# Patient Record
Sex: FEMALE | Race: WHITE | NOT HISPANIC OR LATINO | URBAN - METROPOLITAN AREA
[De-identification: names, ages, dates, MRNs, and addresses within clinical notes are randomized per-mention and may not be internally consistent; named-entity substitution may affect disease eponyms.]

---

## 2020-01-06 ENCOUNTER — HISTORICAL (OUTPATIENT)
Dept: INTENSIVE CARE | Facility: HOSPITAL | Age: 50
End: 2020-01-06

## 2020-07-25 ENCOUNTER — HOSPITAL ENCOUNTER (INPATIENT)
Facility: HOSPITAL | Age: 50
LOS: 11 days | Discharge: NURSING, REHAB OR LONG-TERM CARE | DRG: 025 | End: 2020-08-06
Attending: EMERGENCY MEDICINE | Admitting: INTERNAL MEDICINE
Payer: COMMERCIAL

## 2020-07-25 DIAGNOSIS — E83.52 HYPERCALCEMIA: ICD-10-CM

## 2020-07-25 DIAGNOSIS — G89.29 CHRONIC NONINTRACTABLE HEADACHE, UNSPECIFIED HEADACHE TYPE: ICD-10-CM

## 2020-07-25 DIAGNOSIS — Z01.818 PREOP TESTING: ICD-10-CM

## 2020-07-25 DIAGNOSIS — D32.0 CALCIFIED CEREBRAL MENINGIOMA: Primary | Chronic | ICD-10-CM

## 2020-07-25 DIAGNOSIS — G93.89 BRAIN MASS: ICD-10-CM

## 2020-07-25 DIAGNOSIS — R51.9 CHRONIC NONINTRACTABLE HEADACHE, UNSPECIFIED HEADACHE TYPE: ICD-10-CM

## 2020-07-25 DIAGNOSIS — E87.6 HYPOKALEMIA: ICD-10-CM

## 2020-07-25 DIAGNOSIS — N17.9 AKI (ACUTE KIDNEY INJURY): ICD-10-CM

## 2020-07-25 DIAGNOSIS — D50.9 IRON DEFICIENCY ANEMIA, UNSPECIFIED IRON DEFICIENCY ANEMIA TYPE: ICD-10-CM

## 2020-07-25 DIAGNOSIS — N18.30 ANEMIA DUE TO STAGE 3 CHRONIC KIDNEY DISEASE: ICD-10-CM

## 2020-07-25 DIAGNOSIS — D49.7 NEOPLASM OF CENTRAL NERVOUS SYSTEM: ICD-10-CM

## 2020-07-25 DIAGNOSIS — D49.6 NEOPLASM OF BRAIN CAUSING MASS EFFECT ON ADJACENT STRUCTURES: ICD-10-CM

## 2020-07-25 DIAGNOSIS — D63.1 ANEMIA DUE TO STAGE 3 CHRONIC KIDNEY DISEASE: ICD-10-CM

## 2020-07-25 LAB
HIV 1+2 AB+HIV1 P24 AG SERPL QL IA: NEGATIVE
SARS-COV-2 RDRP RESP QL NAA+PROBE: NEGATIVE

## 2020-07-25 PROCEDURE — 96361 HYDRATE IV INFUSION ADD-ON: CPT

## 2020-07-25 PROCEDURE — 96375 TX/PRO/DX INJ NEW DRUG ADDON: CPT

## 2020-07-25 PROCEDURE — 86703 HIV-1/HIV-2 1 RESULT ANTBDY: CPT

## 2020-07-25 PROCEDURE — 25000003 PHARM REV CODE 250: Performed by: EMERGENCY MEDICINE

## 2020-07-25 PROCEDURE — 99291 CRITICAL CARE FIRST HOUR: CPT | Mod: 25

## 2020-07-25 PROCEDURE — 96374 THER/PROPH/DIAG INJ IV PUSH: CPT

## 2020-07-25 PROCEDURE — U0002 COVID-19 LAB TEST NON-CDC: HCPCS

## 2020-07-25 PROCEDURE — 63600175 PHARM REV CODE 636 W HCPCS: Performed by: EMERGENCY MEDICINE

## 2020-07-25 RX ORDER — ATORVASTATIN CALCIUM 10 MG/1
10 TABLET, FILM COATED ORAL DAILY
COMMUNITY
Start: 2020-06-02

## 2020-07-25 RX ORDER — MORPHINE SULFATE 4 MG/ML
4 INJECTION, SOLUTION INTRAMUSCULAR; INTRAVENOUS
Status: COMPLETED | OUTPATIENT
Start: 2020-07-25 | End: 2020-07-25

## 2020-07-25 RX ORDER — LOSARTAN POTASSIUM AND HYDROCHLOROTHIAZIDE 12.5; 5 MG/1; MG/1
1 TABLET ORAL DAILY
COMMUNITY
End: 2020-07-26 | Stop reason: CLARIF

## 2020-07-25 RX ORDER — LEVETIRACETAM 500 MG/1
1500 TABLET ORAL 2 TIMES DAILY
COMMUNITY

## 2020-07-25 RX ORDER — ASPIRIN 325 MG
325 TABLET ORAL DAILY
Status: ON HOLD | COMMUNITY
End: 2020-08-06 | Stop reason: HOSPADM

## 2020-07-25 RX ORDER — FUROSEMIDE 10 MG/ML
20 INJECTION INTRAMUSCULAR; INTRAVENOUS
Status: COMPLETED | OUTPATIENT
Start: 2020-07-25 | End: 2020-07-25

## 2020-07-25 RX ORDER — LEVOTHYROXINE SODIUM 125 UG/1
125 TABLET ORAL
COMMUNITY
Start: 2019-08-09 | End: 2020-07-26 | Stop reason: CLARIF

## 2020-07-25 RX ORDER — LOSARTAN POTASSIUM 100 MG/1
100 TABLET ORAL DAILY
COMMUNITY
End: 2020-07-25 | Stop reason: CLARIF

## 2020-07-25 RX ORDER — ONDANSETRON 2 MG/ML
4 INJECTION INTRAMUSCULAR; INTRAVENOUS
Status: COMPLETED | OUTPATIENT
Start: 2020-07-25 | End: 2020-07-25

## 2020-07-25 RX ORDER — METFORMIN HYDROCHLORIDE 500 MG/1
500 TABLET ORAL 2 TIMES DAILY WITH MEALS
COMMUNITY

## 2020-07-25 RX ORDER — CALCITRIOL 0.25 UG/1
0.25 CAPSULE ORAL DAILY
Status: ON HOLD | COMMUNITY
Start: 2020-07-14 | End: 2021-01-27

## 2020-07-25 RX ORDER — GABAPENTIN 600 MG/1
600 TABLET ORAL DAILY
Status: ON HOLD | COMMUNITY
Start: 2020-06-25 | End: 2021-01-27

## 2020-07-25 RX ORDER — ZOLPIDEM TARTRATE 10 MG/1
10 TABLET ORAL NIGHTLY PRN
Status: ON HOLD | COMMUNITY
Start: 2020-06-25 | End: 2020-08-06 | Stop reason: SDUPTHER

## 2020-07-25 RX ORDER — CLOPIDOGREL BISULFATE 75 MG/1
75 TABLET ORAL DAILY
Status: ON HOLD | COMMUNITY
Start: 2020-06-25 | End: 2020-08-06 | Stop reason: HOSPADM

## 2020-07-25 RX ADMIN — SODIUM CHLORIDE 1000 ML: 0.9 INJECTION, SOLUTION INTRAVENOUS at 10:07

## 2020-07-25 RX ADMIN — FUROSEMIDE 20 MG: 10 INJECTION, SOLUTION INTRAMUSCULAR; INTRAVENOUS at 10:07

## 2020-07-25 RX ADMIN — ONDANSETRON 4 MG: 2 INJECTION INTRAMUSCULAR; INTRAVENOUS at 08:07

## 2020-07-25 RX ADMIN — MORPHINE SULFATE 4 MG: 4 INJECTION, SOLUTION INTRAMUSCULAR; INTRAVENOUS at 08:07

## 2020-07-26 PROBLEM — N17.9 AKI (ACUTE KIDNEY INJURY): Status: ACTIVE | Noted: 2020-07-26

## 2020-07-26 PROBLEM — I10 ESSENTIAL HYPERTENSION: Chronic | Status: ACTIVE | Noted: 2020-07-26

## 2020-07-26 PROBLEM — E83.52 HYPERCALCEMIA: Status: ACTIVE | Noted: 2020-07-26

## 2020-07-26 PROBLEM — G93.89 BRAIN MASS: Status: ACTIVE | Noted: 2020-07-26

## 2020-07-26 LAB
25(OH)D3+25(OH)D2 SERPL-MCNC: 18 NG/ML (ref 30–96)
ALBUMIN SERPL BCP-MCNC: 3.1 G/DL (ref 3.5–5.2)
ALBUMIN SERPL BCP-MCNC: 3.4 G/DL (ref 3.5–5.2)
ALBUMIN SERPL BCP-MCNC: 3.4 G/DL (ref 3.5–5.2)
ALP SERPL-CCNC: 158 U/L (ref 55–135)
ALP SERPL-CCNC: 158 U/L (ref 55–135)
ALT SERPL W/O P-5'-P-CCNC: 22 U/L (ref 10–44)
ALT SERPL W/O P-5'-P-CCNC: 23 U/L (ref 10–44)
ANION GAP SERPL CALC-SCNC: 12 MMOL/L (ref 8–16)
ANION GAP SERPL CALC-SCNC: 12 MMOL/L (ref 8–16)
ANION GAP SERPL CALC-SCNC: 13 MMOL/L (ref 8–16)
AST SERPL-CCNC: 26 U/L (ref 10–40)
AST SERPL-CCNC: 29 U/L (ref 10–40)
BACTERIA #/AREA URNS HPF: ABNORMAL /HPF
BASOPHILS # BLD AUTO: 0.04 K/UL (ref 0–0.2)
BASOPHILS NFR BLD: 0.4 % (ref 0–1.9)
BILIRUB SERPL-MCNC: 0.3 MG/DL (ref 0.1–1)
BILIRUB SERPL-MCNC: 0.3 MG/DL (ref 0.1–1)
BILIRUB UR QL STRIP: NEGATIVE
BUN SERPL-MCNC: 19 MG/DL (ref 6–20)
BUN SERPL-MCNC: 20 MG/DL (ref 6–20)
BUN SERPL-MCNC: 21 MG/DL (ref 6–20)
CA-I BLDV-SCNC: 1.76 MMOL/L (ref 1.06–1.42)
CALCIUM SERPL-MCNC: 13.3 MG/DL (ref 8.7–10.5)
CALCIUM SERPL-MCNC: 14 MG/DL (ref 8.7–10.5)
CALCIUM SERPL-MCNC: 14.2 MG/DL (ref 8.7–10.5)
CHLORIDE SERPL-SCNC: 97 MMOL/L (ref 95–110)
CHLORIDE SERPL-SCNC: 98 MMOL/L (ref 95–110)
CHLORIDE SERPL-SCNC: 98 MMOL/L (ref 95–110)
CHOLEST SERPL-MCNC: 136 MG/DL (ref 120–199)
CHOLEST/HDLC SERPL: 3.9 {RATIO} (ref 2–5)
CLARITY UR: ABNORMAL
CO2 SERPL-SCNC: 25 MMOL/L (ref 23–29)
CO2 SERPL-SCNC: 25 MMOL/L (ref 23–29)
CO2 SERPL-SCNC: 26 MMOL/L (ref 23–29)
COLOR UR: YELLOW
CREAT SERPL-MCNC: 1.9 MG/DL (ref 0.5–1.4)
CREAT SERPL-MCNC: 2.4 MG/DL (ref 0.5–1.4)
CREAT SERPL-MCNC: 2.5 MG/DL (ref 0.5–1.4)
CREAT UR-MCNC: 66.1 MG/DL (ref 15–325)
CREAT UR-MCNC: 66.1 MG/DL (ref 15–325)
DIFFERENTIAL METHOD: ABNORMAL
EOSINOPHIL # BLD AUTO: 0.1 K/UL (ref 0–0.5)
EOSINOPHIL NFR BLD: 0.9 % (ref 0–8)
ERYTHROCYTE [DISTWIDTH] IN BLOOD BY AUTOMATED COUNT: 15.2 % (ref 11.5–14.5)
EST. GFR  (AFRICAN AMERICAN): 25 ML/MIN/1.73 M^2
EST. GFR  (AFRICAN AMERICAN): 26 ML/MIN/1.73 M^2
EST. GFR  (AFRICAN AMERICAN): 35 ML/MIN/1.73 M^2
EST. GFR  (NON AFRICAN AMERICAN): 22 ML/MIN/1.73 M^2
EST. GFR  (NON AFRICAN AMERICAN): 23 ML/MIN/1.73 M^2
EST. GFR  (NON AFRICAN AMERICAN): 30 ML/MIN/1.73 M^2
GLUCOSE SERPL-MCNC: 103 MG/DL (ref 70–110)
GLUCOSE SERPL-MCNC: 110 MG/DL (ref 70–110)
GLUCOSE SERPL-MCNC: 124 MG/DL (ref 70–110)
GLUCOSE UR QL STRIP: NEGATIVE
HCT VFR BLD AUTO: 32.6 % (ref 37–48.5)
HDLC SERPL-MCNC: 35 MG/DL (ref 40–75)
HDLC SERPL: 25.7 % (ref 20–50)
HGB BLD-MCNC: 9.8 G/DL (ref 12–16)
HGB UR QL STRIP: ABNORMAL
IMM GRANULOCYTES # BLD AUTO: 0.09 K/UL (ref 0–0.04)
IMM GRANULOCYTES NFR BLD AUTO: 0.8 % (ref 0–0.5)
KETONES UR QL STRIP: NEGATIVE
LDLC SERPL CALC-MCNC: 67.6 MG/DL (ref 63–159)
LEUKOCYTE ESTERASE UR QL STRIP: ABNORMAL
LYMPHOCYTES # BLD AUTO: 2.9 K/UL (ref 1–4.8)
LYMPHOCYTES NFR BLD: 27.4 % (ref 18–48)
MAGNESIUM SERPL-MCNC: 1.9 MG/DL (ref 1.6–2.6)
MCH RBC QN AUTO: 24.6 PG (ref 27–31)
MCHC RBC AUTO-ENTMCNC: 30.1 G/DL (ref 32–36)
MCV RBC AUTO: 82 FL (ref 82–98)
MICROSCOPIC COMMENT: ABNORMAL
MONOCYTES # BLD AUTO: 0.7 K/UL (ref 0.3–1)
MONOCYTES NFR BLD: 6.4 % (ref 4–15)
NEUTROPHILS # BLD AUTO: 6.9 K/UL (ref 1.8–7.7)
NEUTROPHILS NFR BLD: 64.1 % (ref 38–73)
NITRITE UR QL STRIP: POSITIVE
NONHDLC SERPL-MCNC: 101 MG/DL
NRBC BLD-RTO: 0 /100 WBC
PH UR STRIP: 6 [PH] (ref 5–8)
PHOSPHATE SERPL-MCNC: 4.5 MG/DL (ref 2.7–4.5)
PHOSPHATE SERPL-MCNC: 5.3 MG/DL (ref 2.7–4.5)
PLATELET # BLD AUTO: 370 K/UL (ref 150–350)
PMV BLD AUTO: 9.9 FL (ref 9.2–12.9)
POCT GLUCOSE: 101 MG/DL (ref 70–110)
POCT GLUCOSE: 112 MG/DL (ref 70–110)
POCT GLUCOSE: 114 MG/DL (ref 70–110)
POCT GLUCOSE: 123 MG/DL (ref 70–110)
POTASSIUM SERPL-SCNC: 4.6 MMOL/L (ref 3.5–5.1)
POTASSIUM SERPL-SCNC: 4.6 MMOL/L (ref 3.5–5.1)
POTASSIUM SERPL-SCNC: 4.8 MMOL/L (ref 3.5–5.1)
PROT SERPL-MCNC: 6.9 G/DL (ref 6–8.4)
PROT SERPL-MCNC: 6.9 G/DL (ref 6–8.4)
PROT UR QL STRIP: NEGATIVE
PROT UR-MCNC: 12 MG/DL (ref 0–15)
PROT UR-MCNC: 12 MG/DL (ref 0–15)
PROT/CREAT UR: 0.18 MG/G{CREAT} (ref 0–0.2)
PTH-INTACT SERPL-MCNC: <5 PG/ML (ref 9–77)
RBC # BLD AUTO: 3.98 M/UL (ref 4–5.4)
RBC #/AREA URNS HPF: 5 /HPF (ref 0–4)
SODIUM SERPL-SCNC: 135 MMOL/L (ref 136–145)
SODIUM SERPL-SCNC: 135 MMOL/L (ref 136–145)
SODIUM SERPL-SCNC: 136 MMOL/L (ref 136–145)
SODIUM UR-SCNC: 82 MMOL/L (ref 20–250)
SP GR UR STRIP: 1.02 (ref 1–1.03)
TRIGL SERPL-MCNC: 167 MG/DL (ref 30–150)
TSH SERPL DL<=0.005 MIU/L-ACNC: 1.54 UIU/ML (ref 0.4–4)
URN SPEC COLLECT METH UR: ABNORMAL
UROBILINOGEN UR STRIP-ACNC: NEGATIVE EU/DL
UUN UR-MCNC: 208 MG/DL (ref 140–1050)
WBC # BLD AUTO: 10.72 K/UL (ref 3.9–12.7)
WBC #/AREA URNS HPF: >100 /HPF (ref 0–5)

## 2020-07-26 PROCEDURE — 84540 ASSAY OF URINE/UREA-N: CPT

## 2020-07-26 PROCEDURE — 83735 ASSAY OF MAGNESIUM: CPT

## 2020-07-26 PROCEDURE — 82306 VITAMIN D 25 HYDROXY: CPT

## 2020-07-26 PROCEDURE — 87086 URINE CULTURE/COLONY COUNT: CPT

## 2020-07-26 PROCEDURE — 80061 LIPID PANEL: CPT

## 2020-07-26 PROCEDURE — 25000003 PHARM REV CODE 250: Performed by: NURSE PRACTITIONER

## 2020-07-26 PROCEDURE — 99223 PR INITIAL HOSPITAL CARE,LEVL III: ICD-10-PCS | Mod: ,,, | Performed by: INTERNAL MEDICINE

## 2020-07-26 PROCEDURE — 80069 RENAL FUNCTION PANEL: CPT

## 2020-07-26 PROCEDURE — 25000003 PHARM REV CODE 250: Performed by: INTERNAL MEDICINE

## 2020-07-26 PROCEDURE — 63600175 PHARM REV CODE 636 W HCPCS: Performed by: NURSE PRACTITIONER

## 2020-07-26 PROCEDURE — 81000 URINALYSIS NONAUTO W/SCOPE: CPT

## 2020-07-26 PROCEDURE — 84156 ASSAY OF PROTEIN URINE: CPT

## 2020-07-26 PROCEDURE — 84100 ASSAY OF PHOSPHORUS: CPT

## 2020-07-26 PROCEDURE — 63600175 PHARM REV CODE 636 W HCPCS: Performed by: INTERNAL MEDICINE

## 2020-07-26 PROCEDURE — 83970 ASSAY OF PARATHORMONE: CPT

## 2020-07-26 PROCEDURE — 84300 ASSAY OF URINE SODIUM: CPT

## 2020-07-26 PROCEDURE — 80053 COMPREHEN METABOLIC PANEL: CPT

## 2020-07-26 PROCEDURE — 96372 THER/PROPH/DIAG INJ SC/IM: CPT

## 2020-07-26 PROCEDURE — 99223 1ST HOSP IP/OBS HIGH 75: CPT | Mod: ,,, | Performed by: NEUROLOGICAL SURGERY

## 2020-07-26 PROCEDURE — 84443 ASSAY THYROID STIM HORMONE: CPT

## 2020-07-26 PROCEDURE — 82330 ASSAY OF CALCIUM: CPT

## 2020-07-26 PROCEDURE — 99223 1ST HOSP IP/OBS HIGH 75: CPT | Mod: ,,, | Performed by: INTERNAL MEDICINE

## 2020-07-26 PROCEDURE — 36415 COLL VENOUS BLD VENIPUNCTURE: CPT

## 2020-07-26 PROCEDURE — 85025 COMPLETE CBC W/AUTO DIFF WBC: CPT

## 2020-07-26 PROCEDURE — 99223 PR INITIAL HOSPITAL CARE,LEVL III: ICD-10-PCS | Mod: ,,, | Performed by: NEUROLOGICAL SURGERY

## 2020-07-26 PROCEDURE — 11000001 HC ACUTE MED/SURG PRIVATE ROOM

## 2020-07-26 PROCEDURE — 80053 COMPREHEN METABOLIC PANEL: CPT | Mod: 91

## 2020-07-26 PROCEDURE — 82652 VIT D 1 25-DIHYDROXY: CPT

## 2020-07-26 RX ORDER — HYDRALAZINE HYDROCHLORIDE 25 MG/1
25 TABLET, FILM COATED ORAL EVERY 8 HOURS
Status: DISCONTINUED | OUTPATIENT
Start: 2020-07-26 | End: 2020-08-06 | Stop reason: HOSPADM

## 2020-07-26 RX ORDER — GABAPENTIN 300 MG/1
600 CAPSULE ORAL DAILY
Status: DISCONTINUED | OUTPATIENT
Start: 2020-07-26 | End: 2020-08-06 | Stop reason: HOSPADM

## 2020-07-26 RX ORDER — CLOPIDOGREL BISULFATE 75 MG/1
75 TABLET ORAL DAILY
Status: DISCONTINUED | OUTPATIENT
Start: 2020-07-26 | End: 2020-07-27

## 2020-07-26 RX ORDER — ONDANSETRON 2 MG/ML
4 INJECTION INTRAMUSCULAR; INTRAVENOUS EVERY 6 HOURS PRN
Status: DISCONTINUED | OUTPATIENT
Start: 2020-07-26 | End: 2020-07-31

## 2020-07-26 RX ORDER — INSULIN ASPART 100 [IU]/ML
0-5 INJECTION, SOLUTION INTRAVENOUS; SUBCUTANEOUS
Status: DISCONTINUED | OUTPATIENT
Start: 2020-07-26 | End: 2020-07-27

## 2020-07-26 RX ORDER — LEVOTHYROXINE SODIUM 137 UG/1
TABLET ORAL
Status: ON HOLD | COMMUNITY
Start: 2020-07-14 | End: 2020-08-06 | Stop reason: SDUPTHER

## 2020-07-26 RX ORDER — LEVETIRACETAM 500 MG/1
1500 TABLET ORAL 2 TIMES DAILY
Status: DISCONTINUED | OUTPATIENT
Start: 2020-07-26 | End: 2020-07-30

## 2020-07-26 RX ORDER — IBUPROFEN 200 MG
16 TABLET ORAL
Status: DISCONTINUED | OUTPATIENT
Start: 2020-07-26 | End: 2020-07-27

## 2020-07-26 RX ORDER — SODIUM CHLORIDE 9 MG/ML
INJECTION, SOLUTION INTRAVENOUS CONTINUOUS
Status: DISCONTINUED | OUTPATIENT
Start: 2020-07-26 | End: 2020-07-27

## 2020-07-26 RX ORDER — HYDRALAZINE HYDROCHLORIDE 20 MG/ML
10 INJECTION INTRAMUSCULAR; INTRAVENOUS EVERY 8 HOURS PRN
Status: DISCONTINUED | OUTPATIENT
Start: 2020-07-26 | End: 2020-08-06 | Stop reason: HOSPADM

## 2020-07-26 RX ORDER — HEPARIN SODIUM 5000 [USP'U]/ML
5000 INJECTION, SOLUTION INTRAVENOUS; SUBCUTANEOUS EVERY 8 HOURS
Status: DISCONTINUED | OUTPATIENT
Start: 2020-07-26 | End: 2020-07-30

## 2020-07-26 RX ORDER — ACETAMINOPHEN 325 MG/1
650 TABLET ORAL EVERY 6 HOURS PRN
Status: DISCONTINUED | OUTPATIENT
Start: 2020-07-26 | End: 2020-07-31

## 2020-07-26 RX ORDER — IBUPROFEN 200 MG
24 TABLET ORAL
Status: DISCONTINUED | OUTPATIENT
Start: 2020-07-26 | End: 2020-07-27

## 2020-07-26 RX ORDER — GLUCAGON 1 MG
1 KIT INJECTION
Status: DISCONTINUED | OUTPATIENT
Start: 2020-07-26 | End: 2020-07-27

## 2020-07-26 RX ORDER — ATORVASTATIN CALCIUM 10 MG/1
10 TABLET, FILM COATED ORAL DAILY
Status: DISCONTINUED | OUTPATIENT
Start: 2020-07-26 | End: 2020-08-06 | Stop reason: HOSPADM

## 2020-07-26 RX ORDER — LOSARTAN POTASSIUM AND HYDROCHLOROTHIAZIDE 12.5; 1 MG/1; MG/1
TABLET ORAL
Status: ON HOLD | COMMUNITY
Start: 2020-05-16 | End: 2020-08-06 | Stop reason: HOSPADM

## 2020-07-26 RX ORDER — ASPIRIN 325 MG
325 TABLET ORAL DAILY
Status: DISCONTINUED | OUTPATIENT
Start: 2020-07-26 | End: 2020-07-27

## 2020-07-26 RX ORDER — ENOXAPARIN SODIUM 100 MG/ML
40 INJECTION SUBCUTANEOUS EVERY 24 HOURS
Status: DISCONTINUED | OUTPATIENT
Start: 2020-07-26 | End: 2020-07-26

## 2020-07-26 RX ADMIN — ATORVASTATIN CALCIUM 10 MG: 10 TABLET, FILM COATED ORAL at 09:07

## 2020-07-26 RX ADMIN — CEFTRIAXONE 1 G: 1 INJECTION, SOLUTION INTRAVENOUS at 06:07

## 2020-07-26 RX ADMIN — LEVOTHYROXINE SODIUM 137 MCG: 25 TABLET ORAL at 06:07

## 2020-07-26 RX ADMIN — SODIUM CHLORIDE: 0.9 INJECTION, SOLUTION INTRAVENOUS at 04:07

## 2020-07-26 RX ADMIN — CLOPIDOGREL 75 MG: 75 TABLET, FILM COATED ORAL at 09:07

## 2020-07-26 RX ADMIN — HEPARIN SODIUM 5000 UNITS: 5000 INJECTION, SOLUTION INTRAVENOUS; SUBCUTANEOUS at 09:07

## 2020-07-26 RX ADMIN — HYDRALAZINE HYDROCHLORIDE 25 MG: 25 TABLET, FILM COATED ORAL at 09:07

## 2020-07-26 RX ADMIN — GABAPENTIN 600 MG: 300 CAPSULE ORAL at 09:07

## 2020-07-26 RX ADMIN — HEPARIN SODIUM 5000 UNITS: 5000 INJECTION, SOLUTION INTRAVENOUS; SUBCUTANEOUS at 04:07

## 2020-07-26 RX ADMIN — SODIUM CHLORIDE: 0.9 INJECTION, SOLUTION INTRAVENOUS at 09:07

## 2020-07-26 RX ADMIN — HEPARIN SODIUM 5000 UNITS: 5000 INJECTION, SOLUTION INTRAVENOUS; SUBCUTANEOUS at 06:07

## 2020-07-26 RX ADMIN — LEVETIRACETAM 1500 MG: 500 TABLET ORAL at 09:07

## 2020-07-26 RX ADMIN — ACETAMINOPHEN 650 MG: 325 TABLET ORAL at 09:07

## 2020-07-26 RX ADMIN — ASPIRIN 325 MG: 325 TABLET, FILM COATED ORAL at 09:07

## 2020-07-26 RX ADMIN — HYDRALAZINE HYDROCHLORIDE 10 MG: 20 INJECTION INTRAMUSCULAR; INTRAVENOUS at 04:07

## 2020-07-26 RX ADMIN — LEVETIRACETAM 1500 MG: 500 TABLET ORAL at 08:07

## 2020-07-26 NOTE — ASSESSMENT & PLAN NOTE
1. SHARLENE on CKD stage 3 , baseline creatinine about 1.3 mg/dL, GFR 50 mL/minute, acute kidney injury likely dehydration from hypercalcemia, continue IV fluids, also can be a urinary tract infection, urine culture pending, will start her on IV Rocephin, renal ultrasound ordered and pending at this time,  Avoid Ace inhibitors/ARB/NSAIDs due to acute kidney injury,    2.  Hypercalcemia, patient was taking Tums, calcitriol, hydrochlorothiazide, serum vitamin-D level slightly low at 18, PTH appropriately suppressed at less than 5, PTH RP pending, improvement in serum calcium noted with hydration,     3.  Hypertension, blood pressure is slightly elevated, can be due to her nausea, will add low-dose hydralazine,    4.  Intracranial mass, neurosurgery notes reviewed, MRI pending,    5.  Anemia, multifactorial, monitor hemoglobin,    6.  Possible urinary tract infection, urine cultures pending, will start on IV Rocephin,

## 2020-07-26 NOTE — ASSESSMENT & PLAN NOTE
- Initial creatinine of 1.9 (baseline 1.3).  - IV hydration.  - Avoid nephrotoxic agents. Hold home losartan-HCTZ for now.  - Follow labs.

## 2020-07-26 NOTE — PLAN OF CARE
SW met with patient and her  at bedside to complete discharge planning assessment. Patient lives with her  and he helps manages her care at home. Patient uses a power chair at home and states she is independent with her needs. She denies any current home health or outpatient treatment at this time. She denies any current lw/poa. Patient d/c contact will be her . No other needs identified at this time. Patient denies any post hospital needs or services at this time. Transitional Care Folder, Discharge Planning Begins on Admission pamphlet, [facility], Advance Directive information given to patient. Sw placed contact information on white board. Sw instructed patient or family to call with any questions or concerns.    D/C plan: Home with family  Sabra Fregoso MD  Pharm: [facility] in [place]   Bedside: Yes  Transportation:       07/26/20 1155   Discharge Assessment   Assessment Type Discharge Planning Assessment   Confirmed/corrected address and phone number on facesheet? Yes   Assessment information obtained from? Patient;Caregiver   Communicated expected length of stay with patient/caregiver no   Prior to hospitilization cognitive status: Alert/Oriented   Prior to hospitalization functional status: Assistive Equipment   Current cognitive status: Alert/Oriented   Current Functional Status: Assistive Equipment   Lives With spouse   Able to Return to Prior Arrangements yes   Is patient able to care for self after discharge? Unable to determine at this time (comments)   Who are your caregiver(s) and their phone number(s)? COLLIN DE LA ROSA [phone]   Readmission Within the Last 30 Days no previous admission in last 30 days   Patient currently being followed by outpatient case management? No   Patient currently receives any other outside agency services? No   Equipment Currently Used at Home power chair   Do you have any problems affording any of your  prescribed medications? No   Is the patient taking medications as prescribed? yes   Does the patient have transportation home? Yes   Transportation Anticipated family or friend will provide   Does the patient receive services at the [facility]? No   Discharge Plan A Home with family   Discharge Plan B Home   DME Needed Upon Discharge  none   Patient/Family in Agreement with Plan yes   Does the patient have transportation to healthcare appointments? Yes

## 2020-07-26 NOTE — ASSESSMENT & PLAN NOTE
- Initial calcium of 14.2.  Patient is on calitriol at home, will discontinue.  - Continue aggressive IV hydration.  Will give additional IV Lasix if needed.  - Follow labs.  - Nephrology consult.

## 2020-07-26 NOTE — PLAN OF CARE
Chart reviewed, pt resting in bed with call light and personal belongings within reach  Vitals stable.  Iv fluids infusing as ordered.  Bed alarm set.  Neuro checks every 4 hours.  Pt absent of injury throughout shift, will continue to monitor.

## 2020-07-26 NOTE — SUBJECTIVE & OBJECTIVE
Past Medical History:   Diagnosis Date    Diabetes mellitus     Hypertension     Seizures     Stroke     Thyroid disease        Past Surgical History:   Procedure Laterality Date    THYROIDECTOMY      TONSILLECTOMY         Review of patient's allergies indicates:  No Known Allergies    No current facility-administered medications on file prior to encounter.      Current Outpatient Medications on File Prior to Encounter   Medication Sig    aspirin 325 MG tablet Take 325 mg by mouth once daily.    atorvastatin (LIPITOR) 10 MG tablet Take 10 mg by mouth once daily.     calcitRIOL (ROCALTROL) 0.25 MCG Cap 0.25 mcg once daily.     clopidogreL (PLAVIX) 75 mg tablet Take 75 mg by mouth once daily.     gabapentin (NEURONTIN) 600 MG tablet Take 600 mg by mouth once daily.     levETIRAcetam (KEPPRA) 500 MG Tab Take 1,500 mg by mouth 2 (two) times daily.    levothyroxine (SYNTHROID) 137 MCG Tab tablet     metFORMIN (GLUCOPHAGE) 500 MG tablet Take 500 mg by mouth 2 (two) times daily with meals.    zolpidem (AMBIEN) 10 mg Tab 10 mg nightly as needed.     [DISCONTINUED] levothyroxine (SYNTHROID) 125 MCG tablet Take 125 mcg by mouth before breakfast.     [DISCONTINUED] losartan-hydrochlorothiazide 50-12.5 mg (HYZAAR) 50-12.5 mg per tablet Take 1 tablet by mouth once daily.    losartan-hydrochlorothiazide 100-12.5 mg (HYZAAR) 100-12.5 mg Tab      Family History     Reviewed and not pertinent.         Tobacco Use    Smoking status: Current Every Day Smoker     Packs/day: 1.00     Types: Cigarettes   Substance and Sexual Activity    Alcohol use: Yes    Drug use: Not Currently    Sexual activity: Not on file     Review of Systems   Constitutional: Positive for fatigue. Negative for activity change, chills, diaphoresis and fever.   HENT: Negative for congestion, drooling, postnasal drip, rhinorrhea, sinus pressure and sore throat.    Eyes: Negative for photophobia and visual disturbance.   Respiratory: Negative  for cough, shortness of breath and wheezing.    Cardiovascular: Negative for chest pain, palpitations and leg swelling.   Gastrointestinal: Negative for abdominal pain, constipation, diarrhea, nausea and vomiting.   Genitourinary: Negative for dysuria, hematuria and urgency.   Musculoskeletal: Positive for gait problem. Negative for arthralgias, back pain and myalgias.   Skin: Negative for pallor and rash.   Neurological: Positive for dizziness and weakness (generalized). Negative for seizures, syncope, facial asymmetry, speech difficulty, light-headedness, numbness and headaches.   Psychiatric/Behavioral: The patient is not nervous/anxious.    All other systems reviewed and are negative.    Objective:     Vital Signs (Most Recent):  Temp: 97.8 °F (36.6 °C) (07/26/20 0435)  Pulse: 76 (07/26/20 0435)  Resp: 15 (07/26/20 0231)  BP: (!) 141/79 (07/26/20 0442)  SpO2: (!) 92 % (07/26/20 0435) Vital Signs (24h Range):  Temp:  [97.8 °F (36.6 °C)-98.4 °F (36.9 °C)] 97.8 °F (36.6 °C)  Pulse:  [67-85] 76  Resp:  [13-18] 15  SpO2:  [88 %-97 %] 92 %  BP: (141-192)/(65-90) 141/79     Weight: 129.8 kg (286 lb 2.5 oz)  Body mass index is 41.06 kg/m².    Physical Exam  Vitals signs and nursing note reviewed.   Constitutional:       General: She is sleeping. She is not in acute distress.     Appearance: Normal appearance. She is well-developed. She is not diaphoretic.   HENT:      Head: Normocephalic and atraumatic.   Eyes:      Extraocular Movements: Extraocular movements intact.      Conjunctiva/sclera: Conjunctivae normal.      Pupils: Pupils are equal, round, and reactive to light.   Neck:      Musculoskeletal: Normal range of motion and neck supple.      Vascular: Normal carotid pulses. No carotid bruit.   Cardiovascular:      Rate and Rhythm: Normal rate and regular rhythm.      Heart sounds: S1 normal and S2 normal. No murmur.   Pulmonary:      Effort: Pulmonary effort is normal.      Breath sounds: Normal breath sounds and  air entry. No wheezing, rhonchi or rales.   Abdominal:      General: Bowel sounds are normal. There is no distension.      Palpations: Abdomen is soft.      Tenderness: There is no abdominal tenderness.   Musculoskeletal: Normal range of motion.         General: No tenderness.   Skin:     General: Skin is warm and dry.      Capillary Refill: Capillary refill takes less than 2 seconds.      Findings: No erythema or rash.   Neurological:      General: No focal deficit present.      Mental Status: She is oriented to person, place, and time and easily aroused. She is lethargic.      GCS: GCS eye subscore is 4. GCS verbal subscore is 5. GCS motor subscore is 6.      Cranial Nerves: Cranial nerves are intact.      Sensory: Sensation is intact.      Motor: Motor function is intact.      Coordination: Coordination is intact.      Comments: Muscle strength 4/5 to all extremities.  Speech clear and appropriate.   Psychiatric:         Mood and Affect: Mood and affect normal.         Speech: Speech normal.         Behavior: Behavior normal.         Cognition and Memory: Cognition and memory normal.           CRANIAL NERVES     CN III, IV, VI   Pupils are equal, round, and reactive to light.       Significant Labs:  Results for orders placed or performed during the hospital encounter of 07/25/20   HIV 1/2 Ag/Ab (4th Gen)   Result Value Ref Range    HIV 1/2 Ag/Ab Negative Negative   COVID-19 Rapid Screening   Result Value Ref Range    SARS-CoV-2 RNA, Amplification, Qual Negative Negative   Comprehensive metabolic panel   Result Value Ref Range    Sodium 136 136 - 145 mmol/L    Potassium 4.8 3.5 - 5.1 mmol/L    Chloride 98 95 - 110 mmol/L    CO2 25 23 - 29 mmol/L    Glucose 110 70 - 110 mg/dL    BUN, Bld 19 6 - 20 mg/dL    Creatinine 1.9 (H) 0.5 - 1.4 mg/dL    Calcium 14.2 (HH) 8.7 - 10.5 mg/dL    Total Protein 6.9 6.0 - 8.4 g/dL    Albumin 3.4 (L) 3.5 - 5.2 g/dL    Total Bilirubin 0.3 0.1 - 1.0 mg/dL    Alkaline Phosphatase 158  (H) 55 - 135 U/L    AST 26 10 - 40 U/L    ALT 23 10 - 44 U/L    Anion Gap 13 8 - 16 mmol/L    eGFR if African American 35 (A) >60 mL/min/1.73 m^2    eGFR if non African American 30 (A) >60 mL/min/1.73 m^2   CBC auto differential   Result Value Ref Range    WBC 10.72 3.90 - 12.70 K/uL    RBC 3.98 (L) 4.00 - 5.40 M/uL    Hemoglobin 9.8 (L) 12.0 - 16.0 g/dL    Hematocrit 32.6 (L) 37.0 - 48.5 %    Mean Corpuscular Volume 82 82 - 98 fL    Mean Corpuscular Hemoglobin 24.6 (L) 27.0 - 31.0 pg    Mean Corpuscular Hemoglobin Conc 30.1 (L) 32.0 - 36.0 g/dL    RDW 15.2 (H) 11.5 - 14.5 %    Platelets 370 (H) 150 - 350 K/uL    MPV 9.9 9.2 - 12.9 fL    Immature Granulocytes 0.8 (H) 0.0 - 0.5 %    Gran # (ANC) 6.9 1.8 - 7.7 K/uL    Immature Grans (Abs) 0.09 (H) 0.00 - 0.04 K/uL    Lymph # 2.9 1.0 - 4.8 K/uL    Mono # 0.7 0.3 - 1.0 K/uL    Eos # 0.1 0.0 - 0.5 K/uL    Baso # 0.04 0.00 - 0.20 K/uL    nRBC 0 0 /100 WBC    Gran% 64.1 38.0 - 73.0 %    Lymph% 27.4 18.0 - 48.0 %    Mono% 6.4 4.0 - 15.0 %    Eosinophil% 0.9 0.0 - 8.0 %    Basophil% 0.4 0.0 - 1.9 %    Differential Method Automated       All pertinent labs within the past 24 hours have been reviewed.    Significant Imaging:  Imaging Results    None        I have reviewed all pertinent imaging results/findings within the past 24 hours.

## 2020-07-26 NOTE — HPI
Cata Lang is a 49 y.o. female patient with a PMHx of brain mass, HTN, DM II, hypothyroidism, seizure disorder, and chronic HA who presents to the Emergency Department for evaluation of neurological problem which onset gradually today. Pt notes she has been walking worse since December and her HA is worse on the L side and rates it a moderate to severe HA. Symptoms are worsening and moderate to severe in severity. No mitigating or exacerbating factors reported. Associated sxs include dizziness, malaise, and difficulty walking. Patient denies any fever, SOB, CP, abd pain, N/V, back pain, weakness, and all other sxs at this time. Pt notes she takes several tums daily which was recommended by her ENT. Pt transferred from [place] and accepted by Dr. Gandara (Neurosurgery). Case discussed with Dr. Gandara in ED who reviewed CT of the head from transferring facility and feels there is no need for neurosurgical intervention at present.  In ED, patient's symptoms completely resolved with NIHSS of 0.  Labs showed: creatinine of 1.9, BUN 19, calcium 14.2. Patient is on calcitriol at home, started last year with no follow-up labs done. IV fluids x 1 liter and 20 mg IV Lasix given. Hospital Medicine was contacted to admit for hypercalcemia and SHARLENE.

## 2020-07-26 NOTE — SUBJECTIVE & OBJECTIVE
Past Medical History:   Diagnosis Date    Diabetes mellitus     Hypertension     Seizures     Stroke     Thyroid disease        Past Surgical History:   Procedure Laterality Date    THYROIDECTOMY      TONSILLECTOMY         Review of patient's allergies indicates:   Allergen Reactions    Hydrochlorothiazide      hypercalcemia     Current Facility-Administered Medications   Medication Frequency    0.9%  NaCl infusion Continuous    acetaminophen tablet 650 mg Q6H PRN    aspirin tablet 325 mg Daily    atorvastatin tablet 10 mg Daily    cefTRIAXone (ROCEPHIN) 1 g/50 mL D5W IVPB Q24H    clopidogreL tablet 75 mg Daily    dextrose 50% injection 12.5 g PRN    dextrose 50% injection 25 g PRN    gabapentin capsule 600 mg Daily    glucagon (human recombinant) injection 1 mg PRN    glucose chewable tablet 16 g PRN    glucose chewable tablet 24 g PRN    heparin (porcine) injection 5,000 Units Q8H    hydrALAZINE injection 10 mg Q8H PRN    insulin aspart U-100 pen 0-5 Units QID (AC + HS) PRN    levETIRAcetam tablet 1,500 mg BID    levothyroxine tablet 137 mcg Before breakfast    ondansetron injection 4 mg Q6H PRN     Family History     None        Tobacco Use    Smoking status: Current Every Day Smoker     Packs/day: 1.00     Types: Cigarettes   Substance and Sexual Activity    Alcohol use: Yes    Drug use: Not Currently    Sexual activity: Not on file     Review of Systems   Constitutional: Positive for activity change and fatigue. Negative for appetite change and fever.   HENT: Negative for congestion, facial swelling, sore throat, trouble swallowing and voice change.    Eyes: Negative for redness and visual disturbance.   Respiratory: Negative for apnea, cough, chest tightness, shortness of breath and wheezing.    Cardiovascular: Negative for chest pain, palpitations and leg swelling.   Gastrointestinal: Positive for nausea. Negative for abdominal distention, abdominal pain, blood in stool,  constipation, diarrhea and vomiting.   Genitourinary: Negative for decreased urine volume, difficulty urinating, dysuria, flank pain, frequency, hematuria, pelvic pain and urgency.   Musculoskeletal: Negative for back pain, gait problem and joint swelling.   Skin: Negative for color change and rash.   Neurological: Positive for weakness and headaches. Negative for dizziness and syncope.   Hematological: Does not bruise/bleed easily.   Psychiatric/Behavioral: Negative for agitation, behavioral problems and confusion. The patient is not nervous/anxious.      Objective:     Vital Signs (Most Recent):  Temp: 96.5 °F (35.8 °C) (07/26/20 1201)  Pulse: 68 (07/26/20 1201)  Resp: 18 (07/26/20 1201)  BP: (!) 152/72 (07/26/20 1201)  SpO2: (!) 94 % (07/26/20 1201)  O2 Device (Oxygen Therapy): room air (07/26/20 0435) Vital Signs (24h Range):  Temp:  [96.5 °F (35.8 °C)-98.4 °F (36.9 °C)] 96.5 °F (35.8 °C)  Pulse:  [67-85] 68  Resp:  [13-20] 18  SpO2:  [88 %-97 %] 94 %  BP: (141-192)/(65-90) 152/72     Weight: 129.8 kg (286 lb 2.5 oz) (07/26/20 0435)  Body mass index is 41.06 kg/m².  Body surface area is 2.53 meters squared.    I/O last 3 completed shifts:  In: 1000 [IV Piggyback:1000]  Out: 1000 [Urine:1000]    Physical Exam  Constitutional:       General: She is in acute distress.      Appearance: She is well-developed.   HENT:      Head: Normocephalic and atraumatic.      Mouth/Throat:      Pharynx: No oropharyngeal exudate.   Eyes:      Conjunctiva/sclera: Conjunctivae normal.      Pupils: Pupils are equal, round, and reactive to light.   Neck:      Musculoskeletal: Normal range of motion and neck supple.      Thyroid: No thyroid mass or thyromegaly.      Vascular: No carotid bruit or JVD.      Trachea: No tracheal deviation.   Cardiovascular:      Rate and Rhythm: Normal rate and regular rhythm.      Heart sounds: Normal heart sounds. No murmur. No friction rub. No gallop.    Pulmonary:      Effort: Pulmonary effort is  normal. No respiratory distress.      Breath sounds: Normal breath sounds. No wheezing or rales.   Chest:      Chest wall: No tenderness.   Abdominal:      General: Bowel sounds are normal. There is no distension or abdominal bruit.      Palpations: Abdomen is soft. There is no mass.      Tenderness: There is no abdominal tenderness. There is no guarding or rebound.      Comments: Obese    Musculoskeletal:         General: No tenderness.   Lymphadenopathy:      Cervical: No cervical adenopathy.   Skin:     General: Skin is warm.      Coloration: Skin is not pale.      Findings: No erythema or rash.      Comments: Healing wound on the left leg   Neurological:      Mental Status: She is oriented to person, place, and time.      Cranial Nerves: No cranial nerve deficit.      Motor: No abnormal muscle tone.      Coordination: Coordination normal.   Psychiatric:         Behavior: Behavior normal.         Judgment: Judgment normal.         Significant Labs:  CBC:   Recent Labs   Lab 07/26/20  0045   WBC 10.72   RBC 3.98*   HGB 9.8*   HCT 32.6*   *   MCV 82   MCH 24.6*   MCHC 30.1*     CMP:   Recent Labs   Lab 07/26/20  0452 07/26/20  1141    124*   CALCIUM 14.0* 13.3*   ALBUMIN 3.4* 3.1*   PROT 6.9  --    * 135*   K 4.6 4.6   CO2 26 25   CL 97 98   BUN 20 21*   CREATININE 2.4* 2.5*   ALKPHOS 158*  --    ALT 22  --    AST 29  --    BILITOT 0.3  --      Coagulation: No results for input(s): PT, INR, APTT in the last 168 hours.  LFTs:   Recent Labs   Lab 07/26/20  0452 07/26/20  1141   ALT 22  --    AST 29  --    ALKPHOS 158*  --    BILITOT 0.3  --    PROT 6.9  --    ALBUMIN 3.4* 3.1*     All labs within the past 24 hours have been reviewed.    Significant Imaging:  Reviewed    Lab Results   Component Value Date    PTH <5.0 (L) 07/26/2020    CALCIUM 13.3 (HH) 07/26/2020    CAION 1.76 (H) 07/26/2020    PHOS 4.5 07/26/2020       Lab Results   Component Value Date    ALBUMIN 3.1 (L) 07/26/2020

## 2020-07-26 NOTE — HPI
Cata Lang is a pleasant 49-year-old  woman history of hypertension, type 2 diabetes, morbid obesity, seizure disorder, CKD stage 3, baseline creatinine about 1.3 mg/dL, GFR 50 mL/minute, intracranial mass / lesion, she presents to the emergency room yesterday with progressive complains of weakness, increasing headache, nausea, a repeat MRI of the brain is pending at this time, she was evaluated by Neurosurgery, also significant hypercalcemia with a serum calcium of 14 noted on presentation, patient is currently taking Tums, calcitriol, hydrochlorothiazide, also she possibly has a urinary tract infection, acute on chronic renal failure, serum creatinine was 1.9 on presentation, 2.5 this evening, she is currently receiving IV fluids, we were consulted for acute kidney injury, hypercalcemia

## 2020-07-26 NOTE — H&P
[facility]  History & Physical    Patient Name: Cata Lang  MRN: [id number]  Admission Date: 7/25/2020  Attending Physician: Elina Macedo MD   Primary Care Provider: Sabra Fregoso MD         Patient information was obtained from patient, relative(s), past medical records and ER records.     Subjective:     Principal Problem:Hypercalcemia    Chief Complaint:   Chief Complaint   Patient presents with    Neurologic Problem     Hx of brain mass. Patient sent from [place] for neuro.        HPI:  Cata Lang is a 49 y.o. female patient with a PMHx of brain mass, HTN, DM II, hypothyroidism, seizure disorder, and chronic HA who presents to the Emergency Department for evaluation of neurological problem which onset gradually today. Pt notes she has been walking worse since December and her HA is worse on the L side and rates it a moderate to severe HA. Symptoms are worsening and moderate to severe in severity. No mitigating or exacerbating factors reported. Associated sxs include dizziness, malaise, and difficulty walking. Patient denies any fever, SOB, CP, abd pain, N/V, back pain, weakness, and all other sxs at this time. Pt notes she takes several tums daily which was recommended by her ENT. Pt transferred from [place] and accepted by Dr. Gandara (Neurosurgery). Case discussed with Dr. Gandara in ED who reviewed CT of the head from transferring facility and feels there is no need for neurosurgical intervention at present.  In ED, patient's symptoms completely resolved with NIHSS of 0.  Labs showed: creatinine of 1.9, BUN 19, calcium 14.2. Patient is on calcitriol at home, started last year with no follow-up labs done. IV fluids x 1 liter and 20 mg IV Lasix given. Hospital Medicine was contacted to admit for hypercalcemia and SHARLENE.      Past Medical History:   Diagnosis Date    Diabetes mellitus     Hypertension     Seizures     Stroke     Thyroid disease         Past Surgical History:   Procedure Laterality Date    THYROIDECTOMY      TONSILLECTOMY         Review of patient's allergies indicates:  No Known Allergies    No current facility-administered medications on file prior to encounter.      Current Outpatient Medications on File Prior to Encounter   Medication Sig    aspirin 325 MG tablet Take 325 mg by mouth once daily.    atorvastatin (LIPITOR) 10 MG tablet Take 10 mg by mouth once daily.     calcitRIOL (ROCALTROL) 0.25 MCG Cap 0.25 mcg once daily.     clopidogreL (PLAVIX) 75 mg tablet Take 75 mg by mouth once daily.     gabapentin (NEURONTIN) 600 MG tablet Take 600 mg by mouth once daily.     levETIRAcetam (KEPPRA) 500 MG Tab Take 1,500 mg by mouth 2 (two) times daily.    levothyroxine (SYNTHROID) 137 MCG Tab tablet     metFORMIN (GLUCOPHAGE) 500 MG tablet Take 500 mg by mouth 2 (two) times daily with meals.    zolpidem (AMBIEN) 10 mg Tab 10 mg nightly as needed.     [DISCONTINUED] levothyroxine (SYNTHROID) 125 MCG tablet Take 125 mcg by mouth before breakfast.     [DISCONTINUED] losartan-hydrochlorothiazide 50-12.5 mg (HYZAAR) 50-12.5 mg per tablet Take 1 tablet by mouth once daily.    losartan-hydrochlorothiazide 100-12.5 mg (HYZAAR) 100-12.5 mg Tab      Family History     Reviewed and not pertinent.         Tobacco Use    Smoking status: Current Every Day Smoker     Packs/day: 1.00     Types: Cigarettes   Substance and Sexual Activity    Alcohol use: Yes    Drug use: Not Currently    Sexual activity: Not on file     Review of Systems   Constitutional: Positive for fatigue. Negative for activity change, chills, diaphoresis and fever.   HENT: Negative for congestion, drooling, postnasal drip, rhinorrhea, sinus pressure and sore throat.    Eyes: Negative for photophobia and visual disturbance.   Respiratory: Negative for cough, shortness of breath and wheezing.    Cardiovascular: Negative for chest pain, palpitations and leg swelling.    Gastrointestinal: Negative for abdominal pain, constipation, diarrhea, nausea and vomiting.   Genitourinary: Negative for dysuria, hematuria and urgency.   Musculoskeletal: Positive for gait problem. Negative for arthralgias, back pain and myalgias.   Skin: Negative for pallor and rash.   Neurological: Positive for dizziness and weakness (generalized). Negative for seizures, syncope, facial asymmetry, speech difficulty, light-headedness, numbness and headaches.   Psychiatric/Behavioral: The patient is not nervous/anxious.    All other systems reviewed and are negative.    Objective:     Vital Signs (Most Recent):  Temp: 97.8 °F (36.6 °C) (07/26/20 0435)  Pulse: 76 (07/26/20 0435)  Resp: 15 (07/26/20 0231)  BP: (!) 141/79 (07/26/20 0442)  SpO2: (!) 92 % (07/26/20 0435) Vital Signs (24h Range):  Temp:  [97.8 °F (36.6 °C)-98.4 °F (36.9 °C)] 97.8 °F (36.6 °C)  Pulse:  [67-85] 76  Resp:  [13-18] 15  SpO2:  [88 %-97 %] 92 %  BP: (141-192)/(65-90) 141/79     Weight: 129.8 kg (286 lb 2.5 oz)  Body mass index is 41.06 kg/m².    Physical Exam  Vitals signs and nursing note reviewed.   Constitutional:       General: She is sleeping. She is not in acute distress.     Appearance: Normal appearance. She is well-developed. She is not diaphoretic.   HENT:      Head: Normocephalic and atraumatic.   Eyes:      Extraocular Movements: Extraocular movements intact.      Conjunctiva/sclera: Conjunctivae normal.      Pupils: Pupils are equal, round, and reactive to light.   Neck:      Musculoskeletal: Normal range of motion and neck supple.      Vascular: Normal carotid pulses. No carotid bruit.   Cardiovascular:      Rate and Rhythm: Normal rate and regular rhythm.      Heart sounds: S1 normal and S2 normal. No murmur.   Pulmonary:      Effort: Pulmonary effort is normal.      Breath sounds: Normal breath sounds and air entry. No wheezing, rhonchi or rales.   Abdominal:      General: Bowel sounds are normal. There is no distension.       Palpations: Abdomen is soft.      Tenderness: There is no abdominal tenderness.   Musculoskeletal: Normal range of motion.         General: No tenderness.   Skin:     General: Skin is warm and dry.      Capillary Refill: Capillary refill takes less than 2 seconds.      Findings: No erythema or rash.   Neurological:      General: No focal deficit present.      Mental Status: She is oriented to person, place, and time and easily aroused. She is lethargic.      GCS: GCS eye subscore is 4. GCS verbal subscore is 5. GCS motor subscore is 6.      Cranial Nerves: Cranial nerves are intact.      Sensory: Sensation is intact.      Motor: Motor function is intact.      Coordination: Coordination is intact.      Comments: Muscle strength 4/5 to all extremities.  Speech clear and appropriate.   Psychiatric:         Mood and Affect: Mood and affect normal.         Speech: Speech normal.         Behavior: Behavior normal.         Cognition and Memory: Cognition and memory normal.           CRANIAL NERVES     CN III, IV, VI   Pupils are equal, round, and reactive to light.       Significant Labs:  Results for orders placed or performed during the hospital encounter of 07/25/20   HIV 1/2 Ag/Ab (4th Gen)   Result Value Ref Range    HIV 1/2 Ag/Ab Negative Negative   COVID-19 Rapid Screening   Result Value Ref Range    SARS-CoV-2 RNA, Amplification, Qual Negative Negative   Comprehensive metabolic panel   Result Value Ref Range    Sodium 136 136 - 145 mmol/L    Potassium 4.8 3.5 - 5.1 mmol/L    Chloride 98 95 - 110 mmol/L    CO2 25 23 - 29 mmol/L    Glucose 110 70 - 110 mg/dL    BUN, Bld 19 6 - 20 mg/dL    Creatinine 1.9 (H) 0.5 - 1.4 mg/dL    Calcium 14.2 (HH) 8.7 - 10.5 mg/dL    Total Protein 6.9 6.0 - 8.4 g/dL    Albumin 3.4 (L) 3.5 - 5.2 g/dL    Total Bilirubin 0.3 0.1 - 1.0 mg/dL    Alkaline Phosphatase 158 (H) 55 - 135 U/L    AST 26 10 - 40 U/L    ALT 23 10 - 44 U/L    Anion Gap 13 8 - 16 mmol/L    eGFR if African American 35  (A) >60 mL/min/1.73 m^2    eGFR if non African American 30 (A) >60 mL/min/1.73 m^2   CBC auto differential   Result Value Ref Range    WBC 10.72 3.90 - 12.70 K/uL    RBC 3.98 (L) 4.00 - 5.40 M/uL    Hemoglobin 9.8 (L) 12.0 - 16.0 g/dL    Hematocrit 32.6 (L) 37.0 - 48.5 %    Mean Corpuscular Volume 82 82 - 98 fL    Mean Corpuscular Hemoglobin 24.6 (L) 27.0 - 31.0 pg    Mean Corpuscular Hemoglobin Conc 30.1 (L) 32.0 - 36.0 g/dL    RDW 15.2 (H) 11.5 - 14.5 %    Platelets 370 (H) 150 - 350 K/uL    MPV 9.9 9.2 - 12.9 fL    Immature Granulocytes 0.8 (H) 0.0 - 0.5 %    Gran # (ANC) 6.9 1.8 - 7.7 K/uL    Immature Grans (Abs) 0.09 (H) 0.00 - 0.04 K/uL    Lymph # 2.9 1.0 - 4.8 K/uL    Mono # 0.7 0.3 - 1.0 K/uL    Eos # 0.1 0.0 - 0.5 K/uL    Baso # 0.04 0.00 - 0.20 K/uL    nRBC 0 0 /100 WBC    Gran% 64.1 38.0 - 73.0 %    Lymph% 27.4 18.0 - 48.0 %    Mono% 6.4 4.0 - 15.0 %    Eosinophil% 0.9 0.0 - 8.0 %    Basophil% 0.4 0.0 - 1.9 %    Differential Method Automated       All pertinent labs within the past 24 hours have been reviewed.    Significant Imaging:  Imaging Results    None        I have reviewed all pertinent imaging results/findings within the past 24 hours.           Assessment/Plan:     * Hypercalcemia  - Initial calcium of 14.2.  Patient is on calitriol at home, will discontinue.  - Continue aggressive IV hydration.  Will give additional IV Lasix if needed.  - Follow labs.  - Nephrology consult.    SHARLENE on CKD, stage III  - Initial creatinine of 1.9 (baseline 1.3).  - IV hydration.  - Avoid nephrotoxic agents. Hold home losartan-HCTZ for now.  - Follow labs.    Brain mass  - No acute change in mass seen on CT of brain per Neurosurgery.    Essential hypertension  - Losartan-HCTZ on hold as above.    - IV hydralazine PRN.      VTE Risk Mitigation (From admission, onward)         Ordered     heparin (porcine) injection 5,000 Units  Every 8 hours      07/26/20 0551     IP VTE HIGH RISK PATIENT  Once      07/26/20 0569      Place sequential compression device  Until discontinued      07/26/20 0552                   Riya Franks NP  Department of Hospital Medicine   [facility]

## 2020-07-26 NOTE — ED PROVIDER NOTES
SCRIBE #1 NOTE: I, Gui Aldridge, am scribing for, and in the presence of, Arjun Ely MD. I have scribed the entire note.       History     Chief Complaint   Patient presents with    Neurologic Problem     Hx of brain mass. Patient sent from [facility] for neuro.     Review of patient's allergies indicates:   Allergen Reactions    Hydrochlorothiazide      hypercalcemia         History of Present Illness     HPI    7/25/2020, 9:21 PM   History obtained from the patient      History of Present Illness: Cata Lang is a 49 y.o. female patient with a PMHx of brain mass and chronic HA who presents to the Emergency Department for evaluation of neurological problem which onset gradually today. Pt notes she has been walking worse since December and her HA is worse on the L side and rates it a moderate to severe HA. Symptoms are worsening and moderate to severe in severity. No mitigating or exacerbating factors reported. Associated sxs include dizziness, malaise, and difficulty walking. Patient denies any fever, SOB, CP, abd pain, N/V, back pain, weakness, and all other sxs at this time. Pt notes she takes several tums daily which was recommended by her ENT. Pt transferred from [facility] and accepted by Dr. Gandara (Neurosurgery). No further complaints or concerns at this time.        Arrival mode: AASI    PCP: Sabra Fregoso MD        Past Medical History:  History reviewed. No pertinent medical history.      Past Surgical History:  History reviewed. No pertinent surgical history.        Family History:  History reviewed. No pertinent family history.      Social History:  Social History     Tobacco Use    Smoking status: Unknown    Substance and Sexual Activity    Alcohol use: Unknown     Drug use: Unknown     Sexual activity: Unknown         Review of Systems     Review of Systems   Constitutional: Negative for fever.        (+) malaise   HENT: Negative for sore throat.    Respiratory: Negative  for shortness of breath.    Cardiovascular: Negative for chest pain.   Gastrointestinal: Negative for abdominal pain, nausea and vomiting.   Genitourinary: Negative for dysuria.   Musculoskeletal: Negative for back pain.   Skin: Negative for rash.   Neurological: Positive for dizziness. Negative for weakness and headaches.        (+) difficulty walking   Hematological: Does not bruise/bleed easily.   All other systems reviewed and are negative.       Physical Exam     Initial Vitals [07/25/20 1929]   BP Pulse Resp Temp SpO2   (!) 171/90 85 18 97.9 °F (36.6 °C) 97 %      MAP       --          Physical Exam  Nursing Notes and Vital Signs Reviewed.  Constitutional: Patient is in no acute distress. Well-developed and well-nourished.  Head: Atraumatic. Normocephalic.  Eyes: PERRL. EOM intact. Conjunctivae are not pale. No scleral icterus.  ENT: Mucous membranes are moist. Oropharynx is clear and symmetric.    Neck: Supple. Full ROM. No lymphadenopathy.  Cardiovascular: Regular rate. Regular rhythm. No murmurs, rubs, or gallops. Distal pulses are 2+ and symmetric.  Pulmonary/Chest: No respiratory distress. Clear to auscultation bilaterally. No wheezing or rales.  Abdominal: Soft and non-distended.  There is no tenderness.  No rebound, guarding, or rigidity. Good bowel sounds.  Genitourinary: No CVA tenderness  Musculoskeletal: Moves all extremities. No obvious deformities. No edema. No calf tenderness.  Skin: Warm and dry.  Neurological:  Alert and appropriate.  Normal speech.  No acute focal neurological deficits are appreciated. Somnolent. Asthenia noted. 4/5 strength to all extremities. Pt requires frequent awakening but awakens easily with voice. GCS 14. Oriented x4.   Psychiatric: Normal affect. Good eye contact. Appropriate in content.     ED Course   Critical Care    Date/Time: 7/25/2020 9:39 PM  Performed by: Arjun Ely MD  Authorized by: Arjun Ely MD   Direct patient critical care time: 10  "minutes  Additional history critical care time: 10 minutes  Ordering / reviewing critical care time: 10 minutes  Documentation critical care time: 5 minutes  Consulting other physicians critical care time: 5 minutes  Total critical care time (exclusive of procedural time) : 40 minutes  Critical care time was exclusive of teaching time and separately billable procedures and treating other patients.  Critical care was necessary to treat or prevent imminent or life-threatening deterioration of the following conditions: hypercalcemia, acute kidney injury.  Critical care was time spent personally by me on the following activities: blood draw for specimens, development of treatment plan with patient or surrogate, discussions with consultants, discussions with primary provider, interpretation of cardiac output measurements, evaluation of patient's response to treatment, examination of patient, obtaining history from patient or surrogate, ordering and performing treatments and interventions, ordering and review of laboratory studies, ordering and review of radiographic studies, pulse oximetry, re-evaluation of patient's condition and review of old charts.        ED Vital Signs:  Vitals:    07/26/20 0201 07/26/20 0231 07/26/20 0435 07/26/20 0442   BP: (!) 155/68 (!) 175/72 (!) 177/83 (!) 141/79   Pulse: 74 72 76    Resp: 16 15     Temp:   97.8 °F (36.6 °C)    TempSrc:   Oral    SpO2: (!) 88% (!) 91% (!) 92%    Weight:   129.8 kg (286 lb 2.5 oz)    Height:   5' 10" (1.778 m)     07/26/20 0719 07/26/20 1201 07/26/20 1625 07/26/20 1930   BP: (!) 159/70 (!) 152/72 (!) 181/81 (!) 167/74   Pulse: 69 68 65 70   Resp: 20 18 18 18   Temp: 97.6 °F (36.4 °C) 96.5 °F (35.8 °C) 96.5 °F (35.8 °C) 97.6 °F (36.4 °C)   TempSrc: Axillary Oral Oral Oral   SpO2: 96% (!) 94% 96% 97%   Weight:       Height:        07/26/20 2319 07/27/20 0322 07/27/20 0402 07/27/20 0437   BP: (!) 152/81 (!) 177/83 (!) 173/85 (!) 160/73   Pulse: 70 68  69   Resp: " 18 18     Temp: 97.9 °F (36.6 °C) 97.7 °F (36.5 °C)     TempSrc: Oral Oral     SpO2: 96% (!) 94%     Weight:       Height:        07/27/20 0758 07/27/20 1138 07/27/20 1141   BP: (!) 162/77  (!) 166/74   Pulse: 65 85 72   Resp: 18 18 18   Temp: 97.5 °F (36.4 °C) 97.5 °F (36.4 °C) 97.5 °F (36.4 °C)   TempSrc: Oral Oral Oral   SpO2: 95% (!) 94% 96%   Weight:      Height:          Abnormal Lab Results:  Labs Reviewed   COMPREHENSIVE METABOLIC PANEL - Abnormal; Notable for the following components:       Result Value    Creatinine 1.9 (*)     Calcium 14.2 (*)     Albumin 3.4 (*)     Alkaline Phosphatase 158 (*)     eGFR if  35 (*)     eGFR if non  30 (*)     All other components within normal limits    Narrative:     CA critical result(s) called and verbal readback obtained from JONAS TELLES RN by KIN 07/26/2020 01:31   CBC W/ AUTO DIFFERENTIAL - Abnormal; Notable for the following components:    RBC 3.98 (*)     Hemoglobin 9.8 (*)     Hematocrit 32.6 (*)     Mean Corpuscular Hemoglobin 24.6 (*)     Mean Corpuscular Hemoglobin Conc 30.1 (*)     RDW 15.2 (*)     Platelets 370 (*)     Immature Granulocytes 0.8 (*)     Immature Grans (Abs) 0.09 (*)     All other components within normal limits   HIV 1 / 2 ANTIBODY   SARS-COV-2 RNA AMPLIFICATION, QUAL        All Lab Results:  CBC: unremarkable  UA: positive nitriles, 5-10 WBCS, 3+ bacteria, Moderate epithelial cells    CMP  BUN: 19  Creatinine: 2.23  Calcium: 16.2  Troponin: normal    Results for orders placed or performed during the hospital encounter of 07/25/20   HIV 1/2 Ag/Ab (4th Gen)   Result Value Ref Range    HIV 1/2 Ag/Ab Negative Negative   COVID-19 Rapid Screening   Result Value Ref Range    SARS-CoV-2 RNA, Amplification, Qual Negative Negative   Comprehensive metabolic panel   Result Value Ref Range    Sodium 136 136 - 145 mmol/L    Potassium 4.8 3.5 - 5.1 mmol/L    Chloride 98 95 - 110 mmol/L    CO2 25 23 - 29 mmol/L     Glucose 110 70 - 110 mg/dL    BUN, Bld 19 6 - 20 mg/dL    Creatinine 1.9 (H) 0.5 - 1.4 mg/dL    Calcium 14.2 (HH) 8.7 - 10.5 mg/dL    Total Protein 6.9 6.0 - 8.4 g/dL    Albumin 3.4 (L) 3.5 - 5.2 g/dL    Total Bilirubin 0.3 0.1 - 1.0 mg/dL    Alkaline Phosphatase 158 (H) 55 - 135 U/L    AST 26 10 - 40 U/L    ALT 23 10 - 44 U/L    Anion Gap 13 8 - 16 mmol/L    eGFR if African American 35 (A) >60 mL/min/1.73 m^2    eGFR if non African American 30 (A) >60 mL/min/1.73 m^2   CBC auto differential   Result Value Ref Range    WBC 10.72 3.90 - 12.70 K/uL    RBC 3.98 (L) 4.00 - 5.40 M/uL    Hemoglobin 9.8 (L) 12.0 - 16.0 g/dL    Hematocrit 32.6 (L) 37.0 - 48.5 %    Mean Corpuscular Volume 82 82 - 98 fL    Mean Corpuscular Hemoglobin 24.6 (L) 27.0 - 31.0 pg    Mean Corpuscular Hemoglobin Conc 30.1 (L) 32.0 - 36.0 g/dL    RDW 15.2 (H) 11.5 - 14.5 %    Platelets 370 (H) 150 - 350 K/uL    MPV 9.9 9.2 - 12.9 fL    Immature Granulocytes 0.8 (H) 0.0 - 0.5 %    Gran # (ANC) 6.9 1.8 - 7.7 K/uL    Immature Grans (Abs) 0.09 (H) 0.00 - 0.04 K/uL    Lymph # 2.9 1.0 - 4.8 K/uL    Mono # 0.7 0.3 - 1.0 K/uL    Eos # 0.1 0.0 - 0.5 K/uL    Baso # 0.04 0.00 - 0.20 K/uL    nRBC 0 0 /100 WBC    Gran% 64.1 38.0 - 73.0 %    Lymph% 27.4 18.0 - 48.0 %    Mono% 6.4 4.0 - 15.0 %    Eosinophil% 0.9 0.0 - 8.0 %    Basophil% 0.4 0.0 - 1.9 %    Differential Method Automated    Comprehensive metabolic panel   Result Value Ref Range    Sodium 135 (L) 136 - 145 mmol/L    Potassium 4.6 3.5 - 5.1 mmol/L    Chloride 97 95 - 110 mmol/L    CO2 26 23 - 29 mmol/L    Glucose 103 70 - 110 mg/dL    BUN, Bld 20 6 - 20 mg/dL    Creatinine 2.4 (H) 0.5 - 1.4 mg/dL    Calcium 14.0 (HH) 8.7 - 10.5 mg/dL    Total Protein 6.9 6.0 - 8.4 g/dL    Albumin 3.4 (L) 3.5 - 5.2 g/dL    Total Bilirubin 0.3 0.1 - 1.0 mg/dL    Alkaline Phosphatase 158 (H) 55 - 135 U/L    AST 29 10 - 40 U/L    ALT 22 10 - 44 U/L    Anion Gap 12 8 - 16 mmol/L    eGFR if African American 26 (A) >60  mL/min/1.73 m^2    eGFR if non African American 23 (A) >60 mL/min/1.73 m^2   Magnesium   Result Value Ref Range    Magnesium 1.9 1.6 - 2.6 mg/dL   Phosphorus   Result Value Ref Range    Phosphorus 5.3 (H) 2.7 - 4.5 mg/dL   Lipid panel   Result Value Ref Range    Cholesterol 136 120 - 199 mg/dL    Triglycerides 167 (H) 30 - 150 mg/dL    HDL 35 (L) 40 - 75 mg/dL    LDL Cholesterol 67.6 63.0 - 159.0 mg/dL    Hdl/Cholesterol Ratio 25.7 20.0 - 50.0 %    Total Cholesterol/HDL Ratio 3.9 2.0 - 5.0    Non-HDL Cholesterol 101 mg/dL   Calcium, ionized   Result Value Ref Range    Calcium, Ion 1.76 (H) 1.06 - 1.42 mmol/L   PTH, intact   Result Value Ref Range    PTH, Intact <5.0 (L) 9.0 - 77.0 pg/mL   Vitamin D   Result Value Ref Range    Vit D, 25-Hydroxy 18 (L) 30 - 96 ng/mL   Sodium, urine, random   Result Value Ref Range    Sodium Urine Random 82 20 - 250 mmol/L   Creatinine, urine, random   Result Value Ref Range    Creatinine, Random Ur 66.1 15.0 - 325.0 mg/dL   TSH   Result Value Ref Range    TSH 1.544 0.400 - 4.000 uIU/mL   Urea nitrogen, urine   Result Value Ref Range    Urine Urea Nitrogen, Random 208 140 - 1050 mg/dL   Urinalysis, Reflex to Urine Culture Urine, Clean Catch    Specimen: Urine   Result Value Ref Range    Specimen UA Urine, Catheterized     Color, UA Yellow Yellow, Straw, Pao    Appearance, UA Cloudy (A) Clear    pH, UA 6.0 5.0 - 8.0    Specific Gravity, UA 1.020 1.005 - 1.030    Protein, UA Negative Negative    Glucose, UA Negative Negative    Ketones, UA Negative Negative    Bilirubin (UA) Negative Negative    Occult Blood UA 1+ (A) Negative    Nitrite, UA Positive (A) Negative    Urobilinogen, UA Negative <2.0 EU/dL    Leukocytes, UA 2+ (A) Negative   Protein / creatinine ratio, urine   Result Value Ref Range    Protein, Urine Random 12 0 - 15 mg/dL    Creatinine, Random Ur 66.1 15.0 - 325.0 mg/dL    Prot/Creat Ratio, Ur 0.18 0.00 - 0.20   Protein, Quantitative, Urine Random   Result Value Ref Range     Protein, Urine Random 12 0 - 15 mg/dL   Renal function panel   Result Value Ref Range    Glucose 124 (H) 70 - 110 mg/dL    Sodium 135 (L) 136 - 145 mmol/L    Potassium 4.6 3.5 - 5.1 mmol/L    Chloride 98 95 - 110 mmol/L    CO2 25 23 - 29 mmol/L    BUN, Bld 21 (H) 6 - 20 mg/dL    Calcium 13.3 (HH) 8.7 - 10.5 mg/dL    Creatinine 2.5 (H) 0.5 - 1.4 mg/dL    Albumin 3.1 (L) 3.5 - 5.2 g/dL    Phosphorus 4.5 2.7 - 4.5 mg/dL    eGFR if African American 25 (A) >60 mL/min/1.73 m^2    eGFR if non African American 22 (A) >60 mL/min/1.73 m^2    Anion Gap 12 8 - 16 mmol/L   Urinalysis Microscopic   Result Value Ref Range    RBC, UA 5 (H) 0 - 4 /hpf    WBC, UA >100 (H) 0 - 5 /hpf    Bacteria Moderate (A) None-Occ /hpf    Microscopic Comment SEE COMMENT    Urinalysis, Reflex to Urine Culture Urine, Clean Catch    Specimen: Urine   Result Value Ref Range    Specimen UA Urine, Clean Catch     Color, UA Yellow Yellow, Straw, Pao    Appearance, UA Clear Clear    pH, UA 8.0 5.0 - 8.0    Specific Gravity, UA 1.015 1.005 - 1.030    Protein, UA Negative Negative    Glucose, UA Negative Negative    Ketones, UA Negative Negative    Bilirubin (UA) Negative Negative    Occult Blood UA Trace (A) Negative    Nitrite, UA Positive (A) Negative    Urobilinogen, UA Negative <2.0 EU/dL    Leukocytes, UA 2+ (A) Negative   Urinalysis Microscopic   Result Value Ref Range    RBC, UA 2 0 - 4 /hpf    WBC, UA >100 (H) 0 - 5 /hpf    Bacteria Occasional None-Occ /hpf    Squam Epithel, UA 0 /hpf    Microscopic Comment SEE COMMENT    Comprehensive metabolic panel   Result Value Ref Range    Sodium 136 136 - 145 mmol/L    Potassium 4.0 3.5 - 5.1 mmol/L    Chloride 100 95 - 110 mmol/L    CO2 24 23 - 29 mmol/L    Glucose 111 (H) 70 - 110 mg/dL    BUN, Bld 24 (H) 6 - 20 mg/dL    Creatinine 2.6 (H) 0.5 - 1.4 mg/dL    Calcium 12.0 (H) 8.7 - 10.5 mg/dL    Total Protein 6.9 6.0 - 8.4 g/dL    Albumin 3.4 (L) 3.5 - 5.2 g/dL    Total Bilirubin 0.2 0.1 - 1.0 mg/dL     Alkaline Phosphatase 145 (H) 55 - 135 U/L    AST 35 10 - 40 U/L    ALT 27 10 - 44 U/L    Anion Gap 12 8 - 16 mmol/L    eGFR if African American 23.7 (A) >60 mL/min/1.73 m^2    eGFR if non African American 20.6 (A) >60 mL/min/1.73 m^2   CBC auto differential   Result Value Ref Range    WBC 10.73 3.90 - 12.70 K/uL    RBC 3.89 (L) 4.00 - 5.40 M/uL    Hemoglobin 9.5 (L) 12.0 - 16.0 g/dL    Hematocrit 31.8 (L) 37.0 - 48.5 %    Mean Corpuscular Volume 82 82 - 98 fL    Mean Corpuscular Hemoglobin 24.4 (L) 27.0 - 31.0 pg    Mean Corpuscular Hemoglobin Conc 29.9 (L) 32.0 - 36.0 g/dL    RDW 15.5 (H) 11.5 - 14.5 %    Platelets 343 150 - 350 K/uL    MPV 10.7 9.2 - 12.9 fL    Immature Granulocytes 0.7 (H) 0.0 - 0.5 %    Gran # (ANC) 7.8 (H) 1.8 - 7.7 K/uL    Immature Grans (Abs) 0.08 (H) 0.00 - 0.04 K/uL    Lymph # 2.2 1.0 - 4.8 K/uL    Mono # 0.6 0.3 - 1.0 K/uL    Eos # 0.0 0.0 - 0.5 K/uL    Baso # 0.03 0.00 - 0.20 K/uL    nRBC 0 0 /100 WBC    Gran% 72.5 38.0 - 73.0 %    Lymph% 20.5 18.0 - 48.0 %    Mono% 5.6 4.0 - 15.0 %    Eosinophil% 0.4 0.0 - 8.0 %    Basophil% 0.3 0.0 - 1.9 %    Differential Method Automated    APTT   Result Value Ref Range    aPTT <21.0 21.0 - 32.0 sec   POCT glucose   Result Value Ref Range    POCT Glucose 112 (H) 70 - 110 mg/dL   POCT glucose   Result Value Ref Range    POCT Glucose 123 (H) 70 - 110 mg/dL   POCT glucose   Result Value Ref Range    POCT Glucose 101 70 - 110 mg/dL   POCT glucose   Result Value Ref Range    POCT Glucose 114 (H) 70 - 110 mg/dL   POCT glucose   Result Value Ref Range    POCT Glucose 104 70 - 110 mg/dL   POCT glucose   Result Value Ref Range    POCT Glucose 122 (H) 70 - 110 mg/dL         Imaging Results:  CT Head: Redemonstrated extra-axial mass overlying the  right frontal lobe with accompanying mass effect, midline shift, and underlying vasogenic edema. Potentially more apparent calcifications versus differences in modality.     XR tibia + fibula: No evidence of acute  osseous pathology.     XR Knee 1 or 2 views Left: No evidence of acute osseous pathology.     XR Foot 3 views Min Lt: Recent time frame fracture involving the proximal phalanx of the left great toe. Additional healing fracture deformities involving the fourth and fifth metatarsals and proximal phalanx of the left little toe.               The Emergency Provider reviewed the vital signs and test results, which are outlined above.     ED Discussion     9:34 PM: Discussed pt's case with Dr. Gandara (Neurosurgery) who agrees with treating metabolic causes of altered mental status and recommends no indication for acute intervention due to chronicity of tumor and that there are no lateralizing neuro changes.     11:38 PM: D/w Riya Franks NP with [facility] with Dr. Macedo; they are requesting post-treatment repeat electrolytes and renal function and request a stat nephrology consult.      1:50 AM: Re-evaluated pt. I have discussed test results, shared treatment plan, and the need for admission with patient and family at bedside. Pt and family express understanding at this time and agree with all information. All questions answered. Pt and family have no further questions or concerns at this time. Pt is ready for admit.     2:18 PM: Dr. Blackwell, nephrologist, agrees w/ care.      2:20 AM: Discussed case with Riya Franks NP (Hospital Medicine). Dr. Macedo agrees with current care and management of pt and accepts admission.   Admitting Service: Hospital Medicine  Admitting Physician: Dr. Macedo  [facility]      ED Medication(s):  Medications   atorvastatin tablet 10 mg (10 mg Oral Given 7/27/20 0942)   gabapentin capsule 600 mg (600 mg Oral Given 7/27/20 0942)   levETIRAcetam tablet 1,500 mg (1,500 mg Oral Given 7/27/20 0942)   levothyroxine tablet 137 mcg (137 mcg Oral Given 7/27/20 0521)   hydrALAZINE injection 10 mg (10 mg Intravenous Given 7/27/20 0400)   acetaminophen tablet 650 mg (650 mg Oral Given 7/27/20  1158)   ondansetron injection 4 mg (4 mg Intravenous Given 7/27/20 0526)   heparin (porcine) injection 5,000 Units (5,000 Units Subcutaneous Given 7/27/20 0521)   cefTRIAXone (ROCEPHIN) 1 g/50 mL D5W IVPB (1 g Intravenous New Bag 7/26/20 1802)   hydrALAZINE tablet 25 mg (25 mg Oral Given 7/27/20 0521)   dexamethasone injection 2 mg (2 mg Intravenous Given 7/27/20 1153)   glucose chewable tablet 16 g (has no administration in time range)   glucose chewable tablet 24 g (has no administration in time range)   dextrose 50% injection 12.5 g (has no administration in time range)   dextrose 50% injection 25 g (has no administration in time range)   glucagon (human recombinant) injection 1 mg (has no administration in time range)   insulin aspart U-100 pen 1-10 Units (has no administration in time range)   morphine injection 4 mg (4 mg Intravenous Given 7/25/20 2052)   ondansetron injection 4 mg (4 mg Intravenous Given 7/25/20 2052)   sodium chloride 0.9% bolus 1,000 mL (0 mLs Intravenous Stopped 7/26/20 0053)   furosemide injection 20 mg (20 mg Intravenous Given 7/25/20 2218)       Current Discharge Medication List                    Medical Decision Making:   Clinical Tests:   Lab Tests: Ordered and Reviewed  Radiological Study: Ordered and Reviewed             Scribe Attestation:   Scribe #1: I performed the above scribed service and the documentation accurately describes the services I performed. I attest to the accuracy of the note.     Attending:   Physician Attestation Statement for Scribe #1: I, Arjun Ely MD, personally performed the services described in this documentation, as scribed by Gui Aldridge, in my presence, and it is both accurate and complete.           Clinical Impression       ICD-10-CM ICD-9-CM   1. Hypercalcemia  E83.52 275.42   2. SHARLENE (acute kidney injury)  N17.9 584.9   3. Chronic nonintractable headache, unspecified headache type  R51 784.0   4. 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 Neoplasm of central nervous system  D49.7 239.7       Disposition:   Disposition: Admitted  Condition: Omid Ely MD  07/27/20 1433

## 2020-07-26 NOTE — CONSULTS
[facility] -   Nephrology  Consult Note      Patient Name: Cata Lang  MRN: [id number]  Admission Date: 7/25/2020  Hospital Length of Stay: 0 days  Attending Provider: Kingsley Heart MD   Primary Care Physician: Sabra Fregoso MD  Principal Problem:Hypercalcemia    Consults  Subjective:     HPI: Cata Lang is a pleasant 49-year-old  woman history of hypertension, type 2 diabetes, morbid obesity, seizure disorder, CKD stage 3, baseline creatinine about 1.3 mg/dL, GFR 50 mL/minute, intracranial mass / lesion, she presents to the emergency room yesterday with progressive complains of weakness, increasing headache, nausea, a repeat MRI of the brain is pending at this time, she was evaluated by Neurosurgery, also significant hypercalcemia with a serum calcium of 14 noted on presentation, patient is currently taking Tums, calcitriol, hydrochlorothiazide, also she possibly has a urinary tract infection, acute on chronic renal failure, serum creatinine was 1.9 on presentation, 2.5 this evening, she is currently receiving IV fluids, we were consulted for acute kidney injury, hypercalcemia    Past Medical History:   Diagnosis Date    Diabetes mellitus     Hypertension     Seizures     Stroke     Thyroid disease        Past Surgical History:   Procedure Laterality Date    THYROIDECTOMY      TONSILLECTOMY         Review of patient's allergies indicates:   Allergen Reactions    Hydrochlorothiazide      hypercalcemia     Current Facility-Administered Medications   Medication Frequency    0.9%  NaCl infusion Continuous    acetaminophen tablet 650 mg Q6H PRN    aspirin tablet 325 mg Daily    atorvastatin tablet 10 mg Daily    cefTRIAXone (ROCEPHIN) 1 g/50 mL D5W IVPB Q24H    clopidogreL tablet 75 mg Daily    dextrose 50% injection 12.5 g PRN    dextrose 50% injection 25 g PRN    gabapentin capsule 600 mg Daily    glucagon (human recombinant) injection 1 mg PRN     glucose chewable tablet 16 g PRN    glucose chewable tablet 24 g PRN    heparin (porcine) injection 5,000 Units Q8H    hydrALAZINE injection 10 mg Q8H PRN    insulin aspart U-100 pen 0-5 Units QID (AC + HS) PRN    levETIRAcetam tablet 1,500 mg BID    levothyroxine tablet 137 mcg Before breakfast    ondansetron injection 4 mg Q6H PRN     Family History     None        Tobacco Use    Smoking status: Current Every Day Smoker     Packs/day: 1.00     Types: Cigarettes   Substance and Sexual Activity    Alcohol use: Yes    Drug use: Not Currently    Sexual activity: Not on file     Review of Systems   Constitutional: Positive for activity change and fatigue. Negative for appetite change and fever.   HENT: Negative for congestion, facial swelling, sore throat, trouble swallowing and voice change.    Eyes: Negative for redness and visual disturbance.   Respiratory: Negative for apnea, cough, chest tightness, shortness of breath and wheezing.    Cardiovascular: Negative for chest pain, palpitations and leg swelling.   Gastrointestinal: Positive for nausea. Negative for abdominal distention, abdominal pain, blood in stool, constipation, diarrhea and vomiting.   Genitourinary: Negative for decreased urine volume, difficulty urinating, dysuria, flank pain, frequency, hematuria, pelvic pain and urgency.   Musculoskeletal: Negative for back pain, gait problem and joint swelling.   Skin: Negative for color change and rash.   Neurological: Positive for weakness and headaches. Negative for dizziness and syncope.   Hematological: Does not bruise/bleed easily.   Psychiatric/Behavioral: Negative for agitation, behavioral problems and confusion. The patient is not nervous/anxious.      Objective:     Vital Signs (Most Recent):  Temp: 96.5 °F (35.8 °C) (07/26/20 1201)  Pulse: 68 (07/26/20 1201)  Resp: 18 (07/26/20 1201)  BP: (!) 152/72 (07/26/20 1201)  SpO2: (!) 94 % (07/26/20 1201)  O2 Device (Oxygen Therapy): room air  (07/26/20 0435) Vital Signs (24h Range):  Temp:  [96.5 °F (35.8 °C)-98.4 °F (36.9 °C)] 96.5 °F (35.8 °C)  Pulse:  [67-85] 68  Resp:  [13-20] 18  SpO2:  [88 %-97 %] 94 %  BP: (141-192)/(65-90) 152/72     Weight: 129.8 kg (286 lb 2.5 oz) (07/26/20 0435)  Body mass index is 41.06 kg/m².  Body surface area is 2.53 meters squared.    I/O last 3 completed shifts:  In: 1000 [IV Piggyback:1000]  Out: 1000 [Urine:1000]    Physical Exam  Constitutional:       General: She is in acute distress.      Appearance: She is well-developed.   HENT:      Head: Normocephalic and atraumatic.      Mouth/Throat:      Pharynx: No oropharyngeal exudate.   Eyes:      Conjunctiva/sclera: Conjunctivae normal.      Pupils: Pupils are equal, round, and reactive to light.   Neck:      Musculoskeletal: Normal range of motion and neck supple.      Thyroid: No thyroid mass or thyromegaly.      Vascular: No carotid bruit or JVD.      Trachea: No tracheal deviation.   Cardiovascular:      Rate and Rhythm: Normal rate and regular rhythm.      Heart sounds: Normal heart sounds. No murmur. No friction rub. No gallop.    Pulmonary:      Effort: Pulmonary effort is normal. No respiratory distress.      Breath sounds: Normal breath sounds. No wheezing or rales.   Chest:      Chest wall: No tenderness.   Abdominal:      General: Bowel sounds are normal. There is no distension or abdominal bruit.      Palpations: Abdomen is soft. There is no mass.      Tenderness: There is no abdominal tenderness. There is no guarding or rebound.      Comments: Obese    Musculoskeletal:         General: No tenderness.   Lymphadenopathy:      Cervical: No cervical adenopathy.   Skin:     General: Skin is warm.      Coloration: Skin is not pale.      Findings: No erythema or rash.      Comments: Healing wound on the left leg   Neurological:      Mental Status: She is oriented to person, place, and time.      Cranial Nerves: No cranial nerve deficit.      Motor: No abnormal  muscle tone.      Coordination: Coordination normal.   Psychiatric:         Behavior: Behavior normal.         Judgment: Judgment normal.         Significant Labs:  CBC:   Recent Labs   Lab 07/26/20  0045   WBC 10.72   RBC 3.98*   HGB 9.8*   HCT 32.6*   *   MCV 82   MCH 24.6*   MCHC 30.1*     CMP:   Recent Labs   Lab 07/26/20  0452 07/26/20  1141    124*   CALCIUM 14.0* 13.3*   ALBUMIN 3.4* 3.1*   PROT 6.9  --    * 135*   K 4.6 4.6   CO2 26 25   CL 97 98   BUN 20 21*   CREATININE 2.4* 2.5*   ALKPHOS 158*  --    ALT 22  --    AST 29  --    BILITOT 0.3  --      Coagulation: No results for input(s): PT, INR, APTT in the last 168 hours.  LFTs:   Recent Labs   Lab 07/26/20  0452 07/26/20  1141   ALT 22  --    AST 29  --    ALKPHOS 158*  --    BILITOT 0.3  --    PROT 6.9  --    ALBUMIN 3.4* 3.1*     All labs within the past 24 hours have been reviewed.    Significant Imaging:  Reviewed    Lab Results   Component Value Date    PTH <5.0 (L) 07/26/2020    CALCIUM 13.3 (HH) 07/26/2020    CAION 1.76 (H) 07/26/2020    PHOS 4.5 07/26/2020       Lab Results   Component Value Date    ALBUMIN 3.1 (L) 07/26/2020         Assessment/Plan:     SHARLENE on CKD, stage III  1. SHARLENE on CKD stage 3 , baseline creatinine about 1.3 mg/dL, GFR 50 mL/minute, acute kidney injury likely dehydration from hypercalcemia, continue IV fluids, also can be a urinary tract infection, urine culture pending, will start her on IV Rocephin, renal ultrasound ordered and pending at this time,  Avoid Ace inhibitors/ARB/NSAIDs due to acute kidney injury,    2.  Hypercalcemia, patient was taking Tums, calcitriol, hydrochlorothiazide, serum vitamin-D level slightly low at 18, PTH appropriately suppressed at less than 5, PTH RP pending, improvement in serum calcium noted with hydration,     3.  Hypertension, blood pressure is slightly elevated, can be due to her nausea, will add low-dose hydralazine,    4.  Intracranial mass, neurosurgery notes  reviewed, MRI pending,    5.  Anemia, multifactorial, monitor hemoglobin,    6.  Possible urinary tract infection, urine cultures pending, will start on IV Rocephin,        Thank you for your consult. I will follow-up with patient. Please contact us if you have any additional questions.     Total time spent 70 minutes including time needed to review the records,  patient  evaluation, documentation, face-to-face discussion with the patient,  spouse, primary team,   more than 50% of the time was spent on coordination of care and counseling.       Tr Blackwell MD   Nephrology  [facility] [place]

## 2020-07-26 NOTE — CONSULTS
Plan  Patient remains neurologically stable  MRI of the brain is ordered with without contrast  Will make recommendations following the imaging being completed      History   Patient is a 49-year-old female with a known history of a right-sided frontal lesion since December of last year.  She was having headaches as well as questionable generalized seizure-like activity.  At the initial event she was placed on Keppra for seizure prophylaxis which she has been on since then.   She was initially worked up by a neurosurgeon in [place] and was scheduled for a biopsy of the right frontal lesion however she tells me this was postponed secondary to COVID .    Patient presented to outside hospital with worsening dizziness, difficulty with walking as well as headaches.  On admit to the outside hospital she was found to be hypercalcemic and found to have an elevated creatinine consistent with an SHARLENE she was subsequently admitted to the medicine service  This morning she is not complaining of any new symptoms  She reports she continues to have headaches  No focal weakness, no recent seizure-like activity, denies nausea vomiting, and reports no other associated neurologic symptoms    Medications:  Continuous Infusions:   sodium chloride 0.9% 150 mL/hr at 07/26/20 0439     Scheduled Meds:   aspirin  325 mg Oral Daily    atorvastatin  10 mg Oral Daily    clopidogreL  75 mg Oral Daily    gabapentin  600 mg Oral Daily    heparin (porcine)  5,000 Units Subcutaneous Q8H    levETIRAcetam  1,500 mg Oral BID    levothyroxine  137 mcg Oral Before breakfast     PRN Meds:acetaminophen, dextrose 50%, dextrose 50%, glucagon (human recombinant), glucose, glucose, hydrALAZINE, insulin aspart U-100, ondansetron     Review of Systems   Constitutional: Negative for fatigue and fever.   HENT: Negative for congestion and hearing loss.    Eyes: Negative for pain.   Respiratory: Negative for shortness of breath.    Cardiovascular: Negative  for chest pain.   Gastrointestinal: Negative for abdominal pain.   Genitourinary: Negative for difficulty urinating and urgency.   Skin: Negative for color change and pallor.   Neurological: Positive for dizziness, syncope, weakness and headaches.   Psychiatric/Behavioral: Negative for confusion.     Objective:     Weight: 129.8 kg (286 lb 2.5 oz)  Body mass index is 41.06 kg/m².  Vital Signs (Most Recent):  Temp: 97.6 °F (36.4 °C) (07/26/20 0719)  Pulse: 69 (07/26/20 0719)  Resp: 20 (07/26/20 0719)  BP: (!) 159/70 (07/26/20 0719)  SpO2: 96 % (07/26/20 0719) Vital Signs (24h Range):  Temp:  [97.6 °F (36.4 °C)-98.4 °F (36.9 °C)] 97.6 °F (36.4 °C)  Pulse:  [67-85] 69  Resp:  [13-20] 20  SpO2:  [88 %-97 %] 96 %  BP: (141-192)/(65-90) 159/70                     Female External Urinary Catheter 07/26/20 0600 (Active)   Skin reddened;no breakdown 07/26/20 0435   Tolerance no signs/symptoms of discomfort;assisted with appliance change 07/26/20 0435   Suction Continuous suction at 70 mmHg 07/26/20 0435   Date of last wick change 07/26/20 07/26/20 0435   Time of last wick change 0600 07/26/20 0435       Physical Exam:    Constitutional: She appears distressed.     Eyes: Pupils are equal, round, and reactive to light. EOM are normal.     Cardiovascular: Normal rate.     Abdominal: Soft.     Skin: Skin displays no rash on trunk.     Psych/Behavior: She is alert. She is oriented to person, place, and time. She has a normal mood and affect.     Musculoskeletal:        Neck: Range of motion is full. Muscle strength is 5/5. Tone is normal.        Back: Range of motion is full. There is tenderness. Muscle strength is 5/5. Tone is normal.        Right Upper Extremities: There is no tenderness. Tone is normal.        Left Upper Extremities: There is no tenderness. Tone is normal.       Right Lower Extremities: There is no tenderness.        Left Lower Extremities: There is no tenderness.     Neurological:        Sensory: There is no  sensory deficit in the trunk. There is no sensory deficit in the extremities.        DTRs: DTRs are normal.        Cranial nerves: Cranial nerve(s) II, III, IV, V, VI, VII, VIII, IX, X, XI and XII are intact.       Significant Labs:  Recent Labs   Lab 07/26/20 0045 07/26/20  0452    103     --    K 4.8  --    CL 98  --    CO2 25 26   BUN 19 20   CREATININE 1.9* 2.4*   CALCIUM 14.2* 14.0*   MG  --  1.9     Recent Labs   Lab 07/26/20 0045   WBC 10.72   HGB 9.8*   HCT 32.6*   *     No results for input(s): LABPT, INR, APTT in the last 48 hours.  Microbiology Results (last 7 days)     ** No results found for the last 168 hours. **        All pertinent labs from the last 24 hours have been reviewed.    Significant Diagnostics:  CT scan shows a calcified right frontal lesion with associated edema and compression of the right lateral ventricle.  Third ventricle and 4th ventricle remained patent and no signs of hydrocephalus

## 2020-07-27 PROBLEM — G93.41 METABOLIC ENCEPHALOPATHY: Status: ACTIVE | Noted: 2020-07-27

## 2020-07-27 LAB
ALBUMIN SERPL BCP-MCNC: 3.4 G/DL (ref 3.5–5.2)
ALP SERPL-CCNC: 145 U/L (ref 55–135)
ALT SERPL W/O P-5'-P-CCNC: 27 U/L (ref 10–44)
ANION GAP SERPL CALC-SCNC: 12 MMOL/L (ref 8–16)
APTT BLDCRRT: <21 SEC (ref 21–32)
AST SERPL-CCNC: 35 U/L (ref 10–40)
BACTERIA #/AREA URNS HPF: ABNORMAL /HPF
BASOPHILS # BLD AUTO: 0.03 K/UL (ref 0–0.2)
BASOPHILS NFR BLD: 0.3 % (ref 0–1.9)
BILIRUB SERPL-MCNC: 0.2 MG/DL (ref 0.1–1)
BILIRUB UR QL STRIP: NEGATIVE
BUN SERPL-MCNC: 24 MG/DL (ref 6–20)
CALCIUM SERPL-MCNC: 12 MG/DL (ref 8.7–10.5)
CHLORIDE SERPL-SCNC: 100 MMOL/L (ref 95–110)
CLARITY UR: CLEAR
CO2 SERPL-SCNC: 24 MMOL/L (ref 23–29)
COLOR UR: YELLOW
CREAT SERPL-MCNC: 2.6 MG/DL (ref 0.5–1.4)
DIFFERENTIAL METHOD: ABNORMAL
EOSINOPHIL # BLD AUTO: 0 K/UL (ref 0–0.5)
EOSINOPHIL NFR BLD: 0.4 % (ref 0–8)
ERYTHROCYTE [DISTWIDTH] IN BLOOD BY AUTOMATED COUNT: 15.5 % (ref 11.5–14.5)
EST. GFR  (AFRICAN AMERICAN): 23.7 ML/MIN/1.73 M^2
EST. GFR  (NON AFRICAN AMERICAN): 20.6 ML/MIN/1.73 M^2
GLUCOSE SERPL-MCNC: 111 MG/DL (ref 70–110)
GLUCOSE UR QL STRIP: NEGATIVE
HCT VFR BLD AUTO: 31.8 % (ref 37–48.5)
HGB BLD-MCNC: 9.5 G/DL (ref 12–16)
HGB UR QL STRIP: ABNORMAL
IMM GRANULOCYTES # BLD AUTO: 0.08 K/UL (ref 0–0.04)
IMM GRANULOCYTES NFR BLD AUTO: 0.7 % (ref 0–0.5)
KETONES UR QL STRIP: NEGATIVE
LEUKOCYTE ESTERASE UR QL STRIP: ABNORMAL
LYMPHOCYTES # BLD AUTO: 2.2 K/UL (ref 1–4.8)
LYMPHOCYTES NFR BLD: 20.5 % (ref 18–48)
MCH RBC QN AUTO: 24.4 PG (ref 27–31)
MCHC RBC AUTO-ENTMCNC: 29.9 G/DL (ref 32–36)
MCV RBC AUTO: 82 FL (ref 82–98)
MICROSCOPIC COMMENT: ABNORMAL
MONOCYTES # BLD AUTO: 0.6 K/UL (ref 0.3–1)
MONOCYTES NFR BLD: 5.6 % (ref 4–15)
NEUTROPHILS # BLD AUTO: 7.8 K/UL (ref 1.8–7.7)
NEUTROPHILS NFR BLD: 72.5 % (ref 38–73)
NITRITE UR QL STRIP: POSITIVE
NRBC BLD-RTO: 0 /100 WBC
PH UR STRIP: 8 [PH] (ref 5–8)
PLATELET # BLD AUTO: 343 K/UL (ref 150–350)
PMV BLD AUTO: 10.7 FL (ref 9.2–12.9)
POCT GLUCOSE: 104 MG/DL (ref 70–110)
POCT GLUCOSE: 117 MG/DL (ref 70–110)
POCT GLUCOSE: 122 MG/DL (ref 70–110)
POCT GLUCOSE: 133 MG/DL (ref 70–110)
POTASSIUM SERPL-SCNC: 4 MMOL/L (ref 3.5–5.1)
PROT SERPL-MCNC: 6.9 G/DL (ref 6–8.4)
PROT UR QL STRIP: NEGATIVE
RBC # BLD AUTO: 3.89 M/UL (ref 4–5.4)
RBC #/AREA URNS HPF: 2 /HPF (ref 0–4)
SODIUM SERPL-SCNC: 136 MMOL/L (ref 136–145)
SP GR UR STRIP: 1.01 (ref 1–1.03)
SQUAMOUS #/AREA URNS HPF: 0 /HPF
URN SPEC COLLECT METH UR: ABNORMAL
UROBILINOGEN UR STRIP-ACNC: NEGATIVE EU/DL
WBC # BLD AUTO: 10.73 K/UL (ref 3.9–12.7)
WBC #/AREA URNS HPF: >100 /HPF (ref 0–5)

## 2020-07-27 PROCEDURE — 81000 URINALYSIS NONAUTO W/SCOPE: CPT

## 2020-07-27 PROCEDURE — 85730 THROMBOPLASTIN TIME PARTIAL: CPT

## 2020-07-27 PROCEDURE — 36415 COLL VENOUS BLD VENIPUNCTURE: CPT

## 2020-07-27 PROCEDURE — 87086 URINE CULTURE/COLONY COUNT: CPT

## 2020-07-27 PROCEDURE — 99222 1ST HOSP IP/OBS MODERATE 55: CPT | Mod: ,,, | Performed by: NEUROLOGICAL SURGERY

## 2020-07-27 PROCEDURE — 25000003 PHARM REV CODE 250: Performed by: INTERNAL MEDICINE

## 2020-07-27 PROCEDURE — 80053 COMPREHEN METABOLIC PANEL: CPT | Mod: ER

## 2020-07-27 PROCEDURE — 99233 PR SUBSEQUENT HOSPITAL CARE,LEVL III: ICD-10-PCS | Mod: ,,, | Performed by: INTERNAL MEDICINE

## 2020-07-27 PROCEDURE — 63600175 PHARM REV CODE 636 W HCPCS: Performed by: NURSE PRACTITIONER

## 2020-07-27 PROCEDURE — 85025 COMPLETE CBC W/AUTO DIFF WBC: CPT

## 2020-07-27 PROCEDURE — 63600175 PHARM REV CODE 636 W HCPCS: Performed by: INTERNAL MEDICINE

## 2020-07-27 PROCEDURE — 82397 CHEMILUMINESCENT ASSAY: CPT

## 2020-07-27 PROCEDURE — 83520 IMMUNOASSAY QUANT NOS NONAB: CPT | Mod: 59

## 2020-07-27 PROCEDURE — 99222 PR INITIAL HOSPITAL CARE,LEVL II: ICD-10-PCS | Mod: ,,, | Performed by: NEUROLOGICAL SURGERY

## 2020-07-27 PROCEDURE — 99233 SBSQ HOSP IP/OBS HIGH 50: CPT | Mod: ,,, | Performed by: INTERNAL MEDICINE

## 2020-07-27 PROCEDURE — 11000001 HC ACUTE MED/SURG PRIVATE ROOM

## 2020-07-27 PROCEDURE — 25000003 PHARM REV CODE 250: Performed by: NURSE PRACTITIONER

## 2020-07-27 RX ORDER — INSULIN ASPART 100 [IU]/ML
1-10 INJECTION, SOLUTION INTRAVENOUS; SUBCUTANEOUS
Status: DISCONTINUED | OUTPATIENT
Start: 2020-07-27 | End: 2020-08-06 | Stop reason: HOSPADM

## 2020-07-27 RX ORDER — GLUCAGON 1 MG
1 KIT INJECTION
Status: DISCONTINUED | OUTPATIENT
Start: 2020-07-27 | End: 2020-08-06 | Stop reason: HOSPADM

## 2020-07-27 RX ORDER — IBUPROFEN 200 MG
24 TABLET ORAL
Status: DISCONTINUED | OUTPATIENT
Start: 2020-07-27 | End: 2020-08-06 | Stop reason: HOSPADM

## 2020-07-27 RX ORDER — IBUPROFEN 200 MG
16 TABLET ORAL
Status: DISCONTINUED | OUTPATIENT
Start: 2020-07-27 | End: 2020-08-06 | Stop reason: HOSPADM

## 2020-07-27 RX ORDER — DEXAMETHASONE SODIUM PHOSPHATE 4 MG/ML
2 INJECTION, SOLUTION INTRA-ARTICULAR; INTRALESIONAL; INTRAMUSCULAR; INTRAVENOUS; SOFT TISSUE EVERY 12 HOURS
Status: DISCONTINUED | OUTPATIENT
Start: 2020-07-27 | End: 2020-07-30

## 2020-07-27 RX ORDER — SODIUM CHLORIDE 9 MG/ML
INJECTION, SOLUTION INTRAVENOUS CONTINUOUS
Status: DISCONTINUED | OUTPATIENT
Start: 2020-07-27 | End: 2020-07-31

## 2020-07-27 RX ADMIN — CEFTRIAXONE 1 G: 1 INJECTION, SOLUTION INTRAVENOUS at 04:07

## 2020-07-27 RX ADMIN — LEVOTHYROXINE SODIUM 137 MCG: 25 TABLET ORAL at 05:07

## 2020-07-27 RX ADMIN — ACETAMINOPHEN 650 MG: 325 TABLET ORAL at 09:07

## 2020-07-27 RX ADMIN — ACETAMINOPHEN 650 MG: 325 TABLET ORAL at 11:07

## 2020-07-27 RX ADMIN — HEPARIN SODIUM 5000 UNITS: 5000 INJECTION, SOLUTION INTRAVENOUS; SUBCUTANEOUS at 09:07

## 2020-07-27 RX ADMIN — SODIUM CHLORIDE: 0.9 INJECTION, SOLUTION INTRAVENOUS at 04:07

## 2020-07-27 RX ADMIN — GABAPENTIN 600 MG: 300 CAPSULE ORAL at 09:07

## 2020-07-27 RX ADMIN — HYDRALAZINE HYDROCHLORIDE 10 MG: 20 INJECTION INTRAMUSCULAR; INTRAVENOUS at 04:07

## 2020-07-27 RX ADMIN — ATORVASTATIN CALCIUM 10 MG: 10 TABLET, FILM COATED ORAL at 09:07

## 2020-07-27 RX ADMIN — HYDRALAZINE HYDROCHLORIDE 25 MG: 25 TABLET, FILM COATED ORAL at 09:07

## 2020-07-27 RX ADMIN — HEPARIN SODIUM 5000 UNITS: 5000 INJECTION, SOLUTION INTRAVENOUS; SUBCUTANEOUS at 05:07

## 2020-07-27 RX ADMIN — HYDRALAZINE HYDROCHLORIDE 25 MG: 25 TABLET, FILM COATED ORAL at 04:07

## 2020-07-27 RX ADMIN — ONDANSETRON 4 MG: 2 INJECTION INTRAMUSCULAR; INTRAVENOUS at 05:07

## 2020-07-27 RX ADMIN — LEVETIRACETAM 1500 MG: 500 TABLET ORAL at 09:07

## 2020-07-27 RX ADMIN — DEXAMETHASONE SODIUM PHOSPHATE 2 MG: 4 INJECTION, SOLUTION INTRAMUSCULAR; INTRAVENOUS at 08:07

## 2020-07-27 RX ADMIN — LEVETIRACETAM 1500 MG: 500 TABLET ORAL at 08:07

## 2020-07-27 RX ADMIN — DEXAMETHASONE SODIUM PHOSPHATE 2 MG: 4 INJECTION, SOLUTION INTRAMUSCULAR; INTRAVENOUS at 11:07

## 2020-07-27 RX ADMIN — HYDRALAZINE HYDROCHLORIDE 25 MG: 25 TABLET, FILM COATED ORAL at 05:07

## 2020-07-27 NOTE — PLAN OF CARE
Chart reviewed pt resting in bed with call light and personal belongings within reach. Vitals stable.  Turn every 2 hours to prevent skin breakdown, bed alarm active, periwick in place when pt does not remove herself. Neuro checks every 4 hours. Pt still very drowsy but still arouses for conversation. Pt denies pain, no complaints, free from injury will continue to monitor.

## 2020-07-27 NOTE — PROGRESS NOTES
[facility] -   Nephrology  Progress Note    Patient Name: Cata Lang  MRN: [id number]  Admission Date: 7/25/2020  Hospital Length of Stay: 1 days  Attending Provider: Kingsley Heart MD   Primary Care Physician: Sabra Fregoso MD  Principal Problem:Hypercalcemia    Subjective:     HPI: Cata Lang is a pleasant 49-year-old  woman history of hypertension, type 2 diabetes, morbid obesity, seizure disorder, CKD stage 3, baseline creatinine about 1.3 mg/dL, GFR 50 mL/minute, intracranial mass / lesion, she presents to the emergency room yesterday with progressive complains of weakness, increasing headache, nausea, a repeat MRI of the brain is pending at this time, she was evaluated by Neurosurgery, also significant hypercalcemia with a serum calcium of 14 noted on presentation, patient is currently taking Tums, calcitriol, hydrochlorothiazide, also she possibly has a urinary tract infection, acute on chronic renal failure, serum creatinine was 1.9 on presentation, 2.5 this evening, she is currently receiving IV fluids, we were consulted for acute kidney injury, hypercalcemia    Interval History:  Creatinine is still 2.6 with acute kidney injury.  Ultrasound negative.  Calcium down to 12.  Workup for hypercalcemia pending.  Started on IV fluids for acute kidney injury and dehydration    Review of patient's allergies indicates:   Allergen Reactions    Hydrochlorothiazide      hypercalcemia     Current Facility-Administered Medications   Medication Frequency    acetaminophen tablet 650 mg Q6H PRN    atorvastatin tablet 10 mg Daily    cefTRIAXone (ROCEPHIN) 1 g/50 mL D5W IVPB Q24H    dexamethasone injection 2 mg Q12H    dextrose 50% injection 12.5 g PRN    dextrose 50% injection 25 g PRN    gabapentin capsule 600 mg Daily    glucagon (human recombinant) injection 1 mg PRN    glucose chewable tablet 16 g PRN    glucose chewable tablet 24 g PRN    heparin (porcine)  injection 5,000 Units Q8H    hydrALAZINE injection 10 mg Q8H PRN    hydrALAZINE tablet 25 mg Q8H    insulin aspart U-100 pen 1-10 Units QID (AC + HS) PRN    levETIRAcetam tablet 1,500 mg BID    levothyroxine tablet 137 mcg Before breakfast    ondansetron injection 4 mg Q6H PRN       Objective:     Vital Signs (Most Recent):  Temp: 97.5 °F (36.4 °C) (07/27/20 1141)  Pulse: 72 (07/27/20 1141)  Resp: 18 (07/27/20 1141)  BP: (!) 166/74 (07/27/20 1141)  SpO2: 96 % (07/27/20 1141)  O2 Device (Oxygen Therapy): room air (07/27/20 0322) Vital Signs (24h Range):  Temp:  [96.5 °F (35.8 °C)-97.9 °F (36.6 °C)] 97.5 °F (36.4 °C)  Pulse:  [65-85] 72  Resp:  [18] 18  SpO2:  [94 %-97 %] 96 %  BP: (152-181)/(73-85) 166/74     Weight: 129.8 kg (286 lb 2.5 oz) (07/26/20 0435)  Body mass index is 41.06 kg/m².  Body surface area is 2.53 meters squared.    I/O last 3 completed shifts:  In: 5032.5 [P.O.:240; I.V.:3742.5; IV Piggyback:1050]  Out: 1700 [Urine:1700]    Physical Exam  Vitals signs and nursing note reviewed.   Constitutional:       General: She is not in acute distress.     Appearance: She is well-developed.   HENT:      Head: Normocephalic and atraumatic.   Eyes:      Conjunctiva/sclera: Conjunctivae normal.      Pupils: Pupils are equal, round, and reactive to light.   Neck:      Musculoskeletal: Neck supple.      Thyroid: No thyromegaly.      Vascular: No JVD.   Cardiovascular:      Rate and Rhythm: Normal rate and regular rhythm.      Heart sounds: No murmur. No friction rub. No gallop.    Pulmonary:      Effort: Pulmonary effort is normal.      Breath sounds: Normal breath sounds. No wheezing or rales.   Abdominal:      General: Bowel sounds are normal. There is no distension.      Palpations: Abdomen is soft.      Tenderness: There is no abdominal tenderness. There is no guarding or rebound.   Musculoskeletal: Normal range of motion.         General: No deformity.   Lymphadenopathy:      Cervical: No cervical  adenopathy.   Skin:     General: Skin is warm and dry.      Findings: No rash.   Neurological:      Mental Status: She is alert and oriented to person, place, and time.      Deep Tendon Reflexes: Reflexes are normal and symmetric.   Psychiatric:         Behavior: Behavior normal.         Thought Content: Thought content normal.         Judgment: Judgment normal.         Significant Labs:  CMP:   Recent Labs   Lab 07/27/20  0545   *   CALCIUM 12.0*   ALBUMIN 3.4*   PROT 6.9      K 4.0   CO2 24      BUN 24*   CREATININE 2.6*   ALKPHOS 145*   ALT 27   AST 35   BILITOT 0.2     All labs within the past 24 hours have been reviewed.     Significant Imaging:  Labs: Reviewed  X-Ray: Reviewed  US: Reviewed    Assessment/Plan:     * Hypercalcemia  Patient has severe hypercalcemia.  Vitamin-D and calcium supplementations have been stopped.  Patient still has vitamin-D level which is not very high.  Parathyroid hormone level is low.  PTH related peptide is pending.  Will check for free light chains and screen for multiple myeloma at this time.  Patient has acute kidney injury due to hypercalcemia dehydration.  Will start patient on normal saline for the treatment of hypercalcemia and acute kidney injury.    SHARLENE on CKD, stage III  1. SHARLENE on CKD stage 3 , baseline creatinine about 1.3 mg/dL, GFR 50 mL/minute, acute kidney injury likely dehydration from hypercalcemia,  also can be a urinary tract infection, urine culture pending, will start her on IV Rocephin, renal ultrasound reviewed  Avoid Ace inhibitors/ARB/NSAIDs due to acute kidney injury, start normal saline at 125 cc an hour            Thank you for your consult.     Veronika Eller MD  Nephrology  [facility]

## 2020-07-27 NOTE — SUBJECTIVE & OBJECTIVE
Interval History:  Somnolent but easily awakens.  Answers questions and follows questions appropriately. Gait instability and sluggishness.    Review of Systems   Constitutional: Negative for chills and fever.   HENT: Negative for congestion and sore throat.    Eyes: Negative for visual disturbance.   Respiratory: Negative for cough, shortness of breath and wheezing.    Cardiovascular: Negative for chest pain, palpitations and leg swelling.   Gastrointestinal: Negative for abdominal pain, blood in stool, constipation, diarrhea, nausea and vomiting.   Genitourinary: Negative for dysuria and hematuria.   Musculoskeletal: Positive for gait problem. Negative for arthralgias and back pain.   Skin: Negative for rash and wound.   Neurological: Positive for weakness. Negative for dizziness, light-headedness and numbness.   Hematological: Negative for adenopathy.   Psychiatric/Behavioral: Positive for confusion.     Objective:     Vital Signs (Most Recent):  Temp: 97.9 °F (36.6 °C) (07/26/20 2319)  Pulse: 70 (07/26/20 2319)  Resp: 18 (07/26/20 2319)  BP: (!) 152/81 (07/26/20 2319)  SpO2: 96 % (07/26/20 2319) Vital Signs (24h Range):  Temp:  [96.5 °F (35.8 °C)-97.9 °F (36.6 °C)] 97.9 °F (36.6 °C)  Pulse:  [65-84] 70  Resp:  [13-20] 18  SpO2:  [88 %-97 %] 96 %  BP: (141-181)/(65-83) 152/81     Weight: 129.8 kg (286 lb 2.5 oz)  Body mass index is 41.06 kg/m².    Intake/Output Summary (Last 24 hours) at 7/26/2020 6696  Last data filed at 7/26/2020 1802  Gross per 24 hour   Intake 1240 ml   Output 1700 ml   Net -460 ml      Physical Exam  Vitals signs and nursing note reviewed.   Constitutional:       General: She is not in acute distress.     Appearance: She is well-developed.   HENT:      Head: Normocephalic and atraumatic.   Eyes:      Conjunctiva/sclera: Conjunctivae normal.      Pupils: Pupils are equal, round, and reactive to light.   Neck:      Musculoskeletal: Neck supple.      Thyroid: No thyromegaly.      Vascular: No  JVD.   Cardiovascular:      Rate and Rhythm: Normal rate and regular rhythm.      Heart sounds: No murmur. No friction rub. No gallop.    Pulmonary:      Effort: Pulmonary effort is normal.      Breath sounds: Normal breath sounds. No wheezing or rales.   Abdominal:      General: Bowel sounds are normal. There is no distension.      Palpations: Abdomen is soft.      Tenderness: There is no abdominal tenderness. There is no guarding or rebound.   Musculoskeletal: Normal range of motion.         General: No deformity.   Lymphadenopathy:      Cervical: No cervical adenopathy.   Skin:     General: Skin is warm and dry.      Findings: No rash.   Neurological:      Mental Status: She is alert and oriented to person, place, and time.      Deep Tendon Reflexes: Reflexes are normal and symmetric.   Psychiatric:         Behavior: Behavior normal.         Thought Content: Thought content normal.         Judgment: Judgment normal.         Significant Labs: All pertinent labs within the past 24 hours have been reviewed.    Significant Imaging: I have reviewed all pertinent imaging results/findings within the past 24 hours.

## 2020-07-27 NOTE — NURSING
Pt keeps removing Periwick. Education was given to pt regarding the importance of keeping it in place due to suction and not wanting her to be wet. Pt stated she wants a regular catheter. Education was given to pt regarding us not just putting in catheters if someone doesn't have a medical reason for one due to the risk of infection. Reinforced periwick in place.

## 2020-07-27 NOTE — CONSULTS
[facility] -   Neurosurgery  Consult Note    Consults  Subjective:     Chief Complaint/Reason for Admission:  Right frontal mass    History of Present Illness:  This pleasant 49-year-old woman had developed progressive difficulties with falling in ambulation over the past couple of weeks.  She had been seen in [place] where CT scan demonstrated a right frontal mass.  According to her , who gives most of the history, the patient has been having what he believes her leg seizures where she will have episodic alterations in mental status and weakness.  The patient, who is a poor historian, thinks these may have occurred as far back as 2015, however this is not clear at all.  Most of the symptoms have occurred within the last 6 months, in particular over the last few weeks.  These are clearly associated with headache in the patient has been increasingly sleepy.    PTA Medications   Medication Sig    aspirin 325 MG tablet Take 325 mg by mouth once daily.    atorvastatin (LIPITOR) 10 MG tablet Take 10 mg by mouth once daily.     calcitRIOL (ROCALTROL) 0.25 MCG Cap 0.25 mcg once daily.     clopidogreL (PLAVIX) 75 mg tablet Take 75 mg by mouth once daily.     gabapentin (NEURONTIN) 600 MG tablet Take 600 mg by mouth once daily.     levETIRAcetam (KEPPRA) 500 MG Tab Take 1,500 mg by mouth 2 (two) times daily.    levothyroxine (SYNTHROID) 137 MCG Tab tablet     metFORMIN (GLUCOPHAGE) 500 MG tablet Take 500 mg by mouth 2 (two) times daily with meals.    zolpidem (AMBIEN) 10 mg Tab 10 mg nightly as needed.     losartan-hydrochlorothiazide 100-12.5 mg (HYZAAR) 100-12.5 mg Tab        Review of patient's allergies indicates:   Allergen Reactions    Hydrochlorothiazide      hypercalcemia       Past Medical History:   Diagnosis Date    Diabetes mellitus     Hypertension     Seizures     Stroke     Thyroid disease      Past Surgical History:   Procedure Laterality Date    THYROIDECTOMY       TONSILLECTOMY       Family History     None        Tobacco Use    Smoking status: Current Every Day Smoker     Packs/day: 1.00     Types: Cigarettes   Substance and Sexual Activity    Alcohol use: Yes    Drug use: Not Currently    Sexual activity: Not on file     Review of Systems   Constitutional: Positive for activity change and fatigue. Negative for diaphoresis and unexpected weight change.   HENT: Negative for hearing loss, rhinorrhea, trouble swallowing and voice change.    Eyes: Negative for photophobia and visual disturbance.   Respiratory: Negative for chest tightness and shortness of breath.    Cardiovascular: Negative for chest pain.   Gastrointestinal: Negative for constipation, nausea and vomiting.   Endocrine: Negative for cold intolerance, heat intolerance, polydipsia, polyphagia and polyuria.   Genitourinary: Negative for difficulty urinating.   Musculoskeletal: Negative for back pain, neck pain and neck stiffness.   Skin: Negative.    Neurological: Positive for seizures, weakness and light-headedness. Negative for dizziness, tremors, syncope, facial asymmetry, speech difficulty, numbness and headaches.   Hematological: Negative for adenopathy. Does not bruise/bleed easily.   Psychiatric/Behavioral: Positive for confusion. Negative for behavioral problems and decreased concentration.     Objective:     Weight: 129.8 kg (286 lb 2.5 oz)  Body mass index is 41.06 kg/m².  Vital Signs (Most Recent):  Temp: 97.5 °F (36.4 °C) (07/27/20 0758)  Pulse: 65 (07/27/20 0758)  Resp: 18 (07/27/20 0758)  BP: (!) 162/77 (07/27/20 0758)  SpO2: 95 % (07/27/20 0758) Vital Signs (24h Range):  Temp:  [96.5 °F (35.8 °C)-97.9 °F (36.6 °C)] 97.5 °F (36.4 °C)  Pulse:  [65-70] 65  Resp:  [18] 18  SpO2:  [94 %-97 %] 95 %  BP: (152-181)/(72-85) 162/77                          Physical Exam:  Nursing note and vitals reviewed.    Constitutional: She appears well-developed and well-nourished.     Eyes: Pupils are equal, round,  and reactive to light. Conjunctivae and EOM are normal.     Cardiovascular: Normal rate, regular rhythm and normal pulses.     Abdominal: Soft. Bowel sounds are normal.     Skin: Skin displays no rash on trunk.     Psych/Behavior: She is alert. She is oriented to person, place, and time. She has a normal mood and affect.     Musculoskeletal:        Neck: Range of motion is full. Muscle strength is 5/5. Tone is normal.        Back: Range of motion is full. Muscle strength is 5/5. Tone is normal.        Right Upper Extremities: Range of motion is full. Muscle strength is 5/5. Tone is normal.        Left Upper Extremities: Range of motion is full. Muscle strength is 5/5. Tone is normal.       Right Lower Extremities: Range of motion is full. Muscle strength is 5/5.        Left Lower Extremities: Range of motion is full. Muscle strength is 5/5.     Neurological:        Coordination: She has a normal Romberg Test, normal finger to nose coordination and normal tandem walking coordination.        Sensory: There is no sensory deficit in the trunk. There is no sensory deficit in the extremities.        DTRs: DTRs are DTRS NORMAL AND SYMMETRICnormal and symmetric. Tricep reflexes are 2+ on the right side and 2+ on the left side. Bicep reflexes are 2+ on the right side and 2+ on the left side. Brachioradialis reflexes are 2+ on the right side and 2+ on the left side. Patellar reflexes are 2+ on the right side and 2+ on the left side. Achilles reflexes are 2+ on the right side and 2+ on the left side. She displays no Babinski's sign on the right side. She displays no Babinski's sign on the left side.        Cranial nerves: Cranial nerve(s) II, III, IV, V, VI, VII, VIII, IX, X, XI and XII are intact.    The patient is sleepy but easily aroused.  Her speech is normal.  There are no paraphasic errors.  There is no evidence of focal deficit, however the patient seems to have some subtle memory issues and confusion.    Significant  Labs:  Recent Labs   Lab 07/26/20  0045 07/26/20  0452 07/26/20  1141    103 124*    135* 135*   K 4.8 4.6 4.6   CL 98 97 98   CO2 25 26 25   BUN 19 20 21*   CREATININE 1.9* 2.4* 2.5*   CALCIUM 14.2* 14.0* 13.3*   MG  --  1.9  --      Recent Labs   Lab 07/26/20  0045 07/27/20  0545   WBC 10.72 10.73   HGB 9.8* 9.5*   HCT 32.6* 31.8*   * 343     No results for input(s): LABPT, INR, APTT in the last 48 hours.  Microbiology Results (last 7 days)     Procedure Component Value Units Date/Time    Urine culture [[id number]] Collected: 07/27/20 0033    Order Status: No result Specimen: Urine Updated: 07/27/20 0105    Urine culture [[id number]] Collected: 07/26/20 1434    Order Status: Canceled Specimen: Urine         All pertinent labs from the last 24 hours have been reviewed.  Significant Diagnostics:  I have reviewed and interpreted all pertinent imaging results/findings within the past 24 hours.  A CT scan of the head without contrast is available for review from her local hospital demonstrating a mass in the right frontal lobe with significant calcification.  This is in appearance quite typical of oligodendroglioma.  There is significant mass effect with vasogenic edema associated with the tumor.  The lesion measures about 35 x 40 mm in dimension.  There is effacement of frontal midline structures.  A new MRI confirms these findings.    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  I suspect her previous stroke may have been epilepsy.  We are going to stop the Plavix and aspirin and assess the anti-platelet drug effect.  The patient will require craniotomy and evacuation of the tumor once her coagulation situation has recovered.  She is being treated with anticonvulsants in the form of Keppra and gabapentin.  She has a non compliant diabetic, and will need to be placed on dexamethasone today, so may have some  increased need for hypoglycemics or insulin.  A long discussion with the patient and her  (by telephone) regarding her diagnosis and the indications for, alternatives to all the pertinent risks of the treatment plan.  Understanding the treatment plan fully, and having had all their questions carefully answered, they would like to proceed as outlined above.  Active Diagnoses:    Diagnosis Date Noted POA    PRINCIPAL PROBLEM:  Hypercalcemia [E83.52] 07/26/2020 Yes    SHARLENE on CKD, stage III [N17.9] 07/26/2020 Yes    Essential hypertension [I10] 07/26/2020 Yes     Chronic    Brain mass [G93.89] 07/26/2020 Yes      Problems Resolved During this Admission:       Thank you for your consult. I will follow-up with patient. Please contact us if you have any additional questions.    Jose Hagen MD  Neurosurgery  [facility] -

## 2020-07-27 NOTE — NURSING
Micro called stating the grey top tube for the urine culture did not have any urine in it, the order needed to be put back in and urine recollected for the culture.  Order put in and urine recollected and sent to lab.

## 2020-07-27 NOTE — ASSESSMENT & PLAN NOTE
Patient has severe hypercalcemia.  Vitamin-D and calcium supplementations have been stopped.  Patient still has vitamin-D level which is not very high.  Parathyroid hormone level is low.  PTH related peptide is pending.  Will check for free light chains and screen for multiple myeloma at this time.  Patient has acute kidney injury due to hypercalcemia dehydration.  Will start patient on normal saline for the treatment of hypercalcemia and acute kidney injury.

## 2020-07-27 NOTE — CONSULTS
Consulted to this 49 year old female patient for skin breakdown. PMHx of brain mass, HTN, DM II, hypothyroidism, seizure disorder. She is sleeping, family member present at the bedside. Assessment performed. Left shin with dry flaky skin, healing abrasion from fall, no nursing intervention required. Bilateral heels intact with no redness noted. Patient turned to left side. Cluster of full thickness lesions noted to left buttock, hip area. Wound beds are fibrinous, red and white, periwound red. Unknown etiology. Cleansed with saline and covered with foam dressing. Gluteal cleft with MASD noted, partial thickness tissue loss with moist red wound bed. Barrier cream in use. Recommend continued pressure injury prevention measures.    Lesions to left buttock/hip:  1. Cleanse with saline   2. Pat dry   3. Paint periwound with cavilon  3. Apply foam dressing  5. Change every 3 days or prn excess drainage    MASD gluteal cleft:  1. Cleanse with saline or bath wipes  2. Pat dry  3. Apply thin layer of critic aide paste  4. Perform twice daily and with hygiene care

## 2020-07-27 NOTE — PROGRESS NOTES
[facility] Hospital Medicine  Progress Note    Patient Name: Cata Lang  MRN: [id number]  Patient Class: IP- Inpatient   Admission Date: 7/25/2020  Length of Stay: 0 days  Attending Physician: Kingsley Heart MD  Primary Care Provider: Sabra Fregoso MD        Subjective:     Principal Problem:Hypercalcemia        HPI:   Cata Lang is a 49 y.o. female patient with a PMHx of brain mass, HTN, DM II, hypothyroidism, seizure disorder, and chronic HA who presents to the Emergency Department for evaluation of neurological problem which onset gradually today. Pt notes she has been walking worse since December and her HA is worse on the L side and rates it a moderate to severe HA. Symptoms are worsening and moderate to severe in severity. No mitigating or exacerbating factors reported. Associated sxs include dizziness, malaise, and difficulty walking. Patient denies any fever, SOB, CP, abd pain, N/V, back pain, weakness, and all other sxs at this time. Pt notes she takes several tums daily which was recommended by her ENT. Pt transferred from [place] and accepted by Dr. Gandara (Neurosurgery). Case discussed with Dr. Gandara in ED who reviewed CT of the head from transferring facility and feels there is no need for neurosurgical intervention at present.  In ED, patient's symptoms completely resolved with NIHSS of 0.  Labs showed: creatinine of 1.9, BUN 19, calcium 14.2. Patient is on calcitriol at home, started last year with no follow-up labs done. IV fluids x 1 liter and 20 mg IV Lasix given. Hospital Medicine was contacted to admit for hypercalcemia and SHARLENE.      Overview/Hospital Course:  No notes on file    Interval History:  Somnolent but easily awakens.  Answers questions and follows questions appropriately. Gait instability and sluggishness.    Review of Systems   Constitutional: Negative for chills and fever.   HENT: Negative for congestion and sore throat.    Eyes: Negative  for visual disturbance.   Respiratory: Negative for cough, shortness of breath and wheezing.    Cardiovascular: Negative for chest pain, palpitations and leg swelling.   Gastrointestinal: Negative for abdominal pain, blood in stool, constipation, diarrhea, nausea and vomiting.   Genitourinary: Negative for dysuria and hematuria.   Musculoskeletal: Positive for gait problem. Negative for arthralgias and back pain.   Skin: Negative for rash and wound.   Neurological: Positive for weakness. Negative for dizziness, light-headedness and numbness.   Hematological: Negative for adenopathy.   Psychiatric/Behavioral: Positive for confusion.     Objective:     Vital Signs (Most Recent):  Temp: 97.9 °F (36.6 °C) (07/26/20 2319)  Pulse: 70 (07/26/20 2319)  Resp: 18 (07/26/20 2319)  BP: (!) 152/81 (07/26/20 2319)  SpO2: 96 % (07/26/20 2319) Vital Signs (24h Range):  Temp:  [96.5 °F (35.8 °C)-97.9 °F (36.6 °C)] 97.9 °F (36.6 °C)  Pulse:  [65-84] 70  Resp:  [13-20] 18  SpO2:  [88 %-97 %] 96 %  BP: (141-181)/(65-83) 152/81     Weight: 129.8 kg (286 lb 2.5 oz)  Body mass index is 41.06 kg/m².    Intake/Output Summary (Last 24 hours) at 7/26/2020 2325  Last data filed at 7/26/2020 1802  Gross per 24 hour   Intake 1240 ml   Output 1700 ml   Net -460 ml      Physical Exam  Vitals signs and nursing note reviewed.   Constitutional:       General: She is not in acute distress.     Appearance: She is well-developed.   HENT:      Head: Normocephalic and atraumatic.   Eyes:      Conjunctiva/sclera: Conjunctivae normal.      Pupils: Pupils are equal, round, and reactive to light.   Neck:      Musculoskeletal: Neck supple.      Thyroid: No thyromegaly.      Vascular: No JVD.   Cardiovascular:      Rate and Rhythm: Normal rate and regular rhythm.      Heart sounds: No murmur. No friction rub. No gallop.    Pulmonary:      Effort: Pulmonary effort is normal.      Breath sounds: Normal breath sounds. No wheezing or rales.   Abdominal:       General: Bowel sounds are normal. There is no distension.      Palpations: Abdomen is soft.      Tenderness: There is no abdominal tenderness. There is no guarding or rebound.   Musculoskeletal: Normal range of motion.         General: No deformity.   Lymphadenopathy:      Cervical: No cervical adenopathy.   Skin:     General: Skin is warm and dry.      Findings: No rash.   Neurological:      Mental Status: She is alert and oriented to person, place, and time.      Deep Tendon Reflexes: Reflexes are normal and symmetric.   Psychiatric:         Behavior: Behavior normal.         Thought Content: Thought content normal.         Judgment: Judgment normal.         Significant Labs: All pertinent labs within the past 24 hours have been reviewed.    Significant Imaging: I have reviewed all pertinent imaging results/findings within the past 24 hours.      Assessment/Plan:      * Hypercalcemia  - Initial calcium of 14.2.  Patient is on calitriol at home, will discontinue.  - Continue aggressive IV hydration.  Will give additional IV Lasix if needed.  - Follow labs.  - Nephrology consult.    Brain mass  - No acute change in mass seen on CT of brain per Neurosurgery.    Essential hypertension  - Losartan-HCTZ on hold as above.    - IV hydralazine PRN.    SHARLENE on CKD, stage III  - Initial creatinine of 1.9 (baseline 1.3).  - IV hydration.  - Avoid nephrotoxic agents. Hold home losartan-HCTZ for now.  - Follow labs.      VTE Risk Mitigation (From admission, onward)         Ordered     heparin (porcine) injection 5,000 Units  Every 8 hours      07/26/20 0553     IP VTE HIGH RISK PATIENT  Once      07/26/20 0552     Place sequential compression device  Until discontinued      07/26/20 0552                      Kingsley Heart MD  Department of Hospital Medicine   [facility] -

## 2020-07-27 NOTE — ASSESSMENT & PLAN NOTE
1. SHARLENE on CKD stage 3 , baseline creatinine about 1.3 mg/dL, GFR 50 mL/minute, acute kidney injury likely dehydration from hypercalcemia,  also can be a urinary tract infection, urine culture pending, will start her on IV Rocephin, renal ultrasound reviewed  Avoid Ace inhibitors/ARB/NSAIDs due to acute kidney injury, start normal saline at 125 cc an hour

## 2020-07-27 NOTE — SUBJECTIVE & OBJECTIVE
Interval History:  Creatinine is still 2.6 with acute kidney injury.  Ultrasound negative.  Calcium down to 12.  Workup for hypercalcemia pending.  Started on IV fluids for acute kidney injury and dehydration    Review of patient's allergies indicates:   Allergen Reactions    Hydrochlorothiazide      hypercalcemia     Current Facility-Administered Medications   Medication Frequency    acetaminophen tablet 650 mg Q6H PRN    atorvastatin tablet 10 mg Daily    cefTRIAXone (ROCEPHIN) 1 g/50 mL D5W IVPB Q24H    dexamethasone injection 2 mg Q12H    dextrose 50% injection 12.5 g PRN    dextrose 50% injection 25 g PRN    gabapentin capsule 600 mg Daily    glucagon (human recombinant) injection 1 mg PRN    glucose chewable tablet 16 g PRN    glucose chewable tablet 24 g PRN    heparin (porcine) injection 5,000 Units Q8H    hydrALAZINE injection 10 mg Q8H PRN    hydrALAZINE tablet 25 mg Q8H    insulin aspart U-100 pen 1-10 Units QID (AC + HS) PRN    levETIRAcetam tablet 1,500 mg BID    levothyroxine tablet 137 mcg Before breakfast    ondansetron injection 4 mg Q6H PRN       Objective:     Vital Signs (Most Recent):  Temp: 97.5 °F (36.4 °C) (07/27/20 1141)  Pulse: 72 (07/27/20 1141)  Resp: 18 (07/27/20 1141)  BP: (!) 166/74 (07/27/20 1141)  SpO2: 96 % (07/27/20 1141)  O2 Device (Oxygen Therapy): room air (07/27/20 0322) Vital Signs (24h Range):  Temp:  [96.5 °F (35.8 °C)-97.9 °F (36.6 °C)] 97.5 °F (36.4 °C)  Pulse:  [65-85] 72  Resp:  [18] 18  SpO2:  [94 %-97 %] 96 %  BP: (152-181)/(73-85) 166/74     Weight: 129.8 kg (286 lb 2.5 oz) (07/26/20 0435)  Body mass index is 41.06 kg/m².  Body surface area is 2.53 meters squared.    I/O last 3 completed shifts:  In: 5032.5 [P.O.:240; I.V.:3742.5; IV Piggyback:1050]  Out: 1700 [Urine:1700]    Physical Exam  Vitals signs and nursing note reviewed.   Constitutional:       General: She is not in acute distress.     Appearance: She is well-developed.   HENT:      Head:  Normocephalic and atraumatic.   Eyes:      Conjunctiva/sclera: Conjunctivae normal.      Pupils: Pupils are equal, round, and reactive to light.   Neck:      Musculoskeletal: Neck supple.      Thyroid: No thyromegaly.      Vascular: No JVD.   Cardiovascular:      Rate and Rhythm: Normal rate and regular rhythm.      Heart sounds: No murmur. No friction rub. No gallop.    Pulmonary:      Effort: Pulmonary effort is normal.      Breath sounds: Normal breath sounds. No wheezing or rales.   Abdominal:      General: Bowel sounds are normal. There is no distension.      Palpations: Abdomen is soft.      Tenderness: There is no abdominal tenderness. There is no guarding or rebound.   Musculoskeletal: Normal range of motion.         General: No deformity.   Lymphadenopathy:      Cervical: No cervical adenopathy.   Skin:     General: Skin is warm and dry.      Findings: No rash.   Neurological:      Mental Status: She is alert and oriented to person, place, and time.      Deep Tendon Reflexes: Reflexes are normal and symmetric.   Psychiatric:         Behavior: Behavior normal.         Thought Content: Thought content normal.         Judgment: Judgment normal.         Significant Labs:  CMP:   Recent Labs   Lab 07/27/20  0545   *   CALCIUM 12.0*   ALBUMIN 3.4*   PROT 6.9      K 4.0   CO2 24      BUN 24*   CREATININE 2.6*   ALKPHOS 145*   ALT 27   AST 35   BILITOT 0.2     All labs within the past 24 hours have been reviewed.     Significant Imaging:  Labs: Reviewed  X-Ray: Reviewed  US: Reviewed

## 2020-07-28 LAB
ALBUMIN SERPL BCP-MCNC: 3.3 G/DL (ref 3.5–5.2)
ANION GAP SERPL CALC-SCNC: 13 MMOL/L (ref 8–16)
BACTERIA UR CULT: NO GROWTH
BASOPHILS # BLD AUTO: 0.02 K/UL (ref 0–0.2)
BASOPHILS NFR BLD: 0.2 % (ref 0–1.9)
BILIRUB UR QL STRIP: NEGATIVE
BUN SERPL-MCNC: 27 MG/DL (ref 6–20)
CALCIUM SERPL-MCNC: 10.8 MG/DL (ref 8.7–10.5)
CHLORIDE SERPL-SCNC: 102 MMOL/L (ref 95–110)
CLARITY UR: CLEAR
CO2 SERPL-SCNC: 22 MMOL/L (ref 23–29)
COLOR UR: YELLOW
CREAT SERPL-MCNC: 2.6 MG/DL (ref 0.5–1.4)
CREAT UR-MCNC: 61.9 MG/DL (ref 15–325)
CREAT UR-MCNC: 61.9 MG/DL (ref 15–325)
DIFFERENTIAL METHOD: ABNORMAL
EOSINOPHIL # BLD AUTO: 0 K/UL (ref 0–0.5)
EOSINOPHIL NFR BLD: 0.1 % (ref 0–8)
ERYTHROCYTE [DISTWIDTH] IN BLOOD BY AUTOMATED COUNT: 15.8 % (ref 11.5–14.5)
EST. GFR  (AFRICAN AMERICAN): 24 ML/MIN/1.73 M^2
EST. GFR  (NON AFRICAN AMERICAN): 21 ML/MIN/1.73 M^2
GLUCOSE SERPL-MCNC: 115 MG/DL (ref 70–110)
GLUCOSE UR QL STRIP: NEGATIVE
HCT VFR BLD AUTO: 29.9 % (ref 37–48.5)
HGB BLD-MCNC: 9 G/DL (ref 12–16)
HGB UR QL STRIP: NEGATIVE
IMM GRANULOCYTES # BLD AUTO: 0.12 K/UL (ref 0–0.04)
IMM GRANULOCYTES NFR BLD AUTO: 1.1 % (ref 0–0.5)
INR PPP: 1 (ref 0.8–1.2)
KAPPA LC SER QL IA: 9.27 MG/DL (ref 0.33–1.94)
KAPPA LC/LAMBDA SER IA: 2.05 (ref 0.26–1.65)
KETONES UR QL STRIP: NEGATIVE
LAMBDA LC SER QL IA: 4.53 MG/DL (ref 0.57–2.63)
LEUKOCYTE ESTERASE UR QL STRIP: NEGATIVE
LYMPHOCYTES # BLD AUTO: 2.2 K/UL (ref 1–4.8)
LYMPHOCYTES NFR BLD: 19.4 % (ref 18–48)
MCH RBC QN AUTO: 24.9 PG (ref 27–31)
MCHC RBC AUTO-ENTMCNC: 30.1 G/DL (ref 32–36)
MCV RBC AUTO: 83 FL (ref 82–98)
MONOCYTES # BLD AUTO: 0.6 K/UL (ref 0.3–1)
MONOCYTES NFR BLD: 5.3 % (ref 4–15)
NEUTROPHILS # BLD AUTO: 8.3 K/UL (ref 1.8–7.7)
NEUTROPHILS NFR BLD: 73.9 % (ref 38–73)
NITRITE UR QL STRIP: NEGATIVE
NRBC BLD-RTO: 0 /100 WBC
PH UR STRIP: 6 [PH] (ref 5–8)
PHOSPHATE SERPL-MCNC: 3.5 MG/DL (ref 2.7–4.5)
PLATELET # BLD AUTO: 386 K/UL (ref 150–350)
PLATELET FUNCTION ASSAY - EPINEPHRINE: 90 SECS (ref 76–199)
PMV BLD AUTO: 10.2 FL (ref 9.2–12.9)
POCT GLUCOSE: 107 MG/DL (ref 70–110)
POCT GLUCOSE: 107 MG/DL (ref 70–110)
POCT GLUCOSE: 108 MG/DL (ref 70–110)
POCT GLUCOSE: 131 MG/DL (ref 70–110)
POTASSIUM SERPL-SCNC: 4.2 MMOL/L (ref 3.5–5.1)
PROT UR QL STRIP: NEGATIVE
PROT UR-MCNC: 9 MG/DL (ref 0–15)
PROT/CREAT UR: 0.15 MG/G{CREAT} (ref 0–0.2)
PROTHROMBIN TIME: 10.8 SEC (ref 9–12.5)
RBC # BLD AUTO: 3.62 M/UL (ref 4–5.4)
SODIUM SERPL-SCNC: 137 MMOL/L (ref 136–145)
SODIUM UR-SCNC: 88 MMOL/L (ref 20–250)
SP GR UR STRIP: 1.01 (ref 1–1.03)
URN SPEC COLLECT METH UR: NORMAL
UROBILINOGEN UR STRIP-ACNC: NEGATIVE EU/DL
WBC # BLD AUTO: 11.2 K/UL (ref 3.9–12.7)

## 2020-07-28 PROCEDURE — 85610 PROTHROMBIN TIME: CPT

## 2020-07-28 PROCEDURE — 80069 RENAL FUNCTION PANEL: CPT

## 2020-07-28 PROCEDURE — 99233 PR SUBSEQUENT HOSPITAL CARE,LEVL III: ICD-10-PCS | Mod: ,,, | Performed by: INTERNAL MEDICINE

## 2020-07-28 PROCEDURE — 11000001 HC ACUTE MED/SURG PRIVATE ROOM

## 2020-07-28 PROCEDURE — 99900037 HC PT THERAPY SCREENING (STAT)

## 2020-07-28 PROCEDURE — 63600175 PHARM REV CODE 636 W HCPCS: Performed by: INTERNAL MEDICINE

## 2020-07-28 PROCEDURE — 96372 THER/PROPH/DIAG INJ SC/IM: CPT

## 2020-07-28 PROCEDURE — 99900038 HC OT GENERIC THERAPY SCREENING (STAT): Performed by: PHYSICAL THERAPIST

## 2020-07-28 PROCEDURE — 63600175 PHARM REV CODE 636 W HCPCS: Performed by: NURSE PRACTITIONER

## 2020-07-28 PROCEDURE — 36415 COLL VENOUS BLD VENIPUNCTURE: CPT

## 2020-07-28 PROCEDURE — 25000003 PHARM REV CODE 250: Performed by: NURSE PRACTITIONER

## 2020-07-28 PROCEDURE — 81003 URINALYSIS AUTO W/O SCOPE: CPT

## 2020-07-28 PROCEDURE — 25000003 PHARM REV CODE 250: Performed by: INTERNAL MEDICINE

## 2020-07-28 PROCEDURE — 99231 SBSQ HOSP IP/OBS SF/LOW 25: CPT | Mod: ,,, | Performed by: NEUROLOGICAL SURGERY

## 2020-07-28 PROCEDURE — 84156 ASSAY OF PROTEIN URINE: CPT

## 2020-07-28 PROCEDURE — 84300 ASSAY OF URINE SODIUM: CPT

## 2020-07-28 PROCEDURE — 85576 BLOOD PLATELET AGGREGATION: CPT

## 2020-07-28 PROCEDURE — 85025 COMPLETE CBC W/AUTO DIFF WBC: CPT

## 2020-07-28 PROCEDURE — 99233 SBSQ HOSP IP/OBS HIGH 50: CPT | Mod: ,,, | Performed by: INTERNAL MEDICINE

## 2020-07-28 PROCEDURE — 99231 PR SUBSEQUENT HOSPITAL CARE,LEVL I: ICD-10-PCS | Mod: ,,, | Performed by: NEUROLOGICAL SURGERY

## 2020-07-28 RX ADMIN — HYDRALAZINE HYDROCHLORIDE 10 MG: 20 INJECTION INTRAMUSCULAR; INTRAVENOUS at 11:07

## 2020-07-28 RX ADMIN — HYDRALAZINE HYDROCHLORIDE 25 MG: 25 TABLET, FILM COATED ORAL at 09:07

## 2020-07-28 RX ADMIN — ACETAMINOPHEN 650 MG: 325 TABLET ORAL at 07:07

## 2020-07-28 RX ADMIN — GABAPENTIN 600 MG: 300 CAPSULE ORAL at 09:07

## 2020-07-28 RX ADMIN — DEXAMETHASONE SODIUM PHOSPHATE 2 MG: 4 INJECTION, SOLUTION INTRAMUSCULAR; INTRAVENOUS at 09:07

## 2020-07-28 RX ADMIN — HEPARIN SODIUM 5000 UNITS: 5000 INJECTION, SOLUTION INTRAVENOUS; SUBCUTANEOUS at 09:07

## 2020-07-28 RX ADMIN — LEVETIRACETAM 1500 MG: 500 TABLET ORAL at 09:07

## 2020-07-28 RX ADMIN — HYDRALAZINE HYDROCHLORIDE 10 MG: 20 INJECTION INTRAMUSCULAR; INTRAVENOUS at 12:07

## 2020-07-28 RX ADMIN — HYDRALAZINE HYDROCHLORIDE 25 MG: 25 TABLET, FILM COATED ORAL at 05:07

## 2020-07-28 RX ADMIN — LEVOTHYROXINE SODIUM 137 MCG: 25 TABLET ORAL at 05:07

## 2020-07-28 RX ADMIN — CEFTRIAXONE 1 G: 1 INJECTION, SOLUTION INTRAVENOUS at 05:07

## 2020-07-28 RX ADMIN — ATORVASTATIN CALCIUM 10 MG: 10 TABLET, FILM COATED ORAL at 09:07

## 2020-07-28 RX ADMIN — HEPARIN SODIUM 5000 UNITS: 5000 INJECTION, SOLUTION INTRAVENOUS; SUBCUTANEOUS at 02:07

## 2020-07-28 RX ADMIN — ACETAMINOPHEN 650 MG: 325 TABLET ORAL at 09:07

## 2020-07-28 RX ADMIN — SODIUM CHLORIDE: 0.9 INJECTION, SOLUTION INTRAVENOUS at 05:07

## 2020-07-28 RX ADMIN — HEPARIN SODIUM 5000 UNITS: 5000 INJECTION, SOLUTION INTRAVENOUS; SUBCUTANEOUS at 05:07

## 2020-07-28 RX ADMIN — HYDRALAZINE HYDROCHLORIDE 25 MG: 25 TABLET, FILM COATED ORAL at 02:07

## 2020-07-28 NOTE — SUBJECTIVE & OBJECTIVE
Interval History:  Remains somnolent but marginally more alert.  Answers questions and follows questions appropriately. Gait instability and sluggishness.  Mild hyperglycemia not requiring insulin.    Review of Systems   Constitutional: Negative for chills and fever.   HENT: Negative for congestion and sore throat.    Eyes: Negative for visual disturbance.   Respiratory: Negative for cough, shortness of breath and wheezing.    Cardiovascular: Negative for chest pain, palpitations and leg swelling.   Gastrointestinal: Negative for abdominal pain, blood in stool, constipation, diarrhea, nausea and vomiting.   Genitourinary: Negative for dysuria and hematuria.   Musculoskeletal: Positive for gait problem. Negative for arthralgias and back pain.   Skin: Negative for rash and wound.   Neurological: Positive for weakness. Negative for dizziness, light-headedness and numbness.   Hematological: Negative for adenopathy.   Psychiatric/Behavioral: Positive for confusion.     Objective:     Vital Signs (Most Recent):  Temp: 97.4 °F (36.3 °C) (07/28/20 1137)  Pulse: 70 (07/28/20 1137)  Resp: 18 (07/28/20 1137)  BP: (!) 144/67 (07/28/20 1137)  SpO2: (!) 94 % (07/28/20 1137) Vital Signs (24h Range):  Temp:  [96.9 °F (36.1 °C)-98.5 °F (36.9 °C)] 97.4 °F (36.3 °C)  Pulse:  [65-82] 70  Resp:  [16-20] 18  SpO2:  [94 %-98 %] 94 %  BP: (144-214)/(67-96) 144/67     Weight: 129.8 kg (286 lb 2.5 oz)  Body mass index is 41.06 kg/m².    Intake/Output Summary (Last 24 hours) at 7/28/2020 1411  Last data filed at 7/28/2020 0800  Gross per 24 hour   Intake 2044.17 ml   Output 2 ml   Net 2042.17 ml      Physical Exam  Vitals signs and nursing note reviewed.   Constitutional:       General: She is not in acute distress.     Appearance: She is well-developed.   HENT:      Head: Normocephalic and atraumatic.   Eyes:      Conjunctiva/sclera: Conjunctivae normal.      Pupils: Pupils are equal, round, and reactive to light.   Neck:       Musculoskeletal: Neck supple.      Thyroid: No thyromegaly.      Vascular: No JVD.   Cardiovascular:      Rate and Rhythm: Normal rate and regular rhythm.      Heart sounds: No murmur. No friction rub. No gallop.    Pulmonary:      Effort: Pulmonary effort is normal.      Breath sounds: Normal breath sounds. No wheezing or rales.   Abdominal:      General: Bowel sounds are normal. There is no distension.      Palpations: Abdomen is soft.      Tenderness: There is no abdominal tenderness. There is no guarding or rebound.   Musculoskeletal: Normal range of motion.         General: No deformity.   Lymphadenopathy:      Cervical: No cervical adenopathy.   Skin:     General: Skin is warm and dry.      Findings: No rash.   Neurological:      Mental Status: She is alert and oriented to person, place, and time.      Deep Tendon Reflexes: Reflexes are normal and symmetric.   Psychiatric:         Behavior: Behavior normal.         Thought Content: Thought content normal.         Judgment: Judgment normal.         Significant Labs: All pertinent labs within the past 24 hours have been reviewed.    Significant Imaging: I have reviewed all pertinent imaging results/findings within the past 24 hours.

## 2020-07-28 NOTE — PROGRESS NOTES
Contacted Dr Heart per therapy request. Per Dr Heart okay to hold therapy until after surgical intervention d/t pt drowsiness.

## 2020-07-28 NOTE — PT/OT/SLP PROGRESS
Physical Therapy      Patient Name:  Cata Lang   MRN:  [id number]    1337 P.T.COMPLETED CHART REVIEW AND SPOKE WITH CHARGE NURSE RONY. PT NOT APPROPRIATE FOR P.T. ASSESSMENT AT THIS TIME AND DR. BOLTON CONTACTED APPROVED TO COMPLETE INITIAL EVAL AFTER SX. THANK YOU.    Harriett Bernard, PT,7/28/2020

## 2020-07-28 NOTE — NURSING
Received phone call from micro lab stating PFA was unable to be used due to incorrect timing. Order was put back in for a recollect for 0600 to arrive in lab by 1700 Tuesday 7/28/20. Will continue to monitor.

## 2020-07-28 NOTE — PROGRESS NOTES
[facility] -   Neurosurgery  Progress Note    Subjective:     Interval History:  49-year-old lady in poor general health with known right frontal mass suspicious for oligodendroglioma.  Acute kidney injury, diabetes and hypercalcemia also identified.Known seizure disorder, on high-dose Keppra.    History of Present Illness:  See above.  Patient is somewhat will more awake now after having a dose or to dexamethasone, however still is quite sleepy.  She easily arouses and follows commands.  We are able to get a better neurologic assessment on her today and she appears to have significant left hemiparesis.  Post-Op Info:  * No surgery found *          Medications:  Continuous Infusions:   sodium chloride 0.9% 125 mL/hr at 07/27/20 1658     Scheduled Meds:   atorvastatin  10 mg Oral Daily    cefTRIAXone (ROCEPHIN) IVPB  1 g Intravenous Q24H    dexamethasone  2 mg Intravenous Q12H    gabapentin  600 mg Oral Daily    heparin (porcine)  5,000 Units Subcutaneous Q8H    hydrALAZINE  25 mg Oral Q8H    levETIRAcetam  1,500 mg Oral BID    levothyroxine  137 mcg Oral Before breakfast     PRN Meds:acetaminophen, dextrose 50%, dextrose 50%, glucagon (human recombinant), glucose, glucose, hydrALAZINE, insulin aspart U-100, ondansetron     Review of Systems  Objective:     Weight: 129.8 kg (286 lb 2.5 oz)  Body mass index is 41.06 kg/m².  Vital Signs (Most Recent):  Temp: 98.3 °F (36.8 °C) (07/28/20 0733)  Pulse: 68 (07/28/20 0733)  Resp: 17 (07/28/20 0733)  BP: (!) 145/67 (07/28/20 0733)  SpO2: 96 % (07/28/20 0733) Vital Signs (24h Range):  Temp:  [96.9 °F (36.1 °C)-98.5 °F (36.9 °C)] 98.3 °F (36.8 °C)  Pulse:  [65-85] 68  Resp:  [16-20] 17  SpO2:  [94 %-98 %] 96 %  BP: (145-214)/(67-96) 145/67     Date 07/28/20 0700 - 07/29/20 0659   Shift 5543-5201 1007-2669 6893-1139 24 Hour Total   INTAKE   P.O. 240   240   Shift Total(mL/kg) 240(1.8)   240(1.8)   OUTPUT   Urine(mL/kg/hr) 2   2   Shift Total(mL/kg) 2(0)    2(0)   Weight (kg) 129.8 129.8 129.8 129.8                        Neurosurgery Physical Exam    Significant Labs:  Recent Labs   Lab 07/26/20  1141 07/27/20  0545 07/28/20  0433   * 111* 115*   * 136 137   K 4.6 4.0 4.2   CL 98 100 102   CO2 25 24 22*   BUN 21* 24* 27*   CREATININE 2.5* 2.6* 2.6*   CALCIUM 13.3* 12.0* 10.8*     Recent Labs   Lab 07/27/20  0545 07/28/20  0433   WBC 10.73 11.20   HGB 9.5* 9.0*   HCT 31.8* 29.9*    386*     Recent Labs   Lab 07/27/20  1131 07/28/20  0837   INR  --  1.0   APTT <21.0  --      Microbiology Results (last 7 days)     Procedure Component Value Units Date/Time    Urine culture [[id number]] Collected: 07/27/20 0033    Order Status: No result Specimen: Urine Updated: 07/27/20 0105    Urine culture [[id number]] Collected: 07/26/20 1434    Order Status: Canceled Specimen: Urine         CMP:   Recent Labs   Lab 07/26/20  1141 07/27/20  0545 07/28/20  0433   * 111* 115*   CALCIUM 13.3* 12.0* 10.8*   ALBUMIN 3.1* 3.4* 3.3*   PROT  --  6.9  --    * 136 137   K 4.6 4.0 4.2   CO2 25 24 22*   CL 98 100 102   BUN 21* 24* 27*   CREATININE 2.5* 2.6* 2.6*   ALKPHOS  --  145*  --    ALT  --  27  --    AST  --  35  --    BILITOT  --  0.2  --      Significant Diagnostics:  I have reviewed and interpreted all pertinent imaging results/findings within the past 24 hours.  We have obtained a navigational CT scan for use with operative tumor resection hopefully in the near future.    Assessment/Plan:   1.  Right frontal oligodendroglioma 2.  Acute kidney injury on chronic kidney disease stage III 3.  Hypertension 4.  Diabetes    Plan:  We still await platelet function analysis to see to what degree her platelets are inhibited by her dual anti-platelet therapy that she had been on up to this point.  I suspect her so-called stroke in the past was more likely partial complex seizures in cited by the frontal tumor.  Once her acute kidney injury has recovered to a  degree, and her platelet inhibition has been corrected, she would be a reasonable candidate for stereotactically cystic craniotomy and resection of the tumor followed by adjuvant therapy.  I had a long discussion with the patient's  who is with her this morning, regarding the indications for, alternatives to and all the pertinent risks of the above-outlined treatment plan.  Understanding all of the pertinent risks, and having all of his questions carefully answered, he would like to proceed as outlined above.  Active Diagnoses:    Diagnosis Date Noted POA    PRINCIPAL PROBLEM:  Brain mass [G93.89] 07/26/2020 Yes    Metabolic encephalopathy [G93.41] 07/27/2020 Yes    Hypercalcemia [E83.52] 07/26/2020 Yes    SHARLENE on CKD, stage III [N17.9] 07/26/2020 Yes    Essential hypertension [I10] 07/26/2020 Yes     Chronic      Problems Resolved During this Admission:       Jose Hagen MD  Neurosurgery  [facility] -

## 2020-07-28 NOTE — ASSESSMENT & PLAN NOTE
Initial calcium of 14.2.  Patient is on calitriol at home, will discontinue.  Hold Calcium supplementation.  Continue aggressive IV hydration.  Will give additional IV Lasix if needed.  Nephrology evaluated and assisting with management.  PTH is appropriately suppressed and Vitamin D25 is low.  Check PTHrP and Calcitriol level.

## 2020-07-28 NOTE — PLAN OF CARE
Problem: Adult Inpatient Plan of Care  Goal: Plan of Care Review  7/28/2020 0307 by Riddhi Green RN  Outcome: Ongoing, Progressing  Flowsheets (Taken 7/28/2020 0307)  Plan of Care Reviewed With: patient  Pt had no adverse events during shift. Pt free from falls. Call light in reach. SR's x2. Headache pain well controlled w/ PRN meds. Pt repositions via wedge q2 hours. Wound care initiated, frequent turning in use. IVF's administered as ordered. VTE prophylaxis - SCD's in place. Fluids promoted, ambulation encouraged. Increased BP during shift, prn medication administered per order. Other VS stable. Pt very drowsy but easily awoken with voice and touch. Chart reviewed. Will continue to monitor.

## 2020-07-28 NOTE — ASSESSMENT & PLAN NOTE
- Initial calcium of 14.2.  Patient is on calitriol at home, will discontinue.  - Continue aggressive IV hydration.  Will give additional IV Lasix if needed.  - Follow labs.  - Nephrology consult.  PTH is appropriately suppressed and Vitamin D25 is low.  Check PTHrP and Calcitriol level.

## 2020-07-28 NOTE — ASSESSMENT & PLAN NOTE
No acute change in mass seen on CT of brain per Neurosurgery.  Holding Aspirin and Clopidogrel for possible surgery later this week.  Platelet function assay in progress.

## 2020-07-28 NOTE — ASSESSMENT & PLAN NOTE
Patient had severe hypercalcemia.  Vitamin-D and calcium supplementations have been stopped.  Patient still has vitamin-D level which is not very high.  Parathyroid hormone level is low.  PTH related peptide is pending.        Patient has acute kidney injury due to hypercalcemia dehydration.      Free light chains reviewed.  Probably will need input from Hematology at some point

## 2020-07-28 NOTE — PROGRESS NOTES
[facility] -   Nephrology  Progress Note    Patient Name: Cata Lang  MRN: [id number]  Admission Date: 7/25/2020  Hospital Length of Stay: 2 days  Attending Provider: Kingsley Heart MD   Primary Care Physician: Sabra Fregoso MD  Principal Problem:Brain mass    Subjective:     HPI: Cata Lang is a pleasant 49-year-old  woman history of hypertension, type 2 diabetes, morbid obesity, seizure disorder, CKD stage 3, baseline creatinine about 1.3 mg/dL, GFR 50 mL/minute, intracranial mass / lesion, she presents to the emergency room yesterday with progressive complains of weakness, increasing headache, nausea, a repeat MRI of the brain is pending at this time, she was evaluated by Neurosurgery, also significant hypercalcemia with a serum calcium of 14 noted on presentation, patient is currently taking Tums, calcitriol, hydrochlorothiazide, also she possibly has a urinary tract infection, acute on chronic renal failure, serum creatinine was 1.9 on presentation, 2.5 this evening, she is currently receiving IV fluids, we were consulted for acute kidney injury, hypercalcemia    Interval History:  Calcium is improved.  Acute kidney injury stable.  Ultrasound negative.  Free light chains are high but normal ratio.    Review of patient's allergies indicates:   Allergen Reactions    Hydrochlorothiazide      hypercalcemia     Current Facility-Administered Medications   Medication Frequency    0.9%  NaCl infusion Continuous    acetaminophen tablet 650 mg Q6H PRN    atorvastatin tablet 10 mg Daily    cefTRIAXone (ROCEPHIN) 1 g/50 mL D5W IVPB Q24H    dexamethasone injection 2 mg Q12H    dextrose 50% injection 12.5 g PRN    dextrose 50% injection 25 g PRN    gabapentin capsule 600 mg Daily    glucagon (human recombinant) injection 1 mg PRN    glucose chewable tablet 16 g PRN    glucose chewable tablet 24 g PRN    heparin (porcine) injection 5,000 Units Q8H    hydrALAZINE  injection 10 mg Q8H PRN    hydrALAZINE tablet 25 mg Q8H    insulin aspart U-100 pen 1-10 Units QID (AC + HS) PRN    levETIRAcetam tablet 1,500 mg BID    levothyroxine tablet 137 mcg Before breakfast    ondansetron injection 4 mg Q6H PRN       Objective:     Vital Signs (Most Recent):  Temp: 97.4 °F (36.3 °C) (07/28/20 1533)  Pulse: 69 (07/28/20 1533)  Resp: 18 (07/28/20 1533)  BP: (!) 153/70 (07/28/20 1533)  SpO2: (!) 93 % (07/28/20 1533)  O2 Device (Oxygen Therapy): room air (07/28/20 0733) Vital Signs (24h Range):  Temp:  [96.9 °F (36.1 °C)-98.5 °F (36.9 °C)] 97.4 °F (36.3 °C)  Pulse:  [65-82] 69  Resp:  [17-20] 18  SpO2:  [93 %-98 %] 93 %  BP: (144-214)/(67-96) 153/70     Weight: 129.8 kg (286 lb 2.5 oz) (07/26/20 0435)  Body mass index is 41.06 kg/m².  Body surface area is 2.53 meters squared.    I/O last 3 completed shifts:  In: 5836.7 [P.O.:340; I.V.:5396.7; IV Piggyback:100]  Out: -     Physical Exam  Vitals signs and nursing note reviewed. Exam conducted with a chaperone present.   Constitutional:       General: She is not in acute distress.     Appearance: She is well-developed.   HENT:      Head: Normocephalic and atraumatic.   Eyes:      Conjunctiva/sclera: Conjunctivae normal.      Pupils: Pupils are equal, round, and reactive to light.   Neck:      Musculoskeletal: Neck supple.      Thyroid: No thyromegaly.      Vascular: No JVD.   Cardiovascular:      Rate and Rhythm: Normal rate and regular rhythm.      Heart sounds: No murmur. No friction rub. No gallop.    Pulmonary:      Effort: Pulmonary effort is normal.      Breath sounds: Normal breath sounds. No wheezing or rales.   Abdominal:      General: Bowel sounds are normal. There is no distension.      Palpations: Abdomen is soft.      Tenderness: There is no abdominal tenderness. There is no guarding or rebound.   Musculoskeletal: Normal range of motion.         General: No deformity.   Lymphadenopathy:      Cervical: No cervical adenopathy.    Skin:     General: Skin is warm and dry.      Findings: No rash.   Neurological:      Mental Status: She is alert and oriented to person, place, and time.      Deep Tendon Reflexes: Reflexes are normal and symmetric.   Psychiatric:         Behavior: Behavior normal.         Thought Content: Thought content normal.         Judgment: Judgment normal.         Significant Labs:  CBC:   Recent Labs   Lab 07/28/20  0433   WBC 11.20   RBC 3.62*   HGB 9.0*   HCT 29.9*   *   MCV 83   MCH 24.9*   MCHC 30.1*     CMP:   Recent Labs   Lab 07/27/20  0545 07/28/20  0433   * 115*   CALCIUM 12.0* 10.8*   ALBUMIN 3.4* 3.3*   PROT 6.9  --     137   K 4.0 4.2   CO2 24 22*    102   BUN 24* 27*   CREATININE 2.6* 2.6*   ALKPHOS 145*  --    ALT 27  --    AST 35  --    BILITOT 0.2  --      All labs within the past 24 hours have been reviewed.     Significant Imaging:      Assessment/Plan:     SHARLENE on CKD, stage III   SHARLENE on CKD stage 3 , baseline creatinine about 1.3 mg/dL,      acute kidney injury likely dehydration from hypercalcemia,  also can be a urinary tract infection, urine culture pending, will start her on IV Rocephin, renal ultrasound reviewed  Avoid Ace inhibitors/ARB/NSAIDs due to acute kidney injury,  normal saline at 125 cc an hour    Recheck urine sodium and electrolytes today      Hypercalcemia  Patient had severe hypercalcemia.  Vitamin-D and calcium supplementations have been stopped.  Patient still has vitamin-D level which is not very high.  Parathyroid hormone level is low.  PTH related peptide is pending.        Patient has acute kidney injury due to hypercalcemia dehydration.      Free light chains reviewed.  Probably will need input from Hematology at some point        Thank you for your consult.     Veronika Eller MD  Nephrology  [facility] -

## 2020-07-28 NOTE — ASSESSMENT & PLAN NOTE
SHARLENE on CKD stage 3 , baseline creatinine about 1.3 mg/dL,      acute kidney injury likely dehydration from hypercalcemia,  also can be a urinary tract infection, urine culture pending, will start her on IV Rocephin, renal ultrasound reviewed  Avoid Ace inhibitors/ARB/NSAIDs due to acute kidney injury,  normal saline at 125 cc an hour    Recheck urine sodium and electrolytes today

## 2020-07-28 NOTE — PROGRESS NOTES
[facility] [place] Hospital Medicine  Progress Note    Patient Name: Ctaa Lang  MRN: [id number]  Patient Class: IP- Inpatient   Admission Date: 7/25/2020  Length of Stay: 1 days  Attending Physician: Kingsley Heart MD  Primary Care Provider: Sabra Fregoso MD        Subjective:     Principal Problem:Brain mass        HPI:   Cata Lang is a 49 y.o. female patient with a PMHx of brain mass, HTN, DM II, hypothyroidism, seizure disorder, and chronic HA who presents to the Emergency Department for evaluation of neurological problem which onset gradually today. Pt notes she has been walking worse since December and her HA is worse on the L side and rates it a moderate to severe HA. Symptoms are worsening and moderate to severe in severity. No mitigating or exacerbating factors reported. Associated sxs include dizziness, malaise, and difficulty walking. Patient denies any fever, SOB, CP, abd pain, N/V, back pain, weakness, and all other sxs at this time. Pt notes she takes several tums daily which was recommended by her ENT. Pt transferred from [place] and accepted by Dr. Gandara (Neurosurgery). Case discussed with Dr. Gandara in ED who reviewed CT of the head from transferring facility and feels there is no need for neurosurgical intervention at present.  In ED, patient's symptoms completely resolved with NIHSS of 0.  Labs showed: creatinine of 1.9, BUN 19, calcium 14.2. Patient is on calcitriol at home, started last year with no follow-up labs done. IV fluids x 1 liter and 20 mg IV Lasix given. Hospital Medicine was contacted to admit for hypercalcemia and SHARLENE.      Overview/Hospital Course:  No notes on file    Interval History:  Somnolent but easily awakens.  Spending most of the day and night sleeping.  Answers questions and follows questions appropriately. Gait instability and sluggishness.  No significant change.    Review of Systems   Constitutional: Negative for chills and fever.    HENT: Negative for congestion and sore throat.    Eyes: Negative for visual disturbance.   Respiratory: Negative for cough, shortness of breath and wheezing.    Cardiovascular: Negative for chest pain, palpitations and leg swelling.   Gastrointestinal: Negative for abdominal pain, blood in stool, constipation, diarrhea, nausea and vomiting.   Genitourinary: Negative for dysuria and hematuria.   Musculoskeletal: Positive for gait problem. Negative for arthralgias and back pain.   Skin: Negative for rash and wound.   Neurological: Positive for weakness. Negative for dizziness, light-headedness and numbness.   Hematological: Negative for adenopathy.   Psychiatric/Behavioral: Positive for confusion.     Objective:     Vital Signs (Most Recent):  Temp: 98.1 °F (36.7 °C) (07/27/20 1956)  Pulse: 65 (07/27/20 1956)  Resp: 18 (07/27/20 1956)  BP: (!) 172/81 (07/27/20 1956)  SpO2: 98 % (07/27/20 1956) Vital Signs (24h Range):  Temp:  [97.5 °F (36.4 °C)-98.1 °F (36.7 °C)] 98.1 °F (36.7 °C)  Pulse:  [65-85] 65  Resp:  [16-18] 18  SpO2:  [94 %-98 %] 98 %  BP: (152-177)/(73-85) 172/81     Weight: 129.8 kg (286 lb 2.5 oz)  Body mass index is 41.06 kg/m².    Intake/Output Summary (Last 24 hours) at 7/27/2020 7217  Last data filed at 7/27/2020 0758  Gross per 24 hour   Intake 4032.5 ml   Output --   Net 4032.5 ml      Physical Exam  Vitals signs and nursing note reviewed.   Constitutional:       General: She is not in acute distress.     Appearance: She is well-developed.   HENT:      Head: Normocephalic and atraumatic.   Eyes:      Conjunctiva/sclera: Conjunctivae normal.      Pupils: Pupils are equal, round, and reactive to light.   Neck:      Musculoskeletal: Neck supple.      Thyroid: No thyromegaly.      Vascular: No JVD.   Cardiovascular:      Rate and Rhythm: Normal rate and regular rhythm.      Heart sounds: No murmur. No friction rub. No gallop.    Pulmonary:      Effort: Pulmonary effort is normal.      Breath sounds:  Normal breath sounds. No wheezing or rales.   Abdominal:      General: Bowel sounds are normal. There is no distension.      Palpations: Abdomen is soft.      Tenderness: There is no abdominal tenderness. There is no guarding or rebound.   Musculoskeletal: Normal range of motion.         General: No deformity.   Lymphadenopathy:      Cervical: No cervical adenopathy.   Skin:     General: Skin is warm and dry.      Findings: No rash.   Neurological:      Mental Status: She is alert and oriented to person, place, and time.      Deep Tendon Reflexes: Reflexes are normal and symmetric.   Psychiatric:         Behavior: Behavior normal.         Thought Content: Thought content normal.         Judgment: Judgment normal.         Significant Labs: All pertinent labs within the past 24 hours have been reviewed.    Significant Imaging: I have reviewed all pertinent imaging results/findings within the past 24 hours.      Assessment/Plan:      * Brain mass  No acute change in mass seen on CT of brain per Neurosurgery.  Holding Aspirin and Clopidogrel for possible surgery later this week.    Hypercalcemia  - Initial calcium of 14.2.  Patient is on calitriol at home, will discontinue.  - Continue aggressive IV hydration.  Will give additional IV Lasix if needed.  - Follow labs.  - Nephrology consult.  PTH is appropriately suppressed and Vitamin D25 is low.  Check PTHrP and Calcitriol level.    Metabolic encephalopathy  Multifactorial due to hypercalcemia and brain mass with vasogenic edema.    Essential hypertension  - Losartan-HCTZ on hold as above.    - IV hydralazine PRN.    SHARLENE on CKD, stage III  - Initial creatinine of 1.9 (baseline 1.3).  - IV hydration.  - Avoid nephrotoxic agents. Hold home losartan-HCTZ for now.  - Follow labs.      VTE Risk Mitigation (From admission, onward)         Ordered     heparin (porcine) injection 5,000 Units  Every 8 hours      07/26/20 0530     IP VTE HIGH RISK PATIENT  Once      07/26/20 0522      Place sequential compression device  Until discontinued      07/26/20 0552                      Kingsley Heart MD  Department of Hospital Medicine   [facility] -

## 2020-07-28 NOTE — ASSESSMENT & PLAN NOTE
No acute change in mass seen on CT of brain per Neurosurgery.  Holding Aspirin and Clopidogrel for possible surgery later this week.

## 2020-07-28 NOTE — PLAN OF CARE
NS @ 125mL/hr. IV ABT therapy remains in progress. No adverse reactions noted, remains afebrile. Pt alert, awakens on arousal but quickly falls back asleep. Accu chekcs AC&HS. Dressing to right hip.  @ bedside. Remains free from injury/incident. Call light in reach.

## 2020-07-28 NOTE — SUBJECTIVE & OBJECTIVE
Interval History:  Somnolent but easily awakens.  Spending most of the day and night sleeping.  Answers questions and follows questions appropriately. Gait instability and sluggishness.  No significant change.    Review of Systems   Constitutional: Negative for chills and fever.   HENT: Negative for congestion and sore throat.    Eyes: Negative for visual disturbance.   Respiratory: Negative for cough, shortness of breath and wheezing.    Cardiovascular: Negative for chest pain, palpitations and leg swelling.   Gastrointestinal: Negative for abdominal pain, blood in stool, constipation, diarrhea, nausea and vomiting.   Genitourinary: Negative for dysuria and hematuria.   Musculoskeletal: Positive for gait problem. Negative for arthralgias and back pain.   Skin: Negative for rash and wound.   Neurological: Positive for weakness. Negative for dizziness, light-headedness and numbness.   Hematological: Negative for adenopathy.   Psychiatric/Behavioral: Positive for confusion.     Objective:     Vital Signs (Most Recent):  Temp: 98.1 °F (36.7 °C) (07/27/20 1956)  Pulse: 65 (07/27/20 1956)  Resp: 18 (07/27/20 1956)  BP: (!) 172/81 (07/27/20 1956)  SpO2: 98 % (07/27/20 1956) Vital Signs (24h Range):  Temp:  [97.5 °F (36.4 °C)-98.1 °F (36.7 °C)] 98.1 °F (36.7 °C)  Pulse:  [65-85] 65  Resp:  [16-18] 18  SpO2:  [94 %-98 %] 98 %  BP: (152-177)/(73-85) 172/81     Weight: 129.8 kg (286 lb 2.5 oz)  Body mass index is 41.06 kg/m².    Intake/Output Summary (Last 24 hours) at 7/27/2020 2217  Last data filed at 7/27/2020 0758  Gross per 24 hour   Intake 4032.5 ml   Output --   Net 4032.5 ml      Physical Exam  Vitals signs and nursing note reviewed.   Constitutional:       General: She is not in acute distress.     Appearance: She is well-developed.   HENT:      Head: Normocephalic and atraumatic.   Eyes:      Conjunctiva/sclera: Conjunctivae normal.      Pupils: Pupils are equal, round, and reactive to light.   Neck:       Musculoskeletal: Neck supple.      Thyroid: No thyromegaly.      Vascular: No JVD.   Cardiovascular:      Rate and Rhythm: Normal rate and regular rhythm.      Heart sounds: No murmur. No friction rub. No gallop.    Pulmonary:      Effort: Pulmonary effort is normal.      Breath sounds: Normal breath sounds. No wheezing or rales.   Abdominal:      General: Bowel sounds are normal. There is no distension.      Palpations: Abdomen is soft.      Tenderness: There is no abdominal tenderness. There is no guarding or rebound.   Musculoskeletal: Normal range of motion.         General: No deformity.   Lymphadenopathy:      Cervical: No cervical adenopathy.   Skin:     General: Skin is warm and dry.      Findings: No rash.   Neurological:      Mental Status: She is alert and oriented to person, place, and time.      Deep Tendon Reflexes: Reflexes are normal and symmetric.   Psychiatric:         Behavior: Behavior normal.         Thought Content: Thought content normal.         Judgment: Judgment normal.         Significant Labs: All pertinent labs within the past 24 hours have been reviewed.    Significant Imaging: I have reviewed all pertinent imaging results/findings within the past 24 hours.

## 2020-07-28 NOTE — PT/OT/SLP PROGRESS
Occupational Therapy      Patient Name:  Cata Lang   MRN:  [id number]    OT completed chart review and spoke to Charge Nurse Brook and pt's Nurse Diana. Pt is not appropriate for OT Eval at this time, and Dr. Heart contacted and approved for EVAL to be completed after pt has SX later this week. OT will follow up at that time.     Alejandra Saenz, PT/OT  7/28/2020   13:35

## 2020-07-28 NOTE — PROGRESS NOTES
[facility] Hospital Medicine  Progress Note    Patient Name: Cata Lang  MRN: [id number]  Patient Class: IP- Inpatient   Admission Date: 7/25/2020  Length of Stay: 2 days  Attending Physician: Kingsley Heart MD  Primary Care Provider: Sabra Fregoso MD        Subjective:     Principal Problem:Brain mass        HPI:   Cata Lang is a 49 y.o. female patient with a PMHx of brain mass, HTN, DM II, hypothyroidism, seizure disorder, and chronic HA who presents to the Emergency Department for evaluation of neurological problem which onset gradually today. Pt notes she has been walking worse since December and her HA is worse on the L side and rates it a moderate to severe HA. Symptoms are worsening and moderate to severe in severity. No mitigating or exacerbating factors reported. Associated sxs include dizziness, malaise, and difficulty walking. Patient denies any fever, SOB, CP, abd pain, N/V, back pain, weakness, and all other sxs at this time. Pt notes she takes several tums daily which was recommended by her ENT. Pt transferred from [facility] and accepted by Dr. Gandara (Neurosurgery). Case discussed with Dr. Gandara in ED who reviewed CT of the head from transferring facility and feels there is no need for neurosurgical intervention at present.  In ED, patient's symptoms completely resolved with NIHSS of 0.  Labs showed: creatinine of 1.9, BUN 19, calcium 14.2. Patient is on calcitriol at home, started last year with no follow-up labs done. IV fluids x 1 liter and 20 mg IV Lasix given. Hospital Medicine was contacted to admit for hypercalcemia and SHARLENE.      Overview/Hospital Course:  No notes on file    Interval History:  Remains somnolent but marginally more alert.  Answers questions and follows questions appropriately. Gait instability and sluggishness.  Mild hyperglycemia not requiring insulin.    Review of Systems   Constitutional: Negative for chills and fever.   HENT:  Negative for congestion and sore throat.    Eyes: Negative for visual disturbance.   Respiratory: Negative for cough, shortness of breath and wheezing.    Cardiovascular: Negative for chest pain, palpitations and leg swelling.   Gastrointestinal: Negative for abdominal pain, blood in stool, constipation, diarrhea, nausea and vomiting.   Genitourinary: Negative for dysuria and hematuria.   Musculoskeletal: Positive for gait problem. Negative for arthralgias and back pain.   Skin: Negative for rash and wound.   Neurological: Positive for weakness. Negative for dizziness, light-headedness and numbness.   Hematological: Negative for adenopathy.   Psychiatric/Behavioral: Positive for confusion.     Objective:     Vital Signs (Most Recent):  Temp: 97.4 °F (36.3 °C) (07/28/20 1137)  Pulse: 70 (07/28/20 1137)  Resp: 18 (07/28/20 1137)  BP: (!) 144/67 (07/28/20 1137)  SpO2: (!) 94 % (07/28/20 1137) Vital Signs (24h Range):  Temp:  [96.9 °F (36.1 °C)-98.5 °F (36.9 °C)] 97.4 °F (36.3 °C)  Pulse:  [65-82] 70  Resp:  [16-20] 18  SpO2:  [94 %-98 %] 94 %  BP: (144-214)/(67-96) 144/67     Weight: 129.8 kg (286 lb 2.5 oz)  Body mass index is 41.06 kg/m².    Intake/Output Summary (Last 24 hours) at 7/28/2020 1411  Last data filed at 7/28/2020 0800  Gross per 24 hour   Intake 2044.17 ml   Output 2 ml   Net 2042.17 ml      Physical Exam  Vitals signs and nursing note reviewed.   Constitutional:       General: She is not in acute distress.     Appearance: She is well-developed.   HENT:      Head: Normocephalic and atraumatic.   Eyes:      Conjunctiva/sclera: Conjunctivae normal.      Pupils: Pupils are equal, round, and reactive to light.   Neck:      Musculoskeletal: Neck supple.      Thyroid: No thyromegaly.      Vascular: No JVD.   Cardiovascular:      Rate and Rhythm: Normal rate and regular rhythm.      Heart sounds: No murmur. No friction rub. No gallop.    Pulmonary:      Effort: Pulmonary effort is normal.      Breath sounds:  Normal breath sounds. No wheezing or rales.   Abdominal:      General: Bowel sounds are normal. There is no distension.      Palpations: Abdomen is soft.      Tenderness: There is no abdominal tenderness. There is no guarding or rebound.   Musculoskeletal: Normal range of motion.         General: No deformity.   Lymphadenopathy:      Cervical: No cervical adenopathy.   Skin:     General: Skin is warm and dry.      Findings: No rash.   Neurological:      Mental Status: She is alert and oriented to person, place, and time.      Deep Tendon Reflexes: Reflexes are normal and symmetric.   Psychiatric:         Behavior: Behavior normal.         Thought Content: Thought content normal.         Judgment: Judgment normal.         Significant Labs: All pertinent labs within the past 24 hours have been reviewed.    Significant Imaging: I have reviewed all pertinent imaging results/findings within the past 24 hours.      Assessment/Plan:      * Brain mass  No acute change in mass seen on CT of brain per Neurosurgery.  Holding Aspirin and Clopidogrel for possible surgery later this week.  Platelet function assay in progress.    Hypercalcemia  Initial calcium of 14.2.  Patient is on calitriol at home, will discontinue.  Hold Calcium supplementation.  Continue aggressive IV hydration.  Will give additional IV Lasix if needed.  Nephrology evaluated and assisting with management.  PTH is appropriately suppressed and Vitamin D25 is low.  Check PTHrP and Calcitriol level.    Metabolic encephalopathy  Multifactorial due to hypercalcemia and brain mass with vasogenic edema.    Essential hypertension  - Losartan-HCTZ on hold as above.    - IV hydralazine PRN.    SHARLENE on CKD, stage III  - Initial creatinine of 1.9 (baseline 1.3).  - IV hydration.  - Avoid nephrotoxic agents. Hold home losartan-HCTZ for now.  - Follow labs.      VTE Risk Mitigation (From admission, onward)         Ordered     heparin (porcine) injection 5,000 Units  Every 8  hours      07/26/20 0553     IP VTE HIGH RISK PATIENT  Once      07/26/20 0552     Place sequential compression device  Until discontinued      07/26/20 0552                      Kingsley Heart MD  Department of Hospital Medicine   [facility]

## 2020-07-28 NOTE — NURSING
Received phone call from micro lab stating PFA was unable to be used due to incorrect timing. Order was put back in for a recollect for 0600 to arrive in lab by 1700 Tuesday 7/28/2020. Will continue to monitor.

## 2020-07-28 NOTE — SUBJECTIVE & OBJECTIVE
Interval History:  Calcium is improved.  Acute kidney injury stable.  Ultrasound negative.  Free light chains are high but normal ratio.    Review of patient's allergies indicates:   Allergen Reactions    Hydrochlorothiazide      hypercalcemia     Current Facility-Administered Medications   Medication Frequency    0.9%  NaCl infusion Continuous    acetaminophen tablet 650 mg Q6H PRN    atorvastatin tablet 10 mg Daily    cefTRIAXone (ROCEPHIN) 1 g/50 mL D5W IVPB Q24H    dexamethasone injection 2 mg Q12H    dextrose 50% injection 12.5 g PRN    dextrose 50% injection 25 g PRN    gabapentin capsule 600 mg Daily    glucagon (human recombinant) injection 1 mg PRN    glucose chewable tablet 16 g PRN    glucose chewable tablet 24 g PRN    heparin (porcine) injection 5,000 Units Q8H    hydrALAZINE injection 10 mg Q8H PRN    hydrALAZINE tablet 25 mg Q8H    insulin aspart U-100 pen 1-10 Units QID (AC + HS) PRN    levETIRAcetam tablet 1,500 mg BID    levothyroxine tablet 137 mcg Before breakfast    ondansetron injection 4 mg Q6H PRN       Objective:     Vital Signs (Most Recent):  Temp: 97.4 °F (36.3 °C) (07/28/20 1533)  Pulse: 69 (07/28/20 1533)  Resp: 18 (07/28/20 1533)  BP: (!) 153/70 (07/28/20 1533)  SpO2: (!) 93 % (07/28/20 1533)  O2 Device (Oxygen Therapy): room air (07/28/20 0733) Vital Signs (24h Range):  Temp:  [96.9 °F (36.1 °C)-98.5 °F (36.9 °C)] 97.4 °F (36.3 °C)  Pulse:  [65-82] 69  Resp:  [17-20] 18  SpO2:  [93 %-98 %] 93 %  BP: (144-214)/(67-96) 153/70     Weight: 129.8 kg (286 lb 2.5 oz) (07/26/20 0435)  Body mass index is 41.06 kg/m².  Body surface area is 2.53 meters squared.    I/O last 3 completed shifts:  In: 5836.7 [P.O.:340; I.V.:5396.7; IV Piggyback:100]  Out: -     Physical Exam  Vitals signs and nursing note reviewed. Exam conducted with a chaperone present.   Constitutional:       General: She is not in acute distress.     Appearance: She is well-developed.   HENT:      Head:  Normocephalic and atraumatic.   Eyes:      Conjunctiva/sclera: Conjunctivae normal.      Pupils: Pupils are equal, round, and reactive to light.   Neck:      Musculoskeletal: Neck supple.      Thyroid: No thyromegaly.      Vascular: No JVD.   Cardiovascular:      Rate and Rhythm: Normal rate and regular rhythm.      Heart sounds: No murmur. No friction rub. No gallop.    Pulmonary:      Effort: Pulmonary effort is normal.      Breath sounds: Normal breath sounds. No wheezing or rales.   Abdominal:      General: Bowel sounds are normal. There is no distension.      Palpations: Abdomen is soft.      Tenderness: There is no abdominal tenderness. There is no guarding or rebound.   Musculoskeletal: Normal range of motion.         General: No deformity.   Lymphadenopathy:      Cervical: No cervical adenopathy.   Skin:     General: Skin is warm and dry.      Findings: No rash.   Neurological:      Mental Status: She is alert and oriented to person, place, and time.      Deep Tendon Reflexes: Reflexes are normal and symmetric.   Psychiatric:         Behavior: Behavior normal.         Thought Content: Thought content normal.         Judgment: Judgment normal.         Significant Labs:  CBC:   Recent Labs   Lab 07/28/20  0433   WBC 11.20   RBC 3.62*   HGB 9.0*   HCT 29.9*   *   MCV 83   MCH 24.9*   MCHC 30.1*     CMP:   Recent Labs   Lab 07/27/20  0545 07/28/20  0433   * 115*   CALCIUM 12.0* 10.8*   ALBUMIN 3.4* 3.3*   PROT 6.9  --     137   K 4.0 4.2   CO2 24 22*    102   BUN 24* 27*   CREATININE 2.6* 2.6*   ALKPHOS 145*  --    ALT 27  --    AST 35  --    BILITOT 0.2  --      All labs within the past 24 hours have been reviewed.     Significant Imaging:

## 2020-07-29 LAB
1,25(OH)2D3 SERPL-MCNC: 8 PG/ML (ref 20–79)
ABO + RH BLD: NORMAL
ALBUMIN SERPL BCP-MCNC: 3.3 G/DL (ref 3.5–5.2)
ANION GAP SERPL CALC-SCNC: 12 MMOL/L (ref 8–16)
BASOPHILS # BLD AUTO: 0.02 K/UL (ref 0–0.2)
BASOPHILS NFR BLD: 0.2 % (ref 0–1.9)
BLD GP AB SCN CELLS X3 SERPL QL: NORMAL
BUN SERPL-MCNC: 31 MG/DL (ref 6–20)
CALCIUM SERPL-MCNC: 9.6 MG/DL (ref 8.7–10.5)
CHLORIDE SERPL-SCNC: 101 MMOL/L (ref 95–110)
CO2 SERPL-SCNC: 21 MMOL/L (ref 23–29)
CREAT SERPL-MCNC: 2.3 MG/DL (ref 0.5–1.4)
DIFFERENTIAL METHOD: ABNORMAL
EOSINOPHIL # BLD AUTO: 0 K/UL (ref 0–0.5)
EOSINOPHIL NFR BLD: 0.1 % (ref 0–8)
ERYTHROCYTE [DISTWIDTH] IN BLOOD BY AUTOMATED COUNT: 15.7 % (ref 11.5–14.5)
EST. GFR  (AFRICAN AMERICAN): 28 ML/MIN/1.73 M^2
EST. GFR  (NON AFRICAN AMERICAN): 24 ML/MIN/1.73 M^2
GLUCOSE SERPL-MCNC: 119 MG/DL (ref 70–110)
HCT VFR BLD AUTO: 27.8 % (ref 37–48.5)
HGB BLD-MCNC: 8.6 G/DL (ref 12–16)
IMM GRANULOCYTES # BLD AUTO: 0.12 K/UL (ref 0–0.04)
IMM GRANULOCYTES NFR BLD AUTO: 1.1 % (ref 0–0.5)
LYMPHOCYTES # BLD AUTO: 2.5 K/UL (ref 1–4.8)
LYMPHOCYTES NFR BLD: 22.4 % (ref 18–48)
MCH RBC QN AUTO: 24.7 PG (ref 27–31)
MCHC RBC AUTO-ENTMCNC: 30.9 G/DL (ref 32–36)
MCV RBC AUTO: 80 FL (ref 82–98)
MONOCYTES # BLD AUTO: 0.7 K/UL (ref 0.3–1)
MONOCYTES NFR BLD: 5.9 % (ref 4–15)
NEUTROPHILS # BLD AUTO: 7.8 K/UL (ref 1.8–7.7)
NEUTROPHILS NFR BLD: 70.3 % (ref 38–73)
NRBC BLD-RTO: 0 /100 WBC
PHOSPHATE SERPL-MCNC: 3.4 MG/DL (ref 2.7–4.5)
PLATELET # BLD AUTO: 204 K/UL (ref 150–350)
PMV BLD AUTO: 12.3 FL (ref 9.2–12.9)
POCT GLUCOSE: 111 MG/DL (ref 70–110)
POCT GLUCOSE: 129 MG/DL (ref 70–110)
POCT GLUCOSE: 137 MG/DL (ref 70–110)
POCT GLUCOSE: 139 MG/DL (ref 70–110)
POTASSIUM SERPL-SCNC: 3.9 MMOL/L (ref 3.5–5.1)
RBC # BLD AUTO: 3.48 M/UL (ref 4–5.4)
SODIUM SERPL-SCNC: 134 MMOL/L (ref 136–145)
WBC # BLD AUTO: 11.08 K/UL (ref 3.9–12.7)

## 2020-07-29 PROCEDURE — 97167 OT EVAL HIGH COMPLEX 60 MIN: CPT

## 2020-07-29 PROCEDURE — 63600175 PHARM REV CODE 636 W HCPCS: Performed by: NURSE PRACTITIONER

## 2020-07-29 PROCEDURE — 63600175 PHARM REV CODE 636 W HCPCS: Performed by: INTERNAL MEDICINE

## 2020-07-29 PROCEDURE — 80069 RENAL FUNCTION PANEL: CPT

## 2020-07-29 PROCEDURE — 11000001 HC ACUTE MED/SURG PRIVATE ROOM

## 2020-07-29 PROCEDURE — 96372 THER/PROPH/DIAG INJ SC/IM: CPT

## 2020-07-29 PROCEDURE — 85025 COMPLETE CBC W/AUTO DIFF WBC: CPT

## 2020-07-29 PROCEDURE — 99233 PR SUBSEQUENT HOSPITAL CARE,LEVL III: ICD-10-PCS | Mod: ,,, | Performed by: INTERNAL MEDICINE

## 2020-07-29 PROCEDURE — 99232 SBSQ HOSP IP/OBS MODERATE 35: CPT | Mod: ,,, | Performed by: NEUROLOGICAL SURGERY

## 2020-07-29 PROCEDURE — 97535 SELF CARE MNGMENT TRAINING: CPT

## 2020-07-29 PROCEDURE — 86901 BLOOD TYPING SEROLOGIC RH(D): CPT

## 2020-07-29 PROCEDURE — 36415 COLL VENOUS BLD VENIPUNCTURE: CPT

## 2020-07-29 PROCEDURE — 63600175 PHARM REV CODE 636 W HCPCS: Performed by: PHYSICIAN ASSISTANT

## 2020-07-29 PROCEDURE — 99233 SBSQ HOSP IP/OBS HIGH 50: CPT | Mod: ,,, | Performed by: INTERNAL MEDICINE

## 2020-07-29 PROCEDURE — 97163 PT EVAL HIGH COMPLEX 45 MIN: CPT

## 2020-07-29 PROCEDURE — 97116 GAIT TRAINING THERAPY: CPT

## 2020-07-29 PROCEDURE — 25000003 PHARM REV CODE 250: Performed by: INTERNAL MEDICINE

## 2020-07-29 PROCEDURE — 25000003 PHARM REV CODE 250: Performed by: NURSE PRACTITIONER

## 2020-07-29 PROCEDURE — 99232 PR SUBSEQUENT HOSPITAL CARE,LEVL II: ICD-10-PCS | Mod: ,,, | Performed by: NEUROLOGICAL SURGERY

## 2020-07-29 PROCEDURE — 97530 THERAPEUTIC ACTIVITIES: CPT

## 2020-07-29 RX ADMIN — HYDRALAZINE HYDROCHLORIDE 25 MG: 25 TABLET, FILM COATED ORAL at 05:07

## 2020-07-29 RX ADMIN — GABAPENTIN 600 MG: 300 CAPSULE ORAL at 09:07

## 2020-07-29 RX ADMIN — DEXAMETHASONE SODIUM PHOSPHATE 2 MG: 4 INJECTION, SOLUTION INTRAMUSCULAR; INTRAVENOUS at 08:07

## 2020-07-29 RX ADMIN — LEVOTHYROXINE SODIUM 137 MCG: 25 TABLET ORAL at 05:07

## 2020-07-29 RX ADMIN — HEPARIN SODIUM 5000 UNITS: 5000 INJECTION, SOLUTION INTRAVENOUS; SUBCUTANEOUS at 05:07

## 2020-07-29 RX ADMIN — CEFTRIAXONE 1 G: 1 INJECTION, SOLUTION INTRAVENOUS at 04:07

## 2020-07-29 RX ADMIN — HEPARIN SODIUM 5000 UNITS: 5000 INJECTION, SOLUTION INTRAVENOUS; SUBCUTANEOUS at 03:07

## 2020-07-29 RX ADMIN — LEVETIRACETAM 1500 MG: 500 TABLET ORAL at 09:07

## 2020-07-29 RX ADMIN — ATORVASTATIN CALCIUM 10 MG: 10 TABLET, FILM COATED ORAL at 09:07

## 2020-07-29 RX ADMIN — DEXAMETHASONE SODIUM PHOSPHATE 2 MG: 4 INJECTION, SOLUTION INTRAMUSCULAR; INTRAVENOUS at 09:07

## 2020-07-29 RX ADMIN — ACETAMINOPHEN 650 MG: 325 TABLET ORAL at 09:07

## 2020-07-29 RX ADMIN — HYDRALAZINE HYDROCHLORIDE 25 MG: 25 TABLET, FILM COATED ORAL at 01:07

## 2020-07-29 RX ADMIN — ACETAMINOPHEN 650 MG: 325 TABLET ORAL at 03:07

## 2020-07-29 RX ADMIN — HYDRALAZINE HYDROCHLORIDE 25 MG: 25 TABLET, FILM COATED ORAL at 09:07

## 2020-07-29 RX ADMIN — HEPARIN SODIUM 5000 UNITS: 5000 INJECTION, SOLUTION INTRAVENOUS; SUBCUTANEOUS at 09:07

## 2020-07-29 NOTE — PROGRESS NOTES
[facility]   Nephrology  Progress Note    Patient Name: Cata Lang  MRN: [id number]  Admission Date: 7/25/2020  Hospital Length of Stay: 3 days  Attending Provider: Kingsley Heart MD   Primary Care Physician: Sabra Fregoso MD  Principal Problem:Brain mass    Subjective:     HPI: Cata Lang is a pleasant 49-year-old  woman history of hypertension, type 2 diabetes, morbid obesity, seizure disorder, CKD stage 3, baseline creatinine about 1.3 mg/dL, GFR 50 mL/minute, intracranial mass / lesion, she presents to the emergency room yesterday with progressive complains of weakness, increasing headache, nausea, a repeat MRI of the brain is pending at this time, she was evaluated by Neurosurgery, also significant hypercalcemia with a serum calcium of 14 noted on presentation, patient is currently taking Tums, calcitriol, hydrochlorothiazide, also she possibly has a urinary tract infection, acute on chronic renal failure, serum creatinine was 1.9 on presentation, 2.5 this evening, she is currently receiving IV fluids, we were consulted for acute kidney injury, hypercalcemia    Interval History:  Creatinine down to 2.3.  Urine output has improved.  IV fluids continued.  Calcium is stable.    Review of patient's allergies indicates:   Allergen Reactions    Hydrochlorothiazide      hypercalcemia     Current Facility-Administered Medications   Medication Frequency    0.9%  NaCl infusion Continuous    acetaminophen tablet 650 mg Q6H PRN    atorvastatin tablet 10 mg Daily    cefTRIAXone (ROCEPHIN) 1 g/50 mL D5W IVPB Q24H    dexamethasone injection 2 mg Q12H    dextrose 50% injection 12.5 g PRN    dextrose 50% injection 25 g PRN    gabapentin capsule 600 mg Daily    glucagon (human recombinant) injection 1 mg PRN    glucose chewable tablet 16 g PRN    glucose chewable tablet 24 g PRN    heparin (porcine) injection 5,000 Units Q8H    hydrALAZINE injection 10 mg Q8H PRN     hydrALAZINE tablet 25 mg Q8H    insulin aspart U-100 pen 1-10 Units QID (AC + HS) PRN    levETIRAcetam tablet 1,500 mg BID    levothyroxine tablet 137 mcg Before breakfast    ondansetron injection 4 mg Q6H PRN       Objective:     Vital Signs (Most Recent):  Temp: 98.1 °F (36.7 °C) (07/29/20 1315)  Pulse: 66 (07/29/20 1315)  Resp: 18 (07/29/20 1315)  BP: (!) 145/69 (07/29/20 1315)  SpO2: 96 % (07/29/20 1315)  O2 Device (Oxygen Therapy): room air (07/29/20 0731) Vital Signs (24h Range):  Temp:  [97.4 °F (36.3 °C)-98.6 °F (37 °C)] 98.1 °F (36.7 °C)  Pulse:  [64-71] 66  Resp:  [16-18] 18  SpO2:  [93 %-98 %] 96 %  BP: (145-174)/(69-89) 145/69     Weight: 129.8 kg (286 lb 2.5 oz) (07/26/20 0435)  Body mass index is 41.06 kg/m².  Body surface area is 2.53 meters squared.    I/O last 3 completed shifts:  In: 5611 [P.O.:920; I.V.:4591; IV Piggyback:100]  Out: 4 [Urine:4]    Physical Exam  Vitals signs and nursing note reviewed. Exam conducted with a chaperone present.   Constitutional:       General: She is not in acute distress.     Appearance: She is well-developed.   HENT:      Head: Normocephalic and atraumatic.   Eyes:      Conjunctiva/sclera: Conjunctivae normal.      Pupils: Pupils are equal, round, and reactive to light.   Neck:      Musculoskeletal: Neck supple.      Thyroid: No thyromegaly.      Vascular: No JVD.   Cardiovascular:      Rate and Rhythm: Normal rate and regular rhythm.      Heart sounds: No murmur. No friction rub. No gallop.    Pulmonary:      Effort: Pulmonary effort is normal.      Breath sounds: Normal breath sounds. No wheezing or rales.   Abdominal:      General: Bowel sounds are normal. There is no distension.      Palpations: Abdomen is soft.      Tenderness: There is no abdominal tenderness. There is no guarding or rebound.   Musculoskeletal: Normal range of motion.         General: No deformity.   Lymphadenopathy:      Cervical: No cervical adenopathy.   Skin:     General: Skin is warm  and dry.      Findings: No rash.   Neurological:      Mental Status: She is alert and oriented to person, place, and time.      Deep Tendon Reflexes: Reflexes are normal and symmetric.   Psychiatric:         Behavior: Behavior normal.         Thought Content: Thought content normal.         Judgment: Judgment normal.         Significant Labs:  CMP:   Recent Labs   Lab 07/27/20  0545  07/29/20  0423   *   < > 119*   CALCIUM 12.0*   < > 9.6   ALBUMIN 3.4*   < > 3.3*   PROT 6.9  --   --       < > 134*   K 4.0   < > 3.9   CO2 24   < > 21*      < > 101   BUN 24*   < > 31*   CREATININE 2.6*   < > 2.3*   ALKPHOS 145*  --   --    ALT 27  --   --    AST 35  --   --    BILITOT 0.2  --   --     < > = values in this interval not displayed.     All labs within the past 24 hours have been reviewed.     Significant Imaging:      Assessment/Plan:     SHARLENE on CKD, stage III   SHARLENE on CKD stage 3 , baseline creatinine about 1.3 mg/dL,      acute kidney injury likely dehydration from hypercalcemia,  also can be a urinary tract infection    Continued IV fluids for now.  Discussed with neurosurgery-Dr. Hagen about clearance for surgery on Friday.  Will monitor for the next 24-48 hours.    Ultrasound negative for any hydronephrosis.  Fractional excretion of sodium is greater than 1% now    Hypercalcemia  Patient had severe hypercalcemia.  Vitamin-D and calcium supplementations have been stopped.  Patient still has vitamin-D level which is not very high.  Parathyroid hormone level is low.  PTH related peptide is pending.        Patient has acute kidney injury due to hypercalcemia dehydration.      Free light chains reviewed.  Probably will need input from Hematology at some point        Thank you for your consult.     Veronika Eller MD  Nephrology  [facility]

## 2020-07-29 NOTE — PLAN OF CARE
Problem: Adult Inpatient Plan of Care  Goal: Plan of Care Review  Outcome: Ongoing, Progressing  Flowsheets (Taken 7/29/2020 0401)  Plan of Care Reviewed With:   patient   spouse  Pt had no adverse events during shift. Pt free from falls. Call light in reach. SR's x2. Headache pain well controlled w/ PRN meds. Pt repositions via wedge q2 hours. Wound care initiated, frequent turning in use. IVF's administered as ordered. VTE prophylaxis - SCD's in place. Fluids promoted, ambulation encouraged. Increased BP during shift, prn medication administered per order. Other VS stable. Skin dressing CDI. Pt very drowsy but easily awoken with voice. Chart reviewed. Will continue to monitor.

## 2020-07-29 NOTE — SUBJECTIVE & OBJECTIVE
Interval History:  Creatinine down to 2.3.  Urine output has improved.  IV fluids continued.  Calcium is stable.    Review of patient's allergies indicates:   Allergen Reactions    Hydrochlorothiazide      hypercalcemia     Current Facility-Administered Medications   Medication Frequency    0.9%  NaCl infusion Continuous    acetaminophen tablet 650 mg Q6H PRN    atorvastatin tablet 10 mg Daily    cefTRIAXone (ROCEPHIN) 1 g/50 mL D5W IVPB Q24H    dexamethasone injection 2 mg Q12H    dextrose 50% injection 12.5 g PRN    dextrose 50% injection 25 g PRN    gabapentin capsule 600 mg Daily    glucagon (human recombinant) injection 1 mg PRN    glucose chewable tablet 16 g PRN    glucose chewable tablet 24 g PRN    heparin (porcine) injection 5,000 Units Q8H    hydrALAZINE injection 10 mg Q8H PRN    hydrALAZINE tablet 25 mg Q8H    insulin aspart U-100 pen 1-10 Units QID (AC + HS) PRN    levETIRAcetam tablet 1,500 mg BID    levothyroxine tablet 137 mcg Before breakfast    ondansetron injection 4 mg Q6H PRN       Objective:     Vital Signs (Most Recent):  Temp: 98.1 °F (36.7 °C) (07/29/20 1315)  Pulse: 66 (07/29/20 1315)  Resp: 18 (07/29/20 1315)  BP: (!) 145/69 (07/29/20 1315)  SpO2: 96 % (07/29/20 1315)  O2 Device (Oxygen Therapy): room air (07/29/20 0731) Vital Signs (24h Range):  Temp:  [97.4 °F (36.3 °C)-98.6 °F (37 °C)] 98.1 °F (36.7 °C)  Pulse:  [64-71] 66  Resp:  [16-18] 18  SpO2:  [93 %-98 %] 96 %  BP: (145-174)/(69-89) 145/69     Weight: 129.8 kg (286 lb 2.5 oz) (07/26/20 0435)  Body mass index is 41.06 kg/m².  Body surface area is 2.53 meters squared.    I/O last 3 completed shifts:  In: 5611 [P.O.:920; I.V.:4591; IV Piggyback:100]  Out: 4 [Urine:4]    Physical Exam  Vitals signs and nursing note reviewed. Exam conducted with a chaperone present.   Constitutional:       General: She is not in acute distress.     Appearance: She is well-developed.   HENT:      Head: Normocephalic and atraumatic.    Eyes:      Conjunctiva/sclera: Conjunctivae normal.      Pupils: Pupils are equal, round, and reactive to light.   Neck:      Musculoskeletal: Neck supple.      Thyroid: No thyromegaly.      Vascular: No JVD.   Cardiovascular:      Rate and Rhythm: Normal rate and regular rhythm.      Heart sounds: No murmur. No friction rub. No gallop.    Pulmonary:      Effort: Pulmonary effort is normal.      Breath sounds: Normal breath sounds. No wheezing or rales.   Abdominal:      General: Bowel sounds are normal. There is no distension.      Palpations: Abdomen is soft.      Tenderness: There is no abdominal tenderness. There is no guarding or rebound.   Musculoskeletal: Normal range of motion.         General: No deformity.   Lymphadenopathy:      Cervical: No cervical adenopathy.   Skin:     General: Skin is warm and dry.      Findings: No rash.   Neurological:      Mental Status: She is alert and oriented to person, place, and time.      Deep Tendon Reflexes: Reflexes are normal and symmetric.   Psychiatric:         Behavior: Behavior normal.         Thought Content: Thought content normal.         Judgment: Judgment normal.         Significant Labs:  CMP:   Recent Labs   Lab 07/27/20  0545  07/29/20  0423   *   < > 119*   CALCIUM 12.0*   < > 9.6   ALBUMIN 3.4*   < > 3.3*   PROT 6.9  --   --       < > 134*   K 4.0   < > 3.9   CO2 24   < > 21*      < > 101   BUN 24*   < > 31*   CREATININE 2.6*   < > 2.3*   ALKPHOS 145*  --   --    ALT 27  --   --    AST 35  --   --    BILITOT 0.2  --   --     < > = values in this interval not displayed.     All labs within the past 24 hours have been reviewed.     Significant Imaging:

## 2020-07-29 NOTE — ASSESSMENT & PLAN NOTE
SHARLENE on CKD stage 3 , baseline creatinine about 1.3 mg/dL,      acute kidney injury likely dehydration from hypercalcemia,  also can be a urinary tract infection    Continued IV fluids for now.  Discussed with neurosurgery-Dr. Hagen about clearance for surgery on Friday.  Will monitor for the next 24-48 hours.    Ultrasound negative for any hydronephrosis.  Fractional excretion of sodium is greater than 1% now

## 2020-07-29 NOTE — PT/OT/SLP EVAL
Physical Therapy Evaluation    Patient Name:  Cata Lang   MRN:  [id number]    Recommendations:     Discharge Recommendations:  rehabilitation facility   Discharge Equipment Recommendations: (TBD)   Barriers to discharge: Decreased caregiver support    Assessment:     Cata Lang is a 49 y.o. female admitted with a medical diagnosis of Brain mass.  She presents with the following impairments/functional limitations:  weakness, impaired endurance, impaired self care skills, impaired sensation, impaired functional mobilty, gait instability, impaired balance, pain, decreased safety awareness, decreased lower extremity function, decreased upper extremity function, decreased coordination, decreased ROM .    Rehab Prognosis: Good; patient would benefit from acute skilled PT services to address these deficits and reach maximum level of function.    Recent Surgery: Procedure(s) (LRB):  CRANIOTOMY, WITH 3D STEREOTACTIC IMAGING GUIDANCE, REAL-TIME (Right)      Plan:     During this hospitalization, patient to be seen   to address the identified rehab impairments via gait training, therapeutic activities, therapeutic exercises and progress toward the following goals:    · Plan of Care Expires:  08/05/20    Subjective     Chief Complaint: BACK PAIN AND L SIDED WEAKNESS   Patient/Family Comments/goals: INC MOBILITY   Pain/Comfort:  · Pain Rating 1: 7/10  · Location 1: back  · Pain Rating Post-Intervention 1: 7/10    Patients cultural, spiritual, Restorationism conflicts given the current situation:      Living Environment:  PT LIVES AT HOME WITH  AND HAS A RAMP TO ENTER HOME   Prior to admission, patients level of function was IND AND DRIVES HOWEVER HAS HAD MULT FALLS RECENTLY.  Equipment used at home: none.  DME owned (not currently used): [employer] AND [employer].  Upon discharge, patient will have assistance from  .    Objective:     Communicated with NURSE ELENA AND Epic CHART REVIEW  prior to session.  Patient  found supine with telemetry, peripheral IV  upon PT entry to room.    General Precautions: Standard, fall   Orthopedic Precautions:N/A   Braces: N/A     Exams:  · RLE ROM: WNL  · RLE Strength: WNL  · LLE ROM: LIMITED  · LLE Strength: LIMITED    Functional Mobility:  PT MET IN RM LOG ROLLED TO LEFT AND SEATED EOB WITH MAX A AND LEFT LATERAL LEAN. PT SCOOTED TO EOB WITH MOD A. PT REPORTED INC DIZZINESS AND CONT CLOSING EYES DURING ASSESSMENT. PT STOOD WITH RW AND MAX A X 2 X 3 TRIALS AND ATTEMPTED SIDE STEP TO LEFT WITH INC CUES FOR WT SHIFT TO RIGHT. PT TOOK ON STEP AND THEN SEATED EOB WITH DEC ECCENTRIC CONTROL. PT UNABLE TO T/F TO CHAIR AT THIS TIME AND IS A HIGH FALL RISK D/T DEC TRUNK CONTROL AND FALLING ASLEEP FREQUENTLY. PT RETURNED SUP IN BED WITH MOD A. PT LEFT WITH ALL NEEDS MET AND BED ALARM ON    AM-PAC 6 CLICK MOBILITY  Total Score:10     Patient left HOB elevated with call button in reach and bed alarm on.    GOALS:   Multidisciplinary Problems     Physical Therapy Goals        Problem: Physical Therapy Goal    Goal Priority Disciplines Outcome Goal Variances Interventions   Physical Therapy Goal     PT, PT/OT      Description: PT WILL BE SEEN FOR P.T. FOR A MIN OF 5 OUT OF 7 DAYS A WEEK  LT20  1. PT WILL COMPLETE BED MOBILITY WITH MIN A   2. PT WILL T/F TO CHAIR WITH RW AND MAX A  3. PT WILL SIT>STAND WITH RW AND MAX A  4. PT WILL COMPLETE B LE TE X 20 REPS  5. PT WILL GT TRAIN X 10' WITH RW AND MAX A                   History:     Past Medical History:   Diagnosis Date    Diabetes mellitus     Hypertension     Seizures     Stroke     Thyroid disease        Past Surgical History:   Procedure Laterality Date    THYROIDECTOMY      TONSILLECTOMY         Time Tracking:     PT Received On: 20  PT Start Time: 930     PT Stop Time: 1000  PT Total Time (min): 30 min     Billable Minutes: Evaluation 15 and Therapeutic Activity 15      Harriett Bernard, PT  2020

## 2020-07-29 NOTE — PROGRESS NOTES
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  In the interval the patient has become increasingly alert, however continues to have a mild left hemiparesis in headache.  Her platelet function analysis has come back in the normal range.  She still has some evidence of acute kidney injury, however this appears to be stabilizing according to Dr. West.  Post-Op Info:  Procedure(s) (LRB):  CRANIOTOMY, WITH 3D STEREOTACTIC IMAGING GUIDANCE, REAL-TIME (Right)          Medications:  Continuous Infusions:   sodium chloride 0.9% 125 mL/hr at 07/28/20 1703     Scheduled Meds:   atorvastatin  10 mg Oral Daily    cefTRIAXone (ROCEPHIN) IVPB  1 g Intravenous Q24H    dexamethasone  2 mg Intravenous Q12H    gabapentin  600 mg Oral Daily    heparin (porcine)  5,000 Units Subcutaneous Q8H    hydrALAZINE  25 mg Oral Q8H    levETIRAcetam  1,500 mg Oral BID    levothyroxine  137 mcg Oral Before breakfast     PRN Meds:acetaminophen, dextrose 50%, dextrose 50%, glucagon (human recombinant), glucose, glucose, hydrALAZINE, insulin aspart U-100, ondansetron     Review of Systems   Constitutional: Positive for fatigue. Negative for diaphoresis and unexpected weight change.   HENT: Negative for hearing loss, rhinorrhea, trouble swallowing and voice change.    Eyes: Negative for photophobia and visual disturbance.   Respiratory: Negative for chest tightness and shortness of breath.    Cardiovascular: Negative for chest pain.   Gastrointestinal: Negative for constipation, nausea and vomiting.   Endocrine: Negative for cold intolerance, heat intolerance, polydipsia, polyphagia and polyuria.   Genitourinary: Negative for difficulty  urinating.   Musculoskeletal: Negative for back pain, neck pain and neck stiffness.   Skin: Negative.    Allergic/Immunologic: Negative.    Neurological: Positive for weakness. Negative for dizziness, tremors, seizures, syncope, facial asymmetry, speech difficulty, numbness and headaches.   Hematological: Negative for adenopathy. Does not bruise/bleed easily.   Psychiatric/Behavioral: Positive for decreased concentration. Negative for behavioral problems and confusion.     Objective:     Weight: 129.8 kg (286 lb 2.5 oz)  Body mass index is 41.06 kg/m².  Vital Signs (Most Recent):  Temp: 97.8 °F (36.6 °C) (07/29/20 0731)  Pulse: 64 (07/29/20 0731)  Resp: 16 (07/29/20 0731)  BP: (!) 160/71 (07/29/20 0731)  SpO2: 97 % (07/29/20 0731) Vital Signs (24h Range):  Temp:  [97.4 °F (36.3 °C)-98.6 °F (37 °C)] 97.8 °F (36.6 °C)  Pulse:  [64-71] 64  Resp:  [16-18] 16  SpO2:  [93 %-98 %] 97 %  BP: (144-174)/(67-89) 160/71     Date 07/29/20 0700 - 07/30/20 0659   Shift 6952-8362 8799-3218 6985-2254 24 Hour Total   INTAKE   P.O. 240   240   Shift Total(mL/kg) 240(1.8)   240(1.8)   OUTPUT   Shift Total(mL/kg)       Weight (kg) 129.8 129.8 129.8 129.8                        Physical Exam:  Nursing note and vitals reviewed.    Constitutional: She appears well-developed and well-nourished.     Eyes: Pupils are equal, round, and reactive to light. Conjunctivae and EOM are normal.     Cardiovascular: Normal rate, regular rhythm and normal pulses.     Abdominal: Soft. Bowel sounds are normal.     Skin: Skin displays no rash on trunk and no rash on extremities.     Psych/Behavior: She is alert. She is oriented to person, place, and time. She has a normal mood and affect.     Musculoskeletal: Gait is normal and abnormal.        Neck: Range of motion is full. Muscle strength is 5/5. Tone is normal.        Back: Range of motion is full. Muscle strength is 5/5. Tone is normal.        Right Upper Extremities: Range of motion is full. Muscle  strength is 5/5. Tone is normal.        Left Upper Extremities: Range of motion is full. Muscle strength is 4/5. Tone is normal.       Right Lower Extremities: Range of motion is full. Muscle strength is 5/5.        Left Lower Extremities: Range of motion is full. Muscle strength is 4/5.     Neurological:        Coordination: She has a normal Romberg Test, normal finger to nose coordination and normal tandem walking coordination.        Sensory: There is no sensory deficit in the trunk. There is no sensory deficit in the extremities.        DTRs: DTRs are DTRS NORMAL AND SYMMETRICnormal and symmetric. Tricep reflexes are 1+ on the right side and 2+ on the left side. Bicep reflexes are 1+ on the right side and 2+ on the left side. Brachioradialis reflexes are 1+ on the right side and 2+ on the left side. Patellar reflexes are 1+ on the right side and 2+ on the left side. Achilles reflexes are 1+ on the right side and 2+ on the left side. She displays no Babinski's sign on the right side. She displays Babinski's sign on the left side.        Cranial nerves: Cranial nerve(s) II, III, IV, V, VI, VII, VIII, IX, X, XI and XII are intact.       Significant Labs:  Recent Labs   Lab 07/28/20  0433 07/29/20  0423   * 119*    134*   K 4.2 3.9    101   CO2 22* 21*   BUN 27* 31*   CREATININE 2.6* 2.3*   CALCIUM 10.8* 9.6     Recent Labs   Lab 07/28/20  0433 07/29/20  0423   WBC 11.20 11.08   HGB 9.0* 8.6*   HCT 29.9* 27.8*   * 204     Recent Labs   Lab 07/27/20  1131 07/28/20  0837   INR  --  1.0   APTT <21.0  --      Microbiology Results (last 7 days)     Procedure Component Value Units Date/Time    Urine culture [[id number]] Collected: 07/27/20 0033    Order Status: Completed Specimen: Urine Updated: 07/28/20 1935     Urine Culture, Routine No growth    Narrative:      Urine was sent to lab but culture was not able to be  completed, recollect just for urine culture  Specimen Source->Urine    Urine  culture [[id number]] Collected: 07/26/20 1434    Order Status: Canceled Specimen: Urine         CMP:   Recent Labs   Lab 07/28/20  0433 07/29/20  0423   * 119*   CALCIUM 10.8* 9.6   ALBUMIN 3.3* 3.3*    134*   K 4.2 3.9   CO2 22* 21*    101   BUN 27* 31*   CREATININE 2.6* 2.3*     Significant Diagnostics:  A navigational CT was obtained in anticipation of surgical resection.  The study appears adequate for intended purposes.      Assessment/Plan:   We tentatively plan for stereotactic assisted craniotomy and resection of the right frontal mass on Friday July 31, 2020.  I took the opportunity to discuss the situation at length with the patient and her  at the bedside.  We discussed the indications for, alternatives to, and all the pertinent risks of the procedure at length.  The risks discussed included those of bleeding, infection, stroke, seizures, residual or recurrent tumor development, speech, language cognitive or special sense deficits, medical or anesthetic complications among many others.  Understanding these risks and others, and having had an opportunity to have all of their questions carefully answered, they like to proceed as outlined above.  Active Diagnoses:    Diagnosis Date Noted POA    PRINCIPAL PROBLEM:  Brain mass [G93.89] 07/26/2020 Yes    Metabolic encephalopathy [G93.41] 07/27/2020 Yes    Hypercalcemia [E83.52] 07/26/2020 Yes    SHARLENE on CKD, stage III [N17.9] 07/26/2020 Yes    Essential hypertension [I10] 07/26/2020 Yes     Chronic      Problems Resolved During this Admission:       Jose Hagen MD  Neurosurgery  [facility] -

## 2020-07-29 NOTE — PT/OT/SLP EVAL
Occupational Therapy   Evaluation    Name: Cata Lang  MRN: [id number]  Admitting Diagnosis:  Brain mass      Recommendations:     Discharge Recommendations: rehabilitation facility, nursing facility, skilled(snf vs rehab)  depending on progress  Discharge Equipment Recommendations:  none  Barriers to discharge:  None    Assessment:     Cata Lang is a 49 y.o. female with a medical diagnosis of Brain mass.  She presents with debility. Performance deficits affecting function: weakness, gait instability, impaired endurance, impaired balance, impaired self care skills, impaired functional mobilty, decreased coordination.      Rehab Prognosis: Good; patient would benefit from acute skilled OT services to address these deficits and reach maximum level of function.       Plan:     Patient to be seen 3 x/week to address the above listed problems via    · Plan of Care Expires: 07/29/20  · Plan of Care Reviewed with:      Subjective     Chief Complaint: debility and generalized weakness  Patient/Family Comments/goals:    Occupational Profile:  Living Environment: lives with spouse in 1 story house with ramp to enter  Previous level of function: (I)  With adl's and functional mobility  Roles and Routines: occupational therapy  Equipment Used at Home:  power chair  Assistance upon Discharge:     Pain/Comfort:  Pain Rating 1: 0/10    Patients cultural, spiritual, Judaism conflicts given the current situation:      Objective:     Communicated with: nurse and epic chart review prior to session.  Patient found HOB elevated with   upon OT entry to room.    General Precautions: Standard, fall   Orthopedic Precautions:N/A   Braces: N/A     Occupational Performance:    Bed Mobility:    · Patient completed Rolling/Turning to Right with maximal assistance and 2 persons  · Patient completed Scooting/Bridging with maximal assistance and 2 persons  · Patient completed Supine to Sit with maximal assistance and 2  persons  · Patient completed Sit to Supine with maximal assistance2 persons    Functional Mobility/Transfers:  · Patient completed Sit <> Stand Transfer with maximal assistance and of 2 persons  with  rolling walker x 3 attempts   · Functional Mobility: pt unable weight tshift to to perform activity    Activities of Daily Living:  · Upper Body Dressing: maximal assistance [facility] robe  · Lower Body Dressing: maximal assistance . rose sock sitting eob    Cognitive/Visual Perceptual:  Cognitive/Psychosocial Skills:     -       Oriented to: Person, Place, Time and Situation   -       Follows Commands/attention:Follows one-step commands  -       Communication: clear/ delayed  -       Memory: No Deficits noted  -       Safety awareness/insight to disability: impaired     Physical Exam:  Upper Extremity Range of Motion:     -       Right Upper Extremity: WFL  -       Left Upper Extremity: approx 70 degrees  Upper Extremity Strength:    -       Right Upper Extremity: mmt: 3/ 5 grossly  -       Left Upper Extremity: mmt: -2/5 grossly   Strength:    -       Right Upper Extremity: mmt: 3/5 grossly  -       Left Upper Extremity: mmt: -2/5 grossly    AMPAC 6 Click ADL:  AMPAC Total Score: 12    Treatment & Education:  Pt seen in room with no c/o pain. Pt req mod a with bed mobility and forward scooting. Pt req max a with le/ue dressing. Pt req max a x 3 attempts with max a x2. functional t/f's and un able to weight shift for functional mobility. See above for details.  Patient left HOB elevated with all lines intact, call button in reach, bed alarm on, nurse notified and spouse present    GOALS:   Multidisciplinary Problems     Occupational Therapy Goals        Problem: Occupational Therapy Goal    Goal Priority Disciplines Outcome Interventions   Occupational Therapy Goal     OT, PT/OT     Description: OT goals to be met by 8-5-20  Min a with ue dressing  Min a with le dressing  Pt will tolerate 1 set x 10 reps b  ue rom exercise   Mod a with bsc t/f's with ae.                     History:     Past Medical History:   Diagnosis Date    Diabetes mellitus     Hypertension     Seizures     Stroke     Thyroid disease          Past Surgical History:   Procedure Laterality Date    THYROIDECTOMY      TONSILLECTOMY         Time Tracking:     OT Date of Treatment: 07/29/20  OT Start Time: 0900  OT Stop Time: 0925  OT Total Time (min): 25 min    Billable Minutes:Evaluation 10 minutes  Therapeutic Activity  15 minutes    Inna Ornelas, OT  7/29/2020

## 2020-07-29 NOTE — PLAN OF CARE
NS @ 125mL/hr. IV ABT therapy remains in progress. No adverse reactions noted, remains afebrile. Pt alert, awakens on arousal but quickly falls back asleep. Accu chekcs AC&HS. Dressing to right hip.  @ bedside. Remains free from injury/incident. Call light in reach

## 2020-07-30 ENCOUNTER — ANESTHESIA EVENT (OUTPATIENT)
Dept: SURGERY | Facility: HOSPITAL | Age: 50
DRG: 025 | End: 2020-07-30
Payer: COMMERCIAL

## 2020-07-30 LAB
ALBUMIN SERPL BCP-MCNC: 3 G/DL (ref 3.5–5.2)
ANION GAP SERPL CALC-SCNC: 14 MMOL/L (ref 8–16)
BASOPHILS # BLD AUTO: 0.02 K/UL (ref 0–0.2)
BASOPHILS NFR BLD: 0.1 % (ref 0–1.9)
BUN SERPL-MCNC: 33 MG/DL (ref 6–20)
CALCIUM SERPL-MCNC: 8.5 MG/DL (ref 8.7–10.5)
CHLORIDE SERPL-SCNC: 103 MMOL/L (ref 95–110)
CO2 SERPL-SCNC: 20 MMOL/L (ref 23–29)
CREAT SERPL-MCNC: 1.9 MG/DL (ref 0.5–1.4)
DIFFERENTIAL METHOD: ABNORMAL
EOSINOPHIL # BLD AUTO: 0 K/UL (ref 0–0.5)
EOSINOPHIL NFR BLD: 0.1 % (ref 0–8)
ERYTHROCYTE [DISTWIDTH] IN BLOOD BY AUTOMATED COUNT: 15.8 % (ref 11.5–14.5)
EST. GFR  (AFRICAN AMERICAN): 35 ML/MIN/1.73 M^2
EST. GFR  (NON AFRICAN AMERICAN): 30 ML/MIN/1.73 M^2
GLUCOSE SERPL-MCNC: 116 MG/DL (ref 70–110)
HCT VFR BLD AUTO: 26.9 % (ref 37–48.5)
HGB BLD-MCNC: 8.1 G/DL (ref 12–16)
IMM GRANULOCYTES # BLD AUTO: 0.12 K/UL (ref 0–0.04)
IMM GRANULOCYTES NFR BLD AUTO: 0.9 % (ref 0–0.5)
LYMPHOCYTES # BLD AUTO: 2.7 K/UL (ref 1–4.8)
LYMPHOCYTES NFR BLD: 19.7 % (ref 18–48)
MCH RBC QN AUTO: 24 PG (ref 27–31)
MCHC RBC AUTO-ENTMCNC: 30.1 G/DL (ref 32–36)
MCV RBC AUTO: 80 FL (ref 82–98)
MONOCYTES # BLD AUTO: 0.9 K/UL (ref 0.3–1)
MONOCYTES NFR BLD: 6.4 % (ref 4–15)
NEUTROPHILS # BLD AUTO: 10.1 K/UL (ref 1.8–7.7)
NEUTROPHILS NFR BLD: 72.8 % (ref 38–73)
NRBC BLD-RTO: 0 /100 WBC
PHOSPHATE SERPL-MCNC: 3.4 MG/DL (ref 2.7–4.5)
PLATELET # BLD AUTO: 248 K/UL (ref 150–350)
PMV BLD AUTO: 11.8 FL (ref 9.2–12.9)
POCT GLUCOSE: 106 MG/DL (ref 70–110)
POCT GLUCOSE: 123 MG/DL (ref 70–110)
POCT GLUCOSE: 133 MG/DL (ref 70–110)
POTASSIUM SERPL-SCNC: 3.5 MMOL/L (ref 3.5–5.1)
PTH RELATED PROT SERPL-SCNC: <0.4 PMOL/L
RBC # BLD AUTO: 3.37 M/UL (ref 4–5.4)
SARS-COV-2 RDRP RESP QL NAA+PROBE: NEGATIVE
SODIUM SERPL-SCNC: 137 MMOL/L (ref 136–145)
WBC # BLD AUTO: 13.9 K/UL (ref 3.9–12.7)

## 2020-07-30 PROCEDURE — 93005 ELECTROCARDIOGRAM TRACING: CPT

## 2020-07-30 PROCEDURE — 93010 EKG 12-LEAD: ICD-10-PCS | Mod: ,,, | Performed by: INTERNAL MEDICINE

## 2020-07-30 PROCEDURE — 25000003 PHARM REV CODE 250: Performed by: PHYSICIAN ASSISTANT

## 2020-07-30 PROCEDURE — 93010 ELECTROCARDIOGRAM REPORT: CPT | Mod: ,,, | Performed by: INTERNAL MEDICINE

## 2020-07-30 PROCEDURE — U0002 COVID-19 LAB TEST NON-CDC: HCPCS

## 2020-07-30 PROCEDURE — 25000003 PHARM REV CODE 250: Performed by: NURSE PRACTITIONER

## 2020-07-30 PROCEDURE — 85025 COMPLETE CBC W/AUTO DIFF WBC: CPT

## 2020-07-30 PROCEDURE — 11000001 HC ACUTE MED/SURG PRIVATE ROOM

## 2020-07-30 PROCEDURE — 63600175 PHARM REV CODE 636 W HCPCS: Performed by: INTERNAL MEDICINE

## 2020-07-30 PROCEDURE — 63600175 PHARM REV CODE 636 W HCPCS: Performed by: PHYSICIAN ASSISTANT

## 2020-07-30 PROCEDURE — 63600175 PHARM REV CODE 636 W HCPCS: Performed by: NURSE PRACTITIONER

## 2020-07-30 PROCEDURE — 25000003 PHARM REV CODE 250: Performed by: INTERNAL MEDICINE

## 2020-07-30 PROCEDURE — 99232 PR SUBSEQUENT HOSPITAL CARE,LEVL II: ICD-10-PCS | Mod: ,,, | Performed by: NEUROLOGICAL SURGERY

## 2020-07-30 PROCEDURE — 97530 THERAPEUTIC ACTIVITIES: CPT

## 2020-07-30 PROCEDURE — 99232 SBSQ HOSP IP/OBS MODERATE 35: CPT | Mod: ,,, | Performed by: NEUROLOGICAL SURGERY

## 2020-07-30 PROCEDURE — 80069 RENAL FUNCTION PANEL: CPT

## 2020-07-30 PROCEDURE — 99233 SBSQ HOSP IP/OBS HIGH 50: CPT | Mod: ,,, | Performed by: INTERNAL MEDICINE

## 2020-07-30 PROCEDURE — 36415 COLL VENOUS BLD VENIPUNCTURE: CPT

## 2020-07-30 PROCEDURE — 99233 PR SUBSEQUENT HOSPITAL CARE,LEVL III: ICD-10-PCS | Mod: ,,, | Performed by: INTERNAL MEDICINE

## 2020-07-30 RX ORDER — PANTOPRAZOLE SODIUM 40 MG/1
40 TABLET, DELAYED RELEASE ORAL DAILY
Status: DISCONTINUED | OUTPATIENT
Start: 2020-07-30 | End: 2020-07-31

## 2020-07-30 RX ORDER — CEFAZOLIN SODIUM 2 G/50ML
2 SOLUTION INTRAVENOUS ONCE
Status: DISCONTINUED | OUTPATIENT
Start: 2020-07-31 | End: 2020-07-31

## 2020-07-30 RX ORDER — LEVETIRACETAM 15 MG/ML
1500 INJECTION INTRAVASCULAR EVERY 12 HOURS
Status: DISCONTINUED | OUTPATIENT
Start: 2020-07-30 | End: 2020-08-01

## 2020-07-30 RX ORDER — DEXAMETHASONE SODIUM PHOSPHATE 4 MG/ML
4 INJECTION, SOLUTION INTRA-ARTICULAR; INTRALESIONAL; INTRAMUSCULAR; INTRAVENOUS; SOFT TISSUE EVERY 12 HOURS
Status: DISCONTINUED | OUTPATIENT
Start: 2020-07-30 | End: 2020-07-31

## 2020-07-30 RX ADMIN — HYDRALAZINE HYDROCHLORIDE 25 MG: 25 TABLET, FILM COATED ORAL at 05:07

## 2020-07-30 RX ADMIN — LEVOTHYROXINE SODIUM 137 MCG: 25 TABLET ORAL at 05:07

## 2020-07-30 RX ADMIN — ACETAMINOPHEN 650 MG: 325 TABLET ORAL at 12:07

## 2020-07-30 RX ADMIN — DEXAMETHASONE SODIUM PHOSPHATE 4 MG: 4 INJECTION, SOLUTION INTRAMUSCULAR; INTRAVENOUS at 10:07

## 2020-07-30 RX ADMIN — HYDRALAZINE HYDROCHLORIDE 25 MG: 25 TABLET, FILM COATED ORAL at 04:07

## 2020-07-30 RX ADMIN — CEFTRIAXONE 1 G: 1 INJECTION, SOLUTION INTRAVENOUS at 04:07

## 2020-07-30 RX ADMIN — DEXAMETHASONE SODIUM PHOSPHATE 4 MG: 4 INJECTION, SOLUTION INTRAMUSCULAR; INTRAVENOUS at 08:07

## 2020-07-30 RX ADMIN — GABAPENTIN 600 MG: 300 CAPSULE ORAL at 10:07

## 2020-07-30 RX ADMIN — ACETAMINOPHEN 650 MG: 325 TABLET ORAL at 08:07

## 2020-07-30 RX ADMIN — ATORVASTATIN CALCIUM 10 MG: 10 TABLET, FILM COATED ORAL at 10:07

## 2020-07-30 RX ADMIN — ACETAMINOPHEN 650 MG: 325 TABLET ORAL at 04:07

## 2020-07-30 RX ADMIN — HYDRALAZINE HYDROCHLORIDE 25 MG: 25 TABLET, FILM COATED ORAL at 10:07

## 2020-07-30 RX ADMIN — PANTOPRAZOLE SODIUM 40 MG: 40 TABLET, DELAYED RELEASE ORAL at 10:07

## 2020-07-30 RX ADMIN — LEVETIRACETAM INJECTION 1500 MG: 15 INJECTION INTRAVENOUS at 08:07

## 2020-07-30 RX ADMIN — HYDRALAZINE HYDROCHLORIDE 10 MG: 20 INJECTION INTRAMUSCULAR; INTRAVENOUS at 10:07

## 2020-07-30 RX ADMIN — LEVETIRACETAM INJECTION 1500 MG: 15 INJECTION INTRAVENOUS at 10:07

## 2020-07-30 NOTE — PLAN OF CARE
List provided from [employer] for inpatient rehab hospitals within 50 miles of [place] [employer].         07/30/20 1200   Post-Acute Status   Post-Acute Authorization Placement   Post-Acute Placement Status Patient List Provided   Discharge Delays (!) Other

## 2020-07-30 NOTE — ASSESSMENT & PLAN NOTE
SHARLENE on CKD stage 3 , baseline creatinine about 1.3 mg/dL,Ultrasound negative for any hydronephrosis.    7/30 : Calcium is normal creatinine down to 1.9 okay to proceed with neurosurgery tomorrow discussed with patient, , Dr. Hagen

## 2020-07-30 NOTE — SUBJECTIVE & OBJECTIVE
Interval History:  More alert and interactive.  Answers questions and follows questions appropriately. Gait instability and left sided weakness.  Frontal headache.    Review of Systems   Constitutional: Negative for chills and fever.   HENT: Negative for congestion and sore throat.    Eyes: Negative for visual disturbance.   Respiratory: Negative for cough, shortness of breath and wheezing.    Cardiovascular: Negative for chest pain, palpitations and leg swelling.   Gastrointestinal: Negative for abdominal pain, blood in stool, constipation, diarrhea, nausea and vomiting.   Genitourinary: Negative for dysuria and hematuria.   Musculoskeletal: Positive for gait problem. Negative for arthralgias and back pain.   Skin: Negative for rash and wound.   Neurological: Positive for weakness and headaches. Negative for dizziness, light-headedness and numbness.   Hematological: Negative for adenopathy.   Psychiatric/Behavioral: Positive for confusion.     Objective:     Vital Signs (Most Recent):  Temp: 98.6 °F (37 °C) (07/29/20 1926)  Pulse: 76 (07/29/20 1926)  Resp: 14 (07/29/20 1926)  BP: (!) 141/68 (07/29/20 1926)  SpO2: 96 % (07/29/20 1926) Vital Signs (24h Range):  Temp:  [97.7 °F (36.5 °C)-98.6 °F (37 °C)] 98.6 °F (37 °C)  Pulse:  [64-76] 76  Resp:  [14-18] 14  SpO2:  [93 %-97 %] 96 %  BP: (141-174)/(68-89) 141/68     Weight: 129.8 kg (286 lb 2.5 oz)  Body mass index is 41.06 kg/m².    Intake/Output Summary (Last 24 hours) at 7/29/2020 2047  Last data filed at 7/29/2020 1800  Gross per 24 hour   Intake 3979.76 ml   Output --   Net 3979.76 ml      Physical Exam  Vitals signs and nursing note reviewed.   Constitutional:       General: She is not in acute distress.     Appearance: She is well-developed.   HENT:      Head: Normocephalic and atraumatic.   Eyes:      Conjunctiva/sclera: Conjunctivae normal.      Pupils: Pupils are equal, round, and reactive to light.   Neck:      Musculoskeletal: Neck supple.      Thyroid: No  thyromegaly.      Vascular: No JVD.   Cardiovascular:      Rate and Rhythm: Normal rate and regular rhythm.      Heart sounds: No murmur. No friction rub. No gallop.    Pulmonary:      Effort: Pulmonary effort is normal.      Breath sounds: Normal breath sounds. No wheezing or rales.   Abdominal:      General: Bowel sounds are normal. There is no distension.      Palpations: Abdomen is soft.      Tenderness: There is no abdominal tenderness. There is no guarding or rebound.   Musculoskeletal: Normal range of motion.         General: No deformity.   Lymphadenopathy:      Cervical: No cervical adenopathy.   Skin:     General: Skin is warm and dry.      Findings: No rash.   Neurological:      Mental Status: She is alert and oriented to person, place, and time.      Deep Tendon Reflexes: Reflexes are normal and symmetric.      Comments: Left upper extremity 4/5 strength and pronator drift.   Psychiatric:         Behavior: Behavior normal.         Thought Content: Thought content normal.         Judgment: Judgment normal.         Significant Labs: All pertinent labs within the past 24 hours have been reviewed.    Significant Imaging: I have reviewed all pertinent imaging results/findings within the past 24 hours.

## 2020-07-30 NOTE — PT/OT/SLP PROGRESS
Physical Therapy  Treatment    Cata Lang   MRN: [id number]   Admitting Diagnosis: Brain mass    PT Received On: 07/30/20  PT Start Time: 0745     PT Stop Time: 0810    PT Total Time (min): 25 min       Billable Minutes:  Therapeutic Activity 25    Treatment Type: Treatment  PT/PTA: PT     PTA Visit Number: 0       General Precautions: Standard, fall  Orthopedic Precautions: N/A   Braces: N/A         Subjective:  Communicated with NURSE MALINI AND Epic CHART REVIEW  prior to session.   PT AGREED TO TX     Pain/Comfort  Pain Rating 1: 7/10  Location 1: head  Pain Addressed 1: Nurse notified  Pain Rating Post-Intervention 1: 7/10    Objective:   Patient found with: peripheral IV, telemetry    Functional Mobility:  PT MET IN RM SUP.SIT EOB WITH MAX A. PT SCOOTED TO EOB AND STOOD WITH RW AND MAX A X 2 X 3 TRIALS PT WITH LE UE SUPPORT AND LE QUAD TACTILE CUES. PT WITH SIDE STEPPING TO LEFT SIDE WITH MAX A X 2 TO T/F TO CHAIR WITH MAX A X 2. PT WITH INC FATIGUE AND REPORTED DIZZINESS. PT AGREED TO SIT UP IN CHAIR AND NURSE AWARE TO ASSIST BACK TO BED. PT LEFT SEATED WITH ALL NEEDS MET AND CALL BELL IN REACH.     AM-PAC 6 CLICK MOBILITY  How much help from another person does this patient currently need?   1 = Unable, Total/Dependent Assistance  2 = A lot, Maximum/Moderate Assistance  3 = A little, Minimum/Contact Guard/Supervision  4 = None, Modified [place]/Independent    Turning over in bed (including adjusting bedclothes, sheets and blankets)?: 2  Sitting down on and standing up from a chair with arms (e.g., wheelchair, bedside commode, etc.): 2  Moving from lying on back to sitting on the side of the bed?: 2  Moving to and from a bed to a chair (including a wheelchair)?: 2  Need to walk in hospital room?: 1  Climbing 3-5 steps with a railing?: 1  Basic Mobility Total Score: 10    AM-PAC Raw Score CMS G-Code Modifier Level of Impairment Assistance   6 % Total / Unable   7 - 9 CM 80 - 100% Maximal  Assist   10 - 14 CL 60 - 80% Moderate Assist   15 - 19 CK 40 - 60% Moderate Assist   20 - 22 CJ 20 - 40% Minimal Assist   23 CI 1-20% SBA / CGA   24 CH 0% Independent/ Mod I     Patient left up in chair with call button in reach, chair alarm on and  present.    Assessment:  PT PROGRESSING WITH T/F TRAINING.     Rehab identified problem list/impairments: Rehab identified problem list/impairments: weakness, impaired endurance, impaired self care skills, impaired functional mobilty, gait instability, decreased ROM, decreased lower extremity function, impaired balance, decreased safety awareness, pain, impaired skin, decreased upper extremity function, decreased coordination, abnormal tone, impaired sensation    Rehab potential is good.    Activity tolerance: Poor    Discharge recommendations: Discharge Facility/Level of Care Needs: rehabilitation facility     Barriers to discharge:      Equipment recommendations: Equipment Needed After Discharge: (TBD)     GOALS:   Multidisciplinary Problems     Physical Therapy Goals        Problem: Physical Therapy Goal    Goal Priority Disciplines Outcome Goal Variances Interventions   Physical Therapy Goal     PT, PT/OT Ongoing, Progressing     Description: PT WILL BE SEEN FOR P.T. FOR A MIN OF 5 OUT OF 7 DAYS A WEEK  LT20  1. PT WILL COMPLETE BED MOBILITY WITH MIN A   2. PT WILL T/F TO CHAIR WITH RW AND MAX A  3. PT WILL SIT>STAND WITH RW AND MAX A  4. PT WILL COMPLETE B LE TE X 20 REPS  5. PT WILL GT TRAIN X 10' WITH RW AND MAX A                   PLAN:    Patient to be seen    to address the above listed problems via gait training, therapeutic activities, therapeutic exercises  Plan of Care expires: 20  Plan of Care reviewed with: patient         Harriett Bernard, PT  2020

## 2020-07-30 NOTE — PROGRESS NOTES
[facility] -   Neurosurgery  Progress Note    Subjective:     Interval History:  The patient looks far more awake and alert after a couple of days of dexamethasone.  Metabolic issues seem to be improving.  Less left hemiparesis.    History of Present Illness:  See admission consult from Neurosurgery.  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    Post-Op Info:  Procedure(s) (LRB):  CRANIOTOMY, WITH 3D STEREOTACTIC IMAGING GUIDANCE, REAL-TIME (Right)          Medications:  Continuous Infusions:   sodium chloride 0.9% 125 mL/hr at 07/28/20 1703     Scheduled Meds:   atorvastatin  10 mg Oral Daily    [START ON 7/31/2020] ceFAZolin (ANCEF) IVPB  2 g Intravenous Once    cefTRIAXone (ROCEPHIN) IVPB  1 g Intravenous Q24H    dexamethasone  4 mg Intravenous Q12H    gabapentin  600 mg Oral Daily    hydrALAZINE  25 mg Oral Q8H    levetiracetam IVPB  1,500 mg Intravenous Q12H    levothyroxine  137 mcg Oral Before breakfast    pantoprazole  40 mg Oral Daily     PRN Meds:acetaminophen, dextrose 50%, dextrose 50%, glucagon (human recombinant), glucose, glucose, hydrALAZINE, insulin aspart U-100, ondansetron     Review of Systems  Objective:     Weight: 129.8 kg (286 lb 2.5 oz)  Body mass index is 41.06 kg/m².  Vital Signs (Most Recent):  Temp: 97.7 °F (36.5 °C) (07/30/20 0744)  Pulse: 66 (07/30/20 0744)  Resp: 18 (07/30/20 0744)  BP: (!) 184/83 (07/30/20 0744)  SpO2: 97 % (07/30/20 0744) Vital Signs (24h Range):  Temp:  [97.5 °F (36.4 °C)-98.6 °F (37 °C)] 97.7 °F (36.5 °C)  Pulse:  [66-76] 66  Resp:  [14-18] 18  SpO2:  [95 %-97 %] 97 %  BP: (141-184)/(67-83) 184/83     Date 07/30/20 0700 - 07/31/20 0659   Shift 9609-5750 9199-4988 5975-7819 24 Hour Total   INTAKE   P.O. 240   240   Shift Total(mL/kg) 240(1.8)   240(1.8)   OUTPUT   Shift Total(mL/kg)       Weight (kg)  129.8 129.8 129.8 129.8                        Neurosurgery Physical Exam    Significant Labs:  Recent Labs   Lab 07/29/20  0423 07/30/20  0433   * 116*   * 137   K 3.9 3.5    103   CO2 21* 20*   BUN 31* 33*   CREATININE 2.3* 1.9*   CALCIUM 9.6 8.5*     Recent Labs   Lab 07/29/20  0423 07/30/20  0433   WBC 11.08 13.90*   HGB 8.6* 8.1*   HCT 27.8* 26.9*    248     No results for input(s): LABPT, INR, APTT in the last 48 hours.  Microbiology Results (last 7 days)     Procedure Component Value Units Date/Time    Urine culture [[id number]] Collected: 07/27/20 0033    Order Status: Completed Specimen: Urine Updated: 07/28/20 1935     Urine Culture, Routine No growth    Narrative:      Urine was sent to lab but culture was not able to be  completed, recollect just for urine culture  Specimen Source->Urine    Urine culture [[id number]] Collected: 07/26/20 1434    Order Status: Canceled Specimen: Urine         All pertinent labs from the last 24 hours have been reviewed.  Significant Diagnostics:  I have reviewed and interpreted all pertinent imaging results/findings within the past 24 hours.    Assessment/Plan:   I have discussed her case with Dr. West, who feels she is okay to proceed with surgery tomorrow morning from a metabolic standpoint.  I discussed with the patient and her  today once again the indications for, alternatives to an all the pertinent risks of a stereotactically assisted craniotomy and resection of the right frontal tumor.  The risks of bleeding, infection, stroke, seizures, residual or recurrent tumor development, cognitive or special sense deficits, medical or anesthetic complications were among the many carefully explained.  They both understand that in order for her to reach maximum medical benefit, she may have to undergo adjuvant therapy in the future in the form of chemotherapy and possibly radiotherapy.  Understanding all these issues in risks, and having had an  opportunity to have all of the questions carefully answered, they would like to proceed as outlined above.  Active Diagnoses:    Diagnosis Date Noted POA    PRINCIPAL PROBLEM:  Brain mass [G93.89] 07/26/2020 Yes    Metabolic encephalopathy [G93.41] 07/27/2020 Yes    Hypercalcemia [E83.52] 07/26/2020 Yes    SHARLENE on CKD, stage III [N17.9] 07/26/2020 Yes    Essential hypertension [I10] 07/26/2020 Yes     Chronic      Problems Resolved During this Admission:       Jose Hagen MD  Neurosurgery  [facility] -

## 2020-07-30 NOTE — PLAN OF CARE
Fall precautions implemented. Pt remained free from falls/injuries.  IVF infusing per orders. Frequent weight shifting encouraged. LUE and LLE weakness noted. Purewick in place. Blood glucose monitored using SSI.  Headaches controled with PO tylenol. Safety precautions implemented. Chart reviewed. Will continue to monitor.

## 2020-07-30 NOTE — ASSESSMENT & PLAN NOTE
Initial calcium of 14.2.  Patient is on calitriol at home, will discontinue.  Hold Calcium supplementation.  Continue aggressive IV hydration.  Will give additional IV Lasix if needed.  Nephrology evaluated and assisting with management.  PTH is appropriately suppressed and Vitamin D25 and Calcitriol are low.  PTHrP pending.

## 2020-07-30 NOTE — PT/OT/SLP PROGRESS
Occupational Therapy      Patient Name:  Cata Lang   MRN:  [id number]    Patient not seen today secondary to pt eating dinner Will follow-up on later date. Spouse in agreement    Inna Ornelas, OT  7/30/2020   [id number]

## 2020-07-30 NOTE — PROGRESS NOTES
[facility] -   Nephrology  Progress Note    Patient Name: Cata Lang  MRN: [id number]  Admission Date: 7/25/2020  Hospital Length of Stay: 4 days  Attending Provider: Kingsley Heart MD   Primary Care Physician: Sabra Fregoso MD  Principal Problem:Brain mass    Subjective:     HPI: Cata Lang is a pleasant 49-year-old  woman history of hypertension, type 2 diabetes, morbid obesity, seizure disorder, CKD stage 3, baseline creatinine about 1.3 mg/dL, GFR 50 mL/minute, intracranial mass / lesion, she presents to the emergency room yesterday with progressive complains of weakness, increasing headache, nausea, a repeat MRI of the brain is pending at this time, she was evaluated by Neurosurgery, also significant hypercalcemia with a serum calcium of 14 noted on presentation, patient is currently taking Tums, calcitriol, hydrochlorothiazide, also she possibly has a urinary tract infection, acute on chronic renal failure, serum creatinine was 1.9 on presentation, 2.5 this evening, she is currently receiving IV fluids, we were consulted for acute kidney injury, hypercalcemia    Interval History:  Calcium is normal creatinine down to 1.9 okay to proceed with neurosurgery tomorrow discussed with patient, , Dr. Hagen     Review of patient's allergies indicates:   Allergen Reactions    Hydrochlorothiazide      hypercalcemia     Current Facility-Administered Medications   Medication Frequency    0.9%  NaCl infusion Continuous    acetaminophen tablet 650 mg Q6H PRN    atorvastatin tablet 10 mg Daily    [START ON 7/31/2020] cefazolin (ANCEF) 2 gram in dextrose 5% 50 mL IVPB (premix) Once    cefTRIAXone (ROCEPHIN) 1 g/50 mL D5W IVPB Q24H    dexamethasone injection 4 mg Q12H    dextrose 50% injection 12.5 g PRN    dextrose 50% injection 25 g PRN    gabapentin capsule 600 mg Daily    glucagon (human recombinant) injection 1 mg PRN    glucose chewable tablet 16 g PRN     glucose chewable tablet 24 g PRN    hydrALAZINE injection 10 mg Q8H PRN    hydrALAZINE tablet 25 mg Q8H    insulin aspart U-100 pen 1-10 Units QID (AC + HS) PRN    levETIRAcetam in NaCl (iso-os) IVPB 1,500 mg Q12H    levothyroxine tablet 137 mcg Before breakfast    ondansetron injection 4 mg Q6H PRN    pantoprazole EC tablet 40 mg Daily       Objective:     Vital Signs (Most Recent):  Temp: 97.7 °F (36.5 °C) (07/30/20 0744)  Pulse: 66 (07/30/20 0744)  Resp: 18 (07/30/20 0744)  BP: (!) 184/83 (07/30/20 0744)  SpO2: 97 % (07/30/20 0744)  O2 Device (Oxygen Therapy): room air (07/30/20 0345) Vital Signs (24h Range):  Temp:  [97.5 °F (36.4 °C)-98.6 °F (37 °C)] 97.7 °F (36.5 °C)  Pulse:  [66-76] 66  Resp:  [14-18] 18  SpO2:  [95 %-97 %] 97 %  BP: (141-184)/(67-83) 184/83     Weight: 129.8 kg (286 lb 2.5 oz) (07/26/20 0435)  Body mass index is 41.06 kg/m².  Body surface area is 2.53 meters squared.    I/O last 3 completed shifts:  In: 4459.8 [P.O.:1300; I.V.:3109.8; IV Piggyback:50]  Out: -     Physical Exam  Vitals signs and nursing note reviewed.   Constitutional:       General: She is not in acute distress.     Appearance: She is well-developed.   HENT:      Head: Normocephalic and atraumatic.   Eyes:      Conjunctiva/sclera: Conjunctivae normal.      Pupils: Pupils are equal, round, and reactive to light.   Neck:      Musculoskeletal: Neck supple.      Thyroid: No thyromegaly.      Vascular: No JVD.   Cardiovascular:      Rate and Rhythm: Normal rate and regular rhythm.      Heart sounds: No murmur. No friction rub. No gallop.    Pulmonary:      Effort: Pulmonary effort is normal.      Breath sounds: Normal breath sounds. No wheezing or rales.   Abdominal:      General: Bowel sounds are normal. There is no distension.      Palpations: Abdomen is soft.      Tenderness: There is no abdominal tenderness. There is no guarding or rebound.   Musculoskeletal: Normal range of motion.         General: No deformity.    Lymphadenopathy:      Cervical: No cervical adenopathy.   Skin:     General: Skin is warm and dry.      Findings: No rash.   Neurological:      Mental Status: She is alert and oriented to person, place, and time.      Deep Tendon Reflexes: Reflexes are normal and symmetric.      Comments: Left upper extremity 4/5 strength and pronator drift.   Psychiatric:         Behavior: Behavior normal.         Thought Content: Thought content normal.         Judgment: Judgment normal.         Significant Labs:  All labs within the past 24 hours have been reviewed.     Significant Imaging:      Assessment/Plan:     SHARLENE on CKD, stage III   SHARLENE on CKD stage 3 , baseline creatinine about 1.3 mg/dL,Ultrasound negative for any hydronephrosis.    7/30 : Calcium is normal creatinine down to 1.9 okay to proceed with neurosurgery tomorrow discussed with patient, , Dr. Hagen         Thank you for your consult.     Veronika Eller MD  Nephrology  [facility] -

## 2020-07-30 NOTE — PROGRESS NOTES
[facility] Hospital Medicine  Progress Note    Patient Name: Cata Lang  MRN: [id number]  Patient Class: IP- Inpatient   Admission Date: 7/25/2020  Length of Stay: 3 days  Attending Physician: Kingsley Heart MD  Primary Care Provider: Sabra Fregoso MD        Subjective:     Principal Problem:Brain mass        HPI:   Cata Lang is a 49 y.o. female patient with a PMHx of brain mass, HTN, DM II, hypothyroidism, seizure disorder, and chronic HA who presents to the Emergency Department for evaluation of neurological problem which onset gradually today. Pt notes she has been walking worse since December and her HA is worse on the L side and rates it a moderate to severe HA. Symptoms are worsening and moderate to severe in severity. No mitigating or exacerbating factors reported. Associated sxs include dizziness, malaise, and difficulty walking. Patient denies any fever, SOB, CP, abd pain, N/V, back pain, weakness, and all other sxs at this time. Pt notes she takes several tums daily which was recommended by her ENT. Pt transferred from [place] and accepted by Dr. Gandara (Neurosurgery). Case discussed with Dr. Gandara in ED who reviewed CT of the head from transferring facility and feels there is no need for neurosurgical intervention at present.  In ED, patient's symptoms completely resolved with NIHSS of 0.  Labs showed: creatinine of 1.9, BUN 19, calcium 14.2. Patient is on calcitriol at home, started last year with no follow-up labs done. IV fluids x 1 liter and 20 mg IV Lasix given. Hospital Medicine was contacted to admit for hypercalcemia and SHARLENE.      Overview/Hospital Course:  No notes on file    Interval History:  More alert and interactive.  Answers questions and follows questions appropriately. Gait instability and left sided weakness.  Frontal headache.    Review of Systems   Constitutional: Negative for chills and fever.   HENT: Negative for congestion and sore  throat.    Eyes: Negative for visual disturbance.   Respiratory: Negative for cough, shortness of breath and wheezing.    Cardiovascular: Negative for chest pain, palpitations and leg swelling.   Gastrointestinal: Negative for abdominal pain, blood in stool, constipation, diarrhea, nausea and vomiting.   Genitourinary: Negative for dysuria and hematuria.   Musculoskeletal: Positive for gait problem. Negative for arthralgias and back pain.   Skin: Negative for rash and wound.   Neurological: Positive for weakness and headaches. Negative for dizziness, light-headedness and numbness.   Hematological: Negative for adenopathy.   Psychiatric/Behavioral: Positive for confusion.     Objective:     Vital Signs (Most Recent):  Temp: 98.6 °F (37 °C) (07/29/20 1926)  Pulse: 76 (07/29/20 1926)  Resp: 14 (07/29/20 1926)  BP: (!) 141/68 (07/29/20 1926)  SpO2: 96 % (07/29/20 1926) Vital Signs (24h Range):  Temp:  [97.7 °F (36.5 °C)-98.6 °F (37 °C)] 98.6 °F (37 °C)  Pulse:  [64-76] 76  Resp:  [14-18] 14  SpO2:  [93 %-97 %] 96 %  BP: (141-174)/(68-89) 141/68     Weight: 129.8 kg (286 lb 2.5 oz)  Body mass index is 41.06 kg/m².    Intake/Output Summary (Last 24 hours) at 7/29/2020 2047  Last data filed at 7/29/2020 1800  Gross per 24 hour   Intake 3979.76 ml   Output --   Net 3979.76 ml      Physical Exam  Vitals signs and nursing note reviewed.   Constitutional:       General: She is not in acute distress.     Appearance: She is well-developed.   HENT:      Head: Normocephalic and atraumatic.   Eyes:      Conjunctiva/sclera: Conjunctivae normal.      Pupils: Pupils are equal, round, and reactive to light.   Neck:      Musculoskeletal: Neck supple.      Thyroid: No thyromegaly.      Vascular: No JVD.   Cardiovascular:      Rate and Rhythm: Normal rate and regular rhythm.      Heart sounds: No murmur. No friction rub. No gallop.    Pulmonary:      Effort: Pulmonary effort is normal.      Breath sounds: Normal breath sounds. No wheezing  or rales.   Abdominal:      General: Bowel sounds are normal. There is no distension.      Palpations: Abdomen is soft.      Tenderness: There is no abdominal tenderness. There is no guarding or rebound.   Musculoskeletal: Normal range of motion.         General: No deformity.   Lymphadenopathy:      Cervical: No cervical adenopathy.   Skin:     General: Skin is warm and dry.      Findings: No rash.   Neurological:      Mental Status: She is alert and oriented to person, place, and time.      Deep Tendon Reflexes: Reflexes are normal and symmetric.      Comments: Left upper extremity 4/5 strength and pronator drift.   Psychiatric:         Behavior: Behavior normal.         Thought Content: Thought content normal.         Judgment: Judgment normal.         Significant Labs: All pertinent labs within the past 24 hours have been reviewed.    Significant Imaging: I have reviewed all pertinent imaging results/findings within the past 24 hours.      Assessment/Plan:      * Brain mass  No acute change in mass seen on CT of brain per Neurosurgery.  Holding Aspirin and Clopidogrel for possible surgery later this week.  Platelet function assay normal.    Hypercalcemia  Initial calcium of 14.2.  Patient is on calitriol at home, will discontinue.  Hold Calcium supplementation.  Continue aggressive IV hydration.  Will give additional IV Lasix if needed.  Nephrology evaluated and assisting with management.  PTH is appropriately suppressed and Vitamin D25 and Calcitriol are low.  PTHrP pending.    Metabolic encephalopathy  Multifactorial due to hypercalcemia and brain mass with vasogenic edema.    Essential hypertension  - Losartan-HCTZ on hold as above.    - IV hydralazine PRN.    SHARLENE on CKD, stage III  - Initial creatinine of 1.9 (baseline 1.3).  - IV hydration.  - Avoid nephrotoxic agents. Hold home losartan-HCTZ for now.  - Follow labs.      VTE Risk Mitigation (From admission, onward)         Ordered     heparin (porcine)  injection 5,000 Units  Every 8 hours      07/26/20 0553     IP VTE HIGH RISK PATIENT  Once      07/26/20 0552     Place sequential compression device  Until discontinued      07/26/20 0552                      Kingsley Heart MD  Department of Hospital Medicine   [facility] -

## 2020-07-30 NOTE — ASSESSMENT & PLAN NOTE
No acute change in mass seen on CT of brain per Neurosurgery.  Holding Aspirin and Clopidogrel for possible surgery later this week.  Platelet function assay normal.

## 2020-07-30 NOTE — SUBJECTIVE & OBJECTIVE
Interval History:  Calcium is normal creatinine down to 1.9 okay to proceed with neurosurgery tomorrow discussed with patient, , Dr. Hagen     Review of patient's allergies indicates:   Allergen Reactions    Hydrochlorothiazide      hypercalcemia     Current Facility-Administered Medications   Medication Frequency    0.9%  NaCl infusion Continuous    acetaminophen tablet 650 mg Q6H PRN    atorvastatin tablet 10 mg Daily    [START ON 7/31/2020] cefazolin (ANCEF) 2 gram in dextrose 5% 50 mL IVPB (premix) Once    cefTRIAXone (ROCEPHIN) 1 g/50 mL D5W IVPB Q24H    dexamethasone injection 4 mg Q12H    dextrose 50% injection 12.5 g PRN    dextrose 50% injection 25 g PRN    gabapentin capsule 600 mg Daily    glucagon (human recombinant) injection 1 mg PRN    glucose chewable tablet 16 g PRN    glucose chewable tablet 24 g PRN    hydrALAZINE injection 10 mg Q8H PRN    hydrALAZINE tablet 25 mg Q8H    insulin aspart U-100 pen 1-10 Units QID (AC + HS) PRN    levETIRAcetam in NaCl (iso-os) IVPB 1,500 mg Q12H    levothyroxine tablet 137 mcg Before breakfast    ondansetron injection 4 mg Q6H PRN    pantoprazole EC tablet 40 mg Daily       Objective:     Vital Signs (Most Recent):  Temp: 97.7 °F (36.5 °C) (07/30/20 0744)  Pulse: 66 (07/30/20 0744)  Resp: 18 (07/30/20 0744)  BP: (!) 184/83 (07/30/20 0744)  SpO2: 97 % (07/30/20 0744)  O2 Device (Oxygen Therapy): room air (07/30/20 0345) Vital Signs (24h Range):  Temp:  [97.5 °F (36.4 °C)-98.6 °F (37 °C)] 97.7 °F (36.5 °C)  Pulse:  [66-76] 66  Resp:  [14-18] 18  SpO2:  [95 %-97 %] 97 %  BP: (141-184)/(67-83) 184/83     Weight: 129.8 kg (286 lb 2.5 oz) (07/26/20 0435)  Body mass index is 41.06 kg/m².  Body surface area is 2.53 meters squared.    I/O last 3 completed shifts:  In: 4459.8 [P.O.:1300; I.V.:3109.8; IV Piggyback:50]  Out: -     Physical Exam  Vitals signs and nursing note reviewed.   Constitutional:       General: She is not in acute distress.      Appearance: She is well-developed.   HENT:      Head: Normocephalic and atraumatic.   Eyes:      Conjunctiva/sclera: Conjunctivae normal.      Pupils: Pupils are equal, round, and reactive to light.   Neck:      Musculoskeletal: Neck supple.      Thyroid: No thyromegaly.      Vascular: No JVD.   Cardiovascular:      Rate and Rhythm: Normal rate and regular rhythm.      Heart sounds: No murmur. No friction rub. No gallop.    Pulmonary:      Effort: Pulmonary effort is normal.      Breath sounds: Normal breath sounds. No wheezing or rales.   Abdominal:      General: Bowel sounds are normal. There is no distension.      Palpations: Abdomen is soft.      Tenderness: There is no abdominal tenderness. There is no guarding or rebound.   Musculoskeletal: Normal range of motion.         General: No deformity.   Lymphadenopathy:      Cervical: No cervical adenopathy.   Skin:     General: Skin is warm and dry.      Findings: No rash.   Neurological:      Mental Status: She is alert and oriented to person, place, and time.      Deep Tendon Reflexes: Reflexes are normal and symmetric.      Comments: Left upper extremity 4/5 strength and pronator drift.   Psychiatric:         Behavior: Behavior normal.         Thought Content: Thought content normal.         Judgment: Judgment normal.         Significant Labs:  All labs within the past 24 hours have been reviewed.     Significant Imaging:     appears to be embolic  transthoracic Echo normal  rec: cardiology evaluation to rule out central source  neuro FU  may use antiplatelet agents / AC if indicated by neuro provided platelet count is >50k

## 2020-07-31 ENCOUNTER — ANESTHESIA (OUTPATIENT)
Dept: SURGERY | Facility: HOSPITAL | Age: 50
DRG: 025 | End: 2020-07-31
Payer: COMMERCIAL

## 2020-07-31 PROBLEM — D32.0 CALCIFIED CEREBRAL MENINGIOMA: Chronic | Status: ACTIVE | Noted: 2020-07-31

## 2020-07-31 PROBLEM — E08.65 DIABETES MELLITUS DUE TO UNDERLYING CONDITION WITH HYPERGLYCEMIA, WITHOUT LONG-TERM CURRENT USE OF INSULIN: Status: ACTIVE | Noted: 2020-07-31

## 2020-07-31 PROBLEM — E87.6 HYPOKALEMIA: Status: ACTIVE | Noted: 2020-07-31

## 2020-07-31 LAB
ALBUMIN SERPL BCP-MCNC: 3.1 G/DL (ref 3.5–5.2)
ANION GAP SERPL CALC-SCNC: 13 MMOL/L (ref 8–16)
BASOPHILS # BLD AUTO: 0.01 K/UL (ref 0–0.2)
BASOPHILS NFR BLD: 0.1 % (ref 0–1.9)
BUN SERPL-MCNC: 31 MG/DL (ref 6–20)
CALCIUM SERPL-MCNC: 7.9 MG/DL (ref 8.7–10.5)
CHLORIDE SERPL-SCNC: 105 MMOL/L (ref 95–110)
CO2 SERPL-SCNC: 19 MMOL/L (ref 23–29)
CREAT SERPL-MCNC: 1.7 MG/DL (ref 0.5–1.4)
DIFFERENTIAL METHOD: ABNORMAL
EOSINOPHIL # BLD AUTO: 0 K/UL (ref 0–0.5)
EOSINOPHIL NFR BLD: 0 % (ref 0–8)
ERYTHROCYTE [DISTWIDTH] IN BLOOD BY AUTOMATED COUNT: 15.9 % (ref 11.5–14.5)
EST. GFR  (AFRICAN AMERICAN): 40 ML/MIN/1.73 M^2
EST. GFR  (NON AFRICAN AMERICAN): 34 ML/MIN/1.73 M^2
GLUCOSE SERPL-MCNC: 128 MG/DL (ref 70–110)
HCT VFR BLD AUTO: 28.2 % (ref 37–48.5)
HGB BLD-MCNC: 8.5 G/DL (ref 12–16)
IMM GRANULOCYTES # BLD AUTO: 0.13 K/UL (ref 0–0.04)
IMM GRANULOCYTES NFR BLD AUTO: 1 % (ref 0–0.5)
LYMPHOCYTES # BLD AUTO: 1.9 K/UL (ref 1–4.8)
LYMPHOCYTES NFR BLD: 14.5 % (ref 18–48)
MCH RBC QN AUTO: 24.8 PG (ref 27–31)
MCHC RBC AUTO-ENTMCNC: 30.1 G/DL (ref 32–36)
MCV RBC AUTO: 82 FL (ref 82–98)
MONOCYTES # BLD AUTO: 0.7 K/UL (ref 0.3–1)
MONOCYTES NFR BLD: 5.5 % (ref 4–15)
NEUTROPHILS # BLD AUTO: 10.5 K/UL (ref 1.8–7.7)
NEUTROPHILS NFR BLD: 78.9 % (ref 38–73)
NRBC BLD-RTO: 0 /100 WBC
PHOSPHATE SERPL-MCNC: 3.9 MG/DL (ref 2.7–4.5)
PLATELET # BLD AUTO: 275 K/UL (ref 150–350)
PMV BLD AUTO: 11.3 FL (ref 9.2–12.9)
POCT GLUCOSE: 144 MG/DL (ref 70–110)
POCT GLUCOSE: 157 MG/DL (ref 70–110)
POCT GLUCOSE: 176 MG/DL (ref 70–110)
POTASSIUM SERPL-SCNC: 3.4 MMOL/L (ref 3.5–5.1)
RBC # BLD AUTO: 3.43 M/UL (ref 4–5.4)
SODIUM SERPL-SCNC: 137 MMOL/L (ref 136–145)
WBC # BLD AUTO: 13.34 K/UL (ref 3.9–12.7)

## 2020-07-31 PROCEDURE — 63600175 PHARM REV CODE 636 W HCPCS: Performed by: INTERNAL MEDICINE

## 2020-07-31 PROCEDURE — 88307 TISSUE EXAM BY PATHOLOGIST: CPT | Mod: 26,,, | Performed by: PATHOLOGY

## 2020-07-31 PROCEDURE — 25000003 PHARM REV CODE 250: Performed by: NEUROLOGICAL SURGERY

## 2020-07-31 PROCEDURE — 37000009 HC ANESTHESIA EA ADD 15 MINS: Performed by: NEUROLOGICAL SURGERY

## 2020-07-31 PROCEDURE — 63600175 PHARM REV CODE 636 W HCPCS: Performed by: NURSE ANESTHETIST, CERTIFIED REGISTERED

## 2020-07-31 PROCEDURE — 63600175 PHARM REV CODE 636 W HCPCS: Performed by: NEUROLOGICAL SURGERY

## 2020-07-31 PROCEDURE — 88342 IMHCHEM/IMCYTCHM 1ST ANTB: CPT | Performed by: PATHOLOGY

## 2020-07-31 PROCEDURE — 88307 TISSUE EXAM BY PATHOLOGIST: CPT | Performed by: PATHOLOGY

## 2020-07-31 PROCEDURE — 88331 PR  PATH CONSULT IN SURG,W FRZ SEC: ICD-10-PCS | Mod: 26,,, | Performed by: PATHOLOGY

## 2020-07-31 PROCEDURE — 25000003 PHARM REV CODE 250: Performed by: NURSE PRACTITIONER

## 2020-07-31 PROCEDURE — 61781 PR STEREOTACTIC COMP ASSIST PROC,CRANIAL,INTRADURAL: ICD-10-PCS | Mod: ,,, | Performed by: NEUROLOGICAL SURGERY

## 2020-07-31 PROCEDURE — 99024 POSTOP FOLLOW-UP VISIT: CPT | Mod: ,,, | Performed by: NEUROLOGICAL SURGERY

## 2020-07-31 PROCEDURE — 27201423 OPTIME MED/SURG SUP & DEVICES STERILE SUPPLY: Performed by: NEUROLOGICAL SURGERY

## 2020-07-31 PROCEDURE — 99024 PR POST-OP FOLLOW-UP VISIT: ICD-10-PCS | Mod: ,,, | Performed by: NEUROLOGICAL SURGERY

## 2020-07-31 PROCEDURE — 36000712 HC OR TIME LEV V 1ST 15 MIN: Performed by: NEUROLOGICAL SURGERY

## 2020-07-31 PROCEDURE — 88331 PATH CONSLTJ SURG 1 BLK 1SPC: CPT | Performed by: PATHOLOGY

## 2020-07-31 PROCEDURE — 88331 PATH CONSLTJ SURG 1 BLK 1SPC: CPT | Mod: 26,,, | Performed by: PATHOLOGY

## 2020-07-31 PROCEDURE — 61781 SCAN PROC CRANIAL INTRA: CPT | Mod: ,,, | Performed by: NEUROLOGICAL SURGERY

## 2020-07-31 PROCEDURE — C9113 INJ PANTOPRAZOLE SODIUM, VIA: HCPCS | Performed by: NEUROLOGICAL SURGERY

## 2020-07-31 PROCEDURE — C1713 ANCHOR/SCREW BN/BN,TIS/BN: HCPCS | Performed by: NEUROLOGICAL SURGERY

## 2020-07-31 PROCEDURE — 36000713 HC OR TIME LEV V EA ADD 15 MIN: Performed by: NEUROLOGICAL SURGERY

## 2020-07-31 PROCEDURE — 61512 PR EXCIS SUPRATENT MENINGIOMA: ICD-10-PCS | Mod: ,,, | Performed by: NEUROLOGICAL SURGERY

## 2020-07-31 PROCEDURE — 37000008 HC ANESTHESIA 1ST 15 MINUTES: Performed by: NEUROLOGICAL SURGERY

## 2020-07-31 PROCEDURE — 69990 MICROSURGERY ADD-ON: CPT | Mod: 59,,, | Performed by: NEUROLOGICAL SURGERY

## 2020-07-31 PROCEDURE — 69990 PR MICROSURG TECHNIQUES,REQ OPER MICROSCOPE: ICD-10-PCS | Mod: 59,,, | Performed by: NEUROLOGICAL SURGERY

## 2020-07-31 PROCEDURE — 80069 RENAL FUNCTION PANEL: CPT

## 2020-07-31 PROCEDURE — 25000003 PHARM REV CODE 250: Performed by: NURSE ANESTHETIST, CERTIFIED REGISTERED

## 2020-07-31 PROCEDURE — 36415 COLL VENOUS BLD VENIPUNCTURE: CPT

## 2020-07-31 PROCEDURE — C1762 CONN TISS, HUMAN(INC FASCIA): HCPCS | Performed by: NEUROLOGICAL SURGERY

## 2020-07-31 PROCEDURE — 25000003 PHARM REV CODE 250: Performed by: INTERNAL MEDICINE

## 2020-07-31 PROCEDURE — 20000000 HC ICU ROOM

## 2020-07-31 PROCEDURE — 63600175 PHARM REV CODE 636 W HCPCS: Performed by: PHYSICIAN ASSISTANT

## 2020-07-31 PROCEDURE — C1729 CATH, DRAINAGE: HCPCS | Performed by: NEUROLOGICAL SURGERY

## 2020-07-31 PROCEDURE — 88341 IMHCHEM/IMCYTCHM EA ADD ANTB: CPT | Mod: 59 | Performed by: PATHOLOGY

## 2020-07-31 PROCEDURE — 88307 PR  SURG PATH,LEVEL V: ICD-10-PCS | Mod: 26,,, | Performed by: PATHOLOGY

## 2020-07-31 PROCEDURE — 99291 PR CRITICAL CARE, E/M 30-74 MINUTES: ICD-10-PCS | Mod: ,,, | Performed by: NURSE PRACTITIONER

## 2020-07-31 PROCEDURE — 85025 COMPLETE CBC W/AUTO DIFF WBC: CPT

## 2020-07-31 PROCEDURE — 61512 CRNEC TREPH EXC MNGIOMA STTL: CPT | Mod: ,,, | Performed by: NEUROLOGICAL SURGERY

## 2020-07-31 PROCEDURE — 99291 CRITICAL CARE FIRST HOUR: CPT | Mod: ,,, | Performed by: NURSE PRACTITIONER

## 2020-07-31 DEVICE — IMPLANTABLE DEVICE: Type: IMPLANTABLE DEVICE | Site: CRANIAL | Status: FUNCTIONAL

## 2020-07-31 DEVICE — DURA MATRIX ONLAY PLUS 3X3: Type: IMPLANTABLE DEVICE | Site: CRANIAL | Status: FUNCTIONAL

## 2020-07-31 RX ORDER — NICARDIPINE HYDROCHLORIDE 0.2 MG/ML
INJECTION INTRAVENOUS CONTINUOUS PRN
Status: DISCONTINUED | OUTPATIENT
Start: 2020-07-31 | End: 2020-07-31

## 2020-07-31 RX ORDER — NICARDIPINE HYDROCHLORIDE 0.2 MG/ML
2.5 INJECTION INTRAVENOUS CONTINUOUS
Status: DISCONTINUED | OUTPATIENT
Start: 2020-07-31 | End: 2020-08-02

## 2020-07-31 RX ORDER — MANNITOL 250 MG/ML
65 INJECTION, SOLUTION INTRAVENOUS ONCE
Status: DISCONTINUED | OUTPATIENT
Start: 2020-07-31 | End: 2020-07-31

## 2020-07-31 RX ORDER — POTASSIUM CHLORIDE 7.45 MG/ML
20 INJECTION INTRAVENOUS ONCE
Status: DISCONTINUED | OUTPATIENT
Start: 2020-07-31 | End: 2020-07-31

## 2020-07-31 RX ORDER — GLYCOPYRROLATE 0.2 MG/ML
INJECTION INTRAMUSCULAR; INTRAVENOUS
Status: DISCONTINUED | OUTPATIENT
Start: 2020-07-31 | End: 2020-07-31

## 2020-07-31 RX ORDER — HYDROCODONE BITARTRATE AND ACETAMINOPHEN 5; 325 MG/1; MG/1
1 TABLET ORAL EVERY 4 HOURS PRN
Status: DISCONTINUED | OUTPATIENT
Start: 2020-07-31 | End: 2020-08-06 | Stop reason: HOSPADM

## 2020-07-31 RX ORDER — PROPOFOL 10 MG/ML
VIAL (ML) INTRAVENOUS
Status: DISCONTINUED | OUTPATIENT
Start: 2020-07-31 | End: 2020-07-31

## 2020-07-31 RX ORDER — DEXAMETHASONE SODIUM PHOSPHATE 4 MG/ML
INJECTION, SOLUTION INTRA-ARTICULAR; INTRALESIONAL; INTRAMUSCULAR; INTRAVENOUS; SOFT TISSUE
Status: DISCONTINUED | OUTPATIENT
Start: 2020-07-31 | End: 2020-07-31

## 2020-07-31 RX ORDER — ROCURONIUM BROMIDE 10 MG/ML
INJECTION, SOLUTION INTRAVENOUS
Status: DISCONTINUED | OUTPATIENT
Start: 2020-07-31 | End: 2020-07-31

## 2020-07-31 RX ORDER — NEOSTIGMINE METHYLSULFATE 1 MG/ML
INJECTION, SOLUTION INTRAVENOUS
Status: DISCONTINUED | OUTPATIENT
Start: 2020-07-31 | End: 2020-07-31

## 2020-07-31 RX ORDER — LIDOCAINE HYDROCHLORIDE 20 MG/ML
INJECTION INTRAVENOUS
Status: DISCONTINUED | OUTPATIENT
Start: 2020-07-31 | End: 2020-07-31

## 2020-07-31 RX ORDER — OXYCODONE AND ACETAMINOPHEN 5; 325 MG/1; MG/1
1 TABLET ORAL
Status: DISCONTINUED | OUTPATIENT
Start: 2020-07-31 | End: 2020-08-05

## 2020-07-31 RX ORDER — PHENYLEPHRINE HYDROCHLORIDE 10 MG/ML
INJECTION INTRAVENOUS
Status: DISCONTINUED | OUTPATIENT
Start: 2020-07-31 | End: 2020-07-31

## 2020-07-31 RX ORDER — ESMOLOL HYDROCHLORIDE 10 MG/ML
INJECTION INTRAVENOUS
Status: DISCONTINUED | OUTPATIENT
Start: 2020-07-31 | End: 2020-07-31

## 2020-07-31 RX ORDER — HEPARIN SODIUM 5000 [USP'U]/ML
5000 INJECTION, SOLUTION INTRAVENOUS; SUBCUTANEOUS EVERY 8 HOURS
Status: DISCONTINUED | OUTPATIENT
Start: 2020-08-03 | End: 2020-08-01

## 2020-07-31 RX ORDER — MANNITOL 250 MG/ML
INJECTION, SOLUTION INTRAVENOUS
Status: DISCONTINUED | OUTPATIENT
Start: 2020-07-31 | End: 2020-07-31

## 2020-07-31 RX ORDER — LIDOCAINE HYDROCHLORIDE AND EPINEPHRINE 10; 10 MG/ML; UG/ML
INJECTION, SOLUTION INFILTRATION; PERINEURAL
Status: DISCONTINUED | OUTPATIENT
Start: 2020-07-31 | End: 2020-07-31 | Stop reason: HOSPADM

## 2020-07-31 RX ORDER — BACITRACIN ZINC 500 UNIT/G
OINTMENT (GRAM) TOPICAL
Status: DISCONTINUED | OUTPATIENT
Start: 2020-07-31 | End: 2020-07-31 | Stop reason: HOSPADM

## 2020-07-31 RX ORDER — MIDAZOLAM HYDROCHLORIDE 1 MG/ML
INJECTION, SOLUTION INTRAMUSCULAR; INTRAVENOUS
Status: DISCONTINUED | OUTPATIENT
Start: 2020-07-31 | End: 2020-07-31

## 2020-07-31 RX ORDER — HYDROMORPHONE HYDROCHLORIDE 2 MG/ML
0.2 INJECTION, SOLUTION INTRAMUSCULAR; INTRAVENOUS; SUBCUTANEOUS EVERY 5 MIN PRN
Status: DISCONTINUED | OUTPATIENT
Start: 2020-07-31 | End: 2020-08-05

## 2020-07-31 RX ORDER — ONDANSETRON 2 MG/ML
4 INJECTION INTRAMUSCULAR; INTRAVENOUS EVERY 6 HOURS PRN
Status: DISCONTINUED | OUTPATIENT
Start: 2020-07-31 | End: 2020-08-06 | Stop reason: HOSPADM

## 2020-07-31 RX ORDER — SODIUM CHLORIDE, SODIUM LACTATE, POTASSIUM CHLORIDE, CALCIUM CHLORIDE 600; 310; 30; 20 MG/100ML; MG/100ML; MG/100ML; MG/100ML
INJECTION, SOLUTION INTRAVENOUS CONTINUOUS PRN
Status: DISCONTINUED | OUTPATIENT
Start: 2020-07-31 | End: 2020-07-31

## 2020-07-31 RX ORDER — SODIUM CHLORIDE 9 MG/ML
INJECTION, SOLUTION INTRAVENOUS CONTINUOUS
Status: DISCONTINUED | OUTPATIENT
Start: 2020-07-31 | End: 2020-08-02

## 2020-07-31 RX ORDER — ONDANSETRON 2 MG/ML
INJECTION INTRAMUSCULAR; INTRAVENOUS
Status: DISCONTINUED | OUTPATIENT
Start: 2020-07-31 | End: 2020-07-31

## 2020-07-31 RX ORDER — ONDANSETRON 2 MG/ML
4 INJECTION INTRAMUSCULAR; INTRAVENOUS DAILY PRN
Status: DISCONTINUED | OUTPATIENT
Start: 2020-07-31 | End: 2020-08-05

## 2020-07-31 RX ORDER — ACETAMINOPHEN 325 MG/1
650 TABLET ORAL EVERY 6 HOURS PRN
Status: DISCONTINUED | OUTPATIENT
Start: 2020-07-31 | End: 2020-08-05

## 2020-07-31 RX ORDER — FENTANYL CITRATE 50 UG/ML
INJECTION, SOLUTION INTRAMUSCULAR; INTRAVENOUS
Status: DISCONTINUED | OUTPATIENT
Start: 2020-07-31 | End: 2020-07-31

## 2020-07-31 RX ORDER — NICARDIPINE HYDROCHLORIDE 0.2 MG/ML
2.5 INJECTION INTRAVENOUS CONTINUOUS
Status: DISCONTINUED | OUTPATIENT
Start: 2020-07-31 | End: 2020-07-31

## 2020-07-31 RX ORDER — DEXAMETHASONE SODIUM PHOSPHATE 4 MG/ML
4 INJECTION, SOLUTION INTRA-ARTICULAR; INTRALESIONAL; INTRAMUSCULAR; INTRAVENOUS; SOFT TISSUE EVERY 6 HOURS
Status: DISCONTINUED | OUTPATIENT
Start: 2020-07-31 | End: 2020-08-01

## 2020-07-31 RX ORDER — GENTAMICIN SULFATE 40 MG/ML
160 INJECTION, SOLUTION INTRAMUSCULAR; INTRAVENOUS ONCE
Status: DISCONTINUED | OUTPATIENT
Start: 2020-07-31 | End: 2020-07-31

## 2020-07-31 RX ORDER — SODIUM CHLORIDE 9 MG/ML
INJECTION, SOLUTION INTRAVENOUS CONTINUOUS PRN
Status: DISCONTINUED | OUTPATIENT
Start: 2020-07-31 | End: 2020-07-31

## 2020-07-31 RX ORDER — SUCCINYLCHOLINE CHLORIDE 20 MG/ML
INJECTION INTRAMUSCULAR; INTRAVENOUS
Status: DISCONTINUED | OUTPATIENT
Start: 2020-07-31 | End: 2020-07-31

## 2020-07-31 RX ORDER — HYDROCODONE BITARTRATE AND ACETAMINOPHEN 10; 325 MG/1; MG/1
1 TABLET ORAL EVERY 4 HOURS PRN
Status: DISCONTINUED | OUTPATIENT
Start: 2020-07-31 | End: 2020-08-06 | Stop reason: HOSPADM

## 2020-07-31 RX ORDER — PANTOPRAZOLE SODIUM 40 MG/10ML
40 INJECTION, POWDER, LYOPHILIZED, FOR SOLUTION INTRAVENOUS DAILY
Status: DISCONTINUED | OUTPATIENT
Start: 2020-07-31 | End: 2020-08-01

## 2020-07-31 RX ORDER — BACITRACIN 50000 [IU]/1
INJECTION, POWDER, FOR SOLUTION INTRAMUSCULAR
Status: DISCONTINUED | OUTPATIENT
Start: 2020-07-31 | End: 2020-07-31 | Stop reason: HOSPADM

## 2020-07-31 RX ADMIN — FENTANYL CITRATE 100 MCG: 50 INJECTION, SOLUTION INTRAMUSCULAR; INTRAVENOUS at 07:07

## 2020-07-31 RX ADMIN — PHENYLEPHRINE HYDROCHLORIDE 200 MCG: 10 INJECTION INTRAVENOUS at 08:07

## 2020-07-31 RX ADMIN — LEVETIRACETAM INJECTION 1500 MG: 15 INJECTION INTRAVENOUS at 07:07

## 2020-07-31 RX ADMIN — INSULIN ASPART 1 UNITS: 100 INJECTION, SOLUTION INTRAVENOUS; SUBCUTANEOUS at 10:07

## 2020-07-31 RX ADMIN — ROCURONIUM BROMIDE 45 MG: 10 INJECTION, SOLUTION INTRAVENOUS at 07:07

## 2020-07-31 RX ADMIN — SODIUM CHLORIDE: 0.9 INJECTION, SOLUTION INTRAVENOUS at 07:07

## 2020-07-31 RX ADMIN — NICARDIPINE HYDROCHLORIDE 12.5 MG/HR: 0.2 INJECTION, SOLUTION INTRAVENOUS at 10:07

## 2020-07-31 RX ADMIN — ROBINUL 0.2 MG: 0.2 INJECTION INTRAMUSCULAR; INTRAVENOUS at 09:07

## 2020-07-31 RX ADMIN — PANTOPRAZOLE SODIUM 40 MG: 40 INJECTION, POWDER, LYOPHILIZED, FOR SOLUTION INTRAVENOUS at 03:07

## 2020-07-31 RX ADMIN — NICARDIPINE HYDROCHLORIDE 8 MG/HR: 0.2 INJECTION, SOLUTION INTRAVENOUS at 04:07

## 2020-07-31 RX ADMIN — HYDROCODONE BITARTRATE AND ACETAMINOPHEN 1 TABLET: 5; 325 TABLET ORAL at 03:07

## 2020-07-31 RX ADMIN — SODIUM CHLORIDE: 0.9 INJECTION, SOLUTION INTRAVENOUS at 02:07

## 2020-07-31 RX ADMIN — HYDRALAZINE HYDROCHLORIDE 25 MG: 25 TABLET, FILM COATED ORAL at 03:07

## 2020-07-31 RX ADMIN — DEXAMETHASONE SODIUM PHOSPHATE 10 MG: 4 INJECTION, SOLUTION INTRA-ARTICULAR; INTRALESIONAL; INTRAMUSCULAR; INTRAVENOUS; SOFT TISSUE at 07:07

## 2020-07-31 RX ADMIN — PHENYLEPHRINE HYDROCHLORIDE 400 MCG: 10 INJECTION INTRAVENOUS at 10:07

## 2020-07-31 RX ADMIN — ESMOLOL HYDROCHLORIDE 10 MG: 10 INJECTION, SOLUTION INTRAVENOUS at 12:07

## 2020-07-31 RX ADMIN — LEVETIRACETAM INJECTION 1500 MG: 15 INJECTION INTRAVENOUS at 09:07

## 2020-07-31 RX ADMIN — PROPOFOL 150 MG: 10 INJECTION, EMULSION INTRAVENOUS at 07:07

## 2020-07-31 RX ADMIN — DEXAMETHASONE SODIUM PHOSPHATE 4 MG: 4 INJECTION INTRA-ARTICULAR; INTRALESIONAL; INTRAMUSCULAR; INTRAVENOUS; SOFT TISSUE at 05:07

## 2020-07-31 RX ADMIN — NICARDIPINE HYDROCHLORIDE 5 MG/HR: 0.2 INJECTION, SOLUTION INTRAVENOUS at 11:07

## 2020-07-31 RX ADMIN — ROCURONIUM BROMIDE 50 MG: 10 INJECTION, SOLUTION INTRAVENOUS at 10:07

## 2020-07-31 RX ADMIN — ROCURONIUM BROMIDE 5 MG: 10 INJECTION, SOLUTION INTRAVENOUS at 07:07

## 2020-07-31 RX ADMIN — NEOSTIGMINE METHYLSULFATE 5 MG: 1 INJECTION INTRAVENOUS at 11:07

## 2020-07-31 RX ADMIN — SODIUM CHLORIDE: 0.9 INJECTION, SOLUTION INTRAVENOUS at 10:07

## 2020-07-31 RX ADMIN — REMIFENTANIL HYDROCHLORIDE 0.05 MCG/KG/MIN: 1 INJECTION, POWDER, LYOPHILIZED, FOR SOLUTION INTRAVENOUS at 07:07

## 2020-07-31 RX ADMIN — NICARDIPINE HYDROCHLORIDE 12.5 MG/HR: 0.2 INJECTION, SOLUTION INTRAVENOUS at 07:07

## 2020-07-31 RX ADMIN — MIDAZOLAM 2 MG: 1 INJECTION INTRAMUSCULAR; INTRAVENOUS at 07:07

## 2020-07-31 RX ADMIN — ROBINUL 0.8 MG: 0.2 INJECTION INTRAMUSCULAR; INTRAVENOUS at 11:07

## 2020-07-31 RX ADMIN — Medication 100 MG: at 07:07

## 2020-07-31 RX ADMIN — ROBINUL 0.2 MG: 0.2 INJECTION INTRAMUSCULAR; INTRAVENOUS at 11:07

## 2020-07-31 RX ADMIN — HYDROCODONE BITARTRATE AND ACETAMINOPHEN 1 TABLET: 10; 325 TABLET ORAL at 10:07

## 2020-07-31 RX ADMIN — CEFTRIAXONE 1 G: 1 INJECTION, SOLUTION INTRAVENOUS at 03:07

## 2020-07-31 RX ADMIN — ROCURONIUM BROMIDE 50 MG: 10 INJECTION, SOLUTION INTRAVENOUS at 08:07

## 2020-07-31 RX ADMIN — ESMOLOL HYDROCHLORIDE 20 MG: 10 INJECTION, SOLUTION INTRAVENOUS at 11:07

## 2020-07-31 RX ADMIN — SODIUM CHLORIDE, SODIUM LACTATE, POTASSIUM CHLORIDE, AND CALCIUM CHLORIDE: .6; .31; .03; .02 INJECTION, SOLUTION INTRAVENOUS at 07:07

## 2020-07-31 RX ADMIN — ROBINUL 0.2 MG: 0.2 INJECTION INTRAMUSCULAR; INTRAVENOUS at 08:07

## 2020-07-31 RX ADMIN — SUCCINYLCHOLINE CHLORIDE 140 MG: 20 INJECTION, SOLUTION INTRAMUSCULAR; INTRAVENOUS at 07:07

## 2020-07-31 RX ADMIN — ONDANSETRON 4 MG: 2 INJECTION, SOLUTION INTRAMUSCULAR; INTRAVENOUS at 11:07

## 2020-07-31 RX ADMIN — PHENYLEPHRINE HYDROCHLORIDE 200 MCG: 10 INJECTION INTRAVENOUS at 09:07

## 2020-07-31 RX ADMIN — MANNITOL 47.5 G: 12.5 INJECTION, SOLUTION INTRAVENOUS at 08:07

## 2020-07-31 RX ADMIN — HYDROCODONE BITARTRATE AND ACETAMINOPHEN 1 TABLET: 10; 325 TABLET ORAL at 05:07

## 2020-07-31 RX ADMIN — HYDRALAZINE HYDROCHLORIDE 25 MG: 25 TABLET, FILM COATED ORAL at 10:07

## 2020-07-31 RX ADMIN — NICARDIPINE HYDROCHLORIDE 5 MG/HR: 0.2 INJECTION, SOLUTION INTRAVENOUS at 03:07

## 2020-07-31 RX ADMIN — SUGAMMADEX 200 MG: 100 INJECTION, SOLUTION INTRAVENOUS at 12:07

## 2020-07-31 NOTE — ASSESSMENT & PLAN NOTE
Patient had severe hypercalcemia upon admission.  Vitamin-D and calcium supplementations have been stopped. Parathyroid hormone level is low.  PTH related peptide is pending. Ca has declined to 7.2 today (albumin 3.1). Mild hypoglycemia, monitor for now. Will follow up on PTHrp levels.

## 2020-07-31 NOTE — NURSING
Received from OR no distress noted. Patient is arousable yet drowsy. Patient placed on monitor. Vital signs are stable. No acute distress noted. Dr. Hagen present at bedside. Bedside report received from MATTY Mendez. Orders reviewed. Monte catheter in place draining clear yellow urine. Dressing in place to craniotomy site is clean, dry and intact with MARGI drain connected. Bloody drainage noted to MARGI suction bulb.  Will assume care at this time. Please see assessment flowsheets for more details.

## 2020-07-31 NOTE — HPI
49 year old female with PMH including DM, HTN, seizures, CVA, hypothyroid, headaches  Admitted by hospital medicine team on 7/25 from OSH with neurologic symptoms including dizziness, gait issues, and CT head revealed brain mass  Evaluated by neurosurgery after transfer and decision was make to hold antiplatelets (aspirin and plavix) with plan for craniotomy once cleared    Today she underwent right frontal craniotomy with stereotactic guidance for resection of meningioma

## 2020-07-31 NOTE — ASSESSMENT & PLAN NOTE
SHARLENE on CKD stage 3 , baseline creatinine about 1.3 mg/dL, Ultrasound negative for any hydronephrosis.    Creatinine has improved to 1.6 today. Will monitor closely. Agree with IV fluids.

## 2020-07-31 NOTE — SUBJECTIVE & OBJECTIVE
Interval History:  More alert and interactive.  Left sided upper and lower extremity weakness.    Review of Systems   Constitutional: Negative for chills and fever.   HENT: Negative for congestion and sore throat.    Eyes: Negative for visual disturbance.   Respiratory: Negative for cough, shortness of breath and wheezing.    Cardiovascular: Negative for chest pain, palpitations and leg swelling.   Gastrointestinal: Negative for abdominal pain, blood in stool, constipation, diarrhea, nausea and vomiting.   Genitourinary: Negative for dysuria and hematuria.   Musculoskeletal: Negative for arthralgias, back pain and gait problem.   Skin: Negative for rash and wound.   Neurological: Negative for dizziness, weakness, light-headedness, numbness and headaches.   Hematological: Negative for adenopathy.   Psychiatric/Behavioral: Negative for confusion.     Objective:     Vital Signs (Most Recent):  Temp: 98.4 °F (36.9 °C) (07/30/20 1942)  Pulse: 65 (07/30/20 1942)  Resp: 16 (07/30/20 1942)  BP: (!) 158/68 (07/30/20 1942)  SpO2: 97 % (07/30/20 0744) Vital Signs (24h Range):  Temp:  [97.2 °F (36.2 °C)-98.4 °F (36.9 °C)] 98.4 °F (36.9 °C)  Pulse:  [65-77] 65  Resp:  [16-18] 16  SpO2:  [95 %-97 %] 97 %  BP: (142-184)/(67-83) 158/68     Weight: 129.8 kg (286 lb 2.5 oz)  Body mass index is 41.06 kg/m².    Intake/Output Summary (Last 24 hours) at 7/30/2020 2201  Last data filed at 7/30/2020 1900  Gross per 24 hour   Intake 1200 ml   Output 1600 ml   Net -400 ml      Physical Exam  Vitals signs and nursing note reviewed.   Constitutional:       General: She is not in acute distress.     Appearance: She is well-developed.   HENT:      Head: Normocephalic and atraumatic.   Eyes:      Conjunctiva/sclera: Conjunctivae normal.      Pupils: Pupils are equal, round, and reactive to light.   Neck:      Musculoskeletal: Neck supple.      Thyroid: No thyromegaly.      Vascular: No JVD.   Cardiovascular:      Rate and Rhythm: Normal rate and  regular rhythm.      Heart sounds: No murmur. No friction rub. No gallop.    Pulmonary:      Effort: Pulmonary effort is normal.      Breath sounds: Normal breath sounds. No wheezing or rales.   Abdominal:      General: Bowel sounds are normal. There is no distension.      Palpations: Abdomen is soft.      Tenderness: There is no abdominal tenderness. There is no guarding or rebound.   Musculoskeletal: Normal range of motion.         General: No deformity.   Lymphadenopathy:      Cervical: No cervical adenopathy.   Skin:     General: Skin is warm and dry.      Findings: No rash.   Neurological:      Mental Status: She is alert and oriented to person, place, and time.      Deep Tendon Reflexes: Reflexes are normal and symmetric.      Comments: Left upper extremity 4/5 strength and pronator drift.   Psychiatric:         Behavior: Behavior normal.         Thought Content: Thought content normal.         Judgment: Judgment normal.         Significant Labs: All pertinent labs within the past 24 hours have been reviewed.    Significant Imaging: I have reviewed all pertinent imaging results/findings within the past 24 hours.

## 2020-07-31 NOTE — ANESTHESIA PROCEDURE NOTES
Intubation  Performed by: Gerhard Braun CRNA  Authorized by: Nabeel Alston II, MD     Intubation:     Induction:  Intravenous    Intubated:  Postinduction    Mask Ventilation:  Easy with oral airway    Attempts:  1    Attempted By:  Staff anesthesiologist    Method of Intubation:  Direct    Blade:  Scott 2    Laryngeal View Grade: Grade I - full view of chords      Difficult Airway Encountered?: No      Complications:  None    Airway Device:  Oral endotracheal tube    Airway Device Size:  7.5    Style/Cuff Inflation:  Cuffed    Inflation Amount (mL):  5    Tube secured:  21    Secured at:  The lips    Placement Verified By:  Capnometry    Complicating Factors:  None    Findings Post-Intubation:  BS equal bilateral and atraumatic/condition of teeth unchanged

## 2020-07-31 NOTE — PT/OT/SLP PROGRESS
Occupational Therapy      Patient Name:  Cata Lang   MRN:  [id number]    Patient not seen today secondary to pt is currently in sx. Will re-assess next visit.    Inna Ornelas OT  7/31/2020   0972

## 2020-07-31 NOTE — OP NOTE
[facility] -   Neurosurgery  Operative Note    SUMMARY      Date of Procedure: 7/31/2020     Procedure: Procedure(s) (LRB):  CRANIOTOMY, WITH 3D STEREOTACTIC IMAGING GUIDANCE, REAL-TIME (Right)  EXCISION, NEOPLASM (N/A)     Surgeon(s) and Role:     * Jose Hagen MD - Primary    Assisting Surgeon: None    Pre-Operative Diagnosis: Neoplasm of brain causing mass effect on adjacent structures [D49.6]    Post-Operative Diagnosis:  Right frontal en plaque meningioma    Anesthesia: General    Technical Procedures Used:  1.  Right frontal craniotomy and resection of meningioma 2.  Intraoperative stereotactic navigation    Description of the Findings of the Procedure:  After careful informed consent, the patient was brought to the operating room and general endotracheal anesthesia was induced.  Intravenous antibiotics, anticonvulsants, corticosteroids and osmotic diuretics were administered.  The head was immobilized in a [place] head reese turned to the left approximately 45°.  The right frontotemporal scalp was shaved prepped and draped in usual sterile manner.  A curvilinear scalp incision was planned extending from the left superior temporal line crossing midline just posterior to the hairline and then recurring posteriorly and then inferiorly to and just anterior to the tragus at the level of the right zygoma.  The skin in this area was infiltrated with 1% lidocaine solution with 1 100,000 epinephrine.  10.  Blade knife was used to create the incision, and hemostasis was achieved with the use of the bipolar electrocautery and scalp clips which were placed along the wound edges.  The scalp flap was reflected anteriorly using sharp dissection with care being made to protect the underlying para cranium.  Scalp flap was reflected anteriorly using fishhooks.  The temporalis muscle was taken down from its attachments along the superior temporal line and was reflected inferiorly and held out of the way  with fishhooks.  A small cuff of the muscle along the superior temporal line was left in place to reattached the muscle during closure.  Several heidy holes were created using the Medtronic drill with a 14 mm bur attachment, beginning at the pterion, a 2nd more posteriorly over the temporal squamous a 3rd in the posterior frontal area in the 4th in the posterior area close to the midline.  The dura was stripped away from the inner table of the skull using a right angle dural dissector and a 3.  Penfield.  The Midas drill with the footed B1 attachment was then used to turn a right frontal bone flap.  The patient's anatomy had already been registered to a preoperative CT scanng the Talk[employer] stereotactic navigational computer.  This lab for intraoperative real-time navigation during the procedure.  The bone flap was passed off the field for replacement later in the case.  The dura was opened around the perimeter of the tumor and reflected anteriorly.  A good deal of tumor was adherent to the dura suggesting meningioma.  Frozen section turned positive for a spindle cell tumor suggestive of meningioma.  The tumor appeared to be invasive into the brain with adherence to the arachnoid, as well as traversing several sulcus in a manner unusual for most meningioma.  Under the operating microscope, the ultrasonic aspirated was used to rapidly debulked the tumor.  Care was taken to ensure that the tumor was extirpated as far medial as the falx cerebri, inferiorly toward the orbital roof and laterally along the frontal polar convexity.  The ventrally all tumor had been resected.  Hemostasis was achieved the use the bipolar electrocautery and this or use of Surgiflo.  Surgicel was used to line the tumor cavity and hemostasis was confirmed.  A good bit of the dura was quite friable and was involved with meningioma and was resected over the frontal lobe.  Para cranium harvested earlier during the procedure fit precisely to the  dural defect that was present and was secured in place the series of interrupted for Nurolon sutures covering the frontal low satisfactorily.  The wound was copiously irrigated this point with antibiotic containing saline.  The bone flap was returned to its anatomic position was secured in place with the mini plate and screw fixation bracket this time.  Bone dust harvested during the craniotomy portion was used to fill in the bony gaps.  The temporalis muscle was returned to its anatomic position and secured in place with a series of interrupted 3 0 Vicryl.  A 15 Spanish Alex drain was brought out from the subgaleal space through a separate stab incision in the posterior frontal scalp were was secured to the 3 0 Vicryl time placed closed sterile suction drainage.  The galea was closed with series of interrupted inverted 3 0 Vicryl.  The skin was closed with a running locking suture of 3 0 nylon.  Sterile dressings applied to the wound and the patient was removed from the [place] head reese, awakened and transferred to the surgical ICU in stable condition and tolerated the procedure well.  Sponge and needle counts were correct at the end of the case.    Significant Surgical Tasks Conducted by the Assistant(s), if Applicable:  The assistant was instrumental during the microdissection portion the procedure for hemostasis and assistance with tumor removal while the primary surgeon maintained situational control.    Complications: No    Estimated Blood Loss (EBL): * No values recorded between 7/31/2020  8:36 AM and 7/31/2020 11:55 AM *           Specimens:   Specimen (12h ago, onward)    None           Implants:   Implant Name Type Inv. Item Serial No.  Lot No. LRB No. Used Action   PINS SKULL ADULT Orient - SN/A  PINS SKULL ADULT Orient N/A [employer] [id number] Right 1 Implanted and Explanted   DURA MATRIX ONLAY PLUS 3X3 - SN/A  DURA MATRIX ONLAY PLUS 3X3 N/A [employer] SUMA. [id number] Right 1  Implanted   Allons plates     OBDULIO NEURO  Right 3 Implanted   Allons screws    OBDULIO NEURO  N/A 6 Implanted              Condition: Good    Disposition: ICU - extubated and stable.    Attestation: I was present and scrubbed for the entire procedure.

## 2020-07-31 NOTE — TRANSFER OF CARE
"Anesthesia Transfer of Care Note    Patient: Cata Lang    Procedure(s) Performed: Procedure(s) (LRB):  CRANIOTOMY, WITH 3D STEREOTACTIC IMAGING GUIDANCE, REAL-TIME (Right)  EXCISION, NEOPLASM (N/A)    Patient location: ICU    Anesthesia Type: general    Transport from OR: Transported from OR on 6-10 L/min O2 by face mask with adequate spontaneous ventilation. Continuous ECG monitoring in transport. Continuous SpO2 monitoring in transport. Continuos invasive BP monitoring in transport    Post pain: adequate analgesia    Post assessment: no apparent anesthetic complications and tolerated procedure well    Post vital signs: stable    Level of consciousness: lethargic    Nausea/Vomiting: no nausea/vomiting    Complications: none    Transfer of care protocol was followed      Last vitals:   Visit Vitals  BP (!) 158/72 (BP Location: Left arm, Patient Position: Lying)   Pulse 63   Temp 36.9 °C (98.4 °F) (Oral)   Resp 16   Ht 5' 10" (1.778 m)   Wt 129.8 kg (286 lb 2.5 oz)   LMP  (LMP Unknown)   SpO2 95%   Breastfeeding No   BMI 41.06 kg/m²     "

## 2020-07-31 NOTE — PLAN OF CARE
Fall precautions implemented. Pt remained free from falls/injuries.  IVF infusing per orders.. frequent weight shifting encouraged. Pain adequately managed. NPO status maintained after midnight. Prepped for surgery in AM. Safety precautions implemented. Chart reviewed. Will continue to monitor.

## 2020-07-31 NOTE — ANESTHESIA PREPROCEDURE EVALUATION
07/30/2020  Cata Lang is a 49 y.o., female.    Anesthesia Evaluation    I have reviewed the Patient Summary Reports.    I have reviewed the Nursing Notes.    I have reviewed the Medications.     Review of Systems  Anesthesia Hx:  No problems with previous Anesthesia    Social:  Smoker    Hematology/Oncology:         -- Anemia:   Cardiovascular:   Hypertension ECG has been reviewed.    Pulmonary:  Pulmonary Normal    Renal/:  Renal/ Normal Chronic Renal Disease     Hepatic/GI:  Hepatic/GI Normal    Neurological:   CVA Seizures Brain mass  Mental status changes   Endocrine:   Diabetes      Patient Active Problem List   Diagnosis    Hypercalcemia    SHARLENE on CKD, stage III    Essential hypertension    Brain mass    Metabolic encephalopathy     No current facility-administered medications on file prior to encounter.      Current Outpatient Medications on File Prior to Encounter   Medication Sig Dispense Refill    aspirin 325 MG tablet Take 325 mg by mouth once daily.      atorvastatin (LIPITOR) 10 MG tablet Take 10 mg by mouth once daily.       calcitRIOL (ROCALTROL) 0.25 MCG Cap 0.25 mcg once daily.       clopidogreL (PLAVIX) 75 mg tablet Take 75 mg by mouth once daily.       gabapentin (NEURONTIN) 600 MG tablet Take 600 mg by mouth once daily.       levETIRAcetam (KEPPRA) 500 MG Tab Take 1,500 mg by mouth 2 (two) times daily.      levothyroxine (SYNTHROID) 137 MCG Tab tablet       metFORMIN (GLUCOPHAGE) 500 MG tablet Take 500 mg by mouth 2 (two) times daily with meals.      zolpidem (AMBIEN) 10 mg Tab 10 mg nightly as needed.       losartan-hydrochlorothiazide 100-12.5 mg (HYZAAR) 100-12.5 mg Tab        Past Surgical History:   Procedure Laterality Date    THYROIDECTOMY      TONSILLECTOMY           Physical Exam  General:  Obesity, Well nourished    Airway/Jaw/Neck:  Airway Findings:  Mouth Opening: Small, but > 3cm Tongue: Normal  General Airway Assessment: Adult  Mallampati: III  TM Distance: 4 - 6 cm  Jaw/Neck Findings:  Neck ROM: Normal ROM  Neck Findings:  Girth Increased      Dental:  Dental Findings: In tact   Chest/Lungs:  Chest/Lungs Findings: Clear to auscultation, Normal Respiratory Rate     Heart/Vascular:  Heart Findings: Rate: Normal  Rhythm: Regular Rhythm  Sounds: Normal        Mental Status:  Mental Status Findings:  Cooperative, Alert and Oriented         Anesthesia Plan  Type of Anesthesia, risks & benefits discussed:  Anesthesia Type:  general  Patient's Preference:   Intra-op Monitoring Plan: standard ASA monitors  Intra-op Monitoring Plan Comments:   Post Op Pain Control Plan: multimodal analgesia and per primary service following discharge from PACU  Post Op Pain Control Plan Comments:   Induction:   IV  Beta Blocker:  Patient is not currently on a Beta-Blocker (No further documentation required).       Informed Consent: Patient understands risks and agrees with Anesthesia plan.  Questions answered. Anesthesia consent signed with patient.  ASA Score: 3     Day of Surgery Review of History & Physical:  There are no significant changes.          Ready For Surgery From Anesthesia Perspective.       Chemistry        Component Value Date/Time     07/30/2020 0433    K 3.5 07/30/2020 0433     07/30/2020 0433    CO2 20 (L) 07/30/2020 0433    BUN 33 (H) 07/30/2020 0433    CREATININE 1.9 (H) 07/30/2020 0433     (H) 07/30/2020 0433        Component Value Date/Time    CALCIUM 8.5 (L) 07/30/2020 0433    ALKPHOS 145 (H) 07/27/2020 0545    AST 35 07/27/2020 0545    ALT 27 07/27/2020 0545    BILITOT 0.2 07/27/2020 0545    ESTGFRAFRICA 35 (A) 07/30/2020 0433    EGFRNONAA 30 (A) 07/30/2020 0433        Lab Results   Component Value Date    WBC 13.90 (H) 07/30/2020    HGB 8.1 (L) 07/30/2020    HCT 26.9 (L) 07/30/2020    MCV 80 (L) 07/30/2020     07/30/2020     ,

## 2020-07-31 NOTE — PROGRESS NOTES
[facility] Hospital Medicine  Progress Note    Patient Name: Cata Lang  MRN: [id number]  Patient Class: IP- Inpatient   Admission Date: 7/25/2020  Length of Stay: 4 days  Attending Physician: Kingsley Heart MD  Primary Care Provider: Sabra Fregoso MD        Subjective:     Principal Problem:Brain mass        HPI:   Cata Lang is a 49 y.o. female patient with a PMHx of brain mass, HTN, DM II, hypothyroidism, seizure disorder, and chronic HA who presents to the Emergency Department for evaluation of neurological problem which onset gradually today. Pt notes she has been walking worse since December and her HA is worse on the L side and rates it a moderate to severe HA. Symptoms are worsening and moderate to severe in severity. No mitigating or exacerbating factors reported. Associated sxs include dizziness, malaise, and difficulty walking. Patient denies any fever, SOB, CP, abd pain, N/V, back pain, weakness, and all other sxs at this time. Pt notes she takes several tums daily which was recommended by her ENT. Pt transferred from [place] and accepted by Dr. Gandara (Neurosurgery). Case discussed with Dr. Gandara in ED who reviewed CT of the head from transferring facility and feels there is no need for neurosurgical intervention at present.  In ED, patient's symptoms completely resolved with NIHSS of 0.  Labs showed: creatinine of 1.9, BUN 19, calcium 14.2. Patient is on calcitriol at home, started last year with no follow-up labs done. IV fluids x 1 liter and 20 mg IV Lasix given. Hospital Medicine was contacted to admit for hypercalcemia and SHARLENE.      Overview/Hospital Course:  No notes on file    Interval History:  More alert and interactive.  Left sided upper and lower extremity weakness.    Review of Systems   Constitutional: Negative for chills and fever.   HENT: Negative for congestion and sore throat.    Eyes: Negative for visual disturbance.   Respiratory: Negative  for cough, shortness of breath and wheezing.    Cardiovascular: Negative for chest pain, palpitations and leg swelling.   Gastrointestinal: Negative for abdominal pain, blood in stool, constipation, diarrhea, nausea and vomiting.   Genitourinary: Negative for dysuria and hematuria.   Musculoskeletal: Negative for arthralgias, back pain and gait problem.   Skin: Negative for rash and wound.   Neurological: Negative for dizziness, weakness, light-headedness, numbness and headaches.   Hematological: Negative for adenopathy.   Psychiatric/Behavioral: Negative for confusion.     Objective:     Vital Signs (Most Recent):  Temp: 98.4 °F (36.9 °C) (07/30/20 1942)  Pulse: 65 (07/30/20 1942)  Resp: 16 (07/30/20 1942)  BP: (!) 158/68 (07/30/20 1942)  SpO2: 97 % (07/30/20 0744) Vital Signs (24h Range):  Temp:  [97.2 °F (36.2 °C)-98.4 °F (36.9 °C)] 98.4 °F (36.9 °C)  Pulse:  [65-77] 65  Resp:  [16-18] 16  SpO2:  [95 %-97 %] 97 %  BP: (142-184)/(67-83) 158/68     Weight: 129.8 kg (286 lb 2.5 oz)  Body mass index is 41.06 kg/m².    Intake/Output Summary (Last 24 hours) at 7/30/2020 2201  Last data filed at 7/30/2020 1900  Gross per 24 hour   Intake 1200 ml   Output 1600 ml   Net -400 ml      Physical Exam  Vitals signs and nursing note reviewed.   Constitutional:       General: She is not in acute distress.     Appearance: She is well-developed.   HENT:      Head: Normocephalic and atraumatic.   Eyes:      Conjunctiva/sclera: Conjunctivae normal.      Pupils: Pupils are equal, round, and reactive to light.   Neck:      Musculoskeletal: Neck supple.      Thyroid: No thyromegaly.      Vascular: No JVD.   Cardiovascular:      Rate and Rhythm: Normal rate and regular rhythm.      Heart sounds: No murmur. No friction rub. No gallop.    Pulmonary:      Effort: Pulmonary effort is normal.      Breath sounds: Normal breath sounds. No wheezing or rales.   Abdominal:      General: Bowel sounds are normal. There is no distension.       Palpations: Abdomen is soft.      Tenderness: There is no abdominal tenderness. There is no guarding or rebound.   Musculoskeletal: Normal range of motion.         General: No deformity.   Lymphadenopathy:      Cervical: No cervical adenopathy.   Skin:     General: Skin is warm and dry.      Findings: No rash.   Neurological:      Mental Status: She is alert and oriented to person, place, and time.      Deep Tendon Reflexes: Reflexes are normal and symmetric.      Comments: Left upper extremity 4/5 strength and pronator drift.   Psychiatric:         Behavior: Behavior normal.         Thought Content: Thought content normal.         Judgment: Judgment normal.         Significant Labs: All pertinent labs within the past 24 hours have been reviewed.    Significant Imaging: I have reviewed all pertinent imaging results/findings within the past 24 hours.      Assessment/Plan:      * Brain mass  No acute change in mass seen on CT of brain per Neurosurgery.  Holding Aspirin and Clopidogrel for possible surgery later this week.  Platelet function assay normal.    Hypercalcemia  Initial calcium of 14.2.  Patient is on calitriol at home, will discontinue.  Hold Calcium supplementation.  Continue aggressive IV hydration.  Will give additional IV Lasix if needed.  Nephrology evaluated and assisting with management.  PTH is appropriately suppressed and Vitamin D25 and Calcitriol are low.  PTHrP pending.    Metabolic encephalopathy  Multifactorial due to hypercalcemia and brain mass with vasogenic edema.    Essential hypertension  - Losartan-HCTZ on hold as above.    - IV hydralazine PRN.    SHARLENE on CKD, stage III  - Initial creatinine of 1.9 (baseline 1.3).  - IV hydration.  - Avoid nephrotoxic agents. Hold home losartan-HCTZ for now.  - Follow labs.      VTE Risk Mitigation (From admission, onward)         Ordered     Place HOLLAND hose  Until discontinued      07/30/20 7268     IP VTE HIGH RISK PATIENT  Once      07/26/20 2012      Place sequential compression device  Until discontinued      07/26/20 0552                      Kingsley Heart MD  Department of Hospital Medicine   [facility]

## 2020-07-31 NOTE — PROGRESS NOTES
Sleepy but opens eyes to voice. Squeezes hands to command bilateral, slightly weaker on left as it was pre-op. Vitals stable. Wound looks ok.

## 2020-07-31 NOTE — SUBJECTIVE & OBJECTIVE
Interval History:     7/31/20: s/p brain tumor surgery today. Creatinine has improved to 1.7. Ca at 7.9 today (albumin 3.1).    8/1/20: patient is resting comfortably, no complaints at present.         Review of patient's allergies indicates:   Allergen Reactions    Hydrochlorothiazide      hypercalcemia     Current Facility-Administered Medications   Medication Frequency    0.9%  NaCl infusion Continuous    acetaminophen tablet 650 mg Q6H PRN    atorvastatin tablet 10 mg Daily    bacitracin injection PRN    bacitracin ointment PRN    cefazolin (ANCEF) 2 gram in dextrose 5% 50 mL IVPB (premix) Once    cefTRIAXone (ROCEPHIN) 1 g/50 mL D5W IVPB Q24H    dexamethasone injection 4 mg Q12H    dextrose 50% injection 12.5 g PRN    dextrose 50% injection 25 g PRN    gabapentin capsule 600 mg Daily    gelatin adsorbable 100cm top sponge 100 sponge PRN    gentamicin injection 160 mg Once    glucagon (human recombinant) injection 1 mg PRN    glucose chewable tablet 16 g PRN    glucose chewable tablet 24 g PRN    hydrALAZINE injection 10 mg Q8H PRN    hydrALAZINE tablet 25 mg Q8H    insulin aspart U-100 pen 1-10 Units QID (AC + HS) PRN    levETIRAcetam in NaCl (iso-os) IVPB 1,500 mg Q12H    levothyroxine tablet 137 mcg Before breakfast    lidocaine-EPINEPHrine 1%-1:100,000 injection PRN    mannitol 25% injection 65 g Once    ondansetron injection 4 mg Q6H PRN    pantoprazole EC tablet 40 mg Daily    potassium chloride 10 mEq in 100 mL IVPB Once    thrombin (bovine) solution PRN     Facility-Administered Medications Ordered in Other Encounters   Medication Frequency    0.9%  NaCl infusion Continuous PRN    dexamethasone injection PRN    esmoloL injection PRN    fentaNYL injection PRN    glycopyrrolate injection PRN    lactated ringers infusion Continuous PRN    lidocaine (cardiac) injection PRN    mannitol 25% injection PRN    midazolam injection PRN    neostigmine injection PRN     niCARdipine 40 mg/200 mL (0.2 mg/mL) infusion Continuous PRN    ondansetron injection PRN    phenylephrine injection PRN    propofol (DIPRIVAN) 10 mg/mL infusion PRN    remifentaniL (ULTIVA) 2 mg in sodium chloride 0.9% 100 mL infusion Continuous PRN    rocuronium injection PRN    succinylcholine injection PRN       Objective:     Vital Signs (Most Recent):  Temp: 98.4 °F (36.9 °C) (07/31/20 0342)  Pulse: 63 (07/31/20 0342)  Resp: 16 (07/31/20 0342)  BP: (!) 158/72 (07/31/20 0342)  SpO2: 95 % (07/31/20 0342)  O2 Device (Oxygen Therapy): room air (07/30/20 2128) Vital Signs (24h Range):  Temp:  [97.2 °F (36.2 °C)-98.4 °F (36.9 °C)] 98.4 °F (36.9 °C)  Pulse:  [62-77] 63  Resp:  [16-18] 16  SpO2:  [95 %-98 %] 95 %  BP: (144-160)/(67-80) 158/72     Weight: 129.8 kg (286 lb 2.5 oz) (07/26/20 0435)  Body mass index is 41.06 kg/m².  Body surface area is 2.53 meters squared.    I/O last 3 completed shifts:  In: 1320 [P.O.:1320]  Out: 2300 [Urine:2300]    Physical Exam  Constitutional:       Appearance: She is well-developed.   HENT:      Head: Normocephalic.   Eyes:      Pupils: Pupils are equal, round, and reactive to light.   Neck:      Thyroid: No thyromegaly.   Cardiovascular:      Rate and Rhythm: Normal rate and regular rhythm.      Heart sounds: No friction rub.   Pulmonary:      Effort: Pulmonary effort is normal.      Breath sounds: Normal breath sounds. No wheezing.   Chest:      Chest wall: No tenderness.   Abdominal:      General: Bowel sounds are normal. There is no distension.      Palpations: Abdomen is soft.      Tenderness: There is no abdominal tenderness.   Lymphadenopathy:      Cervical: No cervical adenopathy.   Skin:     General: Skin is warm and dry.      Findings: No rash.   Neurological:      Mental Status: She is alert and oriented to person, place, and time.         Significant Labs:  Lab Results   Component Value Date    CREATININE 1.6 (H) 08/01/2020    BUN 30 (H) 08/01/2020      08/01/2020    K 3.8 08/01/2020     08/01/2020    CO2 19 (L) 08/01/2020     Lab Results   Component Value Date    PTH <5.0 (L) 07/26/2020    CALCIUM 7.2 (L) 08/01/2020    CAION 1.76 (H) 07/26/2020    PHOS 3.9 07/31/2020     Lab Results   Component Value Date    ALBUMIN 3.1 (L) 07/31/2020     Lab Results   Component Value Date    WBC 18.79 (H) 08/01/2020    HGB 7.4 (L) 08/01/2020    HCT 24.7 (L) 08/01/2020    MCV 82 08/01/2020     08/01/2020       Recent Labs   Lab 07/26/20  0452   MG 1.9         Significant Imaging:  Imaging Results    None

## 2020-07-31 NOTE — ANESTHESIA POSTPROCEDURE EVALUATION
Anesthesia Post Evaluation    Patient: Cata Lang    Procedure(s) Performed: Procedure(s) (LRB):  CRANIOTOMY, WITH 3D STEREOTACTIC IMAGING GUIDANCE, REAL-TIME (Right)  EXCISION, NEOPLASM (N/A)    Final Anesthesia Type: general    Patient location during evaluation: ICU  Patient participation: Yes- Able to Participate  Level of consciousness: lethargic  Post-procedure vital signs: reviewed and stable  Pain management: adequate  Airway patency: patent    PONV status at discharge: No PONV  Anesthetic complications: no      Cardiovascular status: blood pressure returned to baseline and hemodynamically stable  Respiratory status: unassisted and spontaneous ventilation  Hydration status: euvolemic  Follow-up not needed.          Vitals Value Taken Time   BP  07/31/20 1323   Temp 36.8 °C (98.2 °F) 07/31/20 1312   Pulse  07/31/20 1323   Resp  07/31/20 1323   SpO2  07/31/20 1323         No case tracking events are documented in the log.      Pain/Debbi Score: Pain Rating Prior to Med Admin: 3 (7/30/2020  4:39 PM)  Pain Rating Post Med Admin: 3 (7/30/2020  1:12 PM)

## 2020-08-01 LAB
ANION GAP SERPL CALC-SCNC: 12 MMOL/L (ref 8–16)
BASOPHILS # BLD AUTO: 0.02 K/UL (ref 0–0.2)
BASOPHILS NFR BLD: 0.1 % (ref 0–1.9)
BUN SERPL-MCNC: 30 MG/DL (ref 6–20)
CALCIUM SERPL-MCNC: 7.2 MG/DL (ref 8.7–10.5)
CHLORIDE SERPL-SCNC: 106 MMOL/L (ref 95–110)
CO2 SERPL-SCNC: 19 MMOL/L (ref 23–29)
CREAT SERPL-MCNC: 1.6 MG/DL (ref 0.5–1.4)
DIFFERENTIAL METHOD: ABNORMAL
EOSINOPHIL # BLD AUTO: 0 K/UL (ref 0–0.5)
EOSINOPHIL NFR BLD: 0 % (ref 0–8)
ERYTHROCYTE [DISTWIDTH] IN BLOOD BY AUTOMATED COUNT: 16.1 % (ref 11.5–14.5)
EST. GFR  (AFRICAN AMERICAN): 43 ML/MIN/1.73 M^2
EST. GFR  (NON AFRICAN AMERICAN): 37 ML/MIN/1.73 M^2
FERRITIN SERPL-MCNC: 10 NG/ML (ref 20–300)
GLUCOSE SERPL-MCNC: 152 MG/DL (ref 70–110)
HCT VFR BLD AUTO: 24.7 % (ref 37–48.5)
HGB BLD-MCNC: 7.4 G/DL (ref 12–16)
IMM GRANULOCYTES # BLD AUTO: 0.31 K/UL (ref 0–0.04)
IMM GRANULOCYTES NFR BLD AUTO: 1.6 % (ref 0–0.5)
IRON SERPL-MCNC: 28 UG/DL (ref 30–160)
LYMPHOCYTES # BLD AUTO: 1.7 K/UL (ref 1–4.8)
LYMPHOCYTES NFR BLD: 9 % (ref 18–48)
MCH RBC QN AUTO: 24.5 PG (ref 27–31)
MCHC RBC AUTO-ENTMCNC: 30 G/DL (ref 32–36)
MCV RBC AUTO: 82 FL (ref 82–98)
MONOCYTES # BLD AUTO: 0.8 K/UL (ref 0.3–1)
MONOCYTES NFR BLD: 4.3 % (ref 4–15)
NEUTROPHILS # BLD AUTO: 16 K/UL (ref 1.8–7.7)
NEUTROPHILS NFR BLD: 85 % (ref 38–73)
NRBC BLD-RTO: 0 /100 WBC
PLATELET # BLD AUTO: 283 K/UL (ref 150–350)
PMV BLD AUTO: 11.4 FL (ref 9.2–12.9)
POCT GLUCOSE: 159 MG/DL (ref 70–110)
POCT GLUCOSE: 165 MG/DL (ref 70–110)
POCT GLUCOSE: 171 MG/DL (ref 70–110)
POCT GLUCOSE: 188 MG/DL (ref 70–110)
POTASSIUM SERPL-SCNC: 3.8 MMOL/L (ref 3.5–5.1)
RBC # BLD AUTO: 3.02 M/UL (ref 4–5.4)
SATURATED IRON: 8 % (ref 20–50)
SODIUM SERPL-SCNC: 137 MMOL/L (ref 136–145)
TOTAL IRON BINDING CAPACITY: 370 UG/DL (ref 250–450)
TRANSFERRIN SERPL-MCNC: 250 MG/DL (ref 200–375)
WBC # BLD AUTO: 18.79 K/UL (ref 3.9–12.7)

## 2020-08-01 PROCEDURE — 25000003 PHARM REV CODE 250: Performed by: PHYSICIAN ASSISTANT

## 2020-08-01 PROCEDURE — 11000001 HC ACUTE MED/SURG PRIVATE ROOM

## 2020-08-01 PROCEDURE — 80048 BASIC METABOLIC PNL TOTAL CA: CPT

## 2020-08-01 PROCEDURE — 84165 PATHOLOGIST INTERPRETATION SPE: ICD-10-PCS | Mod: 26,,, | Performed by: PATHOLOGY

## 2020-08-01 PROCEDURE — 99291 CRITICAL CARE FIRST HOUR: CPT | Mod: ,,, | Performed by: INTERNAL MEDICINE

## 2020-08-01 PROCEDURE — 25000003 PHARM REV CODE 250: Performed by: INTERNAL MEDICINE

## 2020-08-01 PROCEDURE — 36415 COLL VENOUS BLD VENIPUNCTURE: CPT

## 2020-08-01 PROCEDURE — 97163 PT EVAL HIGH COMPLEX 45 MIN: CPT

## 2020-08-01 PROCEDURE — 63600175 PHARM REV CODE 636 W HCPCS: Performed by: NEUROLOGICAL SURGERY

## 2020-08-01 PROCEDURE — 85025 COMPLETE CBC W/AUTO DIFF WBC: CPT

## 2020-08-01 PROCEDURE — 63600175 PHARM REV CODE 636 W HCPCS: Performed by: PHYSICIAN ASSISTANT

## 2020-08-01 PROCEDURE — 99291 PR CRITICAL CARE, E/M 30-74 MINUTES: ICD-10-PCS | Mod: ,,, | Performed by: INTERNAL MEDICINE

## 2020-08-01 PROCEDURE — 99233 PR SUBSEQUENT HOSPITAL CARE,LEVL III: ICD-10-PCS | Mod: ,,, | Performed by: NURSE PRACTITIONER

## 2020-08-01 PROCEDURE — 99255 PR INITIAL INPATIENT CONSULT,LEVL V: ICD-10-PCS | Mod: ,,, | Performed by: INTERNAL MEDICINE

## 2020-08-01 PROCEDURE — 84165 PROTEIN E-PHORESIS SERUM: CPT | Mod: 26,,, | Performed by: PATHOLOGY

## 2020-08-01 PROCEDURE — 99233 SBSQ HOSP IP/OBS HIGH 50: CPT | Mod: ,,, | Performed by: NURSE PRACTITIONER

## 2020-08-01 PROCEDURE — 82728 ASSAY OF FERRITIN: CPT

## 2020-08-01 PROCEDURE — 97167 OT EVAL HIGH COMPLEX 60 MIN: CPT

## 2020-08-01 PROCEDURE — 97530 THERAPEUTIC ACTIVITIES: CPT | Mod: CQ

## 2020-08-01 PROCEDURE — 86334 IMMUNOFIX E-PHORESIS SERUM: CPT

## 2020-08-01 PROCEDURE — 25000003 PHARM REV CODE 250: Performed by: NEUROLOGICAL SURGERY

## 2020-08-01 PROCEDURE — 99255 IP/OBS CONSLTJ NEW/EST HI 80: CPT | Mod: ,,, | Performed by: INTERNAL MEDICINE

## 2020-08-01 PROCEDURE — 86334 IMMUNOFIX E-PHORESIS SERUM: CPT | Mod: 26,,, | Performed by: PATHOLOGY

## 2020-08-01 PROCEDURE — 25000003 PHARM REV CODE 250: Performed by: NURSE PRACTITIONER

## 2020-08-01 PROCEDURE — 84165 PROTEIN E-PHORESIS SERUM: CPT

## 2020-08-01 PROCEDURE — 83540 ASSAY OF IRON: CPT

## 2020-08-01 PROCEDURE — 63600175 PHARM REV CODE 636 W HCPCS: Performed by: INTERNAL MEDICINE

## 2020-08-01 PROCEDURE — C9113 INJ PANTOPRAZOLE SODIUM, VIA: HCPCS | Performed by: NEUROLOGICAL SURGERY

## 2020-08-01 PROCEDURE — 97530 THERAPEUTIC ACTIVITIES: CPT

## 2020-08-01 PROCEDURE — 86334 PATHOLOGIST INTERPRETATION IFE: ICD-10-PCS | Mod: 26,,, | Performed by: PATHOLOGY

## 2020-08-01 RX ORDER — DEXAMETHASONE 4 MG/1
4 TABLET ORAL EVERY 8 HOURS
Status: DISCONTINUED | OUTPATIENT
Start: 2020-08-01 | End: 2020-08-01

## 2020-08-01 RX ORDER — PANTOPRAZOLE SODIUM 40 MG/1
40 TABLET, DELAYED RELEASE ORAL DAILY
Status: DISCONTINUED | OUTPATIENT
Start: 2020-08-01 | End: 2020-08-06 | Stop reason: HOSPADM

## 2020-08-01 RX ORDER — MORPHINE SULFATE 2 MG/ML
2 INJECTION, SOLUTION INTRAMUSCULAR; INTRAVENOUS
Status: DISCONTINUED | OUTPATIENT
Start: 2020-08-01 | End: 2020-08-05

## 2020-08-01 RX ORDER — LEVETIRACETAM 500 MG/1
1500 TABLET ORAL 2 TIMES DAILY
Status: DISCONTINUED | OUTPATIENT
Start: 2020-08-01 | End: 2020-08-06 | Stop reason: HOSPADM

## 2020-08-01 RX ORDER — DEXAMETHASONE 4 MG/1
4 TABLET ORAL EVERY 12 HOURS
Status: DISCONTINUED | OUTPATIENT
Start: 2020-08-01 | End: 2020-08-02

## 2020-08-01 RX ADMIN — DEXAMETHASONE SODIUM PHOSPHATE 4 MG: 4 INJECTION INTRA-ARTICULAR; INTRALESIONAL; INTRAMUSCULAR; INTRAVENOUS; SOFT TISSUE at 12:08

## 2020-08-01 RX ADMIN — INSULIN ASPART 2 UNITS: 100 INJECTION, SOLUTION INTRAVENOUS; SUBCUTANEOUS at 06:08

## 2020-08-01 RX ADMIN — DEXAMETHASONE SODIUM PHOSPHATE 4 MG: 4 INJECTION INTRA-ARTICULAR; INTRALESIONAL; INTRAMUSCULAR; INTRAVENOUS; SOFT TISSUE at 06:08

## 2020-08-01 RX ADMIN — LEVETIRACETAM 1500 MG: 500 TABLET ORAL at 09:08

## 2020-08-01 RX ADMIN — SODIUM CHLORIDE: 0.9 INJECTION, SOLUTION INTRAVENOUS at 06:08

## 2020-08-01 RX ADMIN — HYDROCODONE BITARTRATE AND ACETAMINOPHEN 1 TABLET: 10; 325 TABLET ORAL at 07:08

## 2020-08-01 RX ADMIN — HYDRALAZINE HYDROCHLORIDE 25 MG: 25 TABLET, FILM COATED ORAL at 09:08

## 2020-08-01 RX ADMIN — HYDRALAZINE HYDROCHLORIDE 25 MG: 25 TABLET, FILM COATED ORAL at 02:08

## 2020-08-01 RX ADMIN — NICARDIPINE HYDROCHLORIDE 6.5 MG/HR: 0.2 INJECTION, SOLUTION INTRAVENOUS at 02:08

## 2020-08-01 RX ADMIN — LEVOTHYROXINE SODIUM 137 MCG: 25 TABLET ORAL at 06:08

## 2020-08-01 RX ADMIN — HYDRALAZINE HYDROCHLORIDE 25 MG: 25 TABLET, FILM COATED ORAL at 06:08

## 2020-08-01 RX ADMIN — GABAPENTIN 600 MG: 300 CAPSULE ORAL at 09:08

## 2020-08-01 RX ADMIN — HYDROCODONE BITARTRATE AND ACETAMINOPHEN 1 TABLET: 10; 325 TABLET ORAL at 02:08

## 2020-08-01 RX ADMIN — MORPHINE SULFATE 2 MG: 2 INJECTION, SOLUTION INTRAMUSCULAR; INTRAVENOUS at 12:08

## 2020-08-01 RX ADMIN — DEXAMETHASONE 4 MG: 4 TABLET ORAL at 09:08

## 2020-08-01 RX ADMIN — HYPROMELLOSE 2910 1 DROP: 5 SOLUTION OPHTHALMIC at 10:08

## 2020-08-01 RX ADMIN — CEFTRIAXONE 1 G: 1 INJECTION, SOLUTION INTRAVENOUS at 04:08

## 2020-08-01 RX ADMIN — DEXAMETHASONE 4 MG: 4 TABLET ORAL at 11:08

## 2020-08-01 RX ADMIN — INSULIN ASPART 2 UNITS: 100 INJECTION, SOLUTION INTRAVENOUS; SUBCUTANEOUS at 05:08

## 2020-08-01 RX ADMIN — ATORVASTATIN CALCIUM 10 MG: 10 TABLET, FILM COATED ORAL at 09:08

## 2020-08-01 RX ADMIN — PANTOPRAZOLE SODIUM 40 MG: 40 INJECTION, POWDER, LYOPHILIZED, FOR SOLUTION INTRAVENOUS at 09:08

## 2020-08-01 RX ADMIN — ACETAMINOPHEN 650 MG: 325 TABLET ORAL at 10:08

## 2020-08-01 NOTE — PT/OT/SLP EVAL
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 female with a medical diagnosis of Calcified cerebral meningioma.  She presents with SELF CARE DEBILITY. Performance deficits affecting function: weakness, impaired endurance, impaired self care skills, impaired functional mobilty, gait instability, impaired balance, decreased upper extremity function, decreased lower extremity function.      Rehab Prognosis: Good; patient would benefit from acute skilled OT services to address these deficits and reach maximum level of function.       Plan:     Patient to be seen 3 x/week to address the above listed problems via self-care/home management, therapeutic activities, therapeutic exercises  · Plan of Care Expires: 08/08/20  · Plan of Care Reviewed with: patient, spouse    Subjective     Chief Complaint: DECREASED STANDING BALANCE & FALLS IN PASTS.  Patient/Family Comments/goals: TO INCREASE SAFETY AND (I) WITH SELF CARE.    Occupational Profile:  Living Environment: PATIENT LIVES WITH  AND 16 Y.O. IN 1-STORY HOUSE WITH A 2-INCH HIGH STEP AT ENTRANCE.  Previous level of function: PATIENT STATED SHE COULD PERFORM DRESSING AND BATHING WITHOUT (A).  Roles and Routines: PATIENT STATED FAMILY HELPS HER WITH IADL'S.  Equipment Used at Home:  power chair, cane, straight, rollator  Assistance upon Discharge: FAMILY    Pain/Comfort:  · Pain Rating 1: 0/10    Patients cultural, spiritual, Yarsani conflicts given the current situation:      Objective:     Communicated with: NURSE prior to session.  Patient found up in chair with central line, blood pressure  cuff, duval catheter, SCD, pulse ox (continuous), peripheral IV, telemetry upon OT entry to room.    General Precautions: Standard, fall   Orthopedic Precautions:N/A   Braces:       Occupational Performance:    Bed Mobility:    · NT    Functional Mobility/Transfers:  · Patient completed Sit <> Stand Transfer with maximal assistance  with  rolling walker   · Functional Mobility: NT    Activities of Daily Living:  · Upper Body Dressing: MAX (A)      · Lower Body Dressing: dependence        Cognitive/Visual Perceptual:  APPEARS INTACT    Physical Exam:  Balance:    -       MOD (A) FOR STANDING BALANCE WITH RW  Upper Extremity Range of Motion:     -       Right Upper Extremity: LIMITED DUE TO MULT ICU LINES  -       Left Upper Extremity: LIMITED DUE TO MULT ICU LINES    AMPAC 6 Click ADL:  AMPAC Total Score: 11    Treatment & Education:  OT EVAL COMPLETED. PATIENT AND  EDUCATED RE: PURPOSE OF OT.  PATIENT PERFORMED FEEDING, SIT <> STAND WITH RW AND STANDING BALANCE.  Education:    Patient left up in chair with all lines intact, call button in reach and  present    GOALS:   Multidisciplinary Problems     Occupational Therapy Goals        Problem: Occupational Therapy Goal    Goal Priority Disciplines Outcome Interventions   Occupational Therapy Goal     OT, PT/OT     Description: OT goals to be met by 8-5-20  Min a with ue dressing  Min a with le dressing  Pt will tolerate 1 set x 10 reps b ue rom exercise   Mod a with bsc t/f's with ae.                     History:     Past Medical History:   Diagnosis Date    Diabetes mellitus     Hypertension     Seizures     Stroke     Thyroid disease        Past Surgical History:   Procedure Laterality Date    THYROIDECTOMY      TONSILLECTOMY         Time Tracking:     OT Date of Treatment: 08/01/20  OT Start Time: 0830  OT Stop Time: 0900  OT Total Time (min): 30 min    Billable Minutes:Evaluation 10  Self Care/Home Management 20    Vanessa Hodge  OT  8/1/2020

## 2020-08-01 NOTE — PLAN OF CARE
Cardene titrated down throughout the night. Pt AAOx4. Left sided weakness but able to lift both limbs off bed with drift. NSR. 2L NC. Afebrile. UO adequate. Tolerating diet. CBGs elevated and treated with SSI. Generalized pain improved with morphine PRN. Scalp incision CDI. MARGI drain with 125 cc sanguinous fluid overnight. Pt turned Q2H with pressure points protected. POC reviewed. All questions and concerns addressed.

## 2020-08-01 NOTE — PROGRESS NOTES
[facility] -   Critical Care Medicine  Consult Note    Patient Name: Cata Lang  MRN: [id number]  Admission Date: 7/25/2020  Hospital Length of Stay: 6 days  Code Status: Full Code  Attending Physician: Abdoul Barragan MD   Primary Care Provider: Sabra Fregoso MD   Principal Problem: Calcified cerebral meningioma      Subjective:     HPI:  49 year old female with PMH including DM, HTN, seizures, CVA, hypothyroid, headaches  Admitted by hospital medicine team on 7/25 from OSH with neurologic symptoms including dizziness, gait issues, and CT head revealed brain mass  Evaluated by neurosurgery after transfer and decision was make to hold antiplatelets (aspirin and plavix) with plan for craniotomy once cleared    Today she underwent right frontal craniotomy with stereotactic guidance for resection of meningioma    Hospital/ICU Course:  Transferred to ICU from OR with , arterial line in place on cardene infusion for BP control; seen and evaluated multiple times this afternoon  8/1 - up in chair this morning; cardene infusion off since 5am; worked with PT/OT and tolerating po intake; hyperglycemic overnight    Review of Systems   Constitutional: Positive for malaise/fatigue.   Respiratory: Negative for cough and shortness of breath.    Cardiovascular: Negative for chest pain.   Gastrointestinal: Negative.    Genitourinary: Negative.    Musculoskeletal: Negative.    Neurological: Positive for weakness. Negative for tremors, speech change, focal weakness, seizures and headaches.   Psychiatric/Behavioral: The patient is not nervous/anxious.          Objective:     Vital Signs (Most Recent):  Temp: 98.2 °F (36.8 °C) (08/01/20 0705)  Pulse: 61 (08/01/20 1105)  Resp: 13 (08/01/20 1105)  BP: (!) 115/47 (08/01/20 1105)  SpO2: 96 % (08/01/20 1105) Vital Signs (24h Range):  Temp:  [97.9 °F (36.6 °C)-98.5 °F (36.9 °C)] 98.2 °F (36.8 °C)  Pulse:  [61-95] 61  Resp:  [10-25] 13  SpO2:  [90 %-99 %] 96 %  BP:  (115-163)/(40-78) 115/47  Arterial Line BP: (114-160)/(52-65) 140/52     Weight: 129.8 kg (286 lb 2.5 oz)  Body mass index is 41.06 kg/m².      Intake/Output Summary (Last 24 hours) at 8/1/2020 1207  Last data filed at 8/1/2020 0930  Gross per 24 hour   Intake 4357.65 ml   Output 3715 ml   Net 642.65 ml       Physical Exam  Vitals signs and nursing note reviewed.   Constitutional:       Appearance: She is obese.   HENT:      Head: Atraumatic.      Mouth/Throat:      Mouth: Mucous membranes are moist.   Eyes:      Extraocular Movements: Extraocular movements intact.      Conjunctiva/sclera: Conjunctivae normal.      Pupils: Pupils are equal, round, and reactive to light.   Neck:      Vascular: No JVD.   Cardiovascular:      Rate and Rhythm: Normal rate and regular rhythm.      Pulses:           Radial pulses are 2+ on the right side and 2+ on the left side.        Dorsalis pedis pulses are 2+ on the right side and 2+ on the left side.   Pulmonary:      Effort: Pulmonary effort is normal.      Breath sounds: Normal breath sounds. No wheezing, rhonchi or rales.   Skin:     General: Skin is warm and dry.      Capillary Refill: Capillary refill takes less than 2 seconds.      Nails: There is no clubbing.            Neurological:      Mental Status: She is oriented to person, place, and time.      GCS: GCS eye subscore is 4. GCS verbal subscore is 5. GCS motor subscore is 6.      Comments: Slow but appropriate responses         Vents:       Lines/Drains/Airways     Peripheral Intravenous Line                 Peripheral IV - Single Lumen 18 G Right Wrist -- days                Significant Labs:    CBC/Anemia Profile:  Recent Labs   Lab 07/31/20  0425 08/01/20  0342   WBC 13.34* 18.79*   HGB 8.5* 7.4*   HCT 28.2* 24.7*    283   MCV 82 82   RDW 15.9* 16.1*        Chemistries:  Recent Labs   Lab 07/31/20  0425 08/01/20  0342    137   K 3.4* 3.8    106   CO2 19* 19*   BUN 31* 30*   CREATININE 1.7* 1.6*    CALCIUM 7.9* 7.2*   ALBUMIN 3.1*  --    PHOS 3.9  --        All pertinent labs within the past 24 hours have been reviewed.    Significant Imaging:  I have reviewed all pertinent imaging results/findings within the past 24 hours.        Assessment/Plan:     Renal/  SHARLENE on CKD, stage III  IV hydration  Avoid nephrotoxins  Monitor I/O and creatinine trend    Oncology  * Calcified cerebral meningioma  POD1 craniotomy for resection  Neurosurgery following  PT/OT for rehab  Avoid anticoagulation for at least 72 hours    Endocrine  Diabetes mellitus due to underlying condition with hyperglycemia, without long-term current use of insulin  Escalate SSI prn with monitoring for glucose control and prevention of insulin toxicity      Critical Care Daily Checklist:    A: Awake: RASS Goal/Actual Goal:    Actual: [place] Agitation Sedation Scale (RASS): Alert and calm   B: Spontaneous Breathing Trial Performed?     C: SAT & SBT Coordinated?  n/a                      D: Delirium: CAM-ICU Overall CAM-ICU: Negative   E: Early Mobility Performed? Yes   F: Feeding Goal:    Status:     Current Diet Order   Procedures    Diet consistent carbohydrate      AS: Analgesia/Sedation Prn analgesia   T: Thromboembolic Prophylaxis SCD   H: HOB > 300 Yes   U: Stress Ulcer Prophylaxis (if needed) PPI   G: Glucose Control SSI   B: Bowel Function Stool Occurrence: 1   I: Indwelling Catheter (Lines & Monte) Necessity reviewed   D: De-escalation of Antimicrobials/Pharmacotherapies reviwed    Plan for the day/ETD Transfer out of ICU    Code Status:  Family/Goals of Care: Full Code  Home v rehab on discharge pending hospital course   I have discussed case and plan of care in detail with Dr Duran; Status and plan of care were discussed with team on multidisciplinary rounds.  Agree with transfer out of ICU; we will sign off; please call if we can be of any additional assistance..      Linda Harmon, STEVO-BC  Critical Care Medicine  [facility]

## 2020-08-01 NOTE — NURSING
No acute changes in status noted this shift. Currently resting quietly in bed with safety and fall prevention measures in place. Safety and fall prevention measures are in place at this time. Dressing to craniotomy site remained clean,dry, and intact with MARGI drain. Vital signs are stable. Cardene gtt infusing at 12.5 mg/ No changes in neurological status. Denies needs at this time. Encouraged to call should needs arise. Plan of care discussed with patient and spouse. Verbalized understanding. Will continue to monitor for needs/changes.

## 2020-08-01 NOTE — PROGRESS NOTES
[facility] Hospital Medicine  Progress Note    Patient Name: Cata Lang  MRN: [id number]  Patient Class: IP- Inpatient   Admission Date: 7/25/2020  Length of Stay: 5 days  Attending Physician: Kingsley Heart MD  Primary Care Provider: Sabra Fregoso MD        Subjective:     Principal Problem:Calcified cerebral meningioma        HPI:   Cata Lang is a 49 y.o. female patient with a PMHx of brain mass, HTN, DM II, hypothyroidism, seizure disorder, and chronic HA who presents to the Emergency Department for evaluation of neurological problem which onset gradually today. Pt notes she has been walking worse since December and her HA is worse on the L side and rates it a moderate to severe HA. Symptoms are worsening and moderate to severe in severity. No mitigating or exacerbating factors reported. Associated sxs include dizziness, malaise, and difficulty walking. Patient denies any fever, SOB, CP, abd pain, N/V, back pain, weakness, and all other sxs at this time. Pt notes she takes several tums daily which was recommended by her ENT. Pt transferred from [place] and accepted by Dr. Gandara (Neurosurgery). Case discussed with Dr. Gandara in ED who reviewed CT of the head from transferring facility and feels there is no need for neurosurgical intervention at present.  In ED, patient's symptoms completely resolved with NIHSS of 0.  Labs showed: creatinine of 1.9, BUN 19, calcium 14.2. Patient is on calcitriol at home, started last year with no follow-up labs done. IV fluids x 1 liter and 20 mg IV Lasix given. Hospital Medicine was contacted to admit for hypercalcemia and SHARLENE.    Overview/Hospital Course:  Admitted for evaluation and treatment of hypercalcemia and brain mass with left sided weakness.    Interval History:  Somnolent but easily awakens.  Stable post procedure.  Left sided upper and lower extremity weakness.    Review of Systems   Constitutional: Negative for chills and  fever.   HENT: Negative for congestion and sore throat.    Eyes: Negative for visual disturbance.   Respiratory: Negative for cough, shortness of breath and wheezing.    Cardiovascular: Negative for chest pain, palpitations and leg swelling.   Gastrointestinal: Negative for abdominal pain, blood in stool, constipation, diarrhea, nausea and vomiting.   Genitourinary: Negative for dysuria and hematuria.   Musculoskeletal: Negative for arthralgias, back pain and gait problem.   Skin: Negative for rash and wound.   Neurological: Negative for dizziness, weakness, light-headedness, numbness and headaches.   Hematological: Negative for adenopathy.   Psychiatric/Behavioral: Negative for confusion.     Objective:     Vital Signs (Most Recent):  Temp: 97.9 °F (36.6 °C) (07/31/20 1905)  Pulse: 69 (07/31/20 2105)  Resp: 17 (07/31/20 2105)  BP: (!) 134/40 (07/31/20 2105)  SpO2: (!) 94 % (07/31/20 2105) Vital Signs (24h Range):  Temp:  [97.8 °F (36.6 °C)-98.4 °F (36.9 °C)] 97.9 °F (36.6 °C)  Pulse:  [62-95] 69  Resp:  [12-25] 17  SpO2:  [90 %-97 %] 94 %  BP: (134-161)/(40-80) 134/40  Arterial Line BP: (140-160)/(52-62) 151/57     Weight: 129.8 kg (286 lb 2.5 oz)  Body mass index is 41.06 kg/m².    Intake/Output Summary (Last 24 hours) at 7/31/2020 2147  Last data filed at 7/31/2020 2100  Gross per 24 hour   Intake 3380.79 ml   Output 2950 ml   Net 430.79 ml      Physical Exam  Vitals signs and nursing note reviewed.   Constitutional:       General: She is not in acute distress.     Appearance: She is well-developed.   HENT:      Head: Normocephalic.      Comments: Surgical site dressing clean and intact.  MARGI drain with sero-sanguinous fluid.  Eyes:      Conjunctiva/sclera: Conjunctivae normal.      Pupils: Pupils are equal, round, and reactive to light.   Neck:      Musculoskeletal: Neck supple.      Thyroid: No thyromegaly.      Vascular: No JVD.   Cardiovascular:      Rate and Rhythm: Normal rate and regular rhythm.      Heart  sounds: No murmur. No friction rub. No gallop.       Comments: Right IJV CVL intact and functional.  Pulmonary:      Effort: Pulmonary effort is normal.      Breath sounds: Normal breath sounds. No wheezing or rales.   Abdominal:      General: Bowel sounds are normal. There is no distension.      Palpations: Abdomen is soft.      Tenderness: There is no abdominal tenderness. There is no guarding or rebound.   Musculoskeletal: Normal range of motion.         General: No deformity.   Lymphadenopathy:      Cervical: No cervical adenopathy.   Skin:     General: Skin is warm and dry.      Findings: No rash.   Neurological:      Mental Status: She is alert and oriented to person, place, and time.      Deep Tendon Reflexes: Reflexes are normal and symmetric.      Comments: Left upper extremity 4/5 strength and pronator drift.   Psychiatric:         Behavior: Behavior normal.         Thought Content: Thought content normal.         Judgment: Judgment normal.         Significant Labs: All pertinent labs within the past 24 hours have been reviewed.    Significant Imaging: I have reviewed all pertinent imaging results/findings within the past 24 hours.      Assessment/Plan:      * Calcified cerebral meningioma  No acute change in mass seen on CT of brain per Neurosurgery.  Holding Aspirin and Clopidogrel for surgery later this week.  Platelet function assay normal.  Craniotomy with resection on 31 July.  Post procedure in ICU.    Hypercalcemia  Initial calcium of 14.2.  Patient is on calitriol at home, will discontinue.  Hold Calcium supplementation.  Continue aggressive IV hydration.  Will give additional IV Lasix if needed.  Nephrology evaluated and assisting with management.  PTH is appropriately suppressed and Vitamin D25 and Calcitriol are low.  PTHrP pending.    Metabolic encephalopathy  Multifactorial due to hypercalcemia and brain mass with vasogenic edema.    Essential hypertension  - Losartan-HCTZ on hold as above.     - IV hydralazine PRN.    SHARLENE on CKD, stage III  - Initial creatinine of 1.9 (baseline 1.3).  - IV hydration.  - Avoid nephrotoxic agents. Hold home losartan-HCTZ for now.  - Follow labs.      VTE Risk Mitigation (From admission, onward)         Ordered     heparin (porcine) injection 5,000 Units  Every 8 hours      07/31/20 1312     IP VTE HIGH RISK PATIENT  Once      07/31/20 1312     Place sequential compression device  Until discontinued      07/31/20 1312     Place HOLLAND hose  Until discontinued      07/31/20 1312                Critical care time spent on the evaluation and treatment of severe organ dysfunction, review of pertinent labs and imaging studies, discussions with consulting providers and discussions with patient/family: 30 minutes.      Kingsley Heart MD  Department of Hospital Medicine   [facility]

## 2020-08-01 NOTE — PROGRESS NOTES
[facility] Hospital Medicine  Progress Note    Patient Name: Cata Lang  MRN: [id number]  Patient Class: IP- Inpatient   Admission Date: 7/25/2020  Length of Stay: 6 days  Attending Physician: Abdoul Barragan MD  Primary Care Provider: Sabra Fregoso MD        Subjective:     Principal Problem:Calcified cerebral meningioma        HPI:   Ctaa Lang is a 49 y.o. female patient with a PMHx of brain mass, HTN, DM II, hypothyroidism, seizure disorder, and chronic HA who presents to the Emergency Department for evaluation of neurological problem which onset gradually today. Pt notes she has been walking worse since December and her HA is worse on the L side and rates it a moderate to severe HA. Symptoms are worsening and moderate to severe in severity. No mitigating or exacerbating factors reported. Associated sxs include dizziness, malaise, and difficulty walking. Patient denies any fever, SOB, CP, abd pain, N/V, back pain, weakness, and all other sxs at this time. Pt notes she takes several tums daily which was recommended by her ENT. Pt transferred from [facility] and accepted by Dr. Gandara (Neurosurgery). Case discussed with Dr. Gandara in ED who reviewed CT of the head from transferring facility and feels there is no need for neurosurgical intervention at present.  In ED, patient's symptoms completely resolved with NIHSS of 0.  Labs showed: creatinine of 1.9, BUN 19, calcium 14.2. Patient is on calcitriol at home, started last year with no follow-up labs done. IV fluids x 1 liter and 20 mg IV Lasix given. Hospital Medicine was contacted to admit for hypercalcemia and SHARLENE.    Overview/Hospital Course:  Admitted for evaluation and treatment of hypercalcemia and brain mass with left sided weakness.    8/1: patient stable for transfer to floor. Hypercalcemia workup unremarkable thus far. Will need to rule out multiple myeloma. Heme/onc on case. SPEP, UPEP, immunofixation ordered. CT head chest  abd/pelvis with contrast recommended. Will need to hydrate patient and plan for CT once creatinine improved.     Interval History: No acute events reported overnight. Patient being stepped down today from ICU. Heme/onc consulted on case to assist with hypercalcemia workup. Patient reports taking lots of tums at home.     Review of Systems   Constitutional: Negative for fatigue and fever.   HENT: Negative for sinus pressure.    Eyes: Negative for visual disturbance.   Respiratory: Negative for shortness of breath.    Cardiovascular: Negative for chest pain.   Gastrointestinal: Negative for nausea and vomiting.   Musculoskeletal: Negative for back pain.   Skin: Negative for rash.   Neurological: Negative for headaches.     Objective:     Vital Signs (Most Recent):  Temp: 98.2 °F (36.8 °C) (08/01/20 0705)  Pulse: 61 (08/01/20 1105)  Resp: 13 (08/01/20 1105)  BP: (!) 115/47 (08/01/20 1105)  SpO2: 96 % (08/01/20 1105) Vital Signs (24h Range):  Temp:  [97.9 °F (36.6 °C)-98.5 °F (36.9 °C)] 98.2 °F (36.8 °C)  Pulse:  [61-95] 61  Resp:  [10-25] 13  SpO2:  [90 %-99 %] 96 %  BP: (115-163)/(40-78) 115/47  Arterial Line BP: (114-160)/(52-65) 140/52     Weight: 129.8 kg (286 lb 2.5 oz)  Body mass index is 41.06 kg/m².    Intake/Output Summary (Last 24 hours) at 8/1/2020 1131  Last data filed at 8/1/2020 0930  Gross per 24 hour   Intake 4357.65 ml   Output 3715 ml   Net 642.65 ml      Physical Exam  Constitutional:       General: She is not in acute distress.     Appearance: She is well-developed. She is not diaphoretic.   HENT:      Head: Normocephalic and atraumatic.      Comments: Dressing noted on head, clean dressing  Eyes:      Pupils: Pupils are equal, round, and reactive to light.   Cardiovascular:      Rate and Rhythm: Normal rate and regular rhythm.      Heart sounds: Normal heart sounds. No murmur. No friction rub. No gallop.    Pulmonary:      Effort: Pulmonary effort is normal. No respiratory distress.      Breath  sounds: Normal breath sounds. No stridor. No wheezing or rales.   Abdominal:      General: Bowel sounds are normal. There is no distension.      Palpations: Abdomen is soft. There is no mass.      Tenderness: There is no abdominal tenderness. There is no guarding.   Musculoskeletal:      Right lower leg: No edema.      Left lower leg: No edema.   Skin:     General: Skin is warm.      Findings: No erythema.   Neurological:      Mental Status: She is alert and oriented to person, place, and time.   Psychiatric:         Mood and Affect: Mood normal.         Behavior: Behavior normal.         Thought Content: Thought content normal.         Judgment: Judgment normal.         Significant Labs:   Recent Lab Results       08/01/20  1115   08/01/20  0630   08/01/20  0342   07/31/20  2222   07/31/20  1618        Anion Gap     12         Baso #     0.02         Basophil%     0.1         BUN, Bld     30         Calcium     7.2         Chloride     106         CO2     19         Creatinine     1.6         Differential Method     Automated         eGFR if      43         eGFR if non      37  Comment:  Calculation used to obtain the estimated glomerular filtration  rate (eGFR) is the CKD-EPI equation.            Eos #     0.0         Eosinophil%     0.0         Glucose     152         Gran # (ANC)     16.0         Gran%     85.0         Hematocrit     24.7         Hemoglobin     7.4         Immature Grans (Abs)     0.31  Comment:  Mild elevation in immature granulocytes is non specific and   can be seen in a variety of conditions including stress response,   acute inflammation, trauma and pregnancy. Correlation with other   laboratory and clinical findings is essential.           Immature Granulocytes     1.6         Lymph #     1.7         Lymph%     9.0         MCH     24.5         MCHC     30.0         MCV     82         Mono #     0.8         Mono%     4.3         MPV     11.4         nRBC      0         Platelets     283         POCT Glucose 188 165   157 144     Potassium     3.8         RBC     3.02         RDW     16.1         Sodium     137         WBC     18.79                          07/31/20  1315        Anion Gap       Baso #       Basophil%       BUN, Bld       Calcium       Chloride       CO2       Creatinine       Differential Method       eGFR if        eGFR if non        Eos #       Eosinophil%       Glucose       Gran # (ANC)       Gran%       Hematocrit       Hemoglobin       Immature Grans (Abs)       Immature Granulocytes       Lymph #       Lymph%       MCH       MCHC       MCV       Mono #       Mono%       MPV       nRBC       Platelets       POCT Glucose 176     Potassium       RBC       RDW       Sodium       WBC           All pertinent labs within the past 24 hours have been reviewed.    Significant Imaging: I have reviewed all pertinent imaging results/findings within the past 24 hours.      Assessment/Plan:      * Calcified cerebral meningioma  No acute change in mass seen on CT of brain per Neurosurgery.  Holding Aspirin and Clopidogrel for surgery later this week.  Platelet function assay normal.  Craniotomy with resection on 31 July.  Post procedure in ICU.    8/1:  POD #1 by neurosurgery  Ok for step down   Transfer orders placed by neurosurgery  Continue to monitor   CT head chest abd and pelvis recommended by heme/onc  Will need further hydration before pt can receive IV contrast     Diabetes mellitus due to underlying condition with hyperglycemia, without long-term current use of insulin  8/1:  Glucose controlled   Continue sliding scale insulin       Metabolic encephalopathy  Multifactorial due to hypercalcemia and brain mass with vasogenic edema.    8/1:  Resolved     Essential hypertension  - Losartan-HCTZ on hold as above.    - IV hydralazine PRN.    SHARLENE on CKD, stage III  - Initial creatinine of 1.9 (baseline 1.3).  - IV hydration.  -  Avoid nephrotoxic agents. Hold home losartan-HCTZ for now.  - Follow labs.    8/1:  Improving  Continue to monitor creatinine  Continue IVF  nephro consulted on case    Hypercalcemia  Initial calcium of 14.2.  Patient is on calitriol at home, will discontinue.  Hold Calcium supplementation.  Continue aggressive IV hydration.  Will give additional IV Lasix if needed.  Nephrology evaluated and assisting with management.  PTH is appropriately suppressed and Vitamin D25 and Calcitriol are low.  PTHrP pending.    8/1:  Hypercalcemia workup unremarkable thus far  Will need to rule out multiple myeloma  SPEP, UPEP, immunofixation ordered  Heme/onc on case         VTE Risk Mitigation (From admission, onward)         Ordered     IP VTE HIGH RISK PATIENT  Once      07/31/20 1312     Place sequential compression device  Until discontinued      07/31/20 1312     Place HOLLAND hose  Until discontinued      07/31/20 1312                Critical care time spent on the evaluation and treatment of severe organ dysfunction, review of pertinent labs and imaging studies, discussions with consulting providers and discussions with patient/family: 35 minutes.      Abdoul Barragan MD  Department of Hospital Medicine   [facility]

## 2020-08-01 NOTE — HOSPITAL COURSE
Admitted for evaluation and treatment of hypercalcemia and brain mass with left sided weakness.    8/1: patient stable for transfer to floor. Hypercalcemia workup unremarkable thus far. Will need to rule out multiple myeloma. Heme/onc on case. SPEP, UPEP, immunofixation ordered. CT head chest abd/pelvis with contrast recommended. Will need to hydrate patient and plan for CT once creatinine improved.   8/2: continue IVF. Multiple myeloma workup pending. Awaiting pathology results on intracranial mass. Awaiting CT chest abd pelvis with contrast once creatinine improves. SWAPNIL noted, venofer given once along with ferrous sulfate PO bid.   8/3: no acute events overnight. Patient has drop in plt. HIT antibody sent out. Heme/onc on case. CT chest abd and pelvis showed prominent mediastinal lymph nodes. Continue lovenox for now. Will plan to switch to different agent should plt continue to drop.   8/4: platelets improving. Not likely HIT. Cont lovenox. Calcium replaced. Rehab placement pending.   8/5: platelets improving. HIT panel positive. Discussed with heme/onc. Will dc lovenox. Plan to start arixtra if on formulary. Continue to replace electrolytes. Placement pending.   8/6: patient was discharged to rehab with recommendations for arixtra 2.5mg daily x 1 month for DVT prophylaxis. Follow up with neurosurgery as directed.

## 2020-08-01 NOTE — SUBJECTIVE & OBJECTIVE
Review of Systems   Constitutional: Positive for malaise/fatigue.   Respiratory: Negative for cough and shortness of breath.    Cardiovascular: Negative for chest pain.   Gastrointestinal: Negative.    Genitourinary: Negative.    Musculoskeletal: Negative.    Neurological: Positive for weakness. Negative for tremors, speech change, focal weakness, seizures and headaches.   Psychiatric/Behavioral: The patient is not nervous/anxious.          Objective:     Vital Signs (Most Recent):  Temp: 98.2 °F (36.8 °C) (08/01/20 0705)  Pulse: 61 (08/01/20 1105)  Resp: 13 (08/01/20 1105)  BP: (!) 115/47 (08/01/20 1105)  SpO2: 96 % (08/01/20 1105) Vital Signs (24h Range):  Temp:  [97.9 °F (36.6 °C)-98.5 °F (36.9 °C)] 98.2 °F (36.8 °C)  Pulse:  [61-95] 61  Resp:  [10-25] 13  SpO2:  [90 %-99 %] 96 %  BP: (115-163)/(40-78) 115/47  Arterial Line BP: (114-160)/(52-65) 140/52     Weight: 129.8 kg (286 lb 2.5 oz)  Body mass index is 41.06 kg/m².      Intake/Output Summary (Last 24 hours) at 8/1/2020 1207  Last data filed at 8/1/2020 0930  Gross per 24 hour   Intake 4357.65 ml   Output 3715 ml   Net 642.65 ml       Physical Exam  Vitals signs and nursing note reviewed.   Constitutional:       Appearance: She is obese.   HENT:      Head: Atraumatic.      Mouth/Throat:      Mouth: Mucous membranes are moist.   Eyes:      Extraocular Movements: Extraocular movements intact.      Conjunctiva/sclera: Conjunctivae normal.      Pupils: Pupils are equal, round, and reactive to light.   Neck:      Vascular: No JVD.   Cardiovascular:      Rate and Rhythm: Normal rate and regular rhythm.      Pulses:           Radial pulses are 2+ on the right side and 2+ on the left side.        Dorsalis pedis pulses are 2+ on the right side and 2+ on the left side.   Pulmonary:      Effort: Pulmonary effort is normal.      Breath sounds: Normal breath sounds. No wheezing, rhonchi or rales.   Skin:     General: Skin is warm and dry.      Capillary Refill: Capillary  refill takes less than 2 seconds.      Nails: There is no clubbing.            Neurological:      Mental Status: She is oriented to person, place, and time.      GCS: GCS eye subscore is 4. GCS verbal subscore is 5. GCS motor subscore is 6.      Comments: Slow but appropriate responses         Vents:       Lines/Drains/Airways     Peripheral Intravenous Line                 Peripheral IV - Single Lumen 18 G Right Wrist -- days                Significant Labs:    CBC/Anemia Profile:  Recent Labs   Lab 07/31/20 0425 08/01/20  0342   WBC 13.34* 18.79*   HGB 8.5* 7.4*   HCT 28.2* 24.7*    283   MCV 82 82   RDW 15.9* 16.1*        Chemistries:  Recent Labs   Lab 07/31/20 0425 08/01/20  0342    137   K 3.4* 3.8    106   CO2 19* 19*   BUN 31* 30*   CREATININE 1.7* 1.6*   CALCIUM 7.9* 7.2*   ALBUMIN 3.1*  --    PHOS 3.9  --        All pertinent labs within the past 24 hours have been reviewed.    Significant Imaging:  I have reviewed all pertinent imaging results/findings within the past 24 hours.

## 2020-08-01 NOTE — SUBJECTIVE & OBJECTIVE
Past Medical History:   Diagnosis Date    Diabetes mellitus     Hypertension     Seizures     Stroke     Thyroid disease        Past Surgical History:   Procedure Laterality Date    THYROIDECTOMY      TONSILLECTOMY         Review of patient's allergies indicates:   Allergen Reactions    Hydrochlorothiazide      hypercalcemia       Family History     None        Tobacco Use    Smoking status: Current Every Day Smoker     Packs/day: 1.00     Types: Cigarettes   Substance and Sexual Activity    Alcohol use: Yes    Drug use: Not Currently    Sexual activity: Not on file         Review of Systems   Constitutional: Positive for fatigue.   Respiratory: Negative for shortness of breath.    Cardiovascular: Negative for chest pain.   Gastrointestinal: Negative for nausea.   Skin: Positive for wound.   Neurological: Positive for headaches. Negative for numbness.   Psychiatric/Behavioral: Negative for agitation. The patient is not nervous/anxious and is not hyperactive.      Objective:     Vital Signs (Most Recent):  Temp: 98.2 °F (36.8 °C) (07/31/20 1312)  Pulse: 84 (07/31/20 1400)  Resp: 20 (07/31/20 1709)  BP: (!) 157/60 (07/31/20 1524)  SpO2: 95 % (07/31/20 1400) Vital Signs (24h Range):  Temp:  [97.8 °F (36.6 °C)-98.4 °F (36.9 °C)] 98.2 °F (36.8 °C)  Pulse:  [62-95] 84  Resp:  [16-25] 20  SpO2:  [90 %-98 %] 95 %  BP: (134-161)/(54-80) 157/60  Arterial Line BP: (141-144)/(57-58) 144/57     Weight: 129.8 kg (286 lb 2.5 oz)  Body mass index is 41.06 kg/m².      Intake/Output Summary (Last 24 hours) at 7/31/2020 1921  Last data filed at 7/31/2020 1800  Gross per 24 hour   Intake 2300 ml   Output 2475 ml   Net -175 ml       Physical Exam  Vitals signs and nursing note reviewed.   Constitutional:       Appearance: She is obese.   HENT:      Head: Atraumatic.      Mouth/Throat:      Mouth: Mucous membranes are moist.   Eyes:      Extraocular Movements: Extraocular movements intact.      Conjunctiva/sclera:  Conjunctivae normal.      Pupils: Pupils are equal, round, and reactive to light.   Neck:      Vascular: No JVD.   Cardiovascular:      Rate and Rhythm: Normal rate and regular rhythm.      Pulses:           Radial pulses are 2+ on the right side and 2+ on the left side.        Dorsalis pedis pulses are 2+ on the right side and 2+ on the left side.   Pulmonary:      Effort: Pulmonary effort is normal.      Breath sounds: Normal breath sounds. No wheezing, rhonchi or rales.   Skin:     General: Skin is warm and dry.      Capillary Refill: Capillary refill takes less than 2 seconds.      Nails: There is no clubbing.            Neurological:      Mental Status: She is oriented to person, place, and time.      GCS: GCS eye subscore is 4. GCS verbal subscore is 5. GCS motor subscore is 6.      Comments: Slow but appropriate responses         Vents:       Lines/Drains/Airways     Central Venous Catheter Line            Percutaneous Central Line Insertion/Assessment - Triple Lumen  07/31/20 right internal jugular less than 1 day          Drain            Female External Urinary Catheter 07/30/20 1900 1 day         Closed/Suction Drain 07/31/20 1128 Other (Comment) Bulb 15 Fr. less than 1 day         Urethral Catheter 07/31/20 0736 Straight-tip;Latex 16 Fr. less than 1 day          Arterial Line                 Arterial Line 07/31/20 0734 Left Radial less than 1 day          Peripheral Intravenous Line                 Peripheral IV - Single Lumen 18 G Right Wrist -- days                Significant Labs:    CBC/Anemia Profile:  Recent Labs   Lab 07/30/20  0433 07/31/20  0425   WBC 13.90* 13.34*   HGB 8.1* 8.5*   HCT 26.9* 28.2*    275   MCV 80* 82   RDW 15.8* 15.9*        Chemistries:  Recent Labs   Lab 07/30/20  0433 07/31/20  0425    137   K 3.5 3.4*    105   CO2 20* 19*   BUN 33* 31*   CREATININE 1.9* 1.7*   CALCIUM 8.5* 7.9*   ALBUMIN 3.0* 3.1*   PHOS 3.4 3.9       All pertinent labs within the past 24  hours have been reviewed.    Significant Imaging:   I have reviewed all pertinent imaging results/findings within the past 24 hours.

## 2020-08-01 NOTE — ASSESSMENT & PLAN NOTE
POD1 craniotomy for resection  Neurosurgery following  PT/OT for rehab  Avoid anticoagulation for at least 72 hours

## 2020-08-01 NOTE — SUBJECTIVE & OBJECTIVE
Interval History: No acute events reported overnight. Patient being stepped down today from ICU. Heme/onc consulted on case to assist with hypercalcemia workup. Patient reports taking lots of tums at home.     Review of Systems   Constitutional: Negative for fatigue and fever.   HENT: Negative for sinus pressure.    Eyes: Negative for visual disturbance.   Respiratory: Negative for shortness of breath.    Cardiovascular: Negative for chest pain.   Gastrointestinal: Negative for nausea and vomiting.   Musculoskeletal: Negative for back pain.   Skin: Negative for rash.   Neurological: Negative for headaches.     Objective:     Vital Signs (Most Recent):  Temp: 98.2 °F (36.8 °C) (08/01/20 0705)  Pulse: 61 (08/01/20 1105)  Resp: 13 (08/01/20 1105)  BP: (!) 115/47 (08/01/20 1105)  SpO2: 96 % (08/01/20 1105) Vital Signs (24h Range):  Temp:  [97.9 °F (36.6 °C)-98.5 °F (36.9 °C)] 98.2 °F (36.8 °C)  Pulse:  [61-95] 61  Resp:  [10-25] 13  SpO2:  [90 %-99 %] 96 %  BP: (115-163)/(40-78) 115/47  Arterial Line BP: (114-160)/(52-65) 140/52     Weight: 129.8 kg (286 lb 2.5 oz)  Body mass index is 41.06 kg/m².    Intake/Output Summary (Last 24 hours) at 8/1/2020 1131  Last data filed at 8/1/2020 0930  Gross per 24 hour   Intake 4357.65 ml   Output 3715 ml   Net 642.65 ml      Physical Exam  Constitutional:       General: She is not in acute distress.     Appearance: She is well-developed. She is not diaphoretic.   HENT:      Head: Normocephalic and atraumatic.      Comments: Dressing noted on head, clean dressing  Eyes:      Pupils: Pupils are equal, round, and reactive to light.   Cardiovascular:      Rate and Rhythm: Normal rate and regular rhythm.      Heart sounds: Normal heart sounds. No murmur. No friction rub. No gallop.    Pulmonary:      Effort: Pulmonary effort is normal. No respiratory distress.      Breath sounds: Normal breath sounds. No stridor. No wheezing or rales.   Abdominal:      General: Bowel sounds are normal.  There is no distension.      Palpations: Abdomen is soft. There is no mass.      Tenderness: There is no abdominal tenderness. There is no guarding.   Musculoskeletal:      Right lower leg: No edema.      Left lower leg: No edema.   Skin:     General: Skin is warm.      Findings: No erythema.   Neurological:      Mental Status: She is alert and oriented to person, place, and time.   Psychiatric:         Mood and Affect: Mood normal.         Behavior: Behavior normal.         Thought Content: Thought content normal.         Judgment: Judgment normal.         Significant Labs:   Recent Lab Results       08/01/20  1115   08/01/20  0630   08/01/20  0342   07/31/20  2222   07/31/20  1618        Anion Gap     12         Baso #     0.02         Basophil%     0.1         BUN, Bld     30         Calcium     7.2         Chloride     106         CO2     19         Creatinine     1.6         Differential Method     Automated         eGFR if      43         eGFR if non      37  Comment:  Calculation used to obtain the estimated glomerular filtration  rate (eGFR) is the CKD-EPI equation.            Eos #     0.0         Eosinophil%     0.0         Glucose     152         Gran # (ANC)     16.0         Gran%     85.0         Hematocrit     24.7         Hemoglobin     7.4         Immature Grans (Abs)     0.31  Comment:  Mild elevation in immature granulocytes is non specific and   can be seen in a variety of conditions including stress response,   acute inflammation, trauma and pregnancy. Correlation with other   laboratory and clinical findings is essential.           Immature Granulocytes     1.6         Lymph #     1.7         Lymph%     9.0         MCH     24.5         MCHC     30.0         MCV     82         Mono #     0.8         Mono%     4.3         MPV     11.4         nRBC     0         Platelets     283         POCT Glucose 188 165   157 144     Potassium     3.8         RBC     3.02          RDW     16.1         Sodium     137         WBC     18.79                          07/31/20  1315        Anion Gap       Baso #       Basophil%       BUN, Bld       Calcium       Chloride       CO2       Creatinine       Differential Method       eGFR if        eGFR if non        Eos #       Eosinophil%       Glucose       Gran # (ANC)       Gran%       Hematocrit       Hemoglobin       Immature Grans (Abs)       Immature Granulocytes       Lymph #       Lymph%       MCH       MCHC       MCV       Mono #       Mono%       MPV       nRBC       Platelets       POCT Glucose 176     Potassium       RBC       RDW       Sodium       WBC           All pertinent labs within the past 24 hours have been reviewed.    Significant Imaging: I have reviewed all pertinent imaging results/findings within the past 24 hours.

## 2020-08-01 NOTE — ASSESSMENT & PLAN NOTE
No acute change in mass seen on CT of brain per Neurosurgery.  Holding Aspirin and Clopidogrel for surgery later this week.  Platelet function assay normal.  Craniotomy with resection on 31 July.  Post procedure in ICU.

## 2020-08-01 NOTE — CONSULTS
Consult Note  Hematology/Oncology    Consult Requested By: Abdoul Barragan MD    Reason for Consult:  Hypercalcemia    SUBJECTIVE:     History of Present Illness:  49-year-old morbidly obese female with history that is significant for goiter status post total thyroidectomy 5 years ago, hypertension, type 2 diabetes, seizure disorder, CKD stage 3, intracranial mass who presented to the emergency room with complaint of weakness headache nausea.  MRI revealed mass lesion within the anterior right cranial fossa suggestive of meningioma, there was also right to left midline shift of 6 mm.  Two foci of T2 hyperintense signal were noted in the left occipital lobe and thought to be related to old vascular insults.    Initial by chemical evaluation revealed serum calcium level of 14.2 mg/dL.  There was also evidence of acute kidney injury and normocytic anemia.  Given the above findings there was thought to rule out plasma cell dyscrasia.  Hematology was consulted.    Upon interview with patient and her , she stated that following the total thyroidectomy 5 years ago, she has been on 6000 units of daily calcium supplement.  She denies unintentional weight loss, night sweats, palpable lymph nodes or mass, abnormal bleed.      Continuous Infusions:   sodium chloride 0.9% 100 mL/hr at 08/01/20 0610    niCARdipine Stopped (08/01/20 0600)     Scheduled Meds:   atorvastatin  10 mg Oral Daily    cefTRIAXone (ROCEPHIN) IVPB  1 g Intravenous Q24H    dexAMETHasone  4 mg Oral Q12H    gabapentin  600 mg Oral Daily    hydrALAZINE  25 mg Oral Q8H    levETIRAcetam  1,500 mg Oral BID    levothyroxine  137 mcg Oral Before breakfast    pantoprazole  40 mg Oral Daily     PRN Meds:acetaminophen, dextrose 50%, dextrose 50%, glucagon (human recombinant), glucose, glucose, hydrALAZINE, HYDROcodone-acetaminophen, HYDROcodone-acetaminophen, HYDROmorphone, insulin aspart U-100, morphine, ondansetron, ondansetron,  oxyCODONE-acetaminophen    Past Medical History:   Diagnosis Date    Diabetes mellitus     Hypertension     Seizures     Stroke     Thyroid disease      Past Surgical History:   Procedure Laterality Date    THYROIDECTOMY      TONSILLECTOMY       History reviewed. No pertinent family history.  Social History     Tobacco Use    Smoking status: Current Every Day Smoker     Packs/day: 1.00     Types: Cigarettes   Substance Use Topics    Alcohol use: Yes    Drug use: Not Currently       Review of patient's allergies indicates:   Allergen Reactions    Hydrochlorothiazide      hypercalcemia        Review of Systems:  Review of Systems   Constitutional: Positive for fatigue. Negative for activity change, appetite change, chills, fever and unexpected weight change.   HENT: Negative for hearing loss, mouth sores, nosebleeds, sore throat, tinnitus, trouble swallowing and voice change.    Eyes: Negative for visual disturbance.   Respiratory: Negative for cough, chest tightness and shortness of breath.    Cardiovascular: Negative for chest pain, palpitations and leg swelling.   Gastrointestinal: Negative for abdominal pain, anal bleeding, blood in stool, constipation, diarrhea, nausea and vomiting.   Genitourinary: Negative for dysuria, frequency, hematuria, pelvic pain, vaginal bleeding and vaginal pain.   Musculoskeletal: Negative for arthralgias, back pain, joint swelling and neck pain.   Skin: Negative for color change, pallor, rash and wound.   Allergic/Immunologic: Negative for immunocompromised state.   Neurological: Positive for weakness. Negative for dizziness, tremors, syncope, speech difficulty, light-headedness and headaches.   Hematological: Negative for adenopathy. Does not bruise/bleed easily.   Psychiatric/Behavioral: Negative for agitation, confusion, decreased concentration, hallucinations and sleep disturbance. The patient is not nervous/anxious.            OBJECTIVE:     Vital Signs (Most  Recent)  Temp: 98.2 °F (36.8 °C) (08/01/20 0705)  Pulse: 61 (08/01/20 1105)  Resp: 13 (08/01/20 1105)  BP: (!) 115/47 (08/01/20 1105)  SpO2: 96 % (08/01/20 1105)    Pain Assessment: No pain reported at this time    Vital Signs Range (Last 24H):  Temp:  [97.9 °F (36.6 °C)-98.5 °F (36.9 °C)]   Pulse:  [61-95]   Resp:  [10-25]   BP: (115-163)/(40-78)   SpO2:  [90 %-99 %]   Arterial Line BP: (114-160)/(52-65)     Physical Exam:  Physical Exam  Constitutional:       General: She is not in acute distress.     Appearance: She is well-developed. She is obese. She is not ill-appearing or toxic-appearing.   HENT:      Head: Normocephalic and atraumatic.      Comments: Bandage around the head, status post surgery     Mouth/Throat:      Pharynx: No oropharyngeal exudate.   Eyes:      General: No scleral icterus.        Right eye: No discharge.         Left eye: No discharge.      Conjunctiva/sclera: Conjunctivae normal.      Pupils: Pupils are equal, round, and reactive to light.   Neck:      Musculoskeletal: Normal range of motion and neck supple.      Thyroid: No thyromegaly.   Cardiovascular:      Rate and Rhythm: Normal rate and regular rhythm.      Heart sounds: No murmur.   Pulmonary:      Effort: Pulmonary effort is normal. No respiratory distress.      Breath sounds: Normal breath sounds.   Chest:      Chest wall: No tenderness.   Abdominal:      General: Bowel sounds are normal. There is distension (Nontender).      Palpations: Abdomen is soft. There is no mass.      Tenderness: There is no abdominal tenderness. There is no guarding or rebound.   Musculoskeletal: Normal range of motion.         General: No tenderness.   Lymphadenopathy:      Cervical: No cervical adenopathy.      Right cervical: No superficial cervical adenopathy.     Left cervical: No superficial cervical adenopathy.      Upper Body:      Right upper body: No supraclavicular or pectoral adenopathy.      Left upper body: No supraclavicular or pectoral  adenopathy.   Skin:     General: Skin is warm and dry.      Capillary Refill: Capillary refill takes 2 to 3 seconds.      Coloration: Skin is not pale.      Findings: No erythema or rash.   Neurological:      Mental Status: She is alert and oriented to person, place, and time.      Cranial Nerves: No cranial nerve deficit.      Sensory: No sensory deficit.   Psychiatric:         Behavior: Behavior normal. Behavior is cooperative.         Judgment: Judgment normal.             Laboratory:  CBC with Differential:  Recent Labs   Lab 08/01/20  0342   WBC 18.79*   LYMPH 9.0*  1.7   BASOPHIL 0.1   RBC 3.02*   HCT 24.7*   HGB 7.4*   MCV 82   MCH 24.5*   RDW 16.1*      MPV 11.4     CMP:  Recent Labs   Lab 08/01/20  0342   *   CALCIUM 7.2*      K 3.8   CO2 19*      BUN 30*   CREATININE 1.6*     BMP:   Recent Labs   Lab 08/01/20  0342   *   CALCIUM 7.2*      K 3.8   CO2 19*      BUN 30*   CREATININE 1.6*     LFTs: No results for input(s): ALT, AST, ALKPHOS, BILITOT, PROT, ALBUMIN in the last 24 hours.  Haptoglobin: No results for input(s): HAPTOGLOBIN in the last 24 hours.  Tumor Markers: No results for input(s): PSA, CEA, , AFPTM, GK9149,  in the last 24 hours.    Invalid input(s): ALGTM  Immunology: No results for input(s): SPEP, GERHARD, ANDREW, FREELAMBDALI in the last 24 hours.  Coagulation: No results for input(s): PT, INR, APTT in the last 24 hours.  Specimen (12h ago, onward)    None        Microbiology Results (last 7 days)     Procedure Component Value Units Date/Time    Urine culture [[id number]] Collected: 07/27/20 0033    Order Status: Completed Specimen: Urine Updated: 07/28/20 1935     Urine Culture, Routine No growth    Narrative:      Urine was sent to lab but culture was not able to be  completed, recollect just for urine culture  Specimen Source->Urine    Urine culture [[id number]] Collected: 07/26/20 1434    Order Status: Canceled Specimen: Urine            Diagnostic Results:      ASSESSMENT/PLAN:     Patient Active Problem List   Diagnosis    Hypercalcemia    SHARLENE on CKD, stage III    Essential hypertension    Metabolic encephalopathy    Calcified cerebral meningioma    Hypokalemia    Diabetes mellitus due to underlying condition with hyperglycemia, without long-term current use of insulin         Plan    # hypercalcemia:  -likely related to vitamin-D and calcium supplementation which have both been stopped.  -most recent calcium level noted at 7.2 with albumin of 3.1.  -given presents of anemia and acute on chronic kidney disease, we would recommend ruling out plasma cell dyscrasia.  Will obtain serum and urine electrophoresis and immunofixation.  -noted low parathyroid hormone level.    # intracranial mass:  -status post resection.  Probably meningioma.  -well weight the final pathology to determine if need for systemic therapy.    # normocytic anemia:  -likely due to chronic inflammation from CKD.  -noted hemoglobin at 7.4 with hematocrit of 24.7 and MCV of 82.  -no sign of active bleed, will continue to monitor.  -target hemoglobin above 7.0 grams/deciliter P    # leukocytosis:  -likely stress plus steroid.  -WBC at 18.79.  No evidence of systemic infection.  Continue to monitor.      Thank you for allowing us to participate in the care of this patient.  Contact us with any question or concern.      He Mendosa MD

## 2020-08-01 NOTE — ASSESSMENT & PLAN NOTE
No acute change in mass seen on CT of brain per Neurosurgery.  Holding Aspirin and Clopidogrel for surgery later this week.  Platelet function assay normal.  Craniotomy with resection on 31 July.  Post procedure in ICU.    8/1:  POD #1 by neurosurgery  Ok for step down   Transfer orders placed by neurosurgery  Continue to monitor   CT head chest abd and pelvis recommended by heme/onc  Will need further hydration before pt can receive IV contrast

## 2020-08-01 NOTE — CONSULTS
[facility] -   Critical Care Medicine  Consult Note    Patient Name: Cata Lang  MRN: [id number]  Admission Date: 7/25/2020  Hospital Length of Stay: 5 days  Code Status: Full Code  Attending Physician: Kingsley Heart MD   Primary Care Provider: Sabra Fregoso MD   Principal Problem: Calcified cerebral meningioma      Subjective:     HPI:  49 year old female with PMH including DM, HTN, seizures, CVA, hypothyroid, headaches  Admitted by hospital medicine team on 7/25 from OSH with neurologic symptoms including dizziness, gait issues, and CT head revealed brain mass  Evaluated by neurosurgery after transfer and decision was make to hold antiplatelets (aspirin and plavix) with plan for craniotomy once cleared    Today she underwent right frontal craniotomy with stereotactic guidance for resection of meningioma    Hospital/ICU Course:  Transferred to ICU from OR with , arterial line in place on cardene infusion for BP control; seen and evaluated multiple times this afternoon    Past Medical History:   Diagnosis Date    Diabetes mellitus     Hypertension     Seizures     Stroke     Thyroid disease        Past Surgical History:   Procedure Laterality Date    THYROIDECTOMY      TONSILLECTOMY         Review of patient's allergies indicates:   Allergen Reactions    Hydrochlorothiazide      hypercalcemia       Family History     None        Tobacco Use    Smoking status: Current Every Day Smoker     Packs/day: 1.00     Types: Cigarettes   Substance and Sexual Activity    Alcohol use: Yes    Drug use: Not Currently    Sexual activity: Not on file         Review of Systems   Constitutional: Positive for fatigue.   Respiratory: Negative for shortness of breath.    Cardiovascular: Negative for chest pain.   Gastrointestinal: Negative for nausea.   Skin: Positive for wound.   Neurological: Positive for headaches. Negative for numbness.   Psychiatric/Behavioral: Negative for agitation.  The patient is not nervous/anxious and is not hyperactive.      Objective:     Vital Signs (Most Recent):  Temp: 98.2 °F (36.8 °C) (07/31/20 1312)  Pulse: 84 (07/31/20 1400)  Resp: 20 (07/31/20 1709)  BP: (!) 157/60 (07/31/20 1524)  SpO2: 95 % (07/31/20 1400) Vital Signs (24h Range):  Temp:  [97.8 °F (36.6 °C)-98.4 °F (36.9 °C)] 98.2 °F (36.8 °C)  Pulse:  [62-95] 84  Resp:  [16-25] 20  SpO2:  [90 %-98 %] 95 %  BP: (134-161)/(54-80) 157/60  Arterial Line BP: (141-144)/(57-58) 144/57     Weight: 129.8 kg (286 lb 2.5 oz)  Body mass index is 41.06 kg/m².      Intake/Output Summary (Last 24 hours) at 7/31/2020 1921  Last data filed at 7/31/2020 1800  Gross per 24 hour   Intake 2300 ml   Output 2475 ml   Net -175 ml       Physical Exam  Vitals signs and nursing note reviewed.   Constitutional:       Appearance: She is obese.   HENT:      Head: Atraumatic.      Mouth/Throat:      Mouth: Mucous membranes are moist.   Eyes:      Extraocular Movements: Extraocular movements intact.      Conjunctiva/sclera: Conjunctivae normal.      Pupils: Pupils are equal, round, and reactive to light.   Neck:      Vascular: No JVD.   Cardiovascular:      Rate and Rhythm: Normal rate and regular rhythm.      Pulses:           Radial pulses are 2+ on the right side and 2+ on the left side.        Dorsalis pedis pulses are 2+ on the right side and 2+ on the left side.   Pulmonary:      Effort: Pulmonary effort is normal.      Breath sounds: Normal breath sounds. No wheezing, rhonchi or rales.   Skin:     General: Skin is warm and dry.      Capillary Refill: Capillary refill takes less than 2 seconds.      Nails: There is no clubbing.            Neurological:      Mental Status: She is oriented to person, place, and time.      GCS: GCS eye subscore is 4. GCS verbal subscore is 5. GCS motor subscore is 6.      Comments: Slow but appropriate responses         Vents:       Lines/Drains/Airways     Central Venous Catheter Line             Percutaneous Central Line Insertion/Assessment - Triple Lumen  07/31/20 right internal jugular less than 1 day          Drain            Female External Urinary Catheter 07/30/20 1900 1 day         Closed/Suction Drain 07/31/20 1128 Other (Comment) Bulb 15 Fr. less than 1 day         Urethral Catheter 07/31/20 0736 Straight-tip;Latex 16 Fr. less than 1 day          Arterial Line                 Arterial Line 07/31/20 0734 Left Radial less than 1 day          Peripheral Intravenous Line                 Peripheral IV - Single Lumen 18 G Right Wrist -- days                Significant Labs:    CBC/Anemia Profile:  Recent Labs   Lab 07/30/20  0433 07/31/20  0425   WBC 13.90* 13.34*   HGB 8.1* 8.5*   HCT 26.9* 28.2*    275   MCV 80* 82   RDW 15.8* 15.9*        Chemistries:  Recent Labs   Lab 07/30/20  0433 07/31/20  0425    137   K 3.5 3.4*    105   CO2 20* 19*   BUN 33* 31*   CREATININE 1.9* 1.7*   CALCIUM 8.5* 7.9*   ALBUMIN 3.0* 3.1*   PHOS 3.4 3.9       All pertinent labs within the past 24 hours have been reviewed.    Significant Imaging:   I have reviewed all pertinent imaging results/findings within the past 24 hours.      ABG  No results for input(s): PH, PO2, PCO2, HCO3, BE in the last 168 hours.  Assessment/Plan:     Neuro  Brain mass  Now post craniotomy for resection  Neurosurgery following  Maintain BP control with cardene infusion  ICU neuro monitoring  OOB by am with PT/OT for rehab  Avoid anticoagulation for at least 72 hours    Renal/  SHARLENE on CKD, stage III  IV hydration  Avoid nephrotoxins  Monitor I/O and creatinine trend    Endocrine  Diabetes mellitus due to underlying condition with hyperglycemia, without long-term current use of insulin  SSI prn with monitoring for glucose control and prevention of insulin toxicity        Critical Care Daily Checklist:    A: Awake: RASS Goal/Actual Goal:    Actual: [place] Agitation Sedation Scale (RASS): Drowsy   B: Spontaneous Breathing Trial  Performed?     C: SAT & SBT Coordinated?  n/a                      D: Delirium: CAM-ICU Overall CAM-ICU: Negative   E: Early Mobility Performed? Yes   F: Feeding Goal:    Status:     Current Diet Order   Procedures    Diet diabetic [facility]; 2000 Calorie     Order Specific Question:   Indicate patient location for additional diet options:     Answer:   [facility]     Order Specific Question:   Total calories:     Answer:   2000 Calorie      AS: Analgesia/Sedation Prn analgesia   T: Thromboembolic Prophylaxis SCD   H: HOB > 300 Yes   U: Stress Ulcer Prophylaxis (if needed) PPI   G: Glucose Control SSI   B: Bowel Function Stool Occurrence: 1   I: Indwelling Catheter (Lines & Monte) Necessity reviewed   D: De-escalation of Antimicrobials/Pharmacotherapies reviwed    Plan for the day/ETD As above    Code Status:  Family/Goals of Care: Full Code  Home v rehab on discharge pending hospital course   I have discussed case and plan of care in detail with Dr Duran and Dr Heart, and ALEC Obregon with NS; Status and plan of care were discussed with team on multidisciplinary rounds.    Critical Care Time: 45 minutes  Critical secondary to post op BP control and neuro monitoring after craniotomy;   Critical care was time spent personally by me on the following activities: development of treatment plan with patient or surrogate and bedside caregivers, discussions with consultants, evaluation of patient's response to treatment, examination of patient, ordering and performing treatments and interventions, ordering and review of laboratory studies, ordering and review of radiographic studies, pulse oximetry, re-evaluation of patient's condition. This critical care time did not overlap with that of any other provider or involve time for any procedures.    Thank you for your consult.      STEVO Torres-BC  Critical Care Medicine  [facility] [place]

## 2020-08-01 NOTE — PT/OT/SLP PROGRESS
Physical Therapy  Treatment    Cata Lang   MRN: [id number]   Admitting Diagnosis: Calcified cerebral meningioma       PT Start Time: 0815     PT Stop Time: 0840    PT Total Time (min): 25 min       Billable Minutes:  Therapeutic Activity 25    Treatment Type: Treatment  PT/PTA: PTA     PTA Visit Number: 1       General Precautions: Standard, fall  Orthopedic Precautions: N/A   Braces:           Subjective:  Communicated with NSG prior to session.      Pain/Comfort  Pain Rating 1: 0/10  Pain Rating Post-Intervention 1: 0/10    Objective:        Functional Mobility:  Bed Mobility:        Transfers:       Gait:        Stairs:          Balance:   Static Sit:   Dynamic Sit:   Static Stand:   Dynamic stand:        Therapeutic Activities and Exercises:  BED MOB MOD A X 2   SIT<-->STAND MOD A X 2  BED<-->CHAIR MOD A X 2 WITH MULTIPLE CUES FOR SEQUENCE AND TECHNIQUE.  PATIENT UNABLE TO COMPLETE TURN WITH 1 EPISODE OF STANDING AND REQUIRED SITTING REST IN MIDDLE OF T/F.      AM-PAC 6 CLICK MOBILITY  How much help from another person does this patient currently need?   1 = Unable, Total/Dependent Assistance  2 = A lot, Maximum/Moderate Assistance  3 = A little, Minimum/Contact Guard/Supervision  4 = None, Modified [place]/Independent         AM-PAC Raw Score CMS G-Code Modifier Level of Impairment Assistance   6 % Total / Unable   7 - 9 CM 80 - 100% Maximal Assist   10 - 14 CL 60 - 80% Moderate Assist   15 - 19 CK 40 - 60% Moderate Assist   20 - 22 CJ 20 - 40% Minimal Assist   23 CI 1-20% SBA / CGA   24 CH 0% Independent/ Mod I     Patient left up in chair with all lines intact, call button in reach, NSG notified and  present.    Assessment:  Cata Lang is a 49 y.o. female with a medical diagnosis of Calcified cerebral meningioma and presents with .    Rehab identified problem list/impairments:      Rehab potential is fair.    Activity tolerance: Good    Discharge recommendations: Discharge  Facility/Level of Care Needs: nursing facility, skilled     Barriers to discharge:      Equipment recommendations: Equipment Needed After Discharge: hospital bed, wheelchair, walker, rolling     GOALS:   Multidisciplinary Problems     Physical Therapy Goals        Problem: Physical Therapy Goal    Goal Priority Disciplines Outcome Goal Variances Interventions   Physical Therapy Goal     PT, PT/OT Ongoing, Progressing     Description: PT WILL BE SEEN FOR P.T. FOR A MIN OF 5 OUT OF 7 DAYS A WEEK  LT20  1. PT WILL COMPLETE BED MOBILITY WITH MIN A   2. PT WILL T/F TO CHAIR WITH RW AND MAX A  3. PT WILL SIT>STAND WITH RW AND MAX A  4. PT WILL COMPLETE B LE TE X 20 REPS  5. PT WILL GT TRAIN X 10' WITH RW AND MAX A                   PLAN:    Patient to be seen    to address the above listed problems via gait training, therapeutic activities, therapeutic exercises  Plan of Care expires: 20  Plan of Care reviewed with: patient         Nabeel Baez, PTA  2020

## 2020-08-01 NOTE — PROGRESS NOTES
[facility] -   Nephrology  Progress Note    Patient Name: Cata Lang  MRN: [id number]  Admission Date: 7/25/2020  Hospital Length of Stay: 6 days  Attending Provider: Abdoul Barragan MD   Primary Care Physician: Sabra Fregoso MD  Principal Problem:Calcified cerebral meningioma    Subjective:     HPI: Cata Lang is a pleasant 49-year-old  woman history of hypertension, type 2 diabetes, morbid obesity, seizure disorder, CKD stage 3, baseline creatinine about 1.3 mg/dL, GFR 50 mL/minute, intracranial mass / lesion, she presents to the emergency room yesterday with progressive complains of weakness, increasing headache, nausea, a repeat MRI of the brain is pending at this time, she was evaluated by Neurosurgery, also significant hypercalcemia with a serum calcium of 14 noted on presentation, patient is currently taking Tums, calcitriol, hydrochlorothiazide, also she possibly has a urinary tract infection, acute on chronic renal failure, serum creatinine was 1.9 on presentation, 2.5 this evening, she is currently receiving IV fluids, we were consulted for acute kidney injury, hypercalcemia    Interval History:     7/31/20: s/p brain tumor surgery today. Creatinine has improved to 1.7. Ca at 7.9 today (albumin 3.1).    8/1/20: patient is resting comfortably, no complaints at present.         Review of patient's allergies indicates:   Allergen Reactions    Hydrochlorothiazide      hypercalcemia     Current Facility-Administered Medications   Medication Frequency    0.9%  NaCl infusion Continuous    acetaminophen tablet 650 mg Q6H PRN    atorvastatin tablet 10 mg Daily    bacitracin injection PRN    bacitracin ointment PRN    cefazolin (ANCEF) 2 gram in dextrose 5% 50 mL IVPB (premix) Once    cefTRIAXone (ROCEPHIN) 1 g/50 mL D5W IVPB Q24H    dexamethasone injection 4 mg Q12H    dextrose 50% injection 12.5 g PRN    dextrose 50% injection 25 g PRN    gabapentin capsule 600 mg Daily     gelatin adsorbable 100cm top sponge 100 sponge PRN    gentamicin injection 160 mg Once    glucagon (human recombinant) injection 1 mg PRN    glucose chewable tablet 16 g PRN    glucose chewable tablet 24 g PRN    hydrALAZINE injection 10 mg Q8H PRN    hydrALAZINE tablet 25 mg Q8H    insulin aspart U-100 pen 1-10 Units QID (AC + HS) PRN    levETIRAcetam in NaCl (iso-os) IVPB 1,500 mg Q12H    levothyroxine tablet 137 mcg Before breakfast    lidocaine-EPINEPHrine 1%-1:100,000 injection PRN    mannitol 25% injection 65 g Once    ondansetron injection 4 mg Q6H PRN    pantoprazole EC tablet 40 mg Daily    potassium chloride 10 mEq in 100 mL IVPB Once    thrombin (bovine) solution PRN     Facility-Administered Medications Ordered in Other Encounters   Medication Frequency    0.9%  NaCl infusion Continuous PRN    dexamethasone injection PRN    esmoloL injection PRN    fentaNYL injection PRN    glycopyrrolate injection PRN    lactated ringers infusion Continuous PRN    lidocaine (cardiac) injection PRN    mannitol 25% injection PRN    midazolam injection PRN    neostigmine injection PRN    niCARdipine 40 mg/200 mL (0.2 mg/mL) infusion Continuous PRN    ondansetron injection PRN    phenylephrine injection PRN    propofol (DIPRIVAN) 10 mg/mL infusion PRN    remifentaniL (ULTIVA) 2 mg in sodium chloride 0.9% 100 mL infusion Continuous PRN    rocuronium injection PRN    succinylcholine injection PRN       Objective:     Vital Signs (Most Recent):  Temp: 98.4 °F (36.9 °C) (07/31/20 0342)  Pulse: 63 (07/31/20 0342)  Resp: 16 (07/31/20 0342)  BP: (!) 158/72 (07/31/20 0342)  SpO2: 95 % (07/31/20 0342)  O2 Device (Oxygen Therapy): room air (07/30/20 2128) Vital Signs (24h Range):  Temp:  [97.2 °F (36.2 °C)-98.4 °F (36.9 °C)] 98.4 °F (36.9 °C)  Pulse:  [62-77] 63  Resp:  [16-18] 16  SpO2:  [95 %-98 %] 95 %  BP: (144-160)/(67-80) 158/72     Weight: 129.8 kg (286 lb 2.5 oz) (07/26/20 0435)  Body mass  index is 41.06 kg/m².  Body surface area is 2.53 meters squared.    I/O last 3 completed shifts:  In: 1320 [P.O.:1320]  Out: 2300 [Urine:2300]    Physical Exam  Constitutional:       Appearance: She is well-developed.   HENT:      Head: Normocephalic.   Eyes:      Pupils: Pupils are equal, round, and reactive to light.   Neck:      Thyroid: No thyromegaly.   Cardiovascular:      Rate and Rhythm: Normal rate and regular rhythm.      Heart sounds: No friction rub.   Pulmonary:      Effort: Pulmonary effort is normal.      Breath sounds: Normal breath sounds. No wheezing.   Chest:      Chest wall: No tenderness.   Abdominal:      General: Bowel sounds are normal. There is no distension.      Palpations: Abdomen is soft.      Tenderness: There is no abdominal tenderness.   Lymphadenopathy:      Cervical: No cervical adenopathy.   Skin:     General: Skin is warm and dry.      Findings: No rash.   Neurological:      Mental Status: She is alert and oriented to person, place, and time.         Significant Labs:  Lab Results   Component Value Date    CREATININE 1.6 (H) 08/01/2020    BUN 30 (H) 08/01/2020     08/01/2020    K 3.8 08/01/2020     08/01/2020    CO2 19 (L) 08/01/2020     Lab Results   Component Value Date    PTH <5.0 (L) 07/26/2020    CALCIUM 7.2 (L) 08/01/2020    CAION 1.76 (H) 07/26/2020    PHOS 3.9 07/31/2020     Lab Results   Component Value Date    ALBUMIN 3.1 (L) 07/31/2020     Lab Results   Component Value Date    WBC 18.79 (H) 08/01/2020    HGB 7.4 (L) 08/01/2020    HCT 24.7 (L) 08/01/2020    MCV 82 08/01/2020     08/01/2020       Recent Labs   Lab 07/26/20  0452   MG 1.9         Significant Imaging:  Imaging Results    None           Assessment/Plan:     Hypokalemia  Has resolved.  Magnesium OK.     SHARLENE on CKD, stage III  SHARLENE on CKD stage 3 , baseline creatinine about 1.3 mg/dL, Ultrasound negative for any hydronephrosis.    Creatinine has improved to 1.6 today. Will monitor closely.  Agree with IV fluids.       Hypercalcemia  Patient had severe hypercalcemia upon admission.  Vitamin-D and calcium supplementations have been stopped. Parathyroid hormone level is low.  PTH related peptide is pending. Ca has declined to 7.2 today (albumin 3.1). Mild hypoglycemia, monitor for now. Will follow up on PTHrp levels.         Total ICU time: > 30 minutes. Majority of effort was employed for medical record evaluation and case discussion with ICU team.     Thank you for your consult. I will follow-up with patient. Please contact us if you have any additional questions.    Chandler Gould MD  Nephrology  [facility]

## 2020-08-01 NOTE — NURSING
Patient received on floor as ICU transfer, x4, in no acute distress. Vitals present within stable limits. Medications tolerated well. Dressing on transfer present with mild drainage, dressing remains intact. 24hr urine collect canister at bedside. Neuro checks completed q2hr as ordered; no new deficits/decline observed. Safety precautions re-educated; pt. verbalizes understanding. Will continue to monitor.

## 2020-08-01 NOTE — ASSESSMENT & PLAN NOTE
Now post craniotomy for resection  Neurosurgery following  Maintain BP control with cardene infusion  ICU neuro monitoring  OOB by am with PT/OT for rehab  Avoid anticoagulation for at least 72 hours

## 2020-08-01 NOTE — ASSESSMENT & PLAN NOTE
- Initial creatinine of 1.9 (baseline 1.3).  - IV hydration.  - Avoid nephrotoxic agents. Hold home losartan-HCTZ for now.  - Follow labs.    8/1:  Improving  Continue to monitor creatinine  Continue IVF  nephro consulted on case

## 2020-08-01 NOTE — PT/OT/SLP EVAL
Physical Therapy Evaluation    Patient Name:  Cata Lang   MRN:  [id number]    Recommendations:     Discharge Recommendations:  rehabilitation facility   Discharge Equipment Recommendations: walker, rolling, bedside commode, shower chair   Barriers to discharge: Decreased caregiver support    Assessment:     Cata Lang is a 49 y.o. female admitted with a medical diagnosis of Calcified cerebral meningioma.  She presents with the following impairments/functional limitations:  weakness, impaired endurance, impaired self care skills, impaired functional mobilty, gait instability, impaired balance, decreased coordination, decreased lower extremity function Pt will benefit from acute care PT.    Rehab Prognosis: Good; patient would benefit from acute skilled PT services to address these deficits and reach maximum level of function.    Recent Surgery: Procedure(s) (LRB):  CRANIOTOMY, WITH 3D STEREOTACTIC IMAGING GUIDANCE, REAL-TIME (Right)  EXCISION, NEOPLASM (N/A) 1 Day Post-Op    Plan:     During this hospitalization, patient to be seen 5 x/week(Will be seen a min of 5 out of 7 days a week as tolerated) to address the identified rehab impairments via therapeutic activities, gait training, therapeutic exercises and progress toward the following goals:    · Plan of Care Expires:  08/01/20    Subjective     Chief Complaint: weakness/dizziness  Patient/Family Comments/goals: improve mobility and safety  Pain/Comfort:  · Pain Rating 1: 0/10    Patients cultural, spiritual, Orthodox conflicts given the current situation:      Living Environment:  Prior to symptom onset.  Pt was ind with ambulation. Was driving. Not working. Lives with  and sone in one story house with one small step. Since 7/14 has been using scooter for mobility and needs assist with ADLS  Prior to admission, patients level of function was IND.  Equipment used at home: cane, straight, other (see comments)(scooter).  DME owned (not  currently used): none.  Upon discharge, patient will have assistance from .    Objective:     Communicated with Nurse guillermo and EPIC prior to session.  Patient found up in chair with    upon PT entry to room.    General Precautions: Standard, fall   Orthopedic Precautions:N/A   Braces:       Exams:  · RLE ROM: Deficits: slight deficit  · RLE Strength: Deficits: 3/5  · LLE ROM: Deficits: mod deficiti  · LLE Strength: Deficits: 2/5    Functional Mobility:     SIt to stand with Max A . Standing balance- poor.  Needs RW and assist due to severe forward sway.  Gait not tested today secondary to pt complaints of dizziness    Therapeutic Activities and Exercises:   Sit to stand x2 with max a and max VC. Stood 3 minutes for standing balance and LE WB.  Second trial perform 3 small steps in place with max A. Pt complaints of dizziness decreased throughout tx.  Ended with LE exercises of LAQ and seated heel/toe raises 2 x 10    AM-PAC 6 CLICK MOBILITY  Total Score:11     Patient left up in chair with all lines intact, call button in reach and  present.    GOALS:   Multidisciplinary Problems     Physical Therapy Goals        Problem: Physical Therapy Goal    Goal Priority Disciplines Outcome Goal Variances Interventions   Physical Therapy Goal     PT, PT/OT Ongoing, Progressing     Description: PT WILL BE SEEN FOR P.T. FOR A MIN OF 5 OUT OF 7 DAYS A WEEK  LT20  1. PT WILL COMPLETE BED MOBILITY WITH MIN A   2. PT WILL T/F TO CHAIR WITH RW AND MAX A  3. PT WILL SIT>STAND WITH RW AND MAX A  4. PT WILL COMPLETE B LE TE X 20 REPS  5. PT WILL GT TRAIN X 10' WITH RW AND MAX A                   History:     Past Medical History:   Diagnosis Date    Diabetes mellitus     Hypertension     Seizures     Stroke     Thyroid disease        Past Surgical History:   Procedure Laterality Date    THYROIDECTOMY      TONSILLECTOMY         Time Tracking:     PT Received On: 20  PT Start Time: 1800     PT Stop  Time: 0830  PT Total Time (min): 870 min     Billable Minutes: Evaluation 15 and Therapeutic Activity 15      Ishan Bernard, PT  08/01/2020

## 2020-08-01 NOTE — PLAN OF CARE
PT eval complete.  The following goals will be met in 7 days   Pt will be ind with bed moblity  Pt will transfer bed to chair with RW with SBA  Pt ambulate 125 feet with RW with SBA  Pt tolerate 15 reps of LE exercises

## 2020-08-01 NOTE — SUBJECTIVE & OBJECTIVE
Interval History:  Somnolent but easily awakens.  Stable post procedure.  Left sided upper and lower extremity weakness.    Review of Systems   Constitutional: Negative for chills and fever.   HENT: Negative for congestion and sore throat.    Eyes: Negative for visual disturbance.   Respiratory: Negative for cough, shortness of breath and wheezing.    Cardiovascular: Negative for chest pain, palpitations and leg swelling.   Gastrointestinal: Negative for abdominal pain, blood in stool, constipation, diarrhea, nausea and vomiting.   Genitourinary: Negative for dysuria and hematuria.   Musculoskeletal: Negative for arthralgias, back pain and gait problem.   Skin: Negative for rash and wound.   Neurological: Negative for dizziness, weakness, light-headedness, numbness and headaches.   Hematological: Negative for adenopathy.   Psychiatric/Behavioral: Negative for confusion.     Objective:     Vital Signs (Most Recent):  Temp: 97.9 °F (36.6 °C) (07/31/20 1905)  Pulse: 69 (07/31/20 2105)  Resp: 17 (07/31/20 2105)  BP: (!) 134/40 (07/31/20 2105)  SpO2: (!) 94 % (07/31/20 2105) Vital Signs (24h Range):  Temp:  [97.8 °F (36.6 °C)-98.4 °F (36.9 °C)] 97.9 °F (36.6 °C)  Pulse:  [62-95] 69  Resp:  [12-25] 17  SpO2:  [90 %-97 %] 94 %  BP: (134-161)/(40-80) 134/40  Arterial Line BP: (140-160)/(52-62) 151/57     Weight: 129.8 kg (286 lb 2.5 oz)  Body mass index is 41.06 kg/m².    Intake/Output Summary (Last 24 hours) at 7/31/2020 2147  Last data filed at 7/31/2020 2100  Gross per 24 hour   Intake 3380.79 ml   Output 2950 ml   Net 430.79 ml      Physical Exam  Vitals signs and nursing note reviewed.   Constitutional:       General: She is not in acute distress.     Appearance: She is well-developed.   HENT:      Head: Normocephalic.      Comments: Surgical site dressing clean and intact.  MARGI drain with sero-sanguinous fluid.  Eyes:      Conjunctiva/sclera: Conjunctivae normal.      Pupils: Pupils are equal, round, and reactive to  light.   Neck:      Musculoskeletal: Neck supple.      Thyroid: No thyromegaly.      Vascular: No JVD.   Cardiovascular:      Rate and Rhythm: Normal rate and regular rhythm.      Heart sounds: No murmur. No friction rub. No gallop.       Comments: Right IJV CVL intact and functional.  Pulmonary:      Effort: Pulmonary effort is normal.      Breath sounds: Normal breath sounds. No wheezing or rales.   Abdominal:      General: Bowel sounds are normal. There is no distension.      Palpations: Abdomen is soft.      Tenderness: There is no abdominal tenderness. There is no guarding or rebound.   Musculoskeletal: Normal range of motion.         General: No deformity.   Lymphadenopathy:      Cervical: No cervical adenopathy.   Skin:     General: Skin is warm and dry.      Findings: No rash.   Neurological:      Mental Status: She is alert and oriented to person, place, and time.      Deep Tendon Reflexes: Reflexes are normal and symmetric.      Comments: Left upper extremity 4/5 strength and pronator drift.   Psychiatric:         Behavior: Behavior normal.         Thought Content: Thought content normal.         Judgment: Judgment normal.         Significant Labs: All pertinent labs within the past 24 hours have been reviewed.    Significant Imaging: I have reviewed all pertinent imaging results/findings within the past 24 hours.

## 2020-08-01 NOTE — ASSESSMENT & PLAN NOTE
Initial calcium of 14.2.  Patient is on calitriol at home, will discontinue.  Hold Calcium supplementation.  Continue aggressive IV hydration.  Will give additional IV Lasix if needed.  Nephrology evaluated and assisting with management.  PTH is appropriately suppressed and Vitamin D25 and Calcitriol are low.  PTHrP pending.    8/1:  Hypercalcemia workup unremarkable thus far  Will need to rule out multiple myeloma  SPEP, UPEP, immunofixation ordered  Heme/onc on case

## 2020-08-01 NOTE — PLAN OF CARE
MAKING PROGRESS WITH TRANSFERS AND WITH STANDING. DIFFICULTY MOVING L LEG MORE THAN R LEG. NEEDED TO SIT 1/2 THROUGH TRANSFER FROM BED TO CHAIR DUE TO BACK PAIN AND FATIGUE.

## 2020-08-01 NOTE — OP NOTE
Cata Lang is a 49 y.o. female patient.   1. Hypercalcemia    2. SHARLENE (acute kidney injury)    3. Chronic nonintractable headache, unspecified headache type    4. Neoplasm of brain causing mass effect on adjacent structures    5. Neoplasm of central nervous system    6. Brain mass    7. Preop testing    8. Calcified cerebral meningioma      Past Medical History:   Diagnosis Date    Diabetes mellitus     Hypertension     Seizures     Stroke     Thyroid disease      No past surgical history pertinent negatives on file.  Scheduled Meds:   atorvastatin  10 mg Oral Daily    cefTRIAXone (ROCEPHIN) IVPB  1 g Intravenous Q24H    dexamethasone  4 mg Intravenous Q6H    gabapentin  600 mg Oral Daily    [START ON 8/3/2020] heparin (porcine)  5,000 Units Subcutaneous Q8H    hydrALAZINE  25 mg Oral Q8H    levetiracetam IVPB  1,500 mg Intravenous Q12H    levothyroxine  137 mcg Oral Before breakfast    pantoprazole  40 mg Intravenous Daily     Continuous Infusions:   sodium chloride 0.9% 100 mL/hr at 08/01/20 0610    niCARdipine Stopped (08/01/20 0600)     PRN Meds:acetaminophen, dextrose 50%, dextrose 50%, glucagon (human recombinant), glucose, glucose, hydrALAZINE, HYDROcodone-acetaminophen, HYDROcodone-acetaminophen, HYDROmorphone, insulin aspart U-100, morphine, ondansetron, ondansetron, oxyCODONE-acetaminophen    Review of patient's allergies indicates:   Allergen Reactions    Hydrochlorothiazide      hypercalcemia     Active Hospital Problems    Diagnosis  POA    *Calcified cerebral meningioma [D32.0]  Yes     Priority: 1 - High     Chronic    Hypokalemia [E87.6]  No    Diabetes mellitus due to underlying condition with hyperglycemia, without long-term current use of insulin [E08.65]  Yes    Metabolic encephalopathy [G93.41]  Yes    Hypercalcemia [E83.52]  Yes    SHARLENE on CKD, stage III [N17.9]  Yes    Essential hypertension [I10]  Yes     Chronic      Resolved Hospital Problems   No resolved  "problems to display.     Blood pressure (!) 163/48, pulse 65, temperature 98.2 °F (36.8 °C), temperature source Oral, resp. rate 10, height 5' 10" (1.778 m), weight 129.8 kg (286 lb 2.5 oz), SpO2 96 %, not currently breastfeeding.    Subjective:   Diet: Adequate intake.  Patient reports no nausea or vomiting.    Activity level: Returning to normal.    Pain control: Well controlled.    Wound: Subjective wound complaints: No complaints regarding wound, climbs postop pain well controlled.      Objective:  Vital signs (most recent): Blood pressure (!) 163/48, pulse 65, temperature 98.2 °F (36.8 °C), temperature source Oral, resp. rate 10, height 5' 10" (1.778 m), weight 129.8 kg (286 lb 2.5 oz), SpO2 96 %, not currently breastfeeding.  General appearance: Comfortable and in no acute distress.    Lungs:  Normal respiratory rate and normal effort.  She is in no respiratory distress.    Heart: Normal rate.  Regular rhythm.    Abdomen: Abdomen is soft.  No distension.    Bowel sounds:  Bowel sounds are normal.    Tenderness: There is no abdominal tenderness tenderness.    Wound:  Clean.  There is no dehiscence.  There is no drainage.    Extremities: There is normal range of motion.    Neurological: The patient is alert and oriented to person, place and time.  GCS score: 15.  Positive for  right hemiparesis.  (Awake and alert.  Speech fluent.  Mild pronator drift on left.).  Pupils are equal, round, and reactive to light.       Assessment:   Post-op: 1 day.    Condition: In stable condition.     Plan:  Transfer to floor.  Encourage ambulation and out of bed and up to chair (Physical therapy/occupational therapy to begin with mobilization and ADL training).  Wound care plan: Will leave wound open to air.  Drain has been removed.  Start/continue incentive spirometry.  Discontinue drain.  Consults: physical therapy.  Advance diet as tolerated.  Imaging Plan: CT scan of the head demonstrates interval resection of the right " frontal mass.  Mass effect and midline shift have resolved.  No complicating features.  Resume oral medications.           Jose Hagen MD  8/1/2020

## 2020-08-01 NOTE — NURSING
1143 Telephone report given to MATTY Alegria. Pt up to chair, A&Ox4, neuro intact. Pt spouse @bedside. 1200 Pt transfer via wheelchair to room 504. RN present upon arrival. Personal belongings with pt.

## 2020-08-01 NOTE — HOSPITAL COURSE
Transferred to ICU from OR with CL, arterial line in place on cardene infusion for BP control; seen and evaluated multiple times this afternoon  8/1 - up in chair this morning; cardene infusion off since 5am; worked with PT/OT and tolerating po intake; hyperglycemic overnight

## 2020-08-02 PROBLEM — E83.51 HYPOCALCEMIA: Status: ACTIVE | Noted: 2020-08-02

## 2020-08-02 PROBLEM — D50.9 IRON DEFICIENCY ANEMIA: Status: ACTIVE | Noted: 2020-08-02

## 2020-08-02 LAB
ANION GAP SERPL CALC-SCNC: 14 MMOL/L (ref 8–16)
BASOPHILS # BLD AUTO: 0.03 K/UL (ref 0–0.2)
BASOPHILS NFR BLD: 0.1 % (ref 0–1.9)
BUN SERPL-MCNC: 36 MG/DL (ref 6–20)
CALCIUM SERPL-MCNC: 6.7 MG/DL (ref 8.7–10.5)
CHLORIDE SERPL-SCNC: 104 MMOL/L (ref 95–110)
CO2 SERPL-SCNC: 15 MMOL/L (ref 23–29)
CREAT SERPL-MCNC: 1.5 MG/DL (ref 0.5–1.4)
DIFFERENTIAL METHOD: ABNORMAL
EOSINOPHIL # BLD AUTO: 0 K/UL (ref 0–0.5)
EOSINOPHIL NFR BLD: 0 % (ref 0–8)
ERYTHROCYTE [DISTWIDTH] IN BLOOD BY AUTOMATED COUNT: 16 % (ref 11.5–14.5)
EST. GFR  (AFRICAN AMERICAN): 46 ML/MIN/1.73 M^2
EST. GFR  (NON AFRICAN AMERICAN): 40 ML/MIN/1.73 M^2
GLUCOSE SERPL-MCNC: 227 MG/DL (ref 70–110)
HCT VFR BLD AUTO: 23.2 % (ref 37–48.5)
HGB BLD-MCNC: 7.1 G/DL (ref 12–16)
IMM GRANULOCYTES # BLD AUTO: 0.43 K/UL (ref 0–0.04)
IMM GRANULOCYTES NFR BLD AUTO: 1.9 % (ref 0–0.5)
LYMPHOCYTES # BLD AUTO: 2 K/UL (ref 1–4.8)
LYMPHOCYTES NFR BLD: 8.6 % (ref 18–48)
MCH RBC QN AUTO: 24.8 PG (ref 27–31)
MCHC RBC AUTO-ENTMCNC: 30.6 G/DL (ref 32–36)
MCV RBC AUTO: 81 FL (ref 82–98)
MONOCYTES # BLD AUTO: 1.2 K/UL (ref 0.3–1)
MONOCYTES NFR BLD: 5.1 % (ref 4–15)
NEUTROPHILS # BLD AUTO: 19.1 K/UL (ref 1.8–7.7)
NEUTROPHILS NFR BLD: 84.3 % (ref 38–73)
NRBC BLD-RTO: 0 /100 WBC
PLATELET # BLD AUTO: 123 K/UL (ref 150–350)
PMV BLD AUTO: 11.6 FL (ref 9.2–12.9)
POCT GLUCOSE: 136 MG/DL (ref 70–110)
POCT GLUCOSE: 144 MG/DL (ref 70–110)
POCT GLUCOSE: 150 MG/DL (ref 70–110)
POCT GLUCOSE: 154 MG/DL (ref 70–110)
POTASSIUM SERPL-SCNC: 3.8 MMOL/L (ref 3.5–5.1)
RBC # BLD AUTO: 2.86 M/UL (ref 4–5.4)
SODIUM SERPL-SCNC: 133 MMOL/L (ref 136–145)
WBC # BLD AUTO: 22.68 K/UL (ref 3.9–12.7)

## 2020-08-02 PROCEDURE — 83970 ASSAY OF PARATHORMONE: CPT

## 2020-08-02 PROCEDURE — 63600175 PHARM REV CODE 636 W HCPCS: Performed by: INTERNAL MEDICINE

## 2020-08-02 PROCEDURE — 99233 PR SUBSEQUENT HOSPITAL CARE,LEVL III: ICD-10-PCS | Mod: ,,, | Performed by: INTERNAL MEDICINE

## 2020-08-02 PROCEDURE — 63600175 PHARM REV CODE 636 W HCPCS: Performed by: PHYSICIAN ASSISTANT

## 2020-08-02 PROCEDURE — 25000003 PHARM REV CODE 250: Performed by: INTERNAL MEDICINE

## 2020-08-02 PROCEDURE — 25000003 PHARM REV CODE 250: Performed by: FAMILY MEDICINE

## 2020-08-02 PROCEDURE — 99233 SBSQ HOSP IP/OBS HIGH 50: CPT | Mod: ,,, | Performed by: INTERNAL MEDICINE

## 2020-08-02 PROCEDURE — 63600175 PHARM REV CODE 636 W HCPCS: Performed by: FAMILY MEDICINE

## 2020-08-02 PROCEDURE — 99024 PR POST-OP FOLLOW-UP VISIT: ICD-10-PCS | Mod: ,,, | Performed by: NEUROLOGICAL SURGERY

## 2020-08-02 PROCEDURE — 97530 THERAPEUTIC ACTIVITIES: CPT | Mod: CQ

## 2020-08-02 PROCEDURE — 94799 UNLISTED PULMONARY SVC/PX: CPT

## 2020-08-02 PROCEDURE — 85025 COMPLETE CBC W/AUTO DIFF WBC: CPT

## 2020-08-02 PROCEDURE — 99024 POSTOP FOLLOW-UP VISIT: CPT | Mod: ,,, | Performed by: NEUROLOGICAL SURGERY

## 2020-08-02 PROCEDURE — 25000003 PHARM REV CODE 250: Performed by: NEUROLOGICAL SURGERY

## 2020-08-02 PROCEDURE — 99900035 HC TECH TIME PER 15 MIN (STAT)

## 2020-08-02 PROCEDURE — 97535 SELF CARE MNGMENT TRAINING: CPT

## 2020-08-02 PROCEDURE — 80048 BASIC METABOLIC PNL TOTAL CA: CPT

## 2020-08-02 PROCEDURE — 25000003 PHARM REV CODE 250: Performed by: PHYSICIAN ASSISTANT

## 2020-08-02 PROCEDURE — 99231 PR SUBSEQUENT HOSPITAL CARE,LEVL I: ICD-10-PCS | Mod: ,,, | Performed by: INTERNAL MEDICINE

## 2020-08-02 PROCEDURE — 36415 COLL VENOUS BLD VENIPUNCTURE: CPT

## 2020-08-02 PROCEDURE — 11000001 HC ACUTE MED/SURG PRIVATE ROOM

## 2020-08-02 PROCEDURE — 99231 SBSQ HOSP IP/OBS SF/LOW 25: CPT | Mod: ,,, | Performed by: INTERNAL MEDICINE

## 2020-08-02 PROCEDURE — 25000003 PHARM REV CODE 250: Performed by: NURSE PRACTITIONER

## 2020-08-02 PROCEDURE — 97530 THERAPEUTIC ACTIVITIES: CPT

## 2020-08-02 RX ORDER — POLYETHYLENE GLYCOL 3350 17 G/17G
17 POWDER, FOR SOLUTION ORAL 2 TIMES DAILY PRN
Status: DISCONTINUED | OUTPATIENT
Start: 2020-08-02 | End: 2020-08-06 | Stop reason: HOSPADM

## 2020-08-02 RX ORDER — FERROUS SULFATE 325(65) MG
325 TABLET, DELAYED RELEASE (ENTERIC COATED) ORAL 2 TIMES DAILY
Status: DISCONTINUED | OUTPATIENT
Start: 2020-08-02 | End: 2020-08-06 | Stop reason: HOSPADM

## 2020-08-02 RX ORDER — DEXAMETHASONE 1 MG/1
2 TABLET ORAL EVERY 12 HOURS
Status: DISCONTINUED | OUTPATIENT
Start: 2020-08-02 | End: 2020-08-03

## 2020-08-02 RX ADMIN — LEVETIRACETAM 1500 MG: 500 TABLET ORAL at 08:08

## 2020-08-02 RX ADMIN — IRON SUCROSE 100 MG: 20 INJECTION, SOLUTION INTRAVENOUS at 04:08

## 2020-08-02 RX ADMIN — LEVOTHYROXINE SODIUM 137 MCG: 25 TABLET ORAL at 05:08

## 2020-08-02 RX ADMIN — FERROUS SULFATE TAB EC 325 MG (65 MG FE EQUIVALENT) 325 MG: 325 (65 FE) TABLET DELAYED RESPONSE at 08:08

## 2020-08-02 RX ADMIN — GABAPENTIN 600 MG: 300 CAPSULE ORAL at 08:08

## 2020-08-02 RX ADMIN — IRON SUCROSE 100 MG: 20 INJECTION, SOLUTION INTRAVENOUS at 08:08

## 2020-08-02 RX ADMIN — HYPROMELLOSE 2910 1 DROP: 5 SOLUTION OPHTHALMIC at 07:08

## 2020-08-02 RX ADMIN — HYDRALAZINE HYDROCHLORIDE 25 MG: 25 TABLET, FILM COATED ORAL at 05:08

## 2020-08-02 RX ADMIN — HYDROCODONE BITARTRATE AND ACETAMINOPHEN 1 TABLET: 10; 325 TABLET ORAL at 07:08

## 2020-08-02 RX ADMIN — HYPROMELLOSE 2910 1 DROP: 5 SOLUTION OPHTHALMIC at 11:08

## 2020-08-02 RX ADMIN — PANTOPRAZOLE SODIUM 40 MG: 40 TABLET, DELAYED RELEASE ORAL at 08:08

## 2020-08-02 RX ADMIN — DEXAMETHASONE 2 MG: 1 TABLET ORAL at 08:08

## 2020-08-02 RX ADMIN — INSULIN ASPART 2 UNITS: 100 INJECTION, SOLUTION INTRAVENOUS; SUBCUTANEOUS at 04:08

## 2020-08-02 RX ADMIN — CALCIUM GLUCONATE 1000 MG: 98 INJECTION, SOLUTION INTRAVENOUS at 12:08

## 2020-08-02 RX ADMIN — ATORVASTATIN CALCIUM 10 MG: 10 TABLET, FILM COATED ORAL at 08:08

## 2020-08-02 RX ADMIN — POLYETHYLENE GLYCOL 3350 17 G: 17 POWDER, FOR SOLUTION ORAL at 07:08

## 2020-08-02 RX ADMIN — HYDRALAZINE HYDROCHLORIDE 25 MG: 25 TABLET, FILM COATED ORAL at 11:08

## 2020-08-02 RX ADMIN — HYDROCODONE BITARTRATE AND ACETAMINOPHEN 1 TABLET: 10; 325 TABLET ORAL at 03:08

## 2020-08-02 RX ADMIN — HYDROCODONE BITARTRATE AND ACETAMINOPHEN 1 TABLET: 10; 325 TABLET ORAL at 01:08

## 2020-08-02 RX ADMIN — HYDRALAZINE HYDROCHLORIDE 25 MG: 25 TABLET, FILM COATED ORAL at 01:08

## 2020-08-02 RX ADMIN — DEXAMETHASONE 4 MG: 4 TABLET ORAL at 08:08

## 2020-08-02 NOTE — SUBJECTIVE & OBJECTIVE
Interval History: no seizures.  S/p iv iron.    Oncology Treatment Plan:   [No treatment plan]    Medications:  Continuous Infusions:  Scheduled Meds:   atorvastatin  10 mg Oral Daily    dexAMETHasone  2 mg Oral Q12H    [START ON 8/3/2020] epoetin laxmi-ebpx (RETACRIT) injection  5,000 Units Subcutaneous Every Mon, Wed, Fri    ferrous sulfate  325 mg Oral BID    gabapentin  600 mg Oral Daily    hydrALAZINE  25 mg Oral Q8H    levETIRAcetam  1,500 mg Oral BID    levothyroxine  137 mcg Oral Before breakfast    pantoprazole  40 mg Oral Daily     PRN Meds:acetaminophen, artificial tears, dextrose 50%, dextrose 50%, glucagon (human recombinant), glucose, glucose, hydrALAZINE, HYDROcodone-acetaminophen, HYDROcodone-acetaminophen, HYDROmorphone, insulin aspart U-100, morphine, ondansetron, ondansetron, oxyCODONE-acetaminophen     Review of Systems   Constitutional: Negative.    HENT: Negative.    Eyes: Negative.    Respiratory: Negative.    Cardiovascular: Negative.    Gastrointestinal: Negative.    Endocrine: Negative.    Genitourinary: Negative.    Musculoskeletal: Negative.    Skin: Negative.    Allergic/Immunologic: Negative.    Neurological: Negative.    Hematological: Negative.    Psychiatric/Behavioral: Negative.      Objective:     Vital Signs (Most Recent):  Temp: 97.5 °F (36.4 °C) (08/02/20 1128)  Pulse: 69 (08/02/20 1128)  Resp: 18 (08/02/20 1328)  BP: (!) 146/65 (08/02/20 1128)  SpO2: 97 % (08/02/20 1128) Vital Signs (24h Range):  Temp:  [97.5 °F (36.4 °C)-98.6 °F (37 °C)] 97.5 °F (36.4 °C)  Pulse:  [62-69] 69  Resp:  [15-18] 18  SpO2:  [95 %-98 %] 97 %  BP: (137-162)/(62-68) 146/65     Weight: 129.8 kg (286 lb 2.5 oz)  Body mass index is 41.06 kg/m².  Body surface area is 2.53 meters squared.      Intake/Output Summary (Last 24 hours) at 8/2/2020 1347  Last data filed at 8/1/2020 2240  Gross per 24 hour   Intake --   Output 1000 ml   Net -1000 ml       Physical Exam  Vitals signs and nursing note  reviewed.   Constitutional:       Appearance: She is well-developed.   HENT:      Head: Normocephalic and atraumatic.   Eyes:      Conjunctiva/sclera: Conjunctivae normal.   Neck:      Musculoskeletal: Normal range of motion and neck supple.   Cardiovascular:      Rate and Rhythm: Normal rate and regular rhythm.   Pulmonary:      Effort: Pulmonary effort is normal.      Breath sounds: Normal breath sounds.   Abdominal:      General: Bowel sounds are normal.      Palpations: Abdomen is soft.   Musculoskeletal: Normal range of motion.   Skin:     General: Skin is warm and dry.   Neurological:      Mental Status: She is alert and oriented to person, place, and time.   Psychiatric:         Behavior: Behavior normal.         Thought Content: Thought content normal.         Judgment: Judgment normal.         Significant Labs:   All pertinent labs from the last 24 hours have been reviewed.    Diagnostic Results:  I have reviewed all pertinent imaging results/findings within the past 24 hours.

## 2020-08-02 NOTE — ASSESSMENT & PLAN NOTE
Patient had severe hypercalcemia upon admission.  Vitamin-D and calcium supplementations have been stopped. Parathyroid hormone level is low. Ca has declined to 6.7 today (albumin 3.1). Will replete calcium. PTHrp level was < 0.4 (not elevated).

## 2020-08-02 NOTE — NURSING
Informed in report that urine collection started at 9:30 am. Upon assessing pt, noticed pt's urine collection jug was empty. Pt was unsure when she last voided. Chart review showed no UOP since 0930 however urine was not collected. Bladder scan showed 904 mL. Informed hospital medicine and informed of the delay in urine collection. Straight cath ordered and 1000 mL emptied and placed in orange jug on ice with new sign on door. Will reassess in 6-8 hours.

## 2020-08-02 NOTE — ASSESSMENT & PLAN NOTE
SHARLENE on CKD stage 3 , baseline creatinine about 1.3 mg/dL, Ultrasound negative for any hydronephrosis.    Creatinine has improved to 1.5 today. Will monitor closely.

## 2020-08-02 NOTE — SUBJECTIVE & OBJECTIVE
Interval History:     7/31/20: s/p brain tumor surgery today. Creatinine has improved to 1.7. Ca at 7.9 today (albumin 3.1).    8/1/20: patient is resting comfortably, no complaints at present.     8/2/20: Calcium has declined to 6.7 (albumin 3.1). Creatinine at 1.5. Patient resting in chair, no complaints at present.         Review of patient's allergies indicates:   Allergen Reactions    Hydrochlorothiazide      hypercalcemia     Current Facility-Administered Medications   Medication Frequency    acetaminophen tablet 650 mg Q6H PRN    artificial tears 0.5 % ophthalmic solution 1 drop PRN    atorvastatin tablet 10 mg Daily    dexAMETHasone tablet 2 mg Q12H    dextrose 50% injection 12.5 g PRN    dextrose 50% injection 25 g PRN    ferrous sulfate EC tablet 325 mg BID    gabapentin capsule 600 mg Daily    glucagon (human recombinant) injection 1 mg PRN    glucose chewable tablet 16 g PRN    glucose chewable tablet 24 g PRN    hydrALAZINE injection 10 mg Q8H PRN    hydrALAZINE tablet 25 mg Q8H    HYDROcodone-acetaminophen  mg per tablet 1 tablet Q4H PRN    HYDROcodone-acetaminophen 5-325 mg per tablet 1 tablet Q4H PRN    hydromorphone (PF) injection 0.2 mg Q5 Min PRN    insulin aspart U-100 pen 1-10 Units QID (AC + HS) PRN    levETIRAcetam tablet 1,500 mg BID    levothyroxine tablet 137 mcg Before breakfast    morphine injection 2 mg Q3H PRN    ondansetron injection 4 mg Daily PRN    ondansetron injection 4 mg Q6H PRN    oxyCODONE-acetaminophen 5-325 mg per tablet 1 tablet Q3H PRN    pantoprazole EC tablet 40 mg Daily       Objective:     Vital Signs (Most Recent):  Temp: 98.6 °F (37 °C) (08/02/20 0718)  Pulse: 63 (08/02/20 0718)  Resp: 15 (08/02/20 0718)  BP: 137/62 (08/02/20 0718)  SpO2: 97 % (08/02/20 0718)  O2 Device (Oxygen Therapy): room air (08/01/20 2114) Vital Signs (24h Range):  Temp:  [96.5 °F (35.8 °C)-98.6 °F (37 °C)] 98.6 °F (37 °C)  Pulse:  [62-78] 63  Resp:  [15-18]  15  SpO2:  [95 %-98 %] 97 %  BP: (136-162)/(62-68) 137/62     Weight: 129.8 kg (286 lb 2.5 oz) (07/26/20 0435)  Body mass index is 41.06 kg/m².  Body surface area is 2.53 meters squared.    I/O last 3 completed shifts:  In: 3782.7 [P.O.:1420; I.V.:2262.7; IV Piggyback:100]  Out: 2790 [Urine:2540; Drains:250]    Physical Exam  Constitutional:       Appearance: She is well-developed.   HENT:      Head: Normocephalic.      Comments: S/p brain surgery with forehead scar.   Eyes:      Pupils: Pupils are equal, round, and reactive to light.      Comments: Right eye swollen.    Neck:      Thyroid: No thyromegaly.   Cardiovascular:      Rate and Rhythm: Normal rate and regular rhythm.      Heart sounds: No friction rub.   Pulmonary:      Effort: Pulmonary effort is normal.      Breath sounds: Normal breath sounds. No wheezing.   Chest:      Chest wall: No tenderness.   Abdominal:      General: Bowel sounds are normal. There is no distension.      Palpations: Abdomen is soft.      Tenderness: There is no abdominal tenderness.   Musculoskeletal:      Comments: Trace LE edema.    Lymphadenopathy:      Cervical: No cervical adenopathy.   Skin:     General: Skin is warm and dry.      Findings: No rash.   Neurological:      Mental Status: She is alert and oriented to person, place, and time.         Significant Labs:  Lab Results   Component Value Date    CREATININE 1.5 (H) 08/02/2020    BUN 36 (H) 08/02/2020     (L) 08/02/2020    K 3.8 08/02/2020     08/02/2020    CO2 15 (L) 08/02/2020     Lab Results   Component Value Date    PTH <5.0 (L) 07/26/2020    CALCIUM 6.7 (LL) 08/02/2020    CAION 1.76 (H) 07/26/2020    PHOS 3.9 07/31/2020     Lab Results   Component Value Date    ALBUMIN 3.1 (L) 07/31/2020     Lab Results   Component Value Date    WBC 22.68 (H) 08/02/2020    HGB 7.1 (L) 08/02/2020    HCT 23.2 (L) 08/02/2020    MCV 81 (L) 08/02/2020     (L) 08/02/2020       No results for input(s): MG in the last 168  hours.    PTH-rp: < 0.4 (7/27/20)        Significant Imaging:  Imaging Results    None

## 2020-08-02 NOTE — ASSESSMENT & PLAN NOTE
8/2:  Low iron sat with low ferritin   venofer given once  Po ferrous sulfate given  Cont to monitor h/h

## 2020-08-02 NOTE — HPI
Mrs. Cata Lang is a 48 yo woman with history that is significant for goiter status post total thyroidectomy 5 years ago, hypertension, type 2 diabetes, seizure disorder, CKD stage 3, intracranial mass who presented to the emergency room with complaint of weakness headache nausea.  MRI revealed mass lesion within the anterior right cranial fossa suggestive of meningioma, there was also right to left midline shift of 6 mm.  Two foci of T2 hyperintense signal were noted in the left occipital lobe and thought to be related to old vascular insults.     Initial by chemical evaluation revealed serum calcium level of 14.2 mg/dL.  There was also evidence of acute kidney injury and normocytic anemia.  Given the above findings there was thought to rule out plasma cell dyscrasia.  Hematology was consulted.     Upon interview with patient and her , she stated that following the total thyroidectomy 5 years ago, she has been on 6000 units of daily calcium supplement.  She denies unintentional weight loss, night sweats, palpable lymph nodes or mass, abnormal bleed.    Recently had craniotomy on 7/31 with resection of right frontal mass. Pathology pending.

## 2020-08-02 NOTE — PROGRESS NOTES
[facility] -   Hematology/Oncology  Progress Note    Patient Name: Cata Lang  Admission Date: 7/25/2020  Hospital Length of Stay: 7 days  Code Status: Full Code     Subjective:     HPI:  Mrs. Cata Lang is a 48 yo woman with history that is significant for goiter status post total thyroidectomy 5 years ago, hypertension, type 2 diabetes, seizure disorder, CKD stage 3, intracranial mass who presented to the emergency room with complaint of weakness headache nausea.  MRI revealed mass lesion within the anterior right cranial fossa suggestive of meningioma, there was also right to left midline shift of 6 mm.  Two foci of T2 hyperintense signal were noted in the left occipital lobe and thought to be related to old vascular insults.     Initial by chemical evaluation revealed serum calcium level of 14.2 mg/dL.  There was also evidence of acute kidney injury and normocytic anemia.  Given the above findings there was thought to rule out plasma cell dyscrasia.  Hematology was consulted.     Upon interview with patient and her , she stated that following the total thyroidectomy 5 years ago, she has been on 6000 units of daily calcium supplement.  She denies unintentional weight loss, night sweats, palpable lymph nodes or mass, abnormal bleed.    Recently had craniotomy on 7/31 with resection of right frontal mass. Pathology pending.       Interval History: no seizures.  S/p iv iron.    Oncology Treatment Plan:   [No treatment plan]    Medications:  Continuous Infusions:  Scheduled Meds:   atorvastatin  10 mg Oral Daily    dexAMETHasone  2 mg Oral Q12H    [START ON 8/3/2020] epoetin laxmi-ebpx (RETACRIT) injection  5,000 Units Subcutaneous Every Mon, Wed, Fri    ferrous sulfate  325 mg Oral BID    gabapentin  600 mg Oral Daily    hydrALAZINE  25 mg Oral Q8H    levETIRAcetam  1,500 mg Oral BID    levothyroxine  137 mcg Oral Before breakfast    pantoprazole  40 mg Oral Daily     PRN  Meds:acetaminophen, artificial tears, dextrose 50%, dextrose 50%, glucagon (human recombinant), glucose, glucose, hydrALAZINE, HYDROcodone-acetaminophen, HYDROcodone-acetaminophen, HYDROmorphone, insulin aspart U-100, morphine, ondansetron, ondansetron, oxyCODONE-acetaminophen     Review of Systems   Constitutional: Negative.    HENT: Negative.    Eyes: Negative.    Respiratory: Negative.    Cardiovascular: Negative.    Gastrointestinal: Negative.    Endocrine: Negative.    Genitourinary: Negative.    Musculoskeletal: Negative.    Skin: Negative.    Allergic/Immunologic: Negative.    Neurological: Negative.    Hematological: Negative.    Psychiatric/Behavioral: Negative.      Objective:     Vital Signs (Most Recent):  Temp: 97.5 °F (36.4 °C) (08/02/20 1128)  Pulse: 69 (08/02/20 1128)  Resp: 18 (08/02/20 1328)  BP: (!) 146/65 (08/02/20 1128)  SpO2: 97 % (08/02/20 1128) Vital Signs (24h Range):  Temp:  [97.5 °F (36.4 °C)-98.6 °F (37 °C)] 97.5 °F (36.4 °C)  Pulse:  [62-69] 69  Resp:  [15-18] 18  SpO2:  [95 %-98 %] 97 %  BP: (137-162)/(62-68) 146/65     Weight: 129.8 kg (286 lb 2.5 oz)  Body mass index is 41.06 kg/m².  Body surface area is 2.53 meters squared.      Intake/Output Summary (Last 24 hours) at 8/2/2020 1347  Last data filed at 8/1/2020 2240  Gross per 24 hour   Intake --   Output 1000 ml   Net -1000 ml       Physical Exam  Vitals signs and nursing note reviewed.   Constitutional:       Appearance: She is well-developed.   HENT:      Head: Normocephalic and atraumatic.   Eyes:      Conjunctiva/sclera: Conjunctivae normal.   Neck:      Musculoskeletal: Normal range of motion and neck supple.   Cardiovascular:      Rate and Rhythm: Normal rate and regular rhythm.   Pulmonary:      Effort: Pulmonary effort is normal.      Breath sounds: Normal breath sounds.   Abdominal:      General: Bowel sounds are normal.      Palpations: Abdomen is soft.   Musculoskeletal: Normal range of motion.   Skin:     General: Skin  is warm and dry.   Neurological:      Mental Status: She is alert and oriented to person, place, and time.   Psychiatric:         Behavior: Behavior normal.         Thought Content: Thought content normal.         Judgment: Judgment normal.         Significant Labs:   All pertinent labs from the last 24 hours have been reviewed.    Diagnostic Results:  I have reviewed all pertinent imaging results/findings within the past 24 hours.    Assessment/Plan:     Hypocalcemia  08/02/2020 ionized calcium pending.  Replace calcium per HM.    Iron deficiency anemia  08/02/2020 continue on iv venofer 100 mg daily for 4 more days.    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  Would continue on decadron for brain edema.  Continue on keppra for anti-seizures prophylaxis.        Thank you for your consult. I will follow-up with patient. Please contact us if you have any additional questions.     Benjamin Carnes MD  Hematology/Oncology  [facility]

## 2020-08-02 NOTE — ASSESSMENT & PLAN NOTE
Initial calcium of 14.2.  Patient is on calitriol at home, will discontinue.  Hold Calcium supplementation.  Continue aggressive IV hydration.  Will give additional IV Lasix if needed.  Nephrology evaluated and assisting with management.  PTH is appropriately suppressed and Vitamin D25 and Calcitriol are low.  PTHrP pending.    8/1:  Hypercalcemia workup unremarkable thus far  Will need to rule out multiple myeloma  SPEP, UPEP, immunofixation ordered  Heme/onc on case     8/2:  MM workup pending  Pt hypocalcemic now  Will await ionized calcium   Ct chest abd/pelvis when  Creatinine clearance is appropriate

## 2020-08-02 NOTE — PT/OT/SLP PROGRESS
Physical Therapy  Treatment    Cata Lang   MRN: [id number]   Admitting Diagnosis: Calcified cerebral meningioma       PT Start Time: 0935     PT Stop Time: 1000    PT Total Time (min): 25 min       Billable Minutes:  Therapeutic Activity 25    Treatment Type: Treatment  PT/PTA: PTA     PTA Visit Number: 2       General Precautions: Standard, fall  Orthopedic Precautions: N/A   Braces:           Subjective:  Communicated with NSG prior to session.  PATIENT STATES SHE IS READY TO GO HOME AND SLEEP IN HER OWN BED.     Pain/Comfort  Pain Rating 1: 0/10  Pain Rating Post-Intervention 1: 0/10    Objective:        Functional Mobility:  Bed Mobility:        Transfers:       Gait:        Stairs:          Balance:   Static Sit:   Dynamic Sit:   Static Stand:   Dynamic stand:        Therapeutic Activities and Exercises:  BED MOB SBA  SIT<-->STAND MOD A  BED->CHIAR WITH RW MIN A   LAQ AND HIP FLEXION X 15 EACH     AM-PAC 6 CLICK MOBILITY  How much help from another person does this patient currently need?   1 = Unable, Total/Dependent Assistance  2 = A lot, Maximum/Moderate Assistance  3 = A little, Minimum/Contact Guard/Supervision  4 = None, Modified [place]/Independent         AM-PAC Raw Score CMS G-Code Modifier Level of Impairment Assistance   6 % Total / Unable   7 - 9 CM 80 - 100% Maximal Assist   10 - 14 CL 60 - 80% Moderate Assist   15 - 19 CK 40 - 60% Moderate Assist   20 - 22 CJ 20 - 40% Minimal Assist   23 CI 1-20% SBA / CGA   24 CH 0% Independent/ Mod I     Patient left up in chair with all lines intact, call button in reach and  present.    Assessment:  Cata Lang is a 49 y.o. female with a medical diagnosis of Calcified cerebral meningioma and presents with .    Rehab identified problem list/impairments:      Rehab potential is good.    Activity tolerance: Fair    Discharge recommendations: Discharge Facility/Level of Care Needs: rehabilitation facility     Barriers to discharge:       Equipment recommendations: Equipment Needed After Discharge: walker, rolling     GOALS:   Multidisciplinary Problems     Physical Therapy Goals        Problem: Physical Therapy Goal    Goal Priority Disciplines Outcome Goal Variances Interventions   Physical Therapy Goal     PT, PT/OT Ongoing, Progressing     Description: PT WILL BE SEEN FOR P.T. FOR A MIN OF 5 OUT OF 7 DAYS A WEEK  LT20  1. PT WILL COMPLETE BED MOBILITY WITH MIN A   2. PT WILL T/F TO CHAIR WITH RW AND MAX A  3. PT WILL SIT>STAND WITH RW AND MAX A  4. PT WILL COMPLETE B LE TE X 20 REPS  5. PT WILL GT TRAIN X 10' WITH RW AND MAX A                   PLAN:    Patient to be seen 5 x/week(Will be seen a min of 5 out of 7 days a week as tolerated)  to address the above listed problems via gait training, therapeutic activities, therapeutic exercises  Plan of Care expires: 20  Plan of Care reviewed with: patient, spouse         Nabeel Baez, PTA  2020

## 2020-08-02 NOTE — ASSESSMENT & PLAN NOTE
No acute change in mass seen on CT of brain per Neurosurgery.  Holding Aspirin and Clopidogrel for surgery later this week.  Platelet function assay normal.  Craniotomy with resection on 31 July.  Post procedure in ICU.    8/1:  POD #1 by neurosurgery  Ok for step down   Transfer orders placed by neurosurgery  Continue to monitor   CT head chest abd and pelvis recommended by heme/onc  Will need further hydration before pt can receive IV contrast     8/2:  POD #2 by neurosurgery  Path report pending  Neurosurgery plan for repeat MRI outpatient

## 2020-08-02 NOTE — SUBJECTIVE & OBJECTIVE
Interval History: Reports eye swelling overnight. Otherwise denies any issues.     Review of Systems   Constitutional: Negative for fatigue and fever.   HENT: Negative for sinus pressure.    Eyes: Positive for visual disturbance (right eye swelling ).   Respiratory: Negative for shortness of breath.    Cardiovascular: Negative for chest pain.   Gastrointestinal: Negative for nausea and vomiting.   Musculoskeletal: Negative for back pain.   Skin: Negative for rash.   Neurological: Negative for headaches.     Objective:     Vital Signs (Most Recent):  Temp: 98.6 °F (37 °C) (08/02/20 0718)  Pulse: 63 (08/02/20 0718)  Resp: 15 (08/02/20 0718)  BP: 137/62 (08/02/20 0718)  SpO2: 97 % (08/02/20 0718) Vital Signs (24h Range):  Temp:  [96.5 °F (35.8 °C)-98.6 °F (37 °C)] 98.6 °F (37 °C)  Pulse:  [62-78] 63  Resp:  [15-18] 15  SpO2:  [95 %-98 %] 97 %  BP: (136-162)/(62-68) 137/62     Weight: 129.8 kg (286 lb 2.5 oz)  Body mass index is 41.06 kg/m².    Intake/Output Summary (Last 24 hours) at 8/2/2020 1115  Last data filed at 8/1/2020 2240  Gross per 24 hour   Intake 360 ml   Output 1000 ml   Net -640 ml      Physical Exam  Constitutional:       General: She is not in acute distress.     Appearance: She is well-developed. She is obese. She is not diaphoretic.   HENT:      Head: Normocephalic and atraumatic.      Comments: Dressing noted on head, clean dressing  Eyes:      Comments: Right eye swollen    Cardiovascular:      Rate and Rhythm: Normal rate and regular rhythm.      Heart sounds: Normal heart sounds. No murmur. No friction rub. No gallop.    Pulmonary:      Effort: Pulmonary effort is normal. No respiratory distress.      Breath sounds: Normal breath sounds. No stridor. No wheezing or rales.   Abdominal:      General: Bowel sounds are normal. There is no distension.      Palpations: Abdomen is soft. There is no mass.      Tenderness: There is no abdominal tenderness. There is no guarding.   Musculoskeletal:      Right  lower leg: No edema.      Left lower leg: No edema.   Skin:     General: Skin is warm.      Findings: No erythema.   Neurological:      Mental Status: She is alert and oriented to person, place, and time.   Psychiatric:         Mood and Affect: Mood normal.         Behavior: Behavior normal.         Thought Content: Thought content normal.         Judgment: Judgment normal.         Significant Labs:   Recent Lab Results       08/02/20  0849   08/02/20  0538   08/01/20  2126   08/01/20  1739        Anion Gap 14           Baso # 0.03           Basophil% 0.1           BUN, Bld 36           Calcium 6.7  Comment:  ca critical result(s) called and verbal readback obtained from sneha,   rn by MELLISA 08/02/2020 10:39             Chloride 104           CO2 15           Creatinine 1.5           Differential Method Automated           eGFR if  46           eGFR if non  40  Comment:  Calculation used to obtain the estimated glomerular filtration  rate (eGFR) is the CKD-EPI equation.              Eos # 0.0           Eosinophil% 0.0           Glucose 227           Gran # (ANC) 19.1           Gran% 84.3           Hematocrit 23.2           Hemoglobin 7.1           Immature Grans (Abs) 0.43  Comment:  Mild elevation in immature granulocytes is non specific and   can be seen in a variety of conditions including stress response,   acute inflammation, trauma and pregnancy. Correlation with other   laboratory and clinical findings is essential.             Immature Granulocytes 1.9           Lymph # 2.0           Lymph% 8.6           MCH 24.8           MCHC 30.6           MCV 81           Mono # 1.2           Mono% 5.1           MPV 11.6           nRBC 0           Platelets 123           POCT Glucose   144 171 159     Potassium 3.8           RBC 2.86           RDW 16.0           Sodium 133           WBC 22.68               All pertinent labs within the past 24 hours have been reviewed.    Significant  Imaging: I have reviewed all pertinent imaging results/findings within the past 24 hours.

## 2020-08-02 NOTE — NURSING
As per MD recommendation, pt. took shower on shift; ambulated to and from chair with assistance. Surgical site remains clean and intact; open to air as per MD orders. Will continue to monitor.

## 2020-08-02 NOTE — PROGRESS NOTES
Pulmonary Note    History reviewed , patient examined, lab and radiographic studies reviewed.  Exam stable.  Assessment:  Patient Active Problem List   Diagnosis    Hypercalcemia    SHARLENE on CKD, stage III    Essential hypertension    Metabolic encephalopathy    Calcified cerebral meningioma    Hypokalemia    Diabetes mellitus due to underlying condition with hyperglycemia, without long-term current use of insulin    Brain mass    Neoplasm of central nervous system    Anemia due to stage 3 chronic kidney disease    Iron deficiency anemia       Plan:  No new suggestions  Will sign off  Please re consult if any new problems develop    Earl Duran MD  Pulmonary / Critical Care Medicine

## 2020-08-02 NOTE — PLAN OF CARE
Pt with right eye swelling. Per Dr. Hagen, this is normal and to be expected. HOB elevated at least 30 degrees. Order for prn artificial tears obtained as well per pt request. Neuro checks and vitals have been WDL. Dressing CDI. Pt experiencing urinary retention. See previous note. 24 hour urine collected in process. Heels elevated and turning q 2 with foam wedge when pt allows. SCDs in place. Dressing to hip lesions CDI. Cbg corrected with SSI. Chart reviewed. Will continue to monitor.

## 2020-08-02 NOTE — PT/OT/SLP PROGRESS
Occupational Therapy      Patient Name:  Cata Lang   MRN:  [id number]    TREATMENT TIME:  2131-0275      S:  PATIENT STATED MD INFORMED HER SHE CAN TAKE A SHOWER AND GET HER SX SITE WET THEN DRY IT AFTERWARDS.    O:  PATIENT WAS ON BSC WHEN THERAPIST ENTERED PATIENT ROOM.  PATIENT MOD (A) WITH TOILETING HYGIENE.  WHILE ON BSC, PATIENT PERFORMED SPONGE BATH WITH WIPES REQUIRING (A) TO WASH POSTER PERINEAL AREA AND TO WASH BACK AND FEET.  PATIENT WITH SBA TO DON DUSTER/HOUSECOAT BUT (D) TO ZIP CLOTHING DUE TO C/O (B) HAND DIGITS WEAK.  PATIENT (D) TO DON SOCKS WITH C/O (B) FEET NEUROPATHY.    WITH ATTMEPT TO T/F BSC > B/S CHAIR, PATIENT CALLED ON  TO LIFT HER UP.  OCCUPATIONAL THERAPIST EDUCATED  AND PATIENT TO HAVE PATIENT PUSH UP ON ARMREST OF BSC OR PLACE ONE HAND ON BSC ARMREST WITH OTHER HAND ON B/S CHAIR ARMREST TO PUSH UP ON TO INCREASE SAFETY AND (I) SIT > STAND.    A:  PATIENT PRESENTS WITH SOME LEARNED HELPLESSNESS WITH T/F'S.  PATIENT WITH NO LOB WITH STAND PIVOT NOR STANDING THIS DATE.    P:  CONT TO ADDRESS UNMET GOALS TO INCREASE PATIENT'S  SAFETY AND (I) WITH T/F'S.      CHARGES:  ADL1  TA1    Vanessa Hodge OT  8/2/2020

## 2020-08-02 NOTE — PLAN OF CARE
Patient received on shift laying in bed awake, x4, in no acute distress. Vitals present within stable limits. Medications tolerated well. Neuro checks done and present consistent with previous assessment(s); no new deficits observed.  PT conducted on shift and a pt. Tolerated therapy well; Pending shower/hygiene request. Pt. Sitting up in chair at bedside with spouse in room. Will continue to monitor progress.

## 2020-08-02 NOTE — PROGRESS NOTES
[facility] -   Neurosurgery  Progress Note    Subjective:     Interval History: POD2 right frontal craniotomy for resection of brain tumor.  She remains alert and oriented to person, place, time, and situation.  Right eye swelling and facial swelling present which is to be expected and normal following this type of operation.  She is moving all extremities without difficulty though left sided weakness persists.  Speech is clear and appropriate English.  Pain is well controlled at this time.  The patient states she has attempted to get up out of bed once following her operation.  Patient is now on floor.    History of Present Illness: Please refer to previous note regarding HPI    Post-Op Info:  Procedure(s) (LRB):  CRANIOTOMY, WITH 3D STEREOTACTIC IMAGING GUIDANCE, REAL-TIME (Right)  EXCISION, NEOPLASM (N/A)   2 Days Post-Op      Medications:  Continuous Infusions:  Scheduled Meds:   atorvastatin  10 mg Oral Daily    dexAMETHasone  2 mg Oral Q12H    ferrous sulfate  325 mg Oral BID    gabapentin  600 mg Oral Daily    hydrALAZINE  25 mg Oral Q8H    levETIRAcetam  1,500 mg Oral BID    levothyroxine  137 mcg Oral Before breakfast    pantoprazole  40 mg Oral Daily     PRN Meds:acetaminophen, artificial tears, dextrose 50%, dextrose 50%, glucagon (human recombinant), glucose, glucose, hydrALAZINE, HYDROcodone-acetaminophen, HYDROcodone-acetaminophen, HYDROmorphone, insulin aspart U-100, morphine, ondansetron, ondansetron, oxyCODONE-acetaminophen     Review of Systems   Constitutional: Negative for activity change, appetite change and fatigue.   HENT: Positive for facial swelling and sinus pressure. Negative for congestion, ear discharge and ear pain.    Eyes: Positive for pain. Negative for discharge, redness and itching.        Right eye swelling due to operation   Respiratory: Negative for cough and shortness of breath.    Gastrointestinal: Negative for constipation, diarrhea, nausea and vomiting.    Endocrine: Negative for cold intolerance and heat intolerance.   Genitourinary: Negative for difficulty urinating, dyspareunia, frequency and urgency.   Musculoskeletal: Negative for arthralgias, back pain and neck pain.   Skin: Positive for wound (incision well approximated).   Neurological: Positive for weakness and headaches. Negative for dizziness, seizures, speech difficulty and numbness.   Psychiatric/Behavioral: Negative for behavioral problems, confusion and decreased concentration.     Objective:     Weight: 129.8 kg (286 lb 2.5 oz)  Body mass index is 41.06 kg/m².  Vital Signs (Most Recent):  Temp: 98.6 °F (37 °C) (08/02/20 0718)  Pulse: 63 (08/02/20 0718)  Resp: 15 (08/02/20 0718)  BP: 137/62 (08/02/20 0718)  SpO2: 97 % (08/02/20 0718) Vital Signs (24h Range):  Temp:  [96.5 °F (35.8 °C)-98.6 °F (37 °C)] 98.6 °F (37 °C)  Pulse:  [61-78] 63  Resp:  [11-18] 15  SpO2:  [95 %-98 %] 97 %  BP: (115-162)/(42-68) 137/62                          Physical Exam:  Nursing note and vitals reviewed.    Constitutional: She appears well-developed.   Lungs: equal chest rise and fall  Ears: no otorrhea  Nose: no rhinorrhea  Throat: no difficulty swallowing.  Left sided weakness remains present but slightly improving.  GCS 15     Eyes: Pupils are equal, round, and reactive to light. EOM are normal.   Right eye swelling present.  This is normal and to be expected following operation     Cardiovascular: Normal rate.     Abdominal: Soft.     Skin:   Incision is well approximated.  There is no redness or drainage.  Swelling present but to be expected     Psych/Behavior: She is oriented to person, place, and time.       Significant Labs:  Recent Labs   Lab 08/01/20  0342   *      K 3.8      CO2 19*   BUN 30*   CREATININE 1.6*   CALCIUM 7.2*     Recent Labs   Lab 08/01/20  0342   WBC 18.79*   HGB 7.4*   HCT 24.7*        No results for input(s): LABPT, INR, APTT in the last 48 hours.  Microbiology  Results (last 7 days)     Procedure Component Value Units Date/Time    Urine culture [[id number]] Collected: 07/27/20 0033    Order Status: Completed Specimen: Urine Updated: 07/28/20 1935     Urine Culture, Routine No growth    Narrative:      Urine was sent to lab but culture was not able to be  completed, recollect just for urine culture  Specimen Source->Urine    Urine culture [[id number]] Collected: 07/26/20 1434    Order Status: Canceled Specimen: Urine         All pertinent labs from the last 24 hours have been reviewed.  Significant Diagnostics:  no new imaging for review today    Assessment/Plan:     Active Diagnoses:    Diagnosis Date Noted POA    PRINCIPAL PROBLEM:  Calcified cerebral meningioma [D32.0] 07/31/2020 Yes     Chronic    Brain mass [G93.89]  Unknown    Neoplasm of central nervous system [D49.7]  Unknown    Anemia due to stage 3 chronic kidney disease [N18.3, D63.1]  Unknown    Diabetes mellitus due to underlying condition with hyperglycemia, without long-term current use of insulin [E08.65] 07/31/2020 Yes    Metabolic encephalopathy [G93.41] 07/27/2020 Yes    Hypercalcemia [E83.52] 07/26/2020 Yes    SHARLENE on CKD, stage III [N17.9] 07/26/2020 Yes    Essential hypertension [I10] 07/26/2020 Yes     Chronic      Problems Resolved During this Admission:   PLAN:  -Patient continues to do well and is progressing through the spectrum of care  -Will decrease patient's Decadron to 2mg PO BID.  -Continue Protonix and Keppra  -Ok to start DVT prophylaxis (low dose Lovenox) tomorrow  -Continue PT/OT  -Up to chair TID  -Ambulate with assistance  -Encourage use of IS---patient verbalized understanding and nurse reports understanding  -Patient may start to shower.  Please keep in mind she has left sided weakness and will require assistance.  It is ok to get the incision wet, just pat dry.  Use mild soap such as baby shampoo near the incision and please do not let the water from the shower head hit  directly over the incision.  Leave incision open to air following shower  -Facial and eye swelling is to be expected following a frontal craniotomy.  If there are any questions, please contact me and I will assist with any questions.    Tania Arthur PA-C  Neurosurgery  [facility]

## 2020-08-02 NOTE — ASSESSMENT & PLAN NOTE
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  Would continue on decadron for brain edema.  Continue on keppra for anti-seizures prophylaxis.

## 2020-08-02 NOTE — PROGRESS NOTES
[facility] Hospital Medicine  Progress Note    Patient Name: Cata Lang  MRN: [id number]  Patient Class: IP- Inpatient   Admission Date: 7/25/2020  Length of Stay: 7 days  Attending Physician: Abdoul Barragan MD  Primary Care Provider: Sabra Fregoso MD        Subjective:     Principal Problem:Calcified cerebral meningioma        HPI:   Cata Lang is a 49 y.o. female patient with a PMHx of brain mass, HTN, DM II, hypothyroidism, seizure disorder, and chronic HA who presents to the Emergency Department for evaluation of neurological problem which onset gradually today. Pt notes she has been walking worse since December and her HA is worse on the L side and rates it a moderate to severe HA. Symptoms are worsening and moderate to severe in severity. No mitigating or exacerbating factors reported. Associated sxs include dizziness, malaise, and difficulty walking. Patient denies any fever, SOB, CP, abd pain, N/V, back pain, weakness, and all other sxs at this time. Pt notes she takes several tums daily which was recommended by her ENT. Pt transferred from [place] and accepted by Dr. Gandara (Neurosurgery). Case discussed with Dr. Gandara in ED who reviewed CT of the head from transferring facility and feels there is no need for neurosurgical intervention at present.  In ED, patient's symptoms completely resolved with NIHSS of 0.  Labs showed: creatinine of 1.9, BUN 19, calcium 14.2. Patient is on calcitriol at home, started last year with no follow-up labs done. IV fluids x 1 liter and 20 mg IV Lasix given. Hospital Medicine was contacted to admit for hypercalcemia and SHARLENE.    Overview/Hospital Course:  Admitted for evaluation and treatment of hypercalcemia and brain mass with left sided weakness.    8/1: patient stable for transfer to floor. Hypercalcemia workup unremarkable thus far. Will need to rule out multiple myeloma. Heme/onc on case. SPEP, UPEP, immunofixation ordered. CT head chest  abd/pelvis with contrast recommended. Will need to hydrate patient and plan for CT once creatinine improved.   8/2: continue IVF. Multiple myeloma workup pending. Awaiting pathology results on intracranial mass. Awaiting CT chest abd pelvis with contrast once creatinine improves. SWAPNIL noted, venofer given once along with ferrous sulfate PO bid.     Interval History: Reports eye swelling overnight. Otherwise denies any issues.     Review of Systems   Constitutional: Negative for fatigue and fever.   HENT: Negative for sinus pressure.    Eyes: Positive for visual disturbance (right eye swelling ).   Respiratory: Negative for shortness of breath.    Cardiovascular: Negative for chest pain.   Gastrointestinal: Negative for nausea and vomiting.   Musculoskeletal: Negative for back pain.   Skin: Negative for rash.   Neurological: Negative for headaches.     Objective:     Vital Signs (Most Recent):  Temp: 98.6 °F (37 °C) (08/02/20 0718)  Pulse: 63 (08/02/20 0718)  Resp: 15 (08/02/20 0718)  BP: 137/62 (08/02/20 0718)  SpO2: 97 % (08/02/20 0718) Vital Signs (24h Range):  Temp:  [96.5 °F (35.8 °C)-98.6 °F (37 °C)] 98.6 °F (37 °C)  Pulse:  [62-78] 63  Resp:  [15-18] 15  SpO2:  [95 %-98 %] 97 %  BP: (136-162)/(62-68) 137/62     Weight: 129.8 kg (286 lb 2.5 oz)  Body mass index is 41.06 kg/m².    Intake/Output Summary (Last 24 hours) at 8/2/2020 1115  Last data filed at 8/1/2020 2240  Gross per 24 hour   Intake 360 ml   Output 1000 ml   Net -640 ml      Physical Exam  Constitutional:       General: She is not in acute distress.     Appearance: She is well-developed. She is obese. She is not diaphoretic.   HENT:      Head: Normocephalic and atraumatic.      Comments: Dressing noted on head, clean dressing  Eyes:      Comments: Right eye swollen    Cardiovascular:      Rate and Rhythm: Normal rate and regular rhythm.      Heart sounds: Normal heart sounds. No murmur. No friction rub. No gallop.    Pulmonary:      Effort: Pulmonary  effort is normal. No respiratory distress.      Breath sounds: Normal breath sounds. No stridor. No wheezing or rales.   Abdominal:      General: Bowel sounds are normal. There is no distension.      Palpations: Abdomen is soft. There is no mass.      Tenderness: There is no abdominal tenderness. There is no guarding.   Musculoskeletal:      Right lower leg: No edema.      Left lower leg: No edema.   Skin:     General: Skin is warm.      Findings: No erythema.   Neurological:      Mental Status: She is alert and oriented to person, place, and time.   Psychiatric:         Mood and Affect: Mood normal.         Behavior: Behavior normal.         Thought Content: Thought content normal.         Judgment: Judgment normal.         Significant Labs:   Recent Lab Results       08/02/20  0849   08/02/20  0538   08/01/20  2126   08/01/20  1739        Anion Gap 14           Baso # 0.03           Basophil% 0.1           BUN, Bld 36           Calcium 6.7  Comment:  ca critical result(s) called and verbal readback obtained from sneha,   rn by MELLISA 08/02/2020 10:39             Chloride 104           CO2 15           Creatinine 1.5           Differential Method Automated           eGFR if  46           eGFR if non  40  Comment:  Calculation used to obtain the estimated glomerular filtration  rate (eGFR) is the CKD-EPI equation.              Eos # 0.0           Eosinophil% 0.0           Glucose 227           Gran # (ANC) 19.1           Gran% 84.3           Hematocrit 23.2           Hemoglobin 7.1           Immature Grans (Abs) 0.43  Comment:  Mild elevation in immature granulocytes is non specific and   can be seen in a variety of conditions including stress response,   acute inflammation, trauma and pregnancy. Correlation with other   laboratory and clinical findings is essential.             Immature Granulocytes 1.9           Lymph # 2.0           Lymph% 8.6           MCH 24.8           MCHC 30.6            MCV 81           Mono # 1.2           Mono% 5.1           MPV 11.6           nRBC 0           Platelets 123           POCT Glucose   144 171 159     Potassium 3.8           RBC 2.86           RDW 16.0           Sodium 133           WBC 22.68               All pertinent labs within the past 24 hours have been reviewed.    Significant Imaging: I have reviewed all pertinent imaging results/findings within the past 24 hours.      Assessment/Plan:      * Calcified cerebral meningioma  No acute change in mass seen on CT of brain per Neurosurgery.  Holding Aspirin and Clopidogrel for surgery later this week.  Platelet function assay normal.  Craniotomy with resection on 31 July.  Post procedure in ICU.    8/1:  POD #1 by neurosurgery  Ok for step down   Transfer orders placed by neurosurgery  Continue to monitor   CT head chest abd and pelvis recommended by heme/onc  Will need further hydration before pt can receive IV contrast     8/2:  POD #2 by neurosurgery  Path report pending  Neurosurgery plan for repeat MRI outpatient     Iron deficiency anemia  8/2:  Low iron sat with low ferritin   venofer given once  Po ferrous sulfate given  Cont to monitor h/h       Diabetes mellitus due to underlying condition with hyperglycemia, without long-term current use of insulin  8/1-8/2:  Glucose controlled   Continue sliding scale insulin       Metabolic encephalopathy  Multifactorial due to hypercalcemia and brain mass with vasogenic edema.    8/1:  Resolved     Essential hypertension  - Losartan-HCTZ on hold as above.    - IV hydralazine PRN.    SHARLENE on CKD, stage III  - Initial creatinine of 1.9 (baseline 1.3).  - IV hydration.  - Avoid nephrotoxic agents. Hold home losartan-HCTZ for now.  - Follow labs.    8/1:  Improving  Continue to monitor creatinine  Continue IVF  nephro consulted on case    Hypercalcemia  Initial calcium of 14.2.  Patient is on calitriol at home, will discontinue.  Hold Calcium supplementation.   Continue aggressive IV hydration.  Will give additional IV Lasix if needed.  Nephrology evaluated and assisting with management.  PTH is appropriately suppressed and Vitamin D25 and Calcitriol are low.  PTHrP pending.    8/1:  Hypercalcemia workup unremarkable thus far  Will need to rule out multiple myeloma  SPEP, UPEP, immunofixation ordered  Heme/onc on case     8/2:  MM workup pending  Pt hypocalcemic now  Will await ionized calcium   Ct chest abd/pelvis when  Creatinine clearance is appropriate        VTE Risk Mitigation (From admission, onward)         Ordered     IP VTE HIGH RISK PATIENT  Once      07/31/20 1312     Place sequential compression device  Until discontinued      07/31/20 1312     Place HOLLAND hose  Until discontinued      07/31/20 1312                      Abdoul Barragan MD  Department of Hospital Medicine   [facility]

## 2020-08-02 NOTE — PROGRESS NOTES
[facility] -   Nephrology  Progress Note    Patient Name: Cata Lang  MRN: [id number]  Admission Date: 7/25/2020  Hospital Length of Stay: 7 days  Attending Provider: Abdoul Barragan MD   Primary Care Physician: Sabra Fregoso MD  Principal Problem:Calcified cerebral meningioma    Subjective:     HPI: Cata Lang is a pleasant 49-year-old  woman history of hypertension, type 2 diabetes, morbid obesity, seizure disorder, CKD stage 3, baseline creatinine about 1.3 mg/dL, GFR 50 mL/minute, intracranial mass / lesion, she presents to the emergency room yesterday with progressive complains of weakness, increasing headache, nausea, a repeat MRI of the brain is pending at this time, she was evaluated by Neurosurgery, also significant hypercalcemia with a serum calcium of 14 noted on presentation, patient is currently taking Tums, calcitriol, hydrochlorothiazide, also she possibly has a urinary tract infection, acute on chronic renal failure, serum creatinine was 1.9 on presentation, 2.5 this evening, she is currently receiving IV fluids, we were consulted for acute kidney injury, hypercalcemia    Interval History:     7/31/20: s/p brain tumor surgery today. Creatinine has improved to 1.7. Ca at 7.9 today (albumin 3.1).    8/1/20: patient is resting comfortably, no complaints at present.     8/2/20: Calcium has declined to 6.7 (albumin 3.1). Creatinine at 1.5. Patient resting in chair, no complaints at present.         Review of patient's allergies indicates:   Allergen Reactions    Hydrochlorothiazide      hypercalcemia     Current Facility-Administered Medications   Medication Frequency    acetaminophen tablet 650 mg Q6H PRN    artificial tears 0.5 % ophthalmic solution 1 drop PRN    atorvastatin tablet 10 mg Daily    dexAMETHasone tablet 2 mg Q12H    dextrose 50% injection 12.5 g PRN    dextrose 50% injection 25 g PRN    ferrous sulfate EC tablet 325 mg BID    gabapentin capsule 600  mg Daily    glucagon (human recombinant) injection 1 mg PRN    glucose chewable tablet 16 g PRN    glucose chewable tablet 24 g PRN    hydrALAZINE injection 10 mg Q8H PRN    hydrALAZINE tablet 25 mg Q8H    HYDROcodone-acetaminophen  mg per tablet 1 tablet Q4H PRN    HYDROcodone-acetaminophen 5-325 mg per tablet 1 tablet Q4H PRN    hydromorphone (PF) injection 0.2 mg Q5 Min PRN    insulin aspart U-100 pen 1-10 Units QID (AC + HS) PRN    levETIRAcetam tablet 1,500 mg BID    levothyroxine tablet 137 mcg Before breakfast    morphine injection 2 mg Q3H PRN    ondansetron injection 4 mg Daily PRN    ondansetron injection 4 mg Q6H PRN    oxyCODONE-acetaminophen 5-325 mg per tablet 1 tablet Q3H PRN    pantoprazole EC tablet 40 mg Daily       Objective:     Vital Signs (Most Recent):  Temp: 98.6 °F (37 °C) (08/02/20 0718)  Pulse: 63 (08/02/20 0718)  Resp: 15 (08/02/20 0718)  BP: 137/62 (08/02/20 0718)  SpO2: 97 % (08/02/20 0718)  O2 Device (Oxygen Therapy): room air (08/01/20 2114) Vital Signs (24h Range):  Temp:  [96.5 °F (35.8 °C)-98.6 °F (37 °C)] 98.6 °F (37 °C)  Pulse:  [62-78] 63  Resp:  [15-18] 15  SpO2:  [95 %-98 %] 97 %  BP: (136-162)/(62-68) 137/62     Weight: 129.8 kg (286 lb 2.5 oz) (07/26/20 0435)  Body mass index is 41.06 kg/m².  Body surface area is 2.53 meters squared.    I/O last 3 completed shifts:  In: 3782.7 [P.O.:1420; I.V.:2262.7; IV Piggyback:100]  Out: 2790 [Urine:2540; Drains:250]    Physical Exam  Constitutional:       Appearance: She is well-developed.   HENT:      Head: Normocephalic.      Comments: S/p brain surgery with forehead scar.   Eyes:      Pupils: Pupils are equal, round, and reactive to light.      Comments: Right eye swollen.    Neck:      Thyroid: No thyromegaly.   Cardiovascular:      Rate and Rhythm: Normal rate and regular rhythm.      Heart sounds: No friction rub.   Pulmonary:      Effort: Pulmonary effort is normal.      Breath sounds: Normal breath sounds.  No wheezing.   Chest:      Chest wall: No tenderness.   Abdominal:      General: Bowel sounds are normal. There is no distension.      Palpations: Abdomen is soft.      Tenderness: There is no abdominal tenderness.   Musculoskeletal:      Comments: Trace LE edema.    Lymphadenopathy:      Cervical: No cervical adenopathy.   Skin:     General: Skin is warm and dry.      Findings: No rash.   Neurological:      Mental Status: She is alert and oriented to person, place, and time.         Significant Labs:  Lab Results   Component Value Date    CREATININE 1.5 (H) 08/02/2020    BUN 36 (H) 08/02/2020     (L) 08/02/2020    K 3.8 08/02/2020     08/02/2020    CO2 15 (L) 08/02/2020     Lab Results   Component Value Date    PTH <5.0 (L) 07/26/2020    CALCIUM 6.7 (LL) 08/02/2020    CAION 1.76 (H) 07/26/2020    PHOS 3.9 07/31/2020     Lab Results   Component Value Date    ALBUMIN 3.1 (L) 07/31/2020     Lab Results   Component Value Date    WBC 22.68 (H) 08/02/2020    HGB 7.1 (L) 08/02/2020    HCT 23.2 (L) 08/02/2020    MCV 81 (L) 08/02/2020     (L) 08/02/2020       No results for input(s): MG in the last 168 hours.    PTH-rp: < 0.4 (7/27/20)        Significant Imaging:  Imaging Results    None           Assessment/Plan:     Anemia due to stage 3 chronic kidney disease  Will add Epogen.     Neoplasm of central nervous system  S/p brain mass resection. Neurosurgery is following.     SHARLENE on CKD, stage III  SHARLENE on CKD stage 3 , baseline creatinine about 1.3 mg/dL, Ultrasound negative for any hydronephrosis.    Creatinine has improved to 1.5 today. Will monitor closely.       Hypercalcemia  Patient had severe hypercalcemia upon admission.  Vitamin-D and calcium supplementations have been stopped. Parathyroid hormone level is low. Ca has declined to 6.7 today (albumin 3.1). Will replete calcium. PTHrp level was < 0.4 (not elevated).           Thank you for your consult. I will follow-up with patient. Please contact  us if you have any additional questions.    Chandler Gould MD  Nephrology  [facility]

## 2020-08-03 PROBLEM — D69.6 THROMBOCYTOPENIA: Status: ACTIVE | Noted: 2020-08-03

## 2020-08-03 LAB
ALBUMIN SERPL BCP-MCNC: 3 G/DL (ref 3.5–5.2)
ALBUMIN SERPL ELPH-MCNC: 3.06 G/DL (ref 3.35–5.55)
ALPHA1 GLOB SERPL ELPH-MCNC: 0.39 G/DL (ref 0.17–0.41)
ALPHA2 GLOB SERPL ELPH-MCNC: 0.9 G/DL (ref 0.43–0.99)
ANION GAP SERPL CALC-SCNC: 13 MMOL/L (ref 8–16)
B-GLOBULIN SERPL ELPH-MCNC: 0.7 G/DL (ref 0.5–1.1)
BASOPHILS # BLD AUTO: 0.03 K/UL (ref 0–0.2)
BASOPHILS NFR BLD: 0.1 % (ref 0–1.9)
BUN SERPL-MCNC: 36 MG/DL (ref 6–20)
CALCIUM SERPL-MCNC: 7 MG/DL (ref 8.7–10.5)
CHLORIDE SERPL-SCNC: 105 MMOL/L (ref 95–110)
CO2 SERPL-SCNC: 20 MMOL/L (ref 23–29)
CREAT SERPL-MCNC: 1.3 MG/DL (ref 0.5–1.4)
DIFFERENTIAL METHOD: ABNORMAL
EOSINOPHIL # BLD AUTO: 0 K/UL (ref 0–0.5)
EOSINOPHIL NFR BLD: 0 % (ref 0–8)
ERYTHROCYTE [DISTWIDTH] IN BLOOD BY AUTOMATED COUNT: 16.3 % (ref 11.5–14.5)
EST. GFR  (AFRICAN AMERICAN): 55 ML/MIN/1.73 M^2
EST. GFR  (NON AFRICAN AMERICAN): 48 ML/MIN/1.73 M^2
GAMMA GLOB SERPL ELPH-MCNC: 0.74 G/DL (ref 0.67–1.58)
GLUCOSE SERPL-MCNC: 124 MG/DL (ref 70–110)
HCT VFR BLD AUTO: 24.1 % (ref 37–48.5)
HGB BLD-MCNC: 7.3 G/DL (ref 12–16)
IMM GRANULOCYTES # BLD AUTO: 0.43 K/UL (ref 0–0.04)
IMM GRANULOCYTES NFR BLD AUTO: 1.9 % (ref 0–0.5)
INTERPRETATION SERPL IFE-IMP: NORMAL
LYMPHOCYTES # BLD AUTO: 3.2 K/UL (ref 1–4.8)
LYMPHOCYTES NFR BLD: 14.5 % (ref 18–48)
MCH RBC QN AUTO: 24.7 PG (ref 27–31)
MCHC RBC AUTO-ENTMCNC: 30.3 G/DL (ref 32–36)
MCV RBC AUTO: 82 FL (ref 82–98)
MONOCYTES # BLD AUTO: 1.5 K/UL (ref 0.3–1)
MONOCYTES NFR BLD: 6.7 % (ref 4–15)
NEUTROPHILS # BLD AUTO: 17.1 K/UL (ref 1.8–7.7)
NEUTROPHILS NFR BLD: 76.8 % (ref 38–73)
NRBC BLD-RTO: 0 /100 WBC
PHOSPHATE SERPL-MCNC: 3.2 MG/DL (ref 2.7–4.5)
PLATELET # BLD AUTO: 112 K/UL (ref 150–350)
PMV BLD AUTO: 12.3 FL (ref 9.2–12.9)
POCT GLUCOSE: 118 MG/DL (ref 70–110)
POCT GLUCOSE: 122 MG/DL (ref 70–110)
POCT GLUCOSE: 171 MG/DL (ref 70–110)
POTASSIUM SERPL-SCNC: 3.6 MMOL/L (ref 3.5–5.1)
PROT 24H UR-MRATE: NORMAL MG/SPEC (ref 0–100)
PROT SERPL-MCNC: 5.8 G/DL (ref 6–8.4)
PROT UR-MCNC: <7 MG/DL (ref 0–15)
PTH-INTACT SERPL-MCNC: 23.4 PG/ML (ref 9–77)
RBC # BLD AUTO: 2.95 M/UL (ref 4–5.4)
SODIUM SERPL-SCNC: 138 MMOL/L (ref 136–145)
URINE COLLECTION DURATION: 24 HR
URINE VOLUME: 1950 ML
WBC # BLD AUTO: 22.23 K/UL (ref 3.9–12.7)

## 2020-08-03 PROCEDURE — 99233 PR SUBSEQUENT HOSPITAL CARE,LEVL III: ICD-10-PCS | Mod: ,,, | Performed by: INTERNAL MEDICINE

## 2020-08-03 PROCEDURE — 63600175 PHARM REV CODE 636 W HCPCS: Performed by: PHYSICIAN ASSISTANT

## 2020-08-03 PROCEDURE — 97110 THERAPEUTIC EXERCISES: CPT

## 2020-08-03 PROCEDURE — 63600175 PHARM REV CODE 636 W HCPCS: Performed by: INTERNAL MEDICINE

## 2020-08-03 PROCEDURE — 86335 IMMUNFIX E-PHORSIS/URINE/CSF: CPT | Mod: 26,,, | Performed by: PATHOLOGY

## 2020-08-03 PROCEDURE — 36415 COLL VENOUS BLD VENIPUNCTURE: CPT

## 2020-08-03 PROCEDURE — 94799 UNLISTED PULMONARY SVC/PX: CPT

## 2020-08-03 PROCEDURE — 97116 GAIT TRAINING THERAPY: CPT | Mod: CQ

## 2020-08-03 PROCEDURE — 25000003 PHARM REV CODE 250: Performed by: NURSE PRACTITIONER

## 2020-08-03 PROCEDURE — 97530 THERAPEUTIC ACTIVITIES: CPT | Mod: CQ

## 2020-08-03 PROCEDURE — 84156 ASSAY OF PROTEIN URINE: CPT

## 2020-08-03 PROCEDURE — 97530 THERAPEUTIC ACTIVITIES: CPT | Performed by: PHYSICAL THERAPIST

## 2020-08-03 PROCEDURE — 85025 COMPLETE CBC W/AUTO DIFF WBC: CPT

## 2020-08-03 PROCEDURE — 80069 RENAL FUNCTION PANEL: CPT

## 2020-08-03 PROCEDURE — 84166 PATHOLOGIST INTERPRETATION UPE: ICD-10-PCS | Mod: 26,,, | Performed by: PATHOLOGY

## 2020-08-03 PROCEDURE — 96372 THER/PROPH/DIAG INJ SC/IM: CPT

## 2020-08-03 PROCEDURE — 25000003 PHARM REV CODE 250: Performed by: NEUROLOGICAL SURGERY

## 2020-08-03 PROCEDURE — 86022 PLATELET ANTIBODIES: CPT

## 2020-08-03 PROCEDURE — 25000003 PHARM REV CODE 250: Performed by: INTERNAL MEDICINE

## 2020-08-03 PROCEDURE — 99233 SBSQ HOSP IP/OBS HIGH 50: CPT | Mod: ,,, | Performed by: INTERNAL MEDICINE

## 2020-08-03 PROCEDURE — 86335 PATHOLOGIST INTERPRETATION UIFE: ICD-10-PCS | Mod: 26,,, | Performed by: PATHOLOGY

## 2020-08-03 PROCEDURE — 11000001 HC ACUTE MED/SURG PRIVATE ROOM

## 2020-08-03 PROCEDURE — 25000003 PHARM REV CODE 250: Performed by: PHYSICIAN ASSISTANT

## 2020-08-03 PROCEDURE — 86335 IMMUNFIX E-PHORSIS/URINE/CSF: CPT

## 2020-08-03 PROCEDURE — 84166 PROTEIN E-PHORESIS/URINE/CSF: CPT | Mod: 26,,, | Performed by: PATHOLOGY

## 2020-08-03 PROCEDURE — 82607 VITAMIN B-12: CPT

## 2020-08-03 PROCEDURE — 25000003 PHARM REV CODE 250: Performed by: FAMILY MEDICINE

## 2020-08-03 PROCEDURE — 84166 PROTEIN E-PHORESIS/URINE/CSF: CPT

## 2020-08-03 PROCEDURE — 99900035 HC TECH TIME PER 15 MIN (STAT)

## 2020-08-03 PROCEDURE — 82746 ASSAY OF FOLIC ACID SERUM: CPT

## 2020-08-03 RX ORDER — ENOXAPARIN SODIUM 100 MG/ML
30 INJECTION SUBCUTANEOUS EVERY 24 HOURS
Status: DISCONTINUED | OUTPATIENT
Start: 2020-08-03 | End: 2020-08-03 | Stop reason: DRUGHIGH

## 2020-08-03 RX ORDER — POLYETHYLENE GLYCOL 3350 17 G/17G
17 POWDER, FOR SOLUTION ORAL DAILY
Status: DISCONTINUED | OUTPATIENT
Start: 2020-08-03 | End: 2020-08-06 | Stop reason: HOSPADM

## 2020-08-03 RX ORDER — CALCIUM CARBONATE 500(1250)
500 TABLET ORAL 4 TIMES DAILY
Status: DISCONTINUED | OUTPATIENT
Start: 2020-08-03 | End: 2020-08-06 | Stop reason: HOSPADM

## 2020-08-03 RX ORDER — ENOXAPARIN SODIUM 100 MG/ML
40 INJECTION SUBCUTANEOUS EVERY 24 HOURS
Status: DISCONTINUED | OUTPATIENT
Start: 2020-08-03 | End: 2020-08-05

## 2020-08-03 RX ORDER — BISACODYL 5 MG
5 TABLET, DELAYED RELEASE (ENTERIC COATED) ORAL DAILY PRN
Status: DISCONTINUED | OUTPATIENT
Start: 2020-08-03 | End: 2020-08-06 | Stop reason: HOSPADM

## 2020-08-03 RX ORDER — DOCUSATE SODIUM 100 MG/1
100 CAPSULE, LIQUID FILLED ORAL 2 TIMES DAILY
Status: DISCONTINUED | OUTPATIENT
Start: 2020-08-03 | End: 2020-08-06 | Stop reason: HOSPADM

## 2020-08-03 RX ORDER — DEXAMETHASONE 1 MG/1
2 TABLET ORAL DAILY
Status: DISCONTINUED | OUTPATIENT
Start: 2020-08-03 | End: 2020-08-06 | Stop reason: HOSPADM

## 2020-08-03 RX ADMIN — LEVETIRACETAM 1500 MG: 500 TABLET ORAL at 09:08

## 2020-08-03 RX ADMIN — FERROUS SULFATE TAB EC 325 MG (65 MG FE EQUIVALENT) 325 MG: 325 (65 FE) TABLET DELAYED RESPONSE at 09:08

## 2020-08-03 RX ADMIN — LEVOTHYROXINE SODIUM 137 MCG: 25 TABLET ORAL at 05:08

## 2020-08-03 RX ADMIN — DEXAMETHASONE 2 MG: 1 TABLET ORAL at 09:08

## 2020-08-03 RX ADMIN — POLYETHYLENE GLYCOL 3350 17 G: 17 POWDER, FOR SOLUTION ORAL at 09:08

## 2020-08-03 RX ADMIN — PANTOPRAZOLE SODIUM 40 MG: 40 TABLET, DELAYED RELEASE ORAL at 09:08

## 2020-08-03 RX ADMIN — EPOETIN ALFA-EPBX 5000 UNITS: 10000 INJECTION, SOLUTION INTRAVENOUS; SUBCUTANEOUS at 11:08

## 2020-08-03 RX ADMIN — CALCIUM 500 MG: 500 TABLET ORAL at 01:08

## 2020-08-03 RX ADMIN — ENOXAPARIN SODIUM 40 MG: 100 INJECTION SUBCUTANEOUS at 08:08

## 2020-08-03 RX ADMIN — HYDROCODONE BITARTRATE AND ACETAMINOPHEN 1 TABLET: 10; 325 TABLET ORAL at 03:08

## 2020-08-03 RX ADMIN — HYDRALAZINE HYDROCHLORIDE 25 MG: 25 TABLET, FILM COATED ORAL at 01:08

## 2020-08-03 RX ADMIN — HYDRALAZINE HYDROCHLORIDE 25 MG: 25 TABLET, FILM COATED ORAL at 09:08

## 2020-08-03 RX ADMIN — ATORVASTATIN CALCIUM 10 MG: 10 TABLET, FILM COATED ORAL at 09:08

## 2020-08-03 RX ADMIN — HYDROCODONE BITARTRATE AND ACETAMINOPHEN 1 TABLET: 10; 325 TABLET ORAL at 05:08

## 2020-08-03 RX ADMIN — GABAPENTIN 600 MG: 300 CAPSULE ORAL at 09:08

## 2020-08-03 RX ADMIN — ACETAMINOPHEN 650 MG: 325 TABLET ORAL at 09:08

## 2020-08-03 RX ADMIN — DOCUSATE SODIUM 100 MG: 100 CAPSULE, LIQUID FILLED ORAL at 09:08

## 2020-08-03 RX ADMIN — CALCIUM 500 MG: 500 TABLET ORAL at 09:08

## 2020-08-03 RX ADMIN — IRON SUCROSE 100 MG: 20 INJECTION, SOLUTION INTRAVENOUS at 09:08

## 2020-08-03 RX ADMIN — HYDROCODONE BITARTRATE AND ACETAMINOPHEN 1 TABLET: 10; 325 TABLET ORAL at 09:08

## 2020-08-03 RX ADMIN — HYDRALAZINE HYDROCHLORIDE 25 MG: 25 TABLET, FILM COATED ORAL at 05:08

## 2020-08-03 RX ADMIN — CALCIUM 500 MG: 500 TABLET ORAL at 05:08

## 2020-08-03 NOTE — SUBJECTIVE & OBJECTIVE
Interval History:  Patient overall doing well. Able to move all extremities, mild weakness on left side.  Patient reports left ear pain today, reported hospital medicine.  Platelet count is declining over the past 2 days, decline of greater than 50% noted.  If further decrease in platelet count tomorrow, will evaluate further.    Oncology Treatment Plan:   [No treatment plan]    Medications:  Continuous Infusions:  Scheduled Meds:   atorvastatin  10 mg Oral Daily    calcium carbonate  500 mg Oral QID    dexAMETHasone  2 mg Oral Daily    docusate sodium  100 mg Oral BID    enoxaparin  40 mg Subcutaneous Q24H    epoetin laxmi-ebpx (RETACRIT) injection  5,000 Units Subcutaneous Every Mon, Wed, Fri    ferrous sulfate  325 mg Oral BID    gabapentin  600 mg Oral Daily    hydrALAZINE  25 mg Oral Q8H    iron sucrose  100 mg Intravenous Daily    levETIRAcetam  1,500 mg Oral BID    levothyroxine  137 mcg Oral Before breakfast    pantoprazole  40 mg Oral Daily    polyethylene glycol  17 g Oral Daily     PRN Meds:acetaminophen, artificial tears, bisacodyL, dextrose 50%, dextrose 50%, glucagon (human recombinant), glucose, glucose, hydrALAZINE, HYDROcodone-acetaminophen, HYDROcodone-acetaminophen, HYDROmorphone, insulin aspart U-100, morphine, ondansetron, ondansetron, oxyCODONE-acetaminophen, polyethylene glycol     Review of Systems   Constitutional: Positive for appetite change and fatigue. Negative for chills, diaphoresis, fever and unexpected weight change.   HENT: Positive for ear pain ( left ear) and facial swelling. Negative for congestion, hearing loss, mouth sores, postnasal drip, rhinorrhea, sore throat and trouble swallowing. Ear discharge: Left side.    Eyes: Negative for pain, discharge, redness and visual disturbance.   Respiratory: Negative for cough, chest tightness and shortness of breath.    Cardiovascular: Negative for chest pain, palpitations and leg swelling.   Gastrointestinal: Positive for  abdominal distention. Negative for blood in stool, constipation, diarrhea, nausea and vomiting.   Endocrine: Negative for cold intolerance and heat intolerance.   Genitourinary: Negative for difficulty urinating, dyspareunia, flank pain and hematuria.   Musculoskeletal: Negative for arthralgias, back pain and myalgias.   Skin: Positive for wound.   Neurological: Positive for weakness. Negative for dizziness, light-headedness and headaches.   Hematological: Negative for adenopathy. Does not bruise/bleed easily.   Psychiatric/Behavioral: Negative for agitation, behavioral problems and confusion. The patient is not nervous/anxious.      Objective:     Vital Signs (Most Recent):  Temp: 97.9 °F (36.6 °C) (08/03/20 0744)  Pulse: 63 (08/03/20 0744)  Resp: 18 (08/03/20 0744)  BP: 136/60 (08/03/20 0744)  SpO2: 97 % (08/03/20 0744) Vital Signs (24h Range):  Temp:  [97.5 °F (36.4 °C)-98.3 °F (36.8 °C)] 97.9 °F (36.6 °C)  Pulse:  [58-69] 63  Resp:  [16-18] 18  SpO2:  [96 %-97 %] 97 %  BP: (131-154)/(60-71) 136/60     Weight: 129.8 kg (286 lb 2.5 oz)  Body mass index is 41.06 kg/m².  Body surface area is 2.53 meters squared.      Intake/Output Summary (Last 24 hours) at 8/3/2020 1021  Last data filed at 8/3/2020 0800  Gross per 24 hour   Intake 600 ml   Output --   Net 600 ml       Physical Exam  Vitals signs and nursing note reviewed.   Constitutional:       General: She is not in acute distress.     Appearance: She is well-developed. She is ill-appearing.   HENT:      Head:        Right Ear: Hearing and external ear normal.      Left Ear: Hearing and external ear normal.      Nose: No rhinorrhea.      Right Sinus: No maxillary sinus tenderness or frontal sinus tenderness.      Left Sinus: No maxillary sinus tenderness or frontal sinus tenderness.      Mouth/Throat:      Mouth: No oral lesions.      Pharynx: Uvula midline.   Eyes:      General:         Right eye: No discharge.         Left eye: No discharge.      Pupils: Pupils  are equal, round, and reactive to light.      Comments: Unable to evaluate right eye due to periorbital edema resulting from cranial surgery, mild edema to left eye.   Neck:      Musculoskeletal: Normal range of motion.      Thyroid: No thyromegaly.      Vascular: No carotid bruit.      Trachea: No tracheal deviation.   Cardiovascular:      Rate and Rhythm: Normal rate and regular rhythm.      Pulses:           Dorsalis pedis pulses are 2+ on the right side and 2+ on the left side.      Heart sounds: Normal heart sounds, S1 normal and S2 normal. No murmur.   Pulmonary:      Effort: Pulmonary effort is normal. No respiratory distress.      Breath sounds: Normal breath sounds.   Abdominal:      General: Bowel sounds are decreased. There is distension.      Palpations: Abdomen is soft. There is no mass.      Tenderness: There is no abdominal tenderness.   Musculoskeletal: Normal range of motion.   Lymphadenopathy:      Cervical: No cervical adenopathy.      Upper Body:      Right upper body: No supraclavicular adenopathy.      Left upper body: No supraclavicular adenopathy.   Skin:     General: Skin is warm and dry.      Capillary Refill: Capillary refill takes less than 2 seconds.      Findings: No rash.   Neurological:      Mental Status: She is alert and oriented to person, place, and time.      Sensory: No sensory deficit.      Motor: Weakness (On left side) present.      Gait: Gait normal.   Psychiatric:         Mood and Affect: Mood is not anxious or depressed.         Speech: Speech normal.         Behavior: Behavior normal.         Thought Content: Thought content normal.         Judgment: Judgment normal.         Significant Labs:   CBC:   Recent Labs   Lab 08/02/20  0849 08/03/20  0757   WBC 22.68* 22.23*   HGB 7.1* 7.3*   HCT 23.2* 24.1*   * 112*    and CMP:   Recent Labs   Lab 08/02/20  0849 08/03/20  0757   * 138   K 3.8 3.6    105   CO2 15* 20*   * 124*   BUN 36* 36*   CREATININE  1.5* 1.3   CALCIUM 6.7* 7.0*   ALBUMIN  --  3.0*   ANIONGAP 14 13   EGFRNONAA 40* 48*       Diagnostic Results:  I have reviewed all pertinent imaging results/findings within the past 24 hours.

## 2020-08-03 NOTE — ASSESSMENT & PLAN NOTE
SHARLENE on CKD stage 3 , baseline creatinine about 1.3 mg/dL, Ultrasound negative for any hydronephrosis.    Creatinine has improved to 1.3 today. Will monitor closely.

## 2020-08-03 NOTE — PT/OT/SLP PROGRESS
Physical Therapy  Treatment    Cata Lang   MRN: [id number]   Admitting Diagnosis: Calcified cerebral meningioma    PT Received On: 08/03/20  PT Start Time: 1000     PT Stop Time: 1030    PT Total Time (min): 30 min       Billable Minutes:  Gait Training 15, Therapeutic Activity 15    Treatment Type: Treatment  PT/PTA: PTA     PTA Visit Number: 3       General Precautions: Standard, fall, vision impaired(Pt was able to intermittently open R eye today due to a reduction in swelling of orbit of eye)  Orthopedic Precautions: N/A   Braces: N/A    Spiritual, Cultural Beliefs, Rastafari Practices, Values that Affect Care: no    Subjective:  Communicated with nurse and Epic chart review prior to session.  Patient agreeable to therapy session and appears motivated to improve.     Pain/Comfort  Pain Rating 1: 0/10  Pain Addressed 1: Pre-medicate for activity, Reposition  Pain Rating Post-Intervention 1: 0/10  Pain Rating 2: 0/10    Objective:   Patient found with: PureWick(Central line, BP cuff, duval catheter, SCD, peripheral IV, Telemetry,)    Functional Mobility:  Bed Mobility: Supine -> Sit: CGA with assist from bed rails ( verbal cues for sequencing)  Gait training: 10ft, 8ft with RW and Min A ( x1 moderate length seated rest break between trials <5min)  Patient left up in arm chair with call light in reach &  present. Chair alarm not present due to room not having required connections to utilize chair alarm. Nurse notified of patient disposition post tx.      Therapeutic Activities and Exercises:  See above     AM-PAC 6 CLICK MOBILITY  How much help from another person does this patient currently need?   1 = Unable, Total/Dependent Assistance  2 = A lot, Maximum/Moderate Assistance  3 = A little, Minimum/Contact Guard/Supervision  4 = None, Modified [place]/Independent    Turning over in bed (including adjusting bedclothes, sheets and blankets)?: 4  Sitting down on and standing up from a chair with arms  (e.g., wheelchair, bedside commode, etc.): 3  Moving from lying on back to sitting on the side of the bed?: 1(NT)  Moving to and from a bed to a chair (including a wheelchair)?: 3  Need to walk in hospital room?: 3(used RW)  Climbing 3-5 steps with a railing?: 1(NT)  Basic Mobility Total Score: 15    AM-PAC Raw Score CMS G-Code Modifier Level of Impairment Assistance   6 % Total / Unable   7 - 9 CM 80 - 100% Maximal Assist   10 - 14 CL 60 - 80% Moderate Assist   15 - 19 CK 40 - 60% Moderate Assist   20 - 22 CJ 20 - 40% Minimal Assist   23 CI 1-20% SBA / CGA   24 CH 0% Independent/ Mod I     Patient left up in chair with call button in reach &  present.   Nurse notified.  Assessment:  Cata Lang is a 49 y.o. female with a medical diagnosis of Calcified cerebral meningioma and presents with impaired functional mobility. Patient will benefit from continued skilled PT to address impairments listed below.     Rehab identified problem list/impairments: Rehab identified problem list/impairments: weakness, visual deficits, impaired endurance, decreased coordination, impaired coordination, impaired fine motor, impaired functional mobilty, decreased lower extremity function, decreased upper extremity function, gait instability, impaired balance    Rehab potential is good.    Activity tolerance: Fair    Discharge recommendations: Discharge Facility/Level of Care Needs: rehabilitation facility     Barriers to discharge:      Equipment recommendations: Equipment Needed After Discharge: walker, rolling     GOALS:   Multidisciplinary Problems     Physical Therapy Goals        Problem: Physical Therapy Goal    Goal Priority Disciplines Outcome Goal Variances Interventions   Physical Therapy Goal     PT, PT/OT Ongoing, Progressing     Description: PT WILL BE SEEN FOR P.T. FOR A MIN OF 5 OUT OF 7 DAYS A WEEK  LT20  1. PT WILL COMPLETE BED MOBILITY WITH MIN A   2. PT WILL T/F TO CHAIR WITH RW AND MAX A  3.  PT WILL SIT>STAND WITH RW AND MAX A  4. PT WILL COMPLETE B LE TE X 20 REPS  5. PT WILL GT TRAIN X 10' WITH RW AND MAX A                   PLAN:    Patient to be seen 5 x/week  to address the above listed problems via gait training, therapeutic activities, therapeutic exercises  Plan of Care expires: 08/08/20  Plan of Care reviewed with: patient, spouse         Tori Buchanan, PTA  08/03/2020

## 2020-08-03 NOTE — PROGRESS NOTES
[facility] -   Hematology/Oncology  Progress Note    Patient Name: Cata Lang  Admission Date: 7/25/2020  Hospital Length of Stay: 8 days  Code Status: Full Code     Subjective:     HPI:  Mrs. Cata Lang is a 48 yo woman with history that is significant for goiter status post total thyroidectomy 5 years ago, hypertension, type 2 diabetes, seizure disorder, CKD stage 3, intracranial mass who presented to the emergency room with complaint of weakness headache nausea.  MRI revealed mass lesion within the anterior right cranial fossa suggestive of meningioma, there was also right to left midline shift of 6 mm.  Two foci of T2 hyperintense signal were noted in the left occipital lobe and thought to be related to old vascular insults.     Initial by chemical evaluation revealed serum calcium level of 14.2 mg/dL.  There was also evidence of acute kidney injury and normocytic anemia.  Given the above findings there was thought to rule out plasma cell dyscrasia.  Hematology was consulted.     Upon interview with patient and her , she stated that following the total thyroidectomy 5 years ago, she has been on 6000 units of daily calcium supplement.  She denies unintentional weight loss, night sweats, palpable lymph nodes or mass, abnormal bleed.    Recently had craniotomy on 7/31 with resection of right frontal mass. Pathology pending.       Interval History:  Patient overall doing well. Able to move all extremities, mild weakness on left side.  Patient reports left ear pain today, reported hospital medicine.  Platelet count is declining over the past 2 days, decline of greater than 50% noted.  If further decrease in platelet count tomorrow, will evaluate further.    Oncology Treatment Plan:   [No treatment plan]    Medications:  Continuous Infusions:  Scheduled Meds:   atorvastatin  10 mg Oral Daily    calcium carbonate  500 mg Oral QID    dexAMETHasone  2 mg Oral Daily    docusate sodium   100 mg Oral BID    enoxaparin  40 mg Subcutaneous Q24H    epoetin laxmi-ebpx (RETACRIT) injection  5,000 Units Subcutaneous Every Mon, Wed, Fri    ferrous sulfate  325 mg Oral BID    gabapentin  600 mg Oral Daily    hydrALAZINE  25 mg Oral Q8H    iron sucrose  100 mg Intravenous Daily    levETIRAcetam  1,500 mg Oral BID    levothyroxine  137 mcg Oral Before breakfast    pantoprazole  40 mg Oral Daily    polyethylene glycol  17 g Oral Daily     PRN Meds:acetaminophen, artificial tears, bisacodyL, dextrose 50%, dextrose 50%, glucagon (human recombinant), glucose, glucose, hydrALAZINE, HYDROcodone-acetaminophen, HYDROcodone-acetaminophen, HYDROmorphone, insulin aspart U-100, morphine, ondansetron, ondansetron, oxyCODONE-acetaminophen, polyethylene glycol     Review of Systems   Constitutional: Positive for appetite change and fatigue. Negative for chills, diaphoresis, fever and unexpected weight change.   HENT: Positive for ear pain ( left ear) and facial swelling. Negative for congestion, hearing loss, mouth sores, postnasal drip, rhinorrhea, sore throat and trouble swallowing. Ear discharge: Left side.    Eyes: Negative for pain, discharge, redness and visual disturbance.   Respiratory: Negative for cough, chest tightness and shortness of breath.    Cardiovascular: Negative for chest pain, palpitations and leg swelling.   Gastrointestinal: Positive for abdominal distention. Negative for blood in stool, constipation, diarrhea, nausea and vomiting.   Endocrine: Negative for cold intolerance and heat intolerance.   Genitourinary: Negative for difficulty urinating, dyspareunia, flank pain and hematuria.   Musculoskeletal: Negative for arthralgias, back pain and myalgias.   Skin: Positive for wound.   Neurological: Positive for weakness. Negative for dizziness, light-headedness and headaches.   Hematological: Negative for adenopathy. Does not bruise/bleed easily.   Psychiatric/Behavioral: Negative for agitation,  behavioral problems and confusion. The patient is not nervous/anxious.      Objective:     Vital Signs (Most Recent):  Temp: 97.9 °F (36.6 °C) (08/03/20 0744)  Pulse: 63 (08/03/20 0744)  Resp: 18 (08/03/20 0744)  BP: 136/60 (08/03/20 0744)  SpO2: 97 % (08/03/20 0744) Vital Signs (24h Range):  Temp:  [97.5 °F (36.4 °C)-98.3 °F (36.8 °C)] 97.9 °F (36.6 °C)  Pulse:  [58-69] 63  Resp:  [16-18] 18  SpO2:  [96 %-97 %] 97 %  BP: (131-154)/(60-71) 136/60     Weight: 129.8 kg (286 lb 2.5 oz)  Body mass index is 41.06 kg/m².  Body surface area is 2.53 meters squared.      Intake/Output Summary (Last 24 hours) at 8/3/2020 1021  Last data filed at 8/3/2020 0800  Gross per 24 hour   Intake 600 ml   Output --   Net 600 ml       Physical Exam  Vitals signs and nursing note reviewed.   Constitutional:       General: She is not in acute distress.     Appearance: She is well-developed. She is ill-appearing.   HENT:      Head:        Right Ear: Hearing and external ear normal.      Left Ear: Hearing and external ear normal.      Nose: No rhinorrhea.      Right Sinus: No maxillary sinus tenderness or frontal sinus tenderness.      Left Sinus: No maxillary sinus tenderness or frontal sinus tenderness.      Mouth/Throat:      Mouth: No oral lesions.      Pharynx: Uvula midline.   Eyes:      General:         Right eye: No discharge.         Left eye: No discharge.      Pupils: Pupils are equal, round, and reactive to light.      Comments: Unable to evaluate right eye due to periorbital edema resulting from cranial surgery, mild edema to left eye.   Neck:      Musculoskeletal: Normal range of motion.      Thyroid: No thyromegaly.      Vascular: No carotid bruit.      Trachea: No tracheal deviation.   Cardiovascular:      Rate and Rhythm: Normal rate and regular rhythm.      Pulses:           Dorsalis pedis pulses are 2+ on the right side and 2+ on the left side.      Heart sounds: Normal heart sounds, S1 normal and S2 normal. No murmur.    Pulmonary:      Effort: Pulmonary effort is normal. No respiratory distress.      Breath sounds: Normal breath sounds.   Abdominal:      General: Bowel sounds are decreased. There is distension.      Palpations: Abdomen is soft. There is no mass.      Tenderness: There is no abdominal tenderness.   Musculoskeletal: Normal range of motion.   Lymphadenopathy:      Cervical: No cervical adenopathy.      Upper Body:      Right upper body: No supraclavicular adenopathy.      Left upper body: No supraclavicular adenopathy.   Skin:     General: Skin is warm and dry.      Capillary Refill: Capillary refill takes less than 2 seconds.      Findings: No rash.   Neurological:      Mental Status: She is alert and oriented to person, place, and time.      Sensory: No sensory deficit.      Motor: Weakness (On left side) present.      Gait: Gait normal.   Psychiatric:         Mood and Affect: Mood is not anxious or depressed.         Speech: Speech normal.         Behavior: Behavior normal.         Thought Content: Thought content normal.         Judgment: Judgment normal.         Significant Labs:   CBC:   Recent Labs   Lab 08/02/20  0849 08/03/20  0757   WBC 22.68* 22.23*   HGB 7.1* 7.3*   HCT 23.2* 24.1*   * 112*    and CMP:   Recent Labs   Lab 08/02/20  0849 08/03/20  0757   * 138   K 3.8 3.6    105   CO2 15* 20*   * 124*   BUN 36* 36*   CREATININE 1.5* 1.3   CALCIUM 6.7* 7.0*   ALBUMIN  --  3.0*   ANIONGAP 14 13   EGFRNONAA 40* 48*       Diagnostic Results:  I have reviewed all pertinent imaging results/findings within the past 24 hours.    Assessment/Plan:     Hypocalcemia  Patient has history of having total thyroid removal 5 years ago, has taken 6000 mg of calcium daily since that time.  On admission patient noted to have hypercalcemia, this was treated successfully and now is noted to be hypocalcemic.    --  ionized calcium pending.    -- Replace calcium per HM.    Iron deficiency  anemia  Patient noted to have anemia, iron studies evaluated, significant iron deficiency noted in treatment began with IV Venofer.    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    --Continue on decadron for brain edema.  --Continue on keppra for anti-seizures prophylaxis.        Thank you for your consult. I will follow-up with patient. Please contact us if you have any additional questions.     Karen Jacques NP  Hematology/Oncology  [facility]

## 2020-08-03 NOTE — ASSESSMENT & PLAN NOTE
8/3:  Currently on lovenox  Received heparin in the past  Will check HIT antibody   Heme/onc on case  Will need to switch off of lovenox  Should plt cont to drop

## 2020-08-03 NOTE — PLAN OF CARE
Incision to right head, sutures intact, open to air. Facial and rght eye edema noted.  Neuro checks Q4hrs. Accu checks AC&HS. Dressing to right buttocks/hip area C/D/I. Ambulated a short distance via walker with PT/OT assist. Remains free from incident/injury. Call light in reach.

## 2020-08-03 NOTE — PLAN OF CARE
CM obtained choice form for 1. [facility] 2. [facility] 3. [facility]. Referral sent via [place].      08/03/20 7381   Post-Acute Status   Post-Acute Authorization Placement   Post-Acute Placement Status Referrals Sent   Discharge Plan   Discharge Plan A Rehab

## 2020-08-03 NOTE — PROGRESS NOTES
"[facility] -   Adult Nutrition  Progress Note    SUMMARY       Recommendations    Recommendation:   1. Continue DM diet   2. Encourage the pt to increase intake of her meals   3. Please order Arginaid supplement daily to support healing if appropriate    Interventions:  Collaboration with other providers  Com[employer]    Goals: meal intake to increase to at least 75% by RD f/u  Nutrition Goal Status: new  Communication of GREY Recs: (POC)    Reason for Assessment    Reason For Assessment: length of stay  Diagnosis: (Calcified cerebral meningioma)  Relevant Medical History: T2DM, HTN, seizures, stroke, thyroid disease    General Information Comments: Remote assessment completed, NFPE unable to be performed. She is post-op craniotomy, neoplasm excision. Pt stated that following the total thyroidectomy 5 years ago, she has been on 6000 units of daily calcium supplement. On admission patient noted to have hypercalcemia, this was treated successfully and now is noted to be hypocalcemic.  She denied unintentional weight loss. Meal intake is currently at 50%.    Nutrition Discharge Planning: DM diet to meet energy needs    Nutrition Risk Screen    Nutrition Risk Screen: no indicators present    Nutrition/Diet History    Spiritual, Cultural Beliefs, Congregational Practices, Values that Affect Care: no  Food Allergies: NKFA    Anthropometrics    Temp: 97 °F (36.1 °C)  Height Method: Stated  Height: 5' 10" (177.8 cm)  Height (inches): 70 in  Weight Method: Bed Scale  Weight: 129.8 kg (286 lb 2.5 oz)  Weight (lb): 286.16 lb  Ideal Body Weight (IBW), Female: 150 lb  % Ideal Body Weight, Female (lb): 190.77 %  BMI (Calculated): 41.1  BMI Grade: greater than 40 - morbid obesity       Lab/Procedures/Meds    Pertinent Labs Reviewed: reviewed  Pertinent Labs Comments: BUN 36, eGFR 48, glucsoe 124, albumin 3.0  Pertinent Medications Reviewed: reviewed  Pertinent Medications Comments: statin, calcium carbonate, docusate " sodium, ferrous sulfate, gabapentin, iron sucrose, pantoprazole, polyethylene glycol    Physical Findings/Assessment    Incision to right head, wounds to left buttock, facial swelling    Estimated/Assessed Needs    Weight Used For Calorie Calculations: 129.8 kg (286 lb 2.5 oz)  Energy Calorie Requirements (kcal): 2596  Energy Need Method: Kcal/kg(20 kcal/kg for obese with wounds)  Protein Requirements: 156 g(1.2 g/kg for wound healing)  Weight Used For Protein Calculations: 129.8 kg (286 lb 2.5 oz)        RDA Method (mL): 2596  CHO Requirement: 200 g    Nutrition Prescription Ordered    Current Diet Order: Diabetic Diet    Evaluation of Received Nutrient/Fluid Intake    I/O: +13.2 L since admit  Comments: LBM 7/29    % Intake of Estimated Energy Needs: 50 - 75 %  % Meal Intake: 50%    Nutrition Risk    Level of Risk/Frequency of Follow-up: moderate(1-2x/wk)     Assessment and Plan  Nutrition Problem:  Increased nutrient needs (protein)    Related to (etiology):   Post-op, PI    Signs and Symptoms (as evidenced by):   Incision to right head (craniotomy), wounds to left buttock    Interventions:  Collaboration with other providers  Com[employer]    Nutrition Diagnosis Status:   New  Monitor and Evaluation    Food and Nutrient Intake: energy intake, food and beverage intake  Food and Nutrient Adminstration: diet order  Physical Activity and Function: nutrition-related ADLs and IADLs  Anthropometric Measurements: weight, weight change, body mass index  Biochemical Data, Medical Tests and Procedures: electrolyte and renal panel, glucose/endocrine profile, inflammatory profile  Nutrition-Focused Physical Findings: overall appearance     Malnutrition Assessment    NFPE was unable to be performed 2/2 to remote assessment.  RD on site to f/u with physical exam.    Nutrition Follow-Up    RD Follow-up?: Yes

## 2020-08-03 NOTE — ASSESSMENT & PLAN NOTE
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    --Continue on decadron for brain edema.  --Continue on keppra for anti-seizures prophylaxis.

## 2020-08-03 NOTE — PLAN OF CARE
Patient is in stable condition, no acute distress, pain adequately controlled, neuro checks performed, blood glucose checks performed, 24 urine specimen collected, head incision CDI,  at bedside, and will continue to monitor. 24 hr chart check performed.

## 2020-08-03 NOTE — MEDICAL/APP STUDENT
Progress Note  Hospital Medicine    Primary Team: [facility] MEDICINE A  Admit Date: 7/25/2020   Length of Stay:  LOS: 8 days   SUBJECTIVE:   Reason for Admission:  Calcified cerebral meningioma    HPI/Interval history:    Cata Lang is a 49 year old female with past medical history significant for total thyroidectomy, CKD 3, HTN, DMT2, and intracranial mass. MRI revealed mass lesion within the anterior right cranial fossa suggestive of meningioma, there was also right to left midline shift of 6 mm.  Two foci of T2 hyperintense signal were noted in the left occipital lobe and thought to be related to old vascular insults. She is currently postop day 3 for craniotomy with right frontal lobe resection.     Interval History: There were no acute events over night. Patient states she is overall doing well and pain is adequately controlled. Patient is able to move all extremities, however notes left sided weakness. Patient also reports left ear pain with no loss of hearing. Patient states she ah    Review of Systems:  {ROS:[id number]}     OBJECTIVE:     Temp:  [97.6 °F (36.4 °C)-98.3 °F (36.8 °C)]   Pulse:  [58-67]   Resp:  [16-18]   BP: (131-154)/(60-71)   SpO2:  [96 %-97 %]  Body mass index is 41.06 kg/m².  Intake/Outake:  This Shift:  I/O this shift:  In: 120 [P.O.:120]  Out: -     Net I/O past 24h:     Intake/Output Summary (Last 24 hours) at 8/3/2020 1135  Last data filed at 8/3/2020 0800  Gross per 24 hour   Intake 600 ml   Output --   Net 600 ml             Physical Exam:  {Exam:[id number]}    Laboratory:  CBC/Anemia Labs: Coags:    Recent Labs   Lab 08/01/20  0342 08/01/20  0904 08/02/20  0849 08/03/20  0757   WBC 18.79*  --  22.68* 22.23*   HGB 7.4*  --  7.1* 7.3*   HCT 24.7*  --  23.2* 24.1*     --  123* 112*   MCV 82  --  81* 82   RDW 16.1*  --  16.0* 16.3*   IRON  --  28*  --   --    FERRITIN  --  10*  --   --     Recent Labs   Lab 07/28/20  0837   INR 1.0        Chemistries:   Recent Labs   Lab  07/30/20  0433 07/31/20  0425 08/01/20  0342 08/02/20  0849 08/03/20  0757    137 137 133* 138   K 3.5 3.4* 3.8 3.8 3.6    105 106 104 105   CO2 20* 19* 19* 15* 20*   BUN 33* 31* 30* 36* 36*   CREATININE 1.9* 1.7* 1.6* 1.5* 1.3   CALCIUM 8.5* 7.9* 7.2* 6.7* 7.0*   PHOS 3.4 3.9  --   --  3.2        Medications:  Scheduled Meds:   atorvastatin  10 mg Oral Daily    calcium carbonate  500 mg Oral QID    dexAMETHasone  2 mg Oral Daily    docusate sodium  100 mg Oral BID    enoxaparin  40 mg Subcutaneous Q24H    epoetin laxmi-ebpx (RETACRIT) injection  5,000 Units Subcutaneous Every Mon, Wed, Fri    ferrous sulfate  325 mg Oral BID    gabapentin  600 mg Oral Daily    hydrALAZINE  25 mg Oral Q8H    iron sucrose  100 mg Intravenous Daily    levETIRAcetam  1,500 mg Oral BID    levothyroxine  137 mcg Oral Before breakfast    pantoprazole  40 mg Oral Daily    polyethylene glycol  17 g Oral Daily                             Continuous Infusions:  PRN Meds:.acetaminophen, artificial tears, bisacodyL, dextrose 50%, dextrose 50%, glucagon (human recombinant), glucose, glucose, hydrALAZINE, HYDROcodone-acetaminophen, HYDROcodone-acetaminophen, HYDROmorphone, insulin aspart U-100, morphine, ondansetron, ondansetron, oxyCODONE-acetaminophen, polyethylene glycol     ASSESSMENT/PLAN:     Active Hospital Problems    Diagnosis  POA    *Calcified cerebral meningioma [D32.0]  Yes     Chronic    Iron deficiency anemia [D50.9]  No    Hypocalcemia [E83.51]  Unknown    Neoplasm of central nervous system [D49.7]  Yes    Anemia due to stage 3 chronic kidney disease [N18.3, D63.1]  Yes    Diabetes mellitus due to underlying condition with hyperglycemia, without long-term current use of insulin [E08.65]  Yes    Hypercalcemia [E83.52]  Yes    SHARLENE on CKD, stage III [N17.9]  Yes    Essential hypertension [I10]  Yes     Chronic      Resolved Hospital Problems   No resolved problems to display.         DVT ppx-   CODE  Status-     Dispo-    Brittney Walls, MSY 3  [facility] Medicine

## 2020-08-03 NOTE — PROGRESS NOTES
Follow up on Mrs. Lang today. Assessment performed. Left shin again with dry flaky skin, healing abrasion from fall, no nursing intervention required. Bilateral heels intact with no redness noted. Patient turned to left side. Cluster of full thickness lesions again noted to left buttock, hip area. Wound beds are fibrinous, red and white, periwound red, remains unchanged. Cleansed with saline and covered with foam dressing. Barrier cream in use to sacrum and gluteal cleft. Patient underwent craniotomy since last visit, surgical incision CDI. Recommend continued pressure injury prevention measures and continued care per orders. Will follow.

## 2020-08-03 NOTE — PROGRESS NOTES
Pharmacist Renal Dose Adjustment Note    Cata Lang is a 49 y.o. female being treated with the medication enoxaparin.    Patient Data:    Vital Signs (Most Recent):  Temp: 97.9 °F (36.6 °C) (08/03/20 0744)  Pulse: 63 (08/03/20 0744)  Resp: 18 (08/03/20 0744)  BP: 136/60 (08/03/20 0744)  SpO2: 97 % (08/03/20 0744) Vital Signs (72h Range):  Temp:  [96.5 °F (35.8 °C)-98.6 °F (37 °C)]   Pulse:  [58-95]   Resp:  [10-25]   BP: (115-163)/(40-78)   SpO2:  [90 %-99 %]   Arterial Line BP: (114-160)/(52-65)      Recent Labs   Lab 07/31/20  0425 08/01/20  0342 08/02/20  0849   CREATININE 1.7* 1.6* 1.5*     Serum creatinine: 1.5 mg/dL (H) 08/02/20 0849  Estimated creatinine clearance: 65.1 mL/min (A)    Medication enoxaparin 30 mg SQ every 24 hours will be changed to enoxaparin 40 mg SQ every 24 hours per pharmacy renal dose adjustment protocol for patients with CrCl greater than 30 mL/min.    Pharmacist's Name: Tori Alonzo PharmD  Pharmacist's Extension: [phone]    Thank you for allowing us to participate in this patient's care.     Tori Alonzo PharmD 08/03/2020 7:49 AM

## 2020-08-03 NOTE — PROGRESS NOTES
[facility] -   Nephrology  Progress Note    Patient Name: Cata Lang  MRN: [id number]  Admission Date: 7/25/2020  Hospital Length of Stay: 8 days  Attending Provider: Abdoul Barragan MD   Primary Care Physician: Sabra Fregoso MD  Principal Problem:Calcified cerebral meningioma    Subjective:     HPI: Cata Lang is a pleasant 49-year-old  woman history of hypertension, type 2 diabetes, morbid obesity, seizure disorder, CKD stage 3, baseline creatinine about 1.3 mg/dL, GFR 50 mL/minute, intracranial mass / lesion, she presents to the emergency room yesterday with progressive complains of weakness, increasing headache, nausea, a repeat MRI of the brain is pending at this time, she was evaluated by Neurosurgery, also significant hypercalcemia with a serum calcium of 14 noted on presentation, patient is currently taking Tums, calcitriol, hydrochlorothiazide, also she possibly has a urinary tract infection, acute on chronic renal failure, serum creatinine was 1.9 on presentation, 2.5 this evening, she is currently receiving IV fluids, we were consulted for acute kidney injury, hypercalcemia    Interval History:     7/31/20: s/p brain tumor surgery today. Creatinine has improved to 1.7. Ca at 7.9 today (albumin 3.1).    8/1/20: patient is resting comfortably, no complaints at present.     8/2/20: Calcium has declined to 6.7 (albumin 3.1). Creatinine at 1.5. Patient resting in chair, no complaints at present.     8/3/20: Creatinine has declined to 1.3. Calcium at 7 (albumin 3).         Review of patient's allergies indicates:   Allergen Reactions    Hydrochlorothiazide      hypercalcemia     Current Facility-Administered Medications   Medication Frequency    acetaminophen tablet 650 mg Q6H PRN    artificial tears 0.5 % ophthalmic solution 1 drop PRN    atorvastatin tablet 10 mg Daily    bisacodyL EC tablet 5 mg Daily PRN    dexAMETHasone tablet 2 mg Daily    dextrose 50% injection 12.5  g PRN    dextrose 50% injection 25 g PRN    docusate sodium capsule 100 mg BID    enoxaparin injection 40 mg Q24H    epoetin laxmi-epbx injection 5,000 Units Every Mon, Wed, Fri    ferrous sulfate EC tablet 325 mg BID    gabapentin capsule 600 mg Daily    glucagon (human recombinant) injection 1 mg PRN    glucose chewable tablet 16 g PRN    glucose chewable tablet 24 g PRN    hydrALAZINE injection 10 mg Q8H PRN    hydrALAZINE tablet 25 mg Q8H    HYDROcodone-acetaminophen  mg per tablet 1 tablet Q4H PRN    HYDROcodone-acetaminophen 5-325 mg per tablet 1 tablet Q4H PRN    hydromorphone (PF) injection 0.2 mg Q5 Min PRN    insulin aspart U-100 pen 1-10 Units QID (AC + HS) PRN    iron sucrose injection 100 mg Daily    levETIRAcetam tablet 1,500 mg BID    levothyroxine tablet 137 mcg Before breakfast    morphine injection 2 mg Q3H PRN    ondansetron injection 4 mg Daily PRN    ondansetron injection 4 mg Q6H PRN    oxyCODONE-acetaminophen 5-325 mg per tablet 1 tablet Q3H PRN    pantoprazole EC tablet 40 mg Daily    polyethylene glycol packet 17 g BID PRN    polyethylene glycol packet 17 g Daily       Objective:     Vital Signs (Most Recent):  Temp: 97.9 °F (36.6 °C) (08/03/20 0744)  Pulse: 63 (08/03/20 0744)  Resp: 18 (08/03/20 0744)  BP: 136/60 (08/03/20 0744)  SpO2: 97 % (08/03/20 0744)  O2 Device (Oxygen Therapy): room air (08/03/20 0744) Vital Signs (24h Range):  Temp:  [97.5 °F (36.4 °C)-98.3 °F (36.8 °C)] 97.9 °F (36.6 °C)  Pulse:  [58-69] 63  Resp:  [16-18] 18  SpO2:  [96 %-97 %] 97 %  BP: (131-154)/(60-71) 136/60     Weight: 129.8 kg (286 lb 2.5 oz) (07/26/20 0435)  Body mass index is 41.06 kg/m².  Body surface area is 2.53 meters squared.    I/O last 3 completed shifts:  In: 480 [P.O.:480]  Out: 1000 [Urine:1000]    Physical Exam  Constitutional:       Appearance: She is well-developed.   HENT:      Head: Normocephalic.      Comments: S/p brain surgery with forehead scar.   Eyes:       Pupils: Pupils are equal, round, and reactive to light.      Comments: Right eye swollen.    Neck:      Thyroid: No thyromegaly.   Cardiovascular:      Rate and Rhythm: Normal rate and regular rhythm.      Heart sounds: No friction rub.   Pulmonary:      Effort: Pulmonary effort is normal.      Breath sounds: Normal breath sounds. No wheezing.   Chest:      Chest wall: No tenderness.   Abdominal:      General: Bowel sounds are normal. There is no distension.      Palpations: Abdomen is soft.      Tenderness: There is no abdominal tenderness.   Musculoskeletal:      Comments: Trace LE edema.    Lymphadenopathy:      Cervical: No cervical adenopathy.   Skin:     General: Skin is warm and dry.      Findings: No rash.   Neurological:      Mental Status: She is alert and oriented to person, place, and time.         Significant Labs:  Lab Results   Component Value Date    CREATININE 1.3 08/03/2020    BUN 36 (H) 08/03/2020     08/03/2020    K 3.6 08/03/2020     08/03/2020    CO2 20 (L) 08/03/2020     Lab Results   Component Value Date    PTH 23.4 08/02/2020    CALCIUM 7.0 (L) 08/03/2020    CAION 1.76 (H) 07/26/2020    PHOS 3.2 08/03/2020     Lab Results   Component Value Date    ALBUMIN 3.0 (L) 08/03/2020     Lab Results   Component Value Date    WBC 22.23 (H) 08/03/2020    HGB 7.3 (L) 08/03/2020    HCT 24.1 (L) 08/03/2020    MCV 82 08/03/2020     (L) 08/03/2020       No results for input(s): MG in the last 168 hours.    PTH-rp: < 0.4 (7/27/20)        Significant Imaging:  Imaging Results    None           Assessment/Plan:     SHARLENE on CKD, stage III  SHARLENE on CKD stage 3 , baseline creatinine about 1.3 mg/dL, Ultrasound negative for any hydronephrosis.    Creatinine has improved to 1.3 today. Will monitor closely.       Hypercalcemia  Patient had severe hypercalcemia upon admission.  Vitamin-D and calcium supplementations have been stopped. Parathyroid hormone level is low. Ca had declined to 6.7 on 8/2  (albumin 3.1). Calcium has now improved to 7 on 8/3/20 (albumin 3). Will restart CaCO3 500 mg qid.    PTHrp level was < 0.4 (not elevated).             Thank you for your consult. I will follow-up with patient. Please contact us if you have any additional questions.    Chandler Gould MD  Nephrology  [facility]

## 2020-08-03 NOTE — SUBJECTIVE & OBJECTIVE
Interval History:     7/31/20: s/p brain tumor surgery today. Creatinine has improved to 1.7. Ca at 7.9 today (albumin 3.1).    8/1/20: patient is resting comfortably, no complaints at present.     8/2/20: Calcium has declined to 6.7 (albumin 3.1). Creatinine at 1.5. Patient resting in chair, no complaints at present.     8/3/20: Creatinine has declined to 1.3. Calcium at 7 (albumin 3).         Review of patient's allergies indicates:   Allergen Reactions    Hydrochlorothiazide      hypercalcemia     Current Facility-Administered Medications   Medication Frequency    acetaminophen tablet 650 mg Q6H PRN    artificial tears 0.5 % ophthalmic solution 1 drop PRN    atorvastatin tablet 10 mg Daily    bisacodyL EC tablet 5 mg Daily PRN    dexAMETHasone tablet 2 mg Daily    dextrose 50% injection 12.5 g PRN    dextrose 50% injection 25 g PRN    docusate sodium capsule 100 mg BID    enoxaparin injection 40 mg Q24H    epoetin laxmi-epbx injection 5,000 Units Every Mon, Wed, Fri    ferrous sulfate EC tablet 325 mg BID    gabapentin capsule 600 mg Daily    glucagon (human recombinant) injection 1 mg PRN    glucose chewable tablet 16 g PRN    glucose chewable tablet 24 g PRN    hydrALAZINE injection 10 mg Q8H PRN    hydrALAZINE tablet 25 mg Q8H    HYDROcodone-acetaminophen  mg per tablet 1 tablet Q4H PRN    HYDROcodone-acetaminophen 5-325 mg per tablet 1 tablet Q4H PRN    hydromorphone (PF) injection 0.2 mg Q5 Min PRN    insulin aspart U-100 pen 1-10 Units QID (AC + HS) PRN    iron sucrose injection 100 mg Daily    levETIRAcetam tablet 1,500 mg BID    levothyroxine tablet 137 mcg Before breakfast    morphine injection 2 mg Q3H PRN    ondansetron injection 4 mg Daily PRN    ondansetron injection 4 mg Q6H PRN    oxyCODONE-acetaminophen 5-325 mg per tablet 1 tablet Q3H PRN    pantoprazole EC tablet 40 mg Daily    polyethylene glycol packet 17 g BID PRN    polyethylene glycol packet 17 g Daily        Objective:     Vital Signs (Most Recent):  Temp: 97.9 °F (36.6 °C) (08/03/20 0744)  Pulse: 63 (08/03/20 0744)  Resp: 18 (08/03/20 0744)  BP: 136/60 (08/03/20 0744)  SpO2: 97 % (08/03/20 0744)  O2 Device (Oxygen Therapy): room air (08/03/20 0744) Vital Signs (24h Range):  Temp:  [97.5 °F (36.4 °C)-98.3 °F (36.8 °C)] 97.9 °F (36.6 °C)  Pulse:  [58-69] 63  Resp:  [16-18] 18  SpO2:  [96 %-97 %] 97 %  BP: (131-154)/(60-71) 136/60     Weight: 129.8 kg (286 lb 2.5 oz) (07/26/20 0435)  Body mass index is 41.06 kg/m².  Body surface area is 2.53 meters squared.    I/O last 3 completed shifts:  In: 480 [P.O.:480]  Out: 1000 [Urine:1000]    Physical Exam  Constitutional:       Appearance: She is well-developed.   HENT:      Head: Normocephalic.      Comments: S/p brain surgery with forehead scar.   Eyes:      Pupils: Pupils are equal, round, and reactive to light.      Comments: Right eye swollen.    Neck:      Thyroid: No thyromegaly.   Cardiovascular:      Rate and Rhythm: Normal rate and regular rhythm.      Heart sounds: No friction rub.   Pulmonary:      Effort: Pulmonary effort is normal.      Breath sounds: Normal breath sounds. No wheezing.   Chest:      Chest wall: No tenderness.   Abdominal:      General: Bowel sounds are normal. There is no distension.      Palpations: Abdomen is soft.      Tenderness: There is no abdominal tenderness.   Musculoskeletal:      Comments: Trace LE edema.    Lymphadenopathy:      Cervical: No cervical adenopathy.   Skin:     General: Skin is warm and dry.      Findings: No rash.   Neurological:      Mental Status: She is alert and oriented to person, place, and time.         Significant Labs:  Lab Results   Component Value Date    CREATININE 1.3 08/03/2020    BUN 36 (H) 08/03/2020     08/03/2020    K 3.6 08/03/2020     08/03/2020    CO2 20 (L) 08/03/2020     Lab Results   Component Value Date    PTH 23.4 08/02/2020    CALCIUM 7.0 (L) 08/03/2020    CAION 1.76 (H)  07/26/2020    PHOS 3.2 08/03/2020     Lab Results   Component Value Date    ALBUMIN 3.0 (L) 08/03/2020     Lab Results   Component Value Date    WBC 22.23 (H) 08/03/2020    HGB 7.3 (L) 08/03/2020    HCT 24.1 (L) 08/03/2020    MCV 82 08/03/2020     (L) 08/03/2020       No results for input(s): MG in the last 168 hours.    PTH-rp: < 0.4 (7/27/20)        Significant Imaging:  Imaging Results    None

## 2020-08-03 NOTE — PT/OT/SLP PROGRESS
Physical Therapy      Patient Name:  Cata Lang   MRN:  [id number]    Treatment Time: 1462-8110    S: Patient agreed to LE TE and pOC review.  present; patient with no c/o pain  O: PT reviewed/educated patient/spouse on B LE TE to do in bed: SAQ, AP's, QS, GS, Heel Slides and Hip ABD/ADD with vc/tc's for correct technique.  A: Patient/spouse verbalized understanding of all info provided; demo'd understanding of B LE TE; Rec post-acute PT  P: Cont per POC; advance as reji with exs and mobility training.    1TA 1TE    Narendra Gonzalez, PT

## 2020-08-03 NOTE — ASSESSMENT & PLAN NOTE
Patient had severe hypercalcemia upon admission.  Vitamin-D and calcium supplementations have been stopped. Parathyroid hormone level is low. Ca had declined to 6.7 on 8/2 (albumin 3.1). Calcium has now improved to 7 on 8/3/20 (albumin 3). Will restart CaCO3 500 mg qid.    PTHrp level was < 0.4 (not elevated).

## 2020-08-03 NOTE — ASSESSMENT & PLAN NOTE
No acute change in mass seen on CT of brain per Neurosurgery.  Holding Aspirin and Clopidogrel for surgery later this week.  Platelet function assay normal.  Craniotomy with resection on 31 July.  Post procedure in ICU.    8/1:  POD #1 by neurosurgery  Ok for step down   Transfer orders placed by neurosurgery  Continue to monitor   CT head chest abd and pelvis recommended by heme/onc  Will need further hydration before pt can receive IV contrast     8/2:  POD #2 by neurosurgery  Path report pending  Neurosurgery plan for repeat MRI outpatient     8/3:  POD #3 by neurosurgery  Path report pending

## 2020-08-03 NOTE — ASSESSMENT & PLAN NOTE
Patient has history of having total thyroid removal 5 years ago, has taken 6000 mg of calcium daily since that time.  On admission patient noted to have hypercalcemia, this was treated successfully and now is noted to be hypocalcemic.    --  ionized calcium pending.    -- Replace calcium per HM.

## 2020-08-03 NOTE — PLAN OF CARE
Recommendation:   1. Continue DM diet   2. Encourage the pt to increase intake of her meals   3. Please order Arginaid supplement daily to support healing if appropriate    Interventions:  Collaboration with other providers  [employer]    Goals: meal intake to increase to at least 75% by RD f/u  Nutrition Goal Status: new  Nutrition Discharge Planning: DM diet to meet energy needs

## 2020-08-03 NOTE — PROGRESS NOTES
[facility] -   Neurosurgery  Progress Note    Subjective:     Interval History: POD3 right frontal craniotomy for evacuation of brain mass. The patient is doing well this morning.  She remains alert and oriented.  Negative Pronator drift.  Left sided weakness improving.  Swelling around incision which is to be expected.  She is tolerating a regular diet.  She has not had a BM.  She continues to experience difficulty with ambulating and independence.  Pain well controlled.  Pain remains total assistance according to PT.    History of Present Illness: Refer to previous note regarding HPI    Post-Op Info:  Procedure(s) (LRB):  CRANIOTOMY, WITH 3D STEREOTACTIC IMAGING GUIDANCE, REAL-TIME (Right)  EXCISION, NEOPLASM (N/A)   3 Days Post-Op      Medications:  Continuous Infusions:  Scheduled Meds:   atorvastatin  10 mg Oral Daily    dexAMETHasone  2 mg Oral Daily    docusate sodium  100 mg Oral BID    enoxaparin  30 mg Subcutaneous Q24H    epoetin laxmi-ebpx (RETACRIT) injection  5,000 Units Subcutaneous Every Mon, Wed, Fri    ferrous sulfate  325 mg Oral BID    gabapentin  600 mg Oral Daily    hydrALAZINE  25 mg Oral Q8H    iron sucrose  100 mg Intravenous Daily    levETIRAcetam  1,500 mg Oral BID    levothyroxine  137 mcg Oral Before breakfast    pantoprazole  40 mg Oral Daily    polyethylene glycol  17 g Oral Daily     PRN Meds:acetaminophen, artificial tears, bisacodyL, dextrose 50%, dextrose 50%, glucagon (human recombinant), glucose, glucose, hydrALAZINE, HYDROcodone-acetaminophen, HYDROcodone-acetaminophen, HYDROmorphone, insulin aspart U-100, morphine, ondansetron, ondansetron, oxyCODONE-acetaminophen, polyethylene glycol     Review of Systems   Constitutional: Negative for activity change, appetite change, chills and fever.   HENT: Positive for facial swelling. Negative for ear discharge, ear pain, hearing loss, rhinorrhea, sinus pressure and sinus pain.    Eyes: Negative for pain,  discharge, redness and itching.   Respiratory: Negative for cough, chest tightness and shortness of breath.    Gastrointestinal: Positive for constipation. Negative for abdominal pain, diarrhea, nausea and vomiting.   Endocrine: Negative for cold intolerance and heat intolerance.   Genitourinary: Negative for decreased urine volume, difficulty urinating and frequency.   Musculoskeletal: Positive for gait problem. Negative for arthralgias, back pain, joint swelling, myalgias, neck pain and neck stiffness.   Skin: Positive for wound (incision healing).   Allergic/Immunologic: Negative for environmental allergies and food allergies.   Neurological: Negative for dizziness, seizures, speech difficulty, weakness, numbness and headaches.   Psychiatric/Behavioral: Negative for behavioral problems, confusion, decreased concentration and sleep disturbance. The patient is not nervous/anxious.      Objective:     Weight: 129.8 kg (286 lb 2.5 oz)  Body mass index is 41.06 kg/m².  Vital Signs (Most Recent):  Temp: 98.2 °F (36.8 °C) (08/03/20 0323)  Pulse: 65 (08/03/20 0323)  Resp: 18 (08/03/20 0323)  BP: (!) 154/71 (08/03/20 0323)  SpO2: 96 % (08/03/20 0323) Vital Signs (24h Range):  Temp:  [97.5 °F (36.4 °C)-98.3 °F (36.8 °C)] 98.2 °F (36.8 °C)  Pulse:  [58-69] 65  Resp:  [16-18] 18  SpO2:  [96 %-97 %] 96 %  BP: (131-154)/(60-71) 154/71                          Physical Exam:  Nursing note and vitals reviewed.    Constitutional: She appears well-developed.   Patient alert and oriented x3.  Speech is clear and appropriate English  Negative Pronator drift  Strength on left improving  Nose: no rhinorrhea  Ears: no otorrhea  Right eye swelling improving  Lungs: equal chest rise and fall, no SOB, no difficulty breathing     Eyes: Pupils are equal, round, and reactive to light. EOM are normal.     Cardiovascular: Normal rate.     Abdominal: Soft.     Skin:   Incision is well approximated and healing with no signs of infection.  No  redness or drainage.  Swelling present.     Psych/Behavior: She is alert. She is oriented to person, place, and time. She has a normal mood and affect.       Significant Labs:  Recent Labs   Lab 08/02/20  0849   *   *   K 3.8      CO2 15*   BUN 36*   CREATININE 1.5*   CALCIUM 6.7*     Recent Labs   Lab 08/02/20  0849   WBC 22.68*   HGB 7.1*   HCT 23.2*   *     No results for input(s): LABPT, INR, APTT in the last 48 hours.  Microbiology Results (last 7 days)     Procedure Component Value Units Date/Time    Urine culture [[id number]] Collected: 07/27/20 0033    Order Status: Completed Specimen: Urine Updated: 07/28/20 1935     Urine Culture, Routine No growth    Narrative:      Urine was sent to lab but culture was not able to be  completed, recollect just for urine culture  Specimen Source->Urine        All pertinent labs from the last 24 hours have been reviewed.  Significant Diagnostics:  No new imaging for review today    Assessment/Plan:     Active Diagnoses:    Diagnosis Date Noted POA    PRINCIPAL PROBLEM:  Calcified cerebral meningioma [D32.0] 07/31/2020 Yes     Chronic    Iron deficiency anemia [D50.9] 08/02/2020 No    Hypocalcemia [E83.51] 08/02/2020 Unknown    Neoplasm of central nervous system [D49.7]  Yes    Anemia due to stage 3 chronic kidney disease [N18.3, D63.1]  Yes    Diabetes mellitus due to underlying condition with hyperglycemia, without long-term current use of insulin [E08.65] 07/31/2020 Yes    Hypercalcemia [E83.52] 07/26/2020 Yes    SHARLENE on CKD, stage III [N17.9] 07/26/2020 Yes    Essential hypertension [I10] 07/26/2020 Yes     Chronic      Problems Resolved During this Admission:   PLAN:  -Continue care with primary care team.  -Ok for prophylactic Lovenox 30mg   -Ok for diabetic diet  -Continue PT/OT---patient is full assist and will likely require inpatient rehabilitation  -Leave incision open to air  -Will continue Decadron taper  -Patient has not had a  BM--will order Colace, Dulcolax, and Miralax  -Will consult CM for discharge disposition      Tania Arthur PA-C  Neurosurgery  [facility]

## 2020-08-03 NOTE — PROGRESS NOTES
[facility] Hospital Medicine  Progress Note    Patient Name: Cata Lang  MRN: [id number]  Patient Class: IP- Inpatient   Admission Date: 7/25/2020  Length of Stay: 8 days  Attending Physician: Abdoul Barragan MD  Primary Care Provider: Sabra Fregoso MD        Subjective:     Principal Problem:Calcified cerebral meningioma        HPI:   Cata Lang is a 49 y.o. female patient with a PMHx of brain mass, HTN, DM II, hypothyroidism, seizure disorder, and chronic HA who presents to the Emergency Department for evaluation of neurological problem which onset gradually today. Pt notes she has been walking worse since December and her HA is worse on the L side and rates it a moderate to severe HA. Symptoms are worsening and moderate to severe in severity. No mitigating or exacerbating factors reported. Associated sxs include dizziness, malaise, and difficulty walking. Patient denies any fever, SOB, CP, abd pain, N/V, back pain, weakness, and all other sxs at this time. Pt notes she takes several tums daily which was recommended by her ENT. Pt transferred from [place] and accepted by Dr. Gandara (Neurosurgery). Case discussed with Dr. Gandara in ED who reviewed CT of the head from transferring facility and feels there is no need for neurosurgical intervention at present.  In ED, patient's symptoms completely resolved with NIHSS of 0.  Labs showed: creatinine of 1.9, BUN 19, calcium 14.2. Patient is on calcitriol at home, started last year with no follow-up labs done. IV fluids x 1 liter and 20 mg IV Lasix given. Hospital Medicine was contacted to admit for hypercalcemia and SHARLENE.    Overview/Hospital Course:  Admitted for evaluation and treatment of hypercalcemia and brain mass with left sided weakness.    8/1: patient stable for transfer to floor. Hypercalcemia workup unremarkable thus far. Will need to rule out multiple myeloma. Heme/onc on case. SPEP, UPEP, immunofixation ordered. CT head chest  abd/pelvis with contrast recommended. Will need to hydrate patient and plan for CT once creatinine improved.   8/2: continue IVF. Multiple myeloma workup pending. Awaiting pathology results on intracranial mass. Awaiting CT chest abd pelvis with contrast once creatinine improves. SWAPNIL noted, venofer given once along with ferrous sulfate PO bid.   8/3: no acute events overnight. Patient has drop in plt. HIT antibody sent out. Heme/onc on case. CT chest abd and pelvis showed prominent mediastinal lymph nodes. Continue lovenox for now. Will plan to switch to different agent should plt continue to drop.     Interval History: No acute events reported overnight. Currently complains of some difficulty with incontinence.     Review of Systems   Constitutional: Negative for fatigue and fever.   HENT: Negative for sinus pressure.    Eyes: Positive for visual disturbance (right eye swelling ).   Respiratory: Negative for shortness of breath.    Cardiovascular: Negative for chest pain.   Gastrointestinal: Negative for nausea and vomiting.   Musculoskeletal: Negative for back pain.   Skin: Negative for rash.   Neurological: Negative for headaches.     Objective:     Vital Signs (Most Recent):  Temp: 97 °F (36.1 °C) (08/03/20 1231)  Pulse: 74 (08/03/20 1231)  Resp: 18 (08/03/20 1231)  BP: (!) 124/56 (08/03/20 1231)  SpO2: 98 % (08/03/20 1231) Vital Signs (24h Range):  Temp:  [97 °F (36.1 °C)-98.3 °F (36.8 °C)] 97 °F (36.1 °C)  Pulse:  [58-74] 74  Resp:  [16-18] 18  SpO2:  [96 %-98 %] 98 %  BP: (124-154)/(56-71) 124/56     Weight: 129.8 kg (286 lb 2.5 oz)  Body mass index is 41.06 kg/m².    Intake/Output Summary (Last 24 hours) at 8/3/2020 1515  Last data filed at 8/3/2020 1200  Gross per 24 hour   Intake 870 ml   Output --   Net 870 ml      Physical Exam  Constitutional:       General: She is not in acute distress.     Appearance: She is well-developed. She is obese. She is not diaphoretic.   HENT:      Head: Normocephalic and  atraumatic.      Comments: Dressing noted on head, clean dressing  Eyes:      Comments: Right eye swollen    Cardiovascular:      Rate and Rhythm: Normal rate and regular rhythm.      Heart sounds: Normal heart sounds. No murmur. No friction rub. No gallop.    Pulmonary:      Effort: Pulmonary effort is normal. No respiratory distress.      Breath sounds: Normal breath sounds. No stridor. No wheezing or rales.   Abdominal:      General: Bowel sounds are normal. There is no distension.      Palpations: Abdomen is soft. There is no mass.      Tenderness: There is no abdominal tenderness. There is no guarding.   Musculoskeletal:      Right lower leg: No edema.      Left lower leg: No edema.   Skin:     General: Skin is warm.      Findings: No erythema.   Neurological:      Mental Status: She is alert and oriented to person, place, and time.   Psychiatric:         Mood and Affect: Mood normal.         Behavior: Behavior normal.         Thought Content: Thought content normal.         Judgment: Judgment normal.         Significant Labs:   Recent Lab Results       08/03/20  1143   08/03/20  0757   08/03/20  0049   08/02/20  2331   08/02/20  2318        Urine Protein, Timed     Unable to calculate         Albumin   3.0           Anion Gap   13           Baso #   0.03           Basophil%   0.1           BUN, Bld   36           Calcium   7.0           Chloride   105           CO2   20           Creatinine   1.3           Differential Method   Automated           eGFR if    55           eGFR if non    48  Comment:  Calculation used to obtain the estimated glomerular filtration  rate (eGFR) is the CKD-EPI equation.              Eos #   0.0           Eosinophil%   0.0           Glucose   124           Gran # (ANC)   17.1           Gran%   76.8           Hematocrit   24.1           Hemoglobin   7.3           Immature Grans (Abs)   0.43  Comment:  Mild elevation in immature granulocytes is non  specific and   can be seen in a variety of conditions including stress response,   acute inflammation, trauma and pregnancy. Correlation with other   laboratory and clinical findings is essential.             Immature Granulocytes   1.9           Lymph #   3.2           Lymph%   14.5           MCH   24.7           MCHC   30.3           MCV   82           Mono #   1.5           Mono%   6.7           MPV   12.3           nRBC   0           Phosphorus   3.2           Platelets   112           POCT Glucose 122       136     Potassium   3.6           PROTEIN URINE     <7         PTH       23.4       RBC   2.95           RDW   16.3           Sodium   138           Urine Collection Duration     24         Urine Volume     1950         WBC   22.23                            08/02/20  1658        Urine Protein, Timed       Albumin       Anion Gap       Baso #       Basophil%       BUN, Bld       Calcium       Chloride       CO2       Creatinine       Differential Method       eGFR if        eGFR if non        Eos #       Eosinophil%       Glucose       Gran # (ANC)       Gran%       Hematocrit       Hemoglobin       Immature Grans (Abs)       Immature Granulocytes       Lymph #       Lymph%       MCH       MCHC       MCV       Mono #       Mono%       MPV       nRBC       Phosphorus       Platelets       POCT Glucose 154     Potassium       PROTEIN URINE       PTH       RBC       RDW       Sodium       Urine Collection Duration       Urine Volume       WBC           All pertinent labs within the past 24 hours have been reviewed.    Significant Imaging: I have reviewed all pertinent imaging results/findings within the past 24 hours.      Assessment/Plan:      * Calcified cerebral meningioma  No acute change in mass seen on CT of brain per Neurosurgery.  Holding Aspirin and Clopidogrel for surgery later this week.  Platelet function assay normal.  Craniotomy with resection on 31 July.  Post  procedure in ICU.    8/1:  POD #1 by neurosurgery  Ok for step down   Transfer orders placed by neurosurgery  Continue to monitor   CT head chest abd and pelvis recommended by heme/onc  Will need further hydration before pt can receive IV contrast     8/2:  POD #2 by neurosurgery  Path report pending  Neurosurgery plan for repeat MRI outpatient     8/3:  POD #3 by neurosurgery  Path report pending      Thrombocytopenia  8/3:  Currently on lovenox  Received heparin in the past  Will check HIT antibody   Heme/onc on case  Will need to switch off of lovenox  Should plt cont to drop      Hypocalcemia  8/3:  Corrected calcium 7.8  Will check ionize calcium  Plan to supplement calcium if needed       Iron deficiency anemia  8/2:  Low iron sat with low ferritin   venofer given once  Po ferrous sulfate given  Cont to monitor h/h     8/3:  Cont venofer daily per heme/onc  Ferrous sulfate bid   Cont to monitor H/H       Diabetes mellitus due to underlying condition with hyperglycemia, without long-term current use of insulin  8/1-8/3:  Glucose controlled   Continue sliding scale insulin       Metabolic encephalopathy  Multifactorial due to hypercalcemia and brain mass with vasogenic edema.    8/1:  Resolved     Essential hypertension  - Losartan-HCTZ on hold as above.    - IV hydralazine PRN.    SHARLENE on CKD, stage III  - Initial creatinine of 1.9 (baseline 1.3).  - IV hydration.  - Avoid nephrotoxic agents. Hold home losartan-HCTZ for now.  - Follow labs.    8/1:  Improving  Continue to monitor creatinine  Continue IVF  nephro consulted on case    Hypercalcemia  Initial calcium of 14.2.  Patient is on calitriol at home, will discontinue.  Hold Calcium supplementation.  Continue aggressive IV hydration.  Will give additional IV Lasix if needed.  Nephrology evaluated and assisting with management.  PTH is appropriately suppressed and Vitamin D25 and Calcitriol are low.  PTHrP pending.    8/1:  Hypercalcemia workup unremarkable  thus far  Will need to rule out multiple myeloma  SPEP, UPEP, immunofixation ordered  Heme/onc on case     8/2:  MM workup pending  Pt hypocalcemic now  Will await ionized calcium   Ct chest abd/pelvis when  Creatinine clearance is appropriate    8/3:  Resolved  MM workup pending          VTE Risk Mitigation (From admission, onward)         Ordered     enoxaparin injection 40 mg  Every 24 hours      08/03/20 0750     IP VTE HIGH RISK PATIENT  Once      07/31/20 1312     Place sequential compression device  Until discontinued      07/31/20 1312     Place HOLLAND hose  Until discontinued      07/31/20 1312                      Abdoul Barragan MD  Department of Hospital Medicine   [facility]

## 2020-08-03 NOTE — ASSESSMENT & PLAN NOTE
Initial calcium of 14.2.  Patient is on calitriol at home, will discontinue.  Hold Calcium supplementation.  Continue aggressive IV hydration.  Will give additional IV Lasix if needed.  Nephrology evaluated and assisting with management.  PTH is appropriately suppressed and Vitamin D25 and Calcitriol are low.  PTHrP pending.    8/1:  Hypercalcemia workup unremarkable thus far  Will need to rule out multiple myeloma  SPEP, UPEP, immunofixation ordered  Heme/onc on case     8/2:  MM workup pending  Pt hypocalcemic now  Will await ionized calcium   Ct chest abd/pelvis when  Creatinine clearance is appropriate    8/3:  Resolved  MM workup pending

## 2020-08-03 NOTE — ASSESSMENT & PLAN NOTE
Patient noted to have anemia, iron studies evaluated, significant iron deficiency noted in treatment began with IV Venofer.    --continue on iv venofer 100 mg daily until complete per MAR

## 2020-08-03 NOTE — ASSESSMENT & PLAN NOTE
8/2:  Low iron sat with low ferritin   venofer given once  Po ferrous sulfate given  Cont to monitor h/h     8/3:  Cont venofer daily per heme/onc  Ferrous sulfate bid   Cont to monitor H/H

## 2020-08-03 NOTE — SUBJECTIVE & OBJECTIVE
Interval History: No acute events reported overnight. Currently complains of some difficulty with incontinence.     Review of Systems   Constitutional: Negative for fatigue and fever.   HENT: Negative for sinus pressure.    Eyes: Positive for visual disturbance (right eye swelling ).   Respiratory: Negative for shortness of breath.    Cardiovascular: Negative for chest pain.   Gastrointestinal: Negative for nausea and vomiting.   Musculoskeletal: Negative for back pain.   Skin: Negative for rash.   Neurological: Negative for headaches.     Objective:     Vital Signs (Most Recent):  Temp: 97 °F (36.1 °C) (08/03/20 1231)  Pulse: 74 (08/03/20 1231)  Resp: 18 (08/03/20 1231)  BP: (!) 124/56 (08/03/20 1231)  SpO2: 98 % (08/03/20 1231) Vital Signs (24h Range):  Temp:  [97 °F (36.1 °C)-98.3 °F (36.8 °C)] 97 °F (36.1 °C)  Pulse:  [58-74] 74  Resp:  [16-18] 18  SpO2:  [96 %-98 %] 98 %  BP: (124-154)/(56-71) 124/56     Weight: 129.8 kg (286 lb 2.5 oz)  Body mass index is 41.06 kg/m².    Intake/Output Summary (Last 24 hours) at 8/3/2020 1515  Last data filed at 8/3/2020 1200  Gross per 24 hour   Intake 870 ml   Output --   Net 870 ml      Physical Exam  Constitutional:       General: She is not in acute distress.     Appearance: She is well-developed. She is obese. She is not diaphoretic.   HENT:      Head: Normocephalic and atraumatic.      Comments: Dressing noted on head, clean dressing  Eyes:      Comments: Right eye swollen    Cardiovascular:      Rate and Rhythm: Normal rate and regular rhythm.      Heart sounds: Normal heart sounds. No murmur. No friction rub. No gallop.    Pulmonary:      Effort: Pulmonary effort is normal. No respiratory distress.      Breath sounds: Normal breath sounds. No stridor. No wheezing or rales.   Abdominal:      General: Bowel sounds are normal. There is no distension.      Palpations: Abdomen is soft. There is no mass.      Tenderness: There is no abdominal tenderness. There is no  guarding.   Musculoskeletal:      Right lower leg: No edema.      Left lower leg: No edema.   Skin:     General: Skin is warm.      Findings: No erythema.   Neurological:      Mental Status: She is alert and oriented to person, place, and time.   Psychiatric:         Mood and Affect: Mood normal.         Behavior: Behavior normal.         Thought Content: Thought content normal.         Judgment: Judgment normal.         Significant Labs:   Recent Lab Results       08/03/20  1143   08/03/20  0757   08/03/20  0049   08/02/20  2331   08/02/20  2318        Urine Protein, Timed     Unable to calculate         Albumin   3.0           Anion Gap   13           Baso #   0.03           Basophil%   0.1           BUN, Bld   36           Calcium   7.0           Chloride   105           CO2   20           Creatinine   1.3           Differential Method   Automated           eGFR if    55           eGFR if non    48  Comment:  Calculation used to obtain the estimated glomerular filtration  rate (eGFR) is the CKD-EPI equation.              Eos #   0.0           Eosinophil%   0.0           Glucose   124           Gran # (ANC)   17.1           Gran%   76.8           Hematocrit   24.1           Hemoglobin   7.3           Immature Grans (Abs)   0.43  Comment:  Mild elevation in immature granulocytes is non specific and   can be seen in a variety of conditions including stress response,   acute inflammation, trauma and pregnancy. Correlation with other   laboratory and clinical findings is essential.             Immature Granulocytes   1.9           Lymph #   3.2           Lymph%   14.5           MCH   24.7           MCHC   30.3           MCV   82           Mono #   1.5           Mono%   6.7           MPV   12.3           nRBC   0           Phosphorus   3.2           Platelets   112           POCT Glucose 122       136     Potassium   3.6           PROTEIN URINE     <7         PTH       23.4       RBC    2.95           RDW   16.3           Sodium   138           Urine Collection Duration     24         Urine Volume     1950         WBC   22.23                            08/02/20  1658        Urine Protein, Timed       Albumin       Anion Gap       Baso #       Basophil%       BUN, Bld       Calcium       Chloride       CO2       Creatinine       Differential Method       eGFR if        eGFR if non        Eos #       Eosinophil%       Glucose       Gran # (ANC)       Gran%       Hematocrit       Hemoglobin       Immature Grans (Abs)       Immature Granulocytes       Lymph #       Lymph%       MCH       MCHC       MCV       Mono #       Mono%       MPV       nRBC       Phosphorus       Platelets       POCT Glucose 154     Potassium       PROTEIN URINE       PTH       RBC       RDW       Sodium       Urine Collection Duration       Urine Volume       WBC           All pertinent labs within the past 24 hours have been reviewed.    Significant Imaging: I have reviewed all pertinent imaging results/findings within the past 24 hours.

## 2020-08-04 LAB
ALBUMIN SERPL BCP-MCNC: 2.9 G/DL (ref 3.5–5.2)
ALP SERPL-CCNC: 147 U/L (ref 55–135)
ALT SERPL W/O P-5'-P-CCNC: 53 U/L (ref 10–44)
ANION GAP SERPL CALC-SCNC: 14 MMOL/L (ref 8–16)
AST SERPL-CCNC: 32 U/L (ref 10–40)
BASOPHILS # BLD AUTO: 0.02 K/UL (ref 0–0.2)
BASOPHILS NFR BLD: 0.1 % (ref 0–1.9)
BILIRUB SERPL-MCNC: 0.2 MG/DL (ref 0.1–1)
BUN SERPL-MCNC: 27 MG/DL (ref 6–20)
CALCIUM SERPL-MCNC: 6.7 MG/DL (ref 8.7–10.5)
CHLORIDE SERPL-SCNC: 103 MMOL/L (ref 95–110)
CO2 SERPL-SCNC: 21 MMOL/L (ref 23–29)
CREAT SERPL-MCNC: 1.2 MG/DL (ref 0.5–1.4)
DIFFERENTIAL METHOD: ABNORMAL
EOSINOPHIL # BLD AUTO: 0.1 K/UL (ref 0–0.5)
EOSINOPHIL NFR BLD: 0.5 % (ref 0–8)
ERYTHROCYTE [DISTWIDTH] IN BLOOD BY AUTOMATED COUNT: 16.6 % (ref 11.5–14.5)
EST. GFR  (AFRICAN AMERICAN): >60 ML/MIN/1.73 M^2
EST. GFR  (NON AFRICAN AMERICAN): 52 ML/MIN/1.73 M^2
FOLATE SERPL-MCNC: 2.2 NG/ML (ref 4–24)
GLUCOSE SERPL-MCNC: 176 MG/DL (ref 70–110)
HCT VFR BLD AUTO: 24.3 % (ref 37–48.5)
HGB BLD-MCNC: 7.4 G/DL (ref 12–16)
IMM GRANULOCYTES # BLD AUTO: 0.38 K/UL (ref 0–0.04)
IMM GRANULOCYTES NFR BLD AUTO: 2.3 % (ref 0–0.5)
LYMPHOCYTES # BLD AUTO: 3.2 K/UL (ref 1–4.8)
LYMPHOCYTES NFR BLD: 19.5 % (ref 18–48)
MAGNESIUM SERPL-MCNC: 1.7 MG/DL (ref 1.6–2.6)
MCH RBC QN AUTO: 25.1 PG (ref 27–31)
MCHC RBC AUTO-ENTMCNC: 30.5 G/DL (ref 32–36)
MCV RBC AUTO: 82 FL (ref 82–98)
MONOCYTES # BLD AUTO: 0.9 K/UL (ref 0.3–1)
MONOCYTES NFR BLD: 5.4 % (ref 4–15)
NEUTROPHILS # BLD AUTO: 11.9 K/UL (ref 1.8–7.7)
NEUTROPHILS NFR BLD: 72.2 % (ref 38–73)
NRBC BLD-RTO: 0 /100 WBC
PATHOLOGIST INTERPRETATION IFE: NORMAL
PATHOLOGIST INTERPRETATION SPE: NORMAL
PLATELET # BLD AUTO: 137 K/UL (ref 150–350)
PMV BLD AUTO: 12.6 FL (ref 9.2–12.9)
POCT GLUCOSE: 104 MG/DL (ref 70–110)
POCT GLUCOSE: 112 MG/DL (ref 70–110)
POCT GLUCOSE: 191 MG/DL (ref 70–110)
POCT GLUCOSE: 196 MG/DL (ref 70–110)
POTASSIUM SERPL-SCNC: 3.4 MMOL/L (ref 3.5–5.1)
PROT SERPL-MCNC: 6.3 G/DL (ref 6–8.4)
RBC # BLD AUTO: 2.95 M/UL (ref 4–5.4)
SODIUM SERPL-SCNC: 138 MMOL/L (ref 136–145)
VIT B12 SERPL-MCNC: 380 PG/ML (ref 210–950)
WBC # BLD AUTO: 16.52 K/UL (ref 3.9–12.7)

## 2020-08-04 PROCEDURE — 99233 PR SUBSEQUENT HOSPITAL CARE,LEVL III: ICD-10-PCS | Mod: ,,, | Performed by: INTERNAL MEDICINE

## 2020-08-04 PROCEDURE — 25000003 PHARM REV CODE 250: Performed by: INTERNAL MEDICINE

## 2020-08-04 PROCEDURE — 99024 PR POST-OP FOLLOW-UP VISIT: ICD-10-PCS | Mod: ,,, | Performed by: NEUROLOGICAL SURGERY

## 2020-08-04 PROCEDURE — 63600175 PHARM REV CODE 636 W HCPCS: Performed by: INTERNAL MEDICINE

## 2020-08-04 PROCEDURE — 25000003 PHARM REV CODE 250: Performed by: FAMILY MEDICINE

## 2020-08-04 PROCEDURE — 36415 COLL VENOUS BLD VENIPUNCTURE: CPT

## 2020-08-04 PROCEDURE — 94799 UNLISTED PULMONARY SVC/PX: CPT

## 2020-08-04 PROCEDURE — 11000001 HC ACUTE MED/SURG PRIVATE ROOM

## 2020-08-04 PROCEDURE — 97116 GAIT TRAINING THERAPY: CPT

## 2020-08-04 PROCEDURE — 99024 POSTOP FOLLOW-UP VISIT: CPT | Mod: ,,, | Performed by: NEUROLOGICAL SURGERY

## 2020-08-04 PROCEDURE — 25000003 PHARM REV CODE 250: Performed by: PHYSICIAN ASSISTANT

## 2020-08-04 PROCEDURE — 63600175 PHARM REV CODE 636 W HCPCS: Performed by: PHYSICIAN ASSISTANT

## 2020-08-04 PROCEDURE — 83735 ASSAY OF MAGNESIUM: CPT

## 2020-08-04 PROCEDURE — 96372 THER/PROPH/DIAG INJ SC/IM: CPT

## 2020-08-04 PROCEDURE — 80053 COMPREHEN METABOLIC PANEL: CPT

## 2020-08-04 PROCEDURE — 99233 SBSQ HOSP IP/OBS HIGH 50: CPT | Mod: ,,, | Performed by: INTERNAL MEDICINE

## 2020-08-04 PROCEDURE — 97530 THERAPEUTIC ACTIVITIES: CPT

## 2020-08-04 PROCEDURE — 25000003 PHARM REV CODE 250: Performed by: NURSE PRACTITIONER

## 2020-08-04 PROCEDURE — 25000003 PHARM REV CODE 250: Performed by: NEUROLOGICAL SURGERY

## 2020-08-04 PROCEDURE — 85025 COMPLETE CBC W/AUTO DIFF WBC: CPT

## 2020-08-04 RX ORDER — POTASSIUM CHLORIDE 20 MEQ/1
40 TABLET, EXTENDED RELEASE ORAL ONCE
Status: COMPLETED | OUTPATIENT
Start: 2020-08-04 | End: 2020-08-04

## 2020-08-04 RX ADMIN — FERROUS SULFATE TAB EC 325 MG (65 MG FE EQUIVALENT) 325 MG: 325 (65 FE) TABLET DELAYED RESPONSE at 08:08

## 2020-08-04 RX ADMIN — GABAPENTIN 600 MG: 300 CAPSULE ORAL at 08:08

## 2020-08-04 RX ADMIN — POTASSIUM CHLORIDE 40 MEQ: 1500 TABLET, EXTENDED RELEASE ORAL at 11:08

## 2020-08-04 RX ADMIN — CALCIUM GLUCONATE 2000 MG: 98 INJECTION, SOLUTION INTRAVENOUS at 01:08

## 2020-08-04 RX ADMIN — ACETAMINOPHEN 650 MG: 325 TABLET ORAL at 08:08

## 2020-08-04 RX ADMIN — DOCUSATE SODIUM 100 MG: 100 CAPSULE, LIQUID FILLED ORAL at 09:08

## 2020-08-04 RX ADMIN — CALCIUM 500 MG: 500 TABLET ORAL at 08:08

## 2020-08-04 RX ADMIN — CALCIUM 500 MG: 500 TABLET ORAL at 05:08

## 2020-08-04 RX ADMIN — FERROUS SULFATE TAB EC 325 MG (65 MG FE EQUIVALENT) 325 MG: 325 (65 FE) TABLET DELAYED RESPONSE at 09:08

## 2020-08-04 RX ADMIN — HYDROCODONE BITARTRATE AND ACETAMINOPHEN 1 TABLET: 10; 325 TABLET ORAL at 03:08

## 2020-08-04 RX ADMIN — ATORVASTATIN CALCIUM 10 MG: 10 TABLET, FILM COATED ORAL at 08:08

## 2020-08-04 RX ADMIN — HYDRALAZINE HYDROCHLORIDE 25 MG: 25 TABLET, FILM COATED ORAL at 01:08

## 2020-08-04 RX ADMIN — HYDROCODONE BITARTRATE AND ACETAMINOPHEN 1 TABLET: 10; 325 TABLET ORAL at 08:08

## 2020-08-04 RX ADMIN — LEVETIRACETAM 1500 MG: 500 TABLET ORAL at 09:08

## 2020-08-04 RX ADMIN — PANTOPRAZOLE SODIUM 40 MG: 40 TABLET, DELAYED RELEASE ORAL at 08:08

## 2020-08-04 RX ADMIN — INSULIN ASPART 2 UNITS: 100 INJECTION, SOLUTION INTRAVENOUS; SUBCUTANEOUS at 05:08

## 2020-08-04 RX ADMIN — POLYETHYLENE GLYCOL 3350 17 G: 17 POWDER, FOR SOLUTION ORAL at 08:08

## 2020-08-04 RX ADMIN — LEVOTHYROXINE SODIUM 137 MCG: 25 TABLET ORAL at 05:08

## 2020-08-04 RX ADMIN — ENOXAPARIN SODIUM 40 MG: 100 INJECTION SUBCUTANEOUS at 05:08

## 2020-08-04 RX ADMIN — INSULIN ASPART 2 UNITS: 100 INJECTION, SOLUTION INTRAVENOUS; SUBCUTANEOUS at 12:08

## 2020-08-04 RX ADMIN — HYDROCODONE BITARTRATE AND ACETAMINOPHEN 1 TABLET: 10; 325 TABLET ORAL at 06:08

## 2020-08-04 RX ADMIN — CALCIUM 500 MG: 500 TABLET ORAL at 09:08

## 2020-08-04 RX ADMIN — DEXAMETHASONE 2 MG: 1 TABLET ORAL at 08:08

## 2020-08-04 RX ADMIN — HYDRALAZINE HYDROCHLORIDE 25 MG: 25 TABLET, FILM COATED ORAL at 05:08

## 2020-08-04 RX ADMIN — LEVETIRACETAM 1500 MG: 500 TABLET ORAL at 08:08

## 2020-08-04 RX ADMIN — IRON SUCROSE 100 MG: 20 INJECTION, SOLUTION INTRAVENOUS at 08:08

## 2020-08-04 RX ADMIN — CALCIUM 500 MG: 500 TABLET ORAL at 01:08

## 2020-08-04 RX ADMIN — DOCUSATE SODIUM 100 MG: 100 CAPSULE, LIQUID FILLED ORAL at 08:08

## 2020-08-04 RX ADMIN — HYDRALAZINE HYDROCHLORIDE 25 MG: 25 TABLET, FILM COATED ORAL at 09:08

## 2020-08-04 RX ADMIN — HYDROCODONE BITARTRATE AND ACETAMINOPHEN 1 TABLET: 10; 325 TABLET ORAL at 02:08

## 2020-08-04 NOTE — SUBJECTIVE & OBJECTIVE
Interval History:  Patient out of bed sitting up in chair at bedside, participating with PT, able to walk with assistance in room.  Reports left side is stronger today, still continues with residual weakness.  Platelet count improved today,HIT lab pending.  Due to increase in platelet count today this is likely not HIT, no change to treatment plan today.    Oncology Treatment Plan:   [No treatment plan]    Medications:  Continuous Infusions:  Scheduled Meds:   atorvastatin  10 mg Oral Daily    calcium carbonate  500 mg Oral QID    calcium gluconate IVPB  2,000 mg Intravenous Once    dexAMETHasone  2 mg Oral Daily    docusate sodium  100 mg Oral BID    enoxaparin  40 mg Subcutaneous Q24H    epoetin laxmi-ebpx (RETACRIT) injection  5,000 Units Subcutaneous Every Mon, Wed, Fri    ferrous sulfate  325 mg Oral BID    gabapentin  600 mg Oral Daily    hydrALAZINE  25 mg Oral Q8H    iron sucrose  100 mg Intravenous Daily    levETIRAcetam  1,500 mg Oral BID    levothyroxine  137 mcg Oral Before breakfast    pantoprazole  40 mg Oral Daily    polyethylene glycol  17 g Oral Daily    potassium chloride SA  40 mEq Oral Once     PRN Meds:acetaminophen, artificial tears, bisacodyL, dextrose 50%, dextrose 50%, glucagon (human recombinant), glucose, glucose, hydrALAZINE, HYDROcodone-acetaminophen, HYDROcodone-acetaminophen, HYDROmorphone, insulin aspart U-100, morphine, ondansetron, ondansetron, oxyCODONE-acetaminophen, polyethylene glycol     Review of Systems   Constitutional: Positive for appetite change and fatigue. Negative for chills, diaphoresis, fever and unexpected weight change.   HENT: Positive for facial swelling. Negative for congestion, ear pain, hearing loss, mouth sores, postnasal drip, rhinorrhea, sore throat and trouble swallowing. Ear discharge: Left side.    Eyes: Negative for pain, discharge, redness and visual disturbance.   Respiratory: Negative for cough, chest tightness and shortness of breath.     Cardiovascular: Negative for chest pain, palpitations and leg swelling.   Gastrointestinal: Positive for abdominal distention. Negative for blood in stool, constipation, diarrhea, nausea and vomiting.   Endocrine: Negative for cold intolerance and heat intolerance.   Genitourinary: Negative for difficulty urinating, dyspareunia, flank pain and hematuria.   Musculoskeletal: Negative for arthralgias, back pain and myalgias.   Skin: Positive for wound.   Neurological: Positive for weakness. Negative for dizziness, light-headedness and headaches.   Hematological: Negative for adenopathy. Does not bruise/bleed easily.   Psychiatric/Behavioral: Negative for agitation, behavioral problems and confusion. The patient is not nervous/anxious.      Objective:     Vital Signs (Most Recent):  Temp: 97.8 °F (36.6 °C) (08/04/20 0810)  Pulse: 63 (08/04/20 0810)  Resp: 18 (08/04/20 0830)  BP: (!) 143/66 (08/04/20 0810)  SpO2: 97 % (08/04/20 0810) Vital Signs (24h Range):  Temp:  [96.2 °F (35.7 °C)-97.8 °F (36.6 °C)] 97.8 °F (36.6 °C)  Pulse:  [59-74] 63  Resp:  [17-20] 18  SpO2:  [95 %-98 %] 97 %  BP: ()/(46-68) 143/66     Weight: 129.8 kg (286 lb 2.5 oz)  Body mass index is 41.06 kg/m².  Body surface area is 2.53 meters squared.      Intake/Output Summary (Last 24 hours) at 8/4/2020 1121  Last data filed at 8/3/2020 1200  Gross per 24 hour   Intake 270 ml   Output --   Net 270 ml       Physical Exam  Vitals signs and nursing note reviewed.   Constitutional:       General: She is not in acute distress.     Appearance: She is well-developed. She is ill-appearing.   HENT:      Head:        Right Ear: Hearing and external ear normal.      Left Ear: Hearing and external ear normal.      Nose: No rhinorrhea.      Right Sinus: No maxillary sinus tenderness or frontal sinus tenderness.      Left Sinus: No maxillary sinus tenderness or frontal sinus tenderness.      Mouth/Throat:      Mouth: No oral lesions.      Pharynx: Uvula  midline.   Eyes:      General:         Right eye: No discharge.         Left eye: No discharge.      Pupils: Pupils are equal, round, and reactive to light.      Comments: Unable to evaluate right eye due to periorbital edema resulting from cranial surgery, mild edema to left eye.   Neck:      Musculoskeletal: Normal range of motion.      Thyroid: No thyromegaly.      Vascular: No carotid bruit.      Trachea: No tracheal deviation.   Cardiovascular:      Rate and Rhythm: Normal rate and regular rhythm.      Pulses:           Dorsalis pedis pulses are 2+ on the right side and 2+ on the left side.      Heart sounds: Normal heart sounds, S1 normal and S2 normal. No murmur.   Pulmonary:      Effort: Pulmonary effort is normal. No respiratory distress.      Breath sounds: Normal breath sounds.   Abdominal:      General: Bowel sounds are decreased. There is distension.      Palpations: Abdomen is soft. There is no mass.      Tenderness: There is no abdominal tenderness.   Musculoskeletal: Normal range of motion.   Lymphadenopathy:      Cervical: No cervical adenopathy.      Upper Body:      Right upper body: No supraclavicular adenopathy.      Left upper body: No supraclavicular adenopathy.   Skin:     General: Skin is warm and dry.      Capillary Refill: Capillary refill takes less than 2 seconds.      Findings: No rash.   Neurological:      Mental Status: She is alert and oriented to person, place, and time.      Sensory: No sensory deficit.      Motor: Weakness (On left side) present.      Gait: Gait normal.   Psychiatric:         Mood and Affect: Mood is not anxious or depressed.         Speech: Speech normal.         Behavior: Behavior normal.         Thought Content: Thought content normal.         Judgment: Judgment normal.         Significant Labs:   CBC:   Recent Labs   Lab 08/03/20  0757 08/04/20  0942   WBC 22.23* 16.52*   HGB 7.3* 7.4*   HCT 24.1* 24.3*   * 137*    and CMP:   Recent Labs   Lab  08/03/20  0757 08/04/20  0942    138   K 3.6 3.4*    103   CO2 20* 21*   * 176*   BUN 36* 27*   CREATININE 1.3 1.2   CALCIUM 7.0* 6.7*   PROT  --  6.3   ALBUMIN 3.0* 2.9*   BILITOT  --  0.2   ALKPHOS  --  147*   AST  --  32   ALT  --  53*   ANIONGAP 13 14   EGFRNONAA 48* 52*       Diagnostic Results:  I have reviewed all pertinent imaging results/findings within the past 24 hours.

## 2020-08-04 NOTE — PROGRESS NOTES
[facility] Hospital Medicine  Progress Note    Patient Name: Cata Lang  MRN: [id number]  Patient Class: IP- Inpatient   Admission Date: 7/25/2020  Length of Stay: 9 days  Attending Physician: Abdoul Barragan MD  Primary Care Provider: Sabra Fregoso MD        Subjective:     Principal Problem:Calcified cerebral meningioma        HPI:   Cata Lang is a 49 y.o. female patient with a PMHx of brain mass, HTN, DM II, hypothyroidism, seizure disorder, and chronic HA who presents to the Emergency Department for evaluation of neurological problem which onset gradually today. Pt notes she has been walking worse since December and her HA is worse on the L side and rates it a moderate to severe HA. Symptoms are worsening and moderate to severe in severity. No mitigating or exacerbating factors reported. Associated sxs include dizziness, malaise, and difficulty walking. Patient denies any fever, SOB, CP, abd pain, N/V, back pain, weakness, and all other sxs at this time. Pt notes she takes several tums daily which was recommended by her ENT. Pt transferred from [facility] and accepted by Dr. Gandara (Neurosurgery). Case discussed with Dr. Gandara in ED who reviewed CT of the head from transferring facility and feels there is no need for neurosurgical intervention at present.  In ED, patient's symptoms completely resolved with NIHSS of 0.  Labs showed: creatinine of 1.9, BUN 19, calcium 14.2. Patient is on calcitriol at home, started last year with no follow-up labs done. IV fluids x 1 liter and 20 mg IV Lasix given. Hospital Medicine was contacted to admit for hypercalcemia and SHARLENE.    Overview/Hospital Course:  Admitted for evaluation and treatment of hypercalcemia and brain mass with left sided weakness.    8/1: patient stable for transfer to floor. Hypercalcemia workup unremarkable thus far. Will need to rule out multiple myeloma. Heme/onc on case. SPEP, UPEP, immunofixation ordered. CT head chest  abd/pelvis with contrast recommended. Will need to hydrate patient and plan for CT once creatinine improved.   8/2: continue IVF. Multiple myeloma workup pending. Awaiting pathology results on intracranial mass. Awaiting CT chest abd pelvis with contrast once creatinine improves. SWAPNIL noted, venofer given once along with ferrous sulfate PO bid.   8/3: no acute events overnight. Patient has drop in plt. HIT antibody sent out. Heme/onc on case. CT chest abd and pelvis showed prominent mediastinal lymph nodes. Continue lovenox for now. Will plan to switch to different agent should plt continue to drop.   8/4: platelets improving. Not likely HIT. Cont lovenox. Calcium replaced. Rehab placement pending.     Interval History: No acute events reported overnight. Tolerating diet.     Review of Systems   Constitutional: Negative for fatigue and fever.   HENT: Negative for sinus pressure.    Eyes: Negative for visual disturbance.   Respiratory: Negative for shortness of breath.    Cardiovascular: Negative for chest pain.   Gastrointestinal: Negative for nausea and vomiting.   Musculoskeletal: Negative for back pain.   Skin: Negative for rash.   Neurological: Negative for headaches.     Objective:     Vital Signs (Most Recent):  Temp: 97.9 °F (36.6 °C) (08/04/20 1145)  Pulse: 64 (08/04/20 1145)  Resp: 18 (08/04/20 1415)  BP: 137/63 (08/04/20 1145)  SpO2: 97 % (08/04/20 1145) Vital Signs (24h Range):  Temp:  [96.2 °F (35.7 °C)-97.9 °F (36.6 °C)] 97.9 °F (36.6 °C)  Pulse:  [59-68] 64  Resp:  [17-20] 18  SpO2:  [95 %-98 %] 97 %  BP: ()/(46-68) 137/63     Weight: 129.8 kg (286 lb 2.5 oz)  Body mass index is 41.06 kg/m².  No intake or output data in the 24 hours ending 08/04/20 1447   Physical Exam  Constitutional:       General: She is not in acute distress.     Appearance: She is well-developed. She is obese. She is not diaphoretic.   HENT:      Head:      Comments: Swelling noted on head, sutures clean   Eyes:       Comments: Right eye swollen    Cardiovascular:      Rate and Rhythm: Normal rate and regular rhythm.      Heart sounds: Normal heart sounds. No murmur. No friction rub. No gallop.    Pulmonary:      Effort: Pulmonary effort is normal. No respiratory distress.      Breath sounds: Normal breath sounds. No stridor. No wheezing or rales.   Abdominal:      General: Bowel sounds are normal. There is no distension.      Palpations: Abdomen is soft. There is no mass.      Tenderness: There is no abdominal tenderness. There is no guarding.   Musculoskeletal:      Right lower leg: No edema.      Left lower leg: No edema.   Skin:     General: Skin is warm.      Findings: No erythema.   Neurological:      Mental Status: She is alert and oriented to person, place, and time.   Psychiatric:         Mood and Affect: Mood normal.         Behavior: Behavior normal.         Thought Content: Thought content normal.         Judgment: Judgment normal.         Significant Labs:   Recent Lab Results       08/04/20  1151   08/04/20  0942   08/04/20  0548   08/03/20  2157   08/03/20  1756        Albumin   2.9           Alkaline Phosphatase   147           ALT   53           Anion Gap   14           AST   32           Baso #   0.02           Basophil%   0.1           BILIRUBIN TOTAL   0.2  Comment:  For infants and newborns, interpretation of results should be based  on gestational age, weight and in agreement with clinical  observations.  Premature Infant recommended reference ranges:  Up to 24 hours.............<8.0 mg/dL  Up to 48 hours............<12.0 mg/dL  3-5 days..................<15.0 mg/dL  6-29 days.................<15.0 mg/dL             BUN, Bld   27           Calcium   6.7  Comment:  CA  critical result(s) called and verbal readback obtained from   TACO WELLER RN by AYANNA 08/04/2020 10:42             Chloride   103           CO2   21           Creatinine   1.2           Differential Method   Automated           eGFR if   American   >60           eGFR if non    52  Comment:  Calculation used to obtain the estimated glomerular filtration  rate (eGFR) is the CKD-EPI equation.              Eos #   0.1           Eosinophil%   0.5           Glucose   176           Gran # (ANC)   11.9           Gran%   72.2           Hematocrit   24.3           Hemoglobin   7.4           Immature Grans (Abs)   0.38  Comment:  Mild elevation in immature granulocytes is non specific and   can be seen in a variety of conditions including stress response,   acute inflammation, trauma and pregnancy. Correlation with other   laboratory and clinical findings is essential.             Immature Granulocytes   2.3           Lymph #   3.2           Lymph%   19.5           MCH   25.1           MCHC   30.5           MCV   82           Mono #   0.9           Mono%   5.4           MPV   12.6           nRBC   0           Platelets   137           POCT Glucose 196   112 118 171     Potassium   3.4           PROTEIN TOTAL   6.3           RBC   2.95           RDW   16.6           Sodium   138           WBC   16.52                              All pertinent labs within the past 24 hours have been reviewed.    Significant Imaging: I have reviewed all pertinent imaging results/findings within the past 24 hours.      Assessment/Plan:      * Calcified cerebral meningioma  No acute change in mass seen on CT of brain per Neurosurgery.  Holding Aspirin and Clopidogrel for surgery later this week.  Platelet function assay normal.  Craniotomy with resection on 31 July.  Post procedure in ICU.    8/1:  POD #1 by neurosurgery  Ok for step down   Transfer orders placed by neurosurgery  Continue to monitor   CT head chest abd and pelvis recommended by heme/onc  Will need further hydration before pt can receive IV contrast     8/2:  POD #2 by neurosurgery  Path report pending  Neurosurgery plan for repeat MRI outpatient     8/3:  POD #3 by neurosurgery  Path report  pending    8/4:  POD #4 by neurosurgery  Pt stable       Thrombocytopenia  8/3:  Currently on lovenox  Received heparin in the past  Will check HIT antibody   Heme/onc on case  Will need to switch off of lovenox  Should plt cont to drop    8/4:  plt improving  Not likely HIT   Cont lovenox    Hypocalcemia  8/3:  Corrected calcium 7.8  Will check ionize calcium  Plan to supplement calcium if needed     8/4:  Calcium running low  Will replace today with calcium gluconate  Cont to monitor   Will check mag     Iron deficiency anemia  8/2:  Low iron sat with low ferritin   venofer given once  Po ferrous sulfate given  Cont to monitor h/h     8/3:  Cont venofer daily per heme/onc  Ferrous sulfate bid   Cont to monitor H/H     8/4:  Stable  Cont to monitor       Diabetes mellitus due to underlying condition with hyperglycemia, without long-term current use of insulin  8/1-8/3:  Glucose controlled   Continue sliding scale insulin       Metabolic encephalopathy  Multifactorial due to hypercalcemia and brain mass with vasogenic edema.    8/1:  Resolved     Essential hypertension  - Losartan-HCTZ on hold as above.    - IV hydralazine PRN.    SHARLENE on CKD, stage III  - Initial creatinine of 1.9 (baseline 1.3).  - IV hydration.  - Avoid nephrotoxic agents. Hold home losartan-HCTZ for now.  - Follow labs.    8/1:  Improving  Continue to monitor creatinine  Continue IVF  nephro consulted on case    Hypercalcemia  Initial calcium of 14.2.  Patient is on calitriol at home, will discontinue.  Hold Calcium supplementation.  Continue aggressive IV hydration.  Will give additional IV Lasix if needed.  Nephrology evaluated and assisting with management.  PTH is appropriately suppressed and Vitamin D25 and Calcitriol are low.  PTHrP pending.    8/1:  Hypercalcemia workup unremarkable thus far  Will need to rule out multiple myeloma  SPEP, UPEP, immunofixation ordered  Heme/onc on case     8/2:  MM workup pending  Pt hypocalcemic now  Will  await ionized calcium   Ct chest abd/pelvis when  Creatinine clearance is appropriate    8/3:  Resolved  MM workup pending            VTE Risk Mitigation (From admission, onward)         Ordered     enoxaparin injection 40 mg  Every 24 hours      08/03/20 0750     IP VTE HIGH RISK PATIENT  Once      07/31/20 1312     Place sequential compression device  Until discontinued      07/31/20 1312     Place HOLLAND hose  Until discontinued      07/31/20 1312                      Abdoul Barragan MD  Department of Hospital Medicine   [facility]

## 2020-08-04 NOTE — SUBJECTIVE & OBJECTIVE
Interval History: No acute events reported overnight. Tolerating diet.     Review of Systems   Constitutional: Negative for fatigue and fever.   HENT: Negative for sinus pressure.    Eyes: Negative for visual disturbance.   Respiratory: Negative for shortness of breath.    Cardiovascular: Negative for chest pain.   Gastrointestinal: Negative for nausea and vomiting.   Musculoskeletal: Negative for back pain.   Skin: Negative for rash.   Neurological: Negative for headaches.     Objective:     Vital Signs (Most Recent):  Temp: 97.9 °F (36.6 °C) (08/04/20 1145)  Pulse: 64 (08/04/20 1145)  Resp: 18 (08/04/20 1415)  BP: 137/63 (08/04/20 1145)  SpO2: 97 % (08/04/20 1145) Vital Signs (24h Range):  Temp:  [96.2 °F (35.7 °C)-97.9 °F (36.6 °C)] 97.9 °F (36.6 °C)  Pulse:  [59-68] 64  Resp:  [17-20] 18  SpO2:  [95 %-98 %] 97 %  BP: ()/(46-68) 137/63     Weight: 129.8 kg (286 lb 2.5 oz)  Body mass index is 41.06 kg/m².  No intake or output data in the 24 hours ending 08/04/20 1447   Physical Exam  Constitutional:       General: She is not in acute distress.     Appearance: She is well-developed. She is obese. She is not diaphoretic.   HENT:      Head:      Comments: Swelling noted on head, sutures clean   Eyes:      Comments: Right eye swollen    Cardiovascular:      Rate and Rhythm: Normal rate and regular rhythm.      Heart sounds: Normal heart sounds. No murmur. No friction rub. No gallop.    Pulmonary:      Effort: Pulmonary effort is normal. No respiratory distress.      Breath sounds: Normal breath sounds. No stridor. No wheezing or rales.   Abdominal:      General: Bowel sounds are normal. There is no distension.      Palpations: Abdomen is soft. There is no mass.      Tenderness: There is no abdominal tenderness. There is no guarding.   Musculoskeletal:      Right lower leg: No edema.      Left lower leg: No edema.   Skin:     General: Skin is warm.      Findings: No erythema.   Neurological:      Mental Status:  She is alert and oriented to person, place, and time.   Psychiatric:         Mood and Affect: Mood normal.         Behavior: Behavior normal.         Thought Content: Thought content normal.         Judgment: Judgment normal.         Significant Labs:   Recent Lab Results       08/04/20  1151   08/04/20  0942   08/04/20  0548   08/03/20  2157   08/03/20  1756        Albumin   2.9           Alkaline Phosphatase   147           ALT   53           Anion Gap   14           AST   32           Baso #   0.02           Basophil%   0.1           BILIRUBIN TOTAL   0.2  Comment:  For infants and newborns, interpretation of results should be based  on gestational age, weight and in agreement with clinical  observations.  Premature Infant recommended reference ranges:  Up to 24 hours.............<8.0 mg/dL  Up to 48 hours............<12.0 mg/dL  3-5 days..................<15.0 mg/dL  6-29 days.................<15.0 mg/dL             BUN, Bld   27           Calcium   6.7  Comment:  CA  critical result(s) called and verbal readback obtained from   TACO WELLER RN by AYANNA 08/04/2020 10:42             Chloride   103           CO2   21           Creatinine   1.2           Differential Method   Automated           eGFR if    >60           eGFR if non    52  Comment:  Calculation used to obtain the estimated glomerular filtration  rate (eGFR) is the CKD-EPI equation.              Eos #   0.1           Eosinophil%   0.5           Glucose   176           Gran # (ANC)   11.9           Gran%   72.2           Hematocrit   24.3           Hemoglobin   7.4           Immature Grans (Abs)   0.38  Comment:  Mild elevation in immature granulocytes is non specific and   can be seen in a variety of conditions including stress response,   acute inflammation, trauma and pregnancy. Correlation with other   laboratory and clinical findings is essential.             Immature Granulocytes   2.3           Lymph #   3.2            Lymph%   19.5           MCH   25.1           MCHC   30.5           MCV   82           Mono #   0.9           Mono%   5.4           MPV   12.6           nRBC   0           Platelets   137           POCT Glucose 196   112 118 171     Potassium   3.4           PROTEIN TOTAL   6.3           RBC   2.95           RDW   16.6           Sodium   138           WBC   16.52                              All pertinent labs within the past 24 hours have been reviewed.    Significant Imaging: I have reviewed all pertinent imaging results/findings within the past 24 hours.

## 2020-08-04 NOTE — PLAN OF CARE
Remains injury free. Pain managed with oral medication. Up to recliner today, tolerated well. For breakfast,  fed patient. For lunch, patient fed self and did well. Will monitor.

## 2020-08-04 NOTE — ASSESSMENT & PLAN NOTE
No acute change in mass seen on CT of brain per Neurosurgery.  Holding Aspirin and Clopidogrel for surgery later this week.  Platelet function assay normal.  Craniotomy with resection on 31 July.  Post procedure in ICU.    8/1:  POD #1 by neurosurgery  Ok for step down   Transfer orders placed by neurosurgery  Continue to monitor   CT head chest abd and pelvis recommended by heme/onc  Will need further hydration before pt can receive IV contrast     8/2:  POD #2 by neurosurgery  Path report pending  Neurosurgery plan for repeat MRI outpatient     8/3:  POD #3 by neurosurgery  Path report pending    8/4:  POD #4 by neurosurgery  Pt stable

## 2020-08-04 NOTE — PT/OT/SLP PROGRESS
Occupational Therapy   Treatment    Name: Cata Lang  MRN: [id number]  Admitting Diagnosis:  Calcified cerebral meningioma  4 Days Post-Op    Recommendations:     Discharge Recommendations: rehabilitation facility  Discharge Equipment Recommendations:  walker, rolling  Barriers to discharge:  None    Assessment:     Cata Lang is a 49 y.o. female with a medical diagnosis of Calcified cerebral meningioma.  She presents with DEBILITY AND GENERALIZED WEAKNESS. Performance deficits affecting function are weakness, gait instability, impaired endurance, impaired balance, impaired self care skills, impaired functional mobilty, decreased coordination.     Rehab Prognosis:  Good; patient would benefit from acute skilled OT services to address these deficits and reach maximum level of function.       Plan:     Patient to be seen 3 x/week to address the above listed problems via self-care/home management, therapeutic activities, therapeutic exercises  · Plan of Care Expires: 08/08/20  · Plan of Care Reviewed with: patient    Subjective     Pain/Comfort:  · Pain Rating 1: 4/10  · Location - Side 1: Bilateral  · Location 1: back    Objective:     Communicated with: NURSE AND Epic CHART REVIEW prior to session.  Patient found HOB elevated with PureWick upon OT entry to room.    General Precautions: Standard, fall   Orthopedic Precautions:N/A   Braces: N/A     Occupational Performance:     Bed Mobility:    · Patient completed Rolling/Turning to Right with contact guard assistance  · Patient completed Scooting/Bridging with minimum assistance  · Patient completed Supine to Sit with minimum assistance     Functional Mobility/Transfers:  · Patient completed Sit <> Stand Transfer with minimum assistance  with  rolling walker   · Patient completed Bed <> Chair Transfer using Step Transfer technique with minimum assistance with rolling walker  · Functional Mobility: PT AMBULATED MOD A INITIALLY  AND PROGRESSED TO MIN A WITH  ROLLING WALKER X 18 FEET    Activities of Daily Living:  · Lower Body Dressing: minimum assistance SOCKS SEATED IN BED SIDE CHAIR  TREATMENT AND ACTIVITIES:   · PT REQ MOD TO MIN A WITH BED MOBILTY. UPON SITTING EOB PT REPORTED DIZZINESS AND REQ EXTENDED TIME TO PERFORM TASK. PT PERFORMED 1 SET X 3 -5REPS FOR SIT<>STAND T/F'S TRAINING PT NEEDED MIN VC'S FOR HAND PLACEMENT WITH T/F'S ACTIVITY. PT AMBULATED MOD A INITIALLY  AND PROGRESSED TO MIN A WITH ROLLING WALKER X 18 FEET. PT LEFT SITTING IN CHAIR PER PT REQUEST  WITH ALL NEEDS MET AND CALL BUTTON IN ROOM AND SPOUSE PRESENT.    Patient left up in chair with all lines intact, call button in reach, chair alarm on, NURSE notified and SPOUSE presentEducation:      GOALS:   Multidisciplinary Problems     Occupational Therapy Goals        Problem: Occupational Therapy Goal    Goal Priority Disciplines Outcome Interventions   Occupational Therapy Goal     OT, PT/OT Ongoing, Progressing    Description: OT goals to be met by 8-5-20  Min a with ue dressing  Min a with le dressing  Pt will tolerate 1 set x 10 reps b ue rom exercise   Mod a with bsc t/f's with ae.                     Time Tracking:     OT Date of Treatment: 08/04/20  OT Start Time: 0940  OT Stop Time: 1005  OT Total Time (min): 25 min    Billable Minutes:Therapeutic Activity 25 MINUTES    Inna Ornelas OT  8/4/2020

## 2020-08-04 NOTE — PROGRESS NOTES
[facility] -   Hematology/Oncology  Progress Note    Patient Name: Cata Lang  Admission Date: 7/25/2020  Hospital Length of Stay: 9 days  Code Status: Full Code     Subjective:     HPI:  Mrs. Cata Lang is a 50 yo woman with history that is significant for goiter status post total thyroidectomy 5 years ago, hypertension, type 2 diabetes, seizure disorder, CKD stage 3, intracranial mass who presented to the emergency room with complaint of weakness headache nausea.  MRI revealed mass lesion within the anterior right cranial fossa suggestive of meningioma, there was also right to left midline shift of 6 mm.  Two foci of T2 hyperintense signal were noted in the left occipital lobe and thought to be related to old vascular insults.     Initial by chemical evaluation revealed serum calcium level of 14.2 mg/dL.  There was also evidence of acute kidney injury and normocytic anemia.  Given the above findings there was thought to rule out plasma cell dyscrasia.  Hematology was consulted.     Upon interview with patient and her , she stated that following the total thyroidectomy 5 years ago, she has been on 6000 units of daily calcium supplement.  She denies unintentional weight loss, night sweats, palpable lymph nodes or mass, abnormal bleed.    Recently had craniotomy on 7/31 with resection of right frontal mass. Pathology pending.       Interval History:  Patient out of bed sitting up in chair at bedside, participating with PT, able to walk with assistance in room.  Reports left side is stronger today, still continues with residual weakness.  Platelet count improved today,HIT lab pending.  Due to increase in platelet count today this is likely not HIT, no change to treatment plan today.    Oncology Treatment Plan:   [No treatment plan]    Medications:  Continuous Infusions:  Scheduled Meds:   atorvastatin  10 mg Oral Daily    calcium carbonate  500 mg Oral QID    calcium gluconate IVPB   2,000 mg Intravenous Once    dexAMETHasone  2 mg Oral Daily    docusate sodium  100 mg Oral BID    enoxaparin  40 mg Subcutaneous Q24H    epoetin laxmi-ebpx (RETACRIT) injection  5,000 Units Subcutaneous Every Mon, Wed, Fri    ferrous sulfate  325 mg Oral BID    gabapentin  600 mg Oral Daily    hydrALAZINE  25 mg Oral Q8H    iron sucrose  100 mg Intravenous Daily    levETIRAcetam  1,500 mg Oral BID    levothyroxine  137 mcg Oral Before breakfast    pantoprazole  40 mg Oral Daily    polyethylene glycol  17 g Oral Daily    potassium chloride SA  40 mEq Oral Once     PRN Meds:acetaminophen, artificial tears, bisacodyL, dextrose 50%, dextrose 50%, glucagon (human recombinant), glucose, glucose, hydrALAZINE, HYDROcodone-acetaminophen, HYDROcodone-acetaminophen, HYDROmorphone, insulin aspart U-100, morphine, ondansetron, ondansetron, oxyCODONE-acetaminophen, polyethylene glycol     Review of Systems   Constitutional: Positive for appetite change and fatigue. Negative for chills, diaphoresis, fever and unexpected weight change.   HENT: Positive for facial swelling. Negative for congestion, ear pain, hearing loss, mouth sores, postnasal drip, rhinorrhea, sore throat and trouble swallowing. Ear discharge: Left side.    Eyes: Negative for pain, discharge, redness and visual disturbance.   Respiratory: Negative for cough, chest tightness and shortness of breath.    Cardiovascular: Negative for chest pain, palpitations and leg swelling.   Gastrointestinal: Positive for abdominal distention. Negative for blood in stool, constipation, diarrhea, nausea and vomiting.   Endocrine: Negative for cold intolerance and heat intolerance.   Genitourinary: Negative for difficulty urinating, dyspareunia, flank pain and hematuria.   Musculoskeletal: Negative for arthralgias, back pain and myalgias.   Skin: Positive for wound.   Neurological: Positive for weakness. Negative for dizziness, light-headedness and headaches.    Hematological: Negative for adenopathy. Does not bruise/bleed easily.   Psychiatric/Behavioral: Negative for agitation, behavioral problems and confusion. The patient is not nervous/anxious.      Objective:     Vital Signs (Most Recent):  Temp: 97.8 °F (36.6 °C) (08/04/20 0810)  Pulse: 63 (08/04/20 0810)  Resp: 18 (08/04/20 0830)  BP: (!) 143/66 (08/04/20 0810)  SpO2: 97 % (08/04/20 0810) Vital Signs (24h Range):  Temp:  [96.2 °F (35.7 °C)-97.8 °F (36.6 °C)] 97.8 °F (36.6 °C)  Pulse:  [59-74] 63  Resp:  [17-20] 18  SpO2:  [95 %-98 %] 97 %  BP: ()/(46-68) 143/66     Weight: 129.8 kg (286 lb 2.5 oz)  Body mass index is 41.06 kg/m².  Body surface area is 2.53 meters squared.      Intake/Output Summary (Last 24 hours) at 8/4/2020 1121  Last data filed at 8/3/2020 1200  Gross per 24 hour   Intake 270 ml   Output --   Net 270 ml       Physical Exam  Vitals signs and nursing note reviewed.   Constitutional:       General: She is not in acute distress.     Appearance: She is well-developed. She is ill-appearing.   HENT:      Head:        Right Ear: Hearing and external ear normal.      Left Ear: Hearing and external ear normal.      Nose: No rhinorrhea.      Right Sinus: No maxillary sinus tenderness or frontal sinus tenderness.      Left Sinus: No maxillary sinus tenderness or frontal sinus tenderness.      Mouth/Throat:      Mouth: No oral lesions.      Pharynx: Uvula midline.   Eyes:      General:         Right eye: No discharge.         Left eye: No discharge.      Pupils: Pupils are equal, round, and reactive to light.      Comments: Unable to evaluate right eye due to periorbital edema resulting from cranial surgery, mild edema to left eye.   Neck:      Musculoskeletal: Normal range of motion.      Thyroid: No thyromegaly.      Vascular: No carotid bruit.      Trachea: No tracheal deviation.   Cardiovascular:      Rate and Rhythm: Normal rate and regular rhythm.      Pulses:           Dorsalis pedis pulses are  2+ on the right side and 2+ on the left side.      Heart sounds: Normal heart sounds, S1 normal and S2 normal. No murmur.   Pulmonary:      Effort: Pulmonary effort is normal. No respiratory distress.      Breath sounds: Normal breath sounds.   Abdominal:      General: Bowel sounds are decreased. There is distension.      Palpations: Abdomen is soft. There is no mass.      Tenderness: There is no abdominal tenderness.   Musculoskeletal: Normal range of motion.   Lymphadenopathy:      Cervical: No cervical adenopathy.      Upper Body:      Right upper body: No supraclavicular adenopathy.      Left upper body: No supraclavicular adenopathy.   Skin:     General: Skin is warm and dry.      Capillary Refill: Capillary refill takes less than 2 seconds.      Findings: No rash.   Neurological:      Mental Status: She is alert and oriented to person, place, and time.      Sensory: No sensory deficit.      Motor: Weakness (On left side) present.      Gait: Gait normal.   Psychiatric:         Mood and Affect: Mood is not anxious or depressed.         Speech: Speech normal.         Behavior: Behavior normal.         Thought Content: Thought content normal.         Judgment: Judgment normal.         Significant Labs:   CBC:   Recent Labs   Lab 08/03/20  0757 08/04/20  0942   WBC 22.23* 16.52*   HGB 7.3* 7.4*   HCT 24.1* 24.3*   * 137*    and CMP:   Recent Labs   Lab 08/03/20  0757 08/04/20  0942    138   K 3.6 3.4*    103   CO2 20* 21*   * 176*   BUN 36* 27*   CREATININE 1.3 1.2   CALCIUM 7.0* 6.7*   PROT  --  6.3   ALBUMIN 3.0* 2.9*   BILITOT  --  0.2   ALKPHOS  --  147*   AST  --  32   ALT  --  53*   ANIONGAP 13 14   EGFRNONAA 48* 52*       Diagnostic Results:  I have reviewed all pertinent imaging results/findings within the past 24 hours.    Assessment/Plan:     Thrombocytopenia  Patient had new onset thrombocytopenia, evaluated for HIT, antibody testing sent, pending results.  Review of platelet  count today, has shown increase of platelet count from 112 K to 137K. Patient likely does not have HIT due to improvement of platelet count while still on Lovenox.  Will not change anticoagulation at this time.    --continue Lovenox as ordered  Daily CBC CMP    Hypocalcemia  Patient has history of having total thyroid removal 5 years ago, has taken 6000 mg of calcium daily since that time.  On admission patient noted to have hypercalcemia, this was treated successfully and now is noted to be hypocalcemic.    --  ionized calcium pending.    -- Replace calcium per HM.    Iron deficiency anemia  Patient noted to have anemia, iron studies evaluated, significant iron deficiency noted in treatment began with IV Venofer.    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    --Continue on decadron for brain edema.  --Continue on keppra for anti-seizures prophylaxis.        Thank you for your consult. I will follow-up with patient. Please contact us if you have any additional questions.     Karen Jacques NP  Hematology/Oncology  [facility]

## 2020-08-04 NOTE — PLAN OF CARE
CM resent referral to [facility] through [facility]. If fails to send again [place] with admissions gave [phone] to hard fax.

## 2020-08-04 NOTE — PT/OT/SLP PROGRESS
"Occupational Therapy  Treatment    Cata Lang   MRN: [id number]   Admitting Diagnosis: Calcified cerebral meningioma    OT Date of Treatment: 08/03/20   OT Start Time: 0955  OT Stop Time: 1025  OT Total Time (min): 30 min    Billable Minutes:  Therapeutic Activity 30 min    General Precautions: Standard, fall, vision impaired  Orthopedic Precautions: N/A  Braces: N/A         Subjective:  Communicated with Nurse Ortiz and epic chart review prior to session.  Pt found supine in bed and agreeable to tx at this time.   Pain/Comfort  Pain Rating 1: 3/10  Location - Side 1: Bilateral  Location 1: (Hip and Back)  Pain Addressed 1: Distraction, Reposition    Objective:  Patient found with: Anika     Functional Mobility:  Therapeutic Activities and Exercises:  Pt performed supine>sit with CGA, scooted to EOB with CGA, sit>stand using RW with Min A, functional mobility using RW ~10ft then 8 ft with extended seated rest break, t/f to chair using RW with Min A.     AM-PAC 6 CLICK ADL   How much help from another person does this patient currently need?   1 = Unable, Total/Dependent Assistance  2 = A lot, Maximum/Moderate Assistance  3 = A little, Minimum/Contact Guard/Supervision  4 = None, Modified [place]/Independent    Putting on and taking off regular lower body clothing? : 2  Bathing (including washing, rinsing, drying)?: 3  Toileting, which includes using toilet, bedpan, or urinal? : 2  Putting on and taking off regular upper body clothing?: 3  Taking care of personal grooming such as brushing teeth?: 3  Eating meals?: 3  Daily Activity Total Score: 16     AM-PAC Raw Score CMS "G-Code Modifier Level of Impairment Assistance   6 % Total / Unable   7 - 8 CM 80 - 100% Maximal Assist   9-13 CL 60 - 80% Moderate Assist   14 - 19 CK 40 - 60% Moderate Assist   20 - 22 CJ 20 - 40% Minimal Assist   23 CI 1-20% SBA / CGA   24 CH 0% Independent/ Mod I       Patient left up in chair with all lines intact, call " button in reach, Nurse Diana notified and informed that chair alarm was messing parts therefore not activity at this time.     ASSESSMENT:  Cata Lang is a 49 y.o. female with a medical diagnosis of Calcified cerebral meningioma and presents with impaired functional mobility and ADLs. Pt will benefit from continued skilled OT services in order to address the listed impairments.    Rehab identified problem list/impairments: Rehab identified problem list/impairments: weakness, impaired functional mobilty, decreased safety awareness, decreased coordination, impaired endurance, gait instability, pain, impaired balance, impaired self care skills, visual deficits, edema    Rehab potential is fair.    Activity tolerance: Fair    Discharge recommendations: Discharge Facility/Level of Care Needs: rehabilitation facility     Barriers to discharge:  UNKNOWN    Equipment recommendations: walker, rolling     GOALS:   Multidisciplinary Problems     Occupational Therapy Goals        Problem: Occupational Therapy Goal    Goal Priority Disciplines Outcome Interventions   Occupational Therapy Goal     OT, PT/OT     Description: OT goals to be met by 8-5-20  Min a with ue dressing  Min a with le dressing  Pt will tolerate 1 set x 10 reps b ue rom exercise   Mod a with bsc t/f's with ae.                     Plan:  Patient to be seen   to address the above listed problems via self-care/home management, therapeutic activities, therapeutic exercises  Plan of Care expires: 08/08/20  Plan of Care reviewed with: patient, spouse         Alejandra Saenz, PT/OT  08/03/2020

## 2020-08-04 NOTE — ASSESSMENT & PLAN NOTE
Patient had severe hypercalcemia upon admission.  Vitamin-D and calcium supplementations have been stopped. Parathyroid hormone level is low. Ca had declined to 6.7 on 8/2 (albumin 3.1). Calcium improved to 7 on 8/3/20 (albumin 3). CaCO3 500 mg qid was started on 8/3/20. Calcium today at 6.7 (albumin 2.9). Will admoniter calcium gluconate 2 g IV x1 today.     PTHrp level was < 0.4 (not elevated).

## 2020-08-04 NOTE — PROGRESS NOTES
[facility] -   Nephrology  Progress Note    Patient Name: Cata Lang  MRN: [id number]  Admission Date: 7/25/2020  Hospital Length of Stay: 9 days  Attending Provider: Abdoul Barragan MD   Primary Care Physician: Sabra Fregoso MD  Principal Problem:Calcified cerebral meningioma    Subjective:     HPI: Cata Lang is a pleasant 49-year-old  woman history of hypertension, type 2 diabetes, morbid obesity, seizure disorder, CKD stage 3, baseline creatinine about 1.3 mg/dL, GFR 50 mL/minute, intracranial mass / lesion, she presents to the emergency room yesterday with progressive complains of weakness, increasing headache, nausea, a repeat MRI of the brain is pending at this time, she was evaluated by Neurosurgery, also significant hypercalcemia with a serum calcium of 14 noted on presentation, patient is currently taking Tums, calcitriol, hydrochlorothiazide, also she possibly has a urinary tract infection, acute on chronic renal failure, serum creatinine was 1.9 on presentation, 2.5 this evening, she is currently receiving IV fluids, we were consulted for acute kidney injury, hypercalcemia    Interval History:     7/31/20: s/p brain tumor surgery today. Creatinine has improved to 1.7. Ca at 7.9 today (albumin 3.1).    8/1/20: patient is resting comfortably, no complaints at present.     8/2/20: Calcium has declined to 6.7 (albumin 3.1). Creatinine at 1.5. Patient resting in chair, no complaints at present.     8/3/20: Creatinine has declined to 1.3. Calcium at 7 (albumin 3).     8/4/20: Calcium at 6.7 (albumin 2.9). K at 3.4, creatinine at 1.2.   Patient still very fatigued.         Review of patient's allergies indicates:   Allergen Reactions    Hydrochlorothiazide      hypercalcemia     Current Facility-Administered Medications   Medication Frequency    acetaminophen tablet 650 mg Q6H PRN    artificial tears 0.5 % ophthalmic solution 1 drop PRN    atorvastatin tablet 10 mg Daily     bisacodyL EC tablet 5 mg Daily PRN    calcium carbonate (OS-TH[place]IA) tablet 500 mg QID    dexAMETHasone tablet 2 mg Daily    dextrose 50% injection 12.5 g PRN    dextrose 50% injection 25 g PRN    docusate sodium capsule 100 mg BID    enoxaparin injection 40 mg Q24H    epoetin laxmi-epbx injection 5,000 Units Every Mon, Wed, Fri    ferrous sulfate EC tablet 325 mg BID    gabapentin capsule 600 mg Daily    glucagon (human recombinant) injection 1 mg PRN    glucose chewable tablet 16 g PRN    glucose chewable tablet 24 g PRN    hydrALAZINE injection 10 mg Q8H PRN    hydrALAZINE tablet 25 mg Q8H    HYDROcodone-acetaminophen  mg per tablet 1 tablet Q4H PRN    HYDROcodone-acetaminophen 5-325 mg per tablet 1 tablet Q4H PRN    hydromorphone (PF) injection 0.2 mg Q5 Min PRN    insulin aspart U-100 pen 1-10 Units QID (AC + HS) PRN    iron sucrose injection 100 mg Daily    levETIRAcetam tablet 1,500 mg BID    levothyroxine tablet 137 mcg Before breakfast    morphine injection 2 mg Q3H PRN    ondansetron injection 4 mg Daily PRN    ondansetron injection 4 mg Q6H PRN    oxyCODONE-acetaminophen 5-325 mg per tablet 1 tablet Q3H PRN    pantoprazole EC tablet 40 mg Daily    polyethylene glycol packet 17 g BID PRN    polyethylene glycol packet 17 g Daily       Objective:     Vital Signs (Most Recent):  Temp: 97.8 °F (36.6 °C) (08/04/20 0810)  Pulse: 63 (08/04/20 0810)  Resp: 18 (08/04/20 0830)  BP: (!) 143/66 (08/04/20 0810)  SpO2: 97 % (08/04/20 0810)  O2 Device (Oxygen Therapy): room air (08/03/20 1501) Vital Signs (24h Range):  Temp:  [96.2 °F (35.7 °C)-97.8 °F (36.6 °C)] 97.8 °F (36.6 °C)  Pulse:  [59-74] 63  Resp:  [17-20] 18  SpO2:  [95 %-98 %] 97 %  BP: ()/(46-68) 143/66     Weight: 129.8 kg (286 lb 2.5 oz) (07/26/20 9017)  Body mass index is 41.06 kg/m².  Body surface area is 2.53 meters squared.    I/O last 3 completed shifts:  In: 870 [P.O.:870]  Out: -     Physical Exam  Constitutional:        Appearance: She is well-developed.   HENT:      Head: Normocephalic.      Comments: S/p brain surgery with forehead scar.   Eyes:      Pupils: Pupils are equal, round, and reactive to light.   Neck:      Thyroid: No thyromegaly.   Cardiovascular:      Rate and Rhythm: Normal rate and regular rhythm.      Heart sounds: No friction rub.   Pulmonary:      Effort: Pulmonary effort is normal.      Breath sounds: Normal breath sounds. No wheezing.   Chest:      Chest wall: No tenderness.   Abdominal:      General: Bowel sounds are normal. There is no distension.      Palpations: Abdomen is soft.      Tenderness: There is no abdominal tenderness.   Musculoskeletal:      Comments: Trace LE edema.    Lymphadenopathy:      Cervical: No cervical adenopathy.   Skin:     General: Skin is warm and dry.      Findings: No rash.   Neurological:      Mental Status: She is alert and oriented to person, place, and time.         Significant Labs:  Lab Results   Component Value Date    CREATININE 1.2 08/04/2020    BUN 27 (H) 08/04/2020     08/04/2020    K 3.4 (L) 08/04/2020     08/04/2020    CO2 21 (L) 08/04/2020     Lab Results   Component Value Date    PTH 23.4 08/02/2020    CALCIUM 6.7 (LL) 08/04/2020    CAION 1.76 (H) 07/26/2020    PHOS 3.2 08/03/2020     Lab Results   Component Value Date    ALBUMIN 2.9 (L) 08/04/2020     Lab Results   Component Value Date    WBC 16.52 (H) 08/04/2020    HGB 7.4 (L) 08/04/2020    HCT 24.3 (L) 08/04/2020    MCV 82 08/04/2020     (L) 08/04/2020       No results for input(s): MG in the last 168 hours.    PTH-rp: < 0.4 (7/27/20)        Significant Imaging:  Imaging Results    None           Assessment/Plan:     Anemia due to stage 3 chronic kidney disease  Continue Epogen.     Neoplasm of central nervous system  S/p brain mass resection. Neurosurgery is following.     Hypokalemia  Replete K.  Likely due to hypocalcemia. Will also check magnesium again.     SHARLENE on CKD, stage  III  SHARLENE on CKD stage 3 , baseline creatinine about 1.3 mg/dL, Ultrasound negative for any hydronephrosis.    Creatinine has improved to 1.2 today. Will monitor closely.       Hypercalcemia  Patient had severe hypercalcemia upon admission.  Vitamin-D and calcium supplementations have been stopped. Parathyroid hormone level is low. Ca had declined to 6.7 on 8/2 (albumin 3.1). Calcium improved to 7 on 8/3/20 (albumin 3). CaCO3 500 mg qid was started on 8/3/20. Calcium today at 6.7 (albumin 2.9). Will admoniter calcium gluconate 2 g IV x1 today.     PTHrp level was < 0.4 (not elevated).             Thank you for your consult. I will follow-up with patient. Please contact us if you have any additional questions.    Chandler Gould MD  Nephrology  [facility] -

## 2020-08-04 NOTE — ASSESSMENT & PLAN NOTE
8/3:  Corrected calcium 7.8  Will check ionize calcium  Plan to supplement calcium if needed     8/4:  Calcium running low  Will replace today with calcium gluconate  Cont to monitor   Will check mag

## 2020-08-04 NOTE — ASSESSMENT & PLAN NOTE
8/2:  Low iron sat with low ferritin   venofer given once  Po ferrous sulfate given  Cont to monitor h/h     8/3:  Cont venofer daily per heme/onc  Ferrous sulfate bid   Cont to monitor H/H     8/4:  Stable  Cont to monitor

## 2020-08-04 NOTE — ASSESSMENT & PLAN NOTE
SHARLENE on CKD stage 3 , baseline creatinine about 1.3 mg/dL, Ultrasound negative for any hydronephrosis.    Creatinine has improved to 1.2 today. Will monitor closely.

## 2020-08-04 NOTE — PLAN OF CARE
PRN medication administered from pain. Head incision, CDI. Monitoring blood glucose per SSI. Neuro check q 4 hr. VSS. Afebrile. No acute adverse events noted. Pt remained free from falls this shift. Will continue to monitor. Chart reviewed.

## 2020-08-04 NOTE — SUBJECTIVE & OBJECTIVE
Interval History:     7/31/20: s/p brain tumor surgery today. Creatinine has improved to 1.7. Ca at 7.9 today (albumin 3.1).    8/1/20: patient is resting comfortably, no complaints at present.     8/2/20: Calcium has declined to 6.7 (albumin 3.1). Creatinine at 1.5. Patient resting in chair, no complaints at present.     8/3/20: Creatinine has declined to 1.3. Calcium at 7 (albumin 3).     8/4/20: Calcium at 6.7 (albumin 2.9). K at 3.4, creatinine at 1.2.   Patient still very fatigued.         Review of patient's allergies indicates:   Allergen Reactions    Hydrochlorothiazide      hypercalcemia     Current Facility-Administered Medications   Medication Frequency    acetaminophen tablet 650 mg Q6H PRN    artificial tears 0.5 % ophthalmic solution 1 drop PRN    atorvastatin tablet 10 mg Daily    bisacodyL EC tablet 5 mg Daily PRN    calcium carbonate (OS-THANIA) tablet 500 mg QID    dexAMETHasone tablet 2 mg Daily    dextrose 50% injection 12.5 g PRN    dextrose 50% injection 25 g PRN    docusate sodium capsule 100 mg BID    enoxaparin injection 40 mg Q24H    epoetin laxmi-epbx injection 5,000 Units Every Mon, Wed, Fri    ferrous sulfate EC tablet 325 mg BID    gabapentin capsule 600 mg Daily    glucagon (human recombinant) injection 1 mg PRN    glucose chewable tablet 16 g PRN    glucose chewable tablet 24 g PRN    hydrALAZINE injection 10 mg Q8H PRN    hydrALAZINE tablet 25 mg Q8H    HYDROcodone-acetaminophen  mg per tablet 1 tablet Q4H PRN    HYDROcodone-acetaminophen 5-325 mg per tablet 1 tablet Q4H PRN    hydromorphone (PF) injection 0.2 mg Q5 Min PRN    insulin aspart U-100 pen 1-10 Units QID (AC + HS) PRN    iron sucrose injection 100 mg Daily    levETIRAcetam tablet 1,500 mg BID    levothyroxine tablet 137 mcg Before breakfast    morphine injection 2 mg Q3H PRN    ondansetron injection 4 mg Daily PRN    ondansetron injection 4 mg Q6H PRN    oxyCODONE-acetaminophen 5-325 mg per  tablet 1 tablet Q3H PRN    pantoprazole EC tablet 40 mg Daily    polyethylene glycol packet 17 g BID PRN    polyethylene glycol packet 17 g Daily       Objective:     Vital Signs (Most Recent):  Temp: 97.8 °F (36.6 °C) (08/04/20 0810)  Pulse: 63 (08/04/20 0810)  Resp: 18 (08/04/20 0830)  BP: (!) 143/66 (08/04/20 0810)  SpO2: 97 % (08/04/20 0810)  O2 Device (Oxygen Therapy): room air (08/03/20 1501) Vital Signs (24h Range):  Temp:  [96.2 °F (35.7 °C)-97.8 °F (36.6 °C)] 97.8 °F (36.6 °C)  Pulse:  [59-74] 63  Resp:  [17-20] 18  SpO2:  [95 %-98 %] 97 %  BP: ()/(46-68) 143/66     Weight: 129.8 kg (286 lb 2.5 oz) (07/26/20 0435)  Body mass index is 41.06 kg/m².  Body surface area is 2.53 meters squared.    I/O last 3 completed shifts:  In: 870 [P.O.:870]  Out: -     Physical Exam  Constitutional:       Appearance: She is well-developed.   HENT:      Head: Normocephalic.      Comments: S/p brain surgery with forehead scar.   Eyes:      Pupils: Pupils are equal, round, and reactive to light.   Neck:      Thyroid: No thyromegaly.   Cardiovascular:      Rate and Rhythm: Normal rate and regular rhythm.      Heart sounds: No friction rub.   Pulmonary:      Effort: Pulmonary effort is normal.      Breath sounds: Normal breath sounds. No wheezing.   Chest:      Chest wall: No tenderness.   Abdominal:      General: Bowel sounds are normal. There is no distension.      Palpations: Abdomen is soft.      Tenderness: There is no abdominal tenderness.   Musculoskeletal:      Comments: Trace LE edema.    Lymphadenopathy:      Cervical: No cervical adenopathy.   Skin:     General: Skin is warm and dry.      Findings: No rash.   Neurological:      Mental Status: She is alert and oriented to person, place, and time.         Significant Labs:  Lab Results   Component Value Date    CREATININE 1.2 08/04/2020    BUN 27 (H) 08/04/2020     08/04/2020    K 3.4 (L) 08/04/2020     08/04/2020    CO2 21 (L) 08/04/2020     Lab  Results   Component Value Date    PTH 23.4 08/02/2020    CALCIUM 6.7 (LL) 08/04/2020    CAION 1.76 (H) 07/26/2020    PHOS 3.2 08/03/2020     Lab Results   Component Value Date    ALBUMIN 2.9 (L) 08/04/2020     Lab Results   Component Value Date    WBC 16.52 (H) 08/04/2020    HGB 7.4 (L) 08/04/2020    HCT 24.3 (L) 08/04/2020    MCV 82 08/04/2020     (L) 08/04/2020       No results for input(s): MG in the last 168 hours.    PTH-rp: < 0.4 (7/27/20)        Significant Imaging:  Imaging Results    None

## 2020-08-04 NOTE — PROGRESS NOTES
[facility]  Neurosurgery  Progress Note    Subjective:     Interval History:  49-year-old female 4 days status post right frontal craniotomy resection of en plaque meningioma.    History of Present Illness:  Please see initial neurosurgery consult note..  Has had several months of partial complex seizures with progressive decline in an mental status and left hemiparesis in the week prior to admission.  CT and MRI studies demonstrated a large right frontal mass with substantial vasogenic edema and mass effect, midline shift.    Post-Op Info:  Procedure(s) (LRB):  CRANIOTOMY, WITH 3D STEREOTACTIC IMAGING GUIDANCE, REAL-TIME (Right)  EXCISION, NEOPLASM (N/A)   4 Days Post-Op      Medications:  Continuous Infusions:  Scheduled Meds:   atorvastatin  10 mg Oral Daily    calcium carbonate  500 mg Oral QID    dexAMETHasone  2 mg Oral Daily    docusate sodium  100 mg Oral BID    enoxaparin  40 mg Subcutaneous Q24H    epoetin laxmi-ebpx (RETACRIT) injection  5,000 Units Subcutaneous Every Mon, Wed, Fri    ferrous sulfate  325 mg Oral BID    gabapentin  600 mg Oral Daily    hydrALAZINE  25 mg Oral Q8H    iron sucrose  100 mg Intravenous Daily    levETIRAcetam  1,500 mg Oral BID    levothyroxine  137 mcg Oral Before breakfast    pantoprazole  40 mg Oral Daily    polyethylene glycol  17 g Oral Daily     PRN Meds:acetaminophen, artificial tears, bisacodyL, dextrose 50%, dextrose 50%, glucagon (human recombinant), glucose, glucose, hydrALAZINE, HYDROcodone-acetaminophen, HYDROcodone-acetaminophen, HYDROmorphone, insulin aspart U-100, morphine, ondansetron, ondansetron, oxyCODONE-acetaminophen, polyethylene glycol     Review of Systems   Constitutional: Negative for diaphoresis, fatigue and unexpected weight change.   HENT: Negative for hearing loss, rhinorrhea, trouble swallowing and voice change.         The patient complains of mild incisional pain.   Eyes: Negative for photophobia and visual  disturbance.   Respiratory: Negative for chest tightness and shortness of breath.    Cardiovascular: Negative for chest pain.   Gastrointestinal: Negative for constipation, nausea and vomiting.   Endocrine: Negative for cold intolerance, heat intolerance, polydipsia, polyphagia and polyuria.   Genitourinary: Negative for difficulty urinating.   Musculoskeletal: Negative for back pain, neck pain and neck stiffness.   Skin: Negative.    Allergic/Immunologic: Negative.    Neurological: Negative for dizziness, tremors, seizures, syncope, facial asymmetry, speech difficulty, weakness, numbness and headaches.   Hematological: Negative for adenopathy. Does not bruise/bleed easily.   Psychiatric/Behavioral: Negative for behavioral problems, confusion and decreased concentration.     Objective:     Weight: 129.8 kg (286 lb 2.5 oz)  Body mass index is 41.06 kg/m².  Vital Signs (Most Recent):  Temp: 97.8 °F (36.6 °C) (08/04/20 0810)  Pulse: 63 (08/04/20 0810)  Resp: 18 (08/04/20 0830)  BP: (!) 143/66 (08/04/20 0810)  SpO2: 97 % (08/04/20 0810) Vital Signs (24h Range):  Temp:  [96.2 °F (35.7 °C)-97.8 °F (36.6 °C)] 97.8 °F (36.6 °C)  Pulse:  [59-74] 63  Resp:  [17-20] 18  SpO2:  [95 %-98 %] 97 %  BP: ()/(46-68) 143/66                          Physical Exam:  Nursing note and vitals reviewed.    Constitutional: She appears well-developed and well-nourished.     Eyes: Pupils are equal, round, and reactive to light. Conjunctivae and EOM are normal.     Cardiovascular: Normal rate, regular rhythm and normal pulses.     Abdominal: Soft. Bowel sounds are normal.     Skin: Skin displays no rash on trunk and no rash on extremities.   The patient has developed a modest pseudomeningocele at the surgical site.  There is no erythema or drainage.     Psych/Behavior: She is alert. She is oriented to person, place, and time. She has a normal mood and affect.     Musculoskeletal: Gait is abnormal.        Neck: Range of motion is full.  Muscle strength is 5/5. Tone is normal.        Back: Range of motion is full. Muscle strength is 5/5. Tone is normal.        Right Upper Extremities: Range of motion is full. Muscle strength is 5/5. Tone is normal.        Left Upper Extremities: Range of motion is full. Muscle strength is 5/5. Tone is normal.       Right Lower Extremities: Range of motion is full. Muscle strength is 5/5. Tone is normal.        Left Lower Extremities: Range of motion is full. Muscle strength is 4/5. Tone is normal.     Neurological:        Coordination: She has a normal Romberg Test, normal finger to nose coordination and normal tandem walking coordination.        Sensory: There is no sensory deficit in the trunk. There is no sensory deficit in the extremities.        DTRs: DTRs are DTRS NORMAL AND SYMMETRICnormal and symmetric. Tricep reflexes are 2+ on the right side and 2+ on the left side. Bicep reflexes are 2+ on the right side and 2+ on the left side. Brachioradialis reflexes are 2+ on the right side and 2+ on the left side. Patellar reflexes are 2+ on the right side and 2+ on the left side. Achilles reflexes are 2+ on the right side and 2+ on the left side. She displays no Babinski's sign on the right side. She displays no Babinski's sign on the left side.        Cranial nerves: Cranial nerve(s) II, III, IV, V, VI, VII, VIII, IX, X, XI and XII are intact.       Significant Labs:  Recent Labs   Lab 08/03/20  0757   *      K 3.6      CO2 20*   BUN 36*   CREATININE 1.3   CALCIUM 7.0*     Recent Labs   Lab 08/03/20  0757   WBC 22.23*   HGB 7.3*   HCT 24.1*   *     No results for input(s): LABPT, INR, APTT in the last 48 hours.  Microbiology Results (last 7 days)     Procedure Component Value Units Date/Time    Urine culture [[id number]] Collected: 07/27/20 0033    Order Status: Completed Specimen: Urine Updated: 07/28/20 1935     Urine Culture, Routine No growth    Narrative:      Urine was sent to lab but  culture was not able to be  completed, recollect just for urine culture  Specimen Source->Urine        All pertinent labs from the last 24 hours have been reviewed.  The patient's platelets are trending down, as is her hematocrit.  This will require further evaluation.  Significant Diagnostics:  I have reviewed and interpreted all pertinent imaging results/findings within the past 24 hours.  Postoperative CT demonstrates improved mass effect and resolution of midline shift.  No complicating features.    Assessment/Plan:   The patient would benefit from inpatient rehab, and case management has been consulted.  She has a modest pseudomeningocele that should resolve with her being more upright.  If this remains a persistent probably may have to tap the pseudomeningocele and place a head wrap.  Will have to watch the platelet and hematocrit levels.  We await final pathology regarding the grade of the meningioma.  Aggressive grade 2 or 3 meningiomas may require radiotherapy.  Active Diagnoses:    Diagnosis Date Noted POA    PRINCIPAL PROBLEM:  Calcified cerebral meningioma [D32.0] 07/31/2020 Yes     Chronic    Thrombocytopenia [D69.6] 08/03/2020 No    Iron deficiency anemia [D50.9] 08/02/2020 No    Hypocalcemia [E83.51] 08/02/2020 Yes    Neoplasm of central nervous system [D49.7]  Yes    Anemia due to stage 3 chronic kidney disease [N18.3, D63.1]  Yes    Diabetes mellitus due to underlying condition with hyperglycemia, without long-term current use of insulin [E08.65] 07/31/2020 Yes    Hypercalcemia [E83.52] 07/26/2020 Yes    SHARLENE on CKD, stage III [N17.9] 07/26/2020 Yes    Essential hypertension [I10] 07/26/2020 Yes     Chronic      Problems Resolved During this Admission:       Jose Hagen MD  Neurosurgery  [facility]

## 2020-08-04 NOTE — PT/OT/SLP PROGRESS
Physical Therapy  Treatment    Cata Lang   MRN: [id number]   Admitting Diagnosis: Calcified cerebral meningioma    PT Received On: 08/04/20  PT Start Time: 0950     PT Stop Time: 1015    PT Total Time (min): 25 min       Billable Minutes:  Gait Training 15 and Therapeutic Activity 10    Treatment Type: Treatment  PT/PTA: PT     PTA Visit Number: 0       General Precautions: Standard, fall  Orthopedic Precautions: N/A   Braces: N/A    Spiritual, Cultural Beliefs, Buddhism Practices, Values that Affect Care: no    Subjective:  Communicated with NURSE [place] AND [facility] CHART REVIEW  prior to session.   PT AGREED TO TX     Pain/Comfort  Pain Rating 1: 4/10  Location 1: back  Pain Addressed 1: Reposition  Pain Rating Post-Intervention 1: 4/10    Objective:   Patient found with: Anika    Functional Mobility:  PT MET IN RM SUP.SIT EOB WITH WITH CGA. PT SCOOTED TO EOB WITH MIN A. PT SEATED AS PT REPORTED SLIGHT DIZZINESS. PT STOOD WITH RW AND MIN A FOR GT TRAINING WITH STEP TO GT. PT GT TRAINED WITH MOD >MIN A AND L LE WEAKNESS NOTED . PT GT TRAINED X 18' WITH SLOW PACE AND T/F TO CHAIR WITH RW AND MIN A. PT SEATED FOR REST BREAK. PT STOOD X 3 TRIALS WITH RW AND CGA. PT REPORTED INC FATIGUE.PT RETURNED SEATED IN CHAIR AND LEFT SEATED WITH ALL NEEDS MET AND CALL BELL IN REACH.      AM-PAC 6 CLICK MOBILITY  How much help from another person does this patient currently need?   1 = Unable, Total/Dependent Assistance  2 = A lot, Maximum/Moderate Assistance  3 = A little, Minimum/Contact Guard/Supervision  4 = None, Modified [place]/Independent    Turning over in bed (including adjusting bedclothes, sheets and blankets)?: 3  Sitting down on and standing up from a chair with arms (e.g., wheelchair, bedside commode, etc.): 3  Moving from lying on back to sitting on the side of the bed?: 3  Moving to and from a bed to a chair (including a wheelchair)?: 3  Need to walk in hospital room?: 2  Climbing 3-5 steps with a  railing?: 1  Basic Mobility Total Score: 15    AM-PAC Raw Score CMS G-Code Modifier Level of Impairment Assistance   6 % Total / Unable   7 - 9 CM 80 - 100% Maximal Assist   10 - 14 CL 60 - 80% Moderate Assist   15 - 19 CK 40 - 60% Moderate Assist   20 - 22 CJ 20 - 40% Minimal Assist   23 CI 1-20% SBA / CGA   24 CH 0% Independent/ Mod I     Patient left up in chair with call button in reach and  present.    Assessment:  PT PROGRESSING WITH GT TRAINING.     Rehab identified problem list/impairments: Rehab identified problem list/impairments: weakness, impaired functional mobilty, gait instability, impaired balance, decreased lower extremity function, decreased upper extremity function, pain, edema, impaired self care skills, decreased coordination, impaired endurance    Rehab potential is good.    Activity tolerance: Fair    Discharge recommendations: Discharge Facility/Level of Care Needs: rehabilitation facility     Barriers to discharge:      Equipment recommendations: Equipment Needed After Discharge: walker, rolling     GOALS:   Multidisciplinary Problems     Physical Therapy Goals        Problem: Physical Therapy Goal    Goal Priority Disciplines Outcome Goal Variances Interventions   Physical Therapy Goal     PT, PT/OT Ongoing, Progressing     Description: PT WILL BE SEEN FOR P.T. FOR A MIN OF 5 OUT OF 7 DAYS A WEEK  LT20  1. PT WILL COMPLETE BED MOBILITY WITH MIN A   2. PT WILL T/F TO CHAIR WITH RW AND MAX A  3. PT WILL SIT>STAND WITH RW AND MAX A  4. PT WILL COMPLETE B LE TE X 20 REPS  5. PT WILL GT TRAIN X 10' WITH RW AND MAX A                   PLAN:    Patient to be seen 5 x/week  to address the above listed problems via gait training, therapeutic activities, therapeutic exercises  Plan of Care expires: 20  Plan of Care reviewed with: patient, spouse         Harriett Bernard, PT  2020

## 2020-08-04 NOTE — ASSESSMENT & PLAN NOTE
Patient had new onset thrombocytopenia, evaluated for HIT, antibody testing sent, pending results.  Review of platelet count today, has shown increase of platelet count from 112 K to 137K. Patient likely does not have HIT due to improvement of platelet count while still on Lovenox.  Will not change anticoagulation at this time.    --continue Lovenox as ordered  Daily CBC CMP

## 2020-08-04 NOTE — DISCHARGE INSTRUCTIONS
After Your Hospital Stay for Craniotomy  You may be able to go home as soon as you can walk, eat, and drink normally. Back home, family and friends may offer help and support. Accept help when you need it. But its important to strike a balance. Keep in mind that youre striving to become independent again.  Keep follow-up visits  You may have an office visit 7 to 10 days after the craniotomy. At this time, any remaining stitches or staples may be removed. You can expect to meet with your surgeon about every 4 weeks for the first few months. You may also have follow-up imaging tests to ensure your condition is stable.  Coping after surgery  Accepting what has happened can be hard for you and your loved ones. Your recovery will take time. You may feel more tired than normal for a few months or even a year. Coming to terms with your emotions can help ease the process:  · Its harder to cope some days than others. So be patient with yourself. If you feel sad or depressed, talk with a member of your health care team. Depression is common and can be treated.  · Its normal to have fears or to feel angry. Counseling or a support group may help you cope with your feelings and the demands of any ongoing treatment. Sharing information with your family can also help.  Start by walking  Walking is a great way to rebuild your strength. If directed by your surgeon, start out with short, frequent walks. Even if its just to get a glass of water or to change the TV channel, get up and walk each day. Gradually try walking greater distances, such as to the corner mailbox.  Returning to daily life  The following hints might help in your recovery:  · Increase your level of activity little by little.  · Accept help from those who offer to do household tasks like cooking and yard work.  · Arrange for rides if youre told not to drive for a while. A  or discharge planner can help with this.  · Ask your employer about  returning to work for fewer hours or working at home.  When to call your doctor  Call your surgeon at once if you have any of the following:  · Increased drowsiness  · Ongoing nausea or vomiting  · Extreme headaches  · Fever of 100.4°F (38°C) or greater  · Increased muscle weakness  · Involuntary movements, seizures, or personality changes  · Shortness of breath  · Pain or swelling in a leg  · Redness or drainage from the incision or an IV site  · Burning during urination  · Pain and stiffness in the neck  · Fainting or loss of consciousness  · A fall  · Altered mental status  · Before stopping any medication   Date Last Reviewed: 9/19/2015  © 0854-4084 [employer]. [address] [place] [address], [place] [address]. All rights reserved. This information is not intended as a substitute for professional medical care. Always follow your healthcare professional's instructions.

## 2020-08-04 NOTE — ASSESSMENT & PLAN NOTE
8/3:  Currently on lovenox  Received heparin in the past  Will check HIT antibody   Heme/onc on case  Will need to switch off of lovenox  Should plt cont to drop    8/4:  plt improving  Not likely HIT   Cont lovenox

## 2020-08-05 LAB
ALBUMIN SERPL BCP-MCNC: 2.9 G/DL (ref 3.5–5.2)
ALBUMIN SERPL BCP-MCNC: 3 G/DL (ref 3.5–5.2)
ALP SERPL-CCNC: 166 U/L (ref 55–135)
ALP SERPL-CCNC: 181 U/L (ref 55–135)
ALT SERPL W/O P-5'-P-CCNC: 51 U/L (ref 10–44)
ALT SERPL W/O P-5'-P-CCNC: 51 U/L (ref 10–44)
ANION GAP SERPL CALC-SCNC: 11 MMOL/L (ref 8–16)
APTT BLDCRRT: 27.5 SEC (ref 21–32)
AST SERPL-CCNC: 22 U/L (ref 10–40)
AST SERPL-CCNC: 25 U/L (ref 10–40)
BASOPHILS # BLD AUTO: 0.03 K/UL (ref 0–0.2)
BASOPHILS NFR BLD: 0.2 % (ref 0–1.9)
BILIRUB DIRECT SERPL-MCNC: 0.2 MG/DL (ref 0.1–0.3)
BILIRUB SERPL-MCNC: 0.3 MG/DL (ref 0.1–1)
BILIRUB SERPL-MCNC: 0.3 MG/DL (ref 0.1–1)
BUN SERPL-MCNC: 20 MG/DL (ref 6–20)
CALCIUM SERPL-MCNC: 7.1 MG/DL (ref 8.7–10.5)
CHLORIDE SERPL-SCNC: 102 MMOL/L (ref 95–110)
CO2 SERPL-SCNC: 24 MMOL/L (ref 23–29)
CREAT SERPL-MCNC: 1 MG/DL (ref 0.5–1.4)
DIFFERENTIAL METHOD: ABNORMAL
EOSINOPHIL # BLD AUTO: 0.1 K/UL (ref 0–0.5)
EOSINOPHIL NFR BLD: 0.8 % (ref 0–8)
ERYTHROCYTE [DISTWIDTH] IN BLOOD BY AUTOMATED COUNT: 16.5 % (ref 11.5–14.5)
EST. GFR  (AFRICAN AMERICAN): >60 ML/MIN/1.73 M^2
EST. GFR  (NON AFRICAN AMERICAN): >60 ML/MIN/1.73 M^2
GLUCOSE SERPL-MCNC: 135 MG/DL (ref 70–110)
HCT VFR BLD AUTO: 24.7 % (ref 37–48.5)
HEPARIN INDUCED THROMBOCYTOPENIA: 0.5 OD (ref 0–0.4)
HGB BLD-MCNC: 7.5 G/DL (ref 12–16)
IMM GRANULOCYTES # BLD AUTO: 0.56 K/UL (ref 0–0.04)
IMM GRANULOCYTES NFR BLD AUTO: 3.3 % (ref 0–0.5)
INTERPRETATION UR IFE-IMP: NORMAL
LYMPHOCYTES # BLD AUTO: 3.3 K/UL (ref 1–4.8)
LYMPHOCYTES NFR BLD: 19.5 % (ref 18–48)
MAGNESIUM SERPL-MCNC: 1.6 MG/DL (ref 1.6–2.6)
MCH RBC QN AUTO: 24.9 PG (ref 27–31)
MCHC RBC AUTO-ENTMCNC: 30.4 G/DL (ref 32–36)
MCV RBC AUTO: 82 FL (ref 82–98)
MONOCYTES # BLD AUTO: 0.9 K/UL (ref 0.3–1)
MONOCYTES NFR BLD: 5 % (ref 4–15)
NEUTROPHILS # BLD AUTO: 12.1 K/UL (ref 1.8–7.7)
NEUTROPHILS NFR BLD: 71.2 % (ref 38–73)
NRBC BLD-RTO: 0 /100 WBC
PATHOLOGIST INTERPRETATION UIFE: NORMAL
PLATELET # BLD AUTO: 225 K/UL (ref 150–350)
PMV BLD AUTO: 12.1 FL (ref 9.2–12.9)
POCT GLUCOSE: 111 MG/DL (ref 70–110)
POCT GLUCOSE: 135 MG/DL (ref 70–110)
POCT GLUCOSE: 141 MG/DL (ref 70–110)
POCT GLUCOSE: 153 MG/DL (ref 70–110)
POTASSIUM SERPL-SCNC: 3.9 MMOL/L (ref 3.5–5.1)
PROT SERPL-MCNC: 6.4 G/DL (ref 6–8.4)
PROT SERPL-MCNC: 6.7 G/DL (ref 6–8.4)
RBC # BLD AUTO: 3.01 M/UL (ref 4–5.4)
SODIUM SERPL-SCNC: 137 MMOL/L (ref 136–145)
WBC # BLD AUTO: 17.05 K/UL (ref 3.9–12.7)

## 2020-08-05 PROCEDURE — 63600175 PHARM REV CODE 636 W HCPCS: Performed by: INTERNAL MEDICINE

## 2020-08-05 PROCEDURE — 63600175 PHARM REV CODE 636 W HCPCS: Performed by: FAMILY MEDICINE

## 2020-08-05 PROCEDURE — 83735 ASSAY OF MAGNESIUM: CPT

## 2020-08-05 PROCEDURE — 99233 SBSQ HOSP IP/OBS HIGH 50: CPT | Mod: ,,, | Performed by: INTERNAL MEDICINE

## 2020-08-05 PROCEDURE — 25000003 PHARM REV CODE 250: Performed by: FAMILY MEDICINE

## 2020-08-05 PROCEDURE — 25000003 PHARM REV CODE 250: Performed by: NURSE PRACTITIONER

## 2020-08-05 PROCEDURE — 85730 THROMBOPLASTIN TIME PARTIAL: CPT

## 2020-08-05 PROCEDURE — 36415 COLL VENOUS BLD VENIPUNCTURE: CPT

## 2020-08-05 PROCEDURE — 80053 COMPREHEN METABOLIC PANEL: CPT

## 2020-08-05 PROCEDURE — 25000003 PHARM REV CODE 250: Performed by: INTERNAL MEDICINE

## 2020-08-05 PROCEDURE — 99233 PR SUBSEQUENT HOSPITAL CARE,LEVL III: ICD-10-PCS | Mod: ,,, | Performed by: INTERNAL MEDICINE

## 2020-08-05 PROCEDURE — 63600175 PHARM REV CODE 636 W HCPCS: Performed by: PHYSICIAN ASSISTANT

## 2020-08-05 PROCEDURE — 96372 THER/PROPH/DIAG INJ SC/IM: CPT

## 2020-08-05 PROCEDURE — 99900035 HC TECH TIME PER 15 MIN (STAT)

## 2020-08-05 PROCEDURE — 25000003 PHARM REV CODE 250: Performed by: PHYSICIAN ASSISTANT

## 2020-08-05 PROCEDURE — 25000003 PHARM REV CODE 250: Performed by: NEUROLOGICAL SURGERY

## 2020-08-05 PROCEDURE — 80076 HEPATIC FUNCTION PANEL: CPT

## 2020-08-05 PROCEDURE — 11000001 HC ACUTE MED/SURG PRIVATE ROOM

## 2020-08-05 PROCEDURE — 97530 THERAPEUTIC ACTIVITIES: CPT

## 2020-08-05 PROCEDURE — 85025 COMPLETE CBC W/AUTO DIFF WBC: CPT

## 2020-08-05 PROCEDURE — 97116 GAIT TRAINING THERAPY: CPT

## 2020-08-05 RX ORDER — ACETAMINOPHEN 500 MG
5000 TABLET ORAL DAILY
Status: DISCONTINUED | OUTPATIENT
Start: 2020-08-05 | End: 2020-08-06 | Stop reason: HOSPADM

## 2020-08-05 RX ORDER — MORPHINE SULFATE 2 MG/ML
2 INJECTION, SOLUTION INTRAMUSCULAR; INTRAVENOUS
Status: DISCONTINUED | OUTPATIENT
Start: 2020-08-05 | End: 2020-08-06 | Stop reason: HOSPADM

## 2020-08-05 RX ORDER — ACETAMINOPHEN 325 MG/1
650 TABLET ORAL EVERY 6 HOURS PRN
Status: DISCONTINUED | OUTPATIENT
Start: 2020-08-05 | End: 2020-08-06 | Stop reason: HOSPADM

## 2020-08-05 RX ORDER — ARGATROBAN 1 MG/ML
2 INJECTION INTRAVENOUS CONTINUOUS
Status: DISCONTINUED | OUTPATIENT
Start: 2020-08-05 | End: 2020-08-05

## 2020-08-05 RX ADMIN — ACETAMINOPHEN 650 MG: 325 TABLET ORAL at 11:08

## 2020-08-05 RX ADMIN — CALCIUM 500 MG: 500 TABLET ORAL at 08:08

## 2020-08-05 RX ADMIN — PANTOPRAZOLE SODIUM 40 MG: 40 TABLET, DELAYED RELEASE ORAL at 08:08

## 2020-08-05 RX ADMIN — GABAPENTIN 600 MG: 300 CAPSULE ORAL at 08:08

## 2020-08-05 RX ADMIN — FERROUS SULFATE TAB EC 325 MG (65 MG FE EQUIVALENT) 325 MG: 325 (65 FE) TABLET DELAYED RESPONSE at 08:08

## 2020-08-05 RX ADMIN — HYPROMELLOSE 2910 1 DROP: 5 SOLUTION OPHTHALMIC at 02:08

## 2020-08-05 RX ADMIN — ENOXAPARIN SODIUM 40 MG: 100 INJECTION SUBCUTANEOUS at 04:08

## 2020-08-05 RX ADMIN — LEVETIRACETAM 1500 MG: 500 TABLET ORAL at 09:08

## 2020-08-05 RX ADMIN — POLYETHYLENE GLYCOL 3350 17 G: 17 POWDER, FOR SOLUTION ORAL at 09:08

## 2020-08-05 RX ADMIN — HYDROCODONE BITARTRATE AND ACETAMINOPHEN 1 TABLET: 10; 325 TABLET ORAL at 04:08

## 2020-08-05 RX ADMIN — HYDROCODONE BITARTRATE AND ACETAMINOPHEN 1 TABLET: 10; 325 TABLET ORAL at 08:08

## 2020-08-05 RX ADMIN — ATORVASTATIN CALCIUM 10 MG: 10 TABLET, FILM COATED ORAL at 08:08

## 2020-08-05 RX ADMIN — CHOLECALCIFEROL TAB 125 MCG (5000 UNIT) 5000 UNITS: 125 TAB at 12:08

## 2020-08-05 RX ADMIN — CALCIUM 500 MG: 500 TABLET ORAL at 04:08

## 2020-08-05 RX ADMIN — HYDRALAZINE HYDROCHLORIDE 25 MG: 25 TABLET, FILM COATED ORAL at 03:08

## 2020-08-05 RX ADMIN — EPOETIN ALFA-EPBX 5000 UNITS: 10000 INJECTION, SOLUTION INTRAVENOUS; SUBCUTANEOUS at 10:08

## 2020-08-05 RX ADMIN — CALCIUM 500 MG: 500 TABLET ORAL at 12:08

## 2020-08-05 RX ADMIN — INSULIN ASPART 2 UNITS: 100 INJECTION, SOLUTION INTRAVENOUS; SUBCUTANEOUS at 11:08

## 2020-08-05 RX ADMIN — CALCIUM GLUCONATE 1000 MG: 98 INJECTION, SOLUTION INTRAVENOUS at 10:08

## 2020-08-05 RX ADMIN — IRON SUCROSE 100 MG: 20 INJECTION, SOLUTION INTRAVENOUS at 08:08

## 2020-08-05 RX ADMIN — HYDROCODONE BITARTRATE AND ACETAMINOPHEN 1 TABLET: 10; 325 TABLET ORAL at 07:08

## 2020-08-05 RX ADMIN — CALCIUM 500 MG: 500 TABLET ORAL at 09:08

## 2020-08-05 RX ADMIN — DOCUSATE SODIUM 100 MG: 100 CAPSULE, LIQUID FILLED ORAL at 08:08

## 2020-08-05 RX ADMIN — DEXAMETHASONE 2 MG: 1 TABLET ORAL at 08:08

## 2020-08-05 RX ADMIN — HYDRALAZINE HYDROCHLORIDE 25 MG: 25 TABLET, FILM COATED ORAL at 05:08

## 2020-08-05 RX ADMIN — LEVOTHYROXINE SODIUM 137 MCG: 25 TABLET ORAL at 05:08

## 2020-08-05 RX ADMIN — HYDRALAZINE HYDROCHLORIDE 25 MG: 25 TABLET, FILM COATED ORAL at 09:08

## 2020-08-05 RX ADMIN — FERROUS SULFATE TAB EC 325 MG (65 MG FE EQUIVALENT) 325 MG: 325 (65 FE) TABLET DELAYED RESPONSE at 09:08

## 2020-08-05 RX ADMIN — HYDROCODONE BITARTRATE AND ACETAMINOPHEN 1 TABLET: 10; 325 TABLET ORAL at 02:08

## 2020-08-05 RX ADMIN — LEVETIRACETAM 1500 MG: 500 TABLET ORAL at 08:08

## 2020-08-05 NOTE — PT/OT/SLP PROGRESS
Physical Therapy  Treatment    Cata Lang   MRN: [id number]   Admitting Diagnosis: Calcified cerebral meningioma    PT Received On: 08/05/20  PT Start Time: 0805     PT Stop Time: 0828    PT Total Time (min): 23 min       Billable Minutes:  Gait Training 14 min and Therapeutic Activity 9 min    Treatment Type: Treatment  PT/PTA: PT     PTA Visit Number: 0       General Precautions: Standard, fall  Orthopedic Precautions: N/A   Braces: N/A    Spiritual, Cultural Beliefs, Moravian Practices, Values that Affect Care: no    Subjective:  Communicated with Epic and nurse Evelyn prior to session.  Patient in bed with HOB elevated; agreeable to participating with therapy.    Pain/Comfort  Pain Rating 1: 10/10  Location - Orientation 1: lower  Location 1: back  Pain Addressed 1: Pre-medicate for activity, Other (see comments)(patient had pain meds at 07:50)  Pain Rating Post-Intervention 1: 10/10  Pain Rating 2: 10/10  Location - Side 2: Bilateral  Location 2: knee  Pain Addressed 2: Pre-medicate for activity, Other (see comments)(patient had pain meds at 07:50)  Pain Rating Post-Intervention 2: 10/10    Objective:   Patient found with: peripheral IV    Functional Mobility:  Bed Mobility:    Rolling to right SBA; supine -> sit CGA; seated scooting CGA    Transfers:   Sit <-> stand transfer with RW with min assist and verbal cues for hand placement and safe technique x 2 trials. Stand-pivot transfer to bedside chair with RW with min assist.    Gait:    Gait training 15 feet x 2 trials with RW and mod assist; seated rest break between trials due to patient c/o B knee buckling sensation.  Verbal/tactile cues for postural control.    Balance:   Static Sit: GOOD: Takes MODERATE challenges from all directions  Dynamic Sit: FAIR+: Maintains balance through MINIMAL excursions of active trunk motion  Static Stand: POOR+: Needs MINIMAL assist to maintain  Dynamic stand: POOR: Needs MOD (moderate) assist during gait        Therapeutic Activities and Exercises:  Patient participated in bed mobility, transfer training with RW, and gait training with RW (see above for details).  Patient demonstrated good sitting balance at edge of bed while performing seated BLE therapeutic exercises x 10 in all planes; encouraged patient to perform these intermittently while up in chair.  Encouraged patient to sit up in chair as long as tolerated and to call nurse's station for assistance when ready to return to bed; patient verbalized understanding.     AM-PAC 6 CLICK MOBILITY  How much help from another person does this patient currently need?   1 = Unable, Total/Dependent Assistance  2 = A lot, Maximum/Moderate Assistance  3 = A little, Minimum/Contact Guard/Supervision  4 = None, Modified [place]/Independent    Turning over in bed (including adjusting bedclothes, sheets and blankets)?: 3  Sitting down on and standing up from a chair with arms (e.g., wheelchair, bedside commode, etc.): 3  Moving from lying on back to sitting on the side of the bed?: 3  Moving to and from a bed to a chair (including a wheelchair)?: 3  Need to walk in hospital room?: 2  Climbing 3-5 steps with a railing?: 1  Basic Mobility Total Score: 15    AM-PAC Raw Score CMS G-Code Modifier Level of Impairment Assistance   6 % Total / Unable   7 - 9 CM 80 - 100% Maximal Assist   10 - 14 CL 60 - 80% Moderate Assist   15 - 19 CK 40 - 60% Moderate Assist   20 - 22 CJ 20 - 40% Minimal Assist   23 CI 1-20% SBA / CGA   24 CH 0% Independent/ Mod I     Patient left up in chair with all lines intact, call button in reach, chair alarm on and family member present.    Assessment:  Patient and family member stated that patient is improving with bed mobility and bedside commode transfers.  She is performing these activities more often during the day.  Patient would benefit from continued skilled PT services to facilitate further gains in strength, balance, and functional  mobility.    Rehab identified problem list/impairments: Rehab identified problem list/impairments: weakness, impaired endurance, impaired self care skills, impaired functional mobilty, gait instability, impaired balance, decreased lower extremity function, decreased coordination, decreased safety awareness, pain    Rehab potential is good.    Activity tolerance: Good    Discharge recommendations: Discharge Facility/Level of Care Needs: rehabilitation facility     Barriers to discharge:      Equipment recommendations: Equipment Needed After Discharge: walker, rolling, bedside commode     GOALS:   Multidisciplinary Problems     Physical Therapy Goals        Problem: Physical Therapy Goal    Goal Priority Disciplines Outcome Goal Variances Interventions   Physical Therapy Goal     PT, PT/OT Ongoing, Progressing     Description: PT WILL BE SEEN FOR P.T. FOR A MIN OF 5 OUT OF 7 DAYS A WEEK  LT20  1. PT WILL COMPLETE BED MOBILITY WITH MIN A   2. PT WILL T/F TO CHAIR WITH RW AND MAX A  3. PT WILL SIT>STAND WITH RW AND MAX A  4. PT WILL COMPLETE B LE TE X 20 REPS  5. PT WILL GT TRAIN X 10' WITH RW AND MAX A                   PLAN:    Patient to be seen 5 x/week  to address the above listed problems via gait training, therapeutic activities, therapeutic exercises, neuromuscular re-education  Plan of Care expires: 20  Plan of Care reviewed with: patient         Flakita Muniz, PT, DPT  2020

## 2020-08-05 NOTE — SUBJECTIVE & OBJECTIVE
Interval History:  Patient reported to be participating with PT well.  Continues to exhibit left-sided weakness, but has improved.  Patient drowsy at time of visit, spouse reports she is awake during the night and more drowsy during the day.  Negative for acute events overnight.  Pathology pending.  Oncology Treatment Plan:   [No treatment plan]    Medications:  Continuous Infusions:  Scheduled Meds:   atorvastatin  10 mg Oral Daily    calcium carbonate  500 mg Oral QID    calcium gluconate IVPB  1,000 mg Intravenous Once    cholecalciferol (vitamin D3)  5,000 Units Oral Daily    dexAMETHasone  2 mg Oral Daily    docusate sodium  100 mg Oral BID    enoxaparin  40 mg Subcutaneous Q24H    epoetin laxmi-ebpx (RETACRIT) injection  5,000 Units Subcutaneous Every Mon, Wed, Fri    ferrous sulfate  325 mg Oral BID    gabapentin  600 mg Oral Daily    hydrALAZINE  25 mg Oral Q8H    levETIRAcetam  1,500 mg Oral BID    levothyroxine  137 mcg Oral Before breakfast    pantoprazole  40 mg Oral Daily    polyethylene glycol  17 g Oral Daily     PRN Meds:acetaminophen, artificial tears, bisacodyL, dextrose 50%, dextrose 50%, glucagon (human recombinant), glucose, glucose, hydrALAZINE, HYDROcodone-acetaminophen, HYDROcodone-acetaminophen, insulin aspart U-100, morphine, ondansetron, polyethylene glycol     Review of Systems   Constitutional: Positive for appetite change and fatigue. Negative for chills, diaphoresis, fever and unexpected weight change.   HENT: Positive for ear pain (Left side-mild improvement) and facial swelling ( has improved). Negative for congestion, ear discharge (Left side), hearing loss, mouth sores, postnasal drip, rhinorrhea, sore throat and trouble swallowing.    Eyes: Negative for pain, discharge, redness and visual disturbance.   Respiratory: Negative for cough, chest tightness and shortness of breath.    Cardiovascular: Negative for chest pain, palpitations and leg swelling.    Gastrointestinal: Positive for abdominal distention. Negative for blood in stool, constipation, diarrhea, nausea and vomiting.   Endocrine: Negative for cold intolerance and heat intolerance.   Genitourinary: Negative for difficulty urinating, dyspareunia, flank pain and hematuria.   Musculoskeletal: Negative for arthralgias, back pain and myalgias.   Skin: Positive for wound.   Neurological: Positive for weakness. Negative for dizziness, light-headedness and headaches.   Hematological: Negative for adenopathy. Does not bruise/bleed easily.   Psychiatric/Behavioral: Negative for agitation, behavioral problems and confusion. The patient is not nervous/anxious.      Objective:     Vital Signs (Most Recent):  Temp: 98 °F (36.7 °C) (08/05/20 0751)  Pulse: 71 (08/05/20 0751)  Resp: 16 (08/05/20 0751)  BP: 130/60 (08/05/20 0751)  SpO2: 97 % (08/05/20 0751) Vital Signs (24h Range):  Temp:  [97.4 °F (36.3 °C)-98.5 °F (36.9 °C)] 98 °F (36.7 °C)  Pulse:  [64-73] 71  Resp:  [16-20] 16  SpO2:  [97 %-99 %] 97 %  BP: (130-152)/(60-68) 130/60     Weight: 129.8 kg (286 lb 2.5 oz)  Body mass index is 41.06 kg/m².  Body surface area is 2.53 meters squared.      Intake/Output Summary (Last 24 hours) at 8/5/2020 1138  Last data filed at 8/5/2020 0700  Gross per 24 hour   Intake 820 ml   Output 900 ml   Net -80 ml       Physical Exam  Vitals signs and nursing note reviewed.   Constitutional:       General: She is not in acute distress.     Appearance: She is well-developed. She is ill-appearing.   HENT:      Head:        Right Ear: Hearing and external ear normal.      Left Ear: Hearing and external ear normal.      Nose: No rhinorrhea.      Right Sinus: No maxillary sinus tenderness or frontal sinus tenderness.      Left Sinus: No maxillary sinus tenderness or frontal sinus tenderness.      Mouth/Throat:      Mouth: No oral lesions.      Pharynx: Uvula midline.   Eyes:      General:         Right eye: No discharge.         Left eye:  No discharge.      Pupils: Pupils are equal, round, and reactive to light.   Neck:      Musculoskeletal: Normal range of motion.      Thyroid: No thyromegaly.      Vascular: No carotid bruit.      Trachea: No tracheal deviation.   Cardiovascular:      Rate and Rhythm: Normal rate and regular rhythm.      Pulses:           Dorsalis pedis pulses are 2+ on the right side and 2+ on the left side.      Heart sounds: Normal heart sounds, S1 normal and S2 normal. No murmur.   Pulmonary:      Effort: Pulmonary effort is normal. No respiratory distress.      Breath sounds: Normal breath sounds.   Abdominal:      General: Bowel sounds are decreased. There is distension.      Palpations: Abdomen is soft. There is no mass.      Tenderness: There is no abdominal tenderness.   Musculoskeletal: Normal range of motion.   Lymphadenopathy:      Cervical: No cervical adenopathy.      Upper Body:      Right upper body: No supraclavicular adenopathy.      Left upper body: No supraclavicular adenopathy.   Skin:     General: Skin is warm and dry.      Capillary Refill: Capillary refill takes less than 2 seconds.      Findings: No rash.   Neurological:      Mental Status: She is alert and oriented to person, place, and time.      Sensory: No sensory deficit.      Motor: Weakness (On left side) present.      Gait: Gait normal.   Psychiatric:         Mood and Affect: Mood is not anxious or depressed.         Speech: Speech normal.         Behavior: Behavior normal.         Thought Content: Thought content normal.         Judgment: Judgment normal.         Significant Labs:   CBC:   Recent Labs   Lab 08/04/20  0942 08/05/20  0731   WBC 16.52* 17.05*   HGB 7.4* 7.5*   HCT 24.3* 24.7*   * 225    and CMP:   Recent Labs   Lab 08/04/20  0942 08/05/20  0731    137   K 3.4* 3.9    102   CO2 21* 24   * 135*   BUN 27* 20   CREATININE 1.2 1.0   CALCIUM 6.7* 7.1*   PROT 6.3 6.4   ALBUMIN 2.9* 2.9*   BILITOT 0.2 0.3   ALKPHOS  147* 166*   AST 32 25   ALT 53* 51*   ANIONGAP 14 11   EGFRNONAA 52* >60       Diagnostic Results:  I have reviewed all pertinent imaging results/findings within the past 24 hours.

## 2020-08-05 NOTE — ASSESSMENT & PLAN NOTE
8/3:  Corrected calcium 7.8  Will check ionize calcium  Plan to supplement calcium if needed     8/4:  Calcium running low  Will replace today with calcium gluconate  Cont to monitor   Will check mag     8/5:  Calcium gluconate given today  Cont to monitor calcium levels

## 2020-08-05 NOTE — SUBJECTIVE & OBJECTIVE
Interval History:     7/31/20: s/p brain tumor surgery today. Creatinine has improved to 1.7. Ca at 7.9 today (albumin 3.1).    8/1/20: patient is resting comfortably, no complaints at present.     8/2/20: Calcium has declined to 6.7 (albumin 3.1). Creatinine at 1.5. Patient resting in chair, no complaints at present.     8/3/20: Creatinine has declined to 1.3. Calcium at 7 (albumin 3).     8/4/20: Calcium at 6.7 (albumin 2.9). K at 3.4, creatinine at 1.2.   Patient still very fatigued.     8/5/20: Calcium at 7.1 today. Creatinine has improved to 1.         Review of patient's allergies indicates:   Allergen Reactions    Hydrochlorothiazide      hypercalcemia     Current Facility-Administered Medications   Medication Frequency    acetaminophen tablet 650 mg Q6H PRN    artificial tears 0.5 % ophthalmic solution 1 drop PRN    atorvastatin tablet 10 mg Daily    bisacodyL EC tablet 5 mg Daily PRN    calcium carbonate (OS-THANIA) tablet 500 mg QID    calcium gluconate 1g in dextrose 5% 100mL (ready to mix system) Once    dexAMETHasone tablet 2 mg Daily    dextrose 50% injection 12.5 g PRN    dextrose 50% injection 25 g PRN    docusate sodium capsule 100 mg BID    enoxaparin injection 40 mg Q24H    epoetin laxmi-epbx injection 5,000 Units Every Mon, Wed, Fri    ferrous sulfate EC tablet 325 mg BID    gabapentin capsule 600 mg Daily    glucagon (human recombinant) injection 1 mg PRN    glucose chewable tablet 16 g PRN    glucose chewable tablet 24 g PRN    hydrALAZINE injection 10 mg Q8H PRN    hydrALAZINE tablet 25 mg Q8H    HYDROcodone-acetaminophen  mg per tablet 1 tablet Q4H PRN    HYDROcodone-acetaminophen 5-325 mg per tablet 1 tablet Q4H PRN    insulin aspart U-100 pen 1-10 Units QID (AC + HS) PRN    levETIRAcetam tablet 1,500 mg BID    levothyroxine tablet 137 mcg Before breakfast    morphine injection 2 mg Q3H PRN    ondansetron injection 4 mg Q6H PRN    pantoprazole EC tablet 40 mg  Daily    polyethylene glycol packet 17 g BID PRN    polyethylene glycol packet 17 g Daily       Objective:     Vital Signs (Most Recent):  Temp: 98 °F (36.7 °C) (08/05/20 0751)  Pulse: 71 (08/05/20 0751)  Resp: 16 (08/05/20 0751)  BP: 130/60 (08/05/20 0751)  SpO2: 97 % (08/05/20 0751)  O2 Device (Oxygen Therapy): room air (08/04/20 2000) Vital Signs (24h Range):  Temp:  [97.4 °F (36.3 °C)-98.5 °F (36.9 °C)] 98 °F (36.7 °C)  Pulse:  [64-73] 71  Resp:  [16-20] 16  SpO2:  [97 %-99 %] 97 %  BP: (130-152)/(60-68) 130/60     Weight: 129.8 kg (286 lb 2.5 oz) (07/26/20 0435)  Body mass index is 41.06 kg/m².  Body surface area is 2.53 meters squared.    I/O last 3 completed shifts:  In: 1180 [P.O.:1080; IV Piggyback:100]  Out: 900 [Urine:900]    Physical Exam  Constitutional:       Appearance: She is well-developed.   HENT:      Head: Normocephalic.      Comments: S/p brain surgery with forehead scar.   Eyes:      Pupils: Pupils are equal, round, and reactive to light.   Neck:      Thyroid: No thyromegaly.   Cardiovascular:      Rate and Rhythm: Normal rate and regular rhythm.      Heart sounds: No friction rub.   Pulmonary:      Effort: Pulmonary effort is normal.      Breath sounds: Normal breath sounds. No wheezing.   Chest:      Chest wall: No tenderness.   Abdominal:      General: Bowel sounds are normal. There is no distension.      Palpations: Abdomen is soft.      Tenderness: There is no abdominal tenderness.   Musculoskeletal:      Comments: Trace LE edema.    Lymphadenopathy:      Cervical: No cervical adenopathy.   Skin:     General: Skin is warm and dry.      Findings: No rash.   Neurological:      Mental Status: She is alert and oriented to person, place, and time.         Significant Labs:  Lab Results   Component Value Date    CREATININE 1.0 08/05/2020    BUN 20 08/05/2020     08/05/2020    K 3.9 08/05/2020     08/05/2020    CO2 24 08/05/2020     Lab Results   Component Value Date    PTH 23.4  08/02/2020    CALCIUM 7.1 (L) 08/05/2020    CAION 1.76 (H) 07/26/2020    PHOS 3.2 08/03/2020     Lab Results   Component Value Date    ALBUMIN 2.9 (L) 08/05/2020     Lab Results   Component Value Date    WBC 17.05 (H) 08/05/2020    HGB 7.5 (L) 08/05/2020    HCT 24.7 (L) 08/05/2020    MCV 82 08/05/2020     08/05/2020       Recent Labs   Lab 08/05/20  0731   MG 1.6       PTH-rp: < 0.4 (7/27/20)        Significant Imaging:  Imaging Results    None

## 2020-08-05 NOTE — ASSESSMENT & PLAN NOTE
8/3:  Currently on lovenox  Received heparin in the past  Will check HIT antibody   Heme/onc on case  Will need to switch off of lovenox  Should plt cont to drop    8/4:  plt improving  Not likely HIT   Cont lovenox    8/5:  plt improving  HIT panel positive  Currently on lovenox  Discussed case with heme/onc  Initially wanted arixtra however pharmacy does not carry  Will need to discuss on alternative

## 2020-08-05 NOTE — ASSESSMENT & PLAN NOTE
No acute change in mass seen on CT of brain per Neurosurgery.  Holding Aspirin and Clopidogrel for surgery later this week.  Platelet function assay normal.  Craniotomy with resection on 31 July.  Post procedure in ICU.    8/1:  POD #1 by neurosurgery  Ok for step down   Transfer orders placed by neurosurgery  Continue to monitor   CT head chest abd and pelvis recommended by heme/onc  Will need further hydration before pt can receive IV contrast     8/2:  POD #2 by neurosurgery  Path report pending  Neurosurgery plan for repeat MRI outpatient     8/3:  POD #3 by neurosurgery  Path report pending    8/4:  POD #4 by neurosurgery  Pt stable     8/5:  POD #5 by neurosurgery  Stable   Path report pending  Placement pending

## 2020-08-05 NOTE — ASSESSMENT & PLAN NOTE
SHARLENE on CKD stage 3 , baseline creatinine about 1.3 mg/dL, Ultrasound negative for any hydronephrosis.    Creatinine has improved to 1 today. Will monitor closely.

## 2020-08-05 NOTE — ASSESSMENT & PLAN NOTE
Patient has history of having total thyroid removal 5 years ago, has taken 6000 mg of calcium daily since that time.  On admission patient noted to have hypercalcemia, this was treated per , following treatment patient became hypocalcemic.  This has improved.    --has improved

## 2020-08-05 NOTE — ASSESSMENT & PLAN NOTE
Patient had severe hypercalcemia upon admission.  Vitamin-D and calcium supplementations have been stopped. Parathyroid hormone level was low and has now recovered to 23. Ca had declined to 6.7 on 8/2 (albumin 3.1). Calcium improved to 7 on 8/3/20 (albumin 3). CaCO3 500 mg qid was started on 8/3/20. Calcium on 8/4 at 6.7 (albumin 2.9). Calcium at 7.1 today (after IV calcium yesterday). Will given additional IV Ca-gluconate today.    PTHrp level was < 0.4 (not elevated).

## 2020-08-05 NOTE — PROGRESS NOTES
[facility] Hospital Medicine  Progress Note    Patient Name: Cata Lang  MRN: [id number]  Patient Class: IP- Inpatient   Admission Date: 7/25/2020  Length of Stay: 10 days  Attending Physician: Abdoul Barragan MD  Primary Care Provider: Sabra Fregoso MD        Subjective:     Principal Problem:Calcified cerebral meningioma        HPI:   Cata Lang is a 49 y.o. female patient with a PMHx of brain mass, HTN, DM II, hypothyroidism, seizure disorder, and chronic HA who presents to the Emergency Department for evaluation of neurological problem which onset gradually today. Pt notes she has been walking worse since December and her HA is worse on the L side and rates it a moderate to severe HA. Symptoms are worsening and moderate to severe in severity. No mitigating or exacerbating factors reported. Associated sxs include dizziness, malaise, and difficulty walking. Patient denies any fever, SOB, CP, abd pain, N/V, back pain, weakness, and all other sxs at this time. Pt notes she takes several tums daily which was recommended by her ENT. Pt transferred from [facility] and accepted by Dr. Gandara (Neurosurgery). Case discussed with Dr. Gandara in ED who reviewed CT of the head from transferring facility and feels there is no need for neurosurgical intervention at present.  In ED, patient's symptoms completely resolved with NIHSS of 0.  Labs showed: creatinine of 1.9, BUN 19, calcium 14.2. Patient is on calcitriol at home, started last year with no follow-up labs done. IV fluids x 1 liter and 20 mg IV Lasix given. Hospital Medicine was contacted to admit for hypercalcemia and SHARLENE.    Overview/Hospital Course:  Admitted for evaluation and treatment of hypercalcemia and brain mass with left sided weakness.    8/1: patient stable for transfer to floor. Hypercalcemia workup unremarkable thus far. Will need to rule out multiple myeloma. Heme/onc on case. SPEP, UPEP, immunofixation ordered. CT head chest  abd/pelvis with contrast recommended. Will need to hydrate patient and plan for CT once creatinine improved.   8/2: continue IVF. Multiple myeloma workup pending. Awaiting pathology results on intracranial mass. Awaiting CT chest abd pelvis with contrast once creatinine improves. SWAPNIL noted, venofer given once along with ferrous sulfate PO bid.   8/3: no acute events overnight. Patient has drop in plt. HIT antibody sent out. Heme/onc on case. CT chest abd and pelvis showed prominent mediastinal lymph nodes. Continue lovenox for now. Will plan to switch to different agent should plt continue to drop.   8/4: platelets improving. Not likely HIT. Cont lovenox. Calcium replaced. Rehab placement pending.   8/5: platelets improving. HIT panel positive. Discussed with heme/onc. Will dc lovenox. Plan to start arixtra if on formulary. Continue to replace electrolytes. Placement pending.     Interval History: No acute events reported overnight.      Review of Systems   Constitutional: Negative for fatigue and fever.   HENT: Negative for sinus pressure.    Eyes: Negative for visual disturbance.   Respiratory: Negative for shortness of breath.    Cardiovascular: Negative for chest pain.   Gastrointestinal: Negative for nausea and vomiting.   Musculoskeletal: Negative for back pain.   Skin: Negative for rash.   Neurological: Negative for headaches.   Psychiatric/Behavioral: Negative for confusion.     Objective:     Vital Signs (Most Recent):  Temp: 98.2 °F (36.8 °C) (08/05/20 1558)  Pulse: 66 (08/05/20 1558)  Resp: 16 (08/05/20 1602)  BP: (!) 140/64 (08/05/20 1558)  SpO2: 97 % (08/05/20 1558) Vital Signs (24h Range):  Temp:  [97.4 °F (36.3 °C)-98.5 °F (36.9 °C)] 98.2 °F (36.8 °C)  Pulse:  [64-73] 66  Resp:  [16-20] 16  SpO2:  [97 %-99 %] 97 %  BP: (130-152)/(60-68) 140/64     Weight: 129.8 kg (286 lb 2.5 oz)  Body mass index is 41.06 kg/m².    Intake/Output Summary (Last 24 hours) at 8/5/2020 5700  Last data filed at 8/5/2020  0700  Gross per 24 hour   Intake 360 ml   Output 900 ml   Net -540 ml      Physical Exam  Constitutional:       General: She is not in acute distress.     Appearance: She is well-developed. She is obese. She is not diaphoretic.   HENT:      Head:      Comments: Swelling noted on head, sutures clean   Cardiovascular:      Rate and Rhythm: Normal rate and regular rhythm.      Heart sounds: Normal heart sounds. No murmur. No friction rub. No gallop.    Pulmonary:      Effort: Pulmonary effort is normal. No respiratory distress.      Breath sounds: Normal breath sounds. No stridor. No wheezing or rales.   Abdominal:      General: Bowel sounds are normal. There is no distension.      Palpations: Abdomen is soft. There is no mass.      Tenderness: There is no abdominal tenderness. There is no guarding.   Musculoskeletal:      Right lower leg: No edema.      Left lower leg: No edema.   Skin:     General: Skin is warm.      Findings: No erythema.   Neurological:      Mental Status: She is alert and oriented to person, place, and time.   Psychiatric:         Mood and Affect: Mood normal.         Behavior: Behavior normal.         Thought Content: Thought content normal.         Judgment: Judgment normal.         Significant Labs:   Recent Lab Results       08/05/20  1107   08/05/20  0731   08/05/20  0541   08/04/20  2159   08/04/20  1752        Albumin   2.9           Alkaline Phosphatase   166           ALT   51           Anion Gap   11           AST   25           Baso #   0.03           Basophil%   0.2           BILIRUBIN TOTAL   0.3  Comment:  For infants and newborns, interpretation of results should be based  on gestational age, weight and in agreement with clinical  observations.  Premature Infant recommended reference ranges:  Up to 24 hours.............<8.0 mg/dL  Up to 48 hours............<12.0 mg/dL  3-5 days..................<15.0 mg/dL  6-29 days.................<15.0 mg/dL             BUN, Bld   20            Calcium   7.1           Chloride   102           CO2   24           Creatinine   1.0           Differential Method   Automated           eGFR if    >60           eGFR if non    >60  Comment:  Calculation used to obtain the estimated glomerular filtration  rate (eGFR) is the CKD-EPI equation.              Eos #   0.1           Eosinophil%   0.8           Glucose   135           Gran # (ANC)   12.1           Gran%   71.2           Hematocrit   24.7           Hemoglobin   7.5           Immature Grans (Abs)   0.56  Comment:  Mild elevation in immature granulocytes is non specific and   can be seen in a variety of conditions including stress response,   acute inflammation, trauma and pregnancy. Correlation with other   laboratory and clinical findings is essential.             Immature Granulocytes   3.3           Lymph #   3.3           Lymph%   19.5           Magnesium   1.6           MCH   24.9           MCHC   30.4           MCV   82           Mono #   0.9           Mono%   5.0           MPV   12.1           nRBC   0           Platelets   225           POCT Glucose 153   141 104 191     Potassium   3.9           PROTEIN TOTAL   6.4           RBC   3.01           RDW   16.5           Sodium   137           WBC   17.05                              All pertinent labs within the past 24 hours have been reviewed.    Significant Imaging: I have reviewed all pertinent imaging results/findings within the past 24 hours.      Assessment/Plan:      * Calcified cerebral meningioma  No acute change in mass seen on CT of brain per Neurosurgery.  Holding Aspirin and Clopidogrel for surgery later this week.  Platelet function assay normal.  Craniotomy with resection on 31 July.  Post procedure in ICU.    8/1:  POD #1 by neurosurgery  Ok for step down   Transfer orders placed by neurosurgery  Continue to monitor   CT head chest abd and pelvis recommended by heme/onc  Will need further hydration before  pt can receive IV contrast     8/2:  POD #2 by neurosurgery  Path report pending  Neurosurgery plan for repeat MRI outpatient     8/3:  POD #3 by neurosurgery  Path report pending    8/4:  POD #4 by neurosurgery  Pt stable     8/5:  POD #5 by neurosurgery  Stable   Path report pending  Placement pending       Thrombocytopenia  8/3:  Currently on lovenox  Received heparin in the past  Will check HIT antibody   Heme/onc on case  Will need to switch off of lovenox  Should plt cont to drop    8/4:  plt improving  Not likely HIT   Cont lovenox    8/5:  plt improving  HIT panel positive  Currently on lovenox  Discussed case with heme/onc  Initially wanted arixtra however pharmacy does not carry  Will need to discuss on alternative    Hypocalcemia  8/3:  Corrected calcium 7.8  Will check ionize calcium  Plan to supplement calcium if needed     8/4:  Calcium running low  Will replace today with calcium gluconate  Cont to monitor   Will check mag     8/5:  Calcium gluconate given today  Cont to monitor calcium levels       Iron deficiency anemia  8/2:  Low iron sat with low ferritin   venofer given once  Po ferrous sulfate given  Cont to monitor h/h     8/3:  Cont venofer daily per heme/onc  Ferrous sulfate bid   Cont to monitor H/H     8/4:  Stable  Cont to monitor       Diabetes mellitus due to underlying condition with hyperglycemia, without long-term current use of insulin  8/1-8/3:  Glucose controlled   Continue sliding scale insulin       Metabolic encephalopathy  Multifactorial due to hypercalcemia and brain mass with vasogenic edema.    8/1:  Resolved     Essential hypertension  - Losartan-HCTZ on hold as above.    - IV hydralazine PRN.    SHARLENE on CKD, stage III  - Initial creatinine of 1.9 (baseline 1.3).  - IV hydration.  - Avoid nephrotoxic agents. Hold home losartan-HCTZ for now.  - Follow labs.    8/1:  Improving  Continue to monitor creatinine  Continue IVF  nephro consulted on case    Hypercalcemia  Initial  calcium of 14.2.  Patient is on calitriol at home, will discontinue.  Hold Calcium supplementation.  Continue aggressive IV hydration.  Will give additional IV Lasix if needed.  Nephrology evaluated and assisting with management.  PTH is appropriately suppressed and Vitamin D25 and Calcitriol are low.  PTHrP pending.    8/1:  Hypercalcemia workup unremarkable thus far  Will need to rule out multiple myeloma  SPEP, UPEP, immunofixation ordered  Heme/onc on case     8/2:  MM workup pending  Pt hypocalcemic now  Will await ionized calcium   Ct chest abd/pelvis when  Creatinine clearance is appropriate    8/3:  Resolved  MM workup pending            VTE Risk Mitigation (From admission, onward)         Ordered     IP VTE HIGH RISK PATIENT  Once      07/31/20 1312     Place sequential compression device  Until discontinued      07/31/20 1312     Place HOLLAND hose  Until discontinued      07/31/20 1312                      Abdoul Barragan MD  Department of Hospital Medicine   [facility]

## 2020-08-05 NOTE — PLAN OF CARE
Discussed poc with pt, pt verbalized understanding    modified visitor policy per CDC guidelines  allowing one visitor from 8a-6pm    Purposeful rounding every 2hours    VS wnl    Blood glucose monitoring requiring insulin coverage  Fall precautions in place, remains injury free  Pt denies c/o nausea  Pain under control with PRN meds    Accurate I&Os  Bed locked at lowest position  Call light within reach    Chart check complete  Will cont to monitor    Wound care done

## 2020-08-05 NOTE — ASSESSMENT & PLAN NOTE
Patient had new onset thrombocytopenia, evaluated for HIT, antibody testing sent, pending results.  Review of platelet count today, has shown increase of platelet count from 112 K to 137K. Patient likely does not have HIT due to improvement of platelet count while still on Lovenox.  Will not change anticoagulation at this time.  Patient has been receiving IV Venofer due to iron deficiency, thrombocytopenia was likely secondary to iron deficiency.    --continue Lovenox as ordered  --resolved

## 2020-08-05 NOTE — ASSESSMENT & PLAN NOTE
Patient had severe hypercalcemia upon admission.  Vitamin-D and calcium supplementations have been stopped. Parathyroid hormone level was low and has now recovered to 23. Ca had declined to 6.7 on 8/2 (albumin 3.1). Calcium improved to 7 on 8/3/20 (albumin 3). CaCO3 500 mg qid was started on 8/3/20. Calcium on 8/4 at 6.7 (albumin 2.9). Calcium at 7.1 on 8/5 (after IV calcium). Will follow up on today'a labs. Will continue vitamin D 5000 units per day.     PTHrp level was < 0.4 (not elevated).

## 2020-08-05 NOTE — PROGRESS NOTES
[facility]   Nephrology  Progress Note    Patient Name: Cata Lang  MRN: [id number]  Admission Date: 7/25/2020  Hospital Length of Stay: 10 days  Attending Provider: Abdoul Barragan MD   Primary Care Physician: Sabra Fregoso MD  Principal Problem:Calcified cerebral meningioma    Subjective:     HPI: Cata Lang is a pleasant 49-year-old  woman history of hypertension, type 2 diabetes, morbid obesity, seizure disorder, CKD stage 3, baseline creatinine about 1.3 mg/dL, GFR 50 mL/minute, intracranial mass / lesion, she presents to the emergency room yesterday with progressive complains of weakness, increasing headache, nausea, a repeat MRI of the brain is pending at this time, she was evaluated by Neurosurgery, also significant hypercalcemia with a serum calcium of 14 noted on presentation, patient is currently taking Tums, calcitriol, hydrochlorothiazide, also she possibly has a urinary tract infection, acute on chronic renal failure, serum creatinine was 1.9 on presentation, 2.5 this evening, she is currently receiving IV fluids, we were consulted for acute kidney injury, hypercalcemia    Interval History:     7/31/20: s/p brain tumor surgery today. Creatinine has improved to 1.7. Ca at 7.9 today (albumin 3.1).    8/1/20: patient is resting comfortably, no complaints at present.     8/2/20: Calcium has declined to 6.7 (albumin 3.1). Creatinine at 1.5. Patient resting in chair, no complaints at present.     8/3/20: Creatinine has declined to 1.3. Calcium at 7 (albumin 3).     8/4/20: Calcium at 6.7 (albumin 2.9). K at 3.4, creatinine at 1.2.   Patient still very fatigued.     8/5/20: Calcium at 7.1 today. Creatinine has improved to 1.         Review of patient's allergies indicates:   Allergen Reactions    Hydrochlorothiazide      hypercalcemia     Current Facility-Administered Medications   Medication Frequency    acetaminophen tablet 650 mg Q6H PRN    artificial tears 0.5 %  ophthalmic solution 1 drop PRN    atorvastatin tablet 10 mg Daily    bisacodyL EC tablet 5 mg Daily PRN    calcium carbonate (OS-THANIA) tablet 500 mg QID    calcium gluconate 1g in dextrose 5% 100mL (ready to mix system) Once    dexAMETHasone tablet 2 mg Daily    dextrose 50% injection 12.5 g PRN    dextrose 50% injection 25 g PRN    docusate sodium capsule 100 mg BID    enoxaparin injection 40 mg Q24H    epoetin laxmi-epbx injection 5,000 Units Every Mon, Wed, Fri    ferrous sulfate EC tablet 325 mg BID    gabapentin capsule 600 mg Daily    glucagon (human recombinant) injection 1 mg PRN    glucose chewable tablet 16 g PRN    glucose chewable tablet 24 g PRN    hydrALAZINE injection 10 mg Q8H PRN    hydrALAZINE tablet 25 mg Q8H    HYDROcodone-acetaminophen  mg per tablet 1 tablet Q4H PRN    HYDROcodone-acetaminophen 5-325 mg per tablet 1 tablet Q4H PRN    insulin aspart U-100 pen 1-10 Units QID (AC + HS) PRN    levETIRAcetam tablet 1,500 mg BID    levothyroxine tablet 137 mcg Before breakfast    morphine injection 2 mg Q3H PRN    ondansetron injection 4 mg Q6H PRN    pantoprazole EC tablet 40 mg Daily    polyethylene glycol packet 17 g BID PRN    polyethylene glycol packet 17 g Daily       Objective:     Vital Signs (Most Recent):  Temp: 98 °F (36.7 °C) (08/05/20 0751)  Pulse: 71 (08/05/20 0751)  Resp: 16 (08/05/20 0751)  BP: 130/60 (08/05/20 0751)  SpO2: 97 % (08/05/20 0751)  O2 Device (Oxygen Therapy): room air (08/04/20 2000) Vital Signs (24h Range):  Temp:  [97.4 °F (36.3 °C)-98.5 °F (36.9 °C)] 98 °F (36.7 °C)  Pulse:  [64-73] 71  Resp:  [16-20] 16  SpO2:  [97 %-99 %] 97 %  BP: (130-152)/(60-68) 130/60     Weight: 129.8 kg (286 lb 2.5 oz) (07/26/20 0435)  Body mass index is 41.06 kg/m².  Body surface area is 2.53 meters squared.    I/O last 3 completed shifts:  In: 1180 [P.O.:1080; IV Piggyback:100]  Out: 900 [Urine:900]    Physical Exam  Constitutional:       Appearance: She is  well-developed.   HENT:      Head: Normocephalic.      Comments: S/p brain surgery with forehead scar.   Eyes:      Pupils: Pupils are equal, round, and reactive to light.   Neck:      Thyroid: No thyromegaly.   Cardiovascular:      Rate and Rhythm: Normal rate and regular rhythm.      Heart sounds: No friction rub.   Pulmonary:      Effort: Pulmonary effort is normal.      Breath sounds: Normal breath sounds. No wheezing.   Chest:      Chest wall: No tenderness.   Abdominal:      General: Bowel sounds are normal. There is no distension.      Palpations: Abdomen is soft.      Tenderness: There is no abdominal tenderness.   Musculoskeletal:      Comments: Trace LE edema.    Lymphadenopathy:      Cervical: No cervical adenopathy.   Skin:     General: Skin is warm and dry.      Findings: No rash.   Neurological:      Mental Status: She is alert and oriented to person, place, and time.         Significant Labs:  Lab Results   Component Value Date    CREATININE 1.0 08/05/2020    BUN 20 08/05/2020     08/05/2020    K 3.9 08/05/2020     08/05/2020    CO2 24 08/05/2020     Lab Results   Component Value Date    PTH 23.4 08/02/2020    CALCIUM 7.1 (L) 08/05/2020    CAION 1.76 (H) 07/26/2020    PHOS 3.2 08/03/2020     Lab Results   Component Value Date    ALBUMIN 2.9 (L) 08/05/2020     Lab Results   Component Value Date    WBC 17.05 (H) 08/05/2020    HGB 7.5 (L) 08/05/2020    HCT 24.7 (L) 08/05/2020    MCV 82 08/05/2020     08/05/2020       Recent Labs   Lab 08/05/20  0731   MG 1.6       PTH-rp: < 0.4 (7/27/20)        Significant Imaging:  Imaging Results    None           Assessment/Plan:     Anemia due to stage 3 chronic kidney disease  Continue Epogen.     Neoplasm of central nervous system  S/p brain mass resection. Neurosurgery is following.     SHARLENE on CKD, stage III  SHARLENE on CKD stage 3 , baseline creatinine about 1.3 mg/dL, Ultrasound negative for any hydronephrosis.    Creatinine has improved to 1  today. Will monitor closely.       Hypercalcemia  Patient had severe hypercalcemia upon admission.  Vitamin-D and calcium supplementations have been stopped. Parathyroid hormone level was low and has now recovered to 23. Ca had declined to 6.7 on 8/2 (albumin 3.1). Calcium improved to 7 on 8/3/20 (albumin 3). CaCO3 500 mg qid was started on 8/3/20. Calcium on 8/4 at 6.7 (albumin 2.9). Calcium at 7.1 today (after IV calcium yesterday). Will given additional IV Ca-gluconate today.    PTHrp level was < 0.4 (not elevated).             Thank you for your consult. I will follow-up with patient. Please contact us if you have any additional questions.    Chandler Gould MD  Nephrology  [facility]

## 2020-08-05 NOTE — PROGRESS NOTES
[facility] -   Neurosurgery  Progress Note    Subjective:     Interval History: POD5 right frontal craniotomy for evacuation of tumor.  Pathology results still pending.  Patient is brightly alert and oriented to person, place, time, and situation.  CN II-XII remain intact. She is sitting up to chair tolerating a regular diet.  Pronator drift is negative.  Left sided weakness persists but improving.  She reports headache and some discomfort near her incision but states overall, she continues to improve.      History of Present Illness: Please refer to HPI for additional details.  Thank you.    Post-Op Info:  Procedure(s) (LRB):  CRANIOTOMY, WITH 3D STEREOTACTIC IMAGING GUIDANCE, REAL-TIME (Right)  EXCISION, NEOPLASM (N/A)   5 Days Post-Op      Medications:  Continuous Infusions:  Scheduled Meds:   atorvastatin  10 mg Oral Daily    calcium carbonate  500 mg Oral QID    calcium gluconate IVPB  1,000 mg Intravenous Once    dexAMETHasone  2 mg Oral Daily    docusate sodium  100 mg Oral BID    enoxaparin  40 mg Subcutaneous Q24H    epoetin laxmi-ebpx (RETACRIT) injection  5,000 Units Subcutaneous Every Mon, Wed, Fri    ferrous sulfate  325 mg Oral BID    gabapentin  600 mg Oral Daily    hydrALAZINE  25 mg Oral Q8H    levETIRAcetam  1,500 mg Oral BID    levothyroxine  137 mcg Oral Before breakfast    pantoprazole  40 mg Oral Daily    polyethylene glycol  17 g Oral Daily     PRN Meds:acetaminophen, artificial tears, bisacodyL, dextrose 50%, dextrose 50%, glucagon (human recombinant), glucose, glucose, hydrALAZINE, HYDROcodone-acetaminophen, HYDROcodone-acetaminophen, insulin aspart U-100, morphine, ondansetron, polyethylene glycol     Review of Systems   Constitutional: Negative for activity change, appetite change, chills, diaphoresis and fever.   HENT: Positive for ear pain (near incision) and facial swelling (near incision). Negative for congestion, ear discharge, hearing loss, postnasal drip,  sinus pressure, sinus pain, sneezing, sore throat, tinnitus, trouble swallowing and voice change.    Eyes: Negative for pain, discharge, redness and itching.   Respiratory: Negative for cough, chest tightness, shortness of breath, wheezing and stridor.    Cardiovascular: Negative for chest pain, palpitations and leg swelling.   Gastrointestinal: Negative for abdominal distention, abdominal pain, anal bleeding, blood in stool, constipation, diarrhea, nausea and vomiting.   Endocrine: Negative for cold intolerance and heat intolerance.   Genitourinary: Negative for difficulty urinating, dyspareunia, dysuria, frequency and hematuria.   Musculoskeletal: Negative for arthralgias, back pain, gait problem, joint swelling, myalgias, neck pain and neck stiffness.   Skin: Negative for color change and rash.   Allergic/Immunologic: Negative for environmental allergies.   Neurological: Positive for seizures and headaches. Negative for facial asymmetry, speech difficulty, weakness, light-headedness and numbness.   Psychiatric/Behavioral: Negative for agitation, behavioral problems, decreased concentration, dysphoric mood, hallucinations, self-injury and sleep disturbance. The patient is not nervous/anxious.      Objective:     Weight: 129.8 kg (286 lb 2.5 oz)  Body mass index is 41.06 kg/m².  Vital Signs (Most Recent):  Temp: 98 °F (36.7 °C) (08/05/20 0751)  Pulse: 71 (08/05/20 0751)  Resp: 16 (08/05/20 0751)  BP: 130/60 (08/05/20 0751)  SpO2: 97 % (08/05/20 0751) Vital Signs (24h Range):  Temp:  [97.4 °F (36.3 °C)-98.5 °F (36.9 °C)] 98 °F (36.7 °C)  Pulse:  [64-73] 71  Resp:  [16-20] 16  SpO2:  [97 %-99 %] 97 %  BP: (130-152)/(60-68) 130/60                          Physical Exam:  Nursing note and vitals reviewed.    Constitutional: She appears well-developed.   Patient is A&Ox3.    Negative Pronator drift  Left sided weakness improving  Lungs: equal chest rise and fall  Ears: no otorrhea  Neck: no difficulty swallowing  Nose:  no rhinorrhea     Eyes: Pupils are equal, round, and reactive to light. EOM are normal.     Cardiovascular: Normal rate and regular rhythm.     Abdominal: Soft.     Skin: Skin displays no rash on trunk and no rash on extremities. Skin displays no lesions on trunk and no lesions on extremities.   Incision is well approximated.  There is some CSF collection located near the incision but there is no redness, drainage, or signs of infection.       Psych/Behavior: She is alert. She is oriented to person, place, and time. She has a normal mood and affect.       Significant Labs:  Recent Labs   Lab 08/04/20  0942 08/05/20  0731   * 135*    137   K 3.4* 3.9    102   CO2 21* 24   BUN 27* 20   CREATININE 1.2 1.0   CALCIUM 6.7* 7.1*   MG 1.7 1.6     Recent Labs   Lab 08/04/20  0942 08/05/20  0731   WBC 16.52* 17.05*   HGB 7.4* 7.5*   HCT 24.3* 24.7*   * 225     No results for input(s): LABPT, INR, APTT in the last 48 hours.  Microbiology Results (last 7 days)     ** No results found for the last 168 hours. **        All pertinent labs from the last 24 hours have been reviewed.  Significant Diagnostics:  New CT head wo contrast ordered for today    Assessment/Plan:     Active Diagnoses:    Diagnosis Date Noted POA    PRINCIPAL PROBLEM:  Calcified cerebral meningioma [D32.0] 07/31/2020 Yes     Chronic    Thrombocytopenia [D69.6] 08/03/2020 No    Iron deficiency anemia [D50.9] 08/02/2020 No    Hypocalcemia [E83.51] 08/02/2020 Yes    Neoplasm of central nervous system [D49.7]  Yes    Anemia due to stage 3 chronic kidney disease [N18.3, D63.1]  Yes    Diabetes mellitus due to underlying condition with hyperglycemia, without long-term current use of insulin [E08.65] 07/31/2020 Yes    Hypercalcemia [E83.52] 07/26/2020 Yes    SHARLENE on CKD, stage III [N17.9] 07/26/2020 Yes    Essential hypertension [I10] 07/26/2020 Yes     Chronic      Problems Resolved During this Admission:     -Continue care with  primary care team  -Continue PT/OT  -Case management consulted for discharge placement.  Awaiting rehab authorization  -Continue to leave incision open to air  -Encourage use of IS hourly  -Up to chair TID  -Ambulate with assist  -Will order repeat head CT wo contrast to assess post-operative changes  -Continue DVT prophylaxis    Tania Arthur PA-C  Neurosurgery  [facility]

## 2020-08-05 NOTE — SUBJECTIVE & OBJECTIVE
Interval History: No acute events reported overnight.      Review of Systems   Constitutional: Negative for fatigue and fever.   HENT: Negative for sinus pressure.    Eyes: Negative for visual disturbance.   Respiratory: Negative for shortness of breath.    Cardiovascular: Negative for chest pain.   Gastrointestinal: Negative for nausea and vomiting.   Musculoskeletal: Negative for back pain.   Skin: Negative for rash.   Neurological: Negative for headaches.   Psychiatric/Behavioral: Negative for confusion.     Objective:     Vital Signs (Most Recent):  Temp: 98.2 °F (36.8 °C) (08/05/20 1558)  Pulse: 66 (08/05/20 1558)  Resp: 16 (08/05/20 1602)  BP: (!) 140/64 (08/05/20 1558)  SpO2: 97 % (08/05/20 1558) Vital Signs (24h Range):  Temp:  [97.4 °F (36.3 °C)-98.5 °F (36.9 °C)] 98.2 °F (36.8 °C)  Pulse:  [64-73] 66  Resp:  [16-20] 16  SpO2:  [97 %-99 %] 97 %  BP: (130-152)/(60-68) 140/64     Weight: 129.8 kg (286 lb 2.5 oz)  Body mass index is 41.06 kg/m².    Intake/Output Summary (Last 24 hours) at 8/5/2020 1621  Last data filed at 8/5/2020 0700  Gross per 24 hour   Intake 360 ml   Output 900 ml   Net -540 ml      Physical Exam  Constitutional:       General: She is not in acute distress.     Appearance: She is well-developed. She is obese. She is not diaphoretic.   HENT:      Head:      Comments: Swelling noted on head, sutures clean   Cardiovascular:      Rate and Rhythm: Normal rate and regular rhythm.      Heart sounds: Normal heart sounds. No murmur. No friction rub. No gallop.    Pulmonary:      Effort: Pulmonary effort is normal. No respiratory distress.      Breath sounds: Normal breath sounds. No stridor. No wheezing or rales.   Abdominal:      General: Bowel sounds are normal. There is no distension.      Palpations: Abdomen is soft. There is no mass.      Tenderness: There is no abdominal tenderness. There is no guarding.   Musculoskeletal:      Right lower leg: No edema.      Left lower leg: No edema.    Skin:     General: Skin is warm.      Findings: No erythema.   Neurological:      Mental Status: She is alert and oriented to person, place, and time.   Psychiatric:         Mood and Affect: Mood normal.         Behavior: Behavior normal.         Thought Content: Thought content normal.         Judgment: Judgment normal.         Significant Labs:   Recent Lab Results       08/05/20  1107   08/05/20  0731   08/05/20  0541   08/04/20  2159   08/04/20  1752        Albumin   2.9           Alkaline Phosphatase   166           ALT   51           Anion Gap   11           AST   25           Baso #   0.03           Basophil%   0.2           BILIRUBIN TOTAL   0.3  Comment:  For infants and newborns, interpretation of results should be based  on gestational age, weight and in agreement with clinical  observations.  Premature Infant recommended reference ranges:  Up to 24 hours.............<8.0 mg/dL  Up to 48 hours............<12.0 mg/dL  3-5 days..................<15.0 mg/dL  6-29 days.................<15.0 mg/dL             BUN, Bld   20           Calcium   7.1           Chloride   102           CO2   24           Creatinine   1.0           Differential Method   Automated           eGFR if    >60           eGFR if non    >60  Comment:  Calculation used to obtain the estimated glomerular filtration  rate (eGFR) is the CKD-EPI equation.              Eos #   0.1           Eosinophil%   0.8           Glucose   135           Gran # (ANC)   12.1           Gran%   71.2           Hematocrit   24.7           Hemoglobin   7.5           Immature Grans (Abs)   0.56  Comment:  Mild elevation in immature granulocytes is non specific and   can be seen in a variety of conditions including stress response,   acute inflammation, trauma and pregnancy. Correlation with other   laboratory and clinical findings is essential.             Immature Granulocytes   3.3           Lymph #   3.3           Lymph%   19.5            Magnesium   1.6           MCH   24.9           MCHC   30.4           MCV   82           Mono #   0.9           Mono%   5.0           MPV   12.1           nRBC   0           Platelets   225           POCT Glucose 153   141 104 191     Potassium   3.9           PROTEIN TOTAL   6.4           RBC   3.01           RDW   16.5           Sodium   137           WBC   17.05                              All pertinent labs within the past 24 hours have been reviewed.    Significant Imaging: I have reviewed all pertinent imaging results/findings within the past 24 hours.

## 2020-08-05 NOTE — PLAN OF CARE
PRN medication administered for pain. Head incision, CDI sutures intact. Neuro check q 4 hrs. Monitoring blood glucose per SSI. VSS. Afebrile. No acute adverse events noted. Pt remained free from falls this shift. Will continue to monitor. Chart reviewed.

## 2020-08-05 NOTE — PROGRESS NOTES
[facility] -   Hematology/Oncology  Progress Note    Patient Name: Cata Lang  Admission Date: 7/25/2020  Hospital Length of Stay: 10 days  Code Status: Full Code     Subjective:     HPI:  Mrs. Cata Lang is a 48 yo woman with history that is significant for goiter status post total thyroidectomy 5 years ago, hypertension, type 2 diabetes, seizure disorder, CKD stage 3, intracranial mass who presented to the emergency room with complaint of weakness headache nausea.  MRI revealed mass lesion within the anterior right cranial fossa suggestive of meningioma, there was also right to left midline shift of 6 mm.  Two foci of T2 hyperintense signal were noted in the left occipital lobe and thought to be related to old vascular insults.     Initial by chemical evaluation revealed serum calcium level of 14.2 mg/dL.  There was also evidence of acute kidney injury and normocytic anemia.  Given the above findings there was thought to rule out plasma cell dyscrasia.  Hematology was consulted.     Upon interview with patient and her , she stated that following the total thyroidectomy 5 years ago, she has been on 6000 units of daily calcium supplement.  She denies unintentional weight loss, night sweats, palpable lymph nodes or mass, abnormal bleed.    Recently had craniotomy on 7/31 with resection of right frontal mass. Pathology pending.       Interval History:  Patient reported to be participating with PT well.  Continues to exhibit left-sided weakness, but has improved.  Patient drowsy at time of visit, spouse reports she is awake during the night and more drowsy during the day.  Negative for acute events overnight.  Pathology pending.  Oncology Treatment Plan:   [No treatment plan]    Medications:  Continuous Infusions:  Scheduled Meds:   atorvastatin  10 mg Oral Daily    calcium carbonate  500 mg Oral QID    calcium gluconate IVPB  1,000 mg Intravenous Once    cholecalciferol (vitamin  D3)  5,000 Units Oral Daily    dexAMETHasone  2 mg Oral Daily    docusate sodium  100 mg Oral BID    enoxaparin  40 mg Subcutaneous Q24H    epoetin laxmi-ebpx (RETACRIT) injection  5,000 Units Subcutaneous Every Mon, Wed, Fri    ferrous sulfate  325 mg Oral BID    gabapentin  600 mg Oral Daily    hydrALAZINE  25 mg Oral Q8H    levETIRAcetam  1,500 mg Oral BID    levothyroxine  137 mcg Oral Before breakfast    pantoprazole  40 mg Oral Daily    polyethylene glycol  17 g Oral Daily     PRN Meds:acetaminophen, artificial tears, bisacodyL, dextrose 50%, dextrose 50%, glucagon (human recombinant), glucose, glucose, hydrALAZINE, HYDROcodone-acetaminophen, HYDROcodone-acetaminophen, insulin aspart U-100, morphine, ondansetron, polyethylene glycol     Review of Systems   Constitutional: Positive for appetite change and fatigue. Negative for chills, diaphoresis, fever and unexpected weight change.   HENT: Positive for ear pain (Left side-mild improvement) and facial swelling ( has improved). Negative for congestion, ear discharge (Left side), hearing loss, mouth sores, postnasal drip, rhinorrhea, sore throat and trouble swallowing.    Eyes: Negative for pain, discharge, redness and visual disturbance.   Respiratory: Negative for cough, chest tightness and shortness of breath.    Cardiovascular: Negative for chest pain, palpitations and leg swelling.   Gastrointestinal: Positive for abdominal distention. Negative for blood in stool, constipation, diarrhea, nausea and vomiting.   Endocrine: Negative for cold intolerance and heat intolerance.   Genitourinary: Negative for difficulty urinating, dyspareunia, flank pain and hematuria.   Musculoskeletal: Negative for arthralgias, back pain and myalgias.   Skin: Positive for wound.   Neurological: Positive for weakness. Negative for dizziness, light-headedness and headaches.   Hematological: Negative for adenopathy. Does not bruise/bleed easily.   Psychiatric/Behavioral:  Negative for agitation, behavioral problems and confusion. The patient is not nervous/anxious.      Objective:     Vital Signs (Most Recent):  Temp: 98 °F (36.7 °C) (08/05/20 0751)  Pulse: 71 (08/05/20 0751)  Resp: 16 (08/05/20 0751)  BP: 130/60 (08/05/20 0751)  SpO2: 97 % (08/05/20 0751) Vital Signs (24h Range):  Temp:  [97.4 °F (36.3 °C)-98.5 °F (36.9 °C)] 98 °F (36.7 °C)  Pulse:  [64-73] 71  Resp:  [16-20] 16  SpO2:  [97 %-99 %] 97 %  BP: (130-152)/(60-68) 130/60     Weight: 129.8 kg (286 lb 2.5 oz)  Body mass index is 41.06 kg/m².  Body surface area is 2.53 meters squared.      Intake/Output Summary (Last 24 hours) at 8/5/2020 1138  Last data filed at 8/5/2020 0700  Gross per 24 hour   Intake 820 ml   Output 900 ml   Net -80 ml       Physical Exam  Vitals signs and nursing note reviewed.   Constitutional:       General: She is not in acute distress.     Appearance: She is well-developed. She is ill-appearing.   HENT:      Head:        Right Ear: Hearing and external ear normal.      Left Ear: Hearing and external ear normal.      Nose: No rhinorrhea.      Right Sinus: No maxillary sinus tenderness or frontal sinus tenderness.      Left Sinus: No maxillary sinus tenderness or frontal sinus tenderness.      Mouth/Throat:      Mouth: No oral lesions.      Pharynx: Uvula midline.   Eyes:      General:         Right eye: No discharge.         Left eye: No discharge.      Pupils: Pupils are equal, round, and reactive to light.   Neck:      Musculoskeletal: Normal range of motion.      Thyroid: No thyromegaly.      Vascular: No carotid bruit.      Trachea: No tracheal deviation.   Cardiovascular:      Rate and Rhythm: Normal rate and regular rhythm.      Pulses:           Dorsalis pedis pulses are 2+ on the right side and 2+ on the left side.      Heart sounds: Normal heart sounds, S1 normal and S2 normal. No murmur.   Pulmonary:      Effort: Pulmonary effort is normal. No respiratory distress.      Breath sounds:  Normal breath sounds.   Abdominal:      General: Bowel sounds are decreased. There is distension.      Palpations: Abdomen is soft. There is no mass.      Tenderness: There is no abdominal tenderness.   Musculoskeletal: Normal range of motion.   Lymphadenopathy:      Cervical: No cervical adenopathy.      Upper Body:      Right upper body: No supraclavicular adenopathy.      Left upper body: No supraclavicular adenopathy.   Skin:     General: Skin is warm and dry.      Capillary Refill: Capillary refill takes less than 2 seconds.      Findings: No rash.   Neurological:      Mental Status: She is alert and oriented to person, place, and time.      Sensory: No sensory deficit.      Motor: Weakness (On left side) present.      Gait: Gait normal.   Psychiatric:         Mood and Affect: Mood is not anxious or depressed.         Speech: Speech normal.         Behavior: Behavior normal.         Thought Content: Thought content normal.         Judgment: Judgment normal.         Significant Labs:   CBC:   Recent Labs   Lab 08/04/20  0942 08/05/20  0731   WBC 16.52* 17.05*   HGB 7.4* 7.5*   HCT 24.3* 24.7*   * 225    and CMP:   Recent Labs   Lab 08/04/20  0942 08/05/20  0731    137   K 3.4* 3.9    102   CO2 21* 24   * 135*   BUN 27* 20   CREATININE 1.2 1.0   CALCIUM 6.7* 7.1*   PROT 6.3 6.4   ALBUMIN 2.9* 2.9*   BILITOT 0.2 0.3   ALKPHOS 147* 166*   AST 32 25   ALT 53* 51*   ANIONGAP 14 11   EGFRNONAA 52* >60       Diagnostic Results:  I have reviewed all pertinent imaging results/findings within the past 24 hours.    Assessment/Plan:     Thrombocytopenia  Patient had new onset thrombocytopenia, evaluated for HIT, antibody testing sent, pending results.  Review of platelet count today, has shown increase of platelet count from 112 K to 137K. Patient likely does not have HIT due to improvement of platelet count while still on Lovenox.  Will not change anticoagulation at this time.  Patient has been  receiving IV Venofer due to iron deficiency, thrombocytopenia was likely secondary to iron deficiency.    --continue Lovenox as ordered  --resolved    Hypocalcemia  Patient has history of having total thyroid removal 5 years ago, has taken 6000 mg of calcium daily since that time.  On admission patient noted to have hypercalcemia, this was treated per , following treatment patient became hypocalcemic.  This has improved.    --has improved       Iron deficiency anemia  Patient noted to have anemia, iron studies evaluated, significant iron deficiency noted in treatment began with IV Venofer.    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    --Continue on decadron for brain edema.  --Continue on keppra for anti-seizures prophylaxis.        Thank you for your consult. I will follow-up with patient. Please contact us if you have any additional questions.     Karen Jacques NP  Hematology/Oncology  [facility]

## 2020-08-05 NOTE — PLAN OF CARE
Patient currently requires CGA for bed mobility, min assist for transfers with RW, and mod assist for ambulation with RW 15 feet.

## 2020-08-06 VITALS
OXYGEN SATURATION: 96 % | DIASTOLIC BLOOD PRESSURE: 56 MMHG | TEMPERATURE: 98 F | SYSTOLIC BLOOD PRESSURE: 121 MMHG | HEART RATE: 77 BPM | HEIGHT: 70 IN | WEIGHT: 286.19 LBS | RESPIRATION RATE: 17 BRPM | BODY MASS INDEX: 40.97 KG/M2

## 2020-08-06 PROBLEM — G97.82 POSTPROCEDURAL PSEUDOMENINGOCELE: Status: ACTIVE | Noted: 2020-08-06

## 2020-08-06 PROBLEM — E83.52 HYPERCALCEMIA: Status: RESOLVED | Noted: 2020-07-26 | Resolved: 2020-08-06

## 2020-08-06 LAB
ALBUMIN SERPL BCP-MCNC: 2.8 G/DL (ref 3.5–5.2)
ALP SERPL-CCNC: 174 U/L (ref 55–135)
ALT SERPL W/O P-5'-P-CCNC: 45 U/L (ref 10–44)
ANION GAP SERPL CALC-SCNC: 11 MMOL/L (ref 8–16)
AST SERPL-CCNC: 20 U/L (ref 10–40)
BASOPHILS # BLD AUTO: 0.03 K/UL (ref 0–0.2)
BASOPHILS NFR BLD: 0.2 % (ref 0–1.9)
BILIRUB SERPL-MCNC: 0.3 MG/DL (ref 0.1–1)
BUN SERPL-MCNC: 16 MG/DL (ref 6–20)
CALCIUM SERPL-MCNC: 7.3 MG/DL (ref 8.7–10.5)
CHLORIDE SERPL-SCNC: 101 MMOL/L (ref 95–110)
CO2 SERPL-SCNC: 23 MMOL/L (ref 23–29)
CREAT SERPL-MCNC: 0.9 MG/DL (ref 0.5–1.4)
DACRYOCYTES BLD QL SMEAR: ABNORMAL
DIFFERENTIAL METHOD: ABNORMAL
EOSINOPHIL # BLD AUTO: 0.1 K/UL (ref 0–0.5)
EOSINOPHIL NFR BLD: 0.6 % (ref 0–8)
ERYTHROCYTE [DISTWIDTH] IN BLOOD BY AUTOMATED COUNT: 16.7 % (ref 11.5–14.5)
EST. GFR  (AFRICAN AMERICAN): >60 ML/MIN/1.73 M^2
EST. GFR  (NON AFRICAN AMERICAN): >60 ML/MIN/1.73 M^2
GLUCOSE SERPL-MCNC: 131 MG/DL (ref 70–110)
HCT VFR BLD AUTO: 25.1 % (ref 37–48.5)
HGB BLD-MCNC: 7.4 G/DL (ref 12–16)
IMM GRANULOCYTES # BLD AUTO: 0.73 K/UL (ref 0–0.04)
IMM GRANULOCYTES NFR BLD AUTO: 4.3 % (ref 0–0.5)
LYMPHOCYTES # BLD AUTO: 3.1 K/UL (ref 1–4.8)
LYMPHOCYTES NFR BLD: 18.3 % (ref 18–48)
MAGNESIUM SERPL-MCNC: 1.6 MG/DL (ref 1.6–2.6)
MCH RBC QN AUTO: 24.8 PG (ref 27–31)
MCHC RBC AUTO-ENTMCNC: 29.5 G/DL (ref 32–36)
MCV RBC AUTO: 84 FL (ref 82–98)
MONOCYTES # BLD AUTO: 0.9 K/UL (ref 0.3–1)
MONOCYTES NFR BLD: 5.1 % (ref 4–15)
NEUTROPHILS # BLD AUTO: 12 K/UL (ref 1.8–7.7)
NEUTROPHILS NFR BLD: 71.5 % (ref 38–73)
NRBC BLD-RTO: 0 /100 WBC
OVALOCYTES BLD QL SMEAR: ABNORMAL
PATHOLOGIST INTERPRETATION UPE: NORMAL
PLATELET # BLD AUTO: 273 K/UL (ref 150–350)
PMV BLD AUTO: 11.1 FL (ref 9.2–12.9)
POCT GLUCOSE: 112 MG/DL (ref 70–110)
POCT GLUCOSE: 182 MG/DL (ref 70–110)
POIKILOCYTOSIS BLD QL SMEAR: SLIGHT
POTASSIUM SERPL-SCNC: 3.9 MMOL/L (ref 3.5–5.1)
PROT PATTERN UR ELPH-IMP: NORMAL
PROT SERPL-MCNC: 6.2 G/DL (ref 6–8.4)
RBC # BLD AUTO: 2.98 M/UL (ref 4–5.4)
SODIUM SERPL-SCNC: 135 MMOL/L (ref 136–145)
WBC # BLD AUTO: 16.81 K/UL (ref 3.9–12.7)

## 2020-08-06 PROCEDURE — 63600175 PHARM REV CODE 636 W HCPCS: Performed by: FAMILY MEDICINE

## 2020-08-06 PROCEDURE — 25000003 PHARM REV CODE 250: Performed by: NURSE PRACTITIONER

## 2020-08-06 PROCEDURE — 99233 SBSQ HOSP IP/OBS HIGH 50: CPT | Mod: ,,, | Performed by: INTERNAL MEDICINE

## 2020-08-06 PROCEDURE — 25000003 PHARM REV CODE 250: Performed by: FAMILY MEDICINE

## 2020-08-06 PROCEDURE — 97110 THERAPEUTIC EXERCISES: CPT

## 2020-08-06 PROCEDURE — 97116 GAIT TRAINING THERAPY: CPT

## 2020-08-06 PROCEDURE — 99233 PR SUBSEQUENT HOSPITAL CARE,LEVL III: ICD-10-PCS | Mod: ,,, | Performed by: INTERNAL MEDICINE

## 2020-08-06 PROCEDURE — 25000003 PHARM REV CODE 250: Performed by: INTERNAL MEDICINE

## 2020-08-06 PROCEDURE — 97530 THERAPEUTIC ACTIVITIES: CPT

## 2020-08-06 PROCEDURE — 80053 COMPREHEN METABOLIC PANEL: CPT

## 2020-08-06 PROCEDURE — 36415 COLL VENOUS BLD VENIPUNCTURE: CPT

## 2020-08-06 PROCEDURE — 83735 ASSAY OF MAGNESIUM: CPT

## 2020-08-06 PROCEDURE — 85025 COMPLETE CBC W/AUTO DIFF WBC: CPT

## 2020-08-06 PROCEDURE — 25000003 PHARM REV CODE 250: Performed by: NEUROLOGICAL SURGERY

## 2020-08-06 PROCEDURE — 63600175 PHARM REV CODE 636 W HCPCS: Performed by: PHYSICIAN ASSISTANT

## 2020-08-06 PROCEDURE — 25000003 PHARM REV CODE 250: Performed by: PHYSICIAN ASSISTANT

## 2020-08-06 RX ORDER — CALCIUM CARBONATE 500(1250)
500 TABLET ORAL 4 TIMES DAILY
Refills: 0 | COMMUNITY
Start: 2020-08-06 | End: 2021-08-06

## 2020-08-06 RX ORDER — ZOLPIDEM TARTRATE 10 MG/1
10 TABLET ORAL NIGHTLY PRN
Qty: 30 TABLET | Refills: 0 | Status: SHIPPED | OUTPATIENT
Start: 2020-08-06

## 2020-08-06 RX ORDER — AMLODIPINE BESYLATE 5 MG/1
5 TABLET ORAL DAILY
Qty: 30 TABLET | Refills: 11 | Status: ON HOLD
Start: 2020-08-06 | End: 2021-02-12 | Stop reason: HOSPADM

## 2020-08-06 RX ORDER — NAPROXEN SODIUM 220 MG/1
81 TABLET, FILM COATED ORAL DAILY
Refills: 0
Start: 2020-08-06 | End: 2021-08-06

## 2020-08-06 RX ORDER — HYDROCODONE BITARTRATE AND ACETAMINOPHEN 5; 325 MG/1; MG/1
1 TABLET ORAL EVERY 4 HOURS PRN
Qty: 15 TABLET | Refills: 0 | Status: ON HOLD | OUTPATIENT
Start: 2020-08-06 | End: 2021-02-18 | Stop reason: SDUPTHER

## 2020-08-06 RX ORDER — FERROUS SULFATE 325(65) MG
325 TABLET, DELAYED RELEASE (ENTERIC COATED) ORAL 2 TIMES DAILY
Qty: 60 TABLET | Refills: 1
Start: 2020-08-06

## 2020-08-06 RX ORDER — ACETAMINOPHEN 500 MG
5000 TABLET ORAL DAILY
Qty: 30 TABLET | Refills: 1
Start: 2020-08-07

## 2020-08-06 RX ORDER — FONDAPARINUX SODIUM 2.5 MG/.5ML
2.5 INJECTION SUBCUTANEOUS DAILY
Qty: 14 ML | Refills: 0 | Status: ON HOLD
Start: 2020-08-06 | End: 2020-08-17

## 2020-08-06 RX ORDER — LEVOTHYROXINE SODIUM 137 UG/1
137 TABLET ORAL DAILY
Start: 2020-08-06

## 2020-08-06 RX ADMIN — DEXAMETHASONE 2 MG: 1 TABLET ORAL at 09:08

## 2020-08-06 RX ADMIN — DOCUSATE SODIUM 100 MG: 100 CAPSULE, LIQUID FILLED ORAL at 09:08

## 2020-08-06 RX ADMIN — CALCIUM 500 MG: 500 TABLET ORAL at 03:08

## 2020-08-06 RX ADMIN — ATORVASTATIN CALCIUM 10 MG: 10 TABLET, FILM COATED ORAL at 09:08

## 2020-08-06 RX ADMIN — CALCIUM 500 MG: 500 TABLET ORAL at 09:08

## 2020-08-06 RX ADMIN — CHOLECALCIFEROL TAB 125 MCG (5000 UNIT) 5000 UNITS: 125 TAB at 09:08

## 2020-08-06 RX ADMIN — POLYETHYLENE GLYCOL 3350 17 G: 17 POWDER, FOR SOLUTION ORAL at 09:08

## 2020-08-06 RX ADMIN — GABAPENTIN 600 MG: 300 CAPSULE ORAL at 09:08

## 2020-08-06 RX ADMIN — HYDROCODONE BITARTRATE AND ACETAMINOPHEN 1 TABLET: 10; 325 TABLET ORAL at 12:08

## 2020-08-06 RX ADMIN — MORPHINE SULFATE 2 MG: 2 INJECTION, SOLUTION INTRAMUSCULAR; INTRAVENOUS at 03:08

## 2020-08-06 RX ADMIN — HYDROCODONE BITARTRATE AND ACETAMINOPHEN 1 TABLET: 10; 325 TABLET ORAL at 06:08

## 2020-08-06 RX ADMIN — LEVETIRACETAM 1500 MG: 500 TABLET ORAL at 09:08

## 2020-08-06 RX ADMIN — LEVOTHYROXINE SODIUM 137 MCG: 25 TABLET ORAL at 06:08

## 2020-08-06 RX ADMIN — FERROUS SULFATE TAB EC 325 MG (65 MG FE EQUIVALENT) 325 MG: 325 (65 FE) TABLET DELAYED RESPONSE at 09:08

## 2020-08-06 RX ADMIN — HYDRALAZINE HYDROCHLORIDE 25 MG: 25 TABLET, FILM COATED ORAL at 06:08

## 2020-08-06 RX ADMIN — HYDRALAZINE HYDROCHLORIDE 25 MG: 25 TABLET, FILM COATED ORAL at 03:08

## 2020-08-06 RX ADMIN — PANTOPRAZOLE SODIUM 40 MG: 40 TABLET, DELAYED RELEASE ORAL at 09:08

## 2020-08-06 RX ADMIN — HYDROCODONE BITARTRATE AND ACETAMINOPHEN 1 TABLET: 10; 325 TABLET ORAL at 11:08

## 2020-08-06 NOTE — DISCHARGE SUMMARY
[facility] Hospital Medicine  Discharge Summary      Patient Name: Cata Lang  MRN: [id number]  Admission Date: 7/25/2020  Hospital Length of Stay: 11 days  Discharge Date and Time: 8/6/2020  4:20 PM  Attending Physician: No att. providers found   Discharging Provider: Abdoul Barragan MD  Primary Care Provider: Sabra Fregoso MD      HPI:    Cata Lang is a 49 y.o. female patient with a PMHx of brain mass, HTN, DM II, hypothyroidism, seizure disorder, and chronic HA who presents to the Emergency Department for evaluation of neurological problem which onset gradually today. Pt notes she has been walking worse since December and her HA is worse on the L side and rates it a moderate to severe HA. Symptoms are worsening and moderate to severe in severity. No mitigating or exacerbating factors reported. Associated sxs include dizziness, malaise, and difficulty walking. Patient denies any fever, SOB, CP, abd pain, N/V, back pain, weakness, and all other sxs at this time. Pt notes she takes several tums daily which was recommended by her ENT. Pt transferred from [place] and accepted by Dr. Gandara (Neurosurgery). Case discussed with Dr. Gandara in ED who reviewed CT of the head from transferring facility and feels there is no need for neurosurgical intervention at present.  In ED, patient's symptoms completely resolved with NIHSS of 0.  Labs showed: creatinine of 1.9, BUN 19, calcium 14.2. Patient is on calcitriol at home, started last year with no follow-up labs done. IV fluids x 1 liter and 20 mg IV Lasix given. Hospital Medicine was contacted to admit for hypercalcemia and SHARLENE.    Procedure(s) (LRB):  CRANIOTOMY, WITH 3D STEREOTACTIC IMAGING GUIDANCE, REAL-TIME (Right)  EXCISION, NEOPLASM (N/A)      Hospital Course:   Admitted for evaluation and treatment of hypercalcemia and brain mass with left sided weakness.    8/1: patient stable for transfer to floor. Hypercalcemia workup unremarkable  thus far. Will need to rule out multiple myeloma. Heme/onc on case. SPEP, UPEP, immunofixation ordered. CT head chest abd/pelvis with contrast recommended. Will need to hydrate patient and plan for CT once creatinine improved.   8/2: continue IVF. Multiple myeloma workup pending. Awaiting pathology results on intracranial mass. Awaiting CT chest abd pelvis with contrast once creatinine improves. SWAPNIL noted, venofer given once along with ferrous sulfate PO bid.   8/3: no acute events overnight. Patient has drop in plt. HIT antibody sent out. Heme/onc on case. CT chest abd and pelvis showed prominent mediastinal lymph nodes. Continue lovenox for now. Will plan to switch to different agent should plt continue to drop.   8/4: platelets improving. Not likely HIT. Cont lovenox. Calcium replaced. Rehab placement pending.   8/5: platelets improving. HIT panel positive. Discussed with heme/onc. Will dc lovenox. Plan to start arixtra if on formulary. Continue to replace electrolytes. Placement pending.   8/6: patient was discharged to rehab with recommendations for arixtra 2.5mg daily x 1 month for DVT prophylaxis. Follow up with neurosurgery as directed.      Consults:   Consults (From admission, onward)        Status Ordering Provider     Inpatient consult to Nephrology  Once     Provider:  Tr Blackwell MD    Completed MERRITT YOUSSEF     Inpatient consult to Neurosurgery  Once     Provider:  (Not yet assigned)    Completed MERRITT YOUSSEF     Inpatient consult to Pulmonology  Once     Provider:  STEVO Torres-BC    Completed JOSE JUAN BOLTON     Inpatient consult to Social Work  Once     Provider:  (Not yet assigned)    Completed VY GARDNER new Assessment & Plan notes have been filed under this hospital service since the last note was generated.  Service: Hospital Medicine    Final Active Diagnoses:    Diagnosis Date Noted POA    PRINCIPAL PROBLEM:  Calcified cerebral meningioma  [D32.0] 07/31/2020 Yes     Chronic    Postprocedural pseudomeningocele [G97.82] 08/06/2020 No    Thrombocytopenia [D69.6] 08/03/2020 No    Iron deficiency anemia [D50.9] 08/02/2020 No    Hypocalcemia [E83.51] 08/02/2020 Yes    Neoplasm of central nervous system [D49.7]  Yes    Anemia due to stage 3 chronic kidney disease [N18.3, D63.1]  Yes    Diabetes mellitus due to underlying condition with hyperglycemia, without long-term current use of insulin [E08.65] 07/31/2020 Yes    SHARLENE on CKD, stage III [N17.9] 07/26/2020 Yes    Essential hypertension [I10] 07/26/2020 Yes     Chronic      Problems Resolved During this Admission:    Diagnosis Date Noted Date Resolved POA    Hypercalcemia [E83.52] 07/26/2020 08/06/2020 Yes       Discharged Condition: good    Disposition: Rehab Facility    Follow Up:  Follow-up Information     Jose Hagen MD. Schedule an appointment as soon as possible for a visit in 2 weeks.    Specialty: Neurosurgery  Why: For wound re-check , For suture removal  Contact information:  [address]  [place] [id number] [phone]                 Patient Instructions:   No discharge procedures on file.    Significant Diagnostic Studies:     Pending Diagnostic Studies:     Procedure Component Value Units Date/Time    Hepatic function panel [[id number]]     Order Status: Sent Lab Status: No result     Specimen: Blood     Specimen to Pathology, Surgery Neurosurgery [[id number]] Collected: 07/31/20 0932    Order Status: Sent Lab Status: In process Updated: 07/31/20 1311         Medications:  Reconciled Home Medications:      Medication List      START taking these medications    amLODIPine 5 MG tablet  Commonly known as: NORVASC  Take 1 tablet (5 mg total) by mouth once daily.     aspirin 81 MG Chew  Take 1 tablet (81 mg total) by mouth once daily.  Replaces: aspirin 325 MG tablet     calcium carbonate 500 mg calcium (1,250 mg) tablet  Commonly known as: OS-THANIA  Take 1 tablet (500 mg  total) by mouth 4 (four) times daily.     cholecalciferol (vitamin D3) 125 mcg (5,000 unit) Tab  Take 1 tablet (5,000 Units total) by mouth once daily.  Start taking on: August 7, 2020     ferrous sulfate 325 (65 FE) MG EC tablet  Take 1 tablet (325 mg total) by mouth 2 (two) times daily.     fondaparinux 2.5 mg/0.5 mL Syrg  Commonly known as: ARIXTRA  Inject 0.5 mLs (2.5 mg total) into the skin once daily.     HYDROcodone-acetaminophen 5-325 mg per tablet  Commonly known as: NORCO  Take 1 tablet by mouth every 4 (four) hours as needed.        CHANGE how you take these medications    levothyroxine 137 MCG Tab tablet  Commonly known as: SYNTHROID  Take 1 tablet (137 mcg total) by mouth once daily.  What changed:   · how much to take  · how to take this  · when to take this     zolpidem 10 mg Tab  Commonly known as: AMBIEN  Take 1 tablet (10 mg total) by mouth nightly as needed.  What changed: how to take this        CONTINUE taking these medications    atorvastatin 10 MG tablet  Commonly known as: LIPITOR  Take 10 mg by mouth once daily.     calcitRIOL 0.25 MCG Cap  Commonly known as: ROCALTROL  0.25 mcg once daily.     gabapentin 600 MG tablet  Commonly known as: NEURONTIN  Take 600 mg by mouth once daily.     levETIRAcetam 500 MG Tab  Commonly known as: KEPPRA  Take 1,500 mg by mouth 2 (two) times daily.     metFORMIN 500 MG tablet  Commonly known as: GLUCOPHAGE  Take 500 mg by mouth 2 (two) times daily with meals.        STOP taking these medications    aspirin 325 MG tablet  Replaced by: aspirin 81 MG Chew     clopidogreL 75 mg tablet  Commonly known as: PLAVIX     losartan-hydrochlorothiazide 100-12.5 mg 100-12.5 mg Tab  Commonly known as: HYZAAR            Indwelling Lines/Drains at time of discharge:   Lines/Drains/Airways     None                 Time spent on the discharge of patient: 40 minutes  Patient was seen and examined on the date of discharge and determined to be suitable for discharge.         Abdoul  MD [place]  Department of Hospital Medicine  [facility]

## 2020-08-06 NOTE — PT/OT/SLP PROGRESS
Occupational Therapy   Treatment    Name: Cata Lang  MRN: [id number]  Admitting Diagnosis:  Calcified cerebral meningioma  6 Days Post-Op    Recommendations:     Discharge Recommendations: rehabilitation facility  Discharge Equipment Recommendations:  walker, rolling, bedside commode  Barriers to discharge:  None    Assessment:     Cata Lang is a 49 y.o. female with a medical diagnosis of Calcified cerebral meningioma.  She presents with DEBILITY AND GENERALIZED WEAKNESS. Performance deficits affecting function are weakness, gait instability, impaired endurance, impaired balance, impaired self care skills, impaired functional mobilty, decreased coordination.     Rehab Prognosis:  Good; patient would benefit from acute skilled OT services to address these deficits and reach maximum level of function.       Plan:     Patient to be seen 3 x/week to address the above listed problems via self-care/home management, therapeutic activities, therapeutic exercises  · Plan of Care Expires: 08/06/20  · Plan of Care Reviewed with: spouse    Subjective     Pain/Comfort:  · Pain Rating 1: 7/10  · Location - Side 1: Bilateral  · Location - Orientation 1: lower  · Location 1: head    Objective:     Communicated with: NURSE AND Epic CHART REVIEW prior to session.  Patient found SEATED IN CHAIR with [employer] upon OT entry to room.    General Precautions: Standard, fall   Orthopedic Precautions:N/A   Braces: N/A     Occupational Performance:   Functional Mobility/Transfers:  · Patient completed Sit <> Stand Transfer with contact guard assistance  with  rolling walker   · Patient completed Bed <> Chair Transfer using Step Transfer technique with minimum assistance with rolling walker  Functional Mobility: PT AMBULATED 1 X 20 FEET, 1 X 15 FEET 1X 35 FEET X 3 SEATED REST BREAKS  Activities of Daily Living:  · Upper Body Dressing: minimum assistance hospitals 6 Click ADL: 17    Treatment & Education:  PT  PERFORMED 1 SET X 15 REPS B UE ROM EXERCISE IN ALL AVAILABLE PLANES AND RANGES SEATED IN BED SIDE CHAIR. NURSE NOTIFIED OF NO BLACK CORD IN ROOM    Patient left up in chair with all lines intact, call button in reach, NURSE notified and SPOUSE presentEducation:      GOALS:   Multidisciplinary Problems     Occupational Therapy Goals        Problem: Occupational Therapy Goal    Goal Priority Disciplines Outcome Interventions   Occupational Therapy Goal     OT, PT/OT Ongoing, Progressing    Description: OT goals to be met by 8-5-20  Min a with ue dressing  Min a with le dressing  Pt will tolerate 1 set x 10 reps b ue rom exercise   Mod a with bsc t/f's with ae.                     Time Tracking:     OT Date of Treatment: 08/06/20  OT Start Time: 0905  OT Stop Time: 0930  OT Total Time (min): 25 min    Billable Minutes:Evaluation 10 MINUTES  Self Care/Home Management 15 MINUTES    Inna Ornelas OT  8/6/2020

## 2020-08-06 NOTE — PROGRESS NOTES
[facility]   Neurosurgery  Progress Note    Subjective:     Interval History: POD6 right frontal craniotomy for resection of brain mass.  The patient continues to progress through the spectrum of care.  She was recently diagnosed with HIT which is being treated by medicine team.  Neurologically she continues to improve.  She remains alert and oriented x3.  CN II-XII are intact.  Left sided weakness continues to improve.  She is up to chair tolerating a regular diet.  CSF collection under the incision persists.    History of Present Illness: Refer to [facility]    Post-Op Info:  Procedure(s) (LRB):  CRANIOTOMY, WITH 3D STEREOTACTIC IMAGING GUIDANCE, REAL-TIME (Right)  EXCISION, NEOPLASM (N/A)   6 Days Post-Op      Medications:  Continuous Infusions:  Scheduled Meds:   atorvastatin  10 mg Oral Daily    calcium carbonate  500 mg Oral QID    cholecalciferol (vitamin D3)  5,000 Units Oral Daily    dexAMETHasone  2 mg Oral Daily    docusate sodium  100 mg Oral BID    epoetin laxmi-ebpx (RETACRIT) injection  5,000 Units Subcutaneous Every Mon, Wed, Fri    ferrous sulfate  325 mg Oral BID    gabapentin  600 mg Oral Daily    hydrALAZINE  25 mg Oral Q8H    levETIRAcetam  1,500 mg Oral BID    levothyroxine  137 mcg Oral Before breakfast    pantoprazole  40 mg Oral Daily    polyethylene glycol  17 g Oral Daily     PRN Meds:acetaminophen, artificial tears, bisacodyL, dextrose 50%, dextrose 50%, glucagon (human recombinant), glucose, glucose, hydrALAZINE, HYDROcodone-acetaminophen, HYDROcodone-acetaminophen, insulin aspart U-100, morphine, ondansetron, polyethylene glycol     Review of Systems  Objective:     Weight: 129.8 kg (286 lb 2.5 oz)  Body mass index is 41.06 kg/m².  Vital Signs (Most Recent):  Temp: 98.3 °F (36.8 °C) (08/06/20 0730)  Pulse: 77 (08/06/20 0730)  Resp: 17 (08/06/20 0730)  BP: (!) 121/56 (08/06/20 0730)  SpO2: 96 % (08/06/20 0730) Vital Signs (24h Range):  Temp:  [97.3 °F (36.3 °C)-98.3  °F (36.8 °C)] 98.3 °F (36.8 °C)  Pulse:  [65-78] 77  Resp:  [16-20] 17  SpO2:  [96 %-98 %] 96 %  BP: (121-177)/(56-76) 121/56     Date 08/06/20 0700 - 08/07/20 0659   Shift 8654-9471 9735-0492 1849-8658 24 Hour Total   INTAKE   P.O. 240   240   Shift Total(mL/kg) 240(1.8)   240(1.8)   OUTPUT   Shift Total(mL/kg)       Weight (kg) 129.8 129.8 129.8 129.8                        Neurosurgery Physical Exam    Significant Labs:  Recent Labs   Lab 08/04/20  0942 08/05/20  0731 08/06/20  0742   * 135* 131*    137 135*   K 3.4* 3.9 3.9    102 101   CO2 21* 24 23   BUN 27* 20 16   CREATININE 1.2 1.0 0.9   CALCIUM 6.7* 7.1* 7.3*   MG 1.7 1.6 1.6     Recent Labs   Lab 08/04/20  0942 08/05/20  0731 08/06/20  0742   WBC 16.52* 17.05* 16.81*   HGB 7.4* 7.5* 7.4*   HCT 24.3* 24.7* 25.1*   * 225 273     Recent Labs   Lab 08/05/20 2008   APTT 27.5     Microbiology Results (last 7 days)     ** No results found for the last 168 hours. **        All pertinent labs from the last 24 hours have been reviewed.  Significant Diagnostics:  CT: Ct Head Without Contrast    Result Date: 8/5/2020  Evolving postsurgical changes of a right frontal craniotomy for right frontal lobe mass lesion resection.  Continued mixed density collection within the resection cavity with decreasing pneumocephalus.  No new intracranial hemorrhage or new parenchymal hypodensity. Interval appearance of a large, fluid density subcutaneous collection overlying the craniotomy and extending across the midline concerning for large pseudomeningocele formation. All CT scans at this facility use dose modulation, iterative reconstruction, and/or weight base dosing when appropriate to reduce radiation dose to as low as reasonably achievable. Electronically signed by: Kingsley Churchill Date:    08/05/2020 Time:      16:01    Patient's imaging studies continue to show post-surgical improvement.  There is a CSF collection noted under the  scalp.    Assessment/Plan:     Active Diagnoses:    Diagnosis Date Noted POA    PRINCIPAL PROBLEM:  Calcified cerebral meningioma [D32.0] 07/31/2020 Yes     Chronic    Thrombocytopenia [D69.6] 08/03/2020 No    Iron deficiency anemia [D50.9] 08/02/2020 No    Hypocalcemia [E83.51] 08/02/2020 Yes    Neoplasm of central nervous system [D49.7]  Yes    Anemia due to stage 3 chronic kidney disease [N18.3, D63.1]  Yes    Diabetes mellitus due to underlying condition with hyperglycemia, without long-term current use of insulin [E08.65] 07/31/2020 Yes    Hypercalcemia [E83.52] 07/26/2020 Yes    SHARLENE on CKD, stage III [N17.9] 07/26/2020 Yes    Essential hypertension [I10] 07/26/2020 Yes     Chronic      Problems Resolved During this Admission:     -Continue care with medicine team  -LP tray ordered.  Will perform drainage of CSF collection at bedside after lunch.  This has been communicated to the patient and the nurse  -Up to chair TID  -Encourage use of IS  -Ambulate with assist  -DVT prophylaxis managed by medicine team    Addendum:  Patient has modest frontal pseudomeningocele, likely related to dural defect associated with meningioma.  We will aspirate pseudomeningocele and place head wrap today.  Discussed indications for and risks of procedure with patient and  who understand fully and wished to proceed.    Jose Arthur PA-C  Neurosurgery  [facility]

## 2020-08-06 NOTE — PROGRESS NOTES
[facility] -   Hematology/Oncology  Progress Note    Patient Name: Cata Lang  Admission Date: 7/25/2020  Hospital Length of Stay: 11 days  Code Status: Full Code     Subjective:     HPI:  Mrs. Cata Lang is a 50 yo woman with history that is significant for goiter status post total thyroidectomy 5 years ago, hypertension, type 2 diabetes, seizure disorder, CKD stage 3, intracranial mass who presented to the emergency room with complaint of weakness headache nausea.  MRI revealed mass lesion within the anterior right cranial fossa suggestive of meningioma, there was also right to left midline shift of 6 mm.  Two foci of T2 hyperintense signal were noted in the left occipital lobe and thought to be related to old vascular insults.     Initial by chemical evaluation revealed serum calcium level of 14.2 mg/dL.  There was also evidence of acute kidney injury and normocytic anemia.  Given the above findings there was thought to rule out plasma cell dyscrasia.  Hematology was consulted.     Upon interview with patient and her , she stated that following the total thyroidectomy 5 years ago, she has been on 6000 units of daily calcium supplement.  She denies unintentional weight loss, night sweats, palpable lymph nodes or mass, abnormal bleed.    Recently had craniotomy on 7/31 with resection of right frontal mass. Pathology pending.       Interval History:  Plan is for discharge today to rehab hospital in [place].  Pathology is still pending.    Oncology Treatment Plan:   [No treatment plan]    Medications:  Continuous Infusions:  Scheduled Meds:   atorvastatin  10 mg Oral Daily    calcium carbonate  500 mg Oral QID    cholecalciferol (vitamin D3)  5,000 Units Oral Daily    dexAMETHasone  2 mg Oral Daily    docusate sodium  100 mg Oral BID    epoetin laxmi-ebpx (RETACRIT) injection  5,000 Units Subcutaneous Every Mon, Wed, Fri    ferrous sulfate  325 mg Oral BID    gabapentin  600  mg Oral Daily    hydrALAZINE  25 mg Oral Q8H    levETIRAcetam  1,500 mg Oral BID    levothyroxine  137 mcg Oral Before breakfast    pantoprazole  40 mg Oral Daily    polyethylene glycol  17 g Oral Daily     PRN Meds:acetaminophen, artificial tears, bisacodyL, dextrose 50%, dextrose 50%, glucagon (human recombinant), glucose, glucose, hydrALAZINE, HYDROcodone-acetaminophen, HYDROcodone-acetaminophen, insulin aspart U-100, morphine, ondansetron, polyethylene glycol     Review of Systems   Constitutional: Positive for appetite change and fatigue. Negative for chills, diaphoresis, fever and unexpected weight change.   HENT: Positive for facial swelling ( has improved). Negative for congestion, ear discharge (Left side), ear pain (Left side-mild improvement), hearing loss, mouth sores, postnasal drip, rhinorrhea, sore throat and trouble swallowing.    Eyes: Negative for pain, discharge, redness and visual disturbance.   Respiratory: Negative for cough, chest tightness and shortness of breath.    Cardiovascular: Negative for chest pain, palpitations and leg swelling.   Gastrointestinal: Positive for abdominal distention. Negative for blood in stool, constipation, diarrhea, nausea and vomiting.   Endocrine: Negative for cold intolerance and heat intolerance.   Genitourinary: Negative for difficulty urinating, dyspareunia, flank pain and hematuria.   Musculoskeletal: Negative for arthralgias, back pain and myalgias.   Skin: Positive for wound.   Neurological: Positive for weakness. Negative for dizziness, light-headedness and headaches.   Hematological: Negative for adenopathy. Does not bruise/bleed easily.   Psychiatric/Behavioral: Negative for agitation, behavioral problems and confusion. The patient is not nervous/anxious.      Objective:     Vital Signs (Most Recent):  Temp: 98.3 °F (36.8 °C) (08/06/20 0730)  Pulse: 77 (08/06/20 0730)  Resp: 17 (08/06/20 0730)  BP: (!) 121/56 (08/06/20 0730)  SpO2: 96 % (08/06/20  0730) Vital Signs (24h Range):  Temp:  [97.3 °F (36.3 °C)-98.3 °F (36.8 °C)] 98.3 °F (36.8 °C)  Pulse:  [65-78] 77  Resp:  [16-20] 17  SpO2:  [96 %-98 %] 96 %  BP: (121-177)/(56-76) 121/56     Weight: 129.8 kg (286 lb 2.5 oz)  Body mass index is 41.06 kg/m².  Body surface area is 2.53 meters squared.      Intake/Output Summary (Last 24 hours) at 8/6/2020 1109  Last data filed at 8/6/2020 0800  Gross per 24 hour   Intake 480 ml   Output --   Net 480 ml       Physical Exam  Vitals signs and nursing note reviewed.   Constitutional:       General: She is not in acute distress.     Appearance: She is well-developed. She is ill-appearing.   HENT:      Head:        Right Ear: Hearing and external ear normal.      Left Ear: Hearing and external ear normal.      Nose: No rhinorrhea.      Right Sinus: No maxillary sinus tenderness or frontal sinus tenderness.      Left Sinus: No maxillary sinus tenderness or frontal sinus tenderness.      Mouth/Throat:      Mouth: No oral lesions.      Pharynx: Uvula midline.   Eyes:      General:         Right eye: No discharge.         Left eye: No discharge.      Pupils: Pupils are equal, round, and reactive to light.   Neck:      Musculoskeletal: Normal range of motion.      Thyroid: No thyromegaly.      Vascular: No carotid bruit.      Trachea: No tracheal deviation.   Cardiovascular:      Rate and Rhythm: Normal rate and regular rhythm.      Pulses:           Dorsalis pedis pulses are 2+ on the right side and 2+ on the left side.      Heart sounds: Normal heart sounds, S1 normal and S2 normal. No murmur.   Pulmonary:      Effort: Pulmonary effort is normal. No respiratory distress.      Breath sounds: Normal breath sounds.   Abdominal:      General: Bowel sounds are decreased. There is distension.      Palpations: Abdomen is soft. There is no mass.      Tenderness: There is no abdominal tenderness.   Musculoskeletal: Normal range of motion.   Lymphadenopathy:      Cervical: No cervical  adenopathy.      Upper Body:      Right upper body: No supraclavicular adenopathy.      Left upper body: No supraclavicular adenopathy.   Skin:     General: Skin is warm and dry.      Capillary Refill: Capillary refill takes less than 2 seconds.      Findings: No rash.   Neurological:      Mental Status: She is alert and oriented to person, place, and time.      Sensory: No sensory deficit.      Motor: Weakness (On left side) present.      Gait: Gait normal.   Psychiatric:         Mood and Affect: Mood is not anxious or depressed.         Speech: Speech normal.         Behavior: Behavior normal.         Thought Content: Thought content normal.         Judgment: Judgment normal.         Significant Labs:   CBC:   Recent Labs   Lab 08/05/20 0731 08/06/20  0742   WBC 17.05* 16.81*   HGB 7.5* 7.4*   HCT 24.7* 25.1*    273    and CMP:   Recent Labs   Lab 08/05/20 0731 08/05/20 2008 08/06/20  0742     --  135*   K 3.9  --  3.9     --  101   CO2 24  --  23   *  --  131*   BUN 20  --  16   CREATININE 1.0  --  0.9   CALCIUM 7.1*  --  7.3*   PROT 6.4 6.7 6.2   ALBUMIN 2.9* 3.0* 2.8*   BILITOT 0.3 0.3 0.3   ALKPHOS 166* 181* 174*   AST 25 22 20   ALT 51* 51* 45*   ANIONGAP 11  --  11   EGFRNONAA >60  --  >60       Diagnostic Results:  I have reviewed all pertinent imaging results/findings within the past 24 hours.    Assessment/Plan:     Thrombocytopenia  Patient had new onset thrombocytopenia, evaluated for HIT, antibody testing sent, pending results.  Review of platelet count today, has shown increase of platelet count from 112 K to 137K. Patient likely does not have HIT due to improvement of platelet count while still on Lovenox.  Will not change anticoagulation at this time.  Patient has been receiving IV Venofer due to iron deficiency, thrombocytopenia was likely secondary to iron deficiency.    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  Updated allergy list to add heparin.  Extensive  discussion was spouse for new allergy to heparin and to avoid all heparin medications in the future.  --plan is to begin Arixtra.  --thrombocytopenia resolved    Hypocalcemia  Patient has history of having total thyroid removal 5 years ago, has taken 6000 mg of calcium daily since that time.  On admission patient noted to have hypercalcemia, this was treated per , following treatment patient became hypocalcemic.  This has improved.    --has improved       Iron deficiency anemia  Patient noted to have anemia, iron studies evaluated, significant iron deficiency noted in treatment began with IV Venofer.    -- iv venofer completed today.  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    --Continue on decadron for brain edema.  --Continue on keppra for anti-seizures prophylaxis.        Thank you for your consult. I will follow-up with patient. Please contact us if you have any additional questions.     Karen Jacques NP  Hematology/Oncology  [facility]

## 2020-08-06 NOTE — PLAN OF CARE
Spoke with Genny, Admissions at [facility] discussed patient discharging after her procedure today. Faxed neurosurgery progress note. She stated she will have her physician review the note and call me back. She also stated that patient's spouse requested that he provide transportation and that her facility indicated that transporting patient would be [facility] decision. Secure message sent to provider.      Also discussed patient to discharge on Arixtra 2.5 mg / 0.5 ml  Sq daily. Genny to check with her pharmacy and call me back with confirmation.        08/06/20 1041   Post-Acute Status   Post-Acute Authorization Placement   Discharge Delays (!) Other

## 2020-08-06 NOTE — SUBJECTIVE & OBJECTIVE
Interval History:  Plan is for discharge today to [facility] in [place].  Pathology is still pending.    Oncology Treatment Plan:   [No treatment plan]    Medications:  Continuous Infusions:  Scheduled Meds:   atorvastatin  10 mg Oral Daily    calcium carbonate  500 mg Oral QID    cholecalciferol (vitamin D3)  5,000 Units Oral Daily    dexAMETHasone  2 mg Oral Daily    docusate sodium  100 mg Oral BID    epoetin laxmi-ebpx (RETACRIT) injection  5,000 Units Subcutaneous Every Mon, Wed, Fri    ferrous sulfate  325 mg Oral BID    gabapentin  600 mg Oral Daily    hydrALAZINE  25 mg Oral Q8H    levETIRAcetam  1,500 mg Oral BID    levothyroxine  137 mcg Oral Before breakfast    pantoprazole  40 mg Oral Daily    polyethylene glycol  17 g Oral Daily     PRN Meds:acetaminophen, artificial tears, bisacodyL, dextrose 50%, dextrose 50%, glucagon (human recombinant), glucose, glucose, hydrALAZINE, HYDROcodone-acetaminophen, HYDROcodone-acetaminophen, insulin aspart U-100, morphine, ondansetron, polyethylene glycol     Review of Systems   Constitutional: Positive for appetite change and fatigue. Negative for chills, diaphoresis, fever and unexpected weight change.   HENT: Positive for facial swelling ( has improved). Negative for congestion, ear discharge (Left side), ear pain (Left side-mild improvement), hearing loss, mouth sores, postnasal drip, rhinorrhea, sore throat and trouble swallowing.    Eyes: Negative for pain, discharge, redness and visual disturbance.   Respiratory: Negative for cough, chest tightness and shortness of breath.    Cardiovascular: Negative for chest pain, palpitations and leg swelling.   Gastrointestinal: Positive for abdominal distention. Negative for blood in stool, constipation, diarrhea, nausea and vomiting.   Endocrine: Negative for cold intolerance and heat intolerance.   Genitourinary: Negative for difficulty urinating, dyspareunia, flank pain and hematuria.   Musculoskeletal:  Negative for arthralgias, back pain and myalgias.   Skin: Positive for wound.   Neurological: Positive for weakness. Negative for dizziness, light-headedness and headaches.   Hematological: Negative for adenopathy. Does not bruise/bleed easily.   Psychiatric/Behavioral: Negative for agitation, behavioral problems and confusion. The patient is not nervous/anxious.      Objective:     Vital Signs (Most Recent):  Temp: 98.3 °F (36.8 °C) (08/06/20 0730)  Pulse: 77 (08/06/20 0730)  Resp: 17 (08/06/20 0730)  BP: (!) 121/56 (08/06/20 0730)  SpO2: 96 % (08/06/20 0730) Vital Signs (24h Range):  Temp:  [97.3 °F (36.3 °C)-98.3 °F (36.8 °C)] 98.3 °F (36.8 °C)  Pulse:  [65-78] 77  Resp:  [16-20] 17  SpO2:  [96 %-98 %] 96 %  BP: (121-177)/(56-76) 121/56     Weight: 129.8 kg (286 lb 2.5 oz)  Body mass index is 41.06 kg/m².  Body surface area is 2.53 meters squared.      Intake/Output Summary (Last 24 hours) at 8/6/2020 1109  Last data filed at 8/6/2020 0800  Gross per 24 hour   Intake 480 ml   Output --   Net 480 ml       Physical Exam  Vitals signs and nursing note reviewed.   Constitutional:       General: She is not in acute distress.     Appearance: She is well-developed. She is ill-appearing.   HENT:      Head:        Right Ear: Hearing and external ear normal.      Left Ear: Hearing and external ear normal.      Nose: No rhinorrhea.      Right Sinus: No maxillary sinus tenderness or frontal sinus tenderness.      Left Sinus: No maxillary sinus tenderness or frontal sinus tenderness.      Mouth/Throat:      Mouth: No oral lesions.      Pharynx: Uvula midline.   Eyes:      General:         Right eye: No discharge.         Left eye: No discharge.      Pupils: Pupils are equal, round, and reactive to light.   Neck:      Musculoskeletal: Normal range of motion.      Thyroid: No thyromegaly.      Vascular: No carotid bruit.      Trachea: No tracheal deviation.   Cardiovascular:      Rate and Rhythm: Normal rate and regular rhythm.       Pulses:           Dorsalis pedis pulses are 2+ on the right side and 2+ on the left side.      Heart sounds: Normal heart sounds, S1 normal and S2 normal. No murmur.   Pulmonary:      Effort: Pulmonary effort is normal. No respiratory distress.      Breath sounds: Normal breath sounds.   Abdominal:      General: Bowel sounds are decreased. There is distension.      Palpations: Abdomen is soft. There is no mass.      Tenderness: There is no abdominal tenderness.   Musculoskeletal: Normal range of motion.   Lymphadenopathy:      Cervical: No cervical adenopathy.      Upper Body:      Right upper body: No supraclavicular adenopathy.      Left upper body: No supraclavicular adenopathy.   Skin:     General: Skin is warm and dry.      Capillary Refill: Capillary refill takes less than 2 seconds.      Findings: No rash.   Neurological:      Mental Status: She is alert and oriented to person, place, and time.      Sensory: No sensory deficit.      Motor: Weakness (On left side) present.      Gait: Gait normal.   Psychiatric:         Mood and Affect: Mood is not anxious or depressed.         Speech: Speech normal.         Behavior: Behavior normal.         Thought Content: Thought content normal.         Judgment: Judgment normal.         Significant Labs:   CBC:   Recent Labs   Lab 08/05/20  0731 08/06/20  0742   WBC 17.05* 16.81*   HGB 7.5* 7.4*   HCT 24.7* 25.1*    273    and CMP:   Recent Labs   Lab 08/05/20  0731 08/05/20 2008 08/06/20  0742     --  135*   K 3.9  --  3.9     --  101   CO2 24  --  23   *  --  131*   BUN 20  --  16   CREATININE 1.0  --  0.9   CALCIUM 7.1*  --  7.3*   PROT 6.4 6.7 6.2   ALBUMIN 2.9* 3.0* 2.8*   BILITOT 0.3 0.3 0.3   ALKPHOS 166* 181* 174*   AST 25 22 20   ALT 51* 51* 45*   ANIONGAP 11  --  11   EGFRNONAA >60  --  >60       Diagnostic Results:  I have reviewed all pertinent imaging results/findings within the past 24 hours.

## 2020-08-06 NOTE — PLAN OF CARE
Incision to right head, sutures intact, open to air. Facial edema noted.  Neuro checks Q4hrs. Accu checks AC&HS. Dressing to right buttocks/hip area C/D/I. Ambulated via walker with PT/OT assist. Remains free from incident/injury. PRN pain meds adm as needed. Call light in reach.

## 2020-08-06 NOTE — PLAN OF CARE
Patient to discharge to [facility] on Thursday 8/6 08/05/20 1500   Discharge Reassessment   Assessment Type Discharge Planning Reassessment   Do you have any problems affording any of your prescribed medications? No   Discharge Plan A Rehab   Discharge Plan B Rehab   DME Needed Upon Discharge  none   Patient choice form signed by patient/caregiver N/A   Anticipated Discharge Disposition Rehab   Can the patient/caregiver answer the patient profile reliably? Yes, cognitively intact   How does the patient rate their overall health at the present time? Fair   Describe the patient's ability to walk at the present time. Major restrictions/daily assistance from another person   How often would a person be available to care for the patient? Whenever needed

## 2020-08-06 NOTE — PLAN OF CARE
Problem: Adult Inpatient Plan of Care  Goal: Plan of Care Review  Outcome: Ongoing, Progressing     POC reviewed, including indications and possible side effects of administered medications. Patient verbalized understanding and teach back. No adverse reactions noted. Patient c/o headache. Administered medications per order. Incision remains CDI. Monitoring CBG's. Wound care performed. SCD's in use. VSS. NADN. Patient remains free of falls and injuries during shift. Will continue to monitor.    Chart check complete.

## 2020-08-06 NOTE — PLAN OF CARE
Discharged orders and med rec noted. Faxed appropriate paperwork to [facility] @ [phone] ( [place] , Admissions ) Discharge packet given to patient's spouse . Permission was given to patient's spouse by [facility] and Carlos Royal at [facility] to provide transportation. # for report given to [place] Nurse .      Disposition: [facility]  Transportation provided by family.    Report: [phone]     08/06/20 1448   Post-Acute Status   Post-Acute Authorization Placement   Post-Acute Placement Status Set-up Complete   Discharge Delays (!) Other

## 2020-08-06 NOTE — SUBJECTIVE & OBJECTIVE
Interval History:     7/31/20: s/p brain tumor surgery today. Creatinine has improved to 1.7. Ca at 7.9 today (albumin 3.1).    8/1/20: patient is resting comfortably, no complaints at present.     8/2/20: Calcium has declined to 6.7 (albumin 3.1). Creatinine at 1.5. Patient resting in chair, no complaints at present.     8/3/20: Creatinine has declined to 1.3. Calcium at 7 (albumin 3).     8/4/20: Calcium at 6.7 (albumin 2.9). K at 3.4, creatinine at 1.2.   Patient still very fatigued.     8/5/20: Calcium at 7.1 today. Creatinine has improved to 1.     8/6/20: Morning labs are pending.           Review of patient's allergies indicates:   Allergen Reactions    Heparin analogues Other (See Comments)     DANYELLE     Hydrochlorothiazide      hypercalcemia     Current Facility-Administered Medications   Medication Frequency    acetaminophen tablet 650 mg Q6H PRN    artificial tears 0.5 % ophthalmic solution 1 drop PRN    atorvastatin tablet 10 mg Daily    bisacodyL EC tablet 5 mg Daily PRN    calcium carbonate (OS-THANIA) tablet 500 mg QID    cholecalciferol (vitamin D3) 125 mcg (5,000 unit) tablet 5,000 Units Daily    dexAMETHasone tablet 2 mg Daily    dextrose 50% injection 12.5 g PRN    dextrose 50% injection 25 g PRN    docusate sodium capsule 100 mg BID    epoetin laxmi-epbx injection 5,000 Units Every Mon, Wed, Fri    ferrous sulfate EC tablet 325 mg BID    gabapentin capsule 600 mg Daily    glucagon (human recombinant) injection 1 mg PRN    glucose chewable tablet 16 g PRN    glucose chewable tablet 24 g PRN    hydrALAZINE injection 10 mg Q8H PRN    hydrALAZINE tablet 25 mg Q8H    HYDROcodone-acetaminophen  mg per tablet 1 tablet Q4H PRN    HYDROcodone-acetaminophen 5-325 mg per tablet 1 tablet Q4H PRN    insulin aspart U-100 pen 1-10 Units QID (AC + HS) PRN    levETIRAcetam tablet 1,500 mg BID    levothyroxine tablet 137 mcg Before breakfast    morphine injection 2 mg Q3H PRN     ondansetron injection 4 mg Q6H PRN    pantoprazole EC tablet 40 mg Daily    polyethylene glycol packet 17 g BID PRN    polyethylene glycol packet 17 g Daily       Objective:     Vital Signs (Most Recent):  Temp: 98.3 °F (36.8 °C) (08/06/20 0730)  Pulse: 77 (08/06/20 0730)  Resp: 17 (08/06/20 0730)  BP: (!) 121/56 (08/06/20 0730)  SpO2: 96 % (08/06/20 0730)  O2 Device (Oxygen Therapy): room air (08/06/20 0730) Vital Signs (24h Range):  Temp:  [97.3 °F (36.3 °C)-98.3 °F (36.8 °C)] 98.3 °F (36.8 °C)  Pulse:  [65-78] 77  Resp:  [16-20] 17  SpO2:  [96 %-98 %] 96 %  BP: (121-177)/(56-76) 121/56     Weight: 129.8 kg (286 lb 2.5 oz) (07/26/20 0435)  Body mass index is 41.06 kg/m².  Body surface area is 2.53 meters squared.    I/O last 3 completed shifts:  In: 240 [P.O.:240]  Out: 900 [Urine:900]    Physical Exam  Constitutional:       Appearance: She is well-developed.   HENT:      Head: Normocephalic.      Comments: S/p brain surgery with forehead scar.   Eyes:      Pupils: Pupils are equal, round, and reactive to light.   Neck:      Thyroid: No thyromegaly.   Cardiovascular:      Rate and Rhythm: Normal rate and regular rhythm.      Heart sounds: No friction rub.   Pulmonary:      Effort: Pulmonary effort is normal.      Breath sounds: Normal breath sounds. No wheezing.   Chest:      Chest wall: No tenderness.   Abdominal:      General: Bowel sounds are normal. There is no distension.      Palpations: Abdomen is soft.      Tenderness: There is no abdominal tenderness.   Musculoskeletal:      Comments: Trace LE edema.    Lymphadenopathy:      Cervical: No cervical adenopathy.   Skin:     General: Skin is warm and dry.      Findings: No rash.   Neurological:      Mental Status: She is alert and oriented to person, place, and time.         Significant Labs:  Lab Results   Component Value Date    CREATININE 1.0 08/05/2020    BUN 20 08/05/2020     08/05/2020    K 3.9 08/05/2020     08/05/2020    CO2 24  08/05/2020     Lab Results   Component Value Date    PTH 23.4 08/02/2020    CALCIUM 7.1 (L) 08/05/2020    CAION 1.76 (H) 07/26/2020    PHOS 3.2 08/03/2020     Lab Results   Component Value Date    ALBUMIN 3.0 (L) 08/05/2020     Lab Results   Component Value Date    WBC 16.81 (H) 08/06/2020    HGB 7.4 (L) 08/06/2020    HCT 25.1 (L) 08/06/2020    MCV 84 08/06/2020     08/06/2020       Recent Labs   Lab 08/05/20  0731   MG 1.6       PTH-rp: < 0.4 (7/27/20)        Significant Imaging:  Imaging Results    None

## 2020-08-06 NOTE — PLAN OF CARE
PT GT TRAINED 1X20', 1X15' AND 1X35' WITH SLOW PACE STEP TO GT WITH RW AND MIN A WITH CHAIR IN TOW FOR SAFETY.

## 2020-08-06 NOTE — OP NOTE
[facility] -   Neurosurgery  Operative Note    SUMMARY      Date of Procedure: 7/31/2020     Procedure: Procedure(s) (LRB):  CRANIOTOMY, WITH 3D STEREOTACTIC IMAGING GUIDANCE, REAL-TIME (Right)  EXCISION, NEOPLASM (N/A)     Surgeon(s) and Role:     * Jose Hagen MD - Primary    Assisting Surgeon: None    Pre-Operative Diagnosis: Post -op pseudomeningocele    Post-Operative Diagnosis: Post-Op Diagnosis Codes:  Post -op pseudomeningocele    Anesthesia: none    Technical Procedures Used:  Aspiration of frontal pseudomeningocele    Description of the Findings of the Procedure:  The right frontal scalp was prepped and draped in usual sterile manner.  A 22 spinal needle was advanced through the insensate scalp posterior to the previous skin incision in the stylet was removed.  Approximately 100 mL of serosanguineous CSF was aspirated sterilely.  The needle was removed and Band-Aids were placed over the puncture site.  Patient tolerated the procedure uneventfully and a compression dressing was placed.    Significant Surgical Tasks Conducted by the Assistant(s), if Applicable:  Not applicable    Complications: No    Estimated Blood Loss (EBL): 100 mL           Specimens:   Specimen (12h ago, onward)    None           Implants:   Implant Name Type Inv. Item Serial No.  Lot No. LRB No. Used Action   PINS SKULL ADULT NINO - SN/A  PINS SKULL ADULT NINO N/A [employer] [id number] Right 1 Implanted and Explanted   DURA MATRIX ONLAY PLUS 3X3 - SN/A  DURA MATRIX ONLAY PLUS 3X3 N/A [employer] SUMA. [id number] Right 1 Implanted   [place] plates     ODBULIO NEURO  Right 3 Implanted   [place] screws    OBDULIO NEURO  N/A 6 Implanted              Condition: Good    Disposition: Remained in hospital bed in stable condition    Attestation: I was present for the entire procedure.

## 2020-08-06 NOTE — ASSESSMENT & PLAN NOTE
SHARLENE on CKD stage 3 , baseline creatinine about 1.3 mg/dL, Ultrasound negative for any hydronephrosis.    Creatinine had improved to 1 on 8/5. Will follow up on today's labs.

## 2020-08-06 NOTE — PROGRESS NOTES
[facility] -   Nephrology  Progress Note    Patient Name: Cata Lang  MRN: [id number]  Admission Date: 7/25/2020  Hospital Length of Stay: 11 days  Attending Provider: Abdoul Barragan MD   Primary Care Physician: Sabra Fregoso MD  Principal Problem:Calcified cerebral meningioma    Subjective:     HPI: Cata Lang is a pleasant 49-year-old  woman history of hypertension, type 2 diabetes, morbid obesity, seizure disorder, CKD stage 3, baseline creatinine about 1.3 mg/dL, GFR 50 mL/minute, intracranial mass / lesion, she presents to the emergency room yesterday with progressive complains of weakness, increasing headache, nausea, a repeat MRI of the brain is pending at this time, she was evaluated by Neurosurgery, also significant hypercalcemia with a serum calcium of 14 noted on presentation, patient is currently taking Tums, calcitriol, hydrochlorothiazide, also she possibly has a urinary tract infection, acute on chronic renal failure, serum creatinine was 1.9 on presentation, 2.5 this evening, she is currently receiving IV fluids, we were consulted for acute kidney injury, hypercalcemia    Interval History:     7/31/20: s/p brain tumor surgery today. Creatinine has improved to 1.7. Ca at 7.9 today (albumin 3.1).    8/1/20: patient is resting comfortably, no complaints at present.     8/2/20: Calcium has declined to 6.7 (albumin 3.1). Creatinine at 1.5. Patient resting in chair, no complaints at present.     8/3/20: Creatinine has declined to 1.3. Calcium at 7 (albumin 3).     8/4/20: Calcium at 6.7 (albumin 2.9). K at 3.4, creatinine at 1.2.   Patient still very fatigued.     8/5/20: Calcium at 7.1 today. Creatinine has improved to 1.     8/6/20: Morning labs are pending.           Review of patient's allergies indicates:   Allergen Reactions    Heparin analogues Other (See Comments)     DANYELLE     Hydrochlorothiazide      hypercalcemia     Current Facility-Administered Medications    Medication Frequency    acetaminophen tablet 650 mg Q6H PRN    artificial tears 0.5 % ophthalmic solution 1 drop PRN    atorvastatin tablet 10 mg Daily    bisacodyL EC tablet 5 mg Daily PRN    calcium carbonate (OS-THANIA) tablet 500 mg QID    cholecalciferol (vitamin D3) 125 mcg (5,000 unit) tablet 5,000 Units Daily    dexAMETHasone tablet 2 mg Daily    dextrose 50% injection 12.5 g PRN    dextrose 50% injection 25 g PRN    docusate sodium capsule 100 mg BID    epoetin laxmi-epbx injection 5,000 Units Every Mon, Wed, Fri    ferrous sulfate EC tablet 325 mg BID    gabapentin capsule 600 mg Daily    glucagon (human recombinant) injection 1 mg PRN    glucose chewable tablet 16 g PRN    glucose chewable tablet 24 g PRN    hydrALAZINE injection 10 mg Q8H PRN    hydrALAZINE tablet 25 mg Q8H    HYDROcodone-acetaminophen  mg per tablet 1 tablet Q4H PRN    HYDROcodone-acetaminophen 5-325 mg per tablet 1 tablet Q4H PRN    insulin aspart U-100 pen 1-10 Units QID (AC + HS) PRN    levETIRAcetam tablet 1,500 mg BID    levothyroxine tablet 137 mcg Before breakfast    morphine injection 2 mg Q3H PRN    ondansetron injection 4 mg Q6H PRN    pantoprazole EC tablet 40 mg Daily    polyethylene glycol packet 17 g BID PRN    polyethylene glycol packet 17 g Daily       Objective:     Vital Signs (Most Recent):  Temp: 98.3 °F (36.8 °C) (08/06/20 0730)  Pulse: 77 (08/06/20 0730)  Resp: 17 (08/06/20 0730)  BP: (!) 121/56 (08/06/20 0730)  SpO2: 96 % (08/06/20 0730)  O2 Device (Oxygen Therapy): room air (08/06/20 0730) Vital Signs (24h Range):  Temp:  [97.3 °F (36.3 °C)-98.3 °F (36.8 °C)] 98.3 °F (36.8 °C)  Pulse:  [65-78] 77  Resp:  [16-20] 17  SpO2:  [96 %-98 %] 96 %  BP: (121-177)/(56-76) 121/56     Weight: 129.8 kg (286 lb 2.5 oz) (07/26/20 5275)  Body mass index is 41.06 kg/m².  Body surface area is 2.53 meters squared.    I/O last 3 completed shifts:  In: 240 [P.O.:240]  Out: 900 [Urine:900]    Physical  Exam  Constitutional:       Appearance: She is well-developed.   HENT:      Head: Normocephalic.      Comments: S/p brain surgery with forehead scar.   Eyes:      Pupils: Pupils are equal, round, and reactive to light.   Neck:      Thyroid: No thyromegaly.   Cardiovascular:      Rate and Rhythm: Normal rate and regular rhythm.      Heart sounds: No friction rub.   Pulmonary:      Effort: Pulmonary effort is normal.      Breath sounds: Normal breath sounds. No wheezing.   Chest:      Chest wall: No tenderness.   Abdominal:      General: Bowel sounds are normal. There is no distension.      Palpations: Abdomen is soft.      Tenderness: There is no abdominal tenderness.   Musculoskeletal:      Comments: Trace LE edema.    Lymphadenopathy:      Cervical: No cervical adenopathy.   Skin:     General: Skin is warm and dry.      Findings: No rash.   Neurological:      Mental Status: She is alert and oriented to person, place, and time.         Significant Labs:  Lab Results   Component Value Date    CREATININE 1.0 08/05/2020    BUN 20 08/05/2020     08/05/2020    K 3.9 08/05/2020     08/05/2020    CO2 24 08/05/2020     Lab Results   Component Value Date    PTH 23.4 08/02/2020    CALCIUM 7.1 (L) 08/05/2020    CAION 1.76 (H) 07/26/2020    PHOS 3.2 08/03/2020     Lab Results   Component Value Date    ALBUMIN 3.0 (L) 08/05/2020     Lab Results   Component Value Date    WBC 16.81 (H) 08/06/2020    HGB 7.4 (L) 08/06/2020    HCT 25.1 (L) 08/06/2020    MCV 84 08/06/2020     08/06/2020       Recent Labs   Lab 08/05/20  0731   MG 1.6       PTH-rp: < 0.4 (7/27/20)        Significant Imaging:  Imaging Results    None           Assessment/Plan:     Anemia due to stage 3 chronic kidney disease  Continue Epogen.     Neoplasm of central nervous system  S/p brain mass resection. Neurosurgery is following.     SHARLENE on CKD, stage III  SHARLENE on CKD stage 3 , baseline creatinine about 1.3 mg/dL, Ultrasound negative for any  hydronephrosis.    Creatinine had improved to 1 on 8/5. Will follow up on today's labs.       Hypercalcemia  Patient had severe hypercalcemia upon admission.  Vitamin-D and calcium supplementations have been stopped. Parathyroid hormone level was low and has now recovered to 23. Ca had declined to 6.7 on 8/2 (albumin 3.1). Calcium improved to 7 on 8/3/20 (albumin 3). CaCO3 500 mg qid was started on 8/3/20. Calcium on 8/4 at 6.7 (albumin 2.9). Calcium at 7.1 on 8/5 (after IV calcium). Will follow up on today'a labs. Will continue vitamin D 5000 units per day.     PTHrp level was < 0.4 (not elevated).             Thank you for your consult. I will follow-up with patient. Please contact us if you have any additional questions.    Chandler Gould MD  Nephrology  [facility] -

## 2020-08-06 NOTE — ASSESSMENT & PLAN NOTE
Patient had new onset thrombocytopenia, evaluated for HIT, antibody testing sent, pending results.  Review of platelet count today, has shown increase of platelet count from 112 K to 137K. Patient likely does not have HIT due to improvement of platelet count while still on Lovenox.  Will not change anticoagulation at this time.  Patient has been receiving IV Venofer due to iron deficiency, thrombocytopenia was likely secondary to iron deficiency.    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  Updated allergy list to add heparin.  Extensive discussion was spouse for new allergy to heparin and to avoid all heparin medications in the future.  --plan is to begin Arixtra.  --thrombocytopenia resolved

## 2020-08-06 NOTE — PT/OT/SLP PROGRESS
Physical Therapy  Treatment    Cata Lang   MRN: [id number]   Admitting Diagnosis: Calcified cerebral meningioma    PT Received On: 08/06/20  PT Start Time: 0920     PT Stop Time: 0945    PT Total Time (min): 25 min       Billable Minutes:  Gait Training 15 and Therapeutic Exercise 10    Treatment Type: Treatment  PT/PTA: PT     PTA Visit Number: 0       General Precautions: Standard, fall  Orthopedic Precautions: N/A   Braces: N/A    Spiritual, Cultural Beliefs, Restoration Practices, Values that Affect Care: no    Subjective:  Communicated with NURSE ELENA AND Epic CHART REVIEW prior to session.   PT AGREED TO TX     Pain/Comfort  Pain Rating 1: 7/10  Location 1: head  Pain Addressed 1: Pre-medicate for activity  Pain Rating Post-Intervention 1: 7/10    Objective:   Patient found with: peripheral IV    Functional Mobility:  PT MET IN RM SEATED IN CHAIR WITH C/O HEADACHE . PT STOOD WITH RW AND CGA FOR GT WITH MIN A 1X20', 1X15', AND 1X35' WITH LONG SEATED REST BREAKS. PT WITH UNSTEADY STEP TO GT PATTERN AND INC CUES FOR SAFETY WITH RW. PT RETURNED TO RM T/F TO CHAIR WITH RW AND MOD A WITH LOB. PT SEATED FOR B LE TE X 10 REPS OF AP, TKE, AND MIP. PT LEFT SEATED WITH ALL NEEDS MET AND CALL BELL IN REACH.     AM-PAC 6 CLICK MOBILITY  How much help from another person does this patient currently need?   1 = Unable, Total/Dependent Assistance  2 = A lot, Maximum/Moderate Assistance  3 = A little, Minimum/Contact Guard/Supervision  4 = None, Modified [place]/Independent    Turning over in bed (including adjusting bedclothes, sheets and blankets)?: 1(NT)  Sitting down on and standing up from a chair with arms (e.g., wheelchair, bedside commode, etc.): 3  Moving from lying on back to sitting on the side of the bed?: 1(NT)  Moving to and from a bed to a chair (including a wheelchair)?: 3  Need to walk in hospital room?: 3  Climbing 3-5 steps with a railing?: 1  Basic Mobility Total Score: 12    AM-PAC Raw Score  CMS G-Code Modifier Level of Impairment Assistance   6 % Total / Unable   7 - 9 CM 80 - 100% Maximal Assist   10 - 14 CL 60 - 80% Moderate Assist   15 - 19 CK 40 - 60% Moderate Assist   20 - 22 CJ 20 - 40% Minimal Assist   23 CI 1-20% SBA / CGA   24 CH 0% Independent/ Mod I     Patient left up in chair with call button in reach and chair alarm IN CHAIR HOWEVER NO ALARM CONNECTED AS NO BLACK POWER CORD AVAILABLE.    Assessment:  PT PROGRESSING WITH GT TRAINING. PT FATIGUES QUICKLY WITH MOBILITY .      Rehab identified problem list/impairments: Rehab identified problem list/impairments: weakness, impaired endurance, impaired self care skills, impaired functional mobilty, gait instability, impaired balance, pain, decreased safety awareness, decreased lower extremity function, decreased ROM    Rehab potential is good.    Activity tolerance: Fair    Discharge recommendations: Discharge Facility/Level of Care Needs: rehabilitation facility     Barriers to discharge:      Equipment recommendations: Equipment Needed After Discharge: walker, rolling     GOALS:   Multidisciplinary Problems     Physical Therapy Goals        Problem: Physical Therapy Goal    Goal Priority Disciplines Outcome Goal Variances Interventions   Physical Therapy Goal     PT, PT/OT Ongoing, Progressing     Description: PT WILL BE SEEN FOR P.T. FOR A MIN OF 5 OUT OF 7 DAYS A WEEK  LT20  1. PT WILL COMPLETE BED MOBILITY WITH MIN A   2. PT WILL T/F TO CHAIR WITH RW AND MAX A  3. PT WILL SIT>STAND WITH RW AND MAX A  4. PT WILL COMPLETE B LE TE X 20 REPS  5. PT WILL GT TRAIN X 10' WITH RW AND MAX A                   PLAN:    Patient to be seen 5 x/week  to address the above listed problems via gait training, therapeutic activities, therapeutic exercises, neuromuscular re-education  Plan of Care expires: 20  Plan of Care reviewed with: patient, spouse         Harriett Bernard, PT  2020

## 2020-08-06 NOTE — ASSESSMENT & PLAN NOTE
Patient noted to have anemia, iron studies evaluated, significant iron deficiency noted in treatment began with IV Venofer.    -- iv venofer completed today.  --continue on oral ferrous sulfate 325 mg daily at discharge

## 2020-08-07 NOTE — PLAN OF CARE
08/07/20 1220   Final Note   Assessment Type Final Discharge Note   Anticipated Discharge Disposition Rehab   Right Care Referral Info   Post Acute Recommendation IRF   Facility Name [facility]

## 2020-08-11 LAB
FINAL PATHOLOGIC DIAGNOSIS: NORMAL
FROZEN SECTION DIAGNOSIS: NORMAL
FROZEN SECTION FOOTNOTE: NORMAL
GROSS: NORMAL

## 2020-08-12 ENCOUNTER — TELEPHONE (OUTPATIENT)
Dept: NEUROSURGERY | Facility: CLINIC | Age: 50
End: 2020-08-12

## 2020-08-12 NOTE — TELEPHONE ENCOUNTER
Called and spoke with pts  and confirmed Cata's appointment pts  verbally confirmed and understood.     ----- Message from Mikayla De La Rosa sent at 8/12/2020 10:30 AM CDT -----  Regarding: PTS   Would like a call from nurse in regards to pts date of suture removal also her rehab . Please call back at [phone].       Thank you,   Mikayla De La Rosa

## 2020-08-14 ENCOUNTER — TELEPHONE (OUTPATIENT)
Dept: HEMATOLOGY/ONCOLOGY | Facility: CLINIC | Age: 50
End: 2020-08-14

## 2020-08-14 ENCOUNTER — TUMOR BOARD CONFERENCE (OUTPATIENT)
Dept: HEMATOLOGY/ONCOLOGY | Facility: CLINIC | Age: 50
End: 2020-08-14

## 2020-08-14 NOTE — PROGRESS NOTES
Tumor Board Documentation      Cata Lang was presented by Jose Hagen MD at our Tumor Board on 8/14/2020, which included representatives from Medical Oncology, Hematology, Radiation Oncology, Surgical Oncology, Pathology, Navigation, Research, Genetics, Plastic Surgery, Radiology.    Cata currently presents as a current patient with (P) Brain tumor(Atypical meningioma), with history of the following treatments: (P) Surgical Intervention(s).    Additionally, we reviewed previous medical and familial history, history of present illness, and recent lab results along with all available histopathologic and imaging studies. The tumor board considered available treatment options and made the following recommendations:      Reassess GFR, consider post op MRI, consult radiation oncology to consider fractionated adjuvant radiation    The following procedures/referrals were also placed: No orders of the defined types were placed in this encounter.      Clinical Trial Status: (P) Not discussed     National site-specific guidelines were discussed with respect to the case.    Tumor board is a meeting of clinicians from various specialty areas who evaluate and discuss patients for whom a multidisciplinary approach is being considered. Final determinations in the plan of care are those of the provider(s). The responsibility for follow up of recommendations given during tumor board is that of the provider.     Jose Hagen MD

## 2020-08-14 NOTE — TELEPHONE ENCOUNTER
----- Message from Maia Hidalgo sent at 8/14/2020 12:50 PM CDT -----  Regarding: schedule appt  Contact: Pt  Type: Appointment Request    Caller is requesting an appointment for a new patient.    Name of Caller: Pt's   Reason for appointment: Referred by ER Doctor  Would the patient rather a call back or a response via [facility]? Call back  Best Call Back Number: [phone]  Additional Information: n/a

## 2020-08-14 NOTE — TELEPHONE ENCOUNTER
Informed patient to reach out to Dr. Hagen's office to determine if she will need an oncology appointment.

## 2020-08-17 ENCOUNTER — HOSPITAL ENCOUNTER (OUTPATIENT)
Dept: RADIOLOGY | Facility: HOSPITAL | Age: 50
Discharge: HOME | DRG: 032 | End: 2020-08-17
Attending: PHYSICIAN ASSISTANT
Payer: COMMERCIAL

## 2020-08-17 ENCOUNTER — OFFICE VISIT (OUTPATIENT)
Dept: NEUROSURGERY | Facility: CLINIC | Age: 50
DRG: 032 | End: 2020-08-17
Payer: COMMERCIAL

## 2020-08-17 ENCOUNTER — HOSPITAL ENCOUNTER (INPATIENT)
Facility: HOSPITAL | Age: 50
LOS: 9 days | Discharge: NURSING, REHAB OR LONG-TERM CARE | DRG: 032 | End: 2020-08-26
Attending: EMERGENCY MEDICINE | Admitting: EMERGENCY MEDICINE
Payer: COMMERCIAL

## 2020-08-17 ENCOUNTER — TELEPHONE (OUTPATIENT)
Dept: RADIATION ONCOLOGY | Facility: CLINIC | Age: 50
End: 2020-08-17

## 2020-08-17 ENCOUNTER — TELEPHONE (OUTPATIENT)
Dept: NEUROSURGERY | Facility: CLINIC | Age: 50
End: 2020-08-17

## 2020-08-17 VITALS
WEIGHT: 286.19 LBS | RESPIRATION RATE: 16 BRPM | HEART RATE: 82 BPM | SYSTOLIC BLOOD PRESSURE: 162 MMHG | HEIGHT: 70 IN | BODY MASS INDEX: 40.97 KG/M2 | DIASTOLIC BLOOD PRESSURE: 100 MMHG

## 2020-08-17 DIAGNOSIS — E87.1 HYPONATREMIA: ICD-10-CM

## 2020-08-17 DIAGNOSIS — C71.1 MALIGNANT NEOPLASM OF FRONTAL LOBE: ICD-10-CM

## 2020-08-17 DIAGNOSIS — N18.30 TYPE 2 DIABETES MELLITUS WITH STAGE 3 CHRONIC KIDNEY DISEASE, WITHOUT LONG-TERM CURRENT USE OF INSULIN: ICD-10-CM

## 2020-08-17 DIAGNOSIS — E11.22 TYPE 2 DIABETES MELLITUS WITH STAGE 3 CHRONIC KIDNEY DISEASE, WITHOUT LONG-TERM CURRENT USE OF INSULIN: ICD-10-CM

## 2020-08-17 DIAGNOSIS — D32.0 MENINGIOMA, CEREBRAL: ICD-10-CM

## 2020-08-17 DIAGNOSIS — G91.9 HYDROCEPHALUS, ACQUIRED: ICD-10-CM

## 2020-08-17 DIAGNOSIS — D32.0 CALCIFIED CEREBRAL MENINGIOMA: Chronic | ICD-10-CM

## 2020-08-17 DIAGNOSIS — D63.1 ANEMIA DUE TO STAGE 3 CHRONIC KIDNEY DISEASE: ICD-10-CM

## 2020-08-17 DIAGNOSIS — E87.6 HYPOKALEMIA: ICD-10-CM

## 2020-08-17 DIAGNOSIS — G91.1 OBSTRUCTIVE HYDROCEPHALUS: Primary | ICD-10-CM

## 2020-08-17 DIAGNOSIS — N18.30 ANEMIA DUE TO STAGE 3 CHRONIC KIDNEY DISEASE: ICD-10-CM

## 2020-08-17 DIAGNOSIS — E83.51 HYPOCALCEMIA: ICD-10-CM

## 2020-08-17 DIAGNOSIS — E11.59 HYPERTENSION ASSOCIATED WITH DIABETES: ICD-10-CM

## 2020-08-17 DIAGNOSIS — G97.82 POSTPROCEDURAL PSEUDOMENINGOCELE: ICD-10-CM

## 2020-08-17 DIAGNOSIS — I15.2 HYPERTENSION ASSOCIATED WITH DIABETES: ICD-10-CM

## 2020-08-17 DIAGNOSIS — D42.0 ATYPICAL MENINGIOMA OF BRAIN: ICD-10-CM

## 2020-08-17 DIAGNOSIS — G91.8 OTHER HYDROCEPHALUS: ICD-10-CM

## 2020-08-17 DIAGNOSIS — Z98.890 STATUS POST CRANIOTOMY: ICD-10-CM

## 2020-08-17 DIAGNOSIS — R29.6 FREQUENT FALLS: ICD-10-CM

## 2020-08-17 DIAGNOSIS — Z01.818 PRE-OP EVALUATION: ICD-10-CM

## 2020-08-17 PROBLEM — Z86.73 HISTORY OF CVA IN ADULTHOOD: Status: ACTIVE | Noted: 2020-08-17

## 2020-08-17 PROBLEM — E78.5 HYPERLIPIDEMIA ASSOCIATED WITH TYPE 2 DIABETES MELLITUS: Status: ACTIVE | Noted: 2020-08-17

## 2020-08-17 PROBLEM — E03.9 HYPOTHYROIDISM: Status: ACTIVE | Noted: 2020-08-17

## 2020-08-17 PROBLEM — E11.69 HYPERLIPIDEMIA ASSOCIATED WITH TYPE 2 DIABETES MELLITUS: Status: ACTIVE | Noted: 2020-08-17

## 2020-08-17 LAB
ALBUMIN SERPL BCP-MCNC: 3.9 G/DL (ref 3.5–5.2)
ALP SERPL-CCNC: 190 U/L (ref 55–135)
ALT SERPL W/O P-5'-P-CCNC: 48 U/L (ref 10–44)
ANION GAP SERPL CALC-SCNC: 13 MMOL/L (ref 8–16)
APTT BLDCRRT: 24.8 SEC (ref 21–32)
AST SERPL-CCNC: 32 U/L (ref 10–40)
BASOPHILS # BLD AUTO: 0.02 K/UL (ref 0–0.2)
BASOPHILS NFR BLD: 0.1 % (ref 0–1.9)
BILIRUB SERPL-MCNC: 0.6 MG/DL (ref 0.1–1)
BUN SERPL-MCNC: 16 MG/DL (ref 6–20)
CALCIUM SERPL-MCNC: 9.5 MG/DL (ref 8.7–10.5)
CHLORIDE SERPL-SCNC: 92 MMOL/L (ref 95–110)
CO2 SERPL-SCNC: 24 MMOL/L (ref 23–29)
CREAT SERPL-MCNC: 1 MG/DL (ref 0.5–1.4)
DIFFERENTIAL METHOD: ABNORMAL
EOSINOPHIL # BLD AUTO: 0 K/UL (ref 0–0.5)
EOSINOPHIL NFR BLD: 0.1 % (ref 0–8)
ERYTHROCYTE [DISTWIDTH] IN BLOOD BY AUTOMATED COUNT: 17.3 % (ref 11.5–14.5)
EST. GFR  (AFRICAN AMERICAN): >60 ML/MIN/1.73 M^2
EST. GFR  (NON AFRICAN AMERICAN): >60 ML/MIN/1.73 M^2
GLUCOSE SERPL-MCNC: 145 MG/DL (ref 70–110)
HCT VFR BLD AUTO: 32.7 % (ref 37–48.5)
HGB BLD-MCNC: 10.1 G/DL (ref 12–16)
IMM GRANULOCYTES # BLD AUTO: 0.16 K/UL (ref 0–0.04)
IMM GRANULOCYTES NFR BLD AUTO: 1.1 % (ref 0–0.5)
INR PPP: 1 (ref 0.8–1.2)
LYMPHOCYTES # BLD AUTO: 1.6 K/UL (ref 1–4.8)
LYMPHOCYTES NFR BLD: 10.8 % (ref 18–48)
MCH RBC QN AUTO: 25.8 PG (ref 27–31)
MCHC RBC AUTO-ENTMCNC: 30.9 G/DL (ref 32–36)
MCV RBC AUTO: 83 FL (ref 82–98)
MONOCYTES # BLD AUTO: 0.6 K/UL (ref 0.3–1)
MONOCYTES NFR BLD: 3.8 % (ref 4–15)
NEUTROPHILS # BLD AUTO: 12.5 K/UL (ref 1.8–7.7)
NEUTROPHILS NFR BLD: 84.1 % (ref 38–73)
NRBC BLD-RTO: 0 /100 WBC
OVALOCYTES BLD QL SMEAR: ABNORMAL
PLATELET # BLD AUTO: 339 K/UL (ref 150–350)
PLATELET BLD QL SMEAR: ABNORMAL
PMV BLD AUTO: ABNORMAL FL (ref 9.2–12.9)
POCT GLUCOSE: 121 MG/DL (ref 70–110)
POIKILOCYTOSIS BLD QL SMEAR: SLIGHT
POTASSIUM SERPL-SCNC: 4 MMOL/L (ref 3.5–5.1)
PROT SERPL-MCNC: 7.7 G/DL (ref 6–8.4)
PROTHROMBIN TIME: 10.8 SEC (ref 9–12.5)
RBC # BLD AUTO: 3.92 M/UL (ref 4–5.4)
SARS-COV-2 RDRP RESP QL NAA+PROBE: NEGATIVE
SODIUM SERPL-SCNC: 129 MMOL/L (ref 136–145)
WBC # BLD AUTO: 14.89 K/UL (ref 3.9–12.7)

## 2020-08-17 PROCEDURE — 25000003 PHARM REV CODE 250: Performed by: NURSE PRACTITIONER

## 2020-08-17 PROCEDURE — 99999 PR PBB SHADOW E&M-EST. PATIENT-LVL V: CPT | Mod: PBBFAC,,, | Performed by: PHYSICIAN ASSISTANT

## 2020-08-17 PROCEDURE — 99223 1ST HOSP IP/OBS HIGH 75: CPT | Mod: ,,, | Performed by: INTERNAL MEDICINE

## 2020-08-17 PROCEDURE — 80053 COMPREHEN METABOLIC PANEL: CPT

## 2020-08-17 PROCEDURE — 99291 CRITICAL CARE FIRST HOUR: CPT

## 2020-08-17 PROCEDURE — 99024 POSTOP FOLLOW-UP VISIT: CPT | Mod: S$GLB,,, | Performed by: PHYSICIAN ASSISTANT

## 2020-08-17 PROCEDURE — 70450 CT HEAD/BRAIN W/O DYE: CPT | Mod: 26,,, | Performed by: RADIOLOGY

## 2020-08-17 PROCEDURE — 21400001 HC TELEMETRY ROOM

## 2020-08-17 PROCEDURE — 63600175 PHARM REV CODE 636 W HCPCS: Performed by: NURSE PRACTITIONER

## 2020-08-17 PROCEDURE — 25000003 PHARM REV CODE 250: Performed by: PHYSICIAN ASSISTANT

## 2020-08-17 PROCEDURE — 85025 COMPLETE CBC W/AUTO DIFF WBC: CPT

## 2020-08-17 PROCEDURE — 3008F BODY MASS INDEX DOCD: CPT | Mod: CPTII,S$GLB,, | Performed by: PHYSICIAN ASSISTANT

## 2020-08-17 PROCEDURE — 99223 PR INITIAL HOSPITAL CARE,LEVL III: ICD-10-PCS | Mod: ,,, | Performed by: INTERNAL MEDICINE

## 2020-08-17 PROCEDURE — 99221 1ST HOSP IP/OBS SF/LOW 40: CPT | Mod: 24,,, | Performed by: NEUROLOGICAL SURGERY

## 2020-08-17 PROCEDURE — 99024 PR POST-OP FOLLOW-UP VISIT: ICD-10-PCS | Mod: S$GLB,,, | Performed by: PHYSICIAN ASSISTANT

## 2020-08-17 PROCEDURE — 85730 THROMBOPLASTIN TIME PARTIAL: CPT

## 2020-08-17 PROCEDURE — 36415 COLL VENOUS BLD VENIPUNCTURE: CPT

## 2020-08-17 PROCEDURE — 83036 HEMOGLOBIN GLYCOSYLATED A1C: CPT

## 2020-08-17 PROCEDURE — 70450 CT HEAD/BRAIN W/O DYE: CPT | Mod: TC

## 2020-08-17 PROCEDURE — 70450 CT HEAD WITHOUT CONTRAST: ICD-10-PCS | Mod: 26,,, | Performed by: RADIOLOGY

## 2020-08-17 PROCEDURE — U0002 COVID-19 LAB TEST NON-CDC: HCPCS

## 2020-08-17 PROCEDURE — 99999 PR PBB SHADOW E&M-EST. PATIENT-LVL V: ICD-10-PCS | Mod: PBBFAC,,, | Performed by: PHYSICIAN ASSISTANT

## 2020-08-17 PROCEDURE — 84550 ASSAY OF BLOOD/URIC ACID: CPT

## 2020-08-17 PROCEDURE — 85610 PROTHROMBIN TIME: CPT

## 2020-08-17 PROCEDURE — 3008F PR BODY MASS INDEX (BMI) DOCUMENTED: ICD-10-PCS | Mod: CPTII,S$GLB,, | Performed by: PHYSICIAN ASSISTANT

## 2020-08-17 PROCEDURE — 99221 PR INITIAL HOSPITAL CARE,LEVL I: ICD-10-PCS | Mod: 24,,, | Performed by: NEUROLOGICAL SURGERY

## 2020-08-17 PROCEDURE — 25000003 PHARM REV CODE 250: Performed by: EMERGENCY MEDICINE

## 2020-08-17 RX ORDER — AMLODIPINE BESYLATE 5 MG/1
5 TABLET ORAL DAILY
Status: DISCONTINUED | OUTPATIENT
Start: 2020-08-18 | End: 2020-08-26 | Stop reason: HOSPADM

## 2020-08-17 RX ORDER — ACETAMINOPHEN 500 MG
5000 TABLET ORAL DAILY
Status: DISCONTINUED | OUTPATIENT
Start: 2020-08-18 | End: 2020-08-26 | Stop reason: HOSPADM

## 2020-08-17 RX ORDER — PANTOPRAZOLE SODIUM 40 MG/1
40 TABLET, DELAYED RELEASE ORAL DAILY
Status: DISCONTINUED | OUTPATIENT
Start: 2020-08-18 | End: 2020-08-26 | Stop reason: HOSPADM

## 2020-08-17 RX ORDER — DULOXETIN HYDROCHLORIDE 30 MG/1
60 CAPSULE, DELAYED RELEASE ORAL DAILY
COMMUNITY
Start: 2020-08-14

## 2020-08-17 RX ORDER — ASCORBIC ACID 500 MG
500 TABLET ORAL NIGHTLY
Status: DISCONTINUED | OUTPATIENT
Start: 2020-08-17 | End: 2020-08-26 | Stop reason: HOSPADM

## 2020-08-17 RX ORDER — FERROUS SULFATE 325(65) MG
325 TABLET, DELAYED RELEASE (ENTERIC COATED) ORAL 2 TIMES DAILY WITH MEALS
Status: DISCONTINUED | OUTPATIENT
Start: 2020-08-17 | End: 2020-08-26 | Stop reason: HOSPADM

## 2020-08-17 RX ORDER — IBUPROFEN 200 MG
24 TABLET ORAL
Status: DISCONTINUED | OUTPATIENT
Start: 2020-08-17 | End: 2020-08-26 | Stop reason: HOSPADM

## 2020-08-17 RX ORDER — GLUCAGON 1 MG
1 KIT INJECTION
Status: DISCONTINUED | OUTPATIENT
Start: 2020-08-17 | End: 2020-08-26 | Stop reason: HOSPADM

## 2020-08-17 RX ORDER — FERROUS SULFATE 325(65) MG
325 TABLET, DELAYED RELEASE (ENTERIC COATED) ORAL 2 TIMES DAILY
Status: DISCONTINUED | OUTPATIENT
Start: 2020-08-17 | End: 2020-08-17

## 2020-08-17 RX ORDER — DEXAMETHASONE 1 MG/1
2 TABLET ORAL EVERY 12 HOURS
Status: DISCONTINUED | OUTPATIENT
Start: 2020-08-17 | End: 2020-08-18

## 2020-08-17 RX ORDER — FAMOTIDINE 20 MG/1
20 TABLET, FILM COATED ORAL 2 TIMES DAILY PRN
Status: DISCONTINUED | OUTPATIENT
Start: 2020-08-17 | End: 2020-08-26 | Stop reason: HOSPADM

## 2020-08-17 RX ORDER — INSULIN ASPART 100 [IU]/ML
0-5 INJECTION, SOLUTION INTRAVENOUS; SUBCUTANEOUS
Status: DISCONTINUED | OUTPATIENT
Start: 2020-08-17 | End: 2020-08-26 | Stop reason: HOSPADM

## 2020-08-17 RX ORDER — TALC
6 POWDER (GRAM) TOPICAL NIGHTLY PRN
Status: DISCONTINUED | OUTPATIENT
Start: 2020-08-17 | End: 2020-08-26 | Stop reason: HOSPADM

## 2020-08-17 RX ORDER — ONDANSETRON 2 MG/ML
4 INJECTION INTRAMUSCULAR; INTRAVENOUS
Status: DISCONTINUED | OUTPATIENT
Start: 2020-08-17 | End: 2020-08-17

## 2020-08-17 RX ORDER — IBUPROFEN 200 MG
16 TABLET ORAL
Status: DISCONTINUED | OUTPATIENT
Start: 2020-08-17 | End: 2020-08-26 | Stop reason: HOSPADM

## 2020-08-17 RX ORDER — CALCIUM CARBONATE 500(1250)
500 TABLET ORAL 4 TIMES DAILY
Status: DISCONTINUED | OUTPATIENT
Start: 2020-08-17 | End: 2020-08-26 | Stop reason: HOSPADM

## 2020-08-17 RX ORDER — HYDRALAZINE HYDROCHLORIDE 20 MG/ML
10 INJECTION INTRAMUSCULAR; INTRAVENOUS
Status: DISCONTINUED | OUTPATIENT
Start: 2020-08-17 | End: 2020-08-17

## 2020-08-17 RX ORDER — MORPHINE SULFATE 4 MG/ML
4 INJECTION, SOLUTION INTRAMUSCULAR; INTRAVENOUS
Status: DISCONTINUED | OUTPATIENT
Start: 2020-08-17 | End: 2020-08-17

## 2020-08-17 RX ORDER — DULOXETIN HYDROCHLORIDE 30 MG/1
30 CAPSULE, DELAYED RELEASE ORAL DAILY
Status: DISCONTINUED | OUTPATIENT
Start: 2020-08-18 | End: 2020-08-26 | Stop reason: HOSPADM

## 2020-08-17 RX ORDER — DEXAMETHASONE 2 MG/1
TABLET ORAL
Status: ON HOLD | COMMUNITY
Start: 2020-08-14 | End: 2020-08-26 | Stop reason: HOSPADM

## 2020-08-17 RX ORDER — GABAPENTIN 300 MG/1
600 CAPSULE ORAL DAILY
Status: DISCONTINUED | OUTPATIENT
Start: 2020-08-18 | End: 2020-08-24

## 2020-08-17 RX ORDER — ONDANSETRON 4 MG/1
TABLET, FILM COATED ORAL
Status: ON HOLD | COMMUNITY
Start: 2020-08-14 | End: 2020-08-26 | Stop reason: HOSPADM

## 2020-08-17 RX ORDER — ATORVASTATIN CALCIUM 10 MG/1
10 TABLET, FILM COATED ORAL NIGHTLY
Status: DISCONTINUED | OUTPATIENT
Start: 2020-08-17 | End: 2020-08-26 | Stop reason: HOSPADM

## 2020-08-17 RX ORDER — CALCITRIOL 0.25 UG/1
0.25 CAPSULE ORAL DAILY
Status: DISCONTINUED | OUTPATIENT
Start: 2020-08-18 | End: 2020-08-26 | Stop reason: HOSPADM

## 2020-08-17 RX ORDER — HYDROCODONE BITARTRATE AND ACETAMINOPHEN 5; 325 MG/1; MG/1
1 TABLET ORAL EVERY 4 HOURS PRN
Status: DISCONTINUED | OUTPATIENT
Start: 2020-08-17 | End: 2020-08-26 | Stop reason: HOSPADM

## 2020-08-17 RX ORDER — LEVETIRACETAM 500 MG/1
1500 TABLET ORAL 2 TIMES DAILY
Status: DISCONTINUED | OUTPATIENT
Start: 2020-08-17 | End: 2020-08-26 | Stop reason: HOSPADM

## 2020-08-17 RX ADMIN — OXYCODONE HYDROCHLORIDE AND ACETAMINOPHEN 500 MG: 500 TABLET ORAL at 09:08

## 2020-08-17 RX ADMIN — FERROUS SULFATE TAB EC 325 MG (65 MG FE EQUIVALENT) 325 MG: 325 (65 FE) TABLET DELAYED RESPONSE at 09:08

## 2020-08-17 RX ADMIN — LEVETIRACETAM 1500 MG: 500 TABLET ORAL at 09:08

## 2020-08-17 RX ADMIN — CALCIUM 500 MG: 500 TABLET ORAL at 09:08

## 2020-08-17 RX ADMIN — FAMOTIDINE 20 MG: 20 TABLET ORAL at 11:08

## 2020-08-17 RX ADMIN — DEXAMETHASONE 2 MG: 1 TABLET ORAL at 09:08

## 2020-08-17 RX ADMIN — ATORVASTATIN CALCIUM 10 MG: 10 TABLET, FILM COATED ORAL at 09:08

## 2020-08-17 RX ADMIN — HYDROCODONE BITARTRATE AND ACETAMINOPHEN 1 TABLET: 5; 325 TABLET ORAL at 09:08

## 2020-08-17 NOTE — PLAN OF CARE
08/17/20 1610   ED Admissions Case Approval   ED Admissions Case Approval CM Approved     THIS PATIENT MEETS INPATIENT CRITERIA DUE TO HYDROCEPHALUS.

## 2020-08-17 NOTE — SUBJECTIVE & OBJECTIVE
Past Medical History:   Diagnosis Date    Diabetes mellitus     Hydrocephalus, acquired 8/17/2020    Hypertension     Seizures     Stroke     Thyroid disease        Past Surgical History:   Procedure Laterality Date    CRANIOTOMY      THYROIDECTOMY      TONSILLECTOMY         Review of patient's allergies indicates:   Allergen Reactions    Heparin analogues Other (See Comments)     DANYELLE     Hydrochlorothiazide      hypercalcemia     No current facility-administered medications for this encounter.      Family History     None        Tobacco Use    Smoking status: Current Every Day Smoker     Packs/day: 1.00     Types: Cigarettes    Tobacco comment: none since July 2020   Substance and Sexual Activity    Alcohol use: Not Currently    Drug use: Not Currently    Sexual activity: Not on file     Review of Systems   Constitutional: Negative for appetite change, chills, diaphoresis, fatigue and fever.   HENT: Negative for ear discharge, hearing loss and postnasal drip.    Eyes: Negative for visual disturbance.   Respiratory: Negative for apnea, cough, chest tightness, shortness of breath, wheezing and stridor.    Cardiovascular: Negative for chest pain, palpitations and leg swelling.   Gastrointestinal: Negative for abdominal distention, abdominal pain, blood in stool, diarrhea, nausea and vomiting.   Genitourinary: Negative for decreased urine volume, difficulty urinating, dyspareunia, dysuria, enuresis, flank pain, frequency, genital sores, hematuria, pelvic pain, urgency and vaginal discharge.   Musculoskeletal: Negative for arthralgias, back pain, gait problem, joint swelling, neck pain and neck stiffness.   Skin: Negative for color change and rash.   Neurological: Positive for headaches. Negative for dizziness, tremors, seizures, syncope, weakness, light-headedness and numbness.        Left-sided weakness which is old    Has complained of some weakness and has been falling.   Hematological: Does not  bruise/bleed easily.   Psychiatric/Behavioral: Negative for agitation, confusion, sleep disturbance and suicidal ideas.   All other systems reviewed and are negative.    Objective:     Vital Signs (Most Recent):  Temp: 98.1 °F (36.7 °C) (08/17/20 1304)  Pulse: 67 (08/17/20 1602)  Resp: 18 (08/17/20 1602)  BP: (!) 185/81(MD notified) (08/17/20 1602)  SpO2: 96 % (08/17/20 1602)  O2 Device (Oxygen Therapy): room air (08/17/20 1602) Vital Signs (24h Range):  Temp:  [98.1 °F (36.7 °C)] 98.1 °F (36.7 °C)  Pulse:  [66-85] 67  Resp:  [16-20] 18  SpO2:  [95 %-96 %] 96 %  BP: (129-185)/() 185/81     Weight: 120.7 kg (266 lb 1.6 oz) (08/17/20 1333)  Body mass index is 38.18 kg/m².  Body surface area is 2.44 meters squared.    No intake/output data recorded.    Physical Exam  Vitals signs and nursing note reviewed.   Constitutional:       Appearance: She is well-developed.   HENT:      Head: Normocephalic and atraumatic.      Comments: Dressing on the head from the previous craniotomy.  Eyes:      Conjunctiva/sclera: Conjunctivae normal.      Pupils: Pupils are equal, round, and reactive to light.   Neck:      Musculoskeletal: Full passive range of motion without pain, normal range of motion and neck supple. No edema.      Thyroid: No thyroid mass or thyromegaly.      Vascular: No carotid bruit.   Cardiovascular:      Rate and Rhythm: Normal rate and regular rhythm.      Chest Wall: PMI is not displaced.      Pulses: Normal pulses.      Heart sounds: Normal heart sounds, S1 normal and S2 normal. No murmur. No friction rub.   Pulmonary:      Effort: Pulmonary effort is normal. No accessory muscle usage or respiratory distress.      Breath sounds: Normal breath sounds. No wheezing or rales.   Chest:      Chest wall: No tenderness.   Abdominal:      General: Bowel sounds are normal. There is no distension.      Palpations: Abdomen is soft. There is no mass.      Tenderness: There is no abdominal tenderness. There is no  rebound.      Hernia: No hernia is present.   Musculoskeletal: Normal range of motion.         General: No tenderness.   Skin:     General: Skin is warm and dry.      Coloration: Skin is not pale.      Findings: No bruising, ecchymosis, erythema or rash.      Nails: There is no clubbing.     Neurological:      Mental Status: She is alert and oriented to person, place, and time.      Sensory: No sensory deficit.      Motor: No abnormal muscle tone.      Coordination: Coordination normal.      Deep Tendon Reflexes: Reflexes abnormal.      Comments: Limited exam on telemedicine.  Left-sided residual hemiparesis which is unchanged from the last visit   Psychiatric:         Speech: Speech normal.         Behavior: Behavior normal.         Thought Content: Thought content normal.         Judgment: Judgment normal.         Significant Labs:  CMP:   Recent Labs   Lab 08/17/20  1355   *   CALCIUM 9.5   ALBUMIN 3.9   PROT 7.7   *   K 4.0   CO2 24   CL 92*   BUN 16   CREATININE 1.0   ALKPHOS 190*   ALT 48*   AST 32   BILITOT 0.6     All labs within the past 24 hours have been reviewed.    Significant Imaging:  Labs: Reviewed  CT: Reviewed

## 2020-08-17 NOTE — ASSESSMENT & PLAN NOTE
Creatinine much more improved.  Recommend normal saline for hyponatremia if okay with Dr. Hagen of Neurosurgery

## 2020-08-17 NOTE — ASSESSMENT & PLAN NOTE
Telemetry  NeuroSurgeon consulted  Monitor closely for any signs of decline  Home asa placed on hold- last dose was 8/17/2020  Patient reports home arixtra was discontinued about a week or ago  Neurochecks q 4 hours

## 2020-08-17 NOTE — ED NOTES
Pt resting in ER stretcher, aaox4, rr e/u, NAD noted. Pt remains on cardiac monitor with vss noted. Bed low and locked, call light in reach, side rails up x2. Spouse at the bedside.  Pt verbalized understanding of status and POC; denies further needs. Will continue to monitor.

## 2020-08-17 NOTE — ED NOTES
Pt is 2 weeks post op craniotomy with Dr. Hagen; CASTILLO from rehab Friday. Seen at clinic today for L sided weakness. CT showed hydrocephalus. Sent to ED by neurosurgery to be admitted for shunt placement on Thursday. Craniotomy incision clean and dry; no redness, heat, or drainage noted. Pt resting comfortably at this time, NAD. Call light within reach, side rails up x 2; pt denies needs at this time. Will continue to monitor.

## 2020-08-17 NOTE — ED PROVIDER NOTES
SCRIBE #1 NOTE: I, Manju Johnson, am scribing for, and in the presence of, Sobia Dennis MD. I have scribed the entire note.       History     Chief Complaint   Patient presents with    Post-op Problem     Sent by neurosurgery, Dr. Hagen for shunt placement s/p craniotomy x2wks. Hydrocephalus noted on CT scan     Review of patient's allergies indicates:   Allergen Reactions    Heparin analogues Other (See Comments)     DANYELLE     Hydrochlorothiazide      hypercalcemia         History of Present Illness     HPI    8/17/2020, 1:24 PM  History obtained from the patient      History of Present Illness: Cata Lang is a 49 y.o. female patient with a PMHx of DM, HTN, seizures, and stroke (residual left sided weakness) who is s/p craniotomy for meningioma on 7/31/20 by Dr. Hagen who presents to the Emergency Department for evaluation of post-op problem. She was discharged from rehab 3 days ago and the next day she started having dizziness, HA, and increased left sided weakness.  also reports multiple falls over the past few days. Symptoms are constant and moderate in severity. No mitigating or exacerbating factors reported. Patient denies any head injury/trauma, LOC, back pain, neck pain, extremity numbness, n/v/d, confusion, speech difficulty, facial asymmetry, and all other sxs at this time. Patient had f/u visit with Dr. Hagen today and CT showed hydrocephalus. Dr. Hagen would like patient admitted for  shunt placement. Not currently taking blood thinners. No further complaints or concerns at this time.       Arrival mode: Personal vehicle    PCP: Sabra Fregoso MD        Past Medical History:  Past Medical History:   Diagnosis Date    Diabetes mellitus     Hydrocephalus, acquired 8/17/2020    Hypertension     Seizures     Stroke     Thyroid disease        Past Surgical History:  Past Surgical History:   Procedure Laterality Date    CRANIOTOMY      THYROIDECTOMY      TONSILLECTOMY            Family History:  History reviewed. No pertinent family history.    Social History:  Social History     Tobacco Use    Smoking status: Current Every Day Smoker     Packs/day: 1.00     Types: Cigarettes    Tobacco comment: none since July 2020   Substance and Sexual Activity    Alcohol use: Not Currently    Drug use: Not Currently    Sexual activity: Not on file        Review of Systems     Review of Systems   Constitutional: Negative for activity change, appetite change, chills, diaphoresis, fatigue and fever.   HENT: Negative for congestion, ear pain, nosebleeds, rhinorrhea, sinus pain, sore throat and trouble swallowing.    Eyes: Negative for pain and discharge.   Respiratory: Negative for cough, chest tightness, shortness of breath, wheezing and stridor.    Cardiovascular: Negative for chest pain, palpitations and leg swelling.   Gastrointestinal: Negative for abdominal distention, abdominal pain, blood in stool, constipation, diarrhea, nausea and vomiting.   Genitourinary: Negative for difficulty urinating, dysuria, flank pain, frequency, hematuria and urgency.   Musculoskeletal: Negative for arthralgias, back pain, myalgias and neck pain.   Skin: Negative for pallor, rash and wound.   Neurological: Positive for dizziness, weakness (left sided) and headaches. Negative for syncope, facial asymmetry, speech difficulty, light-headedness and numbness.        (-) head injury/trauma   Hematological: Does not bruise/bleed easily.   Psychiatric/Behavioral: Negative for confusion and self-injury.   All other systems reviewed and are negative.     Physical Exam     Initial Vitals [08/17/20 1304]   BP Pulse Resp Temp SpO2   129/68 85 16 98.1 °F (36.7 °C) 96 %      MAP       --          Physical Exam  Nursing Notes and Vital Signs Reviewed.  Constitutional: Patient is in no acute distress. Chronically ill-appearing. Appears weak and fatigued.   Head: Well-healing craniotomy frontal surgical incision site. Edges  are well-approximated. Normocephalic.  Eyes: PERRL. EOM intact. Conjunctivae are not pale. No scleral icterus.  ENT: Mucous membranes are moist. Oropharynx is clear and symmetric.    Neck: Supple. Full ROM. No lymphadenopathy.  Cardiovascular: Regular rate. Regular rhythm. No murmurs, rubs, or gallops. Distal pulses are 2+ and symmetric.  Pulmonary/Chest: No respiratory distress. Clear to auscultation bilaterally. No wheezing or rales.  Abdominal: Soft and non-distended.  There is no tenderness.  No rebound, guarding, or rigidity. Good bowel sounds.  Genitourinary: No CVA tenderness  Musculoskeletal: Moves all extremities. No obvious deformities. No edema. No calf tenderness.  Skin: Warm and dry.  Neurological: Patient is alert and oriented to person, place and time. GCS 15. Pupils ERRL and EOM normal. Cranial nerves II-XII are intact. Strength is full bilaterally; it is equal and 5/5 in bilateral upper and lower extremities. There is mild pronator drift of left arm. Light touch sense is intact. Speech is clear and normal.   Psychiatric: Normal affect. Good eye contact. Appropriate in content.     ED Course   Critical Care    Date/Time: 8/17/2020 4:16 PM  Performed by: Sobia Dennis MD  Authorized by: Sobia Dennis MD   Direct patient critical care time: 10 minutes  Additional history critical care time: 5 minutes  Ordering / reviewing critical care time: 5 minutes  Documentation critical care time: 5 minutes  Consulting other physicians critical care time: 10 minutes  Consult with family critical care time: 0 minutes  Other critical care time: 0 minutes  Total critical care time (exclusive of procedural time) : 35 minutes  Critical care time was exclusive of separately billable procedures and treating other patients and teaching time.  Critical care was necessary to treat or prevent imminent or life-threatening deterioration of the following conditions: CNS failure or compromise  "(hydrocephalus).  Critical care was time spent personally by me on the following activities: blood draw for specimens, development of treatment plan with patient or surrogate, discussions with consultants, evaluation of patient's response to treatment, examination of patient, obtaining history from patient or surrogate, ordering and performing treatments and interventions, ordering and review of laboratory studies, ordering and review of radiographic studies, re-evaluation of patient's condition and review of old charts.        ED Vital Signs:  Vitals:    08/17/20 1304 08/17/20 1332 08/17/20 1333 08/17/20 1513   BP: 129/68 (!) 164/70  (!) 168/63   Pulse: 85 66  68   Resp: 16 19 19   Temp: 98.1 °F (36.7 °C)      TempSrc: Oral      SpO2: 96% 95%  96%   Weight:   120.7 kg (266 lb 1.6 oz)    Height: 5' 10" (1.778 m)       08/17/20 1514 08/17/20 1524 08/17/20 1602 08/17/20 1710   BP: (!) 168/63  (!) 185/81 (!) 178/77   Pulse: 68 72 67 68   Resp: 20 19 18 18   Temp:    97.2 °F (36.2 °C)   TempSrc:    Oral   SpO2: 96% 96% 96% (!) 94%   Weight:       Height:           Abnormal Lab Results:  Labs Reviewed   CBC W/ AUTO DIFFERENTIAL - Abnormal; Notable for the following components:       Result Value    WBC 14.89 (*)     RBC 3.92 (*)     Hemoglobin 10.1 (*)     Hematocrit 32.7 (*)     Mean Corpuscular Hemoglobin 25.8 (*)     Mean Corpuscular Hemoglobin Conc 30.9 (*)     RDW 17.3 (*)     Immature Granulocytes 1.1 (*)     Gran # (ANC) 12.5 (*)     Immature Grans (Abs) 0.16 (*)     Gran% 84.1 (*)     Lymph% 10.8 (*)     Mono% 3.8 (*)     Platelet Estimate Clumped (*)     All other components within normal limits   COMPREHENSIVE METABOLIC PANEL - Abnormal; Notable for the following components:    Sodium 129 (*)     Chloride 92 (*)     Glucose 145 (*)     Alkaline Phosphatase 190 (*)     ALT 48 (*)     All other components within normal limits   PROTIME-INR   APTT   SARS-COV-2 RNA AMPLIFICATION, QUAL    Narrative:     admit    "     All Lab Results:  Results for orders placed or performed during the hospital encounter of 08/17/20   CBC auto differential   Result Value Ref Range    WBC 14.89 (H) 3.90 - 12.70 K/uL    RBC 3.92 (L) 4.00 - 5.40 M/uL    Hemoglobin 10.1 (L) 12.0 - 16.0 g/dL    Hematocrit 32.7 (L) 37.0 - 48.5 %    Mean Corpuscular Volume 83 82 - 98 fL    Mean Corpuscular Hemoglobin 25.8 (L) 27.0 - 31.0 pg    Mean Corpuscular Hemoglobin Conc 30.9 (L) 32.0 - 36.0 g/dL    RDW 17.3 (H) 11.5 - 14.5 %    Platelets 339 150 - 350 K/uL    MPV SEE COMMENT 9.2 - 12.9 fL    Immature Granulocytes 1.1 (H) 0.0 - 0.5 %    Gran # (ANC) 12.5 (H) 1.8 - 7.7 K/uL    Immature Grans (Abs) 0.16 (H) 0.00 - 0.04 K/uL    Lymph # 1.6 1.0 - 4.8 K/uL    Mono # 0.6 0.3 - 1.0 K/uL    Eos # 0.0 0.0 - 0.5 K/uL    Baso # 0.02 0.00 - 0.20 K/uL    nRBC 0 0 /100 WBC    Gran% 84.1 (H) 38.0 - 73.0 %    Lymph% 10.8 (L) 18.0 - 48.0 %    Mono% 3.8 (L) 4.0 - 15.0 %    Eosinophil% 0.1 0.0 - 8.0 %    Basophil% 0.1 0.0 - 1.9 %    Platelet Estimate Clumped (A)     Poik Slight     Ovalocytes Occasional     Differential Method Automated    Comprehensive metabolic panel   Result Value Ref Range    Sodium 129 (L) 136 - 145 mmol/L    Potassium 4.0 3.5 - 5.1 mmol/L    Chloride 92 (L) 95 - 110 mmol/L    CO2 24 23 - 29 mmol/L    Glucose 145 (H) 70 - 110 mg/dL    BUN, Bld 16 6 - 20 mg/dL    Creatinine 1.0 0.5 - 1.4 mg/dL    Calcium 9.5 8.7 - 10.5 mg/dL    Total Protein 7.7 6.0 - 8.4 g/dL    Albumin 3.9 3.5 - 5.2 g/dL    Total Bilirubin 0.6 0.1 - 1.0 mg/dL    Alkaline Phosphatase 190 (H) 55 - 135 U/L    AST 32 10 - 40 U/L    ALT 48 (H) 10 - 44 U/L    Anion Gap 13 8 - 16 mmol/L    eGFR if African American >60 >60 mL/min/1.73 m^2    eGFR if non African American >60 >60 mL/min/1.73 m^2   Protime-INR   Result Value Ref Range    Prothrombin Time 10.8 9.0 - 12.5 sec    INR 1.0 0.8 - 1.2   APTT   Result Value Ref Range    aPTT 24.8 21.0 - 32.0 sec   COVID-19 Rapid Screening   Result Value Ref  Range    SARS-CoV-2 RNA, Amplification, Qual Negative Negative         Imaging Results:  Imaging Results          CT Head Stealth W/O Contrast (Final result)  Result time 08/17/20 16:19:53   Procedure changed from CT Head Without Contrast     Final result by Kingsley Churchill MD (08/17/20 16:19:53)                 Impression:      Thin-section preoperative CT scan with stable mild dilatation of the ventricular system as above.    Evolving right frontal lobe operative change.    All CT scans at this facility use dose modulation, iterative reconstruction, and/or weight based dosing when appropriate to reduce radiation dose to as low as reasonable achievable.      Electronically signed by: Kingsley Churchill  Date:    08/17/2020  Time:    16:19             Narrative:    EXAMINATION:  CT HEAD STEALTH W/O CONTRAST    CLINICAL HISTORY:  Brain/CNS neoplasm, assess treatment response;    TECHNIQUE:  Volumetric axial CT images obtained throughout the region of the head without use of intravenous contrast via [employer] protocol. Axial, sagittal and coronal reconstructions were performed.    COMPARISON:  Head CT earlier same date with priors; MRI brain 07/26/2020    FINDINGS:  Continued evolution of postsurgical change within the right frontal lobe status post mass lesion resection.  Persistent mixed density extra-axial collection overlies the frontal lobe, improved as compared to recent postoperative scans and stable to examination earlier same date.  Continued hypodensity within the right frontal lobe.  Unchanged hypodensity in the left occipital lobe.  No new or acute area of parenchymal hypoattenuation.    Ex vacuo dilatation of the right frontal horn.  Stable prominence of the ventricular system as compared to recent head CT, but mild increase in size as compared to remote priors.    Stable subcutaneous fluid collection overlying the craniotomy site, stable to recent prior but decreased as compared to remote priors.   Paranasal sinuses are predominantly clear.  Mastoid air cells are clear.  Osseous calvarium is otherwise intact.                                      The Emergency Provider reviewed the vital signs and test results, which are outlined above.     ED Discussion     1:18 PM: Discussed pt's case with Dr. Hagen (neurosurgery) who recommends CT Head without contrast using the stealth protocol. Regular diet for now. Will plan to do surgery on 8/20/20 as patient will need to be off of Aspirin for a few days. Recommends consult to hospital medicine for admission to regular floor. He would also like Dr. Eller (nephrology) to see her in f/u for renal injury from her last visit. States pt cannot walk and should be on fall precautions. Suggests physical therapy consult as well.     4:08 PM: Kidney function is normal. Dr. Eller (nephrology) aware. No recommendations at this time.    4:13 PM: Discussed case with Gita Dyer NP (Hospital Medicine). Dr. Guy agrees with current care and management of pt and accepts admission.   Admitting Service: hospital medicine  Admitting Physician: Dr. Guy  Admit to: inpatient tele    4:14 PM: Re-evaluated pt. I have discussed test results, shared treatment plan, and the need for admission with patient at bedside. Pt expresses understanding at this time and agrees with all information. All questions answered. Pt has no further questions or concerns at this time. Pt is ready for admit.         Medical Decision Making:   Clinical Tests:   Lab Tests: Ordered and Reviewed  Radiological Study: Ordered and Reviewed           ED Medication(s):  Medications   multivitamin tablet (has no administration in time range)   atorvastatin tablet 10 mg (has no administration in time range)   calcitRIOL capsule 0.25 mcg (has no administration in time range)   calcium carbonate (OS-THANIA) tablet 500 mg (has no administration in time range)   cholecalciferol (vitamin D3) 125 mcg (5,000 unit) tablet 5,000 Units (has  no administration in time range)   DULoxetine DR capsule 30 mg (has no administration in time range)   levETIRAcetam tablet 1,500 mg (has no administration in time range)   levothyroxine tablet 137 mcg (has no administration in time range)   pantoprazole EC tablet 40 mg (has no administration in time range)   melatonin tablet 6 mg (has no administration in time range)   amLODIPine tablet 5 mg (has no administration in time range)   gabapentin capsule 600 mg (has no administration in time range)   glucose chewable tablet 16 g (has no administration in time range)   glucose chewable tablet 24 g (has no administration in time range)   dextrose 50% injection 12.5 g (has no administration in time range)   dextrose 50% injection 25 g (has no administration in time range)   glucagon (human recombinant) injection 1 mg (has no administration in time range)   insulin aspart U-100 pen 0-5 Units (has no administration in time range)   ascorbic acid (vitamin C) tablet 500 mg (has no administration in time range)   ferrous sulfate EC tablet 325 mg (has no administration in time range)   HYDROcodone-acetaminophen 5-325 mg per tablet 1 tablet (has no administration in time range)         Scribe Attestation:   Scribe #1: I performed the above scribed service and the documentation accurately describes the services I performed. I attest to the accuracy of the note.     Attending:   Physician Attestation Statement for Scribe #1: I, Sobia Dennis MD, personally performed the services described in this documentation, as scribed by Manju Johnson, in my presence, and it is both accurate and complete.           Clinical Impression       ICD-10-CM ICD-9-CM   1. Obstructive hydrocephalus  G91.1 331.4   2. Status post craniotomy  Z98.890 V45.89   3. Meningioma, cerebral  D32.0 225.2   4. Hyponatremia  E87.1 276.1       Disposition:   Disposition: Admitted  Condition: Serious         Sobia Dennis MD  08/17/20 [id number]

## 2020-08-17 NOTE — ASSESSMENT & PLAN NOTE
Monitor- Levels running 135-136 three weeks ago  Add 1500ml fluid restriction  Nephrology consulted  Seizure precautions

## 2020-08-17 NOTE — TELEPHONE ENCOUNTER
Pt sent to ER for admit due to change in status.   transported pt to ER at [facility] for admit. Dr. Hagen notified ER MD to give report.   NRSX appt and MRI scheduled for next week cancelled. New appt will be scheduled once patient is discharged.   Surgical consent scanned into media.

## 2020-08-17 NOTE — TELEPHONE ENCOUNTER
Attempted to call patient to scheduled Rad Onc consult. There was no answer I see that the patient has been admitted into the ER. We will call back when the patient is discharged.

## 2020-08-17 NOTE — ASSESSMENT & PLAN NOTE
Seems to be hypovolemic hyponatremia.  Will check urine sodium and serum uric acid.  Check for SIADH.  Recommend normal saline if okay with neurosurgery

## 2020-08-17 NOTE — ASSESSMENT & PLAN NOTE
7/31/2020 S/p Crainotomy with Aspiration of frontal pseudomeningocele   Patient reports she has been taking her decadron at home but is unsure of the dosage- Asked  to bring in   Will defer steroid dosing to NeuroSurgeon

## 2020-08-17 NOTE — CONSULTS
[facility] -   Nephrology  Consult Note  Nephrology Telemedicine Consult Note    Consultation started: 8/17/2020 at 5:12 PM   The chief complaint leading to consultation is:  Acute kidney injury hypercalcemia and hyponatremia  This consultation was requested by:  Dr. Hagen-neurosurgery  The patient location is:  Emergency Department  The patient arrived at: [facility]  Spoke nurse at bedside with patient assisting consultant. Also present with the patient at the time of the consultation:Emergency room nursing staff    The attending portion of this evaluation, treatment, and documentation was performed per Veronika Eller MD via audiovisual.         Patient Name: Cata Lang  MRN: [id number]  Admission Date: 8/17/2020  Hospital Length of Stay: 0 days  Attending Provider: Lurdes Guy MD   Primary Care Physician: Sabra Fregoso MD  Principal Problem:<principal problem not specified>    Consults  Subjective:     HPI: Patient is a 49-year-old female with type 2 diabetes hypertension history of 6 seizure disorder.  Also has a history of stroke status post left-sided weakness.  In July of 2020 patient was hospitalized for meningioma.  The hospital stay was complicated by acute kidney injury with severe hypercalcemia improved and painted which was treated with multiple medications and IV fluids.  Patient's acute kidney injury improved and ultimately patient underwent craniotomy for meningioma by Dr. Hagen on 07/31/2020.  Patient was subsequently discharged in a stable condition.  Patient is being readmitted for placement of  shunt for hydrocephalus.  Nephrology consultation provided for follow-up of acute kidney injury, hypercalcemia and hyponatremia.  Management discussed with the emergency room staff.    Bedside consultation provided using telemedicine protocol    Past Medical History:   Diagnosis Date    Diabetes mellitus     Hydrocephalus, acquired 8/17/2020    Hypertension     Seizures      Stroke     Thyroid disease        Past Surgical History:   Procedure Laterality Date    CRANIOTOMY      THYROIDECTOMY      TONSILLECTOMY         Review of patient's allergies indicates:   Allergen Reactions    Heparin analogues Other (See Comments)     DANYELLE     Hydrochlorothiazide      hypercalcemia     No current facility-administered medications for this encounter.      Family History     None        Tobacco Use    Smoking status: Current Every Day Smoker     Packs/day: 1.00     Types: Cigarettes    Tobacco comment: none since July 2020   Substance and Sexual Activity    Alcohol use: Not Currently    Drug use: Not Currently    Sexual activity: Not on file     Review of Systems   Constitutional: Negative for appetite change, chills, diaphoresis, fatigue and fever.   HENT: Negative for ear discharge, hearing loss and postnasal drip.    Eyes: Negative for visual disturbance.   Respiratory: Negative for apnea, cough, chest tightness, shortness of breath, wheezing and stridor.    Cardiovascular: Negative for chest pain, palpitations and leg swelling.   Gastrointestinal: Negative for abdominal distention, abdominal pain, blood in stool, diarrhea, nausea and vomiting.   Genitourinary: Negative for decreased urine volume, difficulty urinating, dyspareunia, dysuria, enuresis, flank pain, frequency, genital sores, hematuria, pelvic pain, urgency and vaginal discharge.   Musculoskeletal: Negative for arthralgias, back pain, gait problem, joint swelling, neck pain and neck stiffness.   Skin: Negative for color change and rash.   Neurological: Positive for headaches. Negative for dizziness, tremors, seizures, syncope, weakness, light-headedness and numbness.        Left-sided weakness which is old    Has complained of some weakness and has been falling.   Hematological: Does not bruise/bleed easily.   Psychiatric/Behavioral: Negative for agitation, confusion, sleep disturbance and suicidal ideas.   All other  systems reviewed and are negative.    Objective:     Vital Signs (Most Recent):  Temp: 98.1 °F (36.7 °C) (08/17/20 1304)  Pulse: 67 (08/17/20 1602)  Resp: 18 (08/17/20 1602)  BP: (!) 185/81(MD notified) (08/17/20 1602)  SpO2: 96 % (08/17/20 1602)  O2 Device (Oxygen Therapy): room air (08/17/20 1602) Vital Signs (24h Range):  Temp:  [98.1 °F (36.7 °C)] 98.1 °F (36.7 °C)  Pulse:  [66-85] 67  Resp:  [16-20] 18  SpO2:  [95 %-96 %] 96 %  BP: (129-185)/() 185/81     Weight: 120.7 kg (266 lb 1.6 oz) (08/17/20 1333)  Body mass index is 38.18 kg/m².  Body surface area is 2.44 meters squared.    No intake/output data recorded.    Physical Exam  Vitals signs and nursing note reviewed.   Constitutional:       Appearance: She is well-developed.   HENT:      Head: Normocephalic and atraumatic.      Comments: Dressing on the head from the previous craniotomy.  Eyes:      Conjunctiva/sclera: Conjunctivae normal.      Pupils: Pupils are equal, round, and reactive to light.   Neck:      Musculoskeletal: Full passive range of motion without pain, normal range of motion and neck supple. No edema.      Thyroid: No thyroid mass or thyromegaly.      Vascular: No carotid bruit.   Cardiovascular:      Rate and Rhythm: Normal rate and regular rhythm.      Chest Wall: PMI is not displaced.      Pulses: Normal pulses.      Heart sounds: Normal heart sounds, S1 normal and S2 normal. No murmur. No friction rub.   Pulmonary:      Effort: Pulmonary effort is normal. No accessory muscle usage or respiratory distress.      Breath sounds: Normal breath sounds. No wheezing or rales.   Chest:      Chest wall: No tenderness.   Abdominal:      General: Bowel sounds are normal. There is no distension.      Palpations: Abdomen is soft. There is no mass.      Tenderness: There is no abdominal tenderness. There is no rebound.      Hernia: No hernia is present.   Musculoskeletal: Normal range of motion.         General: No tenderness.   Skin:      General: Skin is warm and dry.      Coloration: Skin is not pale.      Findings: No bruising, ecchymosis, erythema or rash.      Nails: There is no clubbing.     Neurological:      Mental Status: She is alert and oriented to person, place, and time.      Sensory: No sensory deficit.      Motor: No abnormal muscle tone.      Coordination: Coordination normal.      Deep Tendon Reflexes: Reflexes abnormal.      Comments: Limited exam on telemedicine.  Left-sided residual hemiparesis which is unchanged from the last visit   Psychiatric:         Speech: Speech normal.         Behavior: Behavior normal.         Thought Content: Thought content normal.         Judgment: Judgment normal.         Significant Labs:  CMP:   Recent Labs   Lab 08/17/20  1355   *   CALCIUM 9.5   ALBUMIN 3.9   PROT 7.7   *   K 4.0   CO2 24   CL 92*   BUN 16   CREATININE 1.0   ALKPHOS 190*   ALT 48*   AST 32   BILITOT 0.6     All labs within the past 24 hours have been reviewed.    Significant Imaging:  Labs: Reviewed  CT: Reviewed    Assessment/Plan:     Hyponatremia  Seems to be hypovolemic hyponatremia.  Will check urine sodium and serum uric acid.  Check for SIADH.  Recommend normal saline if okay with neurosurgery    SHARLENE on CKD, stage III  Creatinine much more improved.  Recommend normal saline for hyponatremia if okay with Dr. Hagen of Neurosurgery        Thank you for your consult.     Veronika Eller MD   Nephrology  [facility] -

## 2020-08-17 NOTE — ED NOTES
Pt's , Raulito Lang  [phone]     at the bedside.  May leave shortly to return home for clothes, etc.  Answered questions regarding current visitation policy.

## 2020-08-17 NOTE — HPI
Patient is a 49-year-old female with type 2 diabetes hypertension history of 6 seizure disorder.  Also has a history of stroke status post left-sided weakness.  In July of 2020 patient was hospitalized for meningioma.  The hospital stay was complicated by acute kidney injury with severe hypercalcemia improved and painted which was treated with multiple medications and IV fluids.  Patient's acute kidney injury improved and ultimately patient underwent craniotomy for meningioma by Dr. Hagen on 07/31/2020.  Patient was subsequently discharged in a stable condition.  Patient is being readmitted for placement of  shunt for hydrocephalus.  Nephrology consultation provided for follow-up of acute kidney injury, hypercalcemia and hyponatremia.  Management discussed with the emergency room staff.    Bedside consultation provided using telemedicine protocol

## 2020-08-17 NOTE — PATIENT INSTRUCTIONS
Weaning steroids:  -starting tomorrow (8/18/20) take 1 tab (2mg) once in the morning and once at night.  Please do this for 3 days.  -on 8/21/2020, please take 1 tab every morning for 3 days.  -on 8/25/2020, please take 1/2 a tab every morning for 2 days then stop    Please take over the counter omeprazole as prescribed on the box while taking your steroid to protect your stomach

## 2020-08-17 NOTE — ED NOTES
Report received from MATTY Santo. Patient sitting up in bed, no acute distress noted, awake, alert, and oriented x 3, calm, respirations even and unlabored, and skin is warm and dry. Patient verbalized understanding of status and plan of care. Patient denies needs at this time. Side rails up x 2, call light in reach, bed low and locked.  Will continue to monitor.

## 2020-08-17 NOTE — HPI
This is a 50 yo female who has a PMHX of Dm2, HTN, CKD stage 3, anemia of chronic disease, Prior CVA in 2015 felt to be from her with residual left sided weakness,  And s/p craniotomy for meningioma on 7/31/20 done by Dr. Hagen. She was discharged from rehab 3 days ago and reports that the next day she began  having dizziness, HA, and increased left sided weakness form baseline. Patient has also been having multiple falls the past few days. Patient had a follow up visit with Dr. Hagen today and per report, CT showed signs of  hydrocephalus. Dr. Hagen requested patient be placed in the hospital for further monitoring, physical therapy, and plans for upcoming   shunt placement.

## 2020-08-17 NOTE — PROGRESS NOTES
Subjective:      Patient ID: Cata Lang is a 49 y.o. female.    Chief Complaint: Post-op Evaluation (suture removal)    Ms. Lang is a pleasant 48 yo female who presents to the neurosurgery clinic for follow-up s/p right frontal craniotomy for resection of meningioma performed by Dr. Hagen on July 31, 2020.  She was recently discharged from an inpatient rehab facility last week and according to her  who is accompanying her, she has had an abrupt decrease in neurostatus.  Since being home, he states her ability to mobilize has decreased and she has had about 3 falls.  He also states her left sided weakness is beginning to return.  He reports her being more alert on arriving home and is becoming lethargic.  Her incision has been healing nicely.  There is a small CSF collection near the right eye that has greatly improved since the patient's hospitalization.  No drainage from the incision is noted.  On exam, she is alert and oriented and able to answer questions appropriately.  She is able to follow commands without difficulty.  She is weak and required max assistance with mobilization and with standing from a sitting position.  Slight left pronator drift present.  Patient's sutures were easily removed during today's visit.  No bilateral eye swelling noted one exam.  The patient reports she feels tired but is able to converse normally.  Speech is clear and appropriate to situation.  Pathology report reviewed with patient which states positive for WHO grade II meningioma.    Past Medical History:   Diagnosis Date    Diabetes mellitus     Hydrocephalus, acquired 8/17/2020    Hypertension     Seizures     Stroke     Thyroid disease      Past Surgical History:   Procedure Laterality Date    THYROIDECTOMY      TONSILLECTOMY       History reviewed. No pertinent family history.  Social History     Tobacco Use    Smoking status: Current Every Day Smoker     Packs/day: 1.00     Types: Cigarettes    Substance Use Topics    Alcohol use: Yes    Drug use: Not Currently     Current Outpatient Medications on File Prior to Visit   Medication Sig Dispense Refill    amLODIPine (NORVASC) 5 MG tablet Take 1 tablet (5 mg total) by mouth once daily. 30 tablet 11    aspirin 81 MG Chew Take 1 tablet (81 mg total) by mouth once daily.  0    atorvastatin (LIPITOR) 10 MG tablet Take 10 mg by mouth once daily.       calcitRIOL (ROCALTROL) 0.25 MCG Cap 0.25 mcg once daily.       calcium carbonate (OS-THANIA) 500 mg calcium (1,250 mg) tablet Take 1 tablet (500 mg total) by mouth 4 (four) times daily.  0    cholecalciferol, vitamin D3, 125 mcg (5,000 unit) Tab Take 1 tablet (5,000 Units total) by mouth once daily. 30 tablet 1    fondaparinux (ARIXTRA) 2.5 mg/0.5 mL Syrg Inject 0.5 mLs (2.5 mg total) into the skin once daily. 14 mL 0    gabapentin (NEURONTIN) 600 MG tablet Take 600 mg by mouth once daily.       HYDROcodone-acetaminophen (NORCO) 5-325 mg per tablet Take 1 tablet by mouth every 4 (four) hours as needed. 15 tablet 0    levETIRAcetam (KEPPRA) 500 MG Tab Take 1,500 mg by mouth 2 (two) times daily.      levothyroxine (SYNTHROID) 137 MCG Tab tablet Take 1 tablet (137 mcg total) by mouth once daily.      metFORMIN (GLUCOPHAGE) 500 MG tablet Take 500 mg by mouth 2 (two) times daily with meals.      zolpidem (AMBIEN) 10 mg Tab Take 1 tablet (10 mg total) by mouth nightly as needed. 30 tablet 0    ferrous sulfate 325 (65 FE) MG EC tablet Take 1 tablet (325 mg total) by mouth 2 (two) times daily. 60 tablet 1     No current facility-administered medications on file prior to visit.      Review of Systems   Constitutional: Negative for activity change, appetite change, chills, diaphoresis, fatigue, fever and unexpected weight change.   HENT: Negative for nasal congestion, ear discharge, facial swelling, hearing loss, postnasal drip, rhinorrhea, tinnitus and trouble swallowing.    Eyes: Negative for photophobia,  pain, discharge, redness, itching and visual disturbance.   Respiratory: Negative for apnea, cough, choking, chest tightness, shortness of breath and wheezing.    Cardiovascular: Negative for chest pain, leg swelling and claudication.   Gastrointestinal: Negative for abdominal distention, abdominal pain, change in bowel habit, constipation, diarrhea, nausea, vomiting, fecal incontinence and change in bowel habit.   Endocrine: Negative for cold intolerance and heat intolerance.   Genitourinary: Negative for bladder incontinence, decreased urine volume, difficulty urinating, frequency, hematuria and urgency.   Musculoskeletal: Negative for arthralgias, back pain, gait problem, leg pain, neck pain and neck stiffness.   Neurological: Positive for dizziness, weakness, headaches, disturbances in coordination and coordination difficulties. Negative for vertigo, tremors, seizures, syncope, facial asymmetry, speech difficulty, light-headedness, numbness and memory loss.   Psychiatric/Behavioral: Negative for behavioral problems, confusion, decreased concentration, hallucinations and sleep disturbance. The patient is not nervous/anxious.          Objective:     Vitals:    08/17/20 1028   BP: (!) 162/100   Pulse: 82   Resp: 16      Review of patient's allergies indicates:   Allergen Reactions    Heparin analogues Other (See Comments)     DANYELLE     Hydrochlorothiazide      hypercalcemia      Body mass index is 41.06 kg/m².     Physical Exam:  Vitals reviewed.    Constitutional: She appears well-developed.   Speech is clear and appropriate English.  Nose: no rhinorrhea  Ears: no otorrhea  Neck: trachea midline, good dentition  Left pronator drift present.     Eyes: Pupils are equal, round, and reactive to light. EOM are normal.     Cardiovascular: Normal rate.     Abdominal: Soft.     Skin:   Incision is well approximated.  There is no swelling, redness, or drainage.  Sutures were easily removed without difficulty.  There is a  small CSF collection near the right eye that has greatly improved since the patient's hospitalization.      Psych/Behavior: She is alert. She is oriented to person, place, and time. She has a normal mood and affect.     Musculoskeletal:        Neck: Range of motion is full.        Back: Range of motion is full.        Right Upper Extremities: Range of motion is full.        Left Upper Extremities: Range of motion is full.       Right Lower Extremities: Range of motion is full.        Left Lower Extremities: Range of motion is full.     Neurological:        Coordination: She has abnormal tandem walking coordination.        Cranial nerves: Cranial nerve(s) II, III, IV, V, VI, VII, VIII, IX, X, XI and XII are intact.     Neurologic Exam     Mental Status   Oriented to person, place, and time.   Registration: recalls 3 of 3 objects.   Attention: normal. Concentration: normal.   Speech: speech is normal   Level of consciousness: alert    Cranial Nerves     CN III, IV, VI   Pupils are equal, round, and reactive to light.  Extraocular motions are normal.     CN VIII   CN VIII normal.     CN IX, X   CN IX normal.   CN X normal.     CN XI   CN XI normal.     CN XII   CN XII normal.     Gait, Coordination, and Reflexes Patient is presenting with left sided weakness.  Unstable and requiring wheel chair.       Assessment:     1. Obstructive hydrocephalus    2. Malignant neoplasm of frontal lobe    3. Atypical meningioma of brain    4. Hydrocephalus, acquired      Plan:     Obstructive hydrocephalus  -     CT Head Without Contrast  -     CBC auto differential; Future; Expected date: 08/17/2020  -     Comprehensive metabolic panel; Future; Expected date: 08/17/2020  -     Urinalysis, Reflex to Urine Culture Urine, Clean Catch  -     COVID-19 Routine Screening; Future; Expected date: 08/17/2020  -     APTT; Future; Expected date: 08/17/2020  -     POCT PT/INR; Future; Expected date: 08/17/2020    Malignant neoplasm of frontal  lobe  -     MRI Brain W WO Contrast; Future; Expected date: 08/17/2020    Atypical meningioma of brain  -     Ambulatory referral/consult to Radiation Oncology; Future; Expected date: 08/24/2020  -     CT Head Without Contrast    Hydrocephalus, acquired    Due to an abrupt decrease in function after returning from inpatient rehab, a STAT head CT wo contrast was ordered during today's visit. Regarding the patient's incision, it is healing nicely with no signs of infection.  She was encouraged to wait one more week before submerging the incision underwater.  She was also counseled to apply Neosporin over the incision to aid with healing.  She will also be placed on a slow Decadron taper and instructions were provided.  She will follow-up in 2 weeks with Dr. Hagen with an MRI Brain wwo.  A referral to radiation oncology has been placed.  Due to the patient's residence being near [place], a referral to radiation oncology has been placed there.  Dr. Hagen would like to see the patient back first before starting treatment.        Thank you for the referral   Please call with any questions    Tania Arthur PA-C  Neurosurgery     ADDENDUM:  The  gives a very clear history a patient progressively improving after surgery and entering rehab in doing quite well, only to begin regressing over the last 4 5 days at home.  She certainly looks more dead pan and lethargic, and has had a recurrence of a mild left hemiparesis.  She is unable to walk more than a single step, having been some much better during the immediate postoperative period.  We had considered rebound edema from lowering her dexamethasone, however she has been replaced on that in rehab to no avail.  Her CT scan shows progressive distention of the ventricles and widening of the temporal horns.    I discussed the situation with the patient and her .  I think she is going to require a ventriculoperitoneal shunt.  I discussed this at length with the  patient and her  as well as the inherent risks of the procedure which include but are not limited to those of bleeding, infection, stroke, seizures, residual recurrent symptoms, shunt failure, breakage, occlusion or Mr. action, medical or anesthetic complications among others.  Understanding these risks and others, they wished to proceed as outlined above.  Should there going to be admitted to the emergency room today for medical clearance in anticipation of a shunt in the immediate future.    Jose Hagen MD

## 2020-08-18 LAB
ALBUMIN SERPL BCP-MCNC: 3.9 G/DL (ref 3.5–5.2)
ALP SERPL-CCNC: 180 U/L (ref 55–135)
ALT SERPL W/O P-5'-P-CCNC: 51 U/L (ref 10–44)
ANION GAP SERPL CALC-SCNC: 11 MMOL/L (ref 8–16)
AST SERPL-CCNC: 34 U/L (ref 10–40)
BASOPHILS # BLD AUTO: 0.02 K/UL (ref 0–0.2)
BASOPHILS NFR BLD: 0.1 % (ref 0–1.9)
BILIRUB SERPL-MCNC: 0.6 MG/DL (ref 0.1–1)
BILIRUB UR QL STRIP: NEGATIVE
BUN SERPL-MCNC: 16 MG/DL (ref 6–20)
CALCIUM SERPL-MCNC: 9.4 MG/DL (ref 8.7–10.5)
CHLORIDE SERPL-SCNC: 94 MMOL/L (ref 95–110)
CLARITY UR: CLEAR
CO2 SERPL-SCNC: 25 MMOL/L (ref 23–29)
COLOR UR: YELLOW
CREAT SERPL-MCNC: 1.1 MG/DL (ref 0.5–1.4)
CREAT UR-MCNC: 75.3 MG/DL (ref 15–325)
DIFFERENTIAL METHOD: ABNORMAL
EOSINOPHIL # BLD AUTO: 0 K/UL (ref 0–0.5)
EOSINOPHIL NFR BLD: 0.1 % (ref 0–8)
ERYTHROCYTE [DISTWIDTH] IN BLOOD BY AUTOMATED COUNT: 17.5 % (ref 11.5–14.5)
EST. GFR  (AFRICAN AMERICAN): >60 ML/MIN/1.73 M^2
EST. GFR  (NON AFRICAN AMERICAN): 58 ML/MIN/1.73 M^2
ESTIMATED AVG GLUCOSE: 114 MG/DL (ref 68–131)
GLUCOSE SERPL-MCNC: 144 MG/DL (ref 70–110)
GLUCOSE UR QL STRIP: NEGATIVE
HBA1C MFR BLD HPLC: 5.6 % (ref 4–5.6)
HCT VFR BLD AUTO: 32.3 % (ref 37–48.5)
HGB BLD-MCNC: 10 G/DL (ref 12–16)
HGB UR QL STRIP: NEGATIVE
IMM GRANULOCYTES # BLD AUTO: 0.1 K/UL (ref 0–0.04)
IMM GRANULOCYTES NFR BLD AUTO: 0.7 % (ref 0–0.5)
KETONES UR QL STRIP: NEGATIVE
LEUKOCYTE ESTERASE UR QL STRIP: NEGATIVE
LYMPHOCYTES # BLD AUTO: 1.8 K/UL (ref 1–4.8)
LYMPHOCYTES NFR BLD: 13.4 % (ref 18–48)
MAGNESIUM SERPL-MCNC: 1.9 MG/DL (ref 1.6–2.6)
MCH RBC QN AUTO: 25.5 PG (ref 27–31)
MCHC RBC AUTO-ENTMCNC: 31 G/DL (ref 32–36)
MCV RBC AUTO: 82 FL (ref 82–98)
MONOCYTES # BLD AUTO: 0.7 K/UL (ref 0.3–1)
MONOCYTES NFR BLD: 4.8 % (ref 4–15)
NEUTROPHILS # BLD AUTO: 11 K/UL (ref 1.8–7.7)
NEUTROPHILS NFR BLD: 80.9 % (ref 38–73)
NITRITE UR QL STRIP: NEGATIVE
NRBC BLD-RTO: 0 /100 WBC
PH UR STRIP: 7 [PH] (ref 5–8)
PHOSPHATE SERPL-MCNC: 4 MG/DL (ref 2.7–4.5)
PLATELET # BLD AUTO: 137 K/UL (ref 150–350)
PMV BLD AUTO: 11.7 FL (ref 9.2–12.9)
POCT GLUCOSE: 154 MG/DL (ref 70–110)
POCT GLUCOSE: 166 MG/DL (ref 70–110)
POTASSIUM SERPL-SCNC: 3.8 MMOL/L (ref 3.5–5.1)
PROT SERPL-MCNC: 7.7 G/DL (ref 6–8.4)
PROT UR QL STRIP: NEGATIVE
RBC # BLD AUTO: 3.92 M/UL (ref 4–5.4)
SODIUM SERPL-SCNC: 130 MMOL/L (ref 136–145)
SODIUM UR-SCNC: 58 MMOL/L (ref 20–250)
SP GR UR STRIP: 1.02 (ref 1–1.03)
URATE SERPL-MCNC: 7.5 MG/DL (ref 2.4–5.7)
URN SPEC COLLECT METH UR: NORMAL
UROBILINOGEN UR STRIP-ACNC: NEGATIVE EU/DL
WBC # BLD AUTO: 13.58 K/UL (ref 3.9–12.7)

## 2020-08-18 PROCEDURE — 25000003 PHARM REV CODE 250: Performed by: NEUROLOGICAL SURGERY

## 2020-08-18 PROCEDURE — 99024 POSTOP FOLLOW-UP VISIT: CPT | Mod: ,,, | Performed by: NEUROLOGICAL SURGERY

## 2020-08-18 PROCEDURE — 25000003 PHARM REV CODE 250: Performed by: EMERGENCY MEDICINE

## 2020-08-18 PROCEDURE — 25000003 PHARM REV CODE 250: Performed by: NURSE PRACTITIONER

## 2020-08-18 PROCEDURE — 97163 PT EVAL HIGH COMPLEX 45 MIN: CPT

## 2020-08-18 PROCEDURE — 99024 PR POST-OP FOLLOW-UP VISIT: ICD-10-PCS | Mod: ,,, | Performed by: NEUROLOGICAL SURGERY

## 2020-08-18 PROCEDURE — 99233 SBSQ HOSP IP/OBS HIGH 50: CPT | Mod: ,,, | Performed by: INTERNAL MEDICINE

## 2020-08-18 PROCEDURE — 21400001 HC TELEMETRY ROOM

## 2020-08-18 PROCEDURE — 99233 PR SUBSEQUENT HOSPITAL CARE,LEVL III: ICD-10-PCS | Mod: ,,, | Performed by: INTERNAL MEDICINE

## 2020-08-18 PROCEDURE — 63600175 PHARM REV CODE 636 W HCPCS: Performed by: PHYSICIAN ASSISTANT

## 2020-08-18 PROCEDURE — 36415 COLL VENOUS BLD VENIPUNCTURE: CPT

## 2020-08-18 PROCEDURE — 97530 THERAPEUTIC ACTIVITIES: CPT

## 2020-08-18 PROCEDURE — 63600175 PHARM REV CODE 636 W HCPCS: Performed by: NURSE PRACTITIONER

## 2020-08-18 PROCEDURE — 84100 ASSAY OF PHOSPHORUS: CPT

## 2020-08-18 PROCEDURE — 85025 COMPLETE CBC W/AUTO DIFF WBC: CPT

## 2020-08-18 PROCEDURE — 81003 URINALYSIS AUTO W/O SCOPE: CPT

## 2020-08-18 PROCEDURE — 84300 ASSAY OF URINE SODIUM: CPT

## 2020-08-18 PROCEDURE — 83735 ASSAY OF MAGNESIUM: CPT

## 2020-08-18 PROCEDURE — 97166 OT EVAL MOD COMPLEX 45 MIN: CPT | Performed by: PHYSICAL THERAPIST

## 2020-08-18 PROCEDURE — 82570 ASSAY OF URINE CREATININE: CPT

## 2020-08-18 PROCEDURE — 97530 THERAPEUTIC ACTIVITIES: CPT | Performed by: PHYSICAL THERAPIST

## 2020-08-18 PROCEDURE — 80053 COMPREHEN METABOLIC PANEL: CPT

## 2020-08-18 RX ORDER — HYDRALAZINE HYDROCHLORIDE 20 MG/ML
10 INJECTION INTRAMUSCULAR; INTRAVENOUS EVERY 8 HOURS PRN
Status: DISCONTINUED | OUTPATIENT
Start: 2020-08-18 | End: 2020-08-26 | Stop reason: HOSPADM

## 2020-08-18 RX ORDER — DEXAMETHASONE 1 MG/1
2 TABLET ORAL EVERY 12 HOURS
Status: DISCONTINUED | OUTPATIENT
Start: 2020-08-18 | End: 2020-08-20

## 2020-08-18 RX ORDER — DEXAMETHASONE 1 MG/1
2 TABLET ORAL DAILY
Status: DISCONTINUED | OUTPATIENT
Start: 2020-08-18 | End: 2020-08-18

## 2020-08-18 RX ORDER — SODIUM CHLORIDE 9 MG/ML
INJECTION, SOLUTION INTRAVENOUS CONTINUOUS
Status: ACTIVE | OUTPATIENT
Start: 2020-08-18 | End: 2020-08-20

## 2020-08-18 RX ADMIN — PANTOPRAZOLE SODIUM 40 MG: 40 TABLET, DELAYED RELEASE ORAL at 08:08

## 2020-08-18 RX ADMIN — DULOXETINE 30 MG: 30 CAPSULE, DELAYED RELEASE ORAL at 08:08

## 2020-08-18 RX ADMIN — LEVETIRACETAM 1500 MG: 500 TABLET ORAL at 08:08

## 2020-08-18 RX ADMIN — CALCIUM 500 MG: 500 TABLET ORAL at 02:08

## 2020-08-18 RX ADMIN — Medication 5000 UNITS: at 08:08

## 2020-08-18 RX ADMIN — SODIUM CHLORIDE: 0.9 INJECTION, SOLUTION INTRAVENOUS at 10:08

## 2020-08-18 RX ADMIN — DEXAMETHASONE 2 MG: 1 TABLET ORAL at 08:08

## 2020-08-18 RX ADMIN — ATORVASTATIN CALCIUM 10 MG: 10 TABLET, FILM COATED ORAL at 08:08

## 2020-08-18 RX ADMIN — CALCIUM 500 MG: 500 TABLET ORAL at 05:08

## 2020-08-18 RX ADMIN — GABAPENTIN 600 MG: 300 CAPSULE ORAL at 08:08

## 2020-08-18 RX ADMIN — LEVOTHYROXINE SODIUM 137 MCG: 25 TABLET ORAL at 05:08

## 2020-08-18 RX ADMIN — FERROUS SULFATE TAB EC 325 MG (65 MG FE EQUIVALENT) 325 MG: 325 (65 FE) TABLET DELAYED RESPONSE at 08:08

## 2020-08-18 RX ADMIN — AMLODIPINE BESYLATE 5 MG: 5 TABLET ORAL at 08:08

## 2020-08-18 RX ADMIN — THERA TABS 1 TABLET: TAB at 08:08

## 2020-08-18 RX ADMIN — CALCITRIOL CAPSULES 0.25 MCG 0.25 MCG: 0.25 CAPSULE ORAL at 08:08

## 2020-08-18 RX ADMIN — CALCIUM 500 MG: 500 TABLET ORAL at 08:08

## 2020-08-18 RX ADMIN — HYDROCODONE BITARTRATE AND ACETAMINOPHEN 1 TABLET: 5; 325 TABLET ORAL at 08:08

## 2020-08-18 RX ADMIN — FERROUS SULFATE TAB EC 325 MG (65 MG FE EQUIVALENT) 325 MG: 325 (65 FE) TABLET DELAYED RESPONSE at 05:08

## 2020-08-18 RX ADMIN — HYDROCODONE BITARTRATE AND ACETAMINOPHEN 1 TABLET: 5; 325 TABLET ORAL at 05:08

## 2020-08-18 RX ADMIN — OXYCODONE HYDROCHLORIDE AND ACETAMINOPHEN 500 MG: 500 TABLET ORAL at 08:08

## 2020-08-18 RX ADMIN — HYDRALAZINE HYDROCHLORIDE 10 MG: 20 INJECTION INTRAMUSCULAR; INTRAVENOUS at 08:08

## 2020-08-18 NOTE — ASSESSMENT & PLAN NOTE
S/p craniotomy and L Frontal Meningioma resection- now pt has developed Hydrocephalus in the same area- needs Shunt  No need for any Mannitol  Continue to taper decadron

## 2020-08-18 NOTE — PLAN OF CARE
OT eval completed. Pt requires Max A with LE dressing, Mod A with UE dressing, t/f with Max A x 2 using RW

## 2020-08-18 NOTE — HOSPITAL COURSE
As of 8/26 Patient has been evaluated bu neurosurgeon Dr. Hagen and cleared for DC to inpatient rehab today with outpatient follow-up in clinic in approximately 2 weeks.  VS remain stable, and patient has no complaints.  She is currently AAO, resting comfortably in bed with  at bedside.  Incisions are CDI with no drainage, or s/s of infection.  Case d/w Dr. Hagen, ok to continue home ASA and Eliquis for DVT prophylaxis with plans to DC Eliquis once patient is more mobile.  Case also d/w Dr. Low, patient seen and examined and deemed medically stable to DC to inpatient rehab today.  Patient will DC to the care of [facility] for continued PT/OT 5 times per week.  Medications reconciled for DC.  Patient will need to complete a 10 day course of oral doxycycline for suspected dental abscess and will need outpatient follow-up with her dentist for monitoring.  She will need to follow-up with her PCP and with neuro surgery upon DC from rehab as directed.

## 2020-08-18 NOTE — ASSESSMENT & PLAN NOTE
Creatinine much more improved.  Recommend normal saline for hyponatremia     No evidence of SIADH uric acid is high.  No further workup at this time.  Follow serial blood work.

## 2020-08-18 NOTE — PROGRESS NOTES
High risk wound care screening on this 48 y/o F patient due to recent history of full thickness wounds to right lateral buttock/hip region. Patient has been readmitted with hydrocephalus following craniotomy and is awaiting  shunt placement. A cluster of full thickness ulcerations of unknown etiology again noted to right lateral buttock/hip region, healing well and 90% resurfaced with hyperpigmentation of re-epithelialized tissue noted. Remaining open wound is dry. Cleansed with saline and foam dressing applied. Recommend change weekly. Will follow.

## 2020-08-18 NOTE — PLAN OF CARE
Patient AAOx3. VSS.  Patient remained afebrile throughout the shift.  Heart rate closely monitored   Patient NSR on monitor.  Patient remained free of falls this shift.  Plan of care reviewed.  Patient verbalized understanding.  Patient moving/turning with assistance  Frequent weight shifting encouraged.  Bed low, siderails up x2, wheels locked, call light in reach.  Patient instructed to call for assistance.  Hourly rounding completed.  Will continue to monitor.

## 2020-08-18 NOTE — SUBJECTIVE & OBJECTIVE
Past Medical History:   Diagnosis Date    Diabetes mellitus     Hydrocephalus, acquired 8/17/2020    Hypertension     Seizures     Stroke     Thyroid disease        Past Surgical History:   Procedure Laterality Date    CRANIOTOMY      THYROIDECTOMY      TONSILLECTOMY         Review of patient's allergies indicates:   Allergen Reactions    Heparin analogues Other (See Comments)     DANYELLE     Hydrochlorothiazide      hypercalcemia       No current facility-administered medications on file prior to encounter.      Current Outpatient Medications on File Prior to Encounter   Medication Sig    amLODIPine (NORVASC) 5 MG tablet Take 1 tablet (5 mg total) by mouth once daily.    aspirin 81 MG Chew Take 1 tablet (81 mg total) by mouth once daily.    atorvastatin (LIPITOR) 10 MG tablet Take 10 mg by mouth once daily.     calcitRIOL (ROCALTROL) 0.25 MCG Cap 0.25 mcg once daily.     calcium carbonate (OS-THANIA) 500 mg calcium (1,250 mg) tablet Take 1 tablet (500 mg total) by mouth 4 (four) times daily.    cholecalciferol, vitamin D3, 125 mcg (5,000 unit) Tab Take 1 tablet (5,000 Units total) by mouth once daily.    dexAMETHasone (DECADRON) 2 MG tablet     DULoxetine (CYMBALTA) 30 MG capsule     fondaparinux (ARIXTRA) 2.5 mg/0.5 mL Syrg Inject 0.5 mLs (2.5 mg total) into the skin once daily.    gabapentin (NEURONTIN) 600 MG tablet Take 600 mg by mouth once daily.     HYDROcodone-acetaminophen (NORCO) 5-325 mg per tablet Take 1 tablet by mouth every 4 (four) hours as needed.    levETIRAcetam (KEPPRA) 500 MG Tab Take 1,500 mg by mouth 2 (two) times daily.    levothyroxine (SYNTHROID) 137 MCG Tab tablet Take 1 tablet (137 mcg total) by mouth once daily.    metFORMIN (GLUCOPHAGE) 500 MG tablet Take 500 mg by mouth 2 (two) times daily with meals.    OMEPRAZOLE ORAL Take by mouth.    ondansetron (ZOFRAN) 4 MG tablet     ferrous sulfate 325 (65 FE) MG EC tablet Take 1 tablet (325 mg total) by mouth 2 (two) times  "daily.    zolpidem (AMBIEN) 10 mg Tab Take 1 tablet (10 mg total) by mouth nightly as needed.     Family History     None        Tobacco Use    Smoking status: Current Every Day Smoker     Packs/day: 1.00     Types: Cigarettes    Tobacco comment: none since July 2020   Substance and Sexual Activity    Alcohol use: Not Currently    Drug use: Not Currently    Sexual activity: Not on file     Review of Systems   Constitutional: Positive for activity change and fatigue. Negative for appetite change, chills, diaphoresis and fever.   HENT: Negative for ear discharge, ear pain and facial swelling.    Eyes: Negative for pain and redness.   Respiratory: Negative for cough and shortness of breath.    Gastrointestinal: Negative for abdominal distention, blood in stool, constipation, diarrhea, nausea and vomiting.        LBM today and was "normal"  Patient reports appetite is "very good"   Endocrine: Negative for polydipsia and polyphagia.   Genitourinary: Negative for difficulty urinating, dysuria, flank pain and hematuria.   Musculoskeletal: Negative for neck pain and neck stiffness.   Skin: Negative for color change.   Allergic/Immunologic: Negative for food allergies.   Neurological: Positive for dizziness, weakness and headaches. Negative for seizures, facial asymmetry and speech difficulty.        Patient reports 10/10 frontal headaches    Patient reports ongoing left upper and lower sided weakness since prior CVA in 2015 but reports increased weakness from baseline over past few days    Hematological: Does not bruise/bleed easily.   Psychiatric/Behavioral: Negative for agitation, behavioral problems, confusion, dysphoric mood and suicidal ideas. The patient is not nervous/anxious.      Objective:     Vital Signs (Most Recent):  Temp: 97.2 °F (36.2 °C) (08/17/20 1710)  Pulse: 68 (08/17/20 1710)  Resp: 18 (08/17/20 1710)  BP: (!) 178/77 (08/17/20 1710)  SpO2: (!) 94 % (08/17/20 1710) Vital Signs (24h Range):  Temp:  " [97.2 °F (36.2 °C)-98.1 °F (36.7 °C)] 97.2 °F (36.2 °C)  Pulse:  [66-85] 68  Resp:  [16-20] 18  SpO2:  [94 %-96 %] 94 %  BP: (129-185)/() 178/77     Weight: 120.7 kg (266 lb 1.6 oz)  Body mass index is 38.18 kg/m².    Physical Exam  Constitutional:       General: She is not in acute distress.     Appearance: She is obese.   HENT:      Head: Normocephalic.      Mouth/Throat:      Mouth: Mucous membranes are moist.   Eyes:      General:         Right eye: No discharge.         Left eye: No discharge.      Extraocular Movements: Extraocular movements intact.      Conjunctiva/sclera: Conjunctivae normal.      Pupils: Pupils are equal, round, and reactive to light.   Neck:      Musculoskeletal: Normal range of motion and neck supple. No neck rigidity or muscular tenderness.   Cardiovascular:      Rate and Rhythm: Normal rate and regular rhythm.      Pulses: Normal pulses.      Heart sounds: Normal heart sounds. No murmur.   Pulmonary:      Effort: Pulmonary effort is normal. No respiratory distress.      Breath sounds: Normal breath sounds. No wheezing or rhonchi.   Abdominal:      General: Bowel sounds are normal. There is no distension.      Palpations: Abdomen is soft.      Tenderness: There is no abdominal tenderness. There is no guarding.      Comments: Obese   Genitourinary:     Comments: Not examined  Musculoskeletal: Normal range of motion.         General: No swelling.      Right lower leg: No edema.      Left lower leg: No edema.   Skin:     General: Skin is warm and dry.      Capillary Refill: Capillary refill takes less than 2 seconds.      Comments: Head incisions approximated   Neurological:      Mental Status: She is alert and oriented to person, place, and time.      Comments: Left upper and lower extremity weakness   Psychiatric:         Mood and Affect: Mood normal.         Behavior: Behavior normal.         Thought Content: Thought content normal.         Judgment: Judgment normal.            CRANIAL NERVES     CN III, IV, VI   Pupils are equal, round, and reactive to light.          Results for orders placed or performed during the hospital encounter of 08/17/20   CBC auto differential   Result Value Ref Range    WBC 14.89 (H) 3.90 - 12.70 K/uL    RBC 3.92 (L) 4.00 - 5.40 M/uL    Hemoglobin 10.1 (L) 12.0 - 16.0 g/dL    Hematocrit 32.7 (L) 37.0 - 48.5 %    Mean Corpuscular Volume 83 82 - 98 fL    Mean Corpuscular Hemoglobin 25.8 (L) 27.0 - 31.0 pg    Mean Corpuscular Hemoglobin Conc 30.9 (L) 32.0 - 36.0 g/dL    RDW 17.3 (H) 11.5 - 14.5 %    Platelets 339 150 - 350 K/uL    MPV SEE COMMENT 9.2 - 12.9 fL    Immature Granulocytes 1.1 (H) 0.0 - 0.5 %    Gran # (ANC) 12.5 (H) 1.8 - 7.7 K/uL    Immature Grans (Abs) 0.16 (H) 0.00 - 0.04 K/uL    Lymph # 1.6 1.0 - 4.8 K/uL    Mono # 0.6 0.3 - 1.0 K/uL    Eos # 0.0 0.0 - 0.5 K/uL    Baso # 0.02 0.00 - 0.20 K/uL    nRBC 0 0 /100 WBC    Gran% 84.1 (H) 38.0 - 73.0 %    Lymph% 10.8 (L) 18.0 - 48.0 %    Mono% 3.8 (L) 4.0 - 15.0 %    Eosinophil% 0.1 0.0 - 8.0 %    Basophil% 0.1 0.0 - 1.9 %    Platelet Estimate Clumped (A)     Poik Slight     Ovalocytes Occasional     Differential Method Automated    Comprehensive metabolic panel   Result Value Ref Range    Sodium 129 (L) 136 - 145 mmol/L    Potassium 4.0 3.5 - 5.1 mmol/L    Chloride 92 (L) 95 - 110 mmol/L    CO2 24 23 - 29 mmol/L    Glucose 145 (H) 70 - 110 mg/dL    BUN, Bld 16 6 - 20 mg/dL    Creatinine 1.0 0.5 - 1.4 mg/dL    Calcium 9.5 8.7 - 10.5 mg/dL    Total Protein 7.7 6.0 - 8.4 g/dL    Albumin 3.9 3.5 - 5.2 g/dL    Total Bilirubin 0.6 0.1 - 1.0 mg/dL    Alkaline Phosphatase 190 (H) 55 - 135 U/L    AST 32 10 - 40 U/L    ALT 48 (H) 10 - 44 U/L    Anion Gap 13 8 - 16 mmol/L    eGFR if African American >60 >60 mL/min/1.73 m^2    eGFR if non African American >60 >60 mL/min/1.73 m^2   Protime-INR   Result Value Ref Range    Prothrombin Time 10.8 9.0 - 12.5 sec    INR 1.0 0.8 - 1.2   APTT   Result Value Ref  Range    aPTT 24.8 21.0 - 32.0 sec   COVID-19 Rapid Screening   Result Value Ref Range    SARS-CoV-2 RNA, Amplification, Qual Negative Negative       Imaging Results          CT Head Stealth W/O Contrast (Final result)  Result time 08/17/20 16:19:53   Procedure changed from CT Head Without Contrast     Final result by Kingsley Churchill MD (08/17/20 16:19:53)                 Impression:      Thin-section preoperative CT scan with stable mild dilatation of the ventricular system as above.    Evolving right frontal lobe operative change.    All CT scans at this facility use dose modulation, iterative reconstruction, and/or weight based dosing when appropriate to reduce radiation dose to as low as reasonable achievable.      Electronically signed by: Kingsley Churchill  Date:    08/17/2020  Time:    16:19             Narrative:    EXAMINATION:  CT HEAD STEALTH W/O CONTRAST    CLINICAL HISTORY:  Brain/CNS neoplasm, assess treatment response;    TECHNIQUE:  Volumetric axial CT images obtained throughout the region of the head without use of intravenous contrast via [place] protocol. Axial, sagittal and coronal reconstructions were performed.    COMPARISON:  Head CT earlier same date with priors; MRI brain 07/26/2020    FINDINGS:  Continued evolution of postsurgical change within the right frontal lobe status post mass lesion resection.  Persistent mixed density extra-axial collection overlies the frontal lobe, improved as compared to recent postoperative scans and stable to examination earlier same date.  Continued hypodensity within the right frontal lobe.  Unchanged hypodensity in the left occipital lobe.  No new or acute area of parenchymal hypoattenuation.    Ex vacuo dilatation of the right frontal horn.  Stable prominence of the ventricular system as compared to recent head CT, but mild increase in size as compared to remote priors.    Stable subcutaneous fluid collection overlying the craniotomy site, stable to  recent prior but decreased as compared to remote priors.  Paranasal sinuses are predominantly clear.  Mastoid air cells are clear.  Osseous calvarium is otherwise intact.

## 2020-08-18 NOTE — PLAN OF CARE
P.T. EVAL COMPLETE, PT CURRENTLY REQUIRES MODA FOR BED MOBILITY, MAXA X 2 FOR BED<>CHAIR TF USING RW, P.T. RECOMMENDS INPATIENT REHAB

## 2020-08-18 NOTE — PLAN OF CARE
Plan of care reviewed with pt; pt verbalizes understanding  Pt able to verbalize needs; reports pain at level of 5  AAOx4; NSR on monitor  BP elevated  Pt free of falls; instructed to call staff for mobility  Safety precautions maintained

## 2020-08-18 NOTE — PT/OT/SLP EVAL
Physical Therapy Evaluation    Patient Name:  Cata Lang   MRN:  [id number]    Recommendations:     Discharge Recommendations:  rehabilitation facility   Discharge Equipment Recommendations: bedside commode   Barriers to discharge: None    Assessment:     Cata Lang is a 49 y.o. female admitted with a medical diagnosis of <principal problem not specified>.  She presents with the following impairments/functional limitations:  weakness, impaired balance, impaired endurance, gait instability, impaired functional mobilty, decreased coordination, pain.    Rehab Prognosis: Good; patient would benefit from acute skilled PT services to address these deficits and reach maximum level of function.    Recent Surgery: Procedure(s) (LRB):  INSERTION, SHUNT, VENTRICULOPERITONEAL (Right)      Plan:     During this hospitalization, patient to be seen 5 x/week to address the identified rehab impairments via gait training, therapeutic activities, therapeutic exercises, neuromuscular re-education and progress toward the following goals:    · Plan of Care Expires:  08/25/20    Subjective     Chief Complaint:   Patient/Family Comments/goals:   Pain/Comfort:  · Pain Rating 1: 6/10  · Location - Orientation 1: posterior  · Location 1: head  · Pain Rating 2: 6/10  · Location - Orientation 2: posterior  · Location 2: neck    Patients cultural, spiritual, Buddhist conflicts given the current situation:      Living Environment:  PT LIVES  AND 2 SONS, 1 STORY HOUSE NO STEPS, AMB WITH RW HOUSEHOLD DISTANCES, DOES NOT DRIVE OR WORK, SAPNA WITH DME FOR ADL'S  Prior to admission, patients level of function was SAPNA .  Equipment used at home: walker, rolling, shower chair.  DME owned (not currently used): none.  Upon discharge, patient will have assistance from FAMILY.    Objective:     Communicated with NURSE COPPOLA prior to session.  Patient found supine with bed alarm, peripheral IV, telemetry  upon PT entry to room.    General  Precautions: Standard, fall, seizure   Orthopedic Precautions:N/A   Braces: N/A     Exams:  · Cognitive Exam:  Patient is oriented to Person, Place, Time and Situation  · Postural Exam:  Patient presented with the following abnormalities:    · -       Rounded shoulders  · -       Forward head  · Sensation:    · -       Intact  · RLE ROM: WFL  · RLE Strength: GROSSLY 4/5  · LLE ROM: WFL  · LLE Strength: GROSSLY 3/5    Functional Mobility:  · Bed Mobility:     · Rolling Right: minimum assistance  · Scooting: moderate assistance  · Supine to Sit: moderate assistance  · Transfers:     · Sit to Stand:  maximal assistance and of 2 persons with rolling walker  · Bed to Chair: maximal assistance and of 2 persons with  rolling walker  using  Step Transfer  · Gait: UNABLE TO PROGRESS TO GAIT AT THIS TIME  · Balance: POOR SITTING AND STANDING BALANCE DUE TO L LATERAL LEAN    Therapeutic Activities and Exercises:   PT EDUCATED IN ROLE OF P.T. AND POC, PT WITH L LATERAL LEAN IN SITTING AND STANDING, ABLE TO CORRECT WITH CUES BUT UNABLE TO MAINTAIN MIDLINE IN SITTING OR STANDING, IMPROVED SITTING BALANCE IN SUPPORTIVE CHAIR, PT EDUCATED IN BLE THEREX TO PERFORM WHILE SEATED IN CHAIR, PT ENCOURAGED TO INCREASE TIME OOB IN CHAIR    AM-PAC 6 CLICK MOBILITY  Total Score:10     Patient left up in chair with all lines intact, call button in reach, chair alarm on and NURSE notified.    GOALS:   Multidisciplinary Problems     Physical Therapy Goals        Problem: Physical Therapy Goal    Goal Priority Disciplines Outcome Goal Variances Interventions   Physical Therapy Goal     PT, PT/OT      Description: LTG'S TO BE MET IN 7 DAYS (8-25-20)  1. PT WILL REQUIRE TINO FOR BED MOBILITY  2. PT WILL REQUIRE MODA FOR TF BED<>CHAIR  3. PT WILL AMB 50' WITH RW AND MODA                   History:     Past Medical History:   Diagnosis Date    Diabetes mellitus     Hydrocephalus, acquired 8/17/2020    Hypertension     Seizures     Stroke      Thyroid disease        Past Surgical History:   Procedure Laterality Date    CRANIOTOMY      THYROIDECTOMY      TONSILLECTOMY         Time Tracking:     PT Received On: 08/18/20  PT Start Time: 1015     PT Stop Time: 1040  PT Total Time (min): 25 min     Billable Minutes: Evaluation 15 and Therapeutic Activity 10    Arline Alvarez, PT  08/18/2020

## 2020-08-18 NOTE — H&P
[facility] Hospital Medicine  History & Physical    Patient Name: Cata Lang  MRN: [id number]  Admission Date: 8/17/2020  Attending Physician: Lurdes Guy MD   Primary Care Provider: Sabra Fregoso MD       Patient information was obtained from patient and ER records.     Subjective:     Principal Problem: Hydrocephalus    Chief Complaint:   Chief Complaint   Patient presents with    Post-op Problem     Sent by neurosurgery, Dr. Hagen for shunt placement s/p craniotomy x2wks. Hydrocephalus noted on CT scan        HPI: This is a 50 yo female who has a PMHX of Dm2, HTN, CKD stage 3, anemia of chronic disease, Prior CVA in 2015 felt to be from her with residual left sided weakness,  And s/p craniotomy for meningioma on 7/31/20 done by Dr. Hagen. She was discharged from rehab 3 days ago and reports that the next day she began  having dizziness, HA, and increased left sided weakness form baseline. Patient has also been having multiple falls the past few days. Patient had a follow up visit with Dr. Hagen today and per report, CT showed signs of  hydrocephalus. Dr. Hagen requested patient be placed in the hospital for further monitoring, physical therapy, and plans for upcoming   shunt placement.      Past Medical History:   Diagnosis Date    Diabetes mellitus     Hydrocephalus, acquired 8/17/2020    Hypertension     Seizures     Stroke     Thyroid disease        Past Surgical History:   Procedure Laterality Date    CRANIOTOMY      THYROIDECTOMY      TONSILLECTOMY         Review of patient's allergies indicates:   Allergen Reactions    Heparin analogues Other (See Comments)     DANYELLE     Hydrochlorothiazide      hypercalcemia       No current facility-administered medications on file prior to encounter.      Current Outpatient Medications on File Prior to Encounter   Medication Sig    amLODIPine (NORVASC) 5 MG tablet Take 1 tablet (5 mg total) by mouth once daily.    aspirin  81 MG Chew Take 1 tablet (81 mg total) by mouth once daily.    atorvastatin (LIPITOR) 10 MG tablet Take 10 mg by mouth once daily.     calcitRIOL (ROCALTROL) 0.25 MCG Cap 0.25 mcg once daily.     calcium carbonate (OS-THANIA) 500 mg calcium (1,250 mg) tablet Take 1 tablet (500 mg total) by mouth 4 (four) times daily.    cholecalciferol, vitamin D3, 125 mcg (5,000 unit) Tab Take 1 tablet (5,000 Units total) by mouth once daily.    dexAMETHasone (DECADRON) 2 MG tablet     DULoxetine (CYMBALTA) 30 MG capsule     fondaparinux (ARIXTRA) 2.5 mg/0.5 mL Syrg Inject 0.5 mLs (2.5 mg total) into the skin once daily.    gabapentin (NEURONTIN) 600 MG tablet Take 600 mg by mouth once daily.     HYDROcodone-acetaminophen (NORCO) 5-325 mg per tablet Take 1 tablet by mouth every 4 (four) hours as needed.    levETIRAcetam (KEPPRA) 500 MG Tab Take 1,500 mg by mouth 2 (two) times daily.    levothyroxine (SYNTHROID) 137 MCG Tab tablet Take 1 tablet (137 mcg total) by mouth once daily.    metFORMIN (GLUCOPHAGE) 500 MG tablet Take 500 mg by mouth 2 (two) times daily with meals.    OMEPRAZOLE ORAL Take by mouth.    ondansetron (ZOFRAN) 4 MG tablet     ferrous sulfate 325 (65 FE) MG EC tablet Take 1 tablet (325 mg total) by mouth 2 (two) times daily.    zolpidem (AMBIEN) 10 mg Tab Take 1 tablet (10 mg total) by mouth nightly as needed.     Family History     None        Tobacco Use    Smoking status: Current Every Day Smoker     Packs/day: 1.00     Types: Cigarettes    Tobacco comment: none since July 2020   Substance and Sexual Activity    Alcohol use: Not Currently    Drug use: Not Currently    Sexual activity: Not on file     Review of Systems   Constitutional: Positive for activity change and fatigue. Negative for appetite change, chills, diaphoresis and fever.   HENT: Negative for ear discharge, ear pain and facial swelling.    Eyes: Negative for pain and redness.   Respiratory: Negative for cough and shortness of  "breath.    Gastrointestinal: Negative for abdominal distention, blood in stool, constipation, diarrhea, nausea and vomiting.        LBM today and was "normal"  Patient reports appetite is "very good"   Endocrine: Negative for polydipsia and polyphagia.   Genitourinary: Negative for difficulty urinating, dysuria, flank pain and hematuria.   Musculoskeletal: Negative for neck pain and neck stiffness.   Skin: Negative for color change.   Allergic/Immunologic: Negative for food allergies.   Neurological: Positive for dizziness, weakness and headaches. Negative for seizures, facial asymmetry and speech difficulty.        Patient reports 10/10 frontal headaches    Patient reports ongoing left upper and lower sided weakness since prior CVA in 2015 but reports increased weakness from baseline over past few days    Hematological: Does not bruise/bleed easily.   Psychiatric/Behavioral: Negative for agitation, behavioral problems, confusion, dysphoric mood and suicidal ideas. The patient is not nervous/anxious.      Objective:     Vital Signs (Most Recent):  Temp: 97.2 °F (36.2 °C) (08/17/20 1710)  Pulse: 68 (08/17/20 1710)  Resp: 18 (08/17/20 1710)  BP: (!) 178/77 (08/17/20 1710)  SpO2: (!) 94 % (08/17/20 1710) Vital Signs (24h Range):  Temp:  [97.2 °F (36.2 °C)-98.1 °F (36.7 °C)] 97.2 °F (36.2 °C)  Pulse:  [66-85] 68  Resp:  [16-20] 18  SpO2:  [94 %-96 %] 94 %  BP: (129-185)/() 178/77     Weight: 120.7 kg (266 lb 1.6 oz)  Body mass index is 38.18 kg/m².    Physical Exam  Constitutional:       General: She is not in acute distress.     Appearance: She is obese.   HENT:      Head: Normocephalic.      Mouth/Throat:      Mouth: Mucous membranes are moist.   Eyes:      General:         Right eye: No discharge.         Left eye: No discharge.      Extraocular Movements: Extraocular movements intact.      Conjunctiva/sclera: Conjunctivae normal.      Pupils: Pupils are equal, round, and reactive to light.   Neck:      " Musculoskeletal: Normal range of motion and neck supple. No neck rigidity or muscular tenderness.   Cardiovascular:      Rate and Rhythm: Normal rate and regular rhythm.      Pulses: Normal pulses.      Heart sounds: Normal heart sounds. No murmur.   Pulmonary:      Effort: Pulmonary effort is normal. No respiratory distress.      Breath sounds: Normal breath sounds. No wheezing or rhonchi.   Abdominal:      General: Bowel sounds are normal. There is no distension.      Palpations: Abdomen is soft.      Tenderness: There is no abdominal tenderness. There is no guarding.      Comments: Obese   Genitourinary:     Comments: Not examined  Musculoskeletal: Normal range of motion.         General: No swelling.      Right lower leg: No edema.      Left lower leg: No edema.   Skin:     General: Skin is warm and dry.      Capillary Refill: Capillary refill takes less than 2 seconds.      Comments: Head incisions approximated   Neurological:      Mental Status: She is alert and oriented to person, place, and time.      Comments: Left upper and lower extremity weakness   Psychiatric:         Mood and Affect: Mood normal.         Behavior: Behavior normal.         Thought Content: Thought content normal.         Judgment: Judgment normal.           CRANIAL NERVES     CN III, IV, VI   Pupils are equal, round, and reactive to light.          Results for orders placed or performed during the hospital encounter of 08/17/20   CBC auto differential   Result Value Ref Range    WBC 14.89 (H) 3.90 - 12.70 K/uL    RBC 3.92 (L) 4.00 - 5.40 M/uL    Hemoglobin 10.1 (L) 12.0 - 16.0 g/dL    Hematocrit 32.7 (L) 37.0 - 48.5 %    Mean Corpuscular Volume 83 82 - 98 fL    Mean Corpuscular Hemoglobin 25.8 (L) 27.0 - 31.0 pg    Mean Corpuscular Hemoglobin Conc 30.9 (L) 32.0 - 36.0 g/dL    RDW 17.3 (H) 11.5 - 14.5 %    Platelets 339 150 - 350 K/uL    MPV SEE COMMENT 9.2 - 12.9 fL    Immature Granulocytes 1.1 (H) 0.0 - 0.5 %    Gran # (ANC) 12.5 (H)  1.8 - 7.7 K/uL    Immature Grans (Abs) 0.16 (H) 0.00 - 0.04 K/uL    Lymph # 1.6 1.0 - 4.8 K/uL    Mono # 0.6 0.3 - 1.0 K/uL    Eos # 0.0 0.0 - 0.5 K/uL    Baso # 0.02 0.00 - 0.20 K/uL    nRBC 0 0 /100 WBC    Gran% 84.1 (H) 38.0 - 73.0 %    Lymph% 10.8 (L) 18.0 - 48.0 %    Mono% 3.8 (L) 4.0 - 15.0 %    Eosinophil% 0.1 0.0 - 8.0 %    Basophil% 0.1 0.0 - 1.9 %    Platelet Estimate Clumped (A)     Poik Slight     Ovalocytes Occasional     Differential Method Automated    Comprehensive metabolic panel   Result Value Ref Range    Sodium 129 (L) 136 - 145 mmol/L    Potassium 4.0 3.5 - 5.1 mmol/L    Chloride 92 (L) 95 - 110 mmol/L    CO2 24 23 - 29 mmol/L    Glucose 145 (H) 70 - 110 mg/dL    BUN, Bld 16 6 - 20 mg/dL    Creatinine 1.0 0.5 - 1.4 mg/dL    Calcium 9.5 8.7 - 10.5 mg/dL    Total Protein 7.7 6.0 - 8.4 g/dL    Albumin 3.9 3.5 - 5.2 g/dL    Total Bilirubin 0.6 0.1 - 1.0 mg/dL    Alkaline Phosphatase 190 (H) 55 - 135 U/L    AST 32 10 - 40 U/L    ALT 48 (H) 10 - 44 U/L    Anion Gap 13 8 - 16 mmol/L    eGFR if African American >60 >60 mL/min/1.73 m^2    eGFR if non African American >60 >60 mL/min/1.73 m^2   Protime-INR   Result Value Ref Range    Prothrombin Time 10.8 9.0 - 12.5 sec    INR 1.0 0.8 - 1.2   APTT   Result Value Ref Range    aPTT 24.8 21.0 - 32.0 sec   COVID-19 Rapid Screening   Result Value Ref Range    SARS-CoV-2 RNA, Amplification, Qual Negative Negative       Imaging Results          CT Head Stealth W/O Contrast (Final result)  Result time 08/17/20 16:19:53   Procedure changed from CT Head Without Contrast     Final result by Kingsley Churchill MD (08/17/20 16:19:53)                 Impression:      Thin-section preoperative CT scan with stable mild dilatation of the ventricular system as above.    Evolving right frontal lobe operative change.    All CT scans at this facility use dose modulation, iterative reconstruction, and/or weight based dosing when appropriate to reduce radiation dose to as low as  reasonable achievable.      Electronically signed by: Kingsley Churchill  Date:    08/17/2020  Time:    16:19             Narrative:    EXAMINATION:  CT HEAD STEALTH W/O CONTRAST    CLINICAL HISTORY:  Brain/CNS neoplasm, assess treatment response;    TECHNIQUE:  Volumetric axial CT images obtained throughout the region of the head without use of intravenous contrast via mobiDEOS protocol. Axial, sagittal and coronal reconstructions were performed.    COMPARISON:  Head CT earlier same date with priors; MRI brain 07/26/2020    FINDINGS:  Continued evolution of postsurgical change within the right frontal lobe status post mass lesion resection.  Persistent mixed density extra-axial collection overlies the frontal lobe, improved as compared to recent postoperative scans and stable to examination earlier same date.  Continued hypodensity within the right frontal lobe.  Unchanged hypodensity in the left occipital lobe.  No new or acute area of parenchymal hypoattenuation.    Ex vacuo dilatation of the right frontal horn.  Stable prominence of the ventricular system as compared to recent head CT, but mild increase in size as compared to remote priors.    Stable subcutaneous fluid collection overlying the craniotomy site, stable to recent prior but decreased as compared to remote priors.  Paranasal sinuses are predominantly clear.  Mastoid air cells are clear.  Osseous calvarium is otherwise intact.                                  Assessment/Plan:     History of CVA in adulthood  Patient reports Hx of CVA in 2015 which was felt to be caused from her prior pseudomeningocele  Patient states her home Arixtra was recently discontinued about a week or so ago      Hyperlipidemia associated with type 2 diabetes mellitus  Continue home statin      Frequent falls  New Onset  Fall precautions  Consult physical therapy      Hypothyroidism  Continue home medications    Lab Results   Component Value Date    TSH 1.544 07/26/2020            Type 2 diabetes mellitus with stage 3 chronic kidney disease, without long-term current use of insulin  Dm diet  Accuchecks with correctional SSI  Home metformin on hold      Obstructive hydrocephalus  Telemetry  NeuroSurgeon consulted  Monitor closely for any signs of decline  Home asa placed on hold- last dose was 8/17/2020  Patient reports home arixtra was discontinued about a week or ago  Neurochecks q 4 hours      Hypocalcemia  Continue home medications      Anemia due to stage 3 chronic kidney disease  Add MVI and pm vitamin C  Continue bid ferrous sulfate      Calcified cerebral meningioma  7/31/2020 S/p Crainotomy with Aspiration of frontal pseudomeningocele   Patient reports she has been taking her decadron at home but is unsure of the dosage- Asked  to bring in   Will defer steroid dosing to NeuroSurgeon      Hypertension associated with diabetes  Continue home medications      Hyponatremia  Monitor- Levels running 135-136 three weeks ago  Add 1500ml fluid restriction  Nephrology consulted  Seizure precautions        VTE Risk Mitigation (From admission, onward)         Ordered     Place HOLLAND hose  Until discontinued      08/17/20 2915                Time spent seeing patient( greater than 1/2 spent in direct contact) : 78 minutes     DAVION Guerrier  Department of Hospital Medicine   [facility]

## 2020-08-18 NOTE — PLAN OF CARE
CM met with patient at bedside to assess for discharge needs. Pt states that she lives at home with her spouse and sons, and is dependent with some needs. She recently had a surgery, went to rehab and was discharged home with [facility]. She would like to resume with them on discharge. Choice form completed. She uses a cane, walker and power chair at home for assistance. She is unsure of any discharge needs at this time. CM informed her any other needs will be determined with hospital progress. CM provided a transitional care folder, information on advanced directives, information on pharmacy bedside delivery, and discharge planning begins on admission with contact information for any needs/questions.     D/C Plan: [facility]  PCP: Sabra Fregoso MD  Preferred Pharmacy: [facility]  Discharge transportation: Family  [facility]: Active  Pharmacy Bedside Delivery: Yes       08/18/20 1027   Discharge Assessment   Assessment Type Discharge Planning Assessment   Confirmed/corrected address and phone number on facesheet? Yes   Assessment information obtained from? Patient   Expected Length of Stay (days)   (TBD)   Communicated expected length of stay with patient/caregiver yes   Prior to hospitilization cognitive status: Alert/Oriented   Prior to hospitalization functional status: Independent;Assistive Equipment   Current cognitive status: Alert/Oriented   Current Functional Status: Independent;Assistive Equipment   Facility Arrived From: Home   Lives With spouse   Able to Return to Prior Arrangements other (see comments)  (TBD)   Is patient able to care for self after discharge? Unable to determine at this time (comments)   Who are your caregiver(s) and their phone number(s)? Raulito Lang, Spouse- [phone]   Patient's perception of discharge disposition home health   Readmission Within the Last 30 Days previous discharge plan unsuccessful   Patient currently being followed by outpatient case  management? No   Patient currently receives any other outside agency services? No   Equipment Currently Used at Home cane, straight;walker, rolling;power chair   Do you have any problems affording any of your prescribed medications? No   Is the patient taking medications as prescribed? yes   Does the patient have transportation home? Yes   Transportation Anticipated family or friend will provide   Dialysis Name and Scheduled days NA   Does the patient receive services at the Coumadin Clinic? No   DME Needed Upon Discharge  none   Patient/Family in Agreement with Plan yes   Readmission Questionnaire   At the time of your discharge, did someone talk to you about what your health problems were? Yes   At the time of discharge, did someone talk to you about what to watch out for regarding worsening of your health problem? Yes   At the time of discharge, did someone talk to you about what to do if you experienced worsening of your health problem? Yes   At the time of discharge, did someone talk to you about which medication to take when you left the hospital and which ones to stop taking? Yes   At the time of discharge, did someone talk to you about when and where to follow up with a doctor after you left the hospital? Yes   What do you believe caused you to be sick enough to be re-admitted? Shunt placement   How often do you need to have someone help you when you read instructions, pamphlets, or other written material from your doctor or pharmacy? Rarely   Do you have problems taking your medications as prescribed? Yes   Do you have any problems affording any of  your prescribed medications? No   Do you have problems obtaining/receiving your medications? No   Does the patient have transportation to healthcare appointments? No   Living Arrangements house   Does the patient have family/friends to help with healtcare needs after discharge? yes   Does your caregiver provide all the help you need? Yes

## 2020-08-18 NOTE — SUBJECTIVE & OBJECTIVE
"Interval History: Patient had a follow up visit with Dr. Hagen today and per report, CT showed signs of Hydrocephalus. She was also found to be Hyponatremic with a Na of 129- likely sec to IVVD. Pt admitted to Tele and started on IVF. Pt was also a tapering dose of Decadron orally. This morning she feels lot better, more alert and awake and stronger, speech getting clearer. Dr. Hagen plans Shunt placement this Friday. Na and Cl slightly better, pt eating drinking better too. Will get PT/OT in am     Review of Systems   Constitutional: Positive for activity change and fatigue. Negative for appetite change, chills, diaphoresis and fever.   HENT: Negative for ear discharge, ear pain and facial swelling.    Eyes: Negative for pain and redness.   Respiratory: Negative for cough and shortness of breath.    Gastrointestinal: Negative for abdominal distention, blood in stool, constipation, diarrhea, nausea and vomiting.        LBM today and was "normal"  Patient reports appetite is "very good"   Endocrine: Negative for polydipsia and polyphagia.   Genitourinary: Negative for difficulty urinating, dysuria, flank pain and hematuria.   Musculoskeletal: Negative for neck pain and neck stiffness.   Skin: Negative for color change.   Allergic/Immunologic: Negative for food allergies.   Neurological: Positive for dizziness, weakness and headaches. Negative for seizures, facial asymmetry and speech difficulty.        Patient reports 10/10 frontal headaches    Patient reports ongoing left upper and lower sided weakness since prior CVA in 2015 but reports increased weakness from baseline over past few days    Hematological: Does not bruise/bleed easily.   Psychiatric/Behavioral: Negative for agitation, behavioral problems, confusion, dysphoric mood and suicidal ideas. The patient is not nervous/anxious.      Objective:     Vital Signs (Most Recent):  Temp: 97.8 °F (36.6 °C) (08/18/20 1614)  Pulse: 68 (08/18/20 1700)  Resp: 16 " (08/18/20 1704)  BP: (!) 179/80 (08/18/20 1614)  SpO2: 96 % (08/18/20 1614) Vital Signs (24h Range):  Temp:  [96.2 °F (35.7 °C)-98 °F (36.7 °C)] 97.8 °F (36.6 °C)  Pulse:  [60-91] 68  Resp:  [16-18] 16  SpO2:  [92 %-98 %] 96 %  BP: (151-186)/(73-83) 179/80     Weight: 117.5 kg (259 lb 0.7 oz)  Body mass index is 37.17 kg/m².    Intake/Output Summary (Last 24 hours) at 8/18/2020 1816  Last data filed at 8/18/2020 1704  Gross per 24 hour   Intake 750 ml   Output 400 ml   Net 350 ml      Physical Exam  Vitals signs and nursing note reviewed.   Constitutional:       Appearance: She is well-developed.   HENT:      Head: Normocephalic and atraumatic.      Comments: staples on the head from the previous craniotomy.  Eyes:      Conjunctiva/sclera: Conjunctivae normal.      Pupils: Pupils are equal, round, and reactive to light.   Neck:      Musculoskeletal: Full passive range of motion without pain, normal range of motion and neck supple. No edema.      Thyroid: No thyroid mass or thyromegaly.      Vascular: No carotid bruit.   Cardiovascular:      Rate and Rhythm: Normal rate and regular rhythm.      Chest Wall: PMI is not displaced.      Pulses: Normal pulses.      Heart sounds: Normal heart sounds, S1 normal and S2 normal. No murmur. No friction rub.   Pulmonary:      Effort: Pulmonary effort is normal. No accessory muscle usage or respiratory distress.      Breath sounds: Normal breath sounds. No wheezing or rales.   Chest:      Chest wall: No tenderness.   Abdominal:      General: Bowel sounds are normal. There is no distension.      Palpations: Abdomen is soft. There is no mass.      Tenderness: There is no abdominal tenderness. There is no rebound.      Hernia: No hernia is present.   Musculoskeletal: Normal range of motion.         General: No tenderness.   Skin:     General: Skin is warm and dry.      Coloration: Skin is not pale.      Findings: No bruising, ecchymosis, erythema or rash.      Nails: There is no  clubbing.     Neurological:      Mental Status: She is alert and oriented to person, place, and time.      Sensory: No sensory deficit.      Motor: No abnormal muscle tone.      Coordination: Coordination normal.      Deep Tendon Reflexes: Reflexes abnormal.      Comments:   Left-sided residual hemiparesis which is unchanged from the last visit   Psychiatric:         Speech: Speech normal.         Behavior: Behavior normal.         Thought Content: Thought content normal.         Judgment: Judgment normal.         Significant Labs:   BMP:   Recent Labs   Lab 08/18/20  0606   *   *   K 3.8   CL 94*   CO2 25   BUN 16   CREATININE 1.1   CALCIUM 9.4   MG 1.9     CBC:   Recent Labs   Lab 08/17/20  1355 08/18/20  0606   WBC 14.89* 13.58*   HGB 10.1* 10.0*   HCT 32.7* 32.3*    137*     CMP:   Recent Labs   Lab 08/17/20  1355 08/18/20  0606   * 130*   K 4.0 3.8   CL 92* 94*   CO2 24 25   * 144*   BUN 16 16   CREATININE 1.0 1.1   CALCIUM 9.5 9.4   PROT 7.7 7.7   ALBUMIN 3.9 3.9   BILITOT 0.6 0.6   ALKPHOS 190* 180*   AST 32 34   ALT 48* 51*   ANIONGAP 13 11   EGFRNONAA >60 58*     All pertinent labs within the past 24 hours have been reviewed.  Imaging Results          CT Head Stealth W/O Contrast (Final result)  Result time 08/17/20 16:19:53   Procedure changed from CT Head Without Contrast     Final result by Kingsley Churchill MD (08/17/20 16:19:53)                 Impression:      Thin-section preoperative CT scan with stable mild dilatation of the ventricular system as above.    Evolving right frontal lobe operative change.    All CT scans at this facility use dose modulation, iterative reconstruction, and/or weight based dosing when appropriate to reduce radiation dose to as low as reasonable achievable.      Electronically signed by: Kingsley Churchill  Date:    08/17/2020  Time:    16:19             Narrative:    EXAMINATION:  CT HEAD STEALTH W/O CONTRAST    CLINICAL HISTORY:  Brain/CNS  neoplasm, assess treatment response;    TECHNIQUE:  Volumetric axial CT images obtained throughout the region of the head without use of intravenous contrast via [employer] protocol. Axial, sagittal and coronal reconstructions were performed.    COMPARISON:  Head CT earlier same date with priors; MRI brain 07/26/2020    FINDINGS:  Continued evolution of postsurgical change within the right frontal lobe status post mass lesion resection.  Persistent mixed density extra-axial collection overlies the frontal lobe, improved as compared to recent postoperative scans and stable to examination earlier same date.  Continued hypodensity within the right frontal lobe.  Unchanged hypodensity in the left occipital lobe.  No new or acute area of parenchymal hypoattenuation.    Ex vacuo dilatation of the right frontal horn.  Stable prominence of the ventricular system as compared to recent head CT, but mild increase in size as compared to remote priors.    Stable subcutaneous fluid collection overlying the craniotomy site, stable to recent prior but decreased as compared to remote priors.  Paranasal sinuses are predominantly clear.  Mastoid air cells are clear.  Osseous calvarium is otherwise intact.                              Significant Imaging: I have reviewed all pertinent imaging results/findings within the past 24 hours.

## 2020-08-18 NOTE — ASSESSMENT & PLAN NOTE
7/31/2020 S/p Crainotomy with Aspiration of frontal pseudomeningocele   Patient reports she has been taking her decadron at home but is unsure of the dosage- Asked  to bring in   Will defer steroid dosing to NeuroSurgeon    S/p resection 7/31- See above

## 2020-08-18 NOTE — ASSESSMENT & PLAN NOTE
Patient reports Hx of CVA in 2015 which was felt to be caused from her prior pseudomeningocele  Patient states her home Arixtra was recently discontinued about a week or so ago

## 2020-08-18 NOTE — PT/OT/SLP EVAL
Occupational Therapy   Evaluation    Name: Cata Lang  MRN: [id number]  Admitting Diagnosis:  <principal problem not specified>      Recommendations:     Discharge Recommendations: rehabilitation facility  Discharge Equipment Recommendations:  bedside commode  Barriers to discharge:       Assessment:     Cata Lang is a 49 y.o. female with a medical diagnosis of <principal problem not specified>.  She presents with impaired functional mobility and ADLs. Performance deficits affecting function: weakness, impaired endurance, impaired self care skills, impaired functional mobilty, gait instability, impaired balance, decreased coordination, decreased upper extremity function, decreased lower extremity function, decreased safety awareness, pain.      Rehab Prognosis: Fair; patient would benefit from acute skilled OT services to address these deficits and reach maximum level of function.       Plan:     Patient to be seen 3 x/week to address the above listed problems via self-care/home management, therapeutic activities, therapeutic exercises  · Plan of Care Expires: 08/25/20  · Plan of Care Reviewed with: patient    Subjective     Chief Complaint: head pain  Patient/Family Comments/goals: to increase independence    Occupational Profile:  Living Environment: lives with  and 2 sons in 1 story house with no steps  Previous level of function: Mod I with ADLs, Ambulation with RW in home  Roles and Routines: does not drive or work  Equipment Used at Home:  walker, rolling, shower chair  Assistance upon Discharge: Family    Pain/Comfort:  · Pain Rating 1: 6/10  · Location 1: head    Patients cultural, spiritual, Restoration conflicts given the current situation:      Objective:     Communicated with: Nurse Jacki HINTON and epic chart review prior to session.  Patient found supine with bed alarm, telemetry, peripheral IV upon OT entry to room.    General Precautions: Standard,     Orthopedic Precautions:N/A   Braces:  N/A     Occupational Performance:    Bed Mobility:    · Patient completed Rolling/Turning to Left with  moderate assistance  · Patient completed Rolling/Turning to Right with moderate assistance  · Patient completed Scooting/Bridging with moderate assistance  · Patient completed Supine to Sit with moderate assistance    Functional Mobility/Transfers:  · Patient completed Sit <> Stand Transfer with maximal assistance and of 2 persons  with  rolling walker   · Patient completed Bed <> Chair Transfer using Step Transfer technique with maximal assistance and of 2 persons with rolling walker  · Functional Mobility: ~3 steps to t/f to chair using RW with Mod A    Activities of Daily Living:  · Upper Body Dressing: maximal assistance .  · Lower Body Dressing: moderate assistance /    Cognitive/Visual Perceptual:  Cognitive/Psychosocial Skills:     -       Oriented to: Person, Place, Time and Situation   -       Follows Commands/attention:Follows two-step commands  -       Communication: clear/fluent  -       Memory: No Deficits noted  -       Safety awareness/insight to disability: impaired   Visual/Perceptual:      -Intact .    Physical Exam:  Dominant hand:    -       Right  Upper Extremity Range of Motion:     -       Right Upper Extremity: WFL  -       Left Upper Extremity: Decreased at shoulder  Upper Extremity Strength:    -       Right Upper Extremity: 4/5 grossly  -       Left Upper Extremity: 2/5 grossly   Strength:    -       Right Upper Extremity: WFL  -       Left Upper Extremity: decreased    AMPAC 6 Click ADL:  AMPAC Total Score: 13    Treatment & Education:  OT eval completed as listed above. Pt educated in role of OT and POC.   Education:    Patient left up in chair with all lines intact, call button in reach, chair alarm on and Nurse Jacki HINTON and PCT Ashley notified    GOALS:   Multidisciplinary Problems     Occupational Therapy Goals        Problem: Occupational Therapy Goal    Goal Priority Disciplines  Outcome Interventions   Occupational Therapy Goal     OT, PT/OT     Description: LTGS to be met by 8/24/2020  1. Pt will perform LE dressing with Min A  2. Pt will perform UE dressing with SBA  3. Pt will perform toilet t/f with Mod A  4. Pt will perform (B) UE therapeutic exercises 1 x 20 reps                    History:     Past Medical History:   Diagnosis Date    Diabetes mellitus     Hydrocephalus, acquired 8/17/2020    Hypertension     Seizures     Stroke     Thyroid disease        Past Surgical History:   Procedure Laterality Date    CRANIOTOMY      THYROIDECTOMY      TONSILLECTOMY         Time Tracking:     OT Date of Treatment: 08/18/20  OT Start Time: 0955  OT Stop Time: 1020  OT Total Time (min): 25 min    Billable Minutes:Evaluation 15 min  Therapeutic Activity 10 min    Alejandra Saenz, PT/OT  8/18/2020

## 2020-08-18 NOTE — ASSESSMENT & PLAN NOTE
Monitor- Levels running 135-136 three weeks ago  Add 1500ml fluid restriction  Nephrology consulted  Seizure precautions    Stable- likely sec to Intracranial tumor

## 2020-08-18 NOTE — PROGRESS NOTES
[facility] -   Nephrology  Progress Note    Patient Name: Cata Lang  MRN: [id number]  Admission Date: 8/17/2020  Hospital Length of Stay: 1 days  Attending Provider: Lurdes Guy MD   Primary Care Physician: Sabra Fregoso MD  Principal Problem:<principal problem not specified>    Subjective:     HPI: Patient is a 49-year-old female with type 2 diabetes hypertension history of 6 seizure disorder.  Also has a history of stroke status post left-sided weakness.  In July of 2020 patient was hospitalized for meningioma.  The hospital stay was complicated by acute kidney injury with severe hypercalcemia improved and painted which was treated with multiple medications and IV fluids.  Patient's acute kidney injury improved and ultimately patient underwent craniotomy for meningioma by Dr. Hagen on 07/31/2020.  Patient was subsequently discharged in a stable condition.  Patient is being readmitted for placement of  shunt for hydrocephalus.  Nephrology consultation provided for follow-up of acute kidney injury, hypercalcemia and hyponatremia.  Management discussed with the emergency room staff.    Bedside consultation provided using telemedicine protocol    Interval History:  No SIADH.  Uric acid is high.  Normal saline for hyponatremia.    Review of patient's allergies indicates:   Allergen Reactions    Heparin analogues Other (See Comments)     DANYELLE     Hydrochlorothiazide      hypercalcemia     Current Facility-Administered Medications   Medication Frequency    0.9%  NaCl infusion Continuous    amLODIPine tablet 5 mg Daily    ascorbic acid (vitamin C) tablet 500 mg QHS    atorvastatin tablet 10 mg QHS    calcitRIOL capsule 0.25 mcg Daily    calcium carbonate (OS-THANIA) tablet 500 mg QID    cholecalciferol (vitamin D3) 125 mcg (5,000 unit) tablet 5,000 Units Daily    dexAMETHasone tablet 2 mg Q12H    dextrose 50% injection 12.5 g PRN    dextrose 50% injection 25 g PRN    DULoxetine   capsule 30 mg Daily    famotidine tablet 20 mg BID PRN    ferrous sulfate EC tablet 325 mg BID WM    gabapentin capsule 600 mg Daily    glucagon (human recombinant) injection 1 mg PRN    glucose chewable tablet 16 g PRN    glucose chewable tablet 24 g PRN    HYDROcodone-acetaminophen 5-325 mg per tablet 1 tablet Q4H PRN    insulin aspart U-100 pen 0-5 Units QID (AC + HS) PRN    levETIRAcetam tablet 1,500 mg BID    levothyroxine tablet 137 mcg Before breakfast    melatonin tablet 6 mg Nightly PRN    multivitamin tablet Daily    pantoprazole EC tablet 40 mg Daily       Objective:     Vital Signs (Most Recent):  Temp: 98 °F (36.7 °C) (08/18/20 1151)  Pulse: 68 (08/18/20 1300)  Resp: 18 (08/18/20 1151)  BP: (!) 151/73 (08/18/20 1151)  SpO2: 98 % (08/18/20 1151)  O2 Device (Oxygen Therapy): room air (08/18/20 0705) Vital Signs (24h Range):  Temp:  [96.2 °F (35.7 °C)-98 °F (36.7 °C)] 98 °F (36.7 °C)  Pulse:  [60-91] 68  Resp:  [16-20] 18  SpO2:  [92 %-98 %] 98 %  BP: (151-186)/(63-83) 151/73     Weight: 117.5 kg (259 lb 0.7 oz) (08/18/20 0423)  Body mass index is 37.17 kg/m².  Body surface area is 2.41 meters squared.    I/O last 3 completed shifts:  In: 240 [P.O.:240]  Out: 0     Physical Exam  Vitals signs and nursing note reviewed.   Constitutional:       Appearance: She is well-developed.   HENT:      Head: Normocephalic and atraumatic.      Comments: staples on the head from the previous craniotomy.  Eyes:      Conjunctiva/sclera: Conjunctivae normal.      Pupils: Pupils are equal, round, and reactive to light.   Neck:      Musculoskeletal: Full passive range of motion without pain, normal range of motion and neck supple. No edema.      Thyroid: No thyroid mass or thyromegaly.      Vascular: No carotid bruit.   Cardiovascular:      Rate and Rhythm: Normal rate and regular rhythm.      Chest Wall: PMI is not displaced.      Pulses: Normal pulses.      Heart sounds: Normal heart sounds, S1 normal and S2  normal. No murmur. No friction rub.   Pulmonary:      Effort: Pulmonary effort is normal. No accessory muscle usage or respiratory distress.      Breath sounds: Normal breath sounds. No wheezing or rales.   Chest:      Chest wall: No tenderness.   Abdominal:      General: Bowel sounds are normal. There is no distension.      Palpations: Abdomen is soft. There is no mass.      Tenderness: There is no abdominal tenderness. There is no rebound.      Hernia: No hernia is present.   Musculoskeletal: Normal range of motion.         General: No tenderness.   Skin:     General: Skin is warm and dry.      Coloration: Skin is not pale.      Findings: No bruising, ecchymosis, erythema or rash.      Nails: There is no clubbing.     Neurological:      Mental Status: She is alert and oriented to person, place, and time.      Sensory: No sensory deficit.      Motor: No abnormal muscle tone.      Coordination: Coordination normal.      Deep Tendon Reflexes: Reflexes abnormal.      Comments:   Left-sided residual hemiparesis which is unchanged from the last visit   Psychiatric:         Speech: Speech normal.         Behavior: Behavior normal.         Thought Content: Thought content normal.         Judgment: Judgment normal.         Significant Labs:  CMP:   Recent Labs   Lab 08/18/20  0606   *   CALCIUM 9.4   ALBUMIN 3.9   PROT 7.7   *   K 3.8   CO2 25   CL 94*   BUN 16   CREATININE 1.1   ALKPHOS 180*   ALT 51*   AST 34   BILITOT 0.6     All labs within the past 24 hours have been reviewed.     Significant Imaging:  Labs: Reviewed  CT: Reviewed    Assessment/Plan:     Hyponatremia  Creatinine much more improved.  Recommend normal saline for hyponatremia     No evidence of SIADH uric acid is high.  No further workup at this time.  Follow serial blood work.        Thank you for your consult.     Veronika Eller MD  Nephrology  [facility] -

## 2020-08-18 NOTE — CONSULTS
[facility] -   Neurosurgery  Consult Note    Consults  Subjective:     Chief Complaint/Reason for Admission:  Progressive hydrocephalus with associated lethargy, headache and gait disturbance.    History of Present Illness:  This pleasant 49-year-old woman is approximately 3 weeks out from right frontal craniotomy and resection of for a very large WHO grade 2 meningioma that had presented with several months of episodic focal seizures in progressive obtundation with left hemiparesis.  She become so obtunded that she presented with dehydration and acute renal failure with hypercalcemia.  Following resuscitation over several days, she was taken to the operating room where she underwent uneventful resection of the meningioma.  The patient did well initially and was discharged to inpatient rehab where she progressed nicely.  She has been home for about a week, however  has noticed a significant worsening in her condition over the last week, with less mobility, increasing headaches and lethargy and anorexia.    PTA Medications   Medication Sig    amLODIPine (NORVASC) 5 MG tablet Take 1 tablet (5 mg total) by mouth once daily.    aspirin 81 MG Chew Take 1 tablet (81 mg total) by mouth once daily.    atorvastatin (LIPITOR) 10 MG tablet Take 10 mg by mouth once daily.     calcitRIOL (ROCALTROL) 0.25 MCG Cap 0.25 mcg once daily.     calcium carbonate (OS-THANIA) 500 mg calcium (1,250 mg) tablet Take 1 tablet (500 mg total) by mouth 4 (four) times daily.    cholecalciferol, vitamin D3, 125 mcg (5,000 unit) Tab Take 1 tablet (5,000 Units total) by mouth once daily.    dexAMETHasone (DECADRON) 2 MG tablet     DULoxetine (CYMBALTA) 30 MG capsule     gabapentin (NEURONTIN) 600 MG tablet Take 600 mg by mouth once daily.     HYDROcodone-acetaminophen (NORCO) 5-325 mg per tablet Take 1 tablet by mouth every 4 (four) hours as needed.    levETIRAcetam (KEPPRA) 500 MG Tab Take 1,500 mg by mouth 2 (two)  times daily.    levothyroxine (SYNTHROID) 137 MCG Tab tablet Take 1 tablet (137 mcg total) by mouth once daily.    metFORMIN (GLUCOPHAGE) 500 MG tablet Take 500 mg by mouth 2 (two) times daily with meals.    OMEPRAZOLE ORAL Take by mouth.    ondansetron (ZOFRAN) 4 MG tablet     ferrous sulfate 325 (65 FE) MG EC tablet Take 1 tablet (325 mg total) by mouth 2 (two) times daily.    zolpidem (AMBIEN) 10 mg Tab Take 1 tablet (10 mg total) by mouth nightly as needed.       Review of patient's allergies indicates:   Allergen Reactions    Heparin analogues Other (See Comments)     DANYELLE     Hydrochlorothiazide      hypercalcemia       Past Medical History:   Diagnosis Date    Diabetes mellitus     Hydrocephalus, acquired 8/17/2020    Hypertension     Seizures     Stroke     Thyroid disease      Past Surgical History:   Procedure Laterality Date    CRANIOTOMY      THYROIDECTOMY      TONSILLECTOMY       Family History     None        Tobacco Use    Smoking status: Current Every Day Smoker     Packs/day: 1.00     Types: Cigarettes    Tobacco comment: none since July 2020   Substance and Sexual Activity    Alcohol use: Not Currently    Drug use: Not Currently    Sexual activity: Not on file     Review of Systems   Constitutional: Positive for activity change, appetite change and fatigue. Negative for diaphoresis and unexpected weight change.   HENT: Negative for hearing loss, rhinorrhea, trouble swallowing and voice change.    Eyes: Negative for photophobia and visual disturbance.   Respiratory: Negative for chest tightness and shortness of breath.    Cardiovascular: Negative for chest pain.   Gastrointestinal: Negative for constipation, nausea and vomiting.   Endocrine: Negative for cold intolerance, heat intolerance, polydipsia, polyphagia and polyuria.   Genitourinary: Negative for difficulty urinating.   Musculoskeletal: Positive for gait problem. Negative for back pain, neck pain and neck stiffness.    Skin: Negative.    Allergic/Immunologic: Negative.    Neurological: Positive for weakness and headaches. Negative for dizziness, tremors, seizures, syncope, facial asymmetry, speech difficulty and numbness.   Hematological: Negative for adenopathy. Does not bruise/bleed easily.   Psychiatric/Behavioral: Negative for behavioral problems, confusion and decreased concentration.     Objective:     Weight: 120.7 kg (266 lb 1.6 oz)  Body mass index is 38.18 kg/m².  Vital Signs (Most Recent):  Temp: 97.8 °F (36.6 °C) (08/17/20 1923)  Pulse: 65 (08/17/20 1923)  Resp: 18 (08/17/20 2105)  BP: (!) 161/76 (08/17/20 1923)  SpO2: 96 % (08/17/20 1923) Vital Signs (24h Range):  Temp:  [97.2 °F (36.2 °C)-98.1 °F (36.7 °C)] 97.8 °F (36.6 °C)  Pulse:  [65-85] 65  Resp:  [16-20] 18  SpO2:  [94 %-96 %] 96 %  BP: (129-185)/() 161/76     Date 08/17/20 0700 - 08/18/20 0659   Shift 3177-7457 5454-3011 1253-6688 24 Hour Total   INTAKE   P.O.  0  0   Shift Total(mL/kg)  0(0)  0(0)   OUTPUT   Urine(mL/kg/hr)  0(0)  0   Shift Total(mL/kg)  0(0)  0(0)   Weight (kg) 120.7 120.7 120.7 120.7                        Physical Exam:  Nursing note and vitals reviewed.    Constitutional: She appears well-developed and well-nourished.   The patient has a well healed bicoronal scalp incision.  There is a tiny pseudomeningocele associated with this.  There is no erythema, fluctuance or drainage.  The incision is well healed.     Eyes: Pupils are equal, round, and reactive to light. Conjunctivae and EOM are normal.     Cardiovascular: Normal rate, regular rhythm and normal pulses.     Abdominal: Soft. Bowel sounds are normal.     Skin: Skin displays no rash on trunk and no rash on extremities.     Psych/Behavior: She is oriented to person, place, and time.   Patient appears to not feel well.  She speaks on only short answers.  There appears to be psychomotor slowing.     Musculoskeletal: Gait is normal.        Neck: Range of motion is full. There is  no tenderness. Muscle strength is 5/5. Tone is normal.        Back: Range of motion is full. There is no tenderness. Muscle strength is 5/5. Tone is normal.        Right Upper Extremities: Range of motion is full. Muscle strength is 5/5. Tone is normal.        Left Upper Extremities: Range of motion is full. Muscle strength is 4/5. Tone is normal.       Right Lower Extremities: Range of motion is full. Muscle strength is 5/5.        Left Lower Extremities: Range of motion is full. Muscle strength is 4/5.     Neurological:        Coordination: She has an abnormal Romberg Test and abnormal tandem walking coordination.        Sensory: There is no sensory deficit in the trunk. There is no sensory deficit in the extremities.        DTRs: DTRs are normal. Tricep reflexes are 1+ on the right side and 1+ on the left side. Bicep reflexes are 1+ on the right side and 1+ on the left side. Brachioradialis reflexes are 1+ on the right side and 1+ on the left side. Patellar reflexes are 1+ on the right side and 1+ on the left side. Achilles reflexes are 1+ on the right side and 1+ on the left side. She displays no Babinski's sign on the right side. She displays Babinski's sign on the left side.        Cranial nerves: Cranial nerve(s) II, III, IV, V, VI, VII, VIII, IX, X, XI and XII are intact.       Significant Labs:  Recent Labs   Lab 08/17/20  1355   *   *   K 4.0   CL 92*   CO2 24   BUN 16   CREATININE 1.0   CALCIUM 9.5     Recent Labs   Lab 08/17/20  1355   WBC 14.89*   HGB 10.1*   HCT 32.7*        Recent Labs   Lab 08/17/20  1355   INR 1.0   APTT 24.8     Microbiology Results (last 7 days)     ** No results found for the last 168 hours. **        All pertinent labs from the last 24 hours have been reviewed.  Significant Diagnostics:  CT: Ct Head Without Contrast    Addendum Date: 8/17/2020    Correction: There is mild increase in dilatation of the ventricular system particularly the right temporal horn  consistent with developing hydrocephalus. Electronically signed by: Gui Herrera MD Date:    08/17/2020 Time:    16:11    Result Date: 8/17/2020  No detrimental change with expected evolutionary findings of the right frontal lobe intraoperative bed.  Significant decrease in size in near complete resolution of the scalp fluid collection. Electronically signed by: Gui Herrera MD Date:    08/17/2020 Time:    12:02    Assessment/Plan:   Assessment of the CT obtained emergently in the clinic today demonstrates progressive enlargement of the lateral ventricles, distention of the 3rd ventricle in dilatation of the temporal horns all consistent with a communicating hydrocephalus.  The patient has no fever or meningeal signs.  She is suffering from some degree of hyponatremia with a sodium of 129.    I tend to feel that most of her symptoms related to her progressing hydrocephalus.  Attention to treating her hyponatremia can be done over the next 48 hr with tentative plans for ventriculoperitoneal shunt on Thursday August 20th.  I discussed the treatment plan at length with the patient and her , who understand fully.  I discussed with the patient and her  the risks of ventriculoperitoneal shunt and the other treatment plans, as well as the inherent risks of such as surgery, including bleeding, infection, stroke, seizures, residual recurrent symptoms, shunt malfunction or misplacement, medical or anesthetic complications among many others.  Understanding these risks and others, and having had an opportunity to have all of their questions answered, they would like to proceed as outlined above.  Active Diagnoses:    Diagnosis Date Noted POA    PRINCIPAL PROBLEM:  Obstructive hydrocephalus [G91.1] 08/17/2020 Yes    Calcified cerebral meningioma [D32.0] 07/31/2020 Yes     Chronic    Type 2 diabetes mellitus with stage 3 chronic kidney disease, without long-term current use of insulin [E11.22, N18.3]  08/17/2020 Yes    Hypothyroidism [E03.9] 08/17/2020 Yes    Frequent falls [R29.6] 08/17/2020 Not Applicable    Hyperlipidemia associated with type 2 diabetes mellitus [E11.69, E78.5] 08/17/2020 Yes    History of CVA in adulthood [Z86.73] 08/17/2020 Not Applicable    Hypocalcemia [E83.51] 08/02/2020 Yes    Anemia due to stage 3 chronic kidney disease [N18.3, D63.1]  Yes    Hyponatremia [E87.1] 07/26/2020 Yes    Hypertension associated with diabetes [E11.59, I10] 07/26/2020 Yes      Problems Resolved During this Admission:       Thank you for your consult. I will follow-up with patient. Please contact us if you have any additional questions.    Jose Hagen MD  Neurosurgery  [facility] -

## 2020-08-18 NOTE — SUBJECTIVE & OBJECTIVE
Interval History:  No SIADH.  Uric acid is high.  Normal saline for hyponatremia.    Review of patient's allergies indicates:   Allergen Reactions    Heparin analogues Other (See Comments)     DANYELLE     Hydrochlorothiazide      hypercalcemia     Current Facility-Administered Medications   Medication Frequency    0.9%  NaCl infusion Continuous    amLODIPine tablet 5 mg Daily    ascorbic acid (vitamin C) tablet 500 mg QHS    atorvastatin tablet 10 mg QHS    calcitRIOL capsule 0.25 mcg Daily    calcium carbonate (OS-THANIA) tablet 500 mg QID    cholecalciferol (vitamin D3) 125 mcg (5,000 unit) tablet 5,000 Units Daily    dexAMETHasone tablet 2 mg Q12H    dextrose 50% injection 12.5 g PRN    dextrose 50% injection 25 g PRN    DULoxetine DR capsule 30 mg Daily    famotidine tablet 20 mg BID PRN    ferrous sulfate EC tablet 325 mg BID WM    gabapentin capsule 600 mg Daily    glucagon (human recombinant) injection 1 mg PRN    glucose chewable tablet 16 g PRN    glucose chewable tablet 24 g PRN    HYDROcodone-acetaminophen 5-325 mg per tablet 1 tablet Q4H PRN    insulin aspart U-100 pen 0-5 Units QID (AC + HS) PRN    levETIRAcetam tablet 1,500 mg BID    levothyroxine tablet 137 mcg Before breakfast    melatonin tablet 6 mg Nightly PRN    multivitamin tablet Daily    pantoprazole EC tablet 40 mg Daily       Objective:     Vital Signs (Most Recent):  Temp: 98 °F (36.7 °C) (08/18/20 1151)  Pulse: 68 (08/18/20 1300)  Resp: 18 (08/18/20 1151)  BP: (!) 151/73 (08/18/20 1151)  SpO2: 98 % (08/18/20 1151)  O2 Device (Oxygen Therapy): room air (08/18/20 0705) Vital Signs (24h Range):  Temp:  [96.2 °F (35.7 °C)-98 °F (36.7 °C)] 98 °F (36.7 °C)  Pulse:  [60-91] 68  Resp:  [16-20] 18  SpO2:  [92 %-98 %] 98 %  BP: (151-186)/(63-83) 151/73     Weight: 117.5 kg (259 lb 0.7 oz) (08/18/20 0423)  Body mass index is 37.17 kg/m².  Body surface area is 2.41 meters squared.    I/O last 3 completed shifts:  In: 240  [P.O.:240]  Out: 0     Physical Exam  Vitals signs and nursing note reviewed.   Constitutional:       Appearance: She is well-developed.   HENT:      Head: Normocephalic and atraumatic.      Comments: staples on the head from the previous craniotomy.  Eyes:      Conjunctiva/sclera: Conjunctivae normal.      Pupils: Pupils are equal, round, and reactive to light.   Neck:      Musculoskeletal: Full passive range of motion without pain, normal range of motion and neck supple. No edema.      Thyroid: No thyroid mass or thyromegaly.      Vascular: No carotid bruit.   Cardiovascular:      Rate and Rhythm: Normal rate and regular rhythm.      Chest Wall: PMI is not displaced.      Pulses: Normal pulses.      Heart sounds: Normal heart sounds, S1 normal and S2 normal. No murmur. No friction rub.   Pulmonary:      Effort: Pulmonary effort is normal. No accessory muscle usage or respiratory distress.      Breath sounds: Normal breath sounds. No wheezing or rales.   Chest:      Chest wall: No tenderness.   Abdominal:      General: Bowel sounds are normal. There is no distension.      Palpations: Abdomen is soft. There is no mass.      Tenderness: There is no abdominal tenderness. There is no rebound.      Hernia: No hernia is present.   Musculoskeletal: Normal range of motion.         General: No tenderness.   Skin:     General: Skin is warm and dry.      Coloration: Skin is not pale.      Findings: No bruising, ecchymosis, erythema or rash.      Nails: There is no clubbing.     Neurological:      Mental Status: She is alert and oriented to person, place, and time.      Sensory: No sensory deficit.      Motor: No abnormal muscle tone.      Coordination: Coordination normal.      Deep Tendon Reflexes: Reflexes abnormal.      Comments:   Left-sided residual hemiparesis which is unchanged from the last visit   Psychiatric:         Speech: Speech normal.         Behavior: Behavior normal.         Thought Content: Thought content  normal.         Judgment: Judgment normal.         Significant Labs:  CMP:   Recent Labs   Lab 08/18/20  0606   *   CALCIUM 9.4   ALBUMIN 3.9   PROT 7.7   *   K 3.8   CO2 25   CL 94*   BUN 16   CREATININE 1.1   ALKPHOS 180*   ALT 51*   AST 34   BILITOT 0.6     All labs within the past 24 hours have been reviewed.     Significant Imaging:  Labs: Reviewed  CT: Reviewed

## 2020-08-18 NOTE — PROGRESS NOTES
[facility] Hospital Medicine  Progress Note    Patient Name: Cata Lang  MRN: [id number]  Patient Class: IP- Inpatient   Admission Date: 8/17/2020  Length of Stay: 1 days  Attending Physician: Lurdes Guy MD  Primary Care Provider: Sabra Fregoso MD        Subjective:     Principal Problem:Hydrocephalus, acquired        HPI:  This is a 50 yo female who has a PMHX of Dm2, HTN, CKD stage 3, anemia of chronic disease, Prior CVA in 2015 felt to be from her with residual left sided weakness,  And s/p craniotomy for meningioma on 7/31/20 done by Dr. Hagen. She was discharged from rehab 3 days ago and reports that the next day she began  having dizziness, HA, and increased left sided weakness form baseline. Patient has also been having multiple falls the past few days. Patient had a follow up visit with Dr. Hagen today and per report, CT showed signs of  hydrocephalus. Dr. Hagen requested patient be placed in the hospital for further monitoring, physical therapy, and plans for upcoming   shunt placement.                                                   Overview/Hospital Course:  50 yo female who has a PMHX of DM2, HTN, CKD stage 3, anemia of chronic disease, Prior CVA in 2015 with residual left sided weakness, nd s/p craniotomy for meningioma on 7/31/20 done by Dr. Hagen. She was discharged from rehab 3 days ago and reports that the next day she began having dizziness, HA, and increased left sided weakness form baseline. Patient has also been having multiple falls the past few days. Patient had a follow up visit with Dr. Hagen today and per report, CT showed signs of Hydrocephalus. She was also found to be Hyponatremic with a Na of 129- likely sec to IVVD. Pt admitted to [facility] and started on IVF. Pt was also a tapering dose of Decadron orally. This morning she feels lot better, more alert and awake and stronger, speech getting clearer. Dr. Hagen plans Shunt placement this Friday. Na and Cl  "slightly better, pt eating drinking better too. Will get PT/OT in am     Interval History: Patient had a follow up visit with Dr. Hagen today and per report, CT showed signs of Hydrocephalus. She was also found to be Hyponatremic with a Na of 129- likely sec to IVVD. Pt admitted to [facility] and started on IVF. Pt was also a tapering dose of Decadron orally. This morning she feels lot better, more alert and awake and stronger, speech getting clearer. Dr. Hagen plans Shunt placement this Friday. Na and Cl slightly better, pt eating drinking better too. Will get PT/OT in am     Review of Systems   Constitutional: Positive for activity change and fatigue. Negative for appetite change, chills, diaphoresis and fever.   HENT: Negative for ear discharge, ear pain and facial swelling.    Eyes: Negative for pain and redness.   Respiratory: Negative for cough and shortness of breath.    Gastrointestinal: Negative for abdominal distention, blood in stool, constipation, diarrhea, nausea and vomiting.        LBM today and was "normal"  Patient reports appetite is "very good"   Endocrine: Negative for polydipsia and polyphagia.   Genitourinary: Negative for difficulty urinating, dysuria, flank pain and hematuria.   Musculoskeletal: Negative for neck pain and neck stiffness.   Skin: Negative for color change.   Allergic/Immunologic: Negative for food allergies.   Neurological: Positive for dizziness, weakness and headaches. Negative for seizures, facial asymmetry and speech difficulty.        Patient reports 10/10 frontal headaches    Patient reports ongoing left upper and lower sided weakness since prior CVA in 2015 but reports increased weakness from baseline over past few days    Hematological: Does not bruise/bleed easily.   Psychiatric/Behavioral: Negative for agitation, behavioral problems, confusion, dysphoric mood and suicidal ideas. The patient is not nervous/anxious.      Objective:     Vital Signs (Most Recent):  Temp: 97.8 " °F (36.6 °C) (08/18/20 1614)  Pulse: 68 (08/18/20 1700)  Resp: 16 (08/18/20 1704)  BP: (!) 179/80 (08/18/20 1614)  SpO2: 96 % (08/18/20 1614) Vital Signs (24h Range):  Temp:  [96.2 °F (35.7 °C)-98 °F (36.7 °C)] 97.8 °F (36.6 °C)  Pulse:  [60-91] 68  Resp:  [16-18] 16  SpO2:  [92 %-98 %] 96 %  BP: (151-186)/(73-83) 179/80     Weight: 117.5 kg (259 lb 0.7 oz)  Body mass index is 37.17 kg/m².    Intake/Output Summary (Last 24 hours) at 8/18/2020 1816  Last data filed at 8/18/2020 1704  Gross per 24 hour   Intake 750 ml   Output 400 ml   Net 350 ml      Physical Exam  Vitals signs and nursing note reviewed.   Constitutional:       Appearance: She is well-developed.   HENT:      Head: Normocephalic and atraumatic.      Comments: staples on the head from the previous craniotomy.  Eyes:      Conjunctiva/sclera: Conjunctivae normal.      Pupils: Pupils are equal, round, and reactive to light.   Neck:      Musculoskeletal: Full passive range of motion without pain, normal range of motion and neck supple. No edema.      Thyroid: No thyroid mass or thyromegaly.      Vascular: No carotid bruit.   Cardiovascular:      Rate and Rhythm: Normal rate and regular rhythm.      Chest Wall: PMI is not displaced.      Pulses: Normal pulses.      Heart sounds: Normal heart sounds, S1 normal and S2 normal. No murmur. No friction rub.   Pulmonary:      Effort: Pulmonary effort is normal. No accessory muscle usage or respiratory distress.      Breath sounds: Normal breath sounds. No wheezing or rales.   Chest:      Chest wall: No tenderness.   Abdominal:      General: Bowel sounds are normal. There is no distension.      Palpations: Abdomen is soft. There is no mass.      Tenderness: There is no abdominal tenderness. There is no rebound.      Hernia: No hernia is present.   Musculoskeletal: Normal range of motion.         General: No tenderness.   Skin:     General: Skin is warm and dry.      Coloration: Skin is not pale.      Findings: No  bruising, ecchymosis, erythema or rash.      Nails: There is no clubbing.     Neurological:      Mental Status: She is alert and oriented to person, place, and time.      Sensory: No sensory deficit.      Motor: No abnormal muscle tone.      Coordination: Coordination normal.      Deep Tendon Reflexes: Reflexes abnormal.      Comments:   Left-sided residual hemiparesis which is unchanged from the last visit   Psychiatric:         Speech: Speech normal.         Behavior: Behavior normal.         Thought Content: Thought content normal.         Judgment: Judgment normal.         Significant Labs:   BMP:   Recent Labs   Lab 08/18/20  0606   *   *   K 3.8   CL 94*   CO2 25   BUN 16   CREATININE 1.1   CALCIUM 9.4   MG 1.9     CBC:   Recent Labs   Lab 08/17/20  1355 08/18/20  0606   WBC 14.89* 13.58*   HGB 10.1* 10.0*   HCT 32.7* 32.3*    137*     CMP:   Recent Labs   Lab 08/17/20  1355 08/18/20  0606   * 130*   K 4.0 3.8   CL 92* 94*   CO2 24 25   * 144*   BUN 16 16   CREATININE 1.0 1.1   CALCIUM 9.5 9.4   PROT 7.7 7.7   ALBUMIN 3.9 3.9   BILITOT 0.6 0.6   ALKPHOS 190* 180*   AST 32 34   ALT 48* 51*   ANIONGAP 13 11   EGFRNONAA >60 58*     All pertinent labs within the past 24 hours have been reviewed.  Imaging Results          CT Head Stealth W/O Contrast (Final result)  Result time 08/17/20 16:19:53   Procedure changed from CT Head Without Contrast     Final result by Kingsley Churchill MD (08/17/20 16:19:53)                 Impression:      Thin-section preoperative CT scan with stable mild dilatation of the ventricular system as above.    Evolving right frontal lobe operative change.    All CT scans at this facility use dose modulation, iterative reconstruction, and/or weight based dosing when appropriate to reduce radiation dose to as low as reasonable achievable.      Electronically signed by: Kingsley Churchill  Date:    08/17/2020  Time:    16:19             Narrative:     EXAMINATION:  CT HEAD STEALTH W/O CONTRAST    CLINICAL HISTORY:  Brain/CNS neoplasm, assess treatment response;    TECHNIQUE:  Volumetric axial CT images obtained throughout the region of the head without use of intravenous contrast via [employer] protocol. Axial, sagittal and coronal reconstructions were performed.    COMPARISON:  Head CT earlier same date with priors; MRI brain 07/26/2020    FINDINGS:  Continued evolution of postsurgical change within the right frontal lobe status post mass lesion resection.  Persistent mixed density extra-axial collection overlies the frontal lobe, improved as compared to recent postoperative scans and stable to examination earlier same date.  Continued hypodensity within the right frontal lobe.  Unchanged hypodensity in the left occipital lobe.  No new or acute area of parenchymal hypoattenuation.    Ex vacuo dilatation of the right frontal horn.  Stable prominence of the ventricular system as compared to recent head CT, but mild increase in size as compared to remote priors.    Stable subcutaneous fluid collection overlying the craniotomy site, stable to recent prior but decreased as compared to remote priors.  Paranasal sinuses are predominantly clear.  Mastoid air cells are clear.  Osseous calvarium is otherwise intact.                              Significant Imaging: I have reviewed all pertinent imaging results/findings within the past 24 hours.      Assessment/Plan:      * Hydrocephalus, acquired  S/p craniotomy and L Frontal Meningioma resection- now pt has developed Hydrocephalus in the same area- needs Shunt  No need for any Mannitol  Continue to taper decadron    Calcified cerebral meningioma s/p Craniotomy/ Resection  7/31/2020 S/p Crainotomy with Aspiration of frontal pseudomeningocele   Patient reports she has been taking her decadron at home but is unsure of the dosage- Asked  to bring in   Will defer steroid dosing to NeuroSurgeon    S/p resection 7/31- See  above    Hyponatremia  Monitor- Levels running 135-136 three weeks ago  Add 1500ml fluid restriction  Nephrology consulted  Seizure precautions    Stable- likely sec to Intracranial tumor      Anemia due to stage 3 chronic kidney disease  Add MVI and pm vitamin C  Continue bid ferrous sulfate      Type 2 diabetes mellitus with stage 3 chronic kidney disease, without long-term current use of insulin  Dm diet  Accuchecks with correctional SSI  Home metformin on hold    BS under control    Hypocalcemia  Continue home medications    Normal Ca    History of CVA in adulthood  Patient reports Hx of CVA in 2015 which was felt to be caused from her prior pseudomeningocele  Patient states her home Arixtra was recently discontinued about a week or so ago      Hyperlipidemia associated with type 2 diabetes mellitus  Continue home statin      Frequent falls  New Onset  Fall precautions  Consult physical therapy    Sec to Hydrocephalus- improving      Hypothyroidism  Continue home medications    Lab Results   Component Value Date    TSH 1.544 07/26/2020           Hypertension associated with diabetes  Continue home medications        VTE Risk Mitigation (From admission, onward)         Ordered     Place HOLLAND hose  Until discontinued      08/17/20 8940                Discharge Planning   ROSALIA:      Code Status: Prior   Is the patient medically ready for discharge?:     Reason for patient still in hospital (select all that apply): Patient new problem, Patient trending condition, Laboratory test, Treatment and Consult recommendations       Seen and D/W Dr. Hieu Guy MD  Department of Hospital Medicine   [facility]

## 2020-08-19 ENCOUNTER — ANESTHESIA EVENT (OUTPATIENT)
Dept: SURGERY | Facility: HOSPITAL | Age: 50
DRG: 032 | End: 2020-08-19
Payer: COMMERCIAL

## 2020-08-19 LAB
ABO + RH BLD: NORMAL
ANION GAP SERPL CALC-SCNC: 13 MMOL/L (ref 8–16)
BASOPHILS # BLD AUTO: 0.02 K/UL (ref 0–0.2)
BASOPHILS NFR BLD: 0.1 % (ref 0–1.9)
BLD GP AB SCN CELLS X3 SERPL QL: NORMAL
BUN SERPL-MCNC: 20 MG/DL (ref 6–20)
CALCIUM SERPL-MCNC: 9.4 MG/DL (ref 8.7–10.5)
CHLORIDE SERPL-SCNC: 94 MMOL/L (ref 95–110)
CO2 SERPL-SCNC: 25 MMOL/L (ref 23–29)
CREAT SERPL-MCNC: 1 MG/DL (ref 0.5–1.4)
DIFFERENTIAL METHOD: ABNORMAL
EOSINOPHIL # BLD AUTO: 0 K/UL (ref 0–0.5)
EOSINOPHIL NFR BLD: 0.1 % (ref 0–8)
ERYTHROCYTE [DISTWIDTH] IN BLOOD BY AUTOMATED COUNT: 17.3 % (ref 11.5–14.5)
EST. GFR  (AFRICAN AMERICAN): >60 ML/MIN/1.73 M^2
EST. GFR  (NON AFRICAN AMERICAN): >60 ML/MIN/1.73 M^2
GLUCOSE SERPL-MCNC: 178 MG/DL (ref 70–110)
HCT VFR BLD AUTO: 35.3 % (ref 37–48.5)
HGB BLD-MCNC: 10.9 G/DL (ref 12–16)
IMM GRANULOCYTES # BLD AUTO: 0.08 K/UL (ref 0–0.04)
IMM GRANULOCYTES NFR BLD AUTO: 0.6 % (ref 0–0.5)
LYMPHOCYTES # BLD AUTO: 1.9 K/UL (ref 1–4.8)
LYMPHOCYTES NFR BLD: 13.3 % (ref 18–48)
MCH RBC QN AUTO: 25.8 PG (ref 27–31)
MCHC RBC AUTO-ENTMCNC: 30.9 G/DL (ref 32–36)
MCV RBC AUTO: 84 FL (ref 82–98)
MONOCYTES # BLD AUTO: 0.6 K/UL (ref 0.3–1)
MONOCYTES NFR BLD: 4.3 % (ref 4–15)
NEUTROPHILS # BLD AUTO: 11.7 K/UL (ref 1.8–7.7)
NEUTROPHILS NFR BLD: 81.6 % (ref 38–73)
NRBC BLD-RTO: 0 /100 WBC
PLATELET # BLD AUTO: 199 K/UL (ref 150–350)
PMV BLD AUTO: 11.3 FL (ref 9.2–12.9)
POCT GLUCOSE: 154 MG/DL (ref 70–110)
POCT GLUCOSE: 160 MG/DL (ref 70–110)
POCT GLUCOSE: 160 MG/DL (ref 70–110)
POCT GLUCOSE: 163 MG/DL (ref 70–110)
POCT GLUCOSE: 177 MG/DL (ref 70–110)
POTASSIUM SERPL-SCNC: 3.8 MMOL/L (ref 3.5–5.1)
RBC # BLD AUTO: 4.23 M/UL (ref 4–5.4)
SODIUM SERPL-SCNC: 132 MMOL/L (ref 136–145)
WBC # BLD AUTO: 14.32 K/UL (ref 3.9–12.7)

## 2020-08-19 PROCEDURE — 99232 PR SUBSEQUENT HOSPITAL CARE,LEVL II: ICD-10-PCS | Mod: ,,, | Performed by: INTERNAL MEDICINE

## 2020-08-19 PROCEDURE — 99232 SBSQ HOSP IP/OBS MODERATE 35: CPT | Mod: ,,, | Performed by: INTERNAL MEDICINE

## 2020-08-19 PROCEDURE — 63600175 PHARM REV CODE 636 W HCPCS: Performed by: NURSE PRACTITIONER

## 2020-08-19 PROCEDURE — 85025 COMPLETE CBC W/AUTO DIFF WBC: CPT

## 2020-08-19 PROCEDURE — 80048 BASIC METABOLIC PNL TOTAL CA: CPT

## 2020-08-19 PROCEDURE — 63600175 PHARM REV CODE 636 W HCPCS: Performed by: PHYSICIAN ASSISTANT

## 2020-08-19 PROCEDURE — 25000003 PHARM REV CODE 250: Performed by: NURSE PRACTITIONER

## 2020-08-19 PROCEDURE — 21400001 HC TELEMETRY ROOM

## 2020-08-19 PROCEDURE — 97110 THERAPEUTIC EXERCISES: CPT

## 2020-08-19 PROCEDURE — 36415 COLL VENOUS BLD VENIPUNCTURE: CPT

## 2020-08-19 PROCEDURE — 25000003 PHARM REV CODE 250: Performed by: EMERGENCY MEDICINE

## 2020-08-19 PROCEDURE — 86901 BLOOD TYPING SEROLOGIC RH(D): CPT

## 2020-08-19 PROCEDURE — 97530 THERAPEUTIC ACTIVITIES: CPT

## 2020-08-19 PROCEDURE — 97116 GAIT TRAINING THERAPY: CPT

## 2020-08-19 PROCEDURE — 25000003 PHARM REV CODE 250: Performed by: NEUROLOGICAL SURGERY

## 2020-08-19 RX ADMIN — PANTOPRAZOLE SODIUM 40 MG: 40 TABLET, DELAYED RELEASE ORAL at 09:08

## 2020-08-19 RX ADMIN — CALCIUM 500 MG: 500 TABLET ORAL at 01:08

## 2020-08-19 RX ADMIN — LEVETIRACETAM 1500 MG: 500 TABLET ORAL at 09:08

## 2020-08-19 RX ADMIN — FERROUS SULFATE TAB EC 325 MG (65 MG FE EQUIVALENT) 325 MG: 325 (65 FE) TABLET DELAYED RESPONSE at 04:08

## 2020-08-19 RX ADMIN — FERROUS SULFATE TAB EC 325 MG (65 MG FE EQUIVALENT) 325 MG: 325 (65 FE) TABLET DELAYED RESPONSE at 09:08

## 2020-08-19 RX ADMIN — DULOXETINE 30 MG: 30 CAPSULE, DELAYED RELEASE ORAL at 09:08

## 2020-08-19 RX ADMIN — OXYCODONE HYDROCHLORIDE AND ACETAMINOPHEN 500 MG: 500 TABLET ORAL at 09:08

## 2020-08-19 RX ADMIN — CALCIUM 500 MG: 500 TABLET ORAL at 09:08

## 2020-08-19 RX ADMIN — Medication 5000 UNITS: at 09:08

## 2020-08-19 RX ADMIN — CALCITRIOL CAPSULES 0.25 MCG 0.25 MCG: 0.25 CAPSULE ORAL at 09:08

## 2020-08-19 RX ADMIN — DEXAMETHASONE 2 MG: 1 TABLET ORAL at 09:08

## 2020-08-19 RX ADMIN — SODIUM CHLORIDE: 0.9 INJECTION, SOLUTION INTRAVENOUS at 09:08

## 2020-08-19 RX ADMIN — HYDRALAZINE HYDROCHLORIDE 10 MG: 20 INJECTION INTRAMUSCULAR; INTRAVENOUS at 03:08

## 2020-08-19 RX ADMIN — THERA TABS 1 TABLET: TAB at 09:08

## 2020-08-19 RX ADMIN — AMLODIPINE BESYLATE 5 MG: 5 TABLET ORAL at 09:08

## 2020-08-19 RX ADMIN — CALCIUM 500 MG: 500 TABLET ORAL at 04:08

## 2020-08-19 RX ADMIN — HYDROCODONE BITARTRATE AND ACETAMINOPHEN 1 TABLET: 5; 325 TABLET ORAL at 11:08

## 2020-08-19 RX ADMIN — ATORVASTATIN CALCIUM 10 MG: 10 TABLET, FILM COATED ORAL at 09:08

## 2020-08-19 RX ADMIN — HYDROCODONE BITARTRATE AND ACETAMINOPHEN 1 TABLET: 5; 325 TABLET ORAL at 05:08

## 2020-08-19 RX ADMIN — GABAPENTIN 600 MG: 300 CAPSULE ORAL at 09:08

## 2020-08-19 RX ADMIN — HYDROCODONE BITARTRATE AND ACETAMINOPHEN 1 TABLET: 5; 325 TABLET ORAL at 04:08

## 2020-08-19 RX ADMIN — LEVOTHYROXINE SODIUM 137 MCG: 25 TABLET ORAL at 05:08

## 2020-08-19 NOTE — PLAN OF CARE
PT WITH GOOD PARTICIPATION, MODA FOR BED MOBILITY, MODA X 2 FOR SIT<>STAND TF, MODA FOR SHORT DISTANCE GAIT WITH RW

## 2020-08-19 NOTE — PLAN OF CARE
Treatment ongoing. POC reviewed with pt and spouse at bedside. Pt is AAOX4, VSS, SR on tele. O2 wnl on room air. IVF infusing per orders. PRN meds given for pain and elevated SBP. AC/HS blood glucose monitoring; iss per orders.Pt had 1 episode of emesis; symptoms resolved w/o medication. Pt free from falls; safety precautions in place. Frequent weight shifting encouraged; OOB to chair throughout day. Pt will be NPO at midnight for  shunt placement in AM. Reminded to call for assistance. Hourly rounding complete. Will continue to monitor.

## 2020-08-19 NOTE — PROGRESS NOTES
[facility]   Neurosurgery  Progress Note    Subjective:     Interval History: Patient is sitting up in bed tolerating a regular diet.  She is alert and oriented.  She states her condition appears improved since sodium levels were addressed.  She has a strong appetite.  Her overall condition is stable at this time.  Will proceed with  shunt placement tomorrow.    History of Present Illness: Refer to admission note    Post-Op Info:  Procedure(s) (LRB):  INSERTION, SHUNT, VENTRICULOPERITONEAL (Right)          Medications:  Continuous Infusions:   sodium chloride 0.9% 75 mL/hr at 08/18/20 1052     Scheduled Meds:   amLODIPine  5 mg Oral Daily    ascorbic acid (vitamin C)  500 mg Oral QHS    atorvastatin  10 mg Oral QHS    calcitRIOL  0.25 mcg Oral Daily    calcium carbonate  500 mg Oral QID    cholecalciferol (vitamin D3)  5,000 Units Oral Daily    dexAMETHasone  2 mg Oral Q12H    DULoxetine  30 mg Oral Daily    ferrous sulfate  325 mg Oral BID WM    gabapentin  600 mg Oral Daily    levETIRAcetam  1,500 mg Oral BID    levothyroxine  137 mcg Oral Before breakfast    multivitamin  1 tablet Oral Daily    pantoprazole  40 mg Oral Daily     PRN Meds:dextrose 50%, dextrose 50%, famotidine, glucagon (human recombinant), glucose, glucose, hydrALAZINE, HYDROcodone-acetaminophen, insulin aspart U-100, melatonin     Review of Systems   Constitutional: Negative for activity change, appetite change, chills, diaphoresis and fever.   HENT: Negative for congestion, ear discharge, ear pain, facial swelling, mouth sores, postnasal drip, rhinorrhea, sinus pressure, sinus pain, sneezing, sore throat, tinnitus, trouble swallowing and voice change.    Eyes: Negative for photophobia, pain, discharge, redness and itching.   Respiratory: Negative for apnea, cough, chest tightness, shortness of breath, wheezing and stridor.    Cardiovascular: Negative for chest pain and leg swelling.   Gastrointestinal: Negative  for abdominal distention, abdominal pain, constipation, diarrhea, nausea and vomiting.   Endocrine: Negative for cold intolerance and heat intolerance.   Genitourinary: Negative for difficulty urinating, flank pain, frequency, menstrual problem, urgency, vaginal bleeding and vaginal discharge.   Musculoskeletal: Negative for arthralgias, back pain, gait problem, joint swelling, myalgias, neck pain and neck stiffness.   Allergic/Immunologic: Negative for environmental allergies and food allergies.   Neurological: Negative for dizziness, tremors, seizures, syncope, facial asymmetry, speech difficulty, weakness, light-headedness, numbness and headaches.   Hematological: Negative for adenopathy.   Psychiatric/Behavioral: Negative for agitation, behavioral problems, confusion, decreased concentration, dysphoric mood, hallucinations, self-injury, sleep disturbance and suicidal ideas. The patient is not nervous/anxious and is not hyperactive.      Objective:     Weight: 112.8 kg (248 lb 10.9 oz)  Body mass index is 35.68 kg/m².  Vital Signs (Most Recent):  Temp: 97.7 °F (36.5 °C) (08/19/20 0755)  Pulse: 77 (08/19/20 0755)  Resp: 18 (08/19/20 0547)  BP: (!) 188/86 (08/19/20 0755)  SpO2: 98 % (08/19/20 0755) Vital Signs (24h Range):  Temp:  [96.3 °F (35.7 °C)-98 °F (36.7 °C)] 97.7 °F (36.5 °C)  Pulse:  [63-91] 77  Resp:  [16-20] 18  SpO2:  [96 %-98 %] 98 %  BP: (151-188)/(73-89) 188/86                          Physical Exam:  Vitals reviewed.    Constitutional: She appears well-developed and well-nourished.   Pt is A&Ox3  Speech is clear and appropriate English  Trace left sided weakness present  Left sided pronator drift present  Nose: no rhinorrhea  Ears: no otorrhea  Neck: trachea midline  Mouth: has good dentition      Eyes: Pupils are equal, round, and reactive to light. EOM are normal.     Cardiovascular: Normal rate.     Abdominal: Soft.     Skin: Skin displays no rash on trunk and no rash on extremities. Skin  displays no lesions on trunk and no lesions on extremities.   Incision is well healing with no swelling, redness, or drainage.  No signs of infection.     Psych/Behavior: She is alert. She is oriented to person, place, and time. She has a normal mood and affect.       Significant Labs:  Recent Labs   Lab 08/17/20  1355 08/18/20  0606 08/19/20  0811   * 144* 178*   * 130* 132*   K 4.0 3.8 3.8   CL 92* 94* 94*   CO2 24 25 25   BUN 16 16 20   CREATININE 1.0 1.1 1.0   CALCIUM 9.5 9.4 9.4   MG  --  1.9  --      Recent Labs   Lab 08/17/20  1355 08/18/20  0606 08/19/20  0811   WBC 14.89* 13.58* 14.32*   HGB 10.1* 10.0* 10.9*   HCT 32.7* 32.3* 35.3*    137* 199     Recent Labs   Lab 08/17/20  1355   INR 1.0   APTT 24.8     Microbiology Results (last 7 days)     ** No results found for the last 168 hours. **        All pertinent labs from the last 24 hours have been reviewed.  Significant Diagnostics:  I have reviewed all pertinent imaging results/findings within the past 24 hours.    Assessment/Plan:     Active Diagnoses:    Diagnosis Date Noted POA    PRINCIPAL PROBLEM:  Hydrocephalus, acquired [G91.9] 08/17/2020 Yes    Type 2 diabetes mellitus with stage 3 chronic kidney disease, without long-term current use of insulin [E11.22, N18.3] 08/17/2020 Yes    Hypothyroidism [E03.9] 08/17/2020 Yes    Frequent falls [R29.6] 08/17/2020 Not Applicable    Hyperlipidemia associated with type 2 diabetes mellitus [E11.69, E78.5] 08/17/2020 Yes    History of CVA in adulthood [Z86.73] 08/17/2020 Not Applicable    Hypocalcemia [E83.51] 08/02/2020 Yes    Anemia due to stage 3 chronic kidney disease [N18.3, D63.1]  Yes    Calcified cerebral meningioma s/p Craniotomy/ Resection [D32.0] 07/31/2020 Yes     Chronic    Hyponatremia [E87.1] 07/26/2020 Yes    Hypertension associated with diabetes [E11.59, I10] 07/26/2020 Yes      Problems Resolved During this Admission:    Diagnosis Date Noted Date Resolved POA     Postprocedural pseudomeningocele [G97.82] 08/06/2020 08/17/2020 Yes     PLAN:  -Continue care with primary care team  -Continue to hold anticoagulation at this time  -NPO after midnight  -Type and screen  -Pt will undergo  shunt placement tomorrow  -Ok for PT/OT at this time  -Encourage use of IS  -Ambulate with assist  -Will order bilateral LE venous doppler due to patient's imobility and inability to use blood thinners    Tania Arthur PA-C  Neurosurgery  [facility]

## 2020-08-19 NOTE — ASSESSMENT & PLAN NOTE
S/p craniotomy and L Frontal Meningioma resection- now pt has developed Hydrocephalus in the same area- needs Shunt  No need for any Mannitol  Continue to taper decadron    Clinically a little better, Na also improved to 132   shunt in am

## 2020-08-19 NOTE — PT/OT/SLP PROGRESS
Occupational Therapy   Treatment    Name: Cata Lang  MRN: [id number]  Admitting Diagnosis:  Hydrocephalus, acquired       Recommendations:     Discharge Recommendations: rehabilitation facility  Discharge Equipment Recommendations:  bedside commode  Barriers to discharge:       Assessment:     Cata Lang is a 49 y.o. female with a medical diagnosis of Hydrocephalus, acquired.  She presents with DEBILITY AND GENERALIZED WEAKNESS. Performance deficits affecting function are weakness, impaired self care skills, impaired balance, impaired endurance, impaired functional mobilty, gait instability.     Rehab Prognosis:  Good; patient would benefit from acute skilled OT services to address these deficits and reach maximum level of function.       Plan:     Patient to be seen 3 x/week to address the above listed problems via self-care/home management, therapeutic activities, therapeutic exercises  · Plan of Care Expires:    · Plan of Care Reviewed with: spouse, patient    Subjective     Pain/Comfort:  Pain Rating 1: 0/10    Objective:     Communicated with: NURSE  AND Epic CHART REVIEW prior to session.  Patient found HOB elevated with   upon OT entry to room.    General Precautions: Standard, fall, seizure   Orthopedic Precautions:N/A   Braces: N/A     Occupational Performance:     Bed Mobility:    · Patient completed Rolling/Turning to Right with moderate assistance  · Patient completed Scooting/Bridging with minimum assistance  · Patient completed Supine to Sit with moderate assistance     Functional Mobility/Transfers:  · Patient completed Sit <> Stand Transfer with moderate assistance  with  rolling walker   · Patient completed Bed <> Chair Transfer using Step Transfer technique with moderate assistance with rolling walker  · Functional Mobility: PT AMBULATED 10 FEET WITH MOD A     AMPAC 6 Click ADL: 12    Treatment & Education:  PT SEEN IN ROOM WITH NO C/O PAIN JUST DIZZINESS. PT INSTRUCTED TO KEEP EYES OPEN  TIL DIZZINESS SUBSIDE. PT PERFORMED B UE/ LE EXERCISE SEATED EOB WITH 1 SET X 10 REPS . PT CONSISTENTLY LEAN TO THE RIGHT /POSTERIOR LEAN. OBJECT PLACED IN FRONT OF PT AND ASK TO REMAINED CENTER WITH OBJECT TO PROMOTE AWARENESS OF MID LINE.PT REQ MOD A WITH SIT>STAND T/F'S AND REPORTED DIZZINESS SUBSIDED.  PT AMBULATED 10 FEET WITH ROLLING WALKER AND CHAIR IN TOW AND POOR POSTURE. PT EDUCATED ON PROPER TECHNIQUES. PT REQ MOD A AND VC'S FOR HAND PLACEMENT WITH STAND>SIT TRANFERS TO BED SIDE CHAIR  Patient left up in chair with all lines intact, call button in reach, chair alarm on, NURSE notified and SPOUSE presentEducation:      GOALS:   Multidisciplinary Problems     Occupational Therapy Goals        Problem: Occupational Therapy Goal    Goal Priority Disciplines Outcome Interventions   Occupational Therapy Goal     OT, PT/OT Ongoing, Progressing    Description: LTGS to be met by 8/24/2020  1. Pt will perform LE dressing with Min A  2. Pt will perform UE dressing with SBA  3. Pt will perform toilet t/f with Mod A  4. Pt will perform (B) UE therapeutic exercises 1 x 20 reps                    Time Tracking:     OT Date of Treatment: 08/19/20  OT Start Time: 0935  OT Stop Time: 1000  OT Total Time (min): 25 min    Billable Minutes:Therapeutic Activity 15 MINUTES  Therapeutic Exercise 10 MINUTES    Inna Ornelas OT  8/19/2020

## 2020-08-19 NOTE — PROGRESS NOTES
[facility] Hospital Medicine  Progress Note    Patient Name: Cata Lang  MRN: [id number]  Patient Class: IP- Inpatient   Admission Date: 8/17/2020  Length of Stay: 2 days  Attending Physician: Lurdes Gyu MD  Primary Care Provider: Sabra Fregoso MD        Subjective:     Principal Problem:Hydrocephalus, acquired        HPI:  This is a 48 yo female who has a PMHX of Dm2, HTN, CKD stage 3, anemia of chronic disease, Prior CVA in 2015 felt to be from her with residual left sided weakness,  And s/p craniotomy for meningioma on 7/31/20 done by Dr. Hagen. She was discharged from rehab 3 days ago and reports that the next day she began  having dizziness, HA, and increased left sided weakness form baseline. Patient has also been having multiple falls the past few days. Patient had a follow up visit with Dr. Hagen today and per report, CT showed signs of  hydrocephalus. Dr. Hagen requested patient be placed in the hospital for further monitoring, physical therapy, and plans for upcoming   shunt placement.                                                   Overview/Hospital Course:  48 yo female who has a PMHX of DM2, HTN, CKD stage 3, anemia of chronic disease, Prior CVA in 2015 with residual left sided weakness, nd s/p craniotomy for meningioma on 7/31/20 done by Dr. Hagen. She was discharged from rehab 3 days ago and reports that the next day she began having dizziness, HA, and increased left sided weakness form baseline. Patient has also been having multiple falls the past few days. Patient had a follow up visit with Dr. Hagen today and per report, CT showed signs of Hydrocephalus. She was also found to be Hyponatremic with a Na of 129- likely sec to IVVD. Pt admitted to [facility] and started on IVF. Pt was also a tapering dose of Decadron orally. This morning she feels lot better, more alert and awake and stronger, speech getting clearer. Dr. Hagen plans Shunt placement this Friday. Na and Cl  "slightly better, pt eating drinking better too. Will get PT/OT in am   8/19- Seen and examined with Dr. Jose Hagen, looks and feels better, more alert and responsive, facial expression improved, Na has improved to 132, feels stronger on her legs. Participated with PT and walked a little. Await  shunt surgery in am.     Interval History: Seen and examined with Dr. Jose Hagen, looks and feels better, no dizziness or HA, more alert and responsive, facial expression improved, sitting up in bed and eating BF. Na has improved to 132, feels stronger on her legs. Participated with PT and walked a little. Await  shunt surgery in am.      Review of Systems   Constitutional: Positive for activity change and fatigue. Negative for appetite change, chills, diaphoresis and fever.   HENT: Negative for ear discharge, ear pain and facial swelling.    Eyes: Negative for pain and redness.   Respiratory: Negative for cough and shortness of breath.    Gastrointestinal: Negative for abdominal distention, blood in stool, constipation, diarrhea, nausea and vomiting.        LBM today and was "normal"  Patient reports appetite is "very good"   Endocrine: Negative for polydipsia and polyphagia.   Genitourinary: Negative for difficulty urinating, dysuria, flank pain and hematuria.   Musculoskeletal: Negative for neck pain and neck stiffness.   Skin: Negative for color change.   Allergic/Immunologic: Negative for food allergies.   Neurological: Positive for weakness. Negative for dizziness, seizures, facial asymmetry, speech difficulty and headaches.        Patient reports 10/10 frontal headaches    Patient reports ongoing left upper and lower sided weakness since prior CVA in 2015 but reports increased weakness from baseline over past few days    Hematological: Does not bruise/bleed easily.   Psychiatric/Behavioral: Negative for agitation, behavioral problems, confusion, dysphoric mood and suicidal ideas. The patient is not " nervous/anxious.      Objective:     Vital Signs (Most Recent):  Temp: 97.8 °F (36.6 °C) (08/19/20 1122)  Pulse: 97 (08/19/20 1122)  Resp: 18 (08/19/20 1127)  BP: (!) 166/87 (08/19/20 1122)  SpO2: 95 % (08/19/20 1122) Vital Signs (24h Range):  Temp:  [96.3 °F (35.7 °C)-97.8 °F (36.6 °C)] 97.8 °F (36.6 °C)  Pulse:  [63-97] 97  Resp:  [16-20] 18  SpO2:  [95 %-98 %] 95 %  BP: (158-188)/(75-89) 166/87     Weight: 112.8 kg (248 lb 10.9 oz)  Body mass index is 35.68 kg/m².    Intake/Output Summary (Last 24 hours) at 8/19/2020 1247  Last data filed at 8/19/2020 0400  Gross per 24 hour   Intake 480 ml   Output 800 ml   Net -320 ml      Physical Exam  Vitals signs and nursing note reviewed.   Constitutional:       Appearance: She is well-developed.      Comments: Looks much better, more alert and responsive   HENT:      Head: Normocephalic and atraumatic.      Comments: staples on the head from the previous craniotomy.  Eyes:      Conjunctiva/sclera: Conjunctivae normal.      Pupils: Pupils are equal, round, and reactive to light.   Neck:      Musculoskeletal: Full passive range of motion without pain, normal range of motion and neck supple. No edema.      Thyroid: No thyroid mass or thyromegaly.      Vascular: No carotid bruit.   Cardiovascular:      Rate and Rhythm: Normal rate and regular rhythm.      Chest Wall: PMI is not displaced.      Pulses: Normal pulses.      Heart sounds: Normal heart sounds, S1 normal and S2 normal. No murmur. No friction rub.   Pulmonary:      Effort: Pulmonary effort is normal. No accessory muscle usage or respiratory distress.      Breath sounds: Normal breath sounds. No wheezing or rales.   Chest:      Chest wall: No tenderness.   Abdominal:      General: Bowel sounds are normal. There is no distension.      Palpations: Abdomen is soft. There is no mass.      Tenderness: There is no abdominal tenderness. There is no rebound.      Hernia: No hernia is present.   Musculoskeletal: Normal range  of motion.         General: No tenderness.   Skin:     General: Skin is warm and dry.      Coloration: Skin is not pale.      Findings: No bruising, ecchymosis, erythema or rash.      Nails: There is no clubbing.     Neurological:      Mental Status: She is alert and oriented to person, place, and time.      Sensory: No sensory deficit.      Motor: No abnormal muscle tone.      Coordination: Coordination normal.      Deep Tendon Reflexes: Reflexes abnormal.      Comments:   Left-sided residual hemiparesis which is unchanged from the last visit   Psychiatric:         Speech: Speech normal.         Behavior: Behavior normal.         Thought Content: Thought content normal.         Judgment: Judgment normal.         Significant Labs:   BMP:   Recent Labs   Lab 08/18/20  0606 08/19/20  0811   * 178*   * 132*   K 3.8 3.8   CL 94* 94*   CO2 25 25   BUN 16 20   CREATININE 1.1 1.0   CALCIUM 9.4 9.4   MG 1.9  --      CBC:   Recent Labs   Lab 08/17/20  1355 08/18/20  0606 08/19/20  0811   WBC 14.89* 13.58* 14.32*   HGB 10.1* 10.0* 10.9*   HCT 32.7* 32.3* 35.3*    137* 199     All pertinent labs within the past 24 hours have been reviewed.    Significant Imaging: I have reviewed all pertinent imaging results/findings within the past 24 hours.      Assessment/Plan:      * Hydrocephalus, acquired  S/p craniotomy and L Frontal Meningioma resection- now pt has developed Hydrocephalus in the same area- needs Shunt  No need for any Mannitol  Continue to taper decadron    Clinically a little better, Na also improved to 132   shunt in am    Calcified cerebral meningioma s/p Craniotomy/ Resection  7/31/2020 S/p Crainotomy with Aspiration of frontal pseudomeningocele   Patient reports she has been taking her decadron at home but is unsure of the dosage- Asked  to bring in   Will defer steroid dosing to NeuroSurgeon    S/p resection 7/31- See above    Await  Shunt placement    Hyponatremia  Monitor- Levels  running 135-136 three weeks ago  Add 1500ml fluid restriction  Nephrology consulted  Seizure precautions    Stable- likely sec to Intracranial tumor    Improving a little to 132 with IVF      Anemia due to stage 3 chronic kidney disease  Add MVI and pm vitamin C  Continue bid ferrous sulfate      Type 2 diabetes mellitus with stage 3 chronic kidney disease, without long-term current use of insulin  Dm diet  Accuchecks with correctional SSI  Home metformin on hold    BS under control    Hypocalcemia  Continue home medications    Normal Ca    History of CVA in adulthood  Patient reports Hx of CVA in 2015 which was felt to be caused from her prior pseudomeningocele  Patient states her home Arixtra was recently discontinued about a week or so ago      Hyperlipidemia associated with type 2 diabetes mellitus  Continue home statin      Frequent falls  New Onset  Fall precautions  Consult physical therapy    Sec to Hydrocephalus- improving    Continue PT/OT    Hypothyroidism  Continue home medications    Lab Results   Component Value Date    TSH 1.544 07/26/2020           Hypertension associated with diabetes  Continue home medications        VTE Risk Mitigation (From admission, onward)         Ordered     Place HOLLAND hose  Until discontinued      08/17/20 8973                Discharge Planning   ROSALIA:      Code Status: Prior   Is the patient medically ready for discharge?:     Reason for patient still in hospital (select all that apply): Patient unstable, Patient trending condition, Treatment, Imaging, Consult recommendations and PT / OT recommendations                     Lurdes Guy MD  Department of Hospital Medicine   [facility]

## 2020-08-19 NOTE — PROGRESS NOTES
[facility]   Nephrology  Progress Note    Patient Name: Cata Lang  MRN: [id number]  Admission Date: 8/17/2020  Hospital Length of Stay: 2 days  Attending Provider: Lurdes Guy MD   Primary Care Physician: Sabra Fregoso MD  Principal Problem:Hydrocephalus, acquired    Subjective:     HPI: Patient is a 49-year-old female with type 2 diabetes hypertension history of 6 seizure disorder.  Also has a history of stroke status post left-sided weakness.  In July of 2020 patient was hospitalized for meningioma.  The hospital stay was complicated by acute kidney injury with severe hypercalcemia improved and painted which was treated with multiple medications and IV fluids.  Patient's acute kidney injury improved and ultimately patient underwent craniotomy for meningioma by Dr. Hagen on 07/31/2020.  Patient was subsequently discharged in a stable condition.  Patient is being readmitted for placement of  shunt for hydrocephalus.  Nephrology consultation provided for follow-up of acute kidney injury, hypercalcemia and hyponatremia.  Management discussed with the emergency room staff.    Bedside consultation provided using telemedicine protocol    Interval History:  Sodium 132 status post saline.    Review of patient's allergies indicates:   Allergen Reactions    Heparin analogues Other (See Comments)     DANYELLE     Hydrochlorothiazide      hypercalcemia     Current Facility-Administered Medications   Medication Frequency    0.9%  NaCl infusion Continuous    amLODIPine tablet 5 mg Daily    ascorbic acid (vitamin C) tablet 500 mg QHS    atorvastatin tablet 10 mg QHS    calcitRIOL capsule 0.25 mcg Daily    calcium carbonate (OS-THANIA) tablet 500 mg QID    cholecalciferol (vitamin D3) 125 mcg (5,000 unit) tablet 5,000 Units Daily    dexAMETHasone tablet 2 mg Q12H    dextrose 50% injection 12.5 g PRN    dextrose 50% injection 25 g PRN    DULoxetine DR capsule 30 mg Daily    famotidine tablet  20 mg BID PRN    ferrous sulfate EC tablet 325 mg BID WM    gabapentin capsule 600 mg Daily    glucagon (human recombinant) injection 1 mg PRN    glucose chewable tablet 16 g PRN    glucose chewable tablet 24 g PRN    hydrALAZINE injection 10 mg Q8H PRN    HYDROcodone-acetaminophen 5-325 mg per tablet 1 tablet Q4H PRN    insulin aspart U-100 pen 0-5 Units QID (AC + HS) PRN    levETIRAcetam tablet 1,500 mg BID    levothyroxine tablet 137 mcg Before breakfast    melatonin tablet 6 mg Nightly PRN    multivitamin tablet Daily    pantoprazole EC tablet 40 mg Daily       Objective:     Vital Signs (Most Recent):  Temp: 97.8 °F (36.6 °C) (08/19/20 1122)  Pulse: 97 (08/19/20 1122)  Resp: 18 (08/19/20 1127)  BP: (!) 166/87 (08/19/20 1122)  SpO2: 95 % (08/19/20 1122)  O2 Device (Oxygen Therapy): room air (08/19/20 0755) Vital Signs (24h Range):  Temp:  [96.3 °F (35.7 °C)-97.8 °F (36.6 °C)] 97.8 °F (36.6 °C)  Pulse:  [63-97] 97  Resp:  [16-20] 18  SpO2:  [95 %-98 %] 95 %  BP: (158-188)/(75-89) 166/87     Weight: 112.8 kg (248 lb 10.9 oz) (08/19/20 0347)  Body mass index is 35.68 kg/m².  Body surface area is 2.36 meters squared.    I/O last 3 completed shifts:  In: 1230 [P.O.:1230]  Out: 800 [Urine:800]    Physical Exam  Vitals signs and nursing note reviewed.   Constitutional:       Appearance: She is well-developed.   HENT:      Head: Normocephalic and atraumatic.      Comments: staples on the head from the previous craniotomy.  Eyes:      Conjunctiva/sclera: Conjunctivae normal.      Pupils: Pupils are equal, round, and reactive to light.   Neck:      Musculoskeletal: Full passive range of motion without pain, normal range of motion and neck supple. No edema.      Thyroid: No thyroid mass or thyromegaly.      Vascular: No carotid bruit.   Cardiovascular:      Rate and Rhythm: Normal rate and regular rhythm.      Chest Wall: PMI is not displaced.      Pulses: Normal pulses.      Heart sounds: Normal heart sounds,  S1 normal and S2 normal. No murmur. No friction rub.   Pulmonary:      Effort: Pulmonary effort is normal. No accessory muscle usage or respiratory distress.      Breath sounds: Normal breath sounds. No wheezing or rales.   Chest:      Chest wall: No tenderness.   Abdominal:      General: Bowel sounds are normal. There is no distension.      Palpations: Abdomen is soft. There is no mass.      Tenderness: There is no abdominal tenderness. There is no rebound.      Hernia: No hernia is present.   Musculoskeletal: Normal range of motion.         General: No tenderness.   Skin:     General: Skin is warm and dry.      Coloration: Skin is not pale.      Findings: No bruising, ecchymosis, erythema or rash.      Nails: There is no clubbing.     Neurological:      Mental Status: She is alert and oriented to person, place, and time.      Sensory: No sensory deficit.      Motor: No abnormal muscle tone.      Coordination: Coordination normal.      Deep Tendon Reflexes: Reflexes abnormal.      Comments:   Left-sided residual hemiparesis which is unchanged from the last visit   Psychiatric:         Speech: Speech normal.         Behavior: Behavior normal.         Thought Content: Thought content normal.         Judgment: Judgment normal.         Significant Labs:  All labs within the past 24 hours have been reviewed.     Significant Imaging:      Assessment/Plan:     Hyponatremia  Creatinine much more improved.   normal saline for hyponatremia     No evidence of SIADH uric acid is high.  No further workup at this time.  Follow serial blood work.        Thank you for your consult.     Veronika Eller MD  Nephrology  [facility]

## 2020-08-19 NOTE — ASSESSMENT & PLAN NOTE
Creatinine much more improved.   normal saline for hyponatremia     No evidence of SIADH uric acid is high.  No further workup at this time.  Follow serial blood work.

## 2020-08-19 NOTE — ASSESSMENT & PLAN NOTE
7/31/2020 S/p Crainotomy with Aspiration of frontal pseudomeningocele   Patient reports she has been taking her decadron at home but is unsure of the dosage- Asked  to bring in   Will defer steroid dosing to NeuroSurgeon    S/p resection 7/31- See above    Await  Shunt placement

## 2020-08-19 NOTE — ASSESSMENT & PLAN NOTE
New Onset  Fall precautions  Consult physical therapy    Sec to Hydrocephalus- improving    Continue PT/OT

## 2020-08-19 NOTE — ASSESSMENT & PLAN NOTE
Monitor- Levels running 135-136 three weeks ago  Add 1500ml fluid restriction  Nephrology consulted  Seizure precautions    Stable- likely sec to Intracranial tumor    Improving a little to 132 with IVF

## 2020-08-19 NOTE — PT/OT/SLP PROGRESS
Physical Therapy  Treatment    Cata Lang   MRN: [id number]   Admitting Diagnosis: Hydrocephalus, acquired    PT Received On: 08/19/20  PT Start Time: 0940     PT Stop Time: 1005    PT Total Time (min): 25 min       Billable Minutes:  Gait Training 15 and Therapeutic Exercise 10    Treatment Type: Treatment  PT/PTA: PT     PTA Visit Number: 0       General Precautions: Standard, fall, seizure  Orthopedic Precautions: N/A   Braces: N/A    Subjective:  Communicated with NURSE CORONEL prior to session.  Pain/Comfort  Pain Rating 1: 0/10    Objective:   Patient found with: telemetry, bed alarm, peripheral IV    Functional Mobility:  Therapeutic Activities and Exercises:  PT FOUND SUPINE IN BED UPON ARRIVAL, AGREEABLE TO TX., SUP>SIT WITH MODA, SEATED SCOOT TO EOB WITH MODA, P+ SITTING BALANCE DUE TO L LATERAL LEAN, PT ABLE TO CORRECT POSTURE BUT UNABLE TO MAINTAIN, REVIEW RW USE AND SAFETY DURING TF'S AND GAIT, SIT>STAND WITH MODA X 2, 2 TRIALS TO COMPLETE FULL STAND, PT AMB 5' X 2 TRIALS WITH RW AND MODA, CHAIR IN TOW FOR SAFETY, CUES FOR UPRIGHT POSTURE AND RW SAFETY, QUICK TO FATIGUE, P DYNAMIC BALANCE, PT EDUCATED IN AND PERFORMED BLE THEREX X 10 REPS AROM WITH REST    AM-PAC 6 CLICK MOBILITY  How much help from another person does this patient currently need?   1 = Unable, Total/Dependent Assistance  2 = A lot, Maximum/Moderate Assistance  3 = A little, Minimum/Contact Guard/Supervision  4 = None, Modified [place]/Independent    Turning over in bed (including adjusting bedclothes, sheets and blankets)?: 2  Sitting down on and standing up from a chair with arms (e.g., wheelchair, bedside commode, etc.): 2  Moving from lying on back to sitting on the side of the bed?: 2  Moving to and from a bed to a chair (including a wheelchair)?: 2  Need to walk in hospital room?: 3  Climbing 3-5 steps with a railing?: 1  Basic Mobility Total Score: 12    AM-PAC Raw Score CMS G-Code Modifier Level of Impairment  Assistance   6 % Total / Unable   7 - 9 CM 80 - 100% Maximal Assist   10 - 14 CL 60 - 80% Moderate Assist   15 - 19 CK 40 - 60% Moderate Assist   20 - 22 CJ 20 - 40% Minimal Assist   23 CI 1-20% SBA / CGA   24 CH 0% Independent/ Mod I     Patient left up in chair with all lines intact, call button in reach, chair alarm on, NURSE notified and  present.    Assessment:  Cata Lang is a 49 y.o. female with a medical diagnosis of Hydrocephalus, acquired and presents with IMPAIRED FUNCTIONAL MOBILITY. PT WILL BENEFIT FROM CONT. SKILLED P.T. TO ADDRESS IMPAIRMENTS    Rehab identified problem list/impairments: Rehab identified problem list/impairments: weakness, impaired endurance, impaired balance, gait instability, impaired functional mobilty, decreased coordination    Rehab potential is good.    Activity tolerance: Good    Discharge recommendations: Discharge Facility/Level of Care Needs: rehabilitation facility     Barriers to discharge:      Equipment recommendations: Equipment Needed After Discharge: bedside commode     GOALS:   Multidisciplinary Problems     Physical Therapy Goals        Problem: Physical Therapy Goal    Goal Priority Disciplines Outcome Goal Variances Interventions   Physical Therapy Goal     PT, PT/OT Ongoing, Progressing     Description: LTG'S TO BE MET IN 7 DAYS (8-25-20)  1. PT WILL REQUIRE TINO FOR BED MOBILITY  2. PT WILL REQUIRE MODA FOR TF BED<>CHAIR  3. PT WILL AMB 50' WITH RW AND MODA                   PLAN:    Patient to be seen 5 x/week  to address the above listed problems via gait training, therapeutic activities, therapeutic exercises  Plan of Care expires: 08/25/20  Plan of Care reviewed with: patient, spouse    Arline Alvarez, PT  08/19/2020

## 2020-08-19 NOTE — PLAN OF CARE
Pt AAOx4 .POC reviewed with pt and spouse. Pt verbalized understanding spouse remains at bedside   Pt remains free of injuries and falls; fall precaution in place   NSR on tele monitor.  IV intact N.S at 75mL/hr   No C/O of pain  Blood glucose  monitoring   Turn Q2H  Bed low, side rails up x2, non slip socks in use, call light in reach   Reminded to call for assistance  Hourly rounding complete will continue to monitor

## 2020-08-19 NOTE — SUBJECTIVE & OBJECTIVE
Interval History:  Sodium 132 status post saline.    Review of patient's allergies indicates:   Allergen Reactions    Heparin analogues Other (See Comments)     DANYELLE     Hydrochlorothiazide      hypercalcemia     Current Facility-Administered Medications   Medication Frequency    0.9%  NaCl infusion Continuous    amLODIPine tablet 5 mg Daily    ascorbic acid (vitamin C) tablet 500 mg QHS    atorvastatin tablet 10 mg QHS    calcitRIOL capsule 0.25 mcg Daily    calcium carbonate (OS-THANIA) tablet 500 mg QID    cholecalciferol (vitamin D3) 125 mcg (5,000 unit) tablet 5,000 Units Daily    dexAMETHasone tablet 2 mg Q12H    dextrose 50% injection 12.5 g PRN    dextrose 50% injection 25 g PRN    DULoxetine DR capsule 30 mg Daily    famotidine tablet 20 mg BID PRN    ferrous sulfate EC tablet 325 mg BID WM    gabapentin capsule 600 mg Daily    glucagon (human recombinant) injection 1 mg PRN    glucose chewable tablet 16 g PRN    glucose chewable tablet 24 g PRN    hydrALAZINE injection 10 mg Q8H PRN    HYDROcodone-acetaminophen 5-325 mg per tablet 1 tablet Q4H PRN    insulin aspart U-100 pen 0-5 Units QID (AC + HS) PRN    levETIRAcetam tablet 1,500 mg BID    levothyroxine tablet 137 mcg Before breakfast    melatonin tablet 6 mg Nightly PRN    multivitamin tablet Daily    pantoprazole EC tablet 40 mg Daily       Objective:     Vital Signs (Most Recent):  Temp: 97.8 °F (36.6 °C) (08/19/20 1122)  Pulse: 97 (08/19/20 1122)  Resp: 18 (08/19/20 1127)  BP: (!) 166/87 (08/19/20 1122)  SpO2: 95 % (08/19/20 1122)  O2 Device (Oxygen Therapy): room air (08/19/20 0755) Vital Signs (24h Range):  Temp:  [96.3 °F (35.7 °C)-97.8 °F (36.6 °C)] 97.8 °F (36.6 °C)  Pulse:  [63-97] 97  Resp:  [16-20] 18  SpO2:  [95 %-98 %] 95 %  BP: (158-188)/(75-89) 166/87     Weight: 112.8 kg (248 lb 10.9 oz) (08/19/20 0347)  Body mass index is 35.68 kg/m².  Body surface area is 2.36 meters squared.    I/O last 3 completed shifts:  In:  1230 [P.O.:1230]  Out: 800 [Urine:800]    Physical Exam  Vitals signs and nursing note reviewed.   Constitutional:       Appearance: She is well-developed.   HENT:      Head: Normocephalic and atraumatic.      Comments: staples on the head from the previous craniotomy.  Eyes:      Conjunctiva/sclera: Conjunctivae normal.      Pupils: Pupils are equal, round, and reactive to light.   Neck:      Musculoskeletal: Full passive range of motion without pain, normal range of motion and neck supple. No edema.      Thyroid: No thyroid mass or thyromegaly.      Vascular: No carotid bruit.   Cardiovascular:      Rate and Rhythm: Normal rate and regular rhythm.      Chest Wall: PMI is not displaced.      Pulses: Normal pulses.      Heart sounds: Normal heart sounds, S1 normal and S2 normal. No murmur. No friction rub.   Pulmonary:      Effort: Pulmonary effort is normal. No accessory muscle usage or respiratory distress.      Breath sounds: Normal breath sounds. No wheezing or rales.   Chest:      Chest wall: No tenderness.   Abdominal:      General: Bowel sounds are normal. There is no distension.      Palpations: Abdomen is soft. There is no mass.      Tenderness: There is no abdominal tenderness. There is no rebound.      Hernia: No hernia is present.   Musculoskeletal: Normal range of motion.         General: No tenderness.   Skin:     General: Skin is warm and dry.      Coloration: Skin is not pale.      Findings: No bruising, ecchymosis, erythema or rash.      Nails: There is no clubbing.     Neurological:      Mental Status: She is alert and oriented to person, place, and time.      Sensory: No sensory deficit.      Motor: No abnormal muscle tone.      Coordination: Coordination normal.      Deep Tendon Reflexes: Reflexes abnormal.      Comments:   Left-sided residual hemiparesis which is unchanged from the last visit   Psychiatric:         Speech: Speech normal.         Behavior: Behavior normal.         Thought  Content: Thought content normal.         Judgment: Judgment normal.         Significant Labs:  All labs within the past 24 hours have been reviewed.     Significant Imaging:

## 2020-08-19 NOTE — SUBJECTIVE & OBJECTIVE
"Interval History: Seen and examined with Dr. Jose Hagen, looks and feels better, no dizziness or HA, more alert and responsive, facial expression improved, sitting up in bed and eating BF. Na has improved to 132, feels stronger on her legs. Participated with PT and walked a little. Await  shunt surgery in am.      Review of Systems   Constitutional: Positive for activity change and fatigue. Negative for appetite change, chills, diaphoresis and fever.   HENT: Negative for ear discharge, ear pain and facial swelling.    Eyes: Negative for pain and redness.   Respiratory: Negative for cough and shortness of breath.    Gastrointestinal: Negative for abdominal distention, blood in stool, constipation, diarrhea, nausea and vomiting.        LBM today and was "normal"  Patient reports appetite is "very good"   Endocrine: Negative for polydipsia and polyphagia.   Genitourinary: Negative for difficulty urinating, dysuria, flank pain and hematuria.   Musculoskeletal: Negative for neck pain and neck stiffness.   Skin: Negative for color change.   Allergic/Immunologic: Negative for food allergies.   Neurological: Positive for weakness. Negative for dizziness, seizures, facial asymmetry, speech difficulty and headaches.        Patient reports 10/10 frontal headaches    Patient reports ongoing left upper and lower sided weakness since prior CVA in 2015 but reports increased weakness from baseline over past few days    Hematological: Does not bruise/bleed easily.   Psychiatric/Behavioral: Negative for agitation, behavioral problems, confusion, dysphoric mood and suicidal ideas. The patient is not nervous/anxious.      Objective:     Vital Signs (Most Recent):  Temp: 97.8 °F (36.6 °C) (08/19/20 1122)  Pulse: 97 (08/19/20 1122)  Resp: 18 (08/19/20 1127)  BP: (!) 166/87 (08/19/20 1122)  SpO2: 95 % (08/19/20 1122) Vital Signs (24h Range):  Temp:  [96.3 °F (35.7 °C)-97.8 °F (36.6 °C)] 97.8 °F (36.6 °C)  Pulse:  [63-97] 97  Resp:  " [16-20] 18  SpO2:  [95 %-98 %] 95 %  BP: (158-188)/(75-89) 166/87     Weight: 112.8 kg (248 lb 10.9 oz)  Body mass index is 35.68 kg/m².    Intake/Output Summary (Last 24 hours) at 8/19/2020 1247  Last data filed at 8/19/2020 0400  Gross per 24 hour   Intake 480 ml   Output 800 ml   Net -320 ml      Physical Exam  Vitals signs and nursing note reviewed.   Constitutional:       Appearance: She is well-developed.      Comments: Looks much better, more alert and responsive   HENT:      Head: Normocephalic and atraumatic.      Comments: staples on the head from the previous craniotomy.  Eyes:      Conjunctiva/sclera: Conjunctivae normal.      Pupils: Pupils are equal, round, and reactive to light.   Neck:      Musculoskeletal: Full passive range of motion without pain, normal range of motion and neck supple. No edema.      Thyroid: No thyroid mass or thyromegaly.      Vascular: No carotid bruit.   Cardiovascular:      Rate and Rhythm: Normal rate and regular rhythm.      Chest Wall: PMI is not displaced.      Pulses: Normal pulses.      Heart sounds: Normal heart sounds, S1 normal and S2 normal. No murmur. No friction rub.   Pulmonary:      Effort: Pulmonary effort is normal. No accessory muscle usage or respiratory distress.      Breath sounds: Normal breath sounds. No wheezing or rales.   Chest:      Chest wall: No tenderness.   Abdominal:      General: Bowel sounds are normal. There is no distension.      Palpations: Abdomen is soft. There is no mass.      Tenderness: There is no abdominal tenderness. There is no rebound.      Hernia: No hernia is present.   Musculoskeletal: Normal range of motion.         General: No tenderness.   Skin:     General: Skin is warm and dry.      Coloration: Skin is not pale.      Findings: No bruising, ecchymosis, erythema or rash.      Nails: There is no clubbing.     Neurological:      Mental Status: She is alert and oriented to person, place, and time.      Sensory: No sensory  deficit.      Motor: No abnormal muscle tone.      Coordination: Coordination normal.      Deep Tendon Reflexes: Reflexes abnormal.      Comments:   Left-sided residual hemiparesis which is unchanged from the last visit   Psychiatric:         Speech: Speech normal.         Behavior: Behavior normal.         Thought Content: Thought content normal.         Judgment: Judgment normal.         Significant Labs:   BMP:   Recent Labs   Lab 08/18/20  0606 08/19/20  0811   * 178*   * 132*   K 3.8 3.8   CL 94* 94*   CO2 25 25   BUN 16 20   CREATININE 1.1 1.0   CALCIUM 9.4 9.4   MG 1.9  --      CBC:   Recent Labs   Lab 08/17/20  1355 08/18/20  0606 08/19/20  0811   WBC 14.89* 13.58* 14.32*   HGB 10.1* 10.0* 10.9*   HCT 32.7* 32.3* 35.3*    137* 199     All pertinent labs within the past 24 hours have been reviewed.    Significant Imaging: I have reviewed all pertinent imaging results/findings within the past 24 hours.

## 2020-08-20 ENCOUNTER — ANESTHESIA (OUTPATIENT)
Dept: SURGERY | Facility: HOSPITAL | Age: 50
DRG: 032 | End: 2020-08-20
Payer: COMMERCIAL

## 2020-08-20 PROBLEM — D32.0 MENINGIOMA, CEREBRAL: Status: ACTIVE | Noted: 2020-07-31

## 2020-08-20 PROBLEM — Z98.890 STATUS POST CRANIOTOMY: Status: ACTIVE | Noted: 2020-08-20

## 2020-08-20 PROBLEM — E83.51 HYPOCALCEMIA: Status: RESOLVED | Noted: 2020-08-02 | Resolved: 2020-08-20

## 2020-08-20 LAB
POCT GLUCOSE: 132 MG/DL (ref 70–110)
POCT GLUCOSE: 148 MG/DL (ref 70–110)
POCT GLUCOSE: 154 MG/DL (ref 70–110)
POCT GLUCOSE: 156 MG/DL (ref 70–110)

## 2020-08-20 PROCEDURE — 62223 PR CREATE SHUNT:VENTRIC-PERITONEAL: ICD-10-PCS | Mod: 79,62,, | Performed by: NEUROLOGICAL SURGERY

## 2020-08-20 PROCEDURE — 99254 IP/OBS CNSLTJ NEW/EST MOD 60: CPT | Mod: 57,,, | Performed by: COLON & RECTAL SURGERY

## 2020-08-20 PROCEDURE — 25000003 PHARM REV CODE 250: Performed by: NURSE PRACTITIONER

## 2020-08-20 PROCEDURE — 93005 ELECTROCARDIOGRAM TRACING: CPT

## 2020-08-20 PROCEDURE — 21400001 HC TELEMETRY ROOM

## 2020-08-20 PROCEDURE — 37000009 HC ANESTHESIA EA ADD 15 MINS: Performed by: NEUROLOGICAL SURGERY

## 2020-08-20 PROCEDURE — 36000712 HC OR TIME LEV V 1ST 15 MIN: Performed by: NEUROLOGICAL SURGERY

## 2020-08-20 PROCEDURE — 99900035 HC TECH TIME PER 15 MIN (STAT)

## 2020-08-20 PROCEDURE — 63600175 PHARM REV CODE 636 W HCPCS: Performed by: PHYSICIAN ASSISTANT

## 2020-08-20 PROCEDURE — 94799 UNLISTED PULMONARY SVC/PX: CPT

## 2020-08-20 PROCEDURE — C1894 INTRO/SHEATH, NON-LASER: HCPCS | Performed by: NEUROLOGICAL SURGERY

## 2020-08-20 PROCEDURE — 61781 PR STEREOTACTIC COMP ASSIST PROC,CRANIAL,INTRADURAL: ICD-10-PCS | Mod: ,,, | Performed by: NEUROLOGICAL SURGERY

## 2020-08-20 PROCEDURE — 25000003 PHARM REV CODE 250: Performed by: ANESTHESIOLOGY

## 2020-08-20 PROCEDURE — 63600175 PHARM REV CODE 636 W HCPCS: Performed by: NURSE ANESTHETIST, CERTIFIED REGISTERED

## 2020-08-20 PROCEDURE — 27800903 OPTIME MED/SURG SUP & DEVICES OTHER IMPLANTS: Performed by: NEUROLOGICAL SURGERY

## 2020-08-20 PROCEDURE — 99231 SBSQ HOSP IP/OBS SF/LOW 25: CPT | Mod: 57,,, | Performed by: NEUROLOGICAL SURGERY

## 2020-08-20 PROCEDURE — 71000039 HC RECOVERY, EACH ADD'L HOUR: Performed by: NEUROLOGICAL SURGERY

## 2020-08-20 PROCEDURE — 63600175 PHARM REV CODE 636 W HCPCS: Performed by: ANESTHESIOLOGY

## 2020-08-20 PROCEDURE — 25000003 PHARM REV CODE 250: Performed by: NURSE ANESTHETIST, CERTIFIED REGISTERED

## 2020-08-20 PROCEDURE — 99254 PR INITIAL INPATIENT CONSULT,LEVL IV: ICD-10-PCS | Mod: 57,,, | Performed by: COLON & RECTAL SURGERY

## 2020-08-20 PROCEDURE — 63600175 PHARM REV CODE 636 W HCPCS: Performed by: NEUROLOGICAL SURGERY

## 2020-08-20 PROCEDURE — 37000008 HC ANESTHESIA 1ST 15 MINUTES: Performed by: NEUROLOGICAL SURGERY

## 2020-08-20 PROCEDURE — 36000713 HC OR TIME LEV V EA ADD 15 MIN: Performed by: NEUROLOGICAL SURGERY

## 2020-08-20 PROCEDURE — 25000003 PHARM REV CODE 250: Performed by: EMERGENCY MEDICINE

## 2020-08-20 PROCEDURE — 25000003 PHARM REV CODE 250: Performed by: PHYSICIAN ASSISTANT

## 2020-08-20 PROCEDURE — 62223 ESTABLISH BRAIN CAVITY SHUNT: CPT | Mod: 79,62,, | Performed by: NEUROLOGICAL SURGERY

## 2020-08-20 PROCEDURE — 93010 ELECTROCARDIOGRAM REPORT: CPT | Mod: ,,, | Performed by: INTERNAL MEDICINE

## 2020-08-20 PROCEDURE — 93010 EKG 12-LEAD: ICD-10-PCS | Mod: ,,, | Performed by: INTERNAL MEDICINE

## 2020-08-20 PROCEDURE — 61781 SCAN PROC CRANIAL INTRA: CPT | Mod: ,,, | Performed by: NEUROLOGICAL SURGERY

## 2020-08-20 PROCEDURE — 71000033 HC RECOVERY, INTIAL HOUR: Performed by: NEUROLOGICAL SURGERY

## 2020-08-20 PROCEDURE — 11000001 HC ACUTE MED/SURG PRIVATE ROOM

## 2020-08-20 PROCEDURE — 25000003 PHARM REV CODE 250: Performed by: NEUROLOGICAL SURGERY

## 2020-08-20 PROCEDURE — 99231 PR SUBSEQUENT HOSPITAL CARE,LEVL I: ICD-10-PCS | Mod: 57,,, | Performed by: NEUROLOGICAL SURGERY

## 2020-08-20 DEVICE — IMPLANTABLE DEVICE: Type: IMPLANTABLE DEVICE | Site: BRAIN | Status: FUNCTIONAL

## 2020-08-20 DEVICE — IMPLANTABLE DEVICE: Type: IMPLANTABLE DEVICE | Site: PERITONEUM | Status: FUNCTIONAL

## 2020-08-20 RX ORDER — SODIUM CHLORIDE, SODIUM LACTATE, POTASSIUM CHLORIDE, CALCIUM CHLORIDE 600; 310; 30; 20 MG/100ML; MG/100ML; MG/100ML; MG/100ML
INJECTION, SOLUTION INTRAVENOUS CONTINUOUS PRN
Status: DISCONTINUED | OUTPATIENT
Start: 2020-08-20 | End: 2020-08-20

## 2020-08-20 RX ORDER — MEPERIDINE HYDROCHLORIDE 25 MG/ML
12.5 INJECTION INTRAMUSCULAR; INTRAVENOUS; SUBCUTANEOUS ONCE AS NEEDED
Status: COMPLETED | OUTPATIENT
Start: 2020-08-20 | End: 2020-08-20

## 2020-08-20 RX ORDER — HYDROMORPHONE HYDROCHLORIDE 2 MG/ML
0.2 INJECTION, SOLUTION INTRAMUSCULAR; INTRAVENOUS; SUBCUTANEOUS EVERY 5 MIN PRN
Status: DISCONTINUED | OUTPATIENT
Start: 2020-08-20 | End: 2020-08-20 | Stop reason: HOSPADM

## 2020-08-20 RX ORDER — ROCURONIUM BROMIDE 10 MG/ML
INJECTION, SOLUTION INTRAVENOUS
Status: DISCONTINUED | OUTPATIENT
Start: 2020-08-20 | End: 2020-08-20

## 2020-08-20 RX ORDER — FENTANYL CITRATE 50 UG/ML
INJECTION, SOLUTION INTRAMUSCULAR; INTRAVENOUS
Status: DISCONTINUED | OUTPATIENT
Start: 2020-08-20 | End: 2020-08-20

## 2020-08-20 RX ORDER — SODIUM CHLORIDE 0.9 % (FLUSH) 0.9 %
3 SYRINGE (ML) INJECTION EVERY 8 HOURS
Status: DISCONTINUED | OUTPATIENT
Start: 2020-08-20 | End: 2020-08-20 | Stop reason: HOSPADM

## 2020-08-20 RX ORDER — LABETALOL HYDROCHLORIDE 5 MG/ML
INJECTION, SOLUTION INTRAVENOUS
Status: DISCONTINUED | OUTPATIENT
Start: 2020-08-20 | End: 2020-08-20

## 2020-08-20 RX ORDER — VANCOMYCIN HCL IN 5 % DEXTROSE 1G/250ML
1000 PLASTIC BAG, INJECTION (ML) INTRAVENOUS ONCE
Status: COMPLETED | OUTPATIENT
Start: 2020-08-20 | End: 2020-08-20

## 2020-08-20 RX ORDER — VANCOMYCIN HCL IN 5 % DEXTROSE 1G/250ML
1000 PLASTIC BAG, INJECTION (ML) INTRAVENOUS
Status: COMPLETED | OUTPATIENT
Start: 2020-08-20 | End: 2020-08-21

## 2020-08-20 RX ORDER — SODIUM CHLORIDE AND POTASSIUM CHLORIDE 150; 900 MG/100ML; MG/100ML
INJECTION, SOLUTION INTRAVENOUS CONTINUOUS
Status: DISCONTINUED | OUTPATIENT
Start: 2020-08-20 | End: 2020-08-22

## 2020-08-20 RX ORDER — ACETAMINOPHEN 650 MG/1
650 SUPPOSITORY RECTAL EVERY 6 HOURS PRN
Status: DISCONTINUED | OUTPATIENT
Start: 2020-08-20 | End: 2020-08-20

## 2020-08-20 RX ORDER — BUPIVACAINE HYDROCHLORIDE 2.5 MG/ML
INJECTION, SOLUTION EPIDURAL; INFILTRATION; INTRACAUDAL
Status: DISCONTINUED | OUTPATIENT
Start: 2020-08-20 | End: 2020-08-20 | Stop reason: HOSPADM

## 2020-08-20 RX ORDER — DIPHENHYDRAMINE HYDROCHLORIDE 50 MG/ML
25 INJECTION INTRAMUSCULAR; INTRAVENOUS EVERY 6 HOURS PRN
Status: DISCONTINUED | OUTPATIENT
Start: 2020-08-20 | End: 2020-08-20 | Stop reason: HOSPADM

## 2020-08-20 RX ORDER — NEOSTIGMINE METHYLSULFATE 1 MG/ML
INJECTION, SOLUTION INTRAVENOUS
Status: DISCONTINUED | OUTPATIENT
Start: 2020-08-20 | End: 2020-08-20

## 2020-08-20 RX ORDER — GLYCOPYRROLATE 0.2 MG/ML
INJECTION INTRAMUSCULAR; INTRAVENOUS
Status: DISCONTINUED | OUTPATIENT
Start: 2020-08-20 | End: 2020-08-20

## 2020-08-20 RX ORDER — LIDOCAINE HYDROCHLORIDE 20 MG/ML
JELLY TOPICAL
Status: DISCONTINUED | OUTPATIENT
Start: 2020-08-20 | End: 2020-08-20

## 2020-08-20 RX ORDER — LIDOCAINE HYDROCHLORIDE 10 MG/ML
INJECTION, SOLUTION EPIDURAL; INFILTRATION; INTRACAUDAL; PERINEURAL
Status: DISCONTINUED | OUTPATIENT
Start: 2020-08-20 | End: 2020-08-20

## 2020-08-20 RX ORDER — VANCOMYCIN HYDROCHLORIDE 1 G/20ML
INJECTION, POWDER, LYOPHILIZED, FOR SOLUTION INTRAVENOUS
Status: DISCONTINUED | OUTPATIENT
Start: 2020-08-20 | End: 2020-08-20 | Stop reason: ALTCHOICE

## 2020-08-20 RX ORDER — SUCCINYLCHOLINE CHLORIDE 20 MG/ML
INJECTION INTRAMUSCULAR; INTRAVENOUS
Status: DISCONTINUED | OUTPATIENT
Start: 2020-08-20 | End: 2020-08-20

## 2020-08-20 RX ORDER — METOCLOPRAMIDE HYDROCHLORIDE 5 MG/ML
10 INJECTION INTRAMUSCULAR; INTRAVENOUS EVERY 10 MIN PRN
Status: DISCONTINUED | OUTPATIENT
Start: 2020-08-20 | End: 2020-08-20 | Stop reason: HOSPADM

## 2020-08-20 RX ORDER — ACETAMINOPHEN 325 MG/1
650 TABLET ORAL EVERY 6 HOURS PRN
Status: DISCONTINUED | OUTPATIENT
Start: 2020-08-20 | End: 2020-08-26 | Stop reason: HOSPADM

## 2020-08-20 RX ORDER — ONDANSETRON 2 MG/ML
INJECTION INTRAMUSCULAR; INTRAVENOUS
Status: DISCONTINUED | OUTPATIENT
Start: 2020-08-20 | End: 2020-08-20

## 2020-08-20 RX ORDER — ENALAPRILAT 1.25 MG/ML
1.25 INJECTION INTRAVENOUS EVERY 10 MIN PRN
Status: DISCONTINUED | OUTPATIENT
Start: 2020-08-20 | End: 2020-08-20 | Stop reason: HOSPADM

## 2020-08-20 RX ORDER — LEVETIRACETAM 500 MG/5ML
INJECTION, SOLUTION, CONCENTRATE INTRAVENOUS
Status: DISCONTINUED | OUTPATIENT
Start: 2020-08-20 | End: 2020-08-20

## 2020-08-20 RX ORDER — ONDANSETRON 2 MG/ML
4 INJECTION INTRAMUSCULAR; INTRAVENOUS EVERY 4 HOURS PRN
Status: DISCONTINUED | OUTPATIENT
Start: 2020-08-20 | End: 2020-08-26 | Stop reason: HOSPADM

## 2020-08-20 RX ORDER — PROPOFOL 10 MG/ML
VIAL (ML) INTRAVENOUS
Status: DISCONTINUED | OUTPATIENT
Start: 2020-08-20 | End: 2020-08-20

## 2020-08-20 RX ORDER — DEXAMETHASONE 1 MG/1
2 TABLET ORAL DAILY
Status: DISCONTINUED | OUTPATIENT
Start: 2020-08-21 | End: 2020-08-22

## 2020-08-20 RX ADMIN — PROPOFOL 200 MG: 10 INJECTION, EMULSION INTRAVENOUS at 10:08

## 2020-08-20 RX ADMIN — FENTANYL CITRATE 50 MCG: 50 INJECTION, SOLUTION INTRAMUSCULAR; INTRAVENOUS at 10:08

## 2020-08-20 RX ADMIN — NEOSTIGMINE METHYLSULFATE 5 MG: 1 INJECTION INTRAVENOUS at 12:08

## 2020-08-20 RX ADMIN — CEFTRIAXONE 1 G: 1 INJECTION, SOLUTION INTRAVENOUS at 11:08

## 2020-08-20 RX ADMIN — CALCIUM 500 MG: 500 TABLET ORAL at 05:08

## 2020-08-20 RX ADMIN — LABETALOL HYDROCHLORIDE 10 MG: 5 INJECTION, SOLUTION INTRAVENOUS at 12:08

## 2020-08-20 RX ADMIN — SUCCINYLCHOLINE CHLORIDE 140 MG: 20 INJECTION, SOLUTION INTRAMUSCULAR; INTRAVENOUS at 10:08

## 2020-08-20 RX ADMIN — ROCURONIUM BROMIDE 10 MG: 10 INJECTION, SOLUTION INTRAVENOUS at 10:08

## 2020-08-20 RX ADMIN — FERROUS SULFATE TAB EC 325 MG (65 MG FE EQUIVALENT) 325 MG: 325 (65 FE) TABLET DELAYED RESPONSE at 05:08

## 2020-08-20 RX ADMIN — LEVETIRACETAM 1500 MG: 500 TABLET ORAL at 09:08

## 2020-08-20 RX ADMIN — OXYCODONE HYDROCHLORIDE AND ACETAMINOPHEN 500 MG: 500 TABLET ORAL at 09:08

## 2020-08-20 RX ADMIN — FENTANYL CITRATE 50 MCG: 50 INJECTION, SOLUTION INTRAMUSCULAR; INTRAVENOUS at 11:08

## 2020-08-20 RX ADMIN — FENTANYL CITRATE 50 MCG: 50 INJECTION, SOLUTION INTRAMUSCULAR; INTRAVENOUS at 12:08

## 2020-08-20 RX ADMIN — POTASSIUM CHLORIDE AND SODIUM CHLORIDE: 900; 150 INJECTION, SOLUTION INTRAVENOUS at 05:08

## 2020-08-20 RX ADMIN — ONDANSETRON 4 MG: 2 INJECTION, SOLUTION INTRAMUSCULAR; INTRAVENOUS at 12:08

## 2020-08-20 RX ADMIN — CALCIUM 500 MG: 500 TABLET ORAL at 09:08

## 2020-08-20 RX ADMIN — CEFTRIAXONE 1 G: 1 INJECTION, SOLUTION INTRAVENOUS at 10:08

## 2020-08-20 RX ADMIN — ENALAPRILAT 1.25 MG: 1.25 INJECTION INTRAVENOUS at 01:08

## 2020-08-20 RX ADMIN — LEVETIRACETAM 500 MG: 100 INJECTION, SOLUTION INTRAVENOUS at 10:08

## 2020-08-20 RX ADMIN — ROBINUL 0.8 MG: 0.2 INJECTION INTRAMUSCULAR; INTRAVENOUS at 12:08

## 2020-08-20 RX ADMIN — LIDOCAINE HYDROCHLORIDE 50 MG: 10 INJECTION, SOLUTION EPIDURAL; INFILTRATION; INTRACAUDAL; PERINEURAL at 10:08

## 2020-08-20 RX ADMIN — ROCURONIUM BROMIDE 20 MG: 10 INJECTION, SOLUTION INTRAVENOUS at 11:08

## 2020-08-20 RX ADMIN — HYDROCODONE BITARTRATE AND ACETAMINOPHEN 1 TABLET: 5; 325 TABLET ORAL at 05:08

## 2020-08-20 RX ADMIN — VANCOMYCIN HYDROCHLORIDE 1000 MG: 1 INJECTION, POWDER, LYOPHILIZED, FOR SOLUTION INTRAVENOUS at 09:08

## 2020-08-20 RX ADMIN — VANCOMYCIN HYDROCHLORIDE 1000 MG: 1 INJECTION, POWDER, LYOPHILIZED, FOR SOLUTION INTRAVENOUS at 10:08

## 2020-08-20 RX ADMIN — ATORVASTATIN CALCIUM 10 MG: 10 TABLET, FILM COATED ORAL at 09:08

## 2020-08-20 RX ADMIN — ROCURONIUM BROMIDE 40 MG: 10 INJECTION, SOLUTION INTRAVENOUS at 10:08

## 2020-08-20 RX ADMIN — MEPERIDINE HYDROCHLORIDE 12.5 MG: 25 INJECTION INTRAMUSCULAR; INTRAVENOUS; SUBCUTANEOUS at 02:08

## 2020-08-20 RX ADMIN — SODIUM CHLORIDE, SODIUM LACTATE, POTASSIUM CHLORIDE, AND CALCIUM CHLORIDE: 600; 310; 30; 20 INJECTION, SOLUTION INTRAVENOUS at 10:08

## 2020-08-20 RX ADMIN — ACETAMINOPHEN 650 MG: 325 TABLET ORAL at 09:08

## 2020-08-20 RX ADMIN — LIDOCAINE HYDROCHLORIDE 5 ML: 20 JELLY TOPICAL at 10:08

## 2020-08-20 NOTE — TRANSFER OF CARE
"Anesthesia Transfer of Care Note    Patient: Cata Lang    Procedure(s) Performed: Procedure(s) (LRB):  INSERTION, SHUNT, VENTRICULOPERITONEAL (Right)    Patient location: PACU    Anesthesia Type: general    Transport from OR: Transported from OR on room air with adequate spontaneous ventilation    Post pain: adequate analgesia    Post assessment: no apparent anesthetic complications    Post vital signs: stable    Level of consciousness: awake, alert and oriented    Nausea/Vomiting: no nausea/vomiting    Complications: none    Transfer of care protocol was followed      Last vitals:   Visit Vitals  BP (!) 201/91 (BP Location: Right arm, Patient Position: Lying)   Pulse 71   Temp 36.3 °C (97.4 °F) (Skin)   Resp 17   Ht 5' 10" (1.778 m)   Wt 115.6 kg (254 lb 13.6 oz)   LMP  (LMP Unknown)   SpO2 95%   Breastfeeding No   BMI 36.57 kg/m²     "

## 2020-08-20 NOTE — NURSING
Pt arrived to floor at this time. Bedside report received from Sherron Rn, care assumed. Pt aaox4, drowsy but easily arousable. resp even and unlabored. Dressing to Right side of head. Clean, dry, intact.  Cardiac monitor initiated, normal sinus rhythm  on monitor . Denies pain at this time. In no acute distress. Bed in lowest locked position. Call light and personal  Belonging within reach.  at bedside. Will continue to monitor.

## 2020-08-20 NOTE — HPI
49-year-old female who was admitted to the hospital for management after her craniotomy.  Patient has a past medical history significant for diabetes, hypertension, CKD stage 3, anemia and prior CVA in 2015 with residual left-sided weakness.  Patient underwent a craniotomy for meningioma on 07/31/2020.  She was readmitted after being discharged from rehab with recurrent dizziness, headache and increased left-sided weakness along with multiple falls.  She has undergone a workup including a CT scan showing hydrocephalus.  Neurosurgery was consulted and would like to place a  shunt and has asked for general surgery assistance with this. Only significant abdominal surgical history is hysterectomy per patient and .

## 2020-08-20 NOTE — NURSING
Report given to MATTY Yi for transport to pre-op for surgery. Spouse at bedside, belongings in room at this time. Will transfer belongings to appropriate room.

## 2020-08-20 NOTE — OP NOTE
[facility]  Surgery Department  Operative Note    SUMMARY     Date of Procedure: 8/20/2020     Procedure:  1.  Diagnostic laparoscopy/laparoscopic assistance of  shunt insertion    Surgeon(s) and Role:     * Remy Davenport MD - Primary    Pre-Operative Diagnosis: Obstructive hydrocephalus [G91.1]    Post-Operative Diagnosis: Post-Op Diagnosis Codes:     * Obstructive hydrocephalus [G91.1]    Anesthesia: General    Technical Procedures Used:   1.  Diagnostic laparoscopy/laparoscopic assistance of  shunt insertion    Indications for Procedure:  49-year-old female with hydrocephalus who presents for  shunt insertion    Findings of the Procedure:  Minor adhesions of the omentum to the anterior abdominal wall near the umbilicus    Description of the Procedure:  Patient was in the operating room under the care of Dr. Hagen.  The abdomen was then prepped and draped usual sterile fashion.  A left upper quadrant stab incision was made and the Veress needle was inserted this defect into the abdominal cavity and confirmed good position aspiration saline drop test.  The abdomen was insufflated to pressure 15 mm of mercury.  The 5 mm laparoscopic port was then inserted into abdominal cavity through this defect using Optiview technique in usual fashion.  The camera was introduced in the abdomen checked for any signs of injury upon entry there was none.  Additional 5 mm ports placed near the umbilicus although there were adhesions of the omentum near this area.  A laparoscopic grasper was used to bluntly dissect away the adhesions from this other port without injury to underlying structures.  There was no tented up bowel to the anterior abdominal only omentum.  Once this occurred, Dr. Hagen passed the shunt to a stab incision in the right upper quadrant.  This then had good flow of CSF.  A needle was then introduced into the abdominal cavity through this defect under direct visualization.  Guidewire was  placed through this needle and the needle was removed.  The introducer and sheath were then passed over the guidewire into the abdominal cavity and the guidewire and insertion sheath were removed leaving the introducer in place.  The catheter for the  shunt was then passed through this introducer into the abdominal cavity under direct visualization.  Once all slack had been introduced into the abdominal cavity, the insertion sheath was peeled away and the catheter was fully in place.  The abdomen was then checked for signs of good flow which there was confirmed.  There was good hemostasis.  The abdomen was then desufflated and the remaining ports removed.  The skin was then closed 4-0 Monocryl suture.  Skin glue was applied at both the  shunt insertion site as well as the port sites.  The case was again turned back over to Dr. Hagen for the completion of his procedure.    Significant Surgical Tasks Conducted by the Assistant(s), if Applicable: N/A    Complications: No    Estimated Blood Loss (EBL): minimal           Implants:   Implant Name Type Inv. Item Serial No.  Lot No. LRB No. Used Action   [employer] Certas Plus Programmable Valve    [employer] HOSP SERV [id number] Right 1 Implanted   KIT INTRODUCER CATH PERC 10FR - [id number]  KIT INTRODUCER CATH PERC 10FR  [employer] [place] [id number] Right 1 Implanted       Specimens:   Specimen (12h ago, onward)    None                  Condition: Good    Disposition: PACU - hemodynamically stable.    Attestation: I performed the procedure.

## 2020-08-20 NOTE — OP NOTE
[facility] -   Neurosurgery  Operative Note    SUMMARY      Date of Procedure: 8/20/2020     Procedure: Procedure(s) (LRB):  INSERTION, SHUNT, VENTRICULOPERITONEAL (Right)     Surgeon(s) and Role:     * Jose Hagen MD - co surgeon     * Remy Davenport MD - co surgeon        Pre-Operative Diagnosis: Obstructive hydrocephalus [G91.1]    Post-Operative Diagnosis: Post-Op Diagnosis Codes:     * Obstructive hydrocephalus [G91.1]    Anesthesia: General    Technical Procedures Used:  1. Creation of right parietal ventriculoperitoneal shunt with laparoscopic assistance 2.  Intraoperative stereotactic navigation    Description of the Findings of the Procedure:  After careful informed consent, the patient was brought to the operating room and general tracheal anesthesia was induced.  Intravenous antibiotics had been administered.  The patient was in the supine position on the operating table with a doughnut and the head turned to the left approximately 60°.  The right retroauricular scalp was shaved as was the right neck chest and abdomen followed by sterile prepping and draping of these areas.  Dr. Davenport was available for laparoscopic approach the peritoneal cavity was scribe in a separate operative note.  The skin in the right parietal and retroauricular area was infiltrated with 1% lidocaine solution with 1 100,000 epinephrine.  A 10.  Blade knife was used to create a curvilinear scalp incision in the retroauricular scalp with.  Bleeding was controlled with a Bovie electrocautery and the scalp flap was reflected inferiorly and was held out of the way with a fishhook.  Hemostasis was achieved with the bipolar.  A single bur hole was created here using the [employer] drill with a 14 mm bur attachment.  The dura was scored the bipolar cautery and opened in a cruciate fashion with a with a number 69 [place] blade.  The patient's anatomy had been registered to a preoperative CT scan using the [employer]  stereotactic navigational computer.  This allowed for intraoperative real-time navigation during the procedure.  The right occipital trigone was chosen for the target for the placement of the ventricular catheter.  A shunt tunneler was passed from the parietal incision to the right upper quadrant where the general surgeon made a neck in the skin.  The tunneler was passed out through this neck in the skin in the stylet removed.  A 90 cm distal shunt tubing was passed through the tunneler.  The tunneler was withdrawn leaving the tubing within the subcutaneous tract.  The proximal end of the peritoneal catheter was joint to the distal arm of a [employer] Certas shunt valve with anti siphon device.  It was secured with a tool silk tie.  A 6 cm ventricular catheter was advanced through the bur hole into the lateral ventricle under stereotactic guidance without difficulty.  Proper aggressive cerebral spinal fluid under intense pressure was noticed.  The catheter was then joint to the intake portion of the valve with a 2 0 silk tie.  Fluid was noted to egress of the distal catheter confirming patency of the system.  The distal tubing was then placed within the peritoneal cavity by .  All wounds were copiously irrigated with antibiotic-containing saline.  The retroauricular scalp wound was closed at the galea reapproximation of interrupted inverted 3 0 Vicryl.  Skin was closed with Dermabond surgical adhesive.  Dr. Davenport will describe the closure of the abdominal incisions in his separate operative note.  Sponge and needle counts were correct at the end of the procedure.    Significant Surgical Tasks Conducted by the Assistant(s), if Applicable:  Not applicable    Complications: No    Estimated Blood Loss (EBL): 20 mL           Specimens:   Specimen (12h ago, onward)    None           Implants:   Implant Name Type Inv. Item Serial No.  Lot No. LRB No. Used Action   [employer] Certas Plus Programmable Valve     [employer] IN[employer]/J[employer] HOSP SERV [id number] Right 1 Implanted   KIT INTRODUCER CATH PERC 10FR - [id number]  KIT INTRODUCER CATH PERC 10FR  [employer] [place] [id number] Right 1 Implanted              Condition: Good    Disposition: PACU - hemodynamically stable.    Attestation: I was present and scrubbed for the entire procedure.

## 2020-08-20 NOTE — ASSESSMENT & PLAN NOTE
S/p craniotomy and L Frontal Meningioma resection- now pt has developed Hydrocephalus in the same area- needs Shunt  No need for any Mannitol  Continue to taper decadron    Clinically a little better, Na also improved to 132   shunt in am    S/p  shunt placement- doing well post op

## 2020-08-20 NOTE — CONSULTS
[facility] -   General Surgery  Consult Note    Patient Name: Cata Lang  MRN: [id number]  Code Status: Full Code  Admission Date: 8/17/2020  Hospital Length of Stay: 3 days  Attending Physician: Lurdes Guy MD  Primary Care Provider: Sabra Fregoso MD    Patient information was obtained from patient, spouse/SO and past medical records.     Consults   Consult service: General Surgery  Consult ordered by: Dr. Jose Hagen  Consult provided by: Dr. Remy Davenport    Subjective:     Principal Problem: Hydrocephalus, acquired    History of Present Illness: 49-year-old female who was admitted to the hospital for management after her craniotomy.  Patient has a past medical history significant for diabetes, hypertension, CKD stage 3, anemia and prior CVA in 2015 with residual left-sided weakness.  Patient underwent a craniotomy for meningioma on 07/31/2020.  She was readmitted after being discharged from rehab with recurrent dizziness, headache and increased left-sided weakness along with multiple falls.  She has undergone a workup including a CT scan showing hydrocephalus.  Neurosurgery was consulted and would like to place a  shunt and has asked for general surgery assistance with this. Only significant abdominal surgical history is hysterectomy per patient and .    No current facility-administered medications on file prior to encounter.      Current Outpatient Medications on File Prior to Encounter   Medication Sig    amLODIPine (NORVASC) 5 MG tablet Take 1 tablet (5 mg total) by mouth once daily.    aspirin 81 MG Chew Take 1 tablet (81 mg total) by mouth once daily.    atorvastatin (LIPITOR) 10 MG tablet Take 10 mg by mouth once daily.     calcitRIOL (ROCALTROL) 0.25 MCG Cap 0.25 mcg once daily.     calcium carbonate (OS-THANIA) 500 mg calcium (1,250 mg) tablet Take 1 tablet (500 mg total) by mouth 4 (four) times daily.    cholecalciferol, vitamin D3, 125 mcg (5,000 unit) Tab  Take 1 tablet (5,000 Units total) by mouth once daily.    dexAMETHasone (DECADRON) 2 MG tablet     DULoxetine (CYMBALTA) 30 MG capsule     gabapentin (NEURONTIN) 600 MG tablet Take 600 mg by mouth once daily.     HYDROcodone-acetaminophen (NORCO) 5-325 mg per tablet Take 1 tablet by mouth every 4 (four) hours as needed.    levETIRAcetam (KEPPRA) 500 MG Tab Take 1,500 mg by mouth 2 (two) times daily.    levothyroxine (SYNTHROID) 137 MCG Tab tablet Take 1 tablet (137 mcg total) by mouth once daily.    metFORMIN (GLUCOPHAGE) 500 MG tablet Take 500 mg by mouth 2 (two) times daily with meals.    OMEPRAZOLE ORAL Take by mouth.    ondansetron (ZOFRAN) 4 MG tablet     ferrous sulfate 325 (65 FE) MG EC tablet Take 1 tablet (325 mg total) by mouth 2 (two) times daily.    zolpidem (AMBIEN) 10 mg Tab Take 1 tablet (10 mg total) by mouth nightly as needed.       Review of patient's allergies indicates:   Allergen Reactions    Heparin analogues Other (See Comments)     DANYELLE     Hydrochlorothiazide      hypercalcemia       Past Medical History:   Diagnosis Date    Diabetes mellitus     Hydrocephalus, acquired 8/17/2020    Hypertension     Seizures     Stroke     Thyroid disease      Past Surgical History:   Procedure Laterality Date    CRANIOTOMY      THYROIDECTOMY      TONSILLECTOMY       Family History     None        Tobacco Use    Smoking status: Current Every Day Smoker     Packs/day: 1.00     Types: Cigarettes    Tobacco comment: none since July 2020   Substance and Sexual Activity    Alcohol use: Not Currently    Drug use: Not Currently    Sexual activity: Not on file     Review of Systems   Constitutional: Positive for activity change and fatigue. Negative for appetite change, chills, fever and unexpected weight change.   HENT: Negative for congestion, ear pain, sore throat and trouble swallowing.    Eyes: Negative for pain, redness and itching.   Respiratory: Negative for cough, shortness of  breath and wheezing.    Cardiovascular: Negative for chest pain, palpitations and leg swelling.   Gastrointestinal: Negative for abdominal distention, abdominal pain, anal bleeding, blood in stool, constipation, diarrhea, nausea, rectal pain and vomiting.   Endocrine: Negative for cold intolerance, heat intolerance and polyuria.   Genitourinary: Negative for dysuria, flank pain, frequency and hematuria.   Musculoskeletal: Negative for gait problem, joint swelling and neck pain.   Skin: Negative for color change, rash and wound.   Allergic/Immunologic: Negative for environmental allergies and immunocompromised state.   Neurological: Positive for dizziness, weakness and headaches.   Psychiatric/Behavioral: Negative for agitation, confusion and hallucinations.     Objective:     Vital Signs (Most Recent):  Temp: 97.6 °F (36.4 °C) (08/20/20 0808)  Pulse: 68 (08/20/20 0808)  Resp: 18 (08/20/20 0808)  BP: (!) 159/71 (08/20/20 0808)  SpO2: 98 % (08/20/20 0808) Vital Signs (24h Range):  Temp:  [97.5 °F (36.4 °C)-98 °F (36.7 °C)] 97.6 °F (36.4 °C)  Pulse:  [68-97] 68  Resp:  [18-19] 18  SpO2:  [92 %-99 %] 98 %  BP: (152-188)/(71-87) 159/71     Weight: 115.6 kg (254 lb 13.6 oz)  Body mass index is 36.57 kg/m².    Physical Exam  Constitutional:       Appearance: She is well-developed.   HENT:      Head:      Comments: staples on the scalp from the previous craniotomy incision  Eyes:      Conjunctiva/sclera: Conjunctivae normal.   Neck:      Musculoskeletal: Normal range of motion.      Thyroid: No thyromegaly.   Cardiovascular:      Rate and Rhythm: Normal rate and regular rhythm.   Pulmonary:      Effort: Pulmonary effort is normal. No respiratory distress.   Abdominal:      General: There is no distension.      Palpations: Abdomen is soft. There is no mass.      Tenderness: There is no abdominal tenderness.      Comments: Well-healed infraumbilical midline incision   Musculoskeletal: Normal range of motion.         General:  No tenderness.   Skin:     General: Skin is warm and dry.      Capillary Refill: Capillary refill takes less than 2 seconds.      Findings: No rash.   Neurological:      Mental Status: She is alert and oriented to person, place, and time.         Significant Labs:  CBC:   Recent Labs   Lab 20  0811   WBC 14.32*   RBC 4.23   HGB 10.9*   HCT 35.3*      MCV 84   MCH 25.8*   MCHC 30.9*     BMP:   Recent Labs   Lab 20  0811   * 178*   * 132*   K 3.8 3.8   CL 94* 94*   CO2 25 25   BUN 16 20   CREATININE 1.1 1.0   CALCIUM 9.4 9.4   MG 1.9  --      CMP:   Recent Labs   Lab 20  0811   * 178*   CALCIUM 9.4 9.4   ALBUMIN 3.9  --    PROT 7.7  --    * 132*   K 3.8 3.8   CO2 25 25   CL 94* 94*   BUN 16 20   CREATININE 1.1 1.0   ALKPHOS 180*  --    ALT 51*  --    AST 34  --    BILITOT 0.6  --        Significant Diagnostics:  I have reviewed all pertinent imaging results/findings within the past 24 hours.    Assessment/Plan:     * Hydrocephalus, acquired  48yo F with hydrocephalus who is in need of  shunt    - Discussed with patient and her  the need for an intra-abdominal portion the operation for insertion of  shunt.  Discussed we should attempt to this laparoscopically given the minimal amount of previous surgery she has had her abdomen other than a .  Discussed that this should be successful but there comes with risk which we discussed.  They are agreeable to proceed in this manner.  - okay to proceed to surgery from a general surgery standpoint for laparoscopy/laparoscopic assistance with  shunt insertion in conjunction with Neurosurgery  - All risks, benefits and alternatives fully explained to patient. Risks include, but are not limited to, bleeding, infection, damage to intra-abdominal organs such as colon, rectum, small bowel, stomach, liver, bladder, reproductive organs, sexual dysfunction, urinary dysfunction,  postoperative abscess, conversion to open operation, perioperative MI, CVA and death.  All questions field and appropriately answered to patient's satisfaction.  Consent signed and placed on chart.  - Rest of care and management per neurosurgery and hospital medicine      History of CVA in adulthood  Management per primary team    Hyperlipidemia associated with type 2 diabetes mellitus  Management per primary team    Hypothyroidism  Management per primary team    Type 2 diabetes mellitus with stage 3 chronic kidney disease, without long-term current use of insulin  Management per primary team    Hypocalcemia  Management per primary team    Anemia due to stage 3 chronic kidney disease  Management per primary team    Calcified cerebral meningioma s/p Craniotomy/ Resection  Management per neurosurgery    Hypertension associated with diabetes  Management per primary team    Hyponatremia  Management per primary team      VTE Risk Mitigation (From admission, onward)         Ordered     Place HOLLAND hose  Until discontinued      08/17/20 1306                Thank you for your consult. I will follow-up with patient. Please contact us if you have any additional questions.    Remy Davenport MD  General Surgery  [facility] -

## 2020-08-20 NOTE — PLAN OF CARE
Problem: Wound  Goal: Optimal Wound Healing  Outcome: Ongoing, Progressing     Problem: Fall Injury Risk  Goal: Absence of Fall and Fall-Related Injury  Outcome: Ongoing, Progressing     Problem: Adult Inpatient Plan of Care  Goal: Plan of Care Review  Outcome: Ongoing, Progressing  Goal: Patient-Specific Goal (Individualization)  Outcome: Ongoing, Progressing  Goal: Absence of Hospital-Acquired Illness or Injury  Outcome: Ongoing, Progressing  Goal: Optimal Comfort and Wellbeing  Outcome: Ongoing, Progressing  Goal: Readiness for Transition of Care  Outcome: Ongoing, Progressing  Goal: Rounds/Family Conference  Outcome: Ongoing, Progressing     Problem: Electrolyte Imbalance (Acute Kidney Injury/Impairment)  Goal: Serum Electrolyte Balance  Outcome: Ongoing, Progressing     Problem: Fluid Imbalance (Acute Kidney Injury/Impairment)  Goal: Optimal Fluid Balance  Outcome: Ongoing, Progressing     Problem: Hematologic Alteration (Acute Kidney Injury/Impairment)  Goal: Hemoglobin, Hematocrit and Platelets Within Normal Range  Outcome: Ongoing, Progressing     Problem: Oral Intake Inadequate (Acute Kidney Injury/Impairment)  Goal: Optimal Nutrition Intake  Outcome: Ongoing, Progressing     Problem: Renal Function Impairment (Acute Kidney Injury/Impairment)  Goal: Effective Renal Function  Outcome: Ongoing, Progressing     Problem: Diabetes Comorbidity  Goal: Blood Glucose Level Within Desired Range  Outcome: Ongoing, Progressing     Problem: Skin Injury Risk Increased  Goal: Skin Health and Integrity  Outcome: Ongoing, Progressing     Problem: Infection  Goal: Infection Symptom Resolution  Outcome: Ongoing, Progressing

## 2020-08-20 NOTE — SUBJECTIVE & OBJECTIVE
No current facility-administered medications on file prior to encounter.      Current Outpatient Medications on File Prior to Encounter   Medication Sig    amLODIPine (NORVASC) 5 MG tablet Take 1 tablet (5 mg total) by mouth once daily.    aspirin 81 MG Chew Take 1 tablet (81 mg total) by mouth once daily.    atorvastatin (LIPITOR) 10 MG tablet Take 10 mg by mouth once daily.     calcitRIOL (ROCALTROL) 0.25 MCG Cap 0.25 mcg once daily.     calcium carbonate (OS-THANIA) 500 mg calcium (1,250 mg) tablet Take 1 tablet (500 mg total) by mouth 4 (four) times daily.    cholecalciferol, vitamin D3, 125 mcg (5,000 unit) Tab Take 1 tablet (5,000 Units total) by mouth once daily.    dexAMETHasone (DECADRON) 2 MG tablet     DULoxetine (CYMBALTA) 30 MG capsule     gabapentin (NEURONTIN) 600 MG tablet Take 600 mg by mouth once daily.     HYDROcodone-acetaminophen (NORCO) 5-325 mg per tablet Take 1 tablet by mouth every 4 (four) hours as needed.    levETIRAcetam (KEPPRA) 500 MG Tab Take 1,500 mg by mouth 2 (two) times daily.    levothyroxine (SYNTHROID) 137 MCG Tab tablet Take 1 tablet (137 mcg total) by mouth once daily.    metFORMIN (GLUCOPHAGE) 500 MG tablet Take 500 mg by mouth 2 (two) times daily with meals.    OMEPRAZOLE ORAL Take by mouth.    ondansetron (ZOFRAN) 4 MG tablet     ferrous sulfate 325 (65 FE) MG EC tablet Take 1 tablet (325 mg total) by mouth 2 (two) times daily.    zolpidem (AMBIEN) 10 mg Tab Take 1 tablet (10 mg total) by mouth nightly as needed.       Review of patient's allergies indicates:   Allergen Reactions    Heparin analogues Other (See Comments)     DANYELLE     Hydrochlorothiazide      hypercalcemia       Past Medical History:   Diagnosis Date    Diabetes mellitus     Hydrocephalus, acquired 8/17/2020    Hypertension     Seizures     Stroke     Thyroid disease      Past Surgical History:   Procedure Laterality Date    CRANIOTOMY      THYROIDECTOMY      TONSILLECTOMY        Family History     None        Tobacco Use    Smoking status: Current Every Day Smoker     Packs/day: 1.00     Types: Cigarettes    Tobacco comment: none since July 2020   Substance and Sexual Activity    Alcohol use: Not Currently    Drug use: Not Currently    Sexual activity: Not on file     Review of Systems   Constitutional: Positive for activity change and fatigue. Negative for appetite change, chills, fever and unexpected weight change.   HENT: Negative for congestion, ear pain, sore throat and trouble swallowing.    Eyes: Negative for pain, redness and itching.   Respiratory: Negative for cough, shortness of breath and wheezing.    Cardiovascular: Negative for chest pain, palpitations and leg swelling.   Gastrointestinal: Negative for abdominal distention, abdominal pain, anal bleeding, blood in stool, constipation, diarrhea, nausea, rectal pain and vomiting.   Endocrine: Negative for cold intolerance, heat intolerance and polyuria.   Genitourinary: Negative for dysuria, flank pain, frequency and hematuria.   Musculoskeletal: Negative for gait problem, joint swelling and neck pain.   Skin: Negative for color change, rash and wound.   Allergic/Immunologic: Negative for environmental allergies and immunocompromised state.   Neurological: Positive for dizziness, weakness and headaches.   Psychiatric/Behavioral: Negative for agitation, confusion and hallucinations.     Objective:     Vital Signs (Most Recent):  Temp: 97.6 °F (36.4 °C) (08/20/20 0808)  Pulse: 68 (08/20/20 0808)  Resp: 18 (08/20/20 0808)  BP: (!) 159/71 (08/20/20 0808)  SpO2: 98 % (08/20/20 0808) Vital Signs (24h Range):  Temp:  [97.5 °F (36.4 °C)-98 °F (36.7 °C)] 97.6 °F (36.4 °C)  Pulse:  [68-97] 68  Resp:  [18-19] 18  SpO2:  [92 %-99 %] 98 %  BP: (152-188)/(71-87) 159/71     Weight: 115.6 kg (254 lb 13.6 oz)  Body mass index is 36.57 kg/m².    Physical Exam  Constitutional:       Appearance: She is well-developed.   HENT:      Head:       Comments: staples on the scalp from the previous craniotomy incision  Eyes:      Conjunctiva/sclera: Conjunctivae normal.   Neck:      Musculoskeletal: Normal range of motion.      Thyroid: No thyromegaly.   Cardiovascular:      Rate and Rhythm: Normal rate and regular rhythm.   Pulmonary:      Effort: Pulmonary effort is normal. No respiratory distress.   Abdominal:      General: There is no distension.      Palpations: Abdomen is soft. There is no mass.      Tenderness: There is no abdominal tenderness.      Comments: Well-healed infraumbilical midline incision   Musculoskeletal: Normal range of motion.         General: No tenderness.   Skin:     General: Skin is warm and dry.      Capillary Refill: Capillary refill takes less than 2 seconds.      Findings: No rash.   Neurological:      Mental Status: She is alert and oriented to person, place, and time.         Significant Labs:  CBC:   Recent Labs   Lab 08/19/20  0811   WBC 14.32*   RBC 4.23   HGB 10.9*   HCT 35.3*      MCV 84   MCH 25.8*   MCHC 30.9*     BMP:   Recent Labs   Lab 08/18/20  0606 08/19/20  0811   * 178*   * 132*   K 3.8 3.8   CL 94* 94*   CO2 25 25   BUN 16 20   CREATININE 1.1 1.0   CALCIUM 9.4 9.4   MG 1.9  --      CMP:   Recent Labs   Lab 08/18/20  0606 08/19/20  0811   * 178*   CALCIUM 9.4 9.4   ALBUMIN 3.9  --    PROT 7.7  --    * 132*   K 3.8 3.8   CO2 25 25   CL 94* 94*   BUN 16 20   CREATININE 1.1 1.0   ALKPHOS 180*  --    ALT 51*  --    AST 34  --    BILITOT 0.6  --        Significant Diagnostics:  I have reviewed all pertinent imaging results/findings within the past 24 hours.

## 2020-08-20 NOTE — ANESTHESIA POSTPROCEDURE EVALUATION
Anesthesia Post Evaluation    Patient: Cata Lang    Procedure(s) Performed: Procedure(s) (LRB):  INSERTION, SHUNT, VENTRICULOPERITONEAL (Right)    Final Anesthesia Type: general    Patient location during evaluation: PACU  Patient participation: Yes- Able to Participate  Level of consciousness: awake and alert, oriented and awake  Post-procedure vital signs: reviewed and stable  Pain management: adequate  Airway patency: patent    PONV status at discharge: No PONV  Anesthetic complications: no      Cardiovascular status: blood pressure returned to baseline  Respiratory status: unassisted and spontaneous ventilation  Hydration status: euvolemic  Follow-up not needed.          Vitals Value Taken Time   /65 08/20/20 1501   Temp 36.3 °C (97.4 °F) 08/20/20 1230   Pulse 64 08/20/20 1504   Resp 13 08/20/20 1504   SpO2 97 % 08/20/20 1504   Vitals shown include unvalidated device data.      No case tracking events are documented in the log.      Pain/Debbi Score: Pain Rating Prior to Med Admin: 7 (8/20/2020  2:30 PM)  Pain Rating Post Med Admin: 0 (8/19/2020 12:27 PM)  Debbi Score: 8 (8/20/2020  2:30 PM)

## 2020-08-20 NOTE — PT/OT/SLP PROGRESS
Physical Therapy      Patient Name:  Cata Lang   MRN:  [id number]    Patient not seen today secondary to patient was in still in surgery having a  shunt placed at 1300. Will follow-up on next session.    Narendra Gonzalez, PT

## 2020-08-20 NOTE — PROGRESS NOTES
[facility] Hospital Medicine  Progress Note    Patient Name: Cata Lang  MRN: [id number]  Patient Class: IP- Inpatient   Admission Date: 8/17/2020  Length of Stay: 3 days  Attending Physician: Lurdes Guy MD  Primary Care Provider: Sabra Fregoso MD        Subjective:     Principal Problem:Hydrocephalus, acquired        HPI:  This is a 50 yo female who has a PMHX of Dm2, HTN, CKD stage 3, anemia of chronic disease, Prior CVA in 2015 felt to be from her with residual left sided weakness,  And s/p craniotomy for meningioma on 7/31/20 done by Dr. Hagen. She was discharged from rehab 3 days ago and reports that the next day she began  having dizziness, HA, and increased left sided weakness form baseline. Patient has also been having multiple falls the past few days. Patient had a follow up visit with Dr. Hagen today and per report, CT showed signs of  hydrocephalus. Dr. Hagen requested patient be placed in the hospital for further monitoring, physical therapy, and plans for upcoming   shunt placement.                                                   Overview/Hospital Course:  50 yo female who has a PMHX of DM2, HTN, CKD stage 3, anemia of chronic disease, Prior CVA in 2015 with residual left sided weakness, nd s/p craniotomy for meningioma on 7/31/20 done by Dr. Hagen. She was discharged from rehab 3 days ago and reports that the next day she began having dizziness, HA, and increased left sided weakness form baseline. Patient has also been having multiple falls the past few days. Patient had a follow up visit with Dr. Hagen today and per report, CT showed signs of Hydrocephalus. She was also found to be Hyponatremic with a Na of 129- likely sec to IVVD. Pt admitted to [facility] and started on IVF. Pt was also a tapering dose of Decadron orally. This morning she feels lot better, more alert and awake and stronger, speech getting clearer. Dr. Hagen plans Shunt placement this Friday. Na and Cl  "slightly better, pt eating drinking better too. Will get PT/OT in am   8/19- Seen and examined with Dr. Jose Hagen, looks and feels better, more alert and responsive, facial expression improved, Na has improved to 132, feels stronger on her legs. Participated with PT and walked a little. Await  shunt surgery in am.   8/20- seen post op, doing a little better, s/p  shunt placement by Dr. Hagen which went uneventfully. Post op was AAOX3, speech clear, her HA was much better, just had incisional pain. Now resting comfortably after Norco.     Interval History: seen post op, doing a little better, s/p  shunt placement by Dr. Hagen which went uneventfully. Post op was AAOX3, speech clear, her HA was much better, just had incisional pain. Now resting comfortably after Norco.     Review of Systems   Constitutional: Positive for activity change and fatigue. Negative for appetite change, chills, diaphoresis and fever.   HENT: Negative for ear discharge, ear pain and facial swelling.    Eyes: Negative for pain and redness.   Respiratory: Negative for cough and shortness of breath.    Gastrointestinal: Negative for abdominal distention, blood in stool, constipation, diarrhea, nausea and vomiting.        LBM today and was "normal"  Patient reports appetite is "very good"   Endocrine: Negative for polydipsia and polyphagia.   Genitourinary: Negative for difficulty urinating, dysuria, flank pain and hematuria.   Musculoskeletal: Negative for neck pain and neck stiffness.   Skin: Negative for color change.   Allergic/Immunologic: Negative for food allergies.   Neurological: Positive for weakness. Negative for dizziness, seizures, facial asymmetry, speech difficulty and headaches.        Frontal headaches better    Patient reports ongoing left upper and lower sided weakness since prior CVA in 2015 but reports increased weakness from baseline over past few days    Hematological: Does not bruise/bleed easily. "   Psychiatric/Behavioral: Negative for agitation, behavioral problems, confusion, dysphoric mood and suicidal ideas. The patient is not nervous/anxious.      Objective:     Vital Signs (Most Recent):  Temp: 97.4 °F (36.3 °C) (08/20/20 1653)  Pulse: 69 (08/20/20 1653)  Resp: 16 (08/20/20 1715)  BP: (!) 140/66 (08/20/20 1653)  SpO2: 97 % (08/20/20 1653) Vital Signs (24h Range):  Temp:  [97.4 °F (36.3 °C)-98 °F (36.7 °C)] 97.4 °F (36.3 °C)  Pulse:  [61-87] 69  Resp:  [10-20] 16  SpO2:  [94 %-100 %] 97 %  BP: (120-201)/() 140/66     Weight: 115.6 kg (254 lb 13.6 oz)  Body mass index is 36.57 kg/m².    Intake/Output Summary (Last 24 hours) at 8/20/2020 1821  Last data filed at 8/20/2020 1230  Gross per 24 hour   Intake 2440 ml   Output 1420 ml   Net 1020 ml      Physical Exam  Nursing note reviewed.   Constitutional:       Appearance: She is well-developed.   HENT:      Head:      Comments: staples on the scalp from the previous craniotomy incision  Eyes:      Conjunctiva/sclera: Conjunctivae normal.   Neck:      Musculoskeletal: Normal range of motion.      Thyroid: No thyromegaly.   Cardiovascular:      Rate and Rhythm: Normal rate and regular rhythm.   Pulmonary:      Effort: Pulmonary effort is normal. No respiratory distress.   Abdominal:      General: There is no distension.      Palpations: Abdomen is soft. There is no mass.      Tenderness: There is no abdominal tenderness.      Comments: Well-healed infraumbilical midline incision   Musculoskeletal: Normal range of motion.         General: No tenderness.   Skin:     General: Skin is warm and dry.      Capillary Refill: Capillary refill takes less than 2 seconds.      Findings: No rash.   Neurological:      Mental Status: She is alert and oriented to person, place, and time.         Significant Labs:   BMP:   Recent Labs   Lab 08/19/20  0811   *   *   K 3.8   CL 94*   CO2 25   BUN 20   CREATININE 1.0   CALCIUM 9.4     CBC:   Recent Labs   Lab  08/19/20  0811   WBC 14.32*   HGB 10.9*   HCT 35.3*        CMP:   Recent Labs   Lab 08/19/20  0811   *   K 3.8   CL 94*   CO2 25   *   BUN 20   CREATININE 1.0   CALCIUM 9.4   ANIONGAP 13   EGFRNONAA >60     Magnesium:   All pertinent labs within the past 24 hours have been reviewed.    Significant Imaging: I have reviewed all pertinent imaging results/findings within the past 24 hours.      Assessment/Plan:      * Hydrocephalus, acquired  S/p craniotomy and L Frontal Meningioma resection- now pt has developed Hydrocephalus in the same area- needs Shunt  No need for any Mannitol  Continue to taper decadron    Clinically a little better, Na also improved to 132   shunt in am    S/p  shunt placement- doing well post op    Meningioma, cerebral s/p resection  7/31/2020 S/p Crainotomy with Aspiration of frontal pseudomeningocele   Patient reports she has been taking her decadron at home but is unsure of the dosage- Asked  to bring in   Will defer steroid dosing to NeuroSurgeon    S/p resection 7/31- See above    Await  Shunt placement    S/p resection    Hyponatremia  Monitor- Levels running 135-136 three weeks ago  Add 1500ml fluid restriction  Nephrology consulted  Seizure precautions    Stable- likely sec to Intracranial tumor    Improving a little to 132 with IVF    improving      Anemia due to stage 3 chronic kidney disease  Add MVI and pm vitamin C  Continue bid ferrous sulfate      Type 2 diabetes mellitus with stage 3 chronic kidney disease, without long-term current use of insulin  Dm diet  Accuchecks with correctional SSI  Home metformin on hold    BS under control    History of CVA in adulthood  Patient reports Hx of CVA in 2015 which was felt to be caused from her prior pseudomeningocele  Patient states her home Arixtra was recently discontinued about a week or so ago      Hyperlipidemia associated with type 2 diabetes mellitus  Continue home statin      Frequent falls  New  Onset  Fall precautions  Consult physical therapy    Sec to Hydrocephalus- improving    Continue PT/OT    Hypothyroidism  Continue home medications    Lab Results   Component Value Date    TSH 1.544 07/26/2020           Hypertension associated with diabetes  Continue home medications        VTE Risk Mitigation (From admission, onward)         Ordered     Place HOLLAND hose  Until discontinued      08/20/20 1239     IP VTE HIGH RISK PATIENT  Once      08/20/20 1239     Place sequential compression device  Until discontinued      08/20/20 1239     Place HOLLAND hose  Until discontinued      08/17/20 3084                Discharge Planning   ROSALIA:      Code Status: Full Code   Is the patient medically ready for discharge?:     Reason for patient still in hospital (select all that apply): Patient new problem, Laboratory test and Treatment                     Lurdes Guy MD  Department of Hospital Medicine   [facility]

## 2020-08-20 NOTE — ASSESSMENT & PLAN NOTE
48yo F with hydrocephalus who is in need of  shunt    - Discussed with patient and her  the need for an intra-abdominal portion the operation for insertion of  shunt.  Discussed we should attempt to this laparoscopically given the minimal amount of previous surgery she has had her abdomen other than a .  Discussed that this should be successful but there comes with risk which we discussed.  They are agreeable to proceed in this manner.  - okay to proceed to surgery from a general surgery standpoint for laparoscopy/laparoscopic assistance with  shunt insertion in conjunction with Neurosurgery  - All risks, benefits and alternatives fully explained to patient. Risks include, but are not limited to, bleeding, infection, damage to intra-abdominal organs such as colon, rectum, small bowel, stomach, liver, bladder, reproductive organs, sexual dysfunction, urinary dysfunction, postoperative abscess, conversion to open operation, perioperative MI, CVA and death.  All questions field and appropriately answered to patient's satisfaction.  Consent signed and placed on chart.  - Rest of care and management per neurosurgery and hospital medicine

## 2020-08-20 NOTE — SUBJECTIVE & OBJECTIVE
"Interval History: seen post op, doing a little better, s/p  shunt placement by Dr. Hagen which went uneventfully. Post op was AAOX3, speech clear, her HA was much better, just had incisional pain. Now resting comfortably after Norco.     Review of Systems   Constitutional: Positive for activity change and fatigue. Negative for appetite change, chills, diaphoresis and fever.   HENT: Negative for ear discharge, ear pain and facial swelling.    Eyes: Negative for pain and redness.   Respiratory: Negative for cough and shortness of breath.    Gastrointestinal: Negative for abdominal distention, blood in stool, constipation, diarrhea, nausea and vomiting.        LBM today and was "normal"  Patient reports appetite is "very good"   Endocrine: Negative for polydipsia and polyphagia.   Genitourinary: Negative for difficulty urinating, dysuria, flank pain and hematuria.   Musculoskeletal: Negative for neck pain and neck stiffness.   Skin: Negative for color change.   Allergic/Immunologic: Negative for food allergies.   Neurological: Positive for weakness. Negative for dizziness, seizures, facial asymmetry, speech difficulty and headaches.        Frontal headaches better    Patient reports ongoing left upper and lower sided weakness since prior CVA in 2015 but reports increased weakness from baseline over past few days    Hematological: Does not bruise/bleed easily.   Psychiatric/Behavioral: Negative for agitation, behavioral problems, confusion, dysphoric mood and suicidal ideas. The patient is not nervous/anxious.      Objective:     Vital Signs (Most Recent):  Temp: 97.4 °F (36.3 °C) (08/20/20 1653)  Pulse: 69 (08/20/20 1653)  Resp: 16 (08/20/20 1715)  BP: (!) 140/66 (08/20/20 1653)  SpO2: 97 % (08/20/20 1653) Vital Signs (24h Range):  Temp:  [97.4 °F (36.3 °C)-98 °F (36.7 °C)] 97.4 °F (36.3 °C)  Pulse:  [61-87] 69  Resp:  [10-20] 16  SpO2:  [94 %-100 %] 97 %  BP: (120-201)/() 140/66     Weight: 115.6 kg (254 lb " 13.6 oz)  Body mass index is 36.57 kg/m².    Intake/Output Summary (Last 24 hours) at 8/20/2020 1821  Last data filed at 8/20/2020 1230  Gross per 24 hour   Intake 2440 ml   Output 1420 ml   Net 1020 ml      Physical Exam  Nursing note reviewed.   Constitutional:       Appearance: She is well-developed.   HENT:      Head:      Comments: staples on the scalp from the previous craniotomy incision  Eyes:      Conjunctiva/sclera: Conjunctivae normal.   Neck:      Musculoskeletal: Normal range of motion.      Thyroid: No thyromegaly.   Cardiovascular:      Rate and Rhythm: Normal rate and regular rhythm.   Pulmonary:      Effort: Pulmonary effort is normal. No respiratory distress.   Abdominal:      General: There is no distension.      Palpations: Abdomen is soft. There is no mass.      Tenderness: There is no abdominal tenderness.      Comments: Well-healed infraumbilical midline incision   Musculoskeletal: Normal range of motion.         General: No tenderness.   Skin:     General: Skin is warm and dry.      Capillary Refill: Capillary refill takes less than 2 seconds.      Findings: No rash.   Neurological:      Mental Status: She is alert and oriented to person, place, and time.         Significant Labs:   BMP:   Recent Labs   Lab 08/19/20  0811   *   *   K 3.8   CL 94*   CO2 25   BUN 20   CREATININE 1.0   CALCIUM 9.4     CBC:   Recent Labs   Lab 08/19/20  0811   WBC 14.32*   HGB 10.9*   HCT 35.3*        CMP:   Recent Labs   Lab 08/19/20  0811   *   K 3.8   CL 94*   CO2 25   *   BUN 20   CREATININE 1.0   CALCIUM 9.4   ANIONGAP 13   EGFRNONAA >60     Magnesium:   All pertinent labs within the past 24 hours have been reviewed.    Significant Imaging: I have reviewed all pertinent imaging results/findings within the past 24 hours.

## 2020-08-20 NOTE — ASSESSMENT & PLAN NOTE
Monitor- Levels running 135-136 three weeks ago  Add 1500ml fluid restriction  Nephrology consulted  Seizure precautions    Stable- likely sec to Intracranial tumor    Improving a little to 132 with IVF    improving

## 2020-08-20 NOTE — PLAN OF CARE
Patient remains free from injury or fall. Patient participates in PT to promote strengthening from being deconditioned since surgery in 07/2020.    Problem: Fall Injury Risk  Goal: Absence of Fall and Fall-Related Injury  Outcome: Ongoing, Progressing

## 2020-08-20 NOTE — PROGRESS NOTES
[facility]   Neurosurgery  Progress Note    Subjective:     Interval History:  No change from yesterday.  Remains somewhat irritable, somnolent with mild confusion.    History of Present Illness:  Little over 2 weeks out from craniotomy and resection of a large frontal meningioma.  Interval development of progressive hydrocephalus associated with gait worsening.    Post-Op Info:  Procedure(s) (LRB):  INSERTION, SHUNT, VENTRICULOPERITONEAL (Right)   Day of Surgery      Medications:  Continuous Infusions:   sodium chloride 0.9% 75 mL/hr at 08/19/20 0940     Scheduled Meds:   amLODIPine  5 mg Oral Daily    ascorbic acid (vitamin C)  500 mg Oral QHS    atorvastatin  10 mg Oral QHS    calcitRIOL  0.25 mcg Oral Daily    calcium carbonate  500 mg Oral QID    cefTRIAXone (ROCEPHIN) IVPB  1 g Intravenous Once    cholecalciferol (vitamin D3)  5,000 Units Oral Daily    dexAMETHasone  2 mg Oral Q12H    DULoxetine  30 mg Oral Daily    ferrous sulfate  325 mg Oral BID WM    gabapentin  600 mg Oral Daily    levETIRAcetam  1,500 mg Oral BID    levothyroxine  137 mcg Oral Before breakfast    multivitamin  1 tablet Oral Daily    nozaseptin   Each Nostril BID    pantoprazole  40 mg Oral Daily    vancomycin (VANCOCIN) IVPB  1,000 mg Intravenous Once     PRN Meds:dextrose 50%, dextrose 50%, famotidine, glucagon (human recombinant), glucose, glucose, hydrALAZINE, HYDROcodone-acetaminophen, insulin aspart U-100, melatonin     Review of Systems  Objective:     Weight: 115.6 kg (254 lb 13.6 oz)  Body mass index is 36.57 kg/m².  Vital Signs (Most Recent):  Temp: 97.6 °F (36.4 °C) (08/20/20 0808)  Pulse: 68 (08/20/20 0808)  Resp: 18 (08/20/20 0808)  BP: (!) 159/71 (08/20/20 0808)  SpO2: 98 % (08/20/20 0808) Vital Signs (24h Range):  Temp:  [97.5 °F (36.4 °C)-98 °F (36.7 °C)] 97.6 °F (36.4 °C)  Pulse:  [68-97] 68  Resp:  [18-19] 18  SpO2:  [92 %-99 %] 98 %  BP: (152-188)/(71-87) 159/71                           Neurosurgery Physical Exam    Significant Labs:  Recent Labs   Lab 08/19/20  0811   *   *   K 3.8   CL 94*   CO2 25   BUN 20   CREATININE 1.0   CALCIUM 9.4     Recent Labs   Lab 08/19/20  0811   WBC 14.32*   HGB 10.9*   HCT 35.3*        No results for input(s): LABPT, INR, APTT in the last 48 hours.  Microbiology Results (last 7 days)     ** No results found for the last 168 hours. **        All pertinent labs from the last 24 hours have been reviewed.  Significant Diagnostics:  I have reviewed and interpreted all pertinent imaging results/findings within the past 24 hours.    Assessment/Plan:   Patient schedule for ventriculoperitoneal shunt this morning.  I have discussed with the patient and her  the indications for, alternatives to an all the pertinent risks the procedure at length.  Understanding all the risks, and having had an opportunity to have all the questions carefully answered, they would like to proceed this morning.  General surgery will be assisting with laparoscopic portion of the procedure.  Active Diagnoses:    Diagnosis Date Noted POA    PRINCIPAL PROBLEM:  Hydrocephalus, acquired [G91.9] 08/17/2020 Yes    Calcified cerebral meningioma s/p Craniotomy/ Resection [D32.0] 07/31/2020 Yes     Chronic    Type 2 diabetes mellitus with stage 3 chronic kidney disease, without long-term current use of insulin [E11.22, N18.3] 08/17/2020 Yes    Hypothyroidism [E03.9] 08/17/2020 Yes    Frequent falls [R29.6] 08/17/2020 Not Applicable    Hyperlipidemia associated with type 2 diabetes mellitus [E11.69, E78.5] 08/17/2020 Yes    History of CVA in adulthood [Z86.73] 08/17/2020 Not Applicable    Hypocalcemia [E83.51] 08/02/2020 Yes    Anemia due to stage 3 chronic kidney disease [N18.3, D63.1]  Yes    Hyponatremia [E87.1] 07/26/2020 Yes    Hypertension associated with diabetes [E11.59, I10] 07/26/2020 Yes      Problems Resolved During this Admission:    Diagnosis Date Noted  Date Resolved POA    Postprocedural pseudomeningocele [G97.82] 08/06/2020 08/17/2020 Yes       Jose Hagen MD  Neurosurgery  [facility]

## 2020-08-20 NOTE — ANESTHESIA PREPROCEDURE EVALUATION
08/19/2020  Cata Lang is a 49 y.o., female.  Patient Active Problem List   Diagnosis    Hyponatremia    Hypertension associated with diabetes    Metabolic encephalopathy    Calcified cerebral meningioma s/p Craniotomy/ Resection    Hypokalemia    Diabetes mellitus due to underlying condition with hyperglycemia, without long-term current use of insulin    Anemia due to stage 3 chronic kidney disease    Iron deficiency anemia    Hypocalcemia    Thrombocytopenia    Hydrocephalus, acquired    Type 2 diabetes mellitus with stage 3 chronic kidney disease, without long-term current use of insulin    Hypothyroidism    Frequent falls    Hyperlipidemia associated with type 2 diabetes mellitus    History of CVA in adulthood     No current facility-administered medications on file prior to encounter.      Current Outpatient Medications on File Prior to Encounter   Medication Sig Dispense Refill    amLODIPine (NORVASC) 5 MG tablet Take 1 tablet (5 mg total) by mouth once daily. 30 tablet 11    aspirin 81 MG Chew Take 1 tablet (81 mg total) by mouth once daily.  0    atorvastatin (LIPITOR) 10 MG tablet Take 10 mg by mouth once daily.       calcitRIOL (ROCALTROL) 0.25 MCG Cap 0.25 mcg once daily.       calcium carbonate (OS-THANIA) 500 mg calcium (1,250 mg) tablet Take 1 tablet (500 mg total) by mouth 4 (four) times daily.  0    cholecalciferol, vitamin D3, 125 mcg (5,000 unit) Tab Take 1 tablet (5,000 Units total) by mouth once daily. 30 tablet 1    dexAMETHasone (DECADRON) 2 MG tablet       DULoxetine (CYMBALTA) 30 MG capsule       gabapentin (NEURONTIN) 600 MG tablet Take 600 mg by mouth once daily.       HYDROcodone-acetaminophen (NORCO) 5-325 mg per tablet Take 1 tablet by mouth every 4 (four) hours as needed. 15 tablet 0    levETIRAcetam (KEPPRA) 500 MG Tab Take 1,500 mg by mouth 2 (two)  times daily.      levothyroxine (SYNTHROID) 137 MCG Tab tablet Take 1 tablet (137 mcg total) by mouth once daily.      metFORMIN (GLUCOPHAGE) 500 MG tablet Take 500 mg by mouth 2 (two) times daily with meals.      OMEPRAZOLE ORAL Take by mouth.      ondansetron (ZOFRAN) 4 MG tablet       ferrous sulfate 325 (65 FE) MG EC tablet Take 1 tablet (325 mg total) by mouth 2 (two) times daily. 60 tablet 1    zolpidem (AMBIEN) 10 mg Tab Take 1 tablet (10 mg total) by mouth nightly as needed. 30 tablet 0     Past Surgical History:   Procedure Laterality Date    CRANIOTOMY      THYROIDECTOMY      TONSILLECTOMY         Anesthesia Evaluation    I have reviewed the Patient Summary Reports.    I have reviewed the Nursing Notes.    I have reviewed the Medications.     Review of Systems  Anesthesia Hx:  No problems with previous Anesthesia    Social:  Smoker    Hematology/Oncology:         -- Anemia:   Cardiovascular:   Hypertension ECG has been reviewed.    Pulmonary:  Pulmonary Normal    Renal/:   Chronic Renal Disease, CRI    Hepatic/GI:  Hepatic/GI Normal    Neurological:   CVA Seizures Brain mass  Mental status changes   Endocrine:   Diabetes, type 2 Hypothyroidism      Patient Active Problem List   Diagnosis    Hyponatremia    Hypertension associated with diabetes    Metabolic encephalopathy    Calcified cerebral meningioma s/p Craniotomy/ Resection    Hypokalemia    Diabetes mellitus due to underlying condition with hyperglycemia, without long-term current use of insulin    Anemia due to stage 3 chronic kidney disease    Iron deficiency anemia    Hypocalcemia    Thrombocytopenia    Hydrocephalus, acquired    Type 2 diabetes mellitus with stage 3 chronic kidney disease, without long-term current use of insulin    Hypothyroidism    Frequent falls    Hyperlipidemia associated with type 2 diabetes mellitus    History of CVA in adulthood     Current Facility-Administered Medications on File Prior to  Visit   Medication Dose Route Frequency Provider Last Rate Last Dose    0.9%  NaCl infusion   Intravenous Continuous Jose Hagen MD 75 mL/hr at 08/19/20 0940      amLODIPine tablet 5 mg  5 mg Oral Daily Janie Styles, FNP   5 mg at 08/19/20 0922    ascorbic acid (vitamin C) tablet 500 mg  500 mg Oral QHS Janie Styles, FNP   500 mg at 08/18/20 2030    atorvastatin tablet 10 mg  10 mg Oral QHS Janie Styles, FNP   10 mg at 08/18/20 2029    calcitRIOL capsule 0.25 mcg  0.25 mcg Oral Daily Janie Styles, FNP   0.25 mcg at 08/19/20 0923    calcium carbonate (OS-THANIA) tablet 500 mg  500 mg Oral QID Janie Styles, FNP   500 mg at 08/19/20 1644    cholecalciferol (vitamin D3) 125 mcg (5,000 unit) tablet 5,000 Units  5,000 Units Oral Daily Janie Styles, FNP   5,000 Units at 08/19/20 0922    dexAMETHasone tablet 2 mg  2 mg Oral Q12H Tania Arthur PA-C   2 mg at 08/19/20 0922    dextrose 50% injection 12.5 g  12.5 g Intravenous PRN Janie Styles, FNP        dextrose 50% injection 25 g  25 g Intravenous PRN Janie Styles, FNP        DULoxetine DR capsule 30 mg  30 mg Oral Daily Janie Styles, FNP   30 mg at 08/19/20 0922    famotidine tablet 20 mg  20 mg Oral BID PRN ALEC Truong   20 mg at 08/17/20 2320    ferrous sulfate EC tablet 325 mg  325 mg Oral BID WM Janie Styles, FNP   325 mg at 08/19/20 1644    gabapentin capsule 600 mg  600 mg Oral Daily Janie Styles, FNP   600 mg at 08/19/20 0922    glucagon (human recombinant) injection 1 mg  1 mg Intramuscular PRN Janie Styles, FNP        glucose chewable tablet 16 g  16 g Oral PRN Janie Styles, FNP        glucose chewable tablet 24 g  24 g Oral PRN Janie Styles, FNP        hydrALAZINE injection 10 mg  10 mg Intravenous Q8H PRN Riya B. Buuck, NP   10 mg at 08/19/20 1557    HYDROcodone-acetaminophen 5-325 mg per tablet 1 tablet  1 tablet Oral Q4H PRN Lurdes Guy MD   1 tablet  at 08/19/20 1644    insulin aspart U-100 pen 0-5 Units  0-5 Units Subcutaneous QID (AC + HS) PRN Janie LEONLeia Styles, FNP        levETIRAcetam tablet 1,500 mg  1,500 mg Oral BID Janie Styles FNP   1,500 mg at 08/19/20 0922    levothyroxine tablet 137 mcg  137 mcg Oral Before breakfast Janie LEONLeia Styles FNP   137 mcg at 08/19/20 0540    melatonin tablet 6 mg  6 mg Oral Nightly PRN Janievivian Styles, FNP        multivitamin tablet  1 tablet Oral Daily Janie Streeterlex, FNP   1 tablet at 08/19/20 0922    pantoprazole EC tablet 40 mg  40 mg Oral Daily Janie LEONLeia Styles FNP   40 mg at 08/19/20 0922     Current Outpatient Medications on File Prior to Visit   Medication Sig Dispense Refill    amLODIPine (NORVASC) 5 MG tablet Take 1 tablet (5 mg total) by mouth once daily. 30 tablet 11    aspirin 81 MG Chew Take 1 tablet (81 mg total) by mouth once daily.  0    atorvastatin (LIPITOR) 10 MG tablet Take 10 mg by mouth once daily.       calcitRIOL (ROCALTROL) 0.25 MCG Cap 0.25 mcg once daily.       calcium carbonate (OS-THANIA) 500 mg calcium (1,250 mg) tablet Take 1 tablet (500 mg total) by mouth 4 (four) times daily.  0    cholecalciferol, vitamin D3, 125 mcg (5,000 unit) Tab Take 1 tablet (5,000 Units total) by mouth once daily. 30 tablet 1    dexAMETHasone (DECADRON) 2 MG tablet       DULoxetine (CYMBALTA) 30 MG capsule       ferrous sulfate 325 (65 FE) MG EC tablet Take 1 tablet (325 mg total) by mouth 2 (two) times daily. 60 tablet 1    gabapentin (NEURONTIN) 600 MG tablet Take 600 mg by mouth once daily.       HYDROcodone-acetaminophen (NORCO) 5-325 mg per tablet Take 1 tablet by mouth every 4 (four) hours as needed. 15 tablet 0    levETIRAcetam (KEPPRA) 500 MG Tab Take 1,500 mg by mouth 2 (two) times daily.      levothyroxine (SYNTHROID) 137 MCG Tab tablet Take 1 tablet (137 mcg total) by mouth once daily.      metFORMIN (GLUCOPHAGE) 500 MG tablet Take 500 mg by mouth 2 (two) times daily with  meals.      OMEPRAZOLE ORAL Take by mouth.      ondansetron (ZOFRAN) 4 MG tablet       zolpidem (AMBIEN) 10 mg Tab Take 1 tablet (10 mg total) by mouth nightly as needed. 30 tablet 0     Past Surgical History:   Procedure Laterality Date    CRANIOTOMY      THYROIDECTOMY      TONSILLECTOMY           Physical Exam  General:  Well nourished    Airway/Jaw/Neck:  Airway Findings: Mouth Opening: Normal Tongue: Normal  Mallampati: III      Dental:  Dental Findings: In tact   Chest/Lungs:  Chest/Lungs Clear    Heart/Vascular:  Heart Findings: Normal Heart murmur: negative       Mental Status:  Mental Status Findings:  Cooperative, Alert and Oriented         Anesthesia Plan  Type of Anesthesia, risks & benefits discussed:  Anesthesia Type:  general  Patient's Preference:   Intra-op Monitoring Plan: standard ASA monitors  Intra-op Monitoring Plan Comments:   Post Op Pain Control Plan: multimodal analgesia and per primary service following discharge from PACU  Post Op Pain Control Plan Comments:   Induction:   IV  Beta Blocker:  Patient is not currently on a Beta-Blocker (No further documentation required).       Informed Consent: Patient understands risks and agrees with Anesthesia plan.  Questions answered. Anesthesia consent signed with patient.  ASA Score: 3     Day of Surgery Review of History & Physical:  There are no significant changes.          Ready For Surgery From Anesthesia Perspective.       Chemistry        Component Value Date/Time     (L) 08/19/2020 0811    K 3.8 08/19/2020 0811    CL 94 (L) 08/19/2020 0811    CO2 25 08/19/2020 0811    BUN 20 08/19/2020 0811    CREATININE 1.0 08/19/2020 0811     (H) 08/19/2020 0811        Component Value Date/Time    CALCIUM 9.4 08/19/2020 0811    ALKPHOS 180 (H) 08/18/2020 0606    AST 34 08/18/2020 0606    ALT 51 (H) 08/18/2020 0606    BILITOT 0.6 08/18/2020 0606    ESTGFRAFRICA >60 08/19/2020 0811    EGFRNONAA >60 08/19/2020 0811        Lab Results    Component Value Date    WBC 14.32 (H) 08/19/2020    HGB 10.9 (L) 08/19/2020    HCT 35.3 (L) 08/19/2020    MCV 84 08/19/2020     08/19/2020     ,

## 2020-08-20 NOTE — PT/OT/SLP PROGRESS
Occupational Therapy      Patient Name:  Cata Lang   MRN:  [id number]    Pt not seen today bc pt is presently in sx. Will attempt tx session on later date.  Inna Ornelas, OT  8/20/2020   [id number]

## 2020-08-20 NOTE — ASSESSMENT & PLAN NOTE
7/31/2020 S/p Crainotomy with Aspiration of frontal pseudomeningocele   Patient reports she has been taking her decadron at home but is unsure of the dosage- Asked  to bring in   Will defer steroid dosing to NeuroSurgeon    S/p resection 7/31- See above    Await  Shunt placement    S/p resection

## 2020-08-21 LAB
ANION GAP SERPL CALC-SCNC: 11 MMOL/L (ref 8–16)
BASOPHILS # BLD AUTO: 0.02 K/UL (ref 0–0.2)
BASOPHILS NFR BLD: 0.2 % (ref 0–1.9)
BUN SERPL-MCNC: 15 MG/DL (ref 6–20)
CALCIUM SERPL-MCNC: 7.9 MG/DL (ref 8.7–10.5)
CHLORIDE SERPL-SCNC: 97 MMOL/L (ref 95–110)
CO2 SERPL-SCNC: 23 MMOL/L (ref 23–29)
CREAT SERPL-MCNC: 0.9 MG/DL (ref 0.5–1.4)
DIFFERENTIAL METHOD: ABNORMAL
EOSINOPHIL # BLD AUTO: 0.2 K/UL (ref 0–0.5)
EOSINOPHIL NFR BLD: 1.5 % (ref 0–8)
ERYTHROCYTE [DISTWIDTH] IN BLOOD BY AUTOMATED COUNT: 17.6 % (ref 11.5–14.5)
EST. GFR  (AFRICAN AMERICAN): >60 ML/MIN/1.73 M^2
EST. GFR  (NON AFRICAN AMERICAN): >60 ML/MIN/1.73 M^2
GLUCOSE SERPL-MCNC: 139 MG/DL (ref 70–110)
HCT VFR BLD AUTO: 29.9 % (ref 37–48.5)
HGB BLD-MCNC: 9.4 G/DL (ref 12–16)
IMM GRANULOCYTES # BLD AUTO: 0.07 K/UL (ref 0–0.04)
IMM GRANULOCYTES NFR BLD AUTO: 0.6 % (ref 0–0.5)
LYMPHOCYTES # BLD AUTO: 2.4 K/UL (ref 1–4.8)
LYMPHOCYTES NFR BLD: 19.5 % (ref 18–48)
MAGNESIUM SERPL-MCNC: 1.9 MG/DL (ref 1.6–2.6)
MCH RBC QN AUTO: 26.3 PG (ref 27–31)
MCHC RBC AUTO-ENTMCNC: 31.4 G/DL (ref 32–36)
MCV RBC AUTO: 84 FL (ref 82–98)
MONOCYTES # BLD AUTO: 0.7 K/UL (ref 0.3–1)
MONOCYTES NFR BLD: 5.8 % (ref 4–15)
NEUTROPHILS # BLD AUTO: 8.9 K/UL (ref 1.8–7.7)
NEUTROPHILS NFR BLD: 72.4 % (ref 38–73)
NRBC BLD-RTO: 0 /100 WBC
PHOSPHATE SERPL-MCNC: 3.3 MG/DL (ref 2.7–4.5)
PLATELET # BLD AUTO: 191 K/UL (ref 150–350)
PMV BLD AUTO: 11.1 FL (ref 9.2–12.9)
POCT GLUCOSE: 148 MG/DL (ref 70–110)
POCT GLUCOSE: 155 MG/DL (ref 70–110)
POCT GLUCOSE: 162 MG/DL (ref 70–110)
POCT GLUCOSE: 183 MG/DL (ref 70–110)
POTASSIUM SERPL-SCNC: 4.1 MMOL/L (ref 3.5–5.1)
RBC # BLD AUTO: 3.58 M/UL (ref 4–5.4)
SODIUM SERPL-SCNC: 131 MMOL/L (ref 136–145)
WBC # BLD AUTO: 12.21 K/UL (ref 3.9–12.7)

## 2020-08-21 PROCEDURE — 63600175 PHARM REV CODE 636 W HCPCS: Performed by: PHYSICIAN ASSISTANT

## 2020-08-21 PROCEDURE — 84100 ASSAY OF PHOSPHORUS: CPT

## 2020-08-21 PROCEDURE — 99900035 HC TECH TIME PER 15 MIN (STAT)

## 2020-08-21 PROCEDURE — 97530 THERAPEUTIC ACTIVITIES: CPT

## 2020-08-21 PROCEDURE — 83735 ASSAY OF MAGNESIUM: CPT

## 2020-08-21 PROCEDURE — 97535 SELF CARE MNGMENT TRAINING: CPT

## 2020-08-21 PROCEDURE — 25000003 PHARM REV CODE 250: Performed by: EMERGENCY MEDICINE

## 2020-08-21 PROCEDURE — 94761 N-INVAS EAR/PLS OXIMETRY MLT: CPT

## 2020-08-21 PROCEDURE — 25000003 PHARM REV CODE 250: Performed by: NURSE PRACTITIONER

## 2020-08-21 PROCEDURE — 85025 COMPLETE CBC W/AUTO DIFF WBC: CPT

## 2020-08-21 PROCEDURE — 63600175 PHARM REV CODE 636 W HCPCS: Performed by: NURSE PRACTITIONER

## 2020-08-21 PROCEDURE — 99024 POSTOP FOLLOW-UP VISIT: CPT | Mod: ,,, | Performed by: NEUROLOGICAL SURGERY

## 2020-08-21 PROCEDURE — 80048 BASIC METABOLIC PNL TOTAL CA: CPT

## 2020-08-21 PROCEDURE — 36415 COLL VENOUS BLD VENIPUNCTURE: CPT

## 2020-08-21 PROCEDURE — 21400001 HC TELEMETRY ROOM

## 2020-08-21 PROCEDURE — 25000003 PHARM REV CODE 250: Performed by: PHYSICIAN ASSISTANT

## 2020-08-21 PROCEDURE — 94799 UNLISTED PULMONARY SVC/PX: CPT

## 2020-08-21 PROCEDURE — 99024 PR POST-OP FOLLOW-UP VISIT: ICD-10-PCS | Mod: ,,, | Performed by: NEUROLOGICAL SURGERY

## 2020-08-21 RX ADMIN — GABAPENTIN 600 MG: 300 CAPSULE ORAL at 08:08

## 2020-08-21 RX ADMIN — HYDROCODONE BITARTRATE AND ACETAMINOPHEN 1 TABLET: 5; 325 TABLET ORAL at 07:08

## 2020-08-21 RX ADMIN — PANTOPRAZOLE SODIUM 40 MG: 40 TABLET, DELAYED RELEASE ORAL at 08:08

## 2020-08-21 RX ADMIN — LEVETIRACETAM 1500 MG: 500 TABLET ORAL at 09:08

## 2020-08-21 RX ADMIN — Medication 5000 UNITS: at 08:08

## 2020-08-21 RX ADMIN — CALCITRIOL CAPSULES 0.25 MCG 0.25 MCG: 0.25 CAPSULE ORAL at 08:08

## 2020-08-21 RX ADMIN — ATORVASTATIN CALCIUM 10 MG: 10 TABLET, FILM COATED ORAL at 09:08

## 2020-08-21 RX ADMIN — AMLODIPINE BESYLATE 5 MG: 5 TABLET ORAL at 08:08

## 2020-08-21 RX ADMIN — LEVETIRACETAM 1500 MG: 500 TABLET ORAL at 08:08

## 2020-08-21 RX ADMIN — FERROUS SULFATE TAB EC 325 MG (65 MG FE EQUIVALENT) 325 MG: 325 (65 FE) TABLET DELAYED RESPONSE at 08:08

## 2020-08-21 RX ADMIN — VANCOMYCIN HYDROCHLORIDE 1000 MG: 1 INJECTION, POWDER, LYOPHILIZED, FOR SOLUTION INTRAVENOUS at 09:08

## 2020-08-21 RX ADMIN — DEXAMETHASONE 2 MG: 1 TABLET ORAL at 08:08

## 2020-08-21 RX ADMIN — POTASSIUM CHLORIDE AND SODIUM CHLORIDE: 900; 150 INJECTION, SOLUTION INTRAVENOUS at 05:08

## 2020-08-21 RX ADMIN — LEVOTHYROXINE SODIUM 137 MCG: 25 TABLET ORAL at 05:08

## 2020-08-21 RX ADMIN — OXYCODONE HYDROCHLORIDE AND ACETAMINOPHEN 500 MG: 500 TABLET ORAL at 09:08

## 2020-08-21 RX ADMIN — VANCOMYCIN HYDROCHLORIDE 1000 MG: 1 INJECTION, POWDER, LYOPHILIZED, FOR SOLUTION INTRAVENOUS at 10:08

## 2020-08-21 RX ADMIN — THERA TABS 1 TABLET: TAB at 08:08

## 2020-08-21 RX ADMIN — CEFTRIAXONE 1 G: 1 INJECTION, SOLUTION INTRAVENOUS at 11:08

## 2020-08-21 RX ADMIN — FAMOTIDINE 20 MG: 20 TABLET ORAL at 09:08

## 2020-08-21 RX ADMIN — POTASSIUM CHLORIDE AND SODIUM CHLORIDE: 900; 150 INJECTION, SOLUTION INTRAVENOUS at 07:08

## 2020-08-21 RX ADMIN — FERROUS SULFATE TAB EC 325 MG (65 MG FE EQUIVALENT) 325 MG: 325 (65 FE) TABLET DELAYED RESPONSE at 04:08

## 2020-08-21 RX ADMIN — HYDROCODONE BITARTRATE AND ACETAMINOPHEN 1 TABLET: 5; 325 TABLET ORAL at 04:08

## 2020-08-21 RX ADMIN — CALCIUM 500 MG: 500 TABLET ORAL at 08:08

## 2020-08-21 RX ADMIN — CALCIUM 500 MG: 500 TABLET ORAL at 04:08

## 2020-08-21 RX ADMIN — HYDRALAZINE HYDROCHLORIDE 10 MG: 20 INJECTION INTRAMUSCULAR; INTRAVENOUS at 04:08

## 2020-08-21 RX ADMIN — DULOXETINE 30 MG: 30 CAPSULE, DELAYED RELEASE ORAL at 08:08

## 2020-08-21 RX ADMIN — CALCIUM 500 MG: 500 TABLET ORAL at 09:08

## 2020-08-21 NOTE — PLAN OF CARE
Patient's acute kidney injury has resolved.  All electrolytes are stable.  Will sign off at this time please call us back for any questions.    Thank you so much for allowing us to be a part of the care

## 2020-08-21 NOTE — PLAN OF CARE
Patient is in stable condition, no acute distress, receiving IV fluids, receiving antibiotics, pain adequately controlled, on cardiac monitoring (NSR, 70s), purewick in place, blood glucose checks performed, incision to head and abdomen CDI,  at bedside, and will continue to monitor. 24 hr chart check performed.

## 2020-08-21 NOTE — PT/OT/SLP PROGRESS
Physical Therapy  Treatment    Cata Lang   MRN: [id number]   Admitting Diagnosis: Hydrocephalus, acquired    PT Received On: 08/21/20  PT Start Time: 1000     PT Stop Time: 1025    PT Total Time (min): 25 min       Billable Minutes:  Therapeutic Activity 25    Treatment Type: Treatment  PT/PTA: PT     PTA Visit Number: 0       General Precautions: Standard, fall, seizure  Orthopedic Precautions: N/A   Braces: N/A         Subjective:  Communicated with NURSE JOAQUIN AND Epic CHART REVIEW  prior to session.  PT AGREED TO TX     Pain/Comfort  Pain Rating 1: 0/10  Location 1: head  Pain Rating Post-Intervention 1: 0/10    Objective:   Patient found with: peripheral IV, telemetry, bed alarm    Functional Mobility:  PT MET IN RM SUP IN BED. PT ORIENTED TO PERSON PLACE HOWEVER FORGETFUL ABOUT TIME. PT LOG ROLLED TO RIGHT AND SEATED EOB WITH MOD A. PT SCOOTED TO EOB AND STOOD WITH RW X 3 TRIALS WITH MOD A AND INC C/O OF DIZZINESS. PT ATTEMPTED STEPS WITH MAX A AND INC FATIGUE NOTED AND RETURNED SEATED. PT COMPLETED RIGHT SIDED SQUAT PIVOT T/F TO CHAIR WITH MOD A X 2 AND SCOOTED BACK IN CHAIR WITH MIN A. PT LEFT SEATED IN CHAIR WITH ALL NEEDS MET AND  PRESENT     AM-PAC 6 CLICK MOBILITY  How much help from another person does this patient currently need?   1 = Unable, Total/Dependent Assistance  2 = A lot, Maximum/Moderate Assistance  3 = A little, Minimum/Contact Guard/Supervision  4 = None, Modified [place]/Independent    Turning over in bed (including adjusting bedclothes, sheets and blankets)?: 2  Sitting down on and standing up from a chair with arms (e.g., wheelchair, bedside commode, etc.): 2  Moving from lying on back to sitting on the side of the bed?: 2  Moving to and from a bed to a chair (including a wheelchair)?: 2  Need to walk in hospital room?: 1  Climbing 3-5 steps with a railing?: 1  Basic Mobility Total Score: 10    AM-PAC Raw Score CMS G-Code Modifier Level of Impairment Assistance   6 CN  100% Total / Unable   7 - 9 CM 80 - 100% Maximal Assist   10 - 14 CL 60 - 80% Moderate Assist   15 - 19 CK 40 - 60% Moderate Assist   20 - 22 CJ 20 - 40% Minimal Assist   23 CI 1-20% SBA / CGA   24 CH 0% Independent/ Mod I     Patient left up in chair with call button in reach, chair alarm on and  present.    Assessment:  PT CHAVEZ TX WITH INC FATIGUE AND DIZZINESS    Rehab identified problem list/impairments: Rehab identified problem list/impairments: weakness, impaired endurance, impaired self care skills, impaired functional mobilty, gait instability, impaired balance, decreased safety awareness, decreased lower extremity function, impaired cognition, decreased upper extremity function, decreased ROM    Rehab potential is good.    Activity tolerance: Fair    Discharge recommendations: Discharge Facility/Level of Care Needs: rehabilitation facility     Barriers to discharge:      Equipment recommendations:       GOALS:   Multidisciplinary Problems     Physical Therapy Goals        Problem: Physical Therapy Goal    Goal Priority Disciplines Outcome Goal Variances Interventions   Physical Therapy Goal     PT, PT/OT Ongoing, Progressing     Description: LTG'S TO BE MET IN 7 DAYS (8-28-20)  1. PT WILL REQUIRE TINO FOR BED MOBILITY  2. PT WILL REQUIRE TINO FOR TF BED<>CHAIR  3. PT WILL AMB 50' WITH RW AND MODA                   PLAN:    Patient to be seen 5 x/week  to address the above listed problems via gait training, therapeutic activities, therapeutic exercises  Plan of Care expires: 08/25/20  Plan of Care reviewed with: patient, spouse         Harriett Bernard, PT  08/21/2020

## 2020-08-21 NOTE — PLAN OF CARE
Fall precautions maintained. Neuro checks q4h. IV fluids maintained. Pain is well controlled. Incisions intact and dry. All needs met. Will continue to monitor.

## 2020-08-21 NOTE — PROGRESS NOTES
Cata Lang is a 49 y.o. female patient.   1. Hydrocephalus, acquired    2. Status post craniotomy    3. Meningioma, cerebral    4. Hyponatremia    5. Obstructive hydrocephalus    6. Hypertension associated with diabetes    7. Calcified cerebral meningioma s/p Craniotomy/ Resection    8. Anemia due to stage 3 chronic kidney disease    9. Hypocalcemia    10. Postprocedural pseudomeningocele    11. Type 2 diabetes mellitus with stage 3 chronic kidney disease, without long-term current use of insulin    12. Frequent falls    13. Hypokalemia    14. Pre-op evaluation      Past Medical History:   Diagnosis Date    Diabetes mellitus     Hydrocephalus, acquired 8/17/2020    Hypertension     Seizures     Stroke     Thyroid disease      No past surgical history pertinent negatives on file.  Scheduled Meds:   amLODIPine  5 mg Oral Daily    ascorbic acid (vitamin C)  500 mg Oral QHS    atorvastatin  10 mg Oral QHS    calcitRIOL  0.25 mcg Oral Daily    calcium carbonate  500 mg Oral QID    cefTRIAXone (ROCEPHIN) IVPB  1 g Intravenous Q12H    cholecalciferol (vitamin D3)  5,000 Units Oral Daily    dexAMETHasone  2 mg Oral Daily    DULoxetine  30 mg Oral Daily    ferrous sulfate  325 mg Oral BID WM    gabapentin  600 mg Oral Daily    levETIRAcetam  1,500 mg Oral BID    levothyroxine  137 mcg Oral Before breakfast    multivitamin  1 tablet Oral Daily    nozaseptin   Each Nostril BID    pantoprazole  40 mg Oral Daily    vancomycin (VANCOCIN) IVPB  1,000 mg Intravenous Q12H     Continuous Infusions:   0/9% NACL & POTASSIUM CHLORIDE 20 MEQ/L 100 mL/hr at 08/21/20 0528     PRN Meds:acetaminophen, dextrose 50%, dextrose 50%, famotidine, glucagon (human recombinant), glucose, glucose, hydrALAZINE, HYDROcodone-acetaminophen, insulin aspart U-100, melatonin, ondansetron    Review of patient's allergies indicates:   Allergen Reactions    Heparin analogues Other (See Comments)     DANYELLE     Hydrochlorothiazide   "    hypercalcemia     Active Hospital Problems    Diagnosis  POA    *Hydrocephalus, acquired [G91.9]  Yes     Priority: 1 - High    Meningioma, cerebral s/p resection [D32.0]  Yes     Priority: 1 - High    Status post craniotomy [Z98.890]  Not Applicable    Type 2 diabetes mellitus with stage 3 chronic kidney disease, without long-term current use of insulin [E11.22, N18.3]  Yes    Hypothyroidism [E03.9]  Yes    Frequent falls [R29.6]  Not Applicable    Hyperlipidemia associated with type 2 diabetes mellitus [E11.69, E78.5]  Yes    History of CVA in adulthood [Z86.73]  Not Applicable    Anemia due to stage 3 chronic kidney disease [N18.3, D63.1]  Yes    Hyponatremia [E87.1]  Yes    Hypertension associated with diabetes [E11.59, I10]  Yes      Resolved Hospital Problems    Diagnosis Date Resolved POA    Postprocedural pseudomeningocele [G97.82] 08/17/2020 Yes     Priority: 2     Hypocalcemia [E83.51] 08/20/2020 Yes     Blood pressure (!) 142/64, pulse 92, temperature 97.8 °F (36.6 °C), temperature source Oral, resp. rate 16, height 5' 10" (1.778 m), weight 115.6 kg (254 lb 13.6 oz), SpO2 97 %, not currently breastfeeding.    Subjective:   Diet: Adequate intake.  Patient reports no nausea or vomiting.    Activity level: Returning to normal.    Pain control: Well controlled.      Objective:  Vital signs (most recent): Blood pressure (!) 142/64, pulse 92, temperature 97.8 °F (36.6 °C), temperature source Oral, resp. rate 16, height 5' 10" (1.778 m), weight 115.6 kg (254 lb 13.6 oz), SpO2 97 %, not currently breastfeeding.  General appearance: Comfortable, well-appearing and in no acute distress (The patient appears more bright and interactive this morning.).    Lungs:  Normal effort.  Breath sounds normal.    Heart: Normal rate.    Abdomen: Abdomen is soft and flat.  No distension.    Wound:  Clean.  There is no drainage.    Neurological: The patient is alert and oriented to person, place and time.  Normal " strength.       Assessment:   Post-op: 1 day.    Condition: In stable condition.     Plan:  Encourage ambulation and out of bed and up to chair (Physical therapy to progress mobilization again).  Wound care plan: No special wound care required.  May shower.  Respiratory Plan: Bilateral lower extremity venous Dopplers negative for DVT.  Consults: physical therapy.  Regular diet.  Imaging Plan: A CT scan of the head this morning demonstrated optimal placement of the ventricular catheter.  The degree of ventricular distension is about the same.  We have reprogrammed the shunt to a 3 setting of the Codman Certas valve.           Jose Hagen MD  8/21/2020

## 2020-08-21 NOTE — PT/OT/SLP PROGRESS
Occupational Therapy   Treatment    Name: Cata Lang  MRN: [id number]  Admitting Diagnosis:  Hydrocephalus, acquired  1 Day Post-Op    Recommendations:     Discharge Recommendations: rehabilitation facility  Discharge Equipment Recommendations:  bedside commode  Barriers to discharge:       Assessment:     Cata Lang is a 49 y.o. female with a medical diagnosis of Hydrocephalus, acquired.  She presents with DEBILITY AND GENERALIZED WEAKNESS. Performance deficits affecting function are weakness, gait instability, decreased upper extremity function, impaired endurance, impaired balance, impaired self care skills, impaired functional mobilty.     Rehab Prognosis:  Fair; patient would benefit from acute skilled OT services to address these deficits and reach maximum level of function.       Plan:     Patient to be seen 3 x/week to address the above listed problems via self-care/home management, therapeutic activities, therapeutic exercises  · Plan of Care Expires:    · Plan of Care Reviewed with: patient    Subjective     Pain/Comfort:  · Pain Rating 1: 0/10  · Location 2: neck    Objective:     Communicated with: NURSE AND Epic CHART REVIEW prior to session.  Patient found HOB elevated with telemetry upon OT entry to room.    General Precautions: Standard, fall, seizure   Orthopedic Precautions:N/A   Braces: N/A     Occupational Performance:     Bed Mobility:    · Patient completed Rolling/Turning to Right with moderate assistance  · Patient completed Scooting/Bridging with moderate assistance  · Patient completed Supine to Sit with moderate assistance     Functional Mobility/Transfers:  · Patient completed Sit <> Stand Transfer with moderate assistance and of 2 persons  with  rolling walker   · Patient completed Bed <> Chair Transfer using Squat Pivot technique with moderate assistance and of 2 persons with hand-held assist  ·     Activities of Daily Living:  · Grooming: minimum assistance ORAL HYGIENE WITH  MIN A AND SIMPLE FACE WASHING  · Upper Body Dressing: maximal assistance .  · Toileting: total assistance  PUREWICK PLACEMENT       [facility] 6 Click ADL: 12    Treatment & Education:  PT EDUCATION  ON TRANSFER TRAINING  WITH ROLLING WALKER AND VC'S FOR HAND PLACEMENT WITH SIT<>STAND TRANSFERS X 4 ATTEMPTSAND SQUAT>PIVIOT TRANSFERS. PT C/O DIZZINESS AND NO PAIN. PAIN SUBSIDED ONCE UPON IN CHAIR  Patient left up in chair with all lines intact, call button in reach, chair alarm on, NURSE notified and SPOUSE presentEducation:      GOALS:   Multidisciplinary Problems     Occupational Therapy Goals        Problem: Occupational Therapy Goal    Goal Priority Disciplines Outcome Interventions   Occupational Therapy Goal     OT, PT/OT Ongoing, Progressing    Description: LTGS to be met by 8/24/2020  1. Pt will perform LE dressing with Min A  2. Pt will perform UE dressing with SBA  3. Pt will perform toilet t/f with Mod A  4. Pt will perform (B) UE therapeutic exercises 1 x 20 reps                    Time Tracking:     OT Date of Treatment: 08/21/20  OT Start Time: 1000  OT Stop Time: 1025  OT Total Time (min): 25 min    Billable Minutes:Self Care/Home Management 10 MINUTES  Therapeutic Activity 15 MINUTES    Inna Ornelas, OT  8/21/2020

## 2020-08-22 LAB
ALBUMIN SERPL BCP-MCNC: 3.2 G/DL (ref 3.5–5.2)
ALP SERPL-CCNC: 163 U/L (ref 55–135)
ALT SERPL W/O P-5'-P-CCNC: 45 U/L (ref 10–44)
ANION GAP SERPL CALC-SCNC: 14 MMOL/L (ref 8–16)
AST SERPL-CCNC: 19 U/L (ref 10–40)
BASOPHILS # BLD AUTO: 0.04 K/UL (ref 0–0.2)
BASOPHILS NFR BLD: 0.3 % (ref 0–1.9)
BILIRUB SERPL-MCNC: 0.4 MG/DL (ref 0.1–1)
BUN SERPL-MCNC: 14 MG/DL (ref 6–20)
CALCIUM SERPL-MCNC: 8.7 MG/DL (ref 8.7–10.5)
CHLORIDE SERPL-SCNC: 97 MMOL/L (ref 95–110)
CO2 SERPL-SCNC: 22 MMOL/L (ref 23–29)
CREAT SERPL-MCNC: 0.9 MG/DL (ref 0.5–1.4)
DIFFERENTIAL METHOD: ABNORMAL
EOSINOPHIL # BLD AUTO: 0.2 K/UL (ref 0–0.5)
EOSINOPHIL NFR BLD: 2 % (ref 0–8)
ERYTHROCYTE [DISTWIDTH] IN BLOOD BY AUTOMATED COUNT: 17.7 % (ref 11.5–14.5)
EST. GFR  (AFRICAN AMERICAN): >60 ML/MIN/1.73 M^2
EST. GFR  (NON AFRICAN AMERICAN): >60 ML/MIN/1.73 M^2
GLUCOSE SERPL-MCNC: 179 MG/DL (ref 70–110)
HCT VFR BLD AUTO: 31.8 % (ref 37–48.5)
HGB BLD-MCNC: 9.6 G/DL (ref 12–16)
IMM GRANULOCYTES # BLD AUTO: 0.08 K/UL (ref 0–0.04)
IMM GRANULOCYTES NFR BLD AUTO: 0.7 % (ref 0–0.5)
LYMPHOCYTES # BLD AUTO: 2.2 K/UL (ref 1–4.8)
LYMPHOCYTES NFR BLD: 19.1 % (ref 18–48)
MCH RBC QN AUTO: 26.1 PG (ref 27–31)
MCHC RBC AUTO-ENTMCNC: 30.2 G/DL (ref 32–36)
MCV RBC AUTO: 86 FL (ref 82–98)
MONOCYTES # BLD AUTO: 0.6 K/UL (ref 0.3–1)
MONOCYTES NFR BLD: 5.5 % (ref 4–15)
NEUTROPHILS # BLD AUTO: 8.4 K/UL (ref 1.8–7.7)
NEUTROPHILS NFR BLD: 72.4 % (ref 38–73)
NRBC BLD-RTO: 0 /100 WBC
PLATELET # BLD AUTO: 196 K/UL (ref 150–350)
PMV BLD AUTO: 11.6 FL (ref 9.2–12.9)
POCT GLUCOSE: 133 MG/DL (ref 70–110)
POCT GLUCOSE: 156 MG/DL (ref 70–110)
POCT GLUCOSE: 179 MG/DL (ref 70–110)
POTASSIUM SERPL-SCNC: 4 MMOL/L (ref 3.5–5.1)
PROT SERPL-MCNC: 6.8 G/DL (ref 6–8.4)
RBC # BLD AUTO: 3.68 M/UL (ref 4–5.4)
SODIUM SERPL-SCNC: 133 MMOL/L (ref 136–145)
WBC # BLD AUTO: 11.57 K/UL (ref 3.9–12.7)

## 2020-08-22 PROCEDURE — 97530 THERAPEUTIC ACTIVITIES: CPT

## 2020-08-22 PROCEDURE — 25000003 PHARM REV CODE 250: Performed by: NURSE PRACTITIONER

## 2020-08-22 PROCEDURE — 97110 THERAPEUTIC EXERCISES: CPT

## 2020-08-22 PROCEDURE — 94799 UNLISTED PULMONARY SVC/PX: CPT

## 2020-08-22 PROCEDURE — 63600175 PHARM REV CODE 636 W HCPCS: Performed by: PHYSICIAN ASSISTANT

## 2020-08-22 PROCEDURE — 97112 NEUROMUSCULAR REEDUCATION: CPT

## 2020-08-22 PROCEDURE — 94760 N-INVAS EAR/PLS OXIMETRY 1: CPT

## 2020-08-22 PROCEDURE — 99024 POSTOP FOLLOW-UP VISIT: CPT | Mod: ,,, | Performed by: NEUROLOGICAL SURGERY

## 2020-08-22 PROCEDURE — 99024 PR POST-OP FOLLOW-UP VISIT: ICD-10-PCS | Mod: ,,, | Performed by: NEUROLOGICAL SURGERY

## 2020-08-22 PROCEDURE — 36415 COLL VENOUS BLD VENIPUNCTURE: CPT

## 2020-08-22 PROCEDURE — 21400001 HC TELEMETRY ROOM

## 2020-08-22 PROCEDURE — 25000003 PHARM REV CODE 250: Performed by: EMERGENCY MEDICINE

## 2020-08-22 PROCEDURE — 85025 COMPLETE CBC W/AUTO DIFF WBC: CPT

## 2020-08-22 PROCEDURE — 80053 COMPREHEN METABOLIC PANEL: CPT

## 2020-08-22 RX ORDER — DEXAMETHASONE 1 MG/1
1 TABLET ORAL DAILY
Status: DISCONTINUED | OUTPATIENT
Start: 2020-08-23 | End: 2020-08-24

## 2020-08-22 RX ADMIN — HYDROCODONE BITARTRATE AND ACETAMINOPHEN 1 TABLET: 5; 325 TABLET ORAL at 06:08

## 2020-08-22 RX ADMIN — GABAPENTIN 600 MG: 300 CAPSULE ORAL at 08:08

## 2020-08-22 RX ADMIN — ATORVASTATIN CALCIUM 10 MG: 10 TABLET, FILM COATED ORAL at 09:08

## 2020-08-22 RX ADMIN — CALCIUM 500 MG: 500 TABLET ORAL at 08:08

## 2020-08-22 RX ADMIN — APIXABAN 5 MG: 2.5 TABLET, FILM COATED ORAL at 09:08

## 2020-08-22 RX ADMIN — LEVOTHYROXINE SODIUM 137 MCG: 25 TABLET ORAL at 06:08

## 2020-08-22 RX ADMIN — FERROUS SULFATE TAB EC 325 MG (65 MG FE EQUIVALENT) 325 MG: 325 (65 FE) TABLET DELAYED RESPONSE at 08:08

## 2020-08-22 RX ADMIN — HYDROCODONE BITARTRATE AND ACETAMINOPHEN 1 TABLET: 5; 325 TABLET ORAL at 04:08

## 2020-08-22 RX ADMIN — Medication 6 MG: at 09:08

## 2020-08-22 RX ADMIN — HYDROCODONE BITARTRATE AND ACETAMINOPHEN 1 TABLET: 5; 325 TABLET ORAL at 02:08

## 2020-08-22 RX ADMIN — CALCIUM 500 MG: 500 TABLET ORAL at 04:08

## 2020-08-22 RX ADMIN — DULOXETINE 30 MG: 30 CAPSULE, DELAYED RELEASE ORAL at 08:08

## 2020-08-22 RX ADMIN — OXYCODONE HYDROCHLORIDE AND ACETAMINOPHEN 500 MG: 500 TABLET ORAL at 09:08

## 2020-08-22 RX ADMIN — HYDROCODONE BITARTRATE AND ACETAMINOPHEN 1 TABLET: 5; 325 TABLET ORAL at 10:08

## 2020-08-22 RX ADMIN — LEVETIRACETAM 1500 MG: 500 TABLET ORAL at 08:08

## 2020-08-22 RX ADMIN — FERROUS SULFATE TAB EC 325 MG (65 MG FE EQUIVALENT) 325 MG: 325 (65 FE) TABLET DELAYED RESPONSE at 04:08

## 2020-08-22 RX ADMIN — CALCIUM 500 MG: 500 TABLET ORAL at 02:08

## 2020-08-22 RX ADMIN — THERA TABS 1 TABLET: TAB at 08:08

## 2020-08-22 RX ADMIN — AMLODIPINE BESYLATE 5 MG: 5 TABLET ORAL at 08:08

## 2020-08-22 RX ADMIN — CALCITRIOL CAPSULES 0.25 MCG 0.25 MCG: 0.25 CAPSULE ORAL at 08:08

## 2020-08-22 RX ADMIN — HYDROCODONE BITARTRATE AND ACETAMINOPHEN 1 TABLET: 5; 325 TABLET ORAL at 11:08

## 2020-08-22 RX ADMIN — Medication 5000 UNITS: at 08:08

## 2020-08-22 RX ADMIN — PANTOPRAZOLE SODIUM 40 MG: 40 TABLET, DELAYED RELEASE ORAL at 08:08

## 2020-08-22 RX ADMIN — DEXAMETHASONE 2 MG: 1 TABLET ORAL at 08:08

## 2020-08-22 NOTE — ASSESSMENT & PLAN NOTE
Monitor- Levels running 135-136 three weeks ago  Add 1500ml fluid restriction  Nephrology consulted  Seizure precautions    Stable- likely sec to Intracranial tumor    Improving a little to 132 with IVF    Improving    Same at 131

## 2020-08-22 NOTE — PLAN OF CARE
Patient remains free from injury with pain controlled at this time.  Spouse remains at bedside.  Telemetry intact and BG being monitored as ordered. IVFs infusing as ordered without any problems noted.  Will continue to monitor, administer meds as ordered, provide comfort/safety measures and notify MD of any changes in condition.

## 2020-08-22 NOTE — PLAN OF CARE
PATIENT WOULD BENEFIT FROM CONT SKILLED OT INTERVENTION TO INCREASE SAFETY AND (I)  WITH ALL AREAS OF SELF CARE TASKS.

## 2020-08-22 NOTE — ASSESSMENT & PLAN NOTE
7/31/2020 S/p Crainotomy with Aspiration of frontal pseudomeningocele   Patient reports she has been taking her decadron at home but is unsure of the dosage- Asked  to bring in   Will defer steroid dosing to NeuroSurgeon    S/p resection 7/31- See above    Await  Shunt placement    S/p resection    08/22  - Stable  - PT/OT recommending rehab placement  - Case management consulted

## 2020-08-22 NOTE — ASSESSMENT & PLAN NOTE
S/p craniotomy and L Frontal Meningioma resection- now pt has developed Hydrocephalus in the same area- needs Shunt  No need for any Mannitol  Continue to taper decadron    Clinically a little better, Na also improved to 132   shunt in am    S/p  shunt placement- doing well post op    POD 1, repeat CT OK,  shunt adjusted by Dr. Hagen  Continue PT/OT    08/22  - POD #2. Stable. No changes. Continue current regimen. Neurosurgery following

## 2020-08-22 NOTE — SUBJECTIVE & OBJECTIVE
Interval History: Pt seen and examined. Doing well. PT/OT recommending rehab. Consult placed for case management.     Review of Systems   Constitutional: Positive for activity change and fatigue. Negative for appetite change, chills, diaphoresis and fever.   HENT: Negative for ear discharge, ear pain and facial swelling.    Eyes: Negative for pain, redness and visual disturbance.   Respiratory: Negative for apnea, cough, choking, chest tightness, shortness of breath, wheezing and stridor.    Cardiovascular: Negative for chest pain, palpitations and leg swelling.   Gastrointestinal: Negative for abdominal distention, blood in stool, constipation, diarrhea, nausea and vomiting.   Endocrine: Negative for polydipsia and polyphagia.   Genitourinary: Negative for difficulty urinating, dysuria, flank pain and hematuria.   Musculoskeletal: Negative for neck pain and neck stiffness.   Skin: Negative for color change.   Allergic/Immunologic: Negative for food allergies.   Neurological: Positive for weakness. Negative for dizziness, seizures, facial asymmetry, speech difficulty and headaches.        Patient reports ongoing left upper and lower sided weakness since prior CVA in 2015 but reports increased weakness from baseline over past few days    Hematological: Does not bruise/bleed easily.   Psychiatric/Behavioral: Negative for agitation, behavioral problems, confusion, dysphoric mood and suicidal ideas. The patient is not nervous/anxious.      Objective:     Vital Signs (Most Recent):  Temp: 98 °F (36.7 °C) (08/22/20 1144)  Pulse: 79 (08/22/20 1144)  Resp: 18 (08/22/20 1144)  BP: 131/67 (08/22/20 1144)  SpO2: 97 % (08/22/20 1144) Vital Signs (24h Range):  Temp:  [97.7 °F (36.5 °C)-98.6 °F (37 °C)] 98 °F (36.7 °C)  Pulse:  [79-89] 79  Resp:  [16-18] 18  SpO2:  [95 %-98 %] 97 %  BP: (131-189)/(67-83) 131/67     Weight: 115.6 kg (254 lb 13.6 oz)  Body mass index is 36.57 kg/m².    Intake/Output Summary (Last 24 hours) at  8/22/2020 1459  Last data filed at 8/22/2020 0600  Gross per 24 hour   Intake 120 ml   Output --   Net 120 ml      Physical Exam  Vitals signs and nursing note reviewed.   Constitutional:       General: She is not in acute distress.     Appearance: She is well-developed. She is not toxic-appearing or diaphoretic.   HENT:      Head:      Comments: staples on the scalp from the previous craniotomy incision intact     Mouth/Throat:      Mouth: Mucous membranes are moist.   Eyes:      Conjunctiva/sclera: Conjunctivae normal.   Neck:      Musculoskeletal: Normal range of motion.      Thyroid: No thyromegaly.   Cardiovascular:      Rate and Rhythm: Normal rate and regular rhythm.   Pulmonary:      Effort: Pulmonary effort is normal. No respiratory distress.   Abdominal:      General: Bowel sounds are normal. There is no distension.      Palpations: Abdomen is soft. There is no mass.      Tenderness: There is no abdominal tenderness.      Comments: Well-healed infraumbilical midline incision   Genitourinary:     Comments: Deferred  Musculoskeletal: Normal range of motion.         General: No tenderness.   Skin:     General: Skin is warm and dry.      Capillary Refill: Capillary refill takes less than 2 seconds.      Findings: No rash.   Neurological:      Mental Status: She is alert and oriented to person, place, and time.   Psychiatric:         Mood and Affect: Mood normal.         Behavior: Behavior normal.         Significant Labs:   CBC:   Recent Labs   Lab 08/21/20  0705 08/22/20  0923   WBC 12.21 11.57   HGB 9.4* 9.6*   HCT 29.9* 31.8*    196     CMP:   Recent Labs   Lab 08/21/20  0705 08/22/20  0923   * 133*   K 4.1 4.0   CL 97 97   CO2 23 22*   * 179*   BUN 15 14   CREATININE 0.9 0.9   CALCIUM 7.9* 8.7   PROT  --  6.8   ALBUMIN  --  3.2*   BILITOT  --  0.4   ALKPHOS  --  163*   AST  --  19   ALT  --  45*   ANIONGAP 11 14   EGFRNONAA >60 >60     POCT Glucose:   Recent Labs   Lab 08/21/20  4592  08/21/20 2120 08/22/20  0637   POCTGLUCOSE 162* 155* 133*       Significant Imaging: I have reviewed all pertinent imaging results/findings within the past 24 hours.

## 2020-08-22 NOTE — PT/OT/SLP PROGRESS
"Occupational Therapy      Patient Name:  Cata Lang   MRN:  [id number]      TREATMENT TIME:  6811-2114    S:  PATIENT'S RESPONSE FOR EVERY QUESTIONS WAS, "NO."    O:  PATIENT T/F'ED SUPINE > SIT > STAND WITH MOD (A) X2.  PATIENT MOD (A) X2 WITH STANDING BALANCE WITH PATIENT REQUIRING MAX VC AND MAX TACTILE CUE TO DECREASE LEAN TO (L).  PATIENT MOD (A) SEATED EOB WITH MAX VC AND MAX TACTILE CUE TO DECREASE LEAN TO (L).  PATIENT PERFORMED LUE WB'ING THROUGH FOREARM FOR INCREASE LUE STRENGTHENING AS WELL AS TO (A) PATIENT WITH FINDING MIDLINE AFTER RETURN TO SITTING UPRIGHT.  PATIENT PRACTICED SCANNING ROOM TO (R) DUE TO PATIENT WITH (L) GAZE TO (R).      A:  PATIENT WITH DECREASED SITTING AND STANDING BALANCE WITH INCREASED LEAN TO (L).  PATIENT ALSO WITH DECREASED VISUAL AWARENESS TO (R).    P:  CONT TO ADDRESS UNMET GOALS.      CHARGE:  NEURO RE-ED1   TA1      Vanessa Hodge OT  8/22/2020  "

## 2020-08-22 NOTE — ASSESSMENT & PLAN NOTE
S/p craniotomy and L Frontal Meningioma resection- now pt has developed Hydrocephalus in the same area- needs Shunt  No need for any Mannitol  Continue to taper decadron    Clinically a little better, Na also improved to 132   shunt in am    S/p  shunt placement- doing well post op    POD 1, repeat CT OK,  shunt adjusted by Dr. Hagen  Continue PT/OT

## 2020-08-22 NOTE — PROGRESS NOTES
Cata Lang is a 49 y.o. female patient.   1. Hydrocephalus, acquired    2. Status post craniotomy    3. Meningioma, cerebral    4. Hyponatremia    5. Obstructive hydrocephalus    6. Hypertension associated with diabetes    7. Calcified cerebral meningioma s/p Craniotomy/ Resection    8. Anemia due to stage 3 chronic kidney disease    9. Hypocalcemia    10. Postprocedural pseudomeningocele    11. Type 2 diabetes mellitus with stage 3 chronic kidney disease, without long-term current use of insulin    12. Frequent falls    13. Hypokalemia    14. Pre-op evaluation      Past Medical History:   Diagnosis Date    Diabetes mellitus     Hydrocephalus, acquired 8/17/2020    Hypertension     Seizures     Stroke     Thyroid disease      No past surgical history pertinent negatives on file.  Scheduled Meds:   amLODIPine  5 mg Oral Daily    ascorbic acid (vitamin C)  500 mg Oral QHS    atorvastatin  10 mg Oral QHS    calcitRIOL  0.25 mcg Oral Daily    calcium carbonate  500 mg Oral QID    cholecalciferol (vitamin D3)  5,000 Units Oral Daily    [START ON 8/23/2020] dexAMETHasone  1 mg Oral Daily    DULoxetine  30 mg Oral Daily    ferrous sulfate  325 mg Oral BID WM    gabapentin  600 mg Oral Daily    levETIRAcetam  1,500 mg Oral BID    levothyroxine  137 mcg Oral Before breakfast    multivitamin  1 tablet Oral Daily    nozaseptin   Each Nostril BID    pantoprazole  40 mg Oral Daily     Continuous Infusions:  PRN Meds:acetaminophen, dextrose 50%, dextrose 50%, famotidine, glucagon (human recombinant), glucose, glucose, hydrALAZINE, HYDROcodone-acetaminophen, insulin aspart U-100, melatonin, ondansetron    Review of patient's allergies indicates:   Allergen Reactions    Heparin analogues Other (See Comments)     DANYELLE     Hydrochlorothiazide      hypercalcemia     Active Hospital Problems    Diagnosis  POA    *Hydrocephalus, acquired [G91.9]  Yes     Priority: 1 - High    Meningioma, cerebral s/p  "resection [D32.0]  Yes     Priority: 1 - High    Status post craniotomy [Z98.890]  Not Applicable    Type 2 diabetes mellitus with stage 3 chronic kidney disease, without long-term current use of insulin [E11.22, N18.3]  Yes    Hypothyroidism [E03.9]  Yes    Frequent falls [R29.6]  Not Applicable    Hyperlipidemia associated with type 2 diabetes mellitus [E11.69, E78.5]  Yes    History of CVA in adulthood [Z86.73]  Not Applicable    Anemia due to stage 3 chronic kidney disease [N18.3, D63.1]  Yes    Hyponatremia [E87.1]  Yes    Hypertension associated with diabetes [E11.59, I10]  Yes      Resolved Hospital Problems    Diagnosis Date Resolved POA    Postprocedural pseudomeningocele [G97.82] 08/17/2020 Yes     Priority: 2     Hypocalcemia [E83.51] 08/20/2020 Yes     Blood pressure (!) 157/70, pulse 83, temperature 97.7 °F (36.5 °C), temperature source Oral, resp. rate 17, height 5' 10" (1.778 m), weight 115.6 kg (254 lb 13.6 oz), SpO2 98 %, not currently breastfeeding.    Subjective:   Diet: Adequate intake.  Patient reports no nausea or vomiting.    Activity level: Returning to normal.    Pain control: Well controlled.    Wound: Subjective wound complaints: Healing well, Dermabond in place.      Objective:  Vital signs (most recent): Blood pressure (!) 157/70, pulse 83, temperature 97.7 °F (36.5 °C), temperature source Oral, resp. rate 17, height 5' 10" (1.778 m), weight 115.6 kg (254 lb 13.6 oz), SpO2 98 %, not currently breastfeeding.  General appearance: Comfortable and in no acute distress.    Lungs:  Normal effort.    Heart: Normal rate.    Chest: Symmetric chest wall expansion.    Abdomen: Abdomen is soft and flat.  No distension.    Bowel sounds:  Bowel sounds are normal.    Tenderness: There is no abdominal tenderness tenderness.    Wound:  Clean.  There is no drainage.    Extremities: There is normal range of motion.    Neurological: The patient is alert and oriented to person, place and time.  "      Assessment:   Post-op: 2 days.    Condition: In stable condition.     Plan:  Other transfer (see comment) (Will likely require another inpatient rehab stay prior to transition home).  Out of bed and up to chair (Ambulation with physical therapy).  Consults: physical therapy.  Regular diet.           Jose Hagen MD  8/22/2020

## 2020-08-22 NOTE — ASSESSMENT & PLAN NOTE
Monitor- Levels running 135-136 three weeks ago  Add 1500ml fluid restriction  Nephrology consulted  Seizure precautions    Stable- likely sec to Intracranial tumor    Improving a little to 132 with IVF    Improving    Same at 131    08/22  - Na+ 133, improving  - Continue to monitor

## 2020-08-22 NOTE — PLAN OF CARE
Fall precautions maintained. Neuro checks q4h. Pain is well controlled. Incisions intact and dry. Repositioned q2h. All needs met. Will continue to monitor.

## 2020-08-22 NOTE — SUBJECTIVE & OBJECTIVE
"Interval History: POD 1, doing better. Resting comfortable, did OK with PT/OT, eating drinking more, labs improved, Na still 131. Getting IVF so will stop in am. Dr. Hagen following and adjusting the  shunt. Had post op CT head this am. Hydrocephalus still the same.     Review of Systems   Constitutional: Positive for activity change and fatigue. Negative for appetite change, chills, diaphoresis and fever.   HENT: Negative for ear discharge, ear pain and facial swelling.    Eyes: Negative for pain and redness.   Respiratory: Negative for cough and shortness of breath.    Gastrointestinal: Negative for abdominal distention, blood in stool, constipation, diarrhea, nausea and vomiting.        LBM today and was "normal"  Patient reports appetite is "very good"   Endocrine: Negative for polydipsia and polyphagia.   Genitourinary: Negative for difficulty urinating, dysuria, flank pain and hematuria.   Musculoskeletal: Negative for neck pain and neck stiffness.   Skin: Negative for color change.   Allergic/Immunologic: Negative for food allergies.   Neurological: Positive for weakness. Negative for dizziness, seizures, facial asymmetry, speech difficulty and headaches.        Frontal headaches better    Patient reports ongoing left upper and lower sided weakness since prior CVA in 2015 but reports increased weakness from baseline over past few days    Hematological: Does not bruise/bleed easily.   Psychiatric/Behavioral: Negative for agitation, behavioral problems, confusion, dysphoric mood and suicidal ideas. The patient is not nervous/anxious.      Objective:     Vital Signs (Most Recent):  Temp: 98.6 °F (37 °C) (08/21/20 1550)  Pulse: 87 (08/21/20 1647)  Resp: 18 (08/21/20 1651)  BP: (!) 166/70 (08/21/20 1647)  SpO2: 97 % (08/21/20 1550) Vital Signs (24h Range):  Temp:  [97.8 °F (36.6 °C)-98.7 °F (37.1 °C)] 98.6 °F (37 °C)  Pulse:  [72-92] 87  Resp:  [15-18] 18  SpO2:  [97 %-99 %] 97 %  BP: (142-189)/(64-83) 166/70 "     Weight: 115.6 kg (254 lb 13.6 oz)  Body mass index is 36.57 kg/m².    Intake/Output Summary (Last 24 hours) at 8/21/2020 1926  Last data filed at 8/21/2020 0621  Gross per 24 hour   Intake 1688.33 ml   Output 800 ml   Net 888.33 ml      Physical Exam  Vitals signs and nursing note reviewed.   Constitutional:       Appearance: She is well-developed.   HENT:      Head:      Comments: staples on the scalp from the previous craniotomy incision  Eyes:      Conjunctiva/sclera: Conjunctivae normal.   Neck:      Musculoskeletal: Normal range of motion.      Thyroid: No thyromegaly.   Cardiovascular:      Rate and Rhythm: Normal rate and regular rhythm.   Pulmonary:      Effort: Pulmonary effort is normal. No respiratory distress.   Abdominal:      General: There is no distension.      Palpations: Abdomen is soft. There is no mass.      Tenderness: There is no abdominal tenderness.      Comments: Well-healed infraumbilical midline incision   Musculoskeletal: Normal range of motion.         General: No tenderness.   Skin:     General: Skin is warm and dry.      Capillary Refill: Capillary refill takes less than 2 seconds.      Findings: No rash.   Neurological:      Mental Status: She is alert and oriented to person, place, and time.         Significant Labs:   BMP:   Recent Labs   Lab 08/21/20  0705   *   *   K 4.1   CL 97   CO2 23   BUN 15   CREATININE 0.9   CALCIUM 7.9*   MG 1.9     CBC:   Recent Labs   Lab 08/21/20  0705   WBC 12.21   HGB 9.4*   HCT 29.9*        Magnesium:   Recent Labs   Lab 08/21/20  0705   MG 1.9     All pertinent labs within the past 24 hours have been reviewed.    Significant Imaging: I have reviewed all pertinent imaging results/findings within the past 24 hours.

## 2020-08-22 NOTE — PROGRESS NOTES
[facility] Hospital Medicine  Progress Note    Patient Name: Cata Lang  MRN: [id number]  Patient Class: IP- Inpatient   Admission Date: 8/17/2020  Length of Stay: 5 days  Attending Physician: Rj Low MD  Primary Care Provider: Sabra Fregoso MD        Subjective:     Principal Problem:Hydrocephalus, acquired        HPI:  This is a 50 yo female who has a PMHX of Dm2, HTN, CKD stage 3, anemia of chronic disease, Prior CVA in 2015 felt to be from her with residual left sided weakness,  And s/p craniotomy for meningioma on 7/31/20 done by Dr. Hagen. She was discharged from rehab 3 days ago and reports that the next day she began  having dizziness, HA, and increased left sided weakness form baseline. Patient has also been having multiple falls the past few days. Patient had a follow up visit with Dr. Hagen today and per report, CT showed signs of  hydrocephalus. Dr. Hagen requested patient be placed in the hospital for further monitoring, physical therapy, and plans for upcoming   shunt placement.                                                   Overview/Hospital Course:  50 yo female who has a PMHX of DM2, HTN, CKD stage 3, anemia of chronic disease, Prior CVA in 2015 with residual left sided weakness, nd s/p craniotomy for meningioma on 7/31/20 done by Dr. Hagen. She was discharged from rehab 3 days ago and reports that the next day she began having dizziness, HA, and increased left sided weakness form baseline. Patient has also been having multiple falls the past few days. Patient had a follow up visit with Dr. Hagen today and per report, CT showed signs of Hydrocephalus. She was also found to be Hyponatremic with a Na of 129- likely sec to IVVD. Pt admitted to [facility] and started on IVF. Pt was also a tapering dose of Decadron orally. This morning she feels lot better, more alert and awake and stronger, speech getting clearer. Dr. Hagen plans Shunt placement this Friday. Na and  Cl slightly better, pt eating drinking better too. Will get PT/OT in am   8/19- Seen and examined with Dr. Jose Hagen, looks and feels better, more alert and responsive, facial expression improved, Na has improved to 132, feels stronger on her legs. Participated with PT and walked a little. Await  shunt surgery in am.   8/20- seen post op, doing a little better, s/p  shunt placement by Dr. Hagen which went uneventfully. Post op was AAOX3, speech clear, her HA was much better, just had incisional pain. Now resting comfortably after Norco.   8/21- POD 1, doing better. Resting comfortable, did OK with PT/OT, eating drinking more, labs improved, Na still 131. Getting IVF so will stop in am. Dr. Hagen following and adjusting the  shunt. Had post op CT head this am. Hydrocephalus still the same.   As of 08/22 -- POD # 2. Labs reviewed. Stable. PT/OT recommending rehab placement. Consult placed to case management for dispo. Per neurosurgery, pt can now start DVT prophylaxis.  Unable to tolerate lovenox.     Interval History: Pt seen and examined. Doing well. PT/OT recommending rehab. Consult placed for case management.     Review of Systems   Constitutional: Positive for activity change and fatigue. Negative for appetite change, chills, diaphoresis and fever.   HENT: Negative for ear discharge, ear pain and facial swelling.    Eyes: Negative for pain, redness and visual disturbance.   Respiratory: Negative for apnea, cough, choking, chest tightness, shortness of breath, wheezing and stridor.    Cardiovascular: Negative for chest pain, palpitations and leg swelling.   Gastrointestinal: Negative for abdominal distention, blood in stool, constipation, diarrhea, nausea and vomiting.   Endocrine: Negative for polydipsia and polyphagia.   Genitourinary: Negative for difficulty urinating, dysuria, flank pain and hematuria.   Musculoskeletal: Negative for neck pain and neck stiffness.   Skin: Negative for color change.    Allergic/Immunologic: Negative for food allergies.   Neurological: Positive for weakness. Negative for dizziness, seizures, facial asymmetry, speech difficulty and headaches.        Patient reports ongoing left upper and lower sided weakness since prior CVA in 2015 but reports increased weakness from baseline over past few days    Hematological: Does not bruise/bleed easily.   Psychiatric/Behavioral: Negative for agitation, behavioral problems, confusion, dysphoric mood and suicidal ideas. The patient is not nervous/anxious.      Objective:     Vital Signs (Most Recent):  Temp: 98 °F (36.7 °C) (08/22/20 1144)  Pulse: 79 (08/22/20 1144)  Resp: 18 (08/22/20 1144)  BP: 131/67 (08/22/20 1144)  SpO2: 97 % (08/22/20 1144) Vital Signs (24h Range):  Temp:  [97.7 °F (36.5 °C)-98.6 °F (37 °C)] 98 °F (36.7 °C)  Pulse:  [79-89] 79  Resp:  [16-18] 18  SpO2:  [95 %-98 %] 97 %  BP: (131-189)/(67-83) 131/67     Weight: 115.6 kg (254 lb 13.6 oz)  Body mass index is 36.57 kg/m².    Intake/Output Summary (Last 24 hours) at 8/22/2020 1459  Last data filed at 8/22/2020 0600  Gross per 24 hour   Intake 120 ml   Output --   Net 120 ml      Physical Exam  Vitals signs and nursing note reviewed.   Constitutional:       General: She is not in acute distress.     Appearance: She is well-developed. She is not toxic-appearing or diaphoretic.   HENT:      Head:      Comments: staples on the scalp from the previous craniotomy incision intact     Mouth/Throat:      Mouth: Mucous membranes are moist.   Eyes:      Conjunctiva/sclera: Conjunctivae normal.   Neck:      Musculoskeletal: Normal range of motion.      Thyroid: No thyromegaly.   Cardiovascular:      Rate and Rhythm: Normal rate and regular rhythm.   Pulmonary:      Effort: Pulmonary effort is normal. No respiratory distress.   Abdominal:      General: Bowel sounds are normal. There is no distension.      Palpations: Abdomen is soft. There is no mass.      Tenderness: There is no  abdominal tenderness.      Comments: Well-healed infraumbilical midline incision   Genitourinary:     Comments: Deferred  Musculoskeletal: Normal range of motion.         General: No tenderness.   Skin:     General: Skin is warm and dry.      Capillary Refill: Capillary refill takes less than 2 seconds.      Findings: No rash.   Neurological:      Mental Status: She is alert and oriented to person, place, and time.   Psychiatric:         Mood and Affect: Mood normal.         Behavior: Behavior normal.         Significant Labs:   CBC:   Recent Labs   Lab 08/21/20  0705 08/22/20  0923   WBC 12.21 11.57   HGB 9.4* 9.6*   HCT 29.9* 31.8*    196     CMP:   Recent Labs   Lab 08/21/20  0705 08/22/20  0923   * 133*   K 4.1 4.0   CL 97 97   CO2 23 22*   * 179*   BUN 15 14   CREATININE 0.9 0.9   CALCIUM 7.9* 8.7   PROT  --  6.8   ALBUMIN  --  3.2*   BILITOT  --  0.4   ALKPHOS  --  163*   AST  --  19   ALT  --  45*   ANIONGAP 11 14   EGFRNONAA >60 >60     POCT Glucose:   Recent Labs   Lab 08/21/20  1644 08/21/20  2120 08/22/20  0637   POCTGLUCOSE 162* 155* 133*       Significant Imaging: I have reviewed all pertinent imaging results/findings within the past 24 hours.      Assessment/Plan:      * Hydrocephalus, acquired  S/p craniotomy and L Frontal Meningioma resection- now pt has developed Hydrocephalus in the same area- needs Shunt  No need for any Mannitol  Continue to taper decadron    Clinically a little better, Na also improved to 132   shunt in am    S/p  shunt placement- doing well post op    POD 1, repeat CT OK,  shunt adjusted by Dr. Hagen  Continue PT/OT    08/22  - POD #2. Stable. No changes. Continue current regimen. Neurosurgery following    Hyperlipidemia associated with type 2 diabetes mellitus  - Continue home statin      Hypothyroidism  - Continue levothyroxine    Lab Results   Component Value Date    TSH 1.544 07/26/2020           Type 2 diabetes mellitus with stage 3 chronic kidney  disease, without long-term current use of insulin  - Accuchecks and low dose SSI    Anemia due to stage 3 chronic kidney disease  - Hgb/Hct 9.6/31.8 -- stable  - Monitor      Meningioma, cerebral s/p resection  7/31/2020 S/p Crainotomy with Aspiration of frontal pseudomeningocele   Patient reports she has been taking her decadron at home but is unsure of the dosage- Asked  to bring in   Will defer steroid dosing to NeuroSurgeon    S/p resection 7/31- See above    Await  Shunt placement    S/p resection    08/22  - Stable  - PT/OT recommending rehab placement  - Case management consulted    Hypertension associated with diabetes  - Continue home medications including amlodipine 5 mg po daily      Hyponatremia  Monitor- Levels running 135-136 three weeks ago  Add 1500ml fluid restriction  Nephrology consulted  Seizure precautions    Stable- likely sec to Intracranial tumor    Improving a little to 132 with IVF    Improving    Same at 131    08/22  - Na+ 133, improving  - Continue to monitor        VTE Risk Mitigation (From admission, onward)         Ordered     apixaban tablet 5 mg  2 times daily      08/22/20 1516     Place HOLLAND hose  Until discontinued      08/20/20 1239     IP VTE HIGH RISK PATIENT  Once      08/20/20 1239     Place sequential compression device  Until discontinued      08/20/20 1239     Place HOLLAND hose  Until discontinued      08/17/20 1754                Discharge Planning   ROSALIA:      Code Status: Full Code   Is the patient medically ready for discharge?:     Reason for patient still in hospital (select all that apply): Patient trending condition and Treatment                     Linda Rolon, DAVION  Department of Hospital Medicine   [facility]

## 2020-08-22 NOTE — PROGRESS NOTES
[facility] Hospital Medicine  Progress Note    Patient Name: Cata Lang  MRN: [id number]  Patient Class: IP- Inpatient   Admission Date: 8/17/2020  Length of Stay: 4 days  Attending Physician: Lurdes Guy MD  Primary Care Provider: Sabra Fregoso MD        Subjective:     Principal Problem:Hydrocephalus, acquired        HPI:  This is a 50 yo female who has a PMHX of Dm2, HTN, CKD stage 3, anemia of chronic disease, Prior CVA in 2015 felt to be from her with residual left sided weakness,  And s/p craniotomy for meningioma on 7/31/20 done by Dr. Hagen. She was discharged from rehab 3 days ago and reports that the next day she began  having dizziness, HA, and increased left sided weakness form baseline. Patient has also been having multiple falls the past few days. Patient had a follow up visit with Dr. Hagen today and per report, CT showed signs of  hydrocephalus. Dr. Hagen requested patient be placed in the hospital for further monitoring, physical therapy, and plans for upcoming   shunt placement.                                                   Overview/Hospital Course:  50 yo female who has a PMHX of DM2, HTN, CKD stage 3, anemia of chronic disease, Prior CVA in 2015 with residual left sided weakness, nd s/p craniotomy for meningioma on 7/31/20 done by Dr. Hagen. She was discharged from rehab 3 days ago and reports that the next day she began having dizziness, HA, and increased left sided weakness form baseline. Patient has also been having multiple falls the past few days. Patient had a follow up visit with Dr. Hagen today and per report, CT showed signs of Hydrocephalus. She was also found to be Hyponatremic with a Na of 129- likely sec to IVVD. Pt admitted to [facility] and started on IVF. Pt was also a tapering dose of Decadron orally. This morning she feels lot better, more alert and awake and stronger, speech getting clearer. Dr. Hagen plans Shunt placement this Friday. Na and Cl  "slightly better, pt eating drinking better too. Will get PT/OT in am   8/19- Seen and examined with Dr. Jose Hagen, looks and feels better, more alert and responsive, facial expression improved, Na has improved to 132, feels stronger on her legs. Participated with PT and walked a little. Await  shunt surgery in am.   8/20- seen post op, doing a little better, s/p  shunt placement by Dr. Hagen which went uneventfully. Post op was AAOX3, speech clear, her HA was much better, just had incisional pain. Now resting comfortably after Norco.   8/21- POD 1, doing better. Resting comfortable, did OK with PT/OT, eating drinking more, labs improved, Na still 131. Getting IVF so will stop in am. Dr. Hagen following and adjusting the  shunt. Had post op CT head this am. Hydrocephalus still the same.     Interval History: POD 1, doing better. Resting comfortable, did OK with PT/OT, eating drinking more, labs improved, Na still 131. Getting IVF so will stop in am. Dr. Hagen following and adjusting the  shunt. Had post op CT head this am. Hydrocephalus still the same.     Review of Systems   Constitutional: Positive for activity change and fatigue. Negative for appetite change, chills, diaphoresis and fever.   HENT: Negative for ear discharge, ear pain and facial swelling.    Eyes: Negative for pain and redness.   Respiratory: Negative for cough and shortness of breath.    Gastrointestinal: Negative for abdominal distention, blood in stool, constipation, diarrhea, nausea and vomiting.        LBM today and was "normal"  Patient reports appetite is "very good"   Endocrine: Negative for polydipsia and polyphagia.   Genitourinary: Negative for difficulty urinating, dysuria, flank pain and hematuria.   Musculoskeletal: Negative for neck pain and neck stiffness.   Skin: Negative for color change.   Allergic/Immunologic: Negative for food allergies.   Neurological: Positive for weakness. Negative for dizziness, seizures, facial " asymmetry, speech difficulty and headaches.        Frontal headaches better    Patient reports ongoing left upper and lower sided weakness since prior CVA in 2015 but reports increased weakness from baseline over past few days    Hematological: Does not bruise/bleed easily.   Psychiatric/Behavioral: Negative for agitation, behavioral problems, confusion, dysphoric mood and suicidal ideas. The patient is not nervous/anxious.      Objective:     Vital Signs (Most Recent):  Temp: 98.6 °F (37 °C) (08/21/20 1550)  Pulse: 87 (08/21/20 1647)  Resp: 18 (08/21/20 1651)  BP: (!) 166/70 (08/21/20 1647)  SpO2: 97 % (08/21/20 1550) Vital Signs (24h Range):  Temp:  [97.8 °F (36.6 °C)-98.7 °F (37.1 °C)] 98.6 °F (37 °C)  Pulse:  [72-92] 87  Resp:  [15-18] 18  SpO2:  [97 %-99 %] 97 %  BP: (142-189)/(64-83) 166/70     Weight: 115.6 kg (254 lb 13.6 oz)  Body mass index is 36.57 kg/m².    Intake/Output Summary (Last 24 hours) at 8/21/2020 1926  Last data filed at 8/21/2020 0621  Gross per 24 hour   Intake 1688.33 ml   Output 800 ml   Net 888.33 ml      Physical Exam  Vitals signs and nursing note reviewed.   Constitutional:       Appearance: She is well-developed.   HENT:      Head:      Comments: staples on the scalp from the previous craniotomy incision  Eyes:      Conjunctiva/sclera: Conjunctivae normal.   Neck:      Musculoskeletal: Normal range of motion.      Thyroid: No thyromegaly.   Cardiovascular:      Rate and Rhythm: Normal rate and regular rhythm.   Pulmonary:      Effort: Pulmonary effort is normal. No respiratory distress.   Abdominal:      General: There is no distension.      Palpations: Abdomen is soft. There is no mass.      Tenderness: There is no abdominal tenderness.      Comments: Well-healed infraumbilical midline incision   Musculoskeletal: Normal range of motion.         General: No tenderness.   Skin:     General: Skin is warm and dry.      Capillary Refill: Capillary refill takes less than 2 seconds.       Findings: No rash.   Neurological:      Mental Status: She is alert and oriented to person, place, and time.         Significant Labs:   BMP:   Recent Labs   Lab 08/21/20  0705   *   *   K 4.1   CL 97   CO2 23   BUN 15   CREATININE 0.9   CALCIUM 7.9*   MG 1.9     CBC:   Recent Labs   Lab 08/21/20  0705   WBC 12.21   HGB 9.4*   HCT 29.9*        Magnesium:   Recent Labs   Lab 08/21/20  0705   MG 1.9     All pertinent labs within the past 24 hours have been reviewed.    Significant Imaging: I have reviewed all pertinent imaging results/findings within the past 24 hours.      Assessment/Plan:      * Hydrocephalus, acquired  S/p craniotomy and L Frontal Meningioma resection- now pt has developed Hydrocephalus in the same area- needs Shunt  No need for any Mannitol  Continue to taper decadron    Clinically a little better, Na also improved to 132   shunt in am    S/p  shunt placement- doing well post op    POD 1, repeat CT OK,  shunt adjusted by Dr. Hagen  Continue PT/OT    Meningioma, cerebral s/p resection  7/31/2020 S/p Crainotomy with Aspiration of frontal pseudomeningocele   Patient reports she has been taking her decadron at home but is unsure of the dosage- Asked  to bring in   Will defer steroid dosing to NeuroSurgeon    S/p resection 7/31- See above    Await  Shunt placement    S/p resection    Hyponatremia  Monitor- Levels running 135-136 three weeks ago  Add 1500ml fluid restriction  Nephrology consulted  Seizure precautions    Stable- likely sec to Intracranial tumor    Improving a little to 132 with IVF    Improving    Same at 131      Anemia due to stage 3 chronic kidney disease  Add MVI and pm vitamin C  Continue bid ferrous sulfate    Stable H/H      Type 2 diabetes mellitus with stage 3 chronic kidney disease, without long-term current use of insulin  Dm diet  Accuchecks with correctional SSI  Home metformin on hold    BS under control    History of CVA in  adulthood  Patient reports Hx of CVA in 2015 which was felt to be caused from her prior pseudomeningocele  Patient states her home Arixtra was recently discontinued about a week or so ago      Hyperlipidemia associated with type 2 diabetes mellitus  Continue home statin      Frequent falls  New Onset  Fall precautions  Consult physical therapy    Sec to Hydrocephalus- improving    Continue PT/OT    Hypothyroidism  Continue home medications    Lab Results   Component Value Date    TSH 1.544 07/26/2020           Hypertension associated with diabetes  Continue home medications        VTE Risk Mitigation (From admission, onward)         Ordered     Place HOLLAND hose  Until discontinued      08/20/20 1239     IP VTE HIGH RISK PATIENT  Once      08/20/20 1239     Place sequential compression device  Until discontinued      08/20/20 1239     Place HOLLAND hose  Until discontinued      08/17/20 1754                Discharge Planning   ROSALIA:      Code Status: Full Code   Is the patient medically ready for discharge?:     Reason for patient still in hospital (select all that apply): Patient trending condition, Laboratory test and Treatment                     Lurdes Guy MD  Department of Hospital Medicine   [facility]

## 2020-08-23 PROBLEM — K04.7 ABSCESS, DENTAL: Chronic | Status: ACTIVE | Noted: 2020-08-23

## 2020-08-23 LAB
ALBUMIN SERPL BCP-MCNC: 3.2 G/DL (ref 3.5–5.2)
ALP SERPL-CCNC: 179 U/L (ref 55–135)
ALT SERPL W/O P-5'-P-CCNC: 44 U/L (ref 10–44)
ANION GAP SERPL CALC-SCNC: 12 MMOL/L (ref 8–16)
AST SERPL-CCNC: 19 U/L (ref 10–40)
BASOPHILS # BLD AUTO: 0.03 K/UL (ref 0–0.2)
BASOPHILS NFR BLD: 0.2 % (ref 0–1.9)
BILIRUB SERPL-MCNC: 0.4 MG/DL (ref 0.1–1)
BUN SERPL-MCNC: 15 MG/DL (ref 6–20)
CALCIUM SERPL-MCNC: 8.3 MG/DL (ref 8.7–10.5)
CHLORIDE SERPL-SCNC: 96 MMOL/L (ref 95–110)
CO2 SERPL-SCNC: 23 MMOL/L (ref 23–29)
CREAT SERPL-MCNC: 0.9 MG/DL (ref 0.5–1.4)
DIFFERENTIAL METHOD: ABNORMAL
EOSINOPHIL # BLD AUTO: 0.3 K/UL (ref 0–0.5)
EOSINOPHIL NFR BLD: 2 % (ref 0–8)
ERYTHROCYTE [DISTWIDTH] IN BLOOD BY AUTOMATED COUNT: 17 % (ref 11.5–14.5)
EST. GFR  (AFRICAN AMERICAN): >60 ML/MIN/1.73 M^2
EST. GFR  (NON AFRICAN AMERICAN): >60 ML/MIN/1.73 M^2
GLUCOSE SERPL-MCNC: 116 MG/DL (ref 70–110)
HCT VFR BLD AUTO: 30.4 % (ref 37–48.5)
HGB BLD-MCNC: 9.5 G/DL (ref 12–16)
IMM GRANULOCYTES # BLD AUTO: 0.08 K/UL (ref 0–0.04)
IMM GRANULOCYTES NFR BLD AUTO: 0.6 % (ref 0–0.5)
LYMPHOCYTES # BLD AUTO: 3.1 K/UL (ref 1–4.8)
LYMPHOCYTES NFR BLD: 23.7 % (ref 18–48)
MCH RBC QN AUTO: 26.2 PG (ref 27–31)
MCHC RBC AUTO-ENTMCNC: 31.3 G/DL (ref 32–36)
MCV RBC AUTO: 84 FL (ref 82–98)
MONOCYTES # BLD AUTO: 0.7 K/UL (ref 0.3–1)
MONOCYTES NFR BLD: 5.3 % (ref 4–15)
NEUTROPHILS # BLD AUTO: 8.9 K/UL (ref 1.8–7.7)
NEUTROPHILS NFR BLD: 68.2 % (ref 38–73)
NRBC BLD-RTO: 0 /100 WBC
PLATELET # BLD AUTO: 188 K/UL (ref 150–350)
PMV BLD AUTO: 11.8 FL (ref 9.2–12.9)
POCT GLUCOSE: 125 MG/DL (ref 70–110)
POCT GLUCOSE: 132 MG/DL (ref 70–110)
POCT GLUCOSE: 154 MG/DL (ref 70–110)
POCT GLUCOSE: 173 MG/DL (ref 70–110)
POCT GLUCOSE: 215 MG/DL (ref 70–110)
POTASSIUM SERPL-SCNC: 3.5 MMOL/L (ref 3.5–5.1)
PROT SERPL-MCNC: 6.9 G/DL (ref 6–8.4)
RBC # BLD AUTO: 3.63 M/UL (ref 4–5.4)
SODIUM SERPL-SCNC: 131 MMOL/L (ref 136–145)
WBC # BLD AUTO: 13.06 K/UL (ref 3.9–12.7)

## 2020-08-23 PROCEDURE — A9585 GADOBUTROL INJECTION: HCPCS | Performed by: INTERNAL MEDICINE

## 2020-08-23 PROCEDURE — 99900035 HC TECH TIME PER 15 MIN (STAT)

## 2020-08-23 PROCEDURE — 99024 PR POST-OP FOLLOW-UP VISIT: ICD-10-PCS | Mod: ,,, | Performed by: NEUROLOGICAL SURGERY

## 2020-08-23 PROCEDURE — 97530 THERAPEUTIC ACTIVITIES: CPT | Mod: CQ

## 2020-08-23 PROCEDURE — 25000003 PHARM REV CODE 250: Performed by: NEUROLOGICAL SURGERY

## 2020-08-23 PROCEDURE — 25000003 PHARM REV CODE 250: Performed by: NURSE PRACTITIONER

## 2020-08-23 PROCEDURE — 36415 COLL VENOUS BLD VENIPUNCTURE: CPT

## 2020-08-23 PROCEDURE — 80053 COMPREHEN METABOLIC PANEL: CPT

## 2020-08-23 PROCEDURE — 63600175 PHARM REV CODE 636 W HCPCS: Performed by: NURSE PRACTITIONER

## 2020-08-23 PROCEDURE — 25000003 PHARM REV CODE 250: Performed by: EMERGENCY MEDICINE

## 2020-08-23 PROCEDURE — 94761 N-INVAS EAR/PLS OXIMETRY MLT: CPT

## 2020-08-23 PROCEDURE — 85025 COMPLETE CBC W/AUTO DIFF WBC: CPT

## 2020-08-23 PROCEDURE — 63600175 PHARM REV CODE 636 W HCPCS: Performed by: PHYSICIAN ASSISTANT

## 2020-08-23 PROCEDURE — 25500020 PHARM REV CODE 255: Performed by: INTERNAL MEDICINE

## 2020-08-23 PROCEDURE — 99024 POSTOP FOLLOW-UP VISIT: CPT | Mod: ,,, | Performed by: NEUROLOGICAL SURGERY

## 2020-08-23 PROCEDURE — 21400001 HC TELEMETRY ROOM

## 2020-08-23 RX ORDER — IBUPROFEN 600 MG/1
600 TABLET ORAL
Status: DISCONTINUED | OUTPATIENT
Start: 2020-08-23 | End: 2020-08-26 | Stop reason: HOSPADM

## 2020-08-23 RX ORDER — GADOBUTROL 604.72 MG/ML
10 INJECTION INTRAVENOUS
Status: COMPLETED | OUTPATIENT
Start: 2020-08-23 | End: 2020-08-23

## 2020-08-23 RX ORDER — DOXYCYCLINE HYCLATE 100 MG
100 TABLET ORAL EVERY 12 HOURS
Status: DISCONTINUED | OUTPATIENT
Start: 2020-08-23 | End: 2020-08-26 | Stop reason: HOSPADM

## 2020-08-23 RX ADMIN — DULOXETINE 30 MG: 30 CAPSULE, DELAYED RELEASE ORAL at 08:08

## 2020-08-23 RX ADMIN — GADOBUTROL 10 ML: 604.72 INJECTION INTRAVENOUS at 06:08

## 2020-08-23 RX ADMIN — PANTOPRAZOLE SODIUM 40 MG: 40 TABLET, DELAYED RELEASE ORAL at 08:08

## 2020-08-23 RX ADMIN — CALCITRIOL CAPSULES 0.25 MCG 0.25 MCG: 0.25 CAPSULE ORAL at 08:08

## 2020-08-23 RX ADMIN — LEVETIRACETAM 1500 MG: 500 TABLET ORAL at 08:08

## 2020-08-23 RX ADMIN — GABAPENTIN 600 MG: 300 CAPSULE ORAL at 08:08

## 2020-08-23 RX ADMIN — APIXABAN 5 MG: 2.5 TABLET, FILM COATED ORAL at 08:08

## 2020-08-23 RX ADMIN — DOXYCYCLINE HYCLATE 100 MG: 100 TABLET, COATED ORAL at 11:08

## 2020-08-23 RX ADMIN — CALCIUM 500 MG: 500 TABLET ORAL at 04:08

## 2020-08-23 RX ADMIN — AMLODIPINE BESYLATE 5 MG: 5 TABLET ORAL at 08:08

## 2020-08-23 RX ADMIN — THERA TABS 1 TABLET: TAB at 08:08

## 2020-08-23 RX ADMIN — Medication 5000 UNITS: at 08:08

## 2020-08-23 RX ADMIN — LEVOTHYROXINE SODIUM 137 MCG: 25 TABLET ORAL at 06:08

## 2020-08-23 RX ADMIN — DOXYCYCLINE HYCLATE 100 MG: 100 TABLET, COATED ORAL at 08:08

## 2020-08-23 RX ADMIN — DEXAMETHASONE 1 MG: 1 TABLET ORAL at 08:08

## 2020-08-23 RX ADMIN — HYDRALAZINE HYDROCHLORIDE 10 MG: 20 INJECTION INTRAMUSCULAR; INTRAVENOUS at 08:08

## 2020-08-23 RX ADMIN — CALCIUM 500 MG: 500 TABLET ORAL at 08:08

## 2020-08-23 RX ADMIN — FERROUS SULFATE TAB EC 325 MG (65 MG FE EQUIVALENT) 325 MG: 325 (65 FE) TABLET DELAYED RESPONSE at 08:08

## 2020-08-23 RX ADMIN — ACETAMINOPHEN 650 MG: 325 TABLET ORAL at 03:08

## 2020-08-23 RX ADMIN — HYDROCODONE BITARTRATE AND ACETAMINOPHEN 1 TABLET: 5; 325 TABLET ORAL at 09:08

## 2020-08-23 RX ADMIN — FERROUS SULFATE TAB EC 325 MG (65 MG FE EQUIVALENT) 325 MG: 325 (65 FE) TABLET DELAYED RESPONSE at 04:08

## 2020-08-23 RX ADMIN — CALCIUM 500 MG: 500 TABLET ORAL at 12:08

## 2020-08-23 RX ADMIN — OXYCODONE HYDROCHLORIDE AND ACETAMINOPHEN 500 MG: 500 TABLET ORAL at 08:08

## 2020-08-23 RX ADMIN — HYDROCODONE BITARTRATE AND ACETAMINOPHEN 1 TABLET: 5; 325 TABLET ORAL at 08:08

## 2020-08-23 RX ADMIN — HYDROCODONE BITARTRATE AND ACETAMINOPHEN 1 TABLET: 5; 325 TABLET ORAL at 04:08

## 2020-08-23 RX ADMIN — ATORVASTATIN CALCIUM 10 MG: 10 TABLET, FILM COATED ORAL at 08:08

## 2020-08-23 NOTE — ASSESSMENT & PLAN NOTE
Monitor- Levels running 135-136 three weeks ago  Add 1500ml fluid restriction  Nephrology consulted  Seizure precautions    Stable- likely sec to Intracranial tumor    Improving a little to 132 with IVF    Improving    Same at 131    08/22  - Na+ 133, improving  - Continue to monitor    08/23  - Na+ 131  - Stable. Monitor

## 2020-08-23 NOTE — PT/OT/SLP PROGRESS
Physical Therapy      Patient Name:  Cata Lang   MRN:  [id number]    Treatment Time: 0910-0950    S: Patient agreed to PT  O: PT reviewed POC, B LE ROM to prep mobility and safety/fall precautions with mobility using RW. Patient performed supine to sit with min/mod A and tc/vc's for log-rolling technique with use of bed rail, sit bal acts to promote midline min/cga to maintain bal with ue support during balance challenges, sit to/from stand with RW min/mod A x 2 for balance and cues for safe hand placement & efficient body mechanics, plus forced use of L side and then amb a few steps with RW mod A x 2 for balance/RW management but had to return to sit EOB r/t inc dizziness and dec coord. Sit to supine min/mod A; repositioned patient in midline with pillow under L UE to inc awareness and education to look to R side more. Left in bed HOB elevated, call button in reach, bed alarm on and nurse notified.  A: needs rehab; improving with mobility slowly; limited by fatigue, dec balance/coord and weakness  P: cont per poc and advance as reji with exs and mobility training    1NMR 1TA 1TE    Narendra Gonzalez, PT

## 2020-08-23 NOTE — PROGRESS NOTES
Cata Lang is a 49 y.o. female patient.   1. Hydrocephalus, acquired    2. Status post craniotomy    3. Meningioma, cerebral    4. Hyponatremia    5. Obstructive hydrocephalus    6. Hypertension associated with diabetes    7. Calcified cerebral meningioma s/p Craniotomy/ Resection    8. Anemia due to stage 3 chronic kidney disease    9. Hypocalcemia    10. Postprocedural pseudomeningocele    11. Type 2 diabetes mellitus with stage 3 chronic kidney disease, without long-term current use of insulin    12. Frequent falls    13. Hypokalemia    14. Pre-op evaluation      Past Medical History:   Diagnosis Date    Diabetes mellitus     Hydrocephalus, acquired 8/17/2020    Hypertension     Seizures     Stroke     Thyroid disease      No past surgical history pertinent negatives on file.  Scheduled Meds:   amLODIPine  5 mg Oral Daily    apixaban  5 mg Oral BID    ascorbic acid (vitamin C)  500 mg Oral QHS    atorvastatin  10 mg Oral QHS    calcitRIOL  0.25 mcg Oral Daily    calcium carbonate  500 mg Oral QID    cholecalciferol (vitamin D3)  5,000 Units Oral Daily    dexAMETHasone  1 mg Oral Daily    DULoxetine  30 mg Oral Daily    ferrous sulfate  325 mg Oral BID WM    gabapentin  600 mg Oral Daily    levETIRAcetam  1,500 mg Oral BID    levothyroxine  137 mcg Oral Before breakfast    multivitamin  1 tablet Oral Daily    nozaseptin   Each Nostril BID    pantoprazole  40 mg Oral Daily     Continuous Infusions:  PRN Meds:acetaminophen, dextrose 50%, dextrose 50%, famotidine, glucagon (human recombinant), glucose, glucose, hydrALAZINE, HYDROcodone-acetaminophen, insulin aspart U-100, melatonin, ondansetron    Review of patient's allergies indicates:   Allergen Reactions    Heparin analogues Other (See Comments)     DANYELLE     Hydrochlorothiazide      hypercalcemia     Active Hospital Problems    Diagnosis  POA    *Hydrocephalus, acquired [G91.9]  Yes     Priority: 1 - High    Meningioma, cerebral  "s/p resection [D32.0]  Yes     Priority: 1 - High    Status post craniotomy [Z98.890]  Not Applicable    Type 2 diabetes mellitus with stage 3 chronic kidney disease, without long-term current use of insulin [E11.22, N18.3]  Yes    Hypothyroidism [E03.9]  Yes    Frequent falls [R29.6]  Not Applicable    Hyperlipidemia associated with type 2 diabetes mellitus [E11.69, E78.5]  Yes    History of CVA in adulthood [Z86.73]  Not Applicable    Anemia due to stage 3 chronic kidney disease [N18.3, D63.1]  Yes    Hyponatremia [E87.1]  Yes    Hypertension associated with diabetes [E11.59, I10]  Yes      Resolved Hospital Problems    Diagnosis Date Resolved POA    Postprocedural pseudomeningocele [G97.82] 08/17/2020 Yes     Priority: 2     Hypocalcemia [E83.51] 08/20/2020 Yes     Blood pressure 131/71, pulse 86, temperature 97.8 °F (36.6 °C), temperature source Oral, resp. rate 16, height 5' 10" (1.778 m), weight 115.6 kg (254 lb 13.6 oz), SpO2 97 %, not currently breastfeeding.    Subjective:   Diet: Adequate intake.  Patient reports no nausea or vomiting.    Activity level: Returning to normal.    Pain control: Partially controlled (Patient is complaining about pain in the left mandible where she has a known abscess tooth.  She has seen a dentist recently who reported that this probably needed extraction.).      Objective:  Vital signs (most recent): Blood pressure 131/71, pulse 86, temperature 97.8 °F (36.6 °C), temperature source Oral, resp. rate 16, height 5' 10" (1.778 m), weight 115.6 kg (254 lb 13.6 oz), SpO2 97 %, not currently breastfeeding.  General appearance: Comfortable, well-appearing and in no acute distress.    Lungs:  She is in no respiratory distress.  Breath sounds normal.    Heart: Normal rate.  Regular rhythm.    Chest: Symmetric chest wall expansion.    Abdomen: Abdomen is soft and flat.    Tenderness: There is no abdominal tenderness tenderness.    Wound:  Clean.  There is no dehiscence.  " There is no drainage.    Extremities: There is normal range of motion.    Neurological: The patient is alert and oriented to person, place and time.  GCS score: 15.  (No focal deficits.  Headache diminished.  Ready to resume physical therapy and occupational therapy.  Ready for rehab.).       Assessment:   Post-op: 3 days.    Condition: In stable condition.     Plan:  Transfer Plan: Ready for transfer to rehab when bed available.  Activity Plan: Rated the began mobilization with assistance of physical therapy.  Regular diet.  Imaging Plan: CT had planned for tomorrow.  General Orders/Medications Plan: Will add nonsteroidal anti-inflammatory and antibiotics for presumed abscess tooth on left.  Apparently no oral surgery availability at this facility.           Jose Hagen MD  8/23/2020

## 2020-08-23 NOTE — SUBJECTIVE & OBJECTIVE
Interval History: Pt seen and examined. Family member at bedside. Doing well. Has no complaints at this time.     Review of Systems   Constitutional: Positive for activity change and fatigue. Negative for appetite change, chills, diaphoresis and fever.   HENT: Negative for ear discharge, ear pain and facial swelling.    Eyes: Negative for pain, redness and visual disturbance.   Respiratory: Negative for apnea, cough, choking, chest tightness, shortness of breath, wheezing and stridor.    Cardiovascular: Negative for chest pain, palpitations and leg swelling.   Gastrointestinal: Negative for abdominal distention, blood in stool, constipation, diarrhea, nausea and vomiting.   Endocrine: Negative for polydipsia and polyphagia.   Genitourinary: Negative for difficulty urinating, dysuria, flank pain and hematuria.   Musculoskeletal: Negative for neck pain and neck stiffness.   Skin: Negative for color change.   Allergic/Immunologic: Negative for food allergies.   Neurological: Positive for weakness. Negative for dizziness, seizures, facial asymmetry, speech difficulty and headaches.        Patient reports ongoing left upper and lower sided weakness since prior CVA in 2015 but reports increased weakness from baseline over past few days    Hematological: Does not bruise/bleed easily.   Psychiatric/Behavioral: Negative for agitation, behavioral problems, confusion, dysphoric mood and suicidal ideas. The patient is not nervous/anxious.      Objective:     Vital Signs (Most Recent):  Temp: 98.1 °F (36.7 °C) (08/23/20 1207)  Pulse: 95 (08/23/20 1207)  Resp: 18 (08/23/20 1207)  BP: (!) 141/71 (08/23/20 1207)  SpO2: 99 % (08/23/20 1207) Vital Signs (24h Range):  Temp:  [97.1 °F (36.2 °C)-98.6 °F (37 °C)] 98.1 °F (36.7 °C)  Pulse:  [74-95] 95  Resp:  [16-19] 18  SpO2:  [95 %-99 %] 99 %  BP: (131-162)/(60-72) 141/71     Weight: 115.6 kg (254 lb 13.6 oz)  Body mass index is 36.57 kg/m².    Intake/Output Summary (Last 24 hours) at  8/23/2020 1250  Last data filed at 8/22/2020 2100  Gross per 24 hour   Intake 240 ml   Output 1000 ml   Net -760 ml      Physical Exam  Vitals signs and nursing note reviewed.   Constitutional:       General: She is not in acute distress.     Appearance: She is well-developed. She is not toxic-appearing or diaphoretic.   HENT:      Head:      Comments: staples on the scalp from the previous craniotomy incision intact     Mouth/Throat:      Mouth: Mucous membranes are moist.     Eyes:      Conjunctiva/sclera: Conjunctivae normal.   Neck:      Musculoskeletal: Normal range of motion.      Thyroid: No thyromegaly.   Cardiovascular:      Rate and Rhythm: Normal rate and regular rhythm.   Pulmonary:      Effort: Pulmonary effort is normal. No respiratory distress.   Abdominal:      General: Bowel sounds are normal. There is no distension.      Palpations: Abdomen is soft. There is no mass.      Tenderness: There is no abdominal tenderness.      Comments: Well-healed infraumbilical midline incision   Genitourinary:     Comments: Deferred  Musculoskeletal: Normal range of motion.         General: No tenderness.   Skin:     General: Skin is warm and dry.      Capillary Refill: Capillary refill takes less than 2 seconds.      Findings: No rash.   Neurological:      Mental Status: She is alert and oriented to person, place, and time.   Psychiatric:         Mood and Affect: Mood normal.         Behavior: Behavior normal.         Significant Labs:   CBC:   Recent Labs   Lab 08/22/20  0923 08/23/20  0700   WBC 11.57 13.06*   HGB 9.6* 9.5*   HCT 31.8* 30.4*    188     CMP:   Recent Labs   Lab 08/22/20  0923 08/23/20  0700   * 131*   K 4.0 3.5   CL 97 96   CO2 22* 23   * 116*   BUN 14 15   CREATININE 0.9 0.9   CALCIUM 8.7 8.3*   PROT 6.8 6.9   ALBUMIN 3.2* 3.2*   BILITOT 0.4 0.4   ALKPHOS 163* 179*   AST 19 19   ALT 45* 44   ANIONGAP 14 12   EGFRNONAA >60 >60     POCT Glucose:   Recent Labs   Lab 08/23/20  0541  08/23/20  0939 08/23/20  1102   POCTGLUCOSE 125* 215* 173*       Significant Imaging: I have reviewed all pertinent imaging results/findings within the past 24 hours.

## 2020-08-23 NOTE — PT/OT/SLP PROGRESS
Physical Therapy  Treatment    Cata Lang   MRN: [id number]   Admitting Diagnosis: Hydrocephalus, acquired       PT Start Time: 1130     PT Stop Time: 1200    PT Total Time (min): 30 min       Billable Minutes:  Therapeutic Activity 30    Treatment Type: Treatment  PT/PTA: PTA     PTA Visit Number: 1       General Precautions: Standard, fall  Orthopedic Precautions: N/A   Braces:           Subjective:  Communicated with NSG prior to session.  PATIENT STATES SHE IS UPSET BECAUSE SHE WAS ABLE TO TAKE STEPS YESTERDAY. VERBALIZES FEAR OF FALLING.     Pain/Comfort  Pain Rating 1: 0/10  Pain Rating Post-Intervention 1: 0/10    Objective:        Functional Mobility:  Bed Mobility:        Transfers:       Gait:        Stairs:          Balance:   Static Sit:   Dynamic Sit:   Static Stand:   Dynamic stand:        Therapeutic Activities and Exercises:  BED MOB MAX A SIT<-->STAND MAX A X 2 STOOD 3X WITH LAST EPISODE INCLUDING WEIGHT SHIFT L/R WITH MOD A. MAX A TO RETURN TO HOB.     AM-PAC 6 CLICK MOBILITY  How much help from another person does this patient currently need?   1 = Unable, Total/Dependent Assistance  2 = A lot, Maximum/Moderate Assistance  3 = A little, Minimum/Contact Guard/Supervision  4 = None, Modified [place]/Independent         AM-PAC Raw Score CMS G-Code Modifier Level of Impairment Assistance   6 % Total / Unable   7 - 9 CM 80 - 100% Maximal Assist   10 - 14 CL 60 - 80% Moderate Assist   15 - 19 CK 40 - 60% Moderate Assist   20 - 22 CJ 20 - 40% Minimal Assist   23 CI 1-20% SBA / CGA   24 CH 0% Independent/ Mod I     Patient left right sidelying with all lines intact, call button in reach, chair alarm on and HUSBALD present.    Assessment:  Cata Lang is a 49 y.o. female with a medical diagnosis of Hydrocephalus, acquired and presents with .    Rehab identified problem list/impairments:      Rehab potential is fair.    Activity tolerance: Poor    Discharge recommendations: Discharge  Facility/Level of Care Needs: rehabilitation facility     Barriers to discharge:      Equipment recommendations: Equipment Needed After Discharge: bath bench, wheelchair, hospital bed, walker, rolling     GOALS:   Multidisciplinary Problems     Physical Therapy Goals        Problem: Physical Therapy Goal    Goal Priority Disciplines Outcome Goal Variances Interventions   Physical Therapy Goal     PT, PT/OT Ongoing, Progressing     Description: LTG'S TO BE MET IN 7 DAYS (8-28-20)  1. PT WILL REQUIRE TINO FOR BED MOBILITY  2. PT WILL REQUIRE TINO FOR TF BED<>CHAIR  3. PT WILL AMB 50' WITH RW AND MODA                   PLAN:    Patient to be seen 5 x/week  to address the above listed problems via gait training, therapeutic activities, therapeutic exercises  Plan of Care expires: 08/25/20  Plan of Care reviewed with: patient, spouse         Nabeel Baez, PTA  08/23/2020

## 2020-08-23 NOTE — PLAN OF CARE
PATIENT NOT MAKING PROGRESS TODAY DUE TO NOT ABLE TO TAKE ANY STEPS. FEARFUL OF FALLING.  REALIZES THAT LEFT LEG STRENGTH MAY COME BACK OVER TIME BUT IS CONCERNED WITH LOW BACK PAIN AND REQUEST A TENS UNIT/MOIST HEAT.

## 2020-08-23 NOTE — PROGRESS NOTES
[facility] Hospital Medicine  Progress Note    Patient Name: Cata Lang  MRN: [id number]  Patient Class: IP- Inpatient   Admission Date: 8/17/2020  Length of Stay: 6 days  Attending Physician: Rj Low MD  Primary Care Provider: Sabra Fregoso MD        Subjective:     Principal Problem:Hydrocephalus, acquired        HPI:  This is a 50 yo female who has a PMHX of Dm2, HTN, CKD stage 3, anemia of chronic disease, Prior CVA in 2015 felt to be from her with residual left sided weakness,  And s/p craniotomy for meningioma on 7/31/20 done by Dr. Hagen. She was discharged from rehab 3 days ago and reports that the next day she began  having dizziness, HA, and increased left sided weakness form baseline. Patient has also been having multiple falls the past few days. Patient had a follow up visit with Dr. Hagen today and per report, CT showed signs of  hydrocephalus. Dr. Hagen requested patient be placed in the hospital for further monitoring, physical therapy, and plans for upcoming   shunt placement.                                                   Overview/Hospital Course:  50 yo female who has a PMHX of DM2, HTN, CKD stage 3, anemia of chronic disease, Prior CVA in 2015 with residual left sided weakness, nd s/p craniotomy for meningioma on 7/31/20 done by Dr. Hagen. She was discharged from rehab 3 days ago and reports that the next day she began having dizziness, HA, and increased left sided weakness form baseline. Patient has also been having multiple falls the past few days. Patient had a follow up visit with Dr. Hagen today and per report, CT showed signs of Hydrocephalus. She was also found to be Hyponatremic with a Na of 129- likely sec to IVVD. Pt admitted to [facility] and started on IVF. Pt was also a tapering dose of Decadron orally. This morning she feels lot better, more alert and awake and stronger, speech getting clearer. Dr. Hagen plans Shunt placement this Friday. Na and  Cl slightly better, pt eating drinking better too. Will get PT/OT in am   8/19- Seen and examined with Dr. Jose Hagen, looks and feels better, more alert and responsive, facial expression improved, Na has improved to 132, feels stronger on her legs. Participated with PT and walked a little. Await  shunt surgery in am.   8/20- seen post op, doing a little better, s/p  shunt placement by Dr. Hagen which went uneventfully. Post op was AAOX3, speech clear, her HA was much better, just had incisional pain. Now resting comfortably after Norco.   8/21- POD 1, doing better. Resting comfortable, did OK with PT/OT, eating drinking more, labs improved, Na still 131. Getting IVF so will stop in am. Dr. Hagen following and adjusting the  shunt. Had post op CT head this am. Hydrocephalus still the same.   As of 08/22 -- POD # 2. Labs reviewed. Stable. PT/OT recommending rehab placement. Consult placed to case management for dispo. Per neurosurgery, pt can now start DVT prophylaxis.  Unable to tolerate lovenox.   As of 08/23 -- POD # 3. Labs reviewed. Slight increase in WBC count from 11.57K to 13.06K. Afebrile. Discussed case with Dr. Hagen, feels pt may have a dental abscess. Placed on oral doxycycline. Social work consulted for rehab placement -- referrals sent to [facility] and [facility].     Interval History: Pt seen and examined. Family member at bedside. Doing well. Has no complaints at this time.     Review of Systems   Constitutional: Positive for activity change and fatigue. Negative for appetite change, chills, diaphoresis and fever.   HENT: Negative for ear discharge, ear pain and facial swelling.    Eyes: Negative for pain, redness and visual disturbance.   Respiratory: Negative for apnea, cough, choking, chest tightness, shortness of breath, wheezing and stridor.    Cardiovascular: Negative for chest pain, palpitations and leg swelling.   Gastrointestinal: Negative for  abdominal distention, blood in stool, constipation, diarrhea, nausea and vomiting.   Endocrine: Negative for polydipsia and polyphagia.   Genitourinary: Negative for difficulty urinating, dysuria, flank pain and hematuria.   Musculoskeletal: Negative for neck pain and neck stiffness.   Skin: Negative for color change.   Allergic/Immunologic: Negative for food allergies.   Neurological: Positive for weakness. Negative for dizziness, seizures, facial asymmetry, speech difficulty and headaches.        Patient reports ongoing left upper and lower sided weakness since prior CVA in 2015 but reports increased weakness from baseline over past few days    Hematological: Does not bruise/bleed easily.   Psychiatric/Behavioral: Negative for agitation, behavioral problems, confusion, dysphoric mood and suicidal ideas. The patient is not nervous/anxious.      Objective:     Vital Signs (Most Recent):  Temp: 98.1 °F (36.7 °C) (08/23/20 1207)  Pulse: 95 (08/23/20 1207)  Resp: 18 (08/23/20 1207)  BP: (!) 141/71 (08/23/20 1207)  SpO2: 99 % (08/23/20 1207) Vital Signs (24h Range):  Temp:  [97.1 °F (36.2 °C)-98.6 °F (37 °C)] 98.1 °F (36.7 °C)  Pulse:  [74-95] 95  Resp:  [16-19] 18  SpO2:  [95 %-99 %] 99 %  BP: (131-162)/(60-72) 141/71     Weight: 115.6 kg (254 lb 13.6 oz)  Body mass index is 36.57 kg/m².    Intake/Output Summary (Last 24 hours) at 8/23/2020 1250  Last data filed at 8/22/2020 2100  Gross per 24 hour   Intake 240 ml   Output 1000 ml   Net -760 ml      Physical Exam  Vitals signs and nursing note reviewed.   Constitutional:       General: She is not in acute distress.     Appearance: She is well-developed. She is not toxic-appearing or diaphoretic.   HENT:      Head:      Comments: staples on the scalp from the previous craniotomy incision intact     Mouth/Throat:      Mouth: Mucous membranes are moist.     Eyes:      Conjunctiva/sclera: Conjunctivae normal.   Neck:      Musculoskeletal: Normal range of motion.       Thyroid: No thyromegaly.   Cardiovascular:      Rate and Rhythm: Normal rate and regular rhythm.   Pulmonary:      Effort: Pulmonary effort is normal. No respiratory distress.   Abdominal:      General: Bowel sounds are normal. There is no distension.      Palpations: Abdomen is soft. There is no mass.      Tenderness: There is no abdominal tenderness.      Comments: Well-healed infraumbilical midline incision   Genitourinary:     Comments: Deferred  Musculoskeletal: Normal range of motion.         General: No tenderness.   Skin:     General: Skin is warm and dry.      Capillary Refill: Capillary refill takes less than 2 seconds.      Findings: No rash.   Neurological:      Mental Status: She is alert and oriented to person, place, and time.   Psychiatric:         Mood and Affect: Mood normal.         Behavior: Behavior normal.         Significant Labs:   CBC:   Recent Labs   Lab 08/22/20  0923 08/23/20  0700   WBC 11.57 13.06*   HGB 9.6* 9.5*   HCT 31.8* 30.4*    188     CMP:   Recent Labs   Lab 08/22/20  0923 08/23/20  0700   * 131*   K 4.0 3.5   CL 97 96   CO2 22* 23   * 116*   BUN 14 15   CREATININE 0.9 0.9   CALCIUM 8.7 8.3*   PROT 6.8 6.9   ALBUMIN 3.2* 3.2*   BILITOT 0.4 0.4   ALKPHOS 163* 179*   AST 19 19   ALT 45* 44   ANIONGAP 14 12   EGFRNONAA >60 >60     POCT Glucose:   Recent Labs   Lab 08/23/20  0541 08/23/20  0939 08/23/20  1102   POCTGLUCOSE 125* 215* 173*       Significant Imaging: I have reviewed all pertinent imaging results/findings within the past 24 hours.      Assessment/Plan:      * Hydrocephalus, acquired  S/p craniotomy and L Frontal Meningioma resection- now pt has developed Hydrocephalus in the same area- needs Shunt  No need for any Mannitol  Continue to taper decadron    Clinically a little better, Na also improved to 132   shunt in am    S/p  shunt placement- doing well post op    POD 1, repeat CT OK,  shunt adjusted by Dr. Hagen  Continue PT/OT    08/22  -  POD #2. Stable. No changes. Continue current regimen. Neurosurgery following    08/23  - POD #3. Stable. No changes. Continue current regimen. Neurosurgery following, plans to repeat CT head in AM    Abscess, dental  - Dr. Hagen started on oral doxycycline today  - Will need F/U with dentist upon discharge      History of CVA in adulthood  Patient reports Hx of CVA in 2015 which was felt to be caused from her prior pseudomeningocele  Patient states her home Arixtra was recently discontinued about a week or so ago      Hyperlipidemia associated with type 2 diabetes mellitus  - Continue home statin      Frequent falls  New Onset  Fall precautions  Consult physical therapy    Sec to Hydrocephalus- improving    Continue PT/OT    Hypothyroidism  - Continue levothyroxine    Lab Results   Component Value Date    TSH 1.544 07/26/2020           Type 2 diabetes mellitus with stage 3 chronic kidney disease, without long-term current use of insulin  - Accuchecks and low dose SSI    Anemia due to stage 3 chronic kidney disease  - Hgb/Hct 9..5/30.4 -- stable  - Monitor      Meningioma, cerebral s/p resection  7/31/2020 S/p Crainotomy with Aspiration of frontal pseudomeningocele   Patient reports she has been taking her decadron at home but is unsure of the dosage- Asked  to bring in   Will defer steroid dosing to NeuroSurgeon    S/p resection 7/31- See above    Await  Shunt placement    S/p resection    08/22  - Stable  - PT/OT recommending rehab placement  - Case management consulted    08/23  - Stable  - Reviewed. Unchanged. Referrals sent to [facility] rehab and [facility] rehab     Hypertension associated with diabetes  - Continue home medications including amlodipine 5 mg po daily      Hyponatremia  Monitor- Levels running 135-136 three weeks ago  Add 1500ml fluid restriction  Nephrology consulted  Seizure precautions    Stable- likely sec to Intracranial tumor    Improving a little to 132  with IVF    Improving    Same at 131    08/22  - Na+ 133, improving  - Continue to monitor    08/23  - Na+ 131  - Stable. Monitor      VTE Risk Mitigation (From admission, onward)         Ordered     apixaban tablet 5 mg  2 times daily      08/22/20 1516     Place HOLLAND hose  Until discontinued      08/20/20 1239     IP VTE HIGH RISK PATIENT  Once      08/20/20 1239     Place sequential compression device  Until discontinued      08/20/20 1239     Place HOLLAND hose  Until discontinued      08/17/20 1754                Discharge Planning   ROSALIA:      Code Status: Full Code   Is the patient medically ready for discharge?:     Reason for patient still in hospital (select all that apply): Patient trending condition, Treatment and Other (specify) Rehab placement                     DAVION Roach  Department of Hospital Medicine   [facility]

## 2020-08-23 NOTE — ASSESSMENT & PLAN NOTE
S/p craniotomy and L Frontal Meningioma resection- now pt has developed Hydrocephalus in the same area- needs Shunt  No need for any Mannitol  Continue to taper decadron    Clinically a little better, Na also improved to 132   shunt in am    S/p  shunt placement- doing well post op    POD 1, repeat CT OK,  shunt adjusted by Dr. Hagen  Continue PT/OT    08/22  - POD #2. Stable. No changes. Continue current regimen. Neurosurgery following    08/23  - POD #3. Stable. No changes. Continue current regimen. Neurosurgery following, plans to repeat CT head in AM

## 2020-08-23 NOTE — PLAN OF CARE
Fall precautions maintained. Neuro checks q4h. VS stable. Pain is well controlled. Incisions intact and dry. Repositioned q2h. All needs met. No acute events. Will continue to monitor.

## 2020-08-23 NOTE — PLAN OF CARE
Patient remains free from injury with no distress noted this shift.  No changes noted in patient's condition. BG monitored. Telemetry intact.  Will continue to monitor, administer meds as ordered, provide comfort/safety measures and notify MD of any changes in condition.

## 2020-08-23 NOTE — ASSESSMENT & PLAN NOTE
7/31/2020 S/p Crainotomy with Aspiration of frontal pseudomeningocele   Patient reports she has been taking her decadron at home but is unsure of the dosage- Asked  to bring in   Will defer steroid dosing to NeuroSurgeon    S/p resection 7/31- See above    Await  Shunt placement    S/p resection    08/22  - Stable  - PT/OT recommending rehab placement  - Case management consulted    08/23  - Stable  - Reviewed. Unchanged. Referrals sent to [facility] rehab and [facility] rehab

## 2020-08-23 NOTE — PLAN OF CARE
Preference for [facility], [facility] and [facility] obtained.  Referrals sent via "[employer].       08/23/20 0951   Post-Acute Status   Post-Acute Authorization Placement   Post-Acute Placement Status Referrals Sent

## 2020-08-23 NOTE — PLAN OF CARE
Bed mobility mod A; sit balance min a; sit to/from stand min/mod A x 2; took a few steps mod A x 2 but had to return to bed r/t inc dizziness; rec rehab

## 2020-08-24 LAB
POCT GLUCOSE: 128 MG/DL (ref 70–110)
POCT GLUCOSE: 134 MG/DL (ref 70–110)
POCT GLUCOSE: 145 MG/DL (ref 70–110)
POCT GLUCOSE: 227 MG/DL (ref 70–110)

## 2020-08-24 PROCEDURE — 97110 THERAPEUTIC EXERCISES: CPT | Performed by: PHYSICAL THERAPIST

## 2020-08-24 PROCEDURE — 97530 THERAPEUTIC ACTIVITIES: CPT

## 2020-08-24 PROCEDURE — 21400001 HC TELEMETRY ROOM

## 2020-08-24 PROCEDURE — 63600175 PHARM REV CODE 636 W HCPCS: Performed by: PHYSICIAN ASSISTANT

## 2020-08-24 PROCEDURE — 97530 THERAPEUTIC ACTIVITIES: CPT | Performed by: PHYSICAL THERAPIST

## 2020-08-24 PROCEDURE — 97110 THERAPEUTIC EXERCISES: CPT

## 2020-08-24 PROCEDURE — 25000003 PHARM REV CODE 250: Performed by: NURSE PRACTITIONER

## 2020-08-24 PROCEDURE — 94760 N-INVAS EAR/PLS OXIMETRY 1: CPT

## 2020-08-24 PROCEDURE — 25000003 PHARM REV CODE 250: Performed by: NEUROLOGICAL SURGERY

## 2020-08-24 PROCEDURE — 25000003 PHARM REV CODE 250: Performed by: EMERGENCY MEDICINE

## 2020-08-24 PROCEDURE — 25000003 PHARM REV CODE 250: Performed by: INTERNAL MEDICINE

## 2020-08-24 PROCEDURE — 63600175 PHARM REV CODE 636 W HCPCS: Performed by: NURSE PRACTITIONER

## 2020-08-24 RX ORDER — GABAPENTIN 300 MG/1
600 CAPSULE ORAL NIGHTLY
Status: DISCONTINUED | OUTPATIENT
Start: 2020-08-24 | End: 2020-08-26 | Stop reason: HOSPADM

## 2020-08-24 RX ADMIN — ATORVASTATIN CALCIUM 10 MG: 10 TABLET, FILM COATED ORAL at 08:08

## 2020-08-24 RX ADMIN — FERROUS SULFATE TAB EC 325 MG (65 MG FE EQUIVALENT) 325 MG: 325 (65 FE) TABLET DELAYED RESPONSE at 09:08

## 2020-08-24 RX ADMIN — CALCIUM 500 MG: 500 TABLET ORAL at 08:08

## 2020-08-24 RX ADMIN — HYDROCODONE BITARTRATE AND ACETAMINOPHEN 1 TABLET: 5; 325 TABLET ORAL at 04:08

## 2020-08-24 RX ADMIN — GABAPENTIN 600 MG: 300 CAPSULE ORAL at 08:08

## 2020-08-24 RX ADMIN — Medication 5000 UNITS: at 09:08

## 2020-08-24 RX ADMIN — AMLODIPINE BESYLATE 5 MG: 5 TABLET ORAL at 09:08

## 2020-08-24 RX ADMIN — THERA TABS 1 TABLET: TAB at 09:08

## 2020-08-24 RX ADMIN — DOXYCYCLINE HYCLATE 100 MG: 100 TABLET, COATED ORAL at 09:08

## 2020-08-24 RX ADMIN — FERROUS SULFATE TAB EC 325 MG (65 MG FE EQUIVALENT) 325 MG: 325 (65 FE) TABLET DELAYED RESPONSE at 05:08

## 2020-08-24 RX ADMIN — INSULIN ASPART 2 UNITS: 100 INJECTION, SOLUTION INTRAVENOUS; SUBCUTANEOUS at 11:08

## 2020-08-24 RX ADMIN — APIXABAN 5 MG: 2.5 TABLET, FILM COATED ORAL at 08:08

## 2020-08-24 RX ADMIN — CALCIUM 500 MG: 500 TABLET ORAL at 04:08

## 2020-08-24 RX ADMIN — OXYCODONE HYDROCHLORIDE AND ACETAMINOPHEN 500 MG: 500 TABLET ORAL at 08:08

## 2020-08-24 RX ADMIN — LEVETIRACETAM 1500 MG: 500 TABLET ORAL at 09:08

## 2020-08-24 RX ADMIN — LEVETIRACETAM 1500 MG: 500 TABLET ORAL at 08:08

## 2020-08-24 RX ADMIN — CALCIUM 500 MG: 500 TABLET ORAL at 09:08

## 2020-08-24 RX ADMIN — CALCITRIOL CAPSULES 0.25 MCG 0.25 MCG: 0.25 CAPSULE ORAL at 09:08

## 2020-08-24 RX ADMIN — HYDROCODONE BITARTRATE AND ACETAMINOPHEN 1 TABLET: 5; 325 TABLET ORAL at 09:08

## 2020-08-24 RX ADMIN — DULOXETINE 30 MG: 30 CAPSULE, DELAYED RELEASE ORAL at 09:08

## 2020-08-24 RX ADMIN — DEXAMETHASONE INTENSOL 0.5 MG: 1 SOLUTION, CONCENTRATE ORAL at 09:08

## 2020-08-24 RX ADMIN — DOXYCYCLINE HYCLATE 100 MG: 100 TABLET, COATED ORAL at 08:08

## 2020-08-24 RX ADMIN — HYDROCODONE BITARTRATE AND ACETAMINOPHEN 1 TABLET: 5; 325 TABLET ORAL at 01:08

## 2020-08-24 RX ADMIN — ACETAMINOPHEN 650 MG: 325 TABLET ORAL at 05:08

## 2020-08-24 RX ADMIN — APIXABAN 5 MG: 2.5 TABLET, FILM COATED ORAL at 09:08

## 2020-08-24 RX ADMIN — PANTOPRAZOLE SODIUM 40 MG: 40 TABLET, DELAYED RELEASE ORAL at 09:08

## 2020-08-24 RX ADMIN — LEVOTHYROXINE SODIUM 137 MCG: 25 TABLET ORAL at 05:08

## 2020-08-24 NOTE — SUBJECTIVE & OBJECTIVE
Review of Systems   Constitutional: Positive for activity change and fatigue. Negative for appetite change, chills, diaphoresis and fever.   HENT: Negative for ear discharge, ear pain and facial swelling.    Eyes: Negative for pain, redness and visual disturbance.   Respiratory: Negative for apnea, cough, choking, chest tightness, shortness of breath, wheezing and stridor.    Cardiovascular: Negative for chest pain, palpitations and leg swelling.   Gastrointestinal: Negative for abdominal distention, blood in stool, constipation, diarrhea, nausea and vomiting.   Endocrine: Negative for polydipsia and polyphagia.   Genitourinary: Negative for difficulty urinating, dysuria, flank pain and hematuria.   Musculoskeletal: Negative for neck pain and neck stiffness.   Skin: Negative for color change.   Allergic/Immunologic: Negative for food allergies.   Neurological: Positive for weakness. Negative for dizziness, seizures, facial asymmetry, speech difficulty and headaches.   Hematological: Does not bruise/bleed easily.   Psychiatric/Behavioral: Negative for agitation, behavioral problems, confusion, dysphoric mood and suicidal ideas. The patient is not nervous/anxious.      Objective:     Vital Signs (Most Recent):  Temp: 97.8 °F (36.6 °C) (08/24/20 1329)  Pulse: 97 (08/24/20 1329)  Resp: 18 (08/24/20 0940)  BP: (!) 173/82 (08/24/20 1329)  SpO2: 98 % (08/24/20 1329) Vital Signs (24h Range):  Temp:  [97.7 °F (36.5 °C)-98.4 °F (36.9 °C)] 97.8 °F (36.6 °C)  Pulse:  [] 97  Resp:  [16-18] 18  SpO2:  [96 %-99 %] 98 %  BP: (127-177)/(61-82) 173/82     Weight: 115.6 kg (254 lb 13.6 oz)  Body mass index is 36.57 kg/m².    Intake/Output Summary (Last 24 hours) at 8/24/2020 1454  Last data filed at 8/24/2020 0500  Gross per 24 hour   Intake 120 ml   Output 900 ml   Net -780 ml      Physical Exam  Vitals signs and nursing note reviewed.   Constitutional:       General: She is not in acute distress.     Appearance: She is not  ill-appearing, toxic-appearing or diaphoretic.   HENT:      Head:      Comments:  craniotomy incision with staples intact     Right Ear: There is no impacted cerumen.      Left Ear: There is no impacted cerumen.      Nose: No congestion or rhinorrhea.      Mouth/Throat:      Dentition: Dental caries present.      Pharynx: No posterior oropharyngeal erythema.   Neck:      Musculoskeletal: No neck rigidity or muscular tenderness.      Vascular: No carotid bruit.   Cardiovascular:      Heart sounds: No murmur. No gallop.    Pulmonary:      Effort: No respiratory distress.      Breath sounds: No stridor. No wheezing, rhonchi or rales.   Chest:      Chest wall: No tenderness.   Abdominal:      General: There is no distension.      Palpations: There is no mass.      Tenderness: There is no abdominal tenderness. There is no right CVA tenderness, left CVA tenderness, guarding or rebound.      Hernia: No hernia is present.   Musculoskeletal:         General: No swelling, tenderness, deformity or signs of injury.      Right lower leg: No edema.      Left lower leg: No edema.   Lymphadenopathy:      Cervical: No cervical adenopathy.   Skin:     General: Skin is warm and dry.      Capillary Refill: Capillary refill takes less than 2 seconds.   Neurological:      Cranial Nerves: No cranial nerve deficit.      Sensory: No sensory deficit.      Motor: No weakness.      Coordination: Coordination normal.   Psychiatric:         Mood and Affect: Mood normal.         Behavior: Behavior normal.         Significant Labs:   CBC:   Recent Labs   Lab 08/23/20  0700   WBC 13.06*   HGB 9.5*   HCT 30.4*        CMP:   Recent Labs   Lab 08/23/20  0700   *   K 3.5   CL 96   CO2 23   *   BUN 15   CREATININE 0.9   CALCIUM 8.3*   PROT 6.9   ALBUMIN 3.2*   BILITOT 0.4   ALKPHOS 179*   AST 19   ALT 44   ANIONGAP 12   EGFRNONAA >60       Significant Imaging:     Imaging Results          CT Head Stealth W/O Contrast (Final result)   Result time 08/17/20 16:19:53   Procedure changed from CT Head Without Contrast     Final result by Kingsley Churchill MD (08/17/20 16:19:53)                 Impression:      Thin-section preoperative CT scan with stable mild dilatation of the ventricular system as above.    Evolving right frontal lobe operative change.    All CT scans at this facility use dose modulation, iterative reconstruction, and/or weight based dosing when appropriate to reduce radiation dose to as low as reasonable achievable.      Electronically signed by: Kingsley Churchill  Date:    08/17/2020  Time:    16:19             Narrative:    EXAMINATION:  CT HEAD STEALTH W/O CONTRAST    CLINICAL HISTORY:  Brain/CNS neoplasm, assess treatment response;    TECHNIQUE:  Volumetric axial CT images obtained throughout the region of the head without use of intravenous contrast via [email or web address] protocol. Axial, sagittal and coronal reconstructions were performed.    COMPARISON:  Head CT earlier same date with priors; MRI brain 07/26/2020    FINDINGS:  Continued evolution of postsurgical change within the right frontal lobe status post mass lesion resection.  Persistent mixed density extra-axial collection overlies the frontal lobe, improved as compared to recent postoperative scans and stable to examination earlier same date.  Continued hypodensity within the right frontal lobe.  Unchanged hypodensity in the left occipital lobe.  No new or acute area of parenchymal hypoattenuation.    Ex vacuo dilatation of the right frontal horn.  Stable prominence of the ventricular system as compared to recent head CT, but mild increase in size as compared to remote priors.    Stable subcutaneous fluid collection overlying the craniotomy site, stable to recent prior but decreased as compared to remote priors.  Paranasal sinuses are predominantly clear.  Mastoid air cells are clear.  Osseous calvarium is otherwise intact.

## 2020-08-24 NOTE — PLAN OF CARE
Received a call from Kimberley with [facility].  They will accept the patient.  Advised they are second choice.      Left message for admissions coordinator, Ana Paula to call regarding referral..    11:00am spoke with Ana Paula. She is not the admissions coordinator.  Gave number for Denisha [phone].  Left message for Denisha to call.

## 2020-08-24 NOTE — PLAN OF CARE
Accepted by [facility].  Submitting for authorization today.  Updated PT/Ot notes and progress notes sent via [email or web address].       08/24/20 1413   Post-Acute Status   Post-Acute Authorization Placement   Post-Acute Placement Status Pending Payor Medical Review

## 2020-08-24 NOTE — PT/OT/SLP PROGRESS
Physical Therapy  Treatment    Cata Lang   MRN: [id number]   Admitting Diagnosis: Hydrocephalus, acquired    PT Received On: 08/24/20  PT Start Time: 0840     PT Stop Time: 0905    PT Total Time (min): 25 min       Billable Minutes:  Therapeutic Activity 15 and Therapeutic Exercise 10    Treatment Type: Treatment  PT/PTA: PT     PTA Visit Number: 0       General Precautions: Standard, fall  Orthopedic Precautions: N/A   Braces: N/A         Subjective:  Communicated with NURSE DAWKINS AND Epic CHART REVIEW prior to session.   PT AGREED TO TX     Pain/Comfort  Pain Rating 1: 4/10  Pain Rating Post-Intervention 1: 0/10    Objective:   Patient found with: telemetry, SCD, bed alarm    Functional Mobility:  PT MET IN RM SUP IN BED REQUESTING TO GET UP AND EAT. PT LOG ROLLED TO RIGHT AND SEATED  EOB WITH MOD A. PT WITH MOD A WITH SCOOTED TO EOB AND CUES FOR ANT WT SHIFT. PT REQUIRES MULT CUES FOR SCOOTING. PT SEATED WITH INC DIZZINESS AND POST LEAN. PT COMPLETED AP, TKE, MIP WITH LIMITED ROM L > R. PT STOOD X 3 TRIALS WITH MAX A X 2 AND POST LEAN. PT RETURNED SEATED EOB. PT COMPLETED SQUAT PIVOT T/F TO CHAIR WITH MAX A X 2.     AM-PAC 6 CLICK MOBILITY  How much help from another person does this patient currently need?   1 = Unable, Total/Dependent Assistance  2 = A lot, Maximum/Moderate Assistance  3 = A little, Minimum/Contact Guard/Supervision  4 = None, Modified [place]/Independent    Turning over in bed (including adjusting bedclothes, sheets and blankets)?: 2  Sitting down on and standing up from a chair with arms (e.g., wheelchair, bedside commode, etc.): 2  Moving from lying on back to sitting on the side of the bed?: 2  Moving to and from a bed to a chair (including a wheelchair)?: 2  Need to walk in hospital room?: 1  Climbing 3-5 steps with a railing?: 1  Basic Mobility Total Score: 10    AM-PAC Raw Score CMS G-Code Modifier Level of Impairment Assistance   6 % Total / Unable   7 - 9 CM 80 -  100% Maximal Assist   10 - 14 CL 60 - 80% Moderate Assist   15 - 19 CK 40 - 60% Moderate Assist   20 - 22 CJ 20 - 40% Minimal Assist   23 CI 1-20% SBA / CGA   24 CH 0% Independent/ Mod I     Patient left up in chair with call button in reach and chair alarm on.    Assessment:  PT REQUIRES IND ASSIST FOR MOBILITY     Rehab identified problem list/impairments: Rehab identified problem list/impairments: weakness, impaired endurance, impaired self care skills, decreased lower extremity function, decreased ROM, impaired functional mobilty, gait instability, impaired balance, decreased upper extremity function, pain, decreased safety awareness    Rehab potential is good.    Activity tolerance: Fair    Discharge recommendations: Discharge Facility/Level of Care Needs: rehabilitation facility     Barriers to discharge:      Equipment recommendations:       GOALS:   Multidisciplinary Problems     Physical Therapy Goals        Problem: Physical Therapy Goal    Goal Priority Disciplines Outcome Goal Variances Interventions   Physical Therapy Goal     PT, PT/OT Ongoing, Progressing     Description: LTG'S TO BE MET IN 7 DAYS (8-28-20)  1. PT WILL REQUIRE TINO FOR BED MOBILITY  2. PT WILL REQUIRE TINO FOR TF BED<>CHAIR  3. PT WILL AMB 50' WITH RW AND MODA                   PLAN:    Patient to be seen 5 x/week  to address the above listed problems via gait training, therapeutic activities, therapeutic exercises  Plan of Care expires: 08/25/20  Plan of Care reviewed with: patient         Harriett Bernard, PT  08/24/2020

## 2020-08-24 NOTE — ASSESSMENT & PLAN NOTE
7/31/2020 S/p Crainotomy with Aspiration of frontal pseudomeningocele   Patient reports she has been taking her decadron at home but is unsure of the dosage- Asked  to bring in   Will defer steroid dosing to NeuroSurgeon    S/p resection 7/31- See above    Await  Shunt placement    S/p resection    08/22  - Stable  - PT/OT recommending rehab placement  - Case management consulted    08/23  - Stable  - Reviewed. Unchanged. Referrals sent to [facility]hab and [facility]     8/24  Stable awaiting Rehab placement

## 2020-08-24 NOTE — ASSESSMENT & PLAN NOTE
- Continue levothyroxine    Lab Results   Component Value Date    TSH 1.544 07/26/2020       Continue synthroid

## 2020-08-24 NOTE — PHYSICIAN QUERY
PT Name: Cata Lang  MR #: [id number]     Documentation Clarification      CDS/: Xander RAMOS, RN-BC  Contact information: [email or web address]    This form is a permanent document in the medical record.     Query Date: August 24, 2020    By submitting this query, we are merely seeking further clarification of documentation. Please utilize your independent clinical judgment when addressing the question(s) below.    The Medical Record reflects the following:    Supporting Clinical Findings Location in Medical Record   Hypertension associated with diabetes  Continue home medications      Type 2 diabetes mellitus with stage 3 chronic kidney disease, without long-term current use of insulin. Dm diet. Accuchecks with correctional SSI. Home metformin on hold [facility], H&P, 8/17   A1C 5.6  POCT glucose 121, 166, 177, 156, 183, 179, 215    Norvasc 5 mg  Hydralazine 10 mg IV  Enalaprilat 1.25 mg IV   8/17 8/17-8/23 8/18-8/23 8/18,8/19, 8/21, 8/23 8/20                                                                          Provider, please provide diagnosis or diagnoses associated with above clinical findings.    Please clarify the meaning of HTN associated with Diabetes.    [  x ] Hypertension is a manifestation of DM (Secondary HTN)   [   ] Hypertension is not a manifestation of DM (Essential HTN)   [   ] Other (please specify): ____________   [  ] Clinically undetermined     Present on admission (POA) status:   [ x  ] Yes (Y)                          [  ] Clinically Undetermined (W)  [   ] No (N)                            [   ] Documentation insufficient to determine if condition is POA (U)

## 2020-08-24 NOTE — PT/OT/SLP PROGRESS
"Occupational Therapy  Treatment    Cata Lang   MRN: [id number]   Admitting Diagnosis: Hydrocephalus, acquired    OT Date of Treatment: 08/24/20   OT Start Time: 0904  OT Stop Time: 0927  OT Total Time (min): 23 min    Billable Minutes:  Therapeutic Activity 15 min and Therapeutic Exercise 8 min    General Precautions: Standard, fall  Orthopedic Precautions: N/A  Braces: N/A         Subjective:  Communicated with Nurse Ortiz and epic chart review prior to session.  Pt found supine in bed and agreeable to tx at this time.   Pain/Comfort  Pain Rating 1: 4/10  Location 1: head  Pain Addressed 1: Reposition, Distraction    Objective:  Patient found with: telemetry, SCD, bed alarm     Functional Mobility:  Therapeutic Activities and Exercises:  Pt performed supine>sit with Mod A, scooted to EOB with Mod A. Pt sat EOB with c/o dizziness and kept leaning posteriorly. Pt educated in and performed (B) UE therapeutic exercises sitting EOB 1 x 10 reps: shoulder flex, chest press, bicep curls. Pt performed sit>stand using RW x 2 trials with Max A x 2, squat pivot t/f to chair with Max A x 2. Pt set up to eat breakfast.      AM-PAC 6 CLICK ADL   How much help from another person does this patient currently need?   1 = Unable, Total/Dependent Assistance  2 = A lot, Maximum/Moderate Assistance  3 = A little, Minimum/Contact Guard/Supervision  4 = None, Modified Reading/Independent    Putting on and taking off regular lower body clothing? : 2  Bathing (including washing, rinsing, drying)?: 2  Toileting, which includes using toilet, bedpan, or urinal? : 2  Putting on and taking off regular upper body clothing?: 2  Taking care of personal grooming such as brushing teeth?: 2  Eating meals?: 3  Daily Activity Total Score: 13     AM-PAC Raw Score CMS "G-Code Modifier Level of Impairment Assistance   6 % Total / Unable   7 - 8 CM 80 - 100% Maximal Assist   9-13 CL 60 - 80% Moderate Assist   14 - 19 CK 40 - 60% Moderate " Assist   20 - 22 CJ 20 - 40% Minimal Assist   23 CI 1-20% SBA / CGA   24 CH 0% Independent/ Mod I       Patient left up in chair with all lines intact, call button in reach, chair alarm on and Nurse Diana notified    ASSESSMENT:  Cata Lang is a 49 y.o. female with a medical diagnosis of Hydrocephalus, acquired and presents with impaired functional mobility and ADLs. Pt will benefit from continued skilled OT in order to address the listed impairments.     Rehab identified problem list/impairments: Rehab identified problem list/impairments: weakness, impaired endurance, impaired self care skills, impaired functional mobilty, gait instability, impaired balance, decreased coordination, decreased lower extremity function, decreased safety awareness, pain, decreased ROM    Rehab potential is fair.    Activity tolerance: Fair    Discharge recommendations: Discharge Facility/Level of Care Needs: rehabilitation facility     Barriers to discharge:  UNKNOWN    Equipment recommendations: bedside commode     GOALS:   Multidisciplinary Problems     Occupational Therapy Goals        Problem: Occupational Therapy Goal    Goal Priority Disciplines Outcome Interventions   Occupational Therapy Goal     OT, PT/OT Ongoing, Progressing    Description: LTGS to be met by 8/28/2020  1. Pt will perform LE dressing with Min A  2. Pt will perform UE dressing with SBA  3. Pt will perform toilet t/f with Mod A  4. Pt will perform (B) UE therapeutic exercises 1 x 20 reps                    Plan:  Patient to be seen 3 x/week to address the above listed problems via self-care/home management, therapeutic activities, therapeutic exercises  Plan of Care expires: 08/28/20  Plan of Care reviewed with: patient         Alejandra Saenz, PT/OT  08/24/2020

## 2020-08-24 NOTE — PROGRESS NOTES
[facility] Hospital Medicine  Progress Note    Patient Name: Cata Lang  MRN: [id number]  Patient Class: IP- Inpatient   Admission Date: 8/17/2020  Length of Stay: 7 days  Attending Physician: Rj Low MD  Primary Care Provider: Sabra Fregoso MD        Subjective:     Principal Problem:Hydrocephalus, acquired        HPI:  This is a 48 yo female who has a PMHX of Dm2, HTN, CKD stage 3, anemia of chronic disease, Prior CVA in 2015 felt to be from her with residual left sided weakness,  And s/p craniotomy for meningioma on 7/31/20 done by Dr. Hagen. She was discharged from rehab 3 days ago and reports that the next day she began  having dizziness, HA, and increased left sided weakness form baseline. Patient has also been having multiple falls the past few days. Patient had a follow up visit with Dr. Hagen today and per report, CT showed signs of  hydrocephalus. Dr. Hagen requested patient be placed in the hospital for further monitoring, physical therapy, and plans for upcoming   shunt placement.                                                   Overview/Hospital Course:  48 yo female who has a PMHX of DM2, HTN, CKD stage 3, anemia of chronic disease, Prior CVA in 2015 with residual left sided weakness, nd s/p craniotomy for meningioma on 7/31/20 done by Dr. Hagen. She was discharged from rehab 3 days ago and reports that the next day she began having dizziness, HA, and increased left sided weakness form baseline. Patient has also been having multiple falls the past few days. Patient had a follow up visit with Dr. Hagen today and per report, CT showed signs of Hydrocephalus. She was also found to be Hyponatremic with a Na of 129- likely sec to IVVD. Pt admitted to [facility] and started on IVF. Pt was also a tapering dose of Decadron orally. This morning she feels lot better, more alert and awake and stronger, speech getting clearer. Dr. Hagen plans Shunt placement this Friday. Na and  Cl slightly better, pt eating drinking better too. Will get PT/OT in am   8/19- Seen and examined with Dr. Jose Hagen, looks and feels better, more alert and responsive, facial expression improved, Na has improved to 132, feels stronger on her legs. Participated with PT and walked a little. Await  shunt surgery in am.   8/20- seen post op, doing a little better, s/p  shunt placement by Dr. Hagen which went uneventfully. Post op was AAOX3, speech clear, her HA was much better, just had incisional pain. Now resting comfortably after Norco.   8/21- POD 1, doing better. Resting comfortable, did OK with PT/OT, eating drinking more, labs improved, Na still 131. Getting IVF so will stop in am. Dr. Hagen following and adjusting the  shunt. Had post op CT head this am. Hydrocephalus still the same.   As of 08/22 -- POD # 2. Labs reviewed. Stable. PT/OT recommending rehab placement. Consult placed to case management for dispo. Per neurosurgery, pt can now start DVT prophylaxis.  Unable to tolerate lovenox.   As of 08/23 -- POD # 3. Labs reviewed. Slight increase in WBC count from 11.57K to 13.06K. Afebrile. Discussed case with Dr. Hagen, feels pt may have a dental abscess. Placed on oral doxycycline. Social work consulted for rehab placement -- referrals sent to [facility] and [facility].   As of 08/24- POD #4,  shunt surgery, awaiting Rehab placement. Continue Doxycillin for dental abscess.         Review of Systems   Constitutional: Positive for activity change and fatigue. Negative for appetite change, chills, diaphoresis and fever.   HENT: Negative for ear discharge, ear pain and facial swelling.    Eyes: Negative for pain, redness and visual disturbance.   Respiratory: Negative for apnea, cough, choking, chest tightness, shortness of breath, wheezing and stridor.    Cardiovascular: Negative for chest pain, palpitations and leg swelling.   Gastrointestinal: Negative for  abdominal distention, blood in stool, constipation, diarrhea, nausea and vomiting.   Endocrine: Negative for polydipsia and polyphagia.   Genitourinary: Negative for difficulty urinating, dysuria, flank pain and hematuria.   Musculoskeletal: Negative for neck pain and neck stiffness.   Skin: Negative for color change.   Allergic/Immunologic: Negative for food allergies.   Neurological: Positive for weakness. Negative for dizziness, seizures, facial asymmetry, speech difficulty and headaches.   Hematological: Does not bruise/bleed easily.   Psychiatric/Behavioral: Negative for agitation, behavioral problems, confusion, dysphoric mood and suicidal ideas. The patient is not nervous/anxious.      Objective:     Vital Signs (Most Recent):  Temp: 97.8 °F (36.6 °C) (08/24/20 1329)  Pulse: 97 (08/24/20 1329)  Resp: 18 (08/24/20 0940)  BP: (!) 173/82 (08/24/20 1329)  SpO2: 98 % (08/24/20 1329) Vital Signs (24h Range):  Temp:  [97.7 °F (36.5 °C)-98.4 °F (36.9 °C)] 97.8 °F (36.6 °C)  Pulse:  [] 97  Resp:  [16-18] 18  SpO2:  [96 %-99 %] 98 %  BP: (127-177)/(61-82) 173/82     Weight: 115.6 kg (254 lb 13.6 oz)  Body mass index is 36.57 kg/m².    Intake/Output Summary (Last 24 hours) at 8/24/2020 1454  Last data filed at 8/24/2020 0500  Gross per 24 hour   Intake 120 ml   Output 900 ml   Net -780 ml      Physical Exam  Vitals signs and nursing note reviewed.   Constitutional:       General: She is not in acute distress.     Appearance: She is not ill-appearing, toxic-appearing or diaphoretic.   HENT:      Head:      Comments:  craniotomy incision with staples intact     Right Ear: There is no impacted cerumen.      Left Ear: There is no impacted cerumen.      Nose: No congestion or rhinorrhea.      Mouth/Throat:      Dentition: Dental caries present.      Pharynx: No posterior oropharyngeal erythema.   Neck:      Musculoskeletal: No neck rigidity or muscular tenderness.      Vascular: No carotid bruit.   Cardiovascular:       Heart sounds: No murmur. No gallop.    Pulmonary:      Effort: No respiratory distress.      Breath sounds: No stridor. No wheezing, rhonchi or rales.   Chest:      Chest wall: No tenderness.   Abdominal:      General: There is no distension.      Palpations: There is no mass.      Tenderness: There is no abdominal tenderness. There is no right CVA tenderness, left CVA tenderness, guarding or rebound.      Hernia: No hernia is present.   Musculoskeletal:         General: No swelling, tenderness, deformity or signs of injury.      Right lower leg: No edema.      Left lower leg: No edema.   Lymphadenopathy:      Cervical: No cervical adenopathy.   Skin:     General: Skin is warm and dry.      Capillary Refill: Capillary refill takes less than 2 seconds.   Neurological:      Cranial Nerves: No cranial nerve deficit.      Sensory: No sensory deficit.      Motor: No weakness.      Coordination: Coordination normal.   Psychiatric:         Mood and Affect: Mood normal.         Behavior: Behavior normal.         Significant Labs:   CBC:   Recent Labs   Lab 08/23/20  0700   WBC 13.06*   HGB 9.5*   HCT 30.4*        CMP:   Recent Labs   Lab 08/23/20  0700   *   K 3.5   CL 96   CO2 23   *   BUN 15   CREATININE 0.9   CALCIUM 8.3*   PROT 6.9   ALBUMIN 3.2*   BILITOT 0.4   ALKPHOS 179*   AST 19   ALT 44   ANIONGAP 12   EGFRNONAA >60       Significant Imaging:     Imaging Results          CT Head Stealth W/O Contrast (Final result)  Result time 08/17/20 16:19:53   Procedure changed from CT Head Without Contrast     Final result by Kingsley Churchill MD (08/17/20 16:19:53)                 Impression:      Thin-section preoperative CT scan with stable mild dilatation of the ventricular system as above.    Evolving right frontal lobe operative change.    All CT scans at this facility use dose modulation, iterative reconstruction, and/or weight based dosing when appropriate to reduce radiation dose to as low as  reasonable achievable.      Electronically signed by: Kingsley Churchill  Date:    08/17/2020  Time:    16:19             Narrative:    EXAMINATION:  CT HEAD STEALTH W/O CONTRAST    CLINICAL HISTORY:  Brain/CNS neoplasm, assess treatment response;    TECHNIQUE:  Volumetric axial CT images obtained throughout the region of the head without use of intravenous contrast via Placeling protocol. Axial, sagittal and coronal reconstructions were performed.    COMPARISON:  Head CT earlier same date with priors; MRI brain 07/26/2020    FINDINGS:  Continued evolution of postsurgical change within the right frontal lobe status post mass lesion resection.  Persistent mixed density extra-axial collection overlies the frontal lobe, improved as compared to recent postoperative scans and stable to examination earlier same date.  Continued hypodensity within the right frontal lobe.  Unchanged hypodensity in the left occipital lobe.  No new or acute area of parenchymal hypoattenuation.    Ex vacuo dilatation of the right frontal horn.  Stable prominence of the ventricular system as compared to recent head CT, but mild increase in size as compared to remote priors.    Stable subcutaneous fluid collection overlying the craniotomy site, stable to recent prior but decreased as compared to remote priors.  Paranasal sinuses are predominantly clear.  Mastoid air cells are clear.  Osseous calvarium is otherwise intact.                                Assessment/Plan:      * Hydrocephalus, acquired  S/p craniotomy and L Frontal Meningioma resection- now pt has developed Hydrocephalus in the same area- needs Shunt  No need for any Mannitol  Continue to taper decadron    Clinically a little better, Na also improved to 132   shunt in am    S/p  shunt placement- doing well post op    POD 1, repeat CT OK,  shunt adjusted by Dr. Hagen  Continue PT/OT    08/22  - POD #2. Stable. No changes. Continue current regimen. Neurosurgery  following    08/23  - POD #3. Stable. No changes. Continue current regimen. Neurosurgery following, plans to repeat CT head in AM        Abscess, dental  - Dr. Hagen started on oral doxycycline today  - Will need F/U with dentist upon discharge      Status post craniotomy    Neurosurgery managing   Awaiting Rehab placement     History of CVA in adulthood  Patient reports Hx of CVA in 2015 which was felt to be caused from her prior pseudomeningocele  Patient states her home Arixtra was recently discontinued about a week or so ago      Hyperlipidemia associated with type 2 diabetes mellitus  - Continue home statin      Frequent falls  New Onset  Fall precautions  Consult physical therapy    Sec to Hydrocephalus- improving    Continue PT/OT    Hypothyroidism  - Continue levothyroxine    Lab Results   Component Value Date    TSH 1.544 07/26/2020       Continue synthroid     Type 2 diabetes mellitus with stage 3 chronic kidney disease, without long-term current use of insulin  - Accuchecks and low dose SSI    Anemia due to stage 3 chronic kidney disease  - Hgb/Hct 9..5/30.4 -- stable  - Monitor      Meningioma, cerebral s/p resection  7/31/2020 S/p Crainotomy with Aspiration of frontal pseudomeningocele   Patient reports she has been taking her decadron at home but is unsure of the dosage- Asked  to bring in   Will defer steroid dosing to NeuroSurgeon    S/p resection 7/31- See above    Await  Shunt placement    S/p resection    08/22  - Stable  - PT/OT recommending rehab placement  - Case management consulted    08/23  - Stable  - Reviewed. Unchanged. Referrals sent to [facility] rehab and [facility] rehab     8/24  Stable awaiting Rehab placement     Hypertension associated with diabetes  - Continue home medications including amlodipine 5 mg po daily      Hyponatremia  Monitor- Levels running 135-136 three weeks ago  Add 1500ml fluid restriction  Nephrology consulted  Seizure  precautions    Stable- likely sec to Intracranial tumor    Improving a little to 132 with IVF    Improving    Same at 131    08/22  - Na+ 133, improving  - Continue to monitor    08/23  - Na+ 131  - Stable. Monitor      VTE Risk Mitigation (From admission, onward)         Ordered     apixaban tablet 5 mg  2 times daily      08/22/20 1516     Place HOLLAND hose  Until discontinued      08/20/20 1239     IP VTE HIGH RISK PATIENT  Once      08/20/20 1239     Place sequential compression device  Until discontinued      08/20/20 1239     Place HOLLAND hose  Until discontinued      08/17/20 1754                Discharge Planning   ROSALIA:      Code Status: Full Code   Is the patient medically ready for discharge?:     Reason for patient still in hospital (select all that apply): Treatment and Other (specify) Rehab placment                      Jeff Montague NP  Department of Hospital Medicine   [facility]

## 2020-08-24 NOTE — PLAN OF CARE
Problem: Adult Inpatient Plan of Care  Goal: Plan of Care Review  Outcome: Ongoing, Progressing  Flowsheets (Taken 8/24/2020 0315)  Plan of Care Reviewed With: patient  Pt had no adverse events during shift. Pt free from falls. Call light in reach. SR's x2. Pain well controlled w/ PRN meds. Pt repositioned with wedge q2 hours, pt tolerating well. IV saline locked as ordered. Incisions CDI. Neuro checks performed. VTE prophylaxis - SCD's in place, fluids promoted. BG monitoring, BG stable during shift. Elevated BP noted during shift, prn medication given to decrease. Other VSS. Heart monitor in use (NSR. 70's - 80's). Chart reviewed. Will continue to monitor.

## 2020-08-24 NOTE — PLAN OF CARE
Staples to incision to right side of the head intact. Neuro checks Q4hrs. Heart monitor 8578. Accu checks AC&HS. PRN pain meds adm as needed to control pain level. Requires max assist with transfers. Remains freee from injury/incident. Call light in reach.

## 2020-08-25 LAB
ANION GAP SERPL CALC-SCNC: 16 MMOL/L (ref 8–16)
BASOPHILS # BLD AUTO: 0.04 K/UL (ref 0–0.2)
BASOPHILS NFR BLD: 0.3 % (ref 0–1.9)
BUN SERPL-MCNC: 17 MG/DL (ref 6–20)
CALCIUM SERPL-MCNC: 8.9 MG/DL (ref 8.7–10.5)
CHLORIDE SERPL-SCNC: 95 MMOL/L (ref 95–110)
CO2 SERPL-SCNC: 21 MMOL/L (ref 23–29)
CREAT SERPL-MCNC: 0.8 MG/DL (ref 0.5–1.4)
DIFFERENTIAL METHOD: ABNORMAL
EOSINOPHIL # BLD AUTO: 0.2 K/UL (ref 0–0.5)
EOSINOPHIL NFR BLD: 1.7 % (ref 0–8)
ERYTHROCYTE [DISTWIDTH] IN BLOOD BY AUTOMATED COUNT: 17 % (ref 11.5–14.5)
EST. GFR  (AFRICAN AMERICAN): >60 ML/MIN/1.73 M^2
EST. GFR  (NON AFRICAN AMERICAN): >60 ML/MIN/1.73 M^2
GLUCOSE SERPL-MCNC: 135 MG/DL (ref 70–110)
HCT VFR BLD AUTO: 31.3 % (ref 37–48.5)
HGB BLD-MCNC: 9.9 G/DL (ref 12–16)
IMM GRANULOCYTES # BLD AUTO: 0.12 K/UL (ref 0–0.04)
IMM GRANULOCYTES NFR BLD AUTO: 0.9 % (ref 0–0.5)
LYMPHOCYTES # BLD AUTO: 2.6 K/UL (ref 1–4.8)
LYMPHOCYTES NFR BLD: 19.7 % (ref 18–48)
MCH RBC QN AUTO: 26.1 PG (ref 27–31)
MCHC RBC AUTO-ENTMCNC: 31.6 G/DL (ref 32–36)
MCV RBC AUTO: 83 FL (ref 82–98)
MONOCYTES # BLD AUTO: 0.8 K/UL (ref 0.3–1)
MONOCYTES NFR BLD: 5.8 % (ref 4–15)
NEUTROPHILS # BLD AUTO: 9.5 K/UL (ref 1.8–7.7)
NEUTROPHILS NFR BLD: 71.6 % (ref 38–73)
NRBC BLD-RTO: 0 /100 WBC
PLATELET # BLD AUTO: 243 K/UL (ref 150–350)
PMV BLD AUTO: 10.9 FL (ref 9.2–12.9)
POCT GLUCOSE: 125 MG/DL (ref 70–110)
POCT GLUCOSE: 137 MG/DL (ref 70–110)
POCT GLUCOSE: 138 MG/DL (ref 70–110)
POCT GLUCOSE: 159 MG/DL (ref 70–110)
POTASSIUM SERPL-SCNC: 3.3 MMOL/L (ref 3.5–5.1)
RBC # BLD AUTO: 3.79 M/UL (ref 4–5.4)
SODIUM SERPL-SCNC: 132 MMOL/L (ref 136–145)
WBC # BLD AUTO: 13.31 K/UL (ref 3.9–12.7)

## 2020-08-25 PROCEDURE — 25000003 PHARM REV CODE 250: Performed by: NURSE PRACTITIONER

## 2020-08-25 PROCEDURE — 85025 COMPLETE CBC W/AUTO DIFF WBC: CPT

## 2020-08-25 PROCEDURE — 99024 POSTOP FOLLOW-UP VISIT: CPT | Mod: ,,, | Performed by: NEUROLOGICAL SURGERY

## 2020-08-25 PROCEDURE — 97530 THERAPEUTIC ACTIVITIES: CPT | Performed by: PHYSICAL THERAPIST

## 2020-08-25 PROCEDURE — 99024 PR POST-OP FOLLOW-UP VISIT: ICD-10-PCS | Mod: ,,, | Performed by: NEUROLOGICAL SURGERY

## 2020-08-25 PROCEDURE — 25000003 PHARM REV CODE 250: Performed by: EMERGENCY MEDICINE

## 2020-08-25 PROCEDURE — 80048 BASIC METABOLIC PNL TOTAL CA: CPT

## 2020-08-25 PROCEDURE — 63600175 PHARM REV CODE 636 W HCPCS: Performed by: NURSE PRACTITIONER

## 2020-08-25 PROCEDURE — 97116 GAIT TRAINING THERAPY: CPT | Performed by: PHYSICAL THERAPIST

## 2020-08-25 PROCEDURE — 63600175 PHARM REV CODE 636 W HCPCS: Performed by: PHYSICIAN ASSISTANT

## 2020-08-25 PROCEDURE — 97530 THERAPEUTIC ACTIVITIES: CPT

## 2020-08-25 PROCEDURE — 25000003 PHARM REV CODE 250: Performed by: NEUROLOGICAL SURGERY

## 2020-08-25 PROCEDURE — 25000003 PHARM REV CODE 250: Performed by: INTERNAL MEDICINE

## 2020-08-25 PROCEDURE — 36415 COLL VENOUS BLD VENIPUNCTURE: CPT

## 2020-08-25 PROCEDURE — 21400001 HC TELEMETRY ROOM

## 2020-08-25 RX ORDER — BISACODYL 10 MG
10 SUPPOSITORY, RECTAL RECTAL ONCE
Status: COMPLETED | OUTPATIENT
Start: 2020-08-25 | End: 2020-08-25

## 2020-08-25 RX ADMIN — CALCITRIOL CAPSULES 0.25 MCG 0.25 MCG: 0.25 CAPSULE ORAL at 09:08

## 2020-08-25 RX ADMIN — BISACODYL 10 MG: 10 SUPPOSITORY RECTAL at 09:08

## 2020-08-25 RX ADMIN — LEVETIRACETAM 1500 MG: 500 TABLET ORAL at 09:08

## 2020-08-25 RX ADMIN — GABAPENTIN 600 MG: 300 CAPSULE ORAL at 09:08

## 2020-08-25 RX ADMIN — HYDRALAZINE HYDROCHLORIDE 10 MG: 20 INJECTION INTRAMUSCULAR; INTRAVENOUS at 04:08

## 2020-08-25 RX ADMIN — DULOXETINE 30 MG: 30 CAPSULE, DELAYED RELEASE ORAL at 09:08

## 2020-08-25 RX ADMIN — ACETAMINOPHEN 650 MG: 325 TABLET ORAL at 04:08

## 2020-08-25 RX ADMIN — CALCIUM 500 MG: 500 TABLET ORAL at 09:08

## 2020-08-25 RX ADMIN — FERROUS SULFATE TAB EC 325 MG (65 MG FE EQUIVALENT) 325 MG: 325 (65 FE) TABLET DELAYED RESPONSE at 09:08

## 2020-08-25 RX ADMIN — Medication 5000 UNITS: at 09:08

## 2020-08-25 RX ADMIN — LEVOTHYROXINE SODIUM 137 MCG: 25 TABLET ORAL at 05:08

## 2020-08-25 RX ADMIN — PANTOPRAZOLE SODIUM 40 MG: 40 TABLET, DELAYED RELEASE ORAL at 09:08

## 2020-08-25 RX ADMIN — DOXYCYCLINE HYCLATE 100 MG: 100 TABLET, COATED ORAL at 09:08

## 2020-08-25 RX ADMIN — THERA TABS 1 TABLET: TAB at 09:08

## 2020-08-25 RX ADMIN — APIXABAN 5 MG: 2.5 TABLET, FILM COATED ORAL at 09:08

## 2020-08-25 RX ADMIN — OXYCODONE HYDROCHLORIDE AND ACETAMINOPHEN 500 MG: 500 TABLET ORAL at 09:08

## 2020-08-25 RX ADMIN — DEXAMETHASONE INTENSOL 0.5 MG: 1 SOLUTION, CONCENTRATE ORAL at 09:08

## 2020-08-25 RX ADMIN — HYDROCODONE BITARTRATE AND ACETAMINOPHEN 1 TABLET: 5; 325 TABLET ORAL at 11:08

## 2020-08-25 RX ADMIN — CALCIUM 500 MG: 500 TABLET ORAL at 04:08

## 2020-08-25 RX ADMIN — FERROUS SULFATE TAB EC 325 MG (65 MG FE EQUIVALENT) 325 MG: 325 (65 FE) TABLET DELAYED RESPONSE at 04:08

## 2020-08-25 RX ADMIN — CALCIUM 500 MG: 500 TABLET ORAL at 01:08

## 2020-08-25 RX ADMIN — HYDROCODONE BITARTRATE AND ACETAMINOPHEN 1 TABLET: 5; 325 TABLET ORAL at 02:08

## 2020-08-25 RX ADMIN — AMLODIPINE BESYLATE 5 MG: 5 TABLET ORAL at 09:08

## 2020-08-25 RX ADMIN — HYDROCODONE BITARTRATE AND ACETAMINOPHEN 1 TABLET: 5; 325 TABLET ORAL at 12:08

## 2020-08-25 RX ADMIN — ACETAMINOPHEN 650 MG: 325 TABLET ORAL at 05:08

## 2020-08-25 RX ADMIN — ATORVASTATIN CALCIUM 10 MG: 10 TABLET, FILM COATED ORAL at 09:08

## 2020-08-25 NOTE — SUBJECTIVE & OBJECTIVE
Past Medical History:   Diagnosis Date    Abscess, dental 8/23/2020    Diabetes mellitus     Hydrocephalus, acquired 8/17/2020    Hypertension     Seizures     Stroke     Thyroid disease        Past Surgical History:   Procedure Laterality Date    CRANIOTOMY      DIAGNOSTIC LAPAROSCOPY N/A 8/20/2020    Procedure: LAPAROSCOPY, DIAGNOSTIC;  Surgeon: Remy Davenport MD;  Location: [facility];  Service: General;  Laterality: N/A;    THYROIDECTOMY      TONSILLECTOMY         Review of patient's allergies indicates:   Allergen Reactions    Heparin analogues Other (See Comments)     DANYELLE     Hydrochlorothiazide      hypercalcemia       No current facility-administered medications on file prior to encounter.      Current Outpatient Medications on File Prior to Encounter   Medication Sig    amLODIPine (NORVASC) 5 MG tablet Take 1 tablet (5 mg total) by mouth once daily.    aspirin 81 MG Chew Take 1 tablet (81 mg total) by mouth once daily.    atorvastatin (LIPITOR) 10 MG tablet Take 10 mg by mouth once daily.     calcitRIOL (ROCALTROL) 0.25 MCG Cap 0.25 mcg once daily.     calcium carbonate (OS-THANIA) 500 mg calcium (1,250 mg) tablet Take 1 tablet (500 mg total) by mouth 4 (four) times daily.    cholecalciferol, vitamin D3, 125 mcg (5,000 unit) Tab Take 1 tablet (5,000 Units total) by mouth once daily.    dexAMETHasone (DECADRON) 2 MG tablet     DULoxetine (CYMBALTA) 30 MG capsule     gabapentin (NEURONTIN) 600 MG tablet Take 600 mg by mouth once daily.     HYDROcodone-acetaminophen (NORCO) 5-325 mg per tablet Take 1 tablet by mouth every 4 (four) hours as needed.    levETIRAcetam (KEPPRA) 500 MG Tab Take 1,500 mg by mouth 2 (two) times daily.    levothyroxine (SYNTHROID) 137 MCG Tab tablet Take 1 tablet (137 mcg total) by mouth once daily.    metFORMIN (GLUCOPHAGE) 500 MG tablet Take 500 mg by mouth 2 (two) times daily with meals.    OMEPRAZOLE ORAL Take by mouth.    ondansetron (ZOFRAN) 4 MG tablet      ferrous sulfate 325 (65 FE) MG EC tablet Take 1 tablet (325 mg total) by mouth 2 (two) times daily.    zolpidem (AMBIEN) 10 mg Tab Take 1 tablet (10 mg total) by mouth nightly as needed.     Family History     None        Tobacco Use    Smoking status: Current Every Day Smoker     Packs/day: 1.00     Types: Cigarettes    Tobacco comment: none since July 2020   Substance and Sexual Activity    Alcohol use: Not Currently    Drug use: Not Currently    Sexual activity: Not on file     Review of Systems   Constitutional: Positive for activity change and fatigue. Negative for appetite change, chills, diaphoresis and fever.   HENT: Negative for ear discharge, ear pain and facial swelling.    Eyes: Negative for pain, redness and visual disturbance.   Respiratory: Negative for apnea, cough, choking, chest tightness, shortness of breath, wheezing and stridor.    Cardiovascular: Negative for chest pain, palpitations and leg swelling.   Gastrointestinal: Negative for abdominal distention, blood in stool, constipation, diarrhea, nausea and vomiting.   Endocrine: Negative for polydipsia and polyphagia.   Genitourinary: Negative for difficulty urinating, dysuria, flank pain and hematuria.   Musculoskeletal: Negative for neck pain and neck stiffness.   Skin: Negative for color change.   Allergic/Immunologic: Negative for food allergies.   Neurological: Positive for weakness. Negative for dizziness, seizures, facial asymmetry, speech difficulty and headaches.   Hematological: Does not bruise/bleed easily.   Psychiatric/Behavioral: Negative for agitation, behavioral problems, confusion, dysphoric mood and suicidal ideas. The patient is not nervous/anxious.      Objective:     Vital Signs (Most Recent):  Temp: 98 °F (36.7 °C) (08/25/20 1728)  Pulse: 95 (08/25/20 1728)  Resp: 16 (08/25/20 1728)  BP: 133/60 (08/25/20 1728)  SpO2: 96 % (08/25/20 1728) Vital Signs (24h Range):  Temp:  [98 °F (36.7 °C)-98.7 °F (37.1 °C)] 98 °F  (36.7 °C)  Pulse:  [74-95] 95  Resp:  [16-18] 16  SpO2:  [94 %-98 %] 96 %  BP: (132-178)/(60-79) 133/60     Weight: 115.6 kg (254 lb 13.6 oz)  Body mass index is 36.57 kg/m².    Physical Exam  Vitals signs and nursing note reviewed.   Constitutional:       General: She is not in acute distress.     Appearance: She is not ill-appearing, toxic-appearing or diaphoretic.   HENT:      Head:      Comments:  craniotomy incision with staples intact     Right Ear: There is no impacted cerumen.      Left Ear: There is no impacted cerumen.      Nose: No congestion or rhinorrhea.      Mouth/Throat:      Dentition: Dental caries present.      Pharynx: No posterior oropharyngeal erythema.   Neck:      Musculoskeletal: No neck rigidity or muscular tenderness.      Vascular: No carotid bruit.   Cardiovascular:      Heart sounds: No murmur. No gallop.    Pulmonary:      Effort: No respiratory distress.      Breath sounds: No stridor. No wheezing, rhonchi or rales.   Chest:      Chest wall: No tenderness.   Abdominal:      General: There is no distension.      Palpations: There is no mass.      Tenderness: There is no abdominal tenderness. There is no right CVA tenderness, left CVA tenderness, guarding or rebound.      Hernia: No hernia is present.   Musculoskeletal:         General: No swelling, tenderness, deformity or signs of injury.      Right lower leg: No edema.      Left lower leg: No edema.   Lymphadenopathy:      Cervical: No cervical adenopathy.   Skin:     General: Skin is warm and dry.      Capillary Refill: Capillary refill takes less than 2 seconds.   Neurological:      Cranial Nerves: No cranial nerve deficit.      Sensory: No sensory deficit.      Motor: No weakness.      Coordination: Coordination normal.   Psychiatric:         Mood and Affect: Mood normal.         Behavior: Behavior normal.             Significant Labs:   CBC:   Recent Labs   Lab 08/25/20  0621   WBC 13.31*   HGB 9.9*   HCT 31.3*        CMP:    Recent Labs   Lab 08/25/20  0621   *   K 3.3*   CL 95   CO2 21*   *   BUN 17   CREATININE 0.8   CALCIUM 8.9   ANIONGAP 16   EGFRNONAA >60       Significant Imaging: I have reviewed all pertinent imaging results/findings within the past 24 hours.

## 2020-08-25 NOTE — ASSESSMENT & PLAN NOTE
S/p craniotomy and L Frontal Meningioma resection- now pt has developed Hydrocephalus in the same area- needs Shunt  No need for any Mannitol  Continue to taper decadron    Clinically a little better, Na also improved to 132   shunt in am    S/p  shunt placement- doing well post op    POD 1, repeat CT OK,  shunt adjusted by Dr. Hagen  Continue PT/OT    08/22  - POD #2. Stable. No changes. Continue current regimen. Neurosurgery following    08/23  - POD #3. Stable. No changes. Continue current regimen. Neurosurgery following, plans to repeat CT head in AM    8/25  POD #5 doing well awaiting placement

## 2020-08-25 NOTE — NURSING
Pt due to void at 2300. Bladder scanned patient, currently has 480 mL urine in bladder. [facility]. Ordered to place duval in patient per BRENDON Guerra. Will continue to monitor.

## 2020-08-25 NOTE — PLAN OF CARE
[employer] requesting ADL scores on OT note.  Spoke with Inna Ornelas.  She states every note has AM-PAC ADL scores.  If [employer] needs another scoring system she will be happy to add to the note.  Faxed today's OT note to Kimberley at [facility].

## 2020-08-25 NOTE — PLAN OF CARE
Problem: Adult Inpatient Plan of Care  Goal: Plan of Care Review  Outcome: Ongoing, Progressing  Flowsheets (Taken 8/25/2020 0141)  Plan of Care Reviewed With: patient  Pt had no adverse events during shift. Pt free from falls. Call light in reach. SR's x2. Pain well controlled w/ PRN meds. Pt repositioned with wedge q2 hours, pt tolerating well. IV saline locked as ordered. Head incisions CDI. Neuro checks performed. VTE prophylaxis - SCD's in place, fluids promoted. BG monitoring, BG stable during shift. VSS. Heart monitor in use (NSR. 70's - 80's). Monte in place due to urinary retention, pt tolerated well. Chart reviewed. Will continue to monitor.

## 2020-08-25 NOTE — PROGRESS NOTES
Cata Lang is a 49 y.o. female patient.   1. Hydrocephalus, acquired    2. Status post craniotomy    3. Meningioma, cerebral    4. Hyponatremia    5. Obstructive hydrocephalus    6. Hypertension associated with diabetes    7. Calcified cerebral meningioma s/p Craniotomy/ Resection    8. Anemia due to stage 3 chronic kidney disease    9. Hypocalcemia    10. Postprocedural pseudomeningocele    11. Type 2 diabetes mellitus with stage 3 chronic kidney disease, without long-term current use of insulin    12. Frequent falls    13. Hypokalemia    14. Pre-op evaluation      Past Medical History:   Diagnosis Date    Abscess, dental 8/23/2020    Diabetes mellitus     Hydrocephalus, acquired 8/17/2020    Hypertension     Seizures     Stroke     Thyroid disease      No past surgical history pertinent negatives on file.  Scheduled Meds:   amLODIPine  5 mg Oral Daily    apixaban  5 mg Oral BID    ascorbic acid (vitamin C)  500 mg Oral QHS    atorvastatin  10 mg Oral QHS    calcitRIOL  0.25 mcg Oral Daily    calcium carbonate  500 mg Oral QID    cholecalciferol (vitamin D3)  5,000 Units Oral Daily    dexAMETHasone  0.5 mg Oral Daily    doxycycline  100 mg Oral Q12H    DULoxetine  30 mg Oral Daily    ferrous sulfate  325 mg Oral BID WM    gabapentin  600 mg Oral QHS    levETIRAcetam  1,500 mg Oral BID    levothyroxine  137 mcg Oral Before breakfast    multivitamin  1 tablet Oral Daily    nozaseptin   Each Nostril BID    pantoprazole  40 mg Oral Daily     Continuous Infusions:  PRN Meds:acetaminophen, dextrose 50%, dextrose 50%, famotidine, glucagon (human recombinant), glucose, glucose, hydrALAZINE, HYDROcodone-acetaminophen, ibuprofen, insulin aspart U-100, melatonin, ondansetron    Review of patient's allergies indicates:   Allergen Reactions    Heparin analogues Other (See Comments)     DANYELLE     Hydrochlorothiazide      hypercalcemia     Active Hospital Problems    Diagnosis  POA     "*Hydrocephalus, acquired [G91.9]  Yes     Priority: 1 - High    Meningioma, cerebral s/p resection [D32.0]  Yes     Priority: 1 - High    Abscess, dental [K04.7]  Yes     Chronic    Status post craniotomy [Z98.890]  Not Applicable    Type 2 diabetes mellitus with stage 3 chronic kidney disease, without long-term current use of insulin [E11.22, N18.3]  Yes    Hypothyroidism [E03.9]  Yes    Frequent falls [R29.6]  Not Applicable    Hyperlipidemia associated with type 2 diabetes mellitus [E11.69, E78.5]  Yes    History of CVA in adulthood [Z86.73]  Not Applicable    Anemia due to stage 3 chronic kidney disease [N18.3, D63.1]  Yes    Hyponatremia [E87.1]  Yes    Hypertension associated with diabetes [E11.59, I10]  Yes      Resolved Hospital Problems    Diagnosis Date Resolved POA    Postprocedural pseudomeningocele [G97.82] 08/17/2020 Yes     Priority: 2     Hypocalcemia [E83.51] 08/20/2020 Yes     Blood pressure (!) 147/67, pulse 84, temperature 98.6 °F (37 °C), temperature source Oral, resp. rate 18, height 5' 10" (1.778 m), weight 115.6 kg (254 lb 13.6 oz), SpO2 95 %, not currently breastfeeding.    Subjective:   Diet: Adequate intake.  Patient reports no nausea or vomiting.    Activity level: Returning to normal.    Pain control: Well controlled.      Objective:  Vital signs (most recent): Blood pressure (!) 147/67, pulse 84, temperature 98.6 °F (37 °C), temperature source Oral, resp. rate 18, height 5' 10" (1.778 m), weight 115.6 kg (254 lb 13.6 oz), SpO2 95 %, not currently breastfeeding.  General appearance: Comfortable and in no acute distress.    Lungs:  Normal effort.    Heart: Normal rate.  Regular rhythm.    Chest: Symmetric chest wall expansion.    Abdomen: Abdomen is soft and flat.    Tenderness: There is no abdominal tenderness tenderness.    Wound:  Clean.  There is no drainage.    Extremities: There is normal range of motion.  There is no local swelling.    Neurological: The patient is " alert and oriented to person, place and time.       Assessment:   Condition: In stable condition.     Plan:  Transfer Plan: Ready for rehab transfer.  Out of bed and up to chair.  Regular diet.           Jose Hagen MD  8/25/2020

## 2020-08-25 NOTE — H&P
[facility] Hospital Medicine  History & Physical    Patient Name: Cata Lang  MRN: [id number]  Admission Date: 8/17/2020  Attending Physician: Rj Low MD   Primary Care Provider: Sabra Fregoso MD      Patient information was obtained from patient and ER records.     Subjective:     Principal Problem:Hydrocephalus, acquired    Chief Complaint:   Chief Complaint   Patient presents with    Post-op Problem     Sent by neurosurgery, Dr. Hagen for shunt placement s/p craniotomy x2wks. Hydrocephalus noted on CT scan        HPI: This is a 48 yo female who has a PMHX of Dm2, HTN, CKD stage 3, anemia of chronic disease, Prior CVA in 2015 felt to be from her with residual left sided weakness,  And s/p craniotomy for meningioma on 7/31/20 done by Dr. Hagen. She was discharged from rehab 3 days ago and reports that the next day she began  having dizziness, HA, and increased left sided weakness form baseline. Patient has also been having multiple falls the past few days. Patient had a follow up visit with Dr. Hagen today and per report, CT showed signs of  hydrocephalus. Dr. Hagen requested patient be placed in the hospital for further monitoring, physical therapy, and plans for upcoming   shunt placement.                                                   Past Medical History:   Diagnosis Date    Abscess, dental 8/23/2020    Diabetes mellitus     Hydrocephalus, acquired 8/17/2020    Hypertension     Seizures     Stroke     Thyroid disease        Past Surgical History:   Procedure Laterality Date    CRANIOTOMY      DIAGNOSTIC LAPAROSCOPY N/A 8/20/2020    Procedure: LAPAROSCOPY, DIAGNOSTIC;  Surgeon: Remy Davenport MD;  Location: [facility] OR;  Service: General;  Laterality: N/A;    THYROIDECTOMY      TONSILLECTOMY         Review of patient's allergies indicates:   Allergen Reactions    Heparin analogues Other (See Comments)     DANYELLE     Hydrochlorothiazide      hypercalcemia        No current facility-administered medications on file prior to encounter.      Current Outpatient Medications on File Prior to Encounter   Medication Sig    amLODIPine (NORVASC) 5 MG tablet Take 1 tablet (5 mg total) by mouth once daily.    aspirin 81 MG Chew Take 1 tablet (81 mg total) by mouth once daily.    atorvastatin (LIPITOR) 10 MG tablet Take 10 mg by mouth once daily.     calcitRIOL (ROCALTROL) 0.25 MCG Cap 0.25 mcg once daily.     calcium carbonate (OS-THANIA) 500 mg calcium (1,250 mg) tablet Take 1 tablet (500 mg total) by mouth 4 (four) times daily.    cholecalciferol, vitamin D3, 125 mcg (5,000 unit) Tab Take 1 tablet (5,000 Units total) by mouth once daily.    dexAMETHasone (DECADRON) 2 MG tablet     DULoxetine (CYMBALTA) 30 MG capsule     gabapentin (NEURONTIN) 600 MG tablet Take 600 mg by mouth once daily.     HYDROcodone-acetaminophen (NORCO) 5-325 mg per tablet Take 1 tablet by mouth every 4 (four) hours as needed.    levETIRAcetam (KEPPRA) 500 MG Tab Take 1,500 mg by mouth 2 (two) times daily.    levothyroxine (SYNTHROID) 137 MCG Tab tablet Take 1 tablet (137 mcg total) by mouth once daily.    metFORMIN (GLUCOPHAGE) 500 MG tablet Take 500 mg by mouth 2 (two) times daily with meals.    OMEPRAZOLE ORAL Take by mouth.    ondansetron (ZOFRAN) 4 MG tablet     ferrous sulfate 325 (65 FE) MG EC tablet Take 1 tablet (325 mg total) by mouth 2 (two) times daily.    zolpidem (AMBIEN) 10 mg Tab Take 1 tablet (10 mg total) by mouth nightly as needed.     Family History     None        Tobacco Use    Smoking status: Current Every Day Smoker     Packs/day: 1.00     Types: Cigarettes    Tobacco comment: none since July 2020   Substance and Sexual Activity    Alcohol use: Not Currently    Drug use: Not Currently    Sexual activity: Not on file     Review of Systems   Constitutional: Positive for activity change and fatigue. Negative for appetite change, chills, diaphoresis and fever.    HENT: Negative for ear discharge, ear pain and facial swelling.    Eyes: Negative for pain, redness and visual disturbance.   Respiratory: Negative for apnea, cough, choking, chest tightness, shortness of breath, wheezing and stridor.    Cardiovascular: Negative for chest pain, palpitations and leg swelling.   Gastrointestinal: Negative for abdominal distention, blood in stool, constipation, diarrhea, nausea and vomiting.   Endocrine: Negative for polydipsia and polyphagia.   Genitourinary: Negative for difficulty urinating, dysuria, flank pain and hematuria.   Musculoskeletal: Negative for neck pain and neck stiffness.   Skin: Negative for color change.   Allergic/Immunologic: Negative for food allergies.   Neurological: Positive for weakness. Negative for dizziness, seizures, facial asymmetry, speech difficulty and headaches.   Hematological: Does not bruise/bleed easily.   Psychiatric/Behavioral: Negative for agitation, behavioral problems, confusion, dysphoric mood and suicidal ideas. The patient is not nervous/anxious.      Objective:     Vital Signs (Most Recent):  Temp: 98 °F (36.7 °C) (08/25/20 1728)  Pulse: 95 (08/25/20 1728)  Resp: 16 (08/25/20 1728)  BP: 133/60 (08/25/20 1728)  SpO2: 96 % (08/25/20 1728) Vital Signs (24h Range):  Temp:  [98 °F (36.7 °C)-98.7 °F (37.1 °C)] 98 °F (36.7 °C)  Pulse:  [74-95] 95  Resp:  [16-18] 16  SpO2:  [94 %-98 %] 96 %  BP: (132-178)/(60-79) 133/60     Weight: 115.6 kg (254 lb 13.6 oz)  Body mass index is 36.57 kg/m².    Physical Exam  Vitals signs and nursing note reviewed.   Constitutional:       General: She is not in acute distress.     Appearance: She is not ill-appearing, toxic-appearing or diaphoretic.   HENT:      Head:      Comments:  craniotomy incision with staples intact     Right Ear: There is no impacted cerumen.      Left Ear: There is no impacted cerumen.      Nose: No congestion or rhinorrhea.      Mouth/Throat:      Dentition: Dental caries present.       Pharynx: No posterior oropharyngeal erythema.   Neck:      Musculoskeletal: No neck rigidity or muscular tenderness.      Vascular: No carotid bruit.   Cardiovascular:      Heart sounds: No murmur. No gallop.    Pulmonary:      Effort: No respiratory distress.      Breath sounds: No stridor. No wheezing, rhonchi or rales.   Chest:      Chest wall: No tenderness.   Abdominal:      General: There is no distension.      Palpations: There is no mass.      Tenderness: There is no abdominal tenderness. There is no right CVA tenderness, left CVA tenderness, guarding or rebound.      Hernia: No hernia is present.   Musculoskeletal:         General: No swelling, tenderness, deformity or signs of injury.      Right lower leg: No edema.      Left lower leg: No edema.   Lymphadenopathy:      Cervical: No cervical adenopathy.   Skin:     General: Skin is warm and dry.      Capillary Refill: Capillary refill takes less than 2 seconds.   Neurological:      Cranial Nerves: No cranial nerve deficit.      Sensory: No sensory deficit.      Motor: No weakness.      Coordination: Coordination normal.   Psychiatric:         Mood and Affect: Mood normal.         Behavior: Behavior normal.             Significant Labs:   CBC:   Recent Labs   Lab 08/25/20  0621   WBC 13.31*   HGB 9.9*   HCT 31.3*        CMP:   Recent Labs   Lab 08/25/20  0621   *   K 3.3*   CL 95   CO2 21*   *   BUN 17   CREATININE 0.8   CALCIUM 8.9   ANIONGAP 16   EGFRNONAA >60       Significant Imaging: I have reviewed all pertinent imaging results/findings within the past 24 hours.    Assessment/Plan:     * Hydrocephalus, acquired  S/p craniotomy and L Frontal Meningioma resection- now pt has developed Hydrocephalus in the same area- needs Shunt  No need for any Mannitol  Continue to taper decadron    Clinically a little better, Na also improved to 132   shunt in am    S/p  shunt placement- doing well post op    POD 1, repeat CT OK,  shunt  adjusted by Dr. Hagen  Continue PT/OT    08/22  - POD #2. Stable. No changes. Continue current regimen. Neurosurgery following    08/23  - POD #3. Stable. No changes. Continue current regimen. Neurosurgery following, plans to repeat CT head in AM    8/25  POD #5 doing well awaiting placement      Abscess, dental  - Dr. Hagen started on oral doxycycline today  - Will need F/U with dentist upon discharge      Status post craniotomy    Neurosurgery managing   Awaiting Rehab placement     History of CVA in adulthood  Patient reports Hx of CVA in 2015 which was felt to be caused from her prior pseudomeningocele  Patient states her home Arixtra was recently discontinued about a week or so ago      Hyperlipidemia associated with type 2 diabetes mellitus  - Continue home statin      Frequent falls  New Onset  Fall precautions  Consult physical therapy    Sec to Hydrocephalus- improving    Continue PT/OT    Hypothyroidism  - Continue levothyroxine    Lab Results   Component Value Date    TSH 1.544 07/26/2020       Continue synthroid     Type 2 diabetes mellitus with stage 3 chronic kidney disease, without long-term current use of insulin  - Accuchecks and low dose SSI    Anemia due to stage 3 chronic kidney disease  - Hgb/Hct 9..5/30.4 -- stable  - Monitor      Meningioma, cerebral s/p resection  7/31/2020 S/p Crainotomy with Aspiration of frontal pseudomeningocele   Patient reports she has been taking her decadron at home but is unsure of the dosage- Asked  to bring in   Will defer steroid dosing to NeuroSurgeon    S/p resection 7/31- See above    Await  Shunt placement    S/p resection    08/22  - Stable  - PT/OT recommending rehab placement  - Case management consulted    08/23  - Stable  - Reviewed. Unchanged. Referrals sent to [facility] rehab and [facility] rehab     8/24  Stable awaiting Rehab placement     Hypertension associated with diabetes  - Continue home medications including  amlodipine 5 mg po daily      Hyponatremia  Monitor- Levels running 135-136 three weeks ago  Add 1500ml fluid restriction  Nephrology consulted  Seizure precautions    Stable- likely sec to Intracranial tumor    Improving a little to 132 with IVF    Improving    Same at 131    08/22  - Na+ 133, improving  - Continue to monitor    08/23  - Na+ 131  - Stable. Monitor    8/25/20  Na 132. monitor      VTE Risk Mitigation (From admission, onward)         Ordered     apixaban tablet 5 mg  2 times daily      08/22/20 1516     Place HOLLAND hose  Until discontinued      08/20/20 1239     IP VTE HIGH RISK PATIENT  Once      08/20/20 1239     Place sequential compression device  Until discontinued      08/20/20 1239     Place HOLLAND hose  Until discontinued      08/17/20 3255                Wang Samson NP  Department of Hospital Medicine   [facility]

## 2020-08-25 NOTE — PT/OT/SLP PROGRESS
"Physical Therapy  Treatment    Cata Lang   MRN: [id number]   Admitting Diagnosis: Hydrocephalus, acquired    PT Received On: 08/25/20  PT Start Time: 0825     PT Stop Time: 0850    PT Total Time (min): 25 min       Billable Minutes:  Gait Training 10 min and Therapeutic Activity 15 min    Treatment Type: Treatment  PT/PTA: PT     PTA Visit Number: 0       General Precautions: Standard, fall  Orthopedic Precautions: N/A   Braces: N/A         Subjective:  Communicated with Nurse Alegria and epic chart review prior to session.  Pt found supine in bed and agreeable to tx at this time. Pt stated that she is "feeling blah today."    Pain/Comfort  Pain Rating 1: 0/10    Objective:   Patient found with: telemetry, bed alarm, duval catheter, peripheral IV    Functional Mobility:  Therapeutic Activities and Exercises:  Pt performed logging rolling to (R) and seated EOB with Mod A, scooted to EOB with Mod A and verbal cues for anterior weight shift. Pt performed sit>stand using RW with Max A x 2, gait trained ~8 steps using RW with Max A x 2 and verbal cues for proper stepping, t/f to chair with Max A x 2 using RW.  Pt educated in and instructed to perform throughout the day (B) LE therapeutic exercises 1 x 10-15 reps: MIP, TKE, AP. Pt left sitting in bedside chair with all needs met and call bell in reach, set up to eat breakfast.     AM-PAC 6 CLICK MOBILITY  How much help from another person does this patient currently need?   1 = Unable, Total/Dependent Assistance  2 = A lot, Maximum/Moderate Assistance  3 = A little, Minimum/Contact Guard/Supervision  4 = None, Modified Cassia/Independent    Turning over in bed (including adjusting bedclothes, sheets and blankets)?: 2  Sitting down on and standing up from a chair with arms (e.g., wheelchair, bedside commode, etc.): 2  Moving from lying on back to sitting on the side of the bed?: 2  Moving to and from a bed to a chair (including a wheelchair)?: 2  Need to walk in " hospital room?: 2  Climbing 3-5 steps with a railing?: 1  Basic Mobility Total Score: 11    AM-PAC Raw Score CMS G-Code Modifier Level of Impairment Assistance   6 % Total / Unable   7 - 9 CM 80 - 100% Maximal Assist   10 - 14 CL 60 - 80% Moderate Assist   15 - 19 CK 40 - 60% Moderate Assist   20 - 22 CJ 20 - 40% Minimal Assist   23 CI 1-20% SBA / CGA   24 CH 0% Independent/ Mod I     Patient left up in chair with all lines intact, call button in reach, chair alarm on and Nurse Airam notified.    Assessment:  Cata Lang is a 49 y.o. female with a medical diagnosis of Hydrocephalus, acquired and presents with impaired functional mobility. Pt will benefit from continued skilled PT in order to address the listed impairments.     Rehab identified problem list/impairments: Rehab identified problem list/impairments: weakness, impaired endurance, impaired self care skills, impaired functional mobilty, gait instability, impaired balance, decreased upper extremity function, decreased lower extremity function, decreased safety awareness    Rehab potential is good.    Activity tolerance: Fair    Discharge recommendations: Discharge Facility/Level of Care Needs: rehabilitation facility     Barriers to discharge:  UNKNOWN    Equipment recommendations: Equipment Needed After Discharge: (TBD)     GOALS:   Multidisciplinary Problems     Physical Therapy Goals        Problem: Physical Therapy Goal    Goal Priority Disciplines Outcome Goal Variances Interventions   Physical Therapy Goal     PT, PT/OT Ongoing, Progressing     Description: LTG'S TO BE MET IN 7 DAYS (8-28-20)  1. PT WILL REQUIRE ITNO FOR BED MOBILITY  2. PT WILL REQUIRE TINO FOR TF BED<>CHAIR  3. PT WILL AMB 50' WITH RW AND MODA                   PLAN:    Patient to be seen 5 x/week  to address the above listed problems via gait training, therapeutic activities, therapeutic exercises  Plan of Care expires: 08/28/20  Plan of Care reviewed with: patient          Alejandra Saenz, PT/OT  08/25/2020

## 2020-08-25 NOTE — PLAN OF CARE
[facility] is requesting additional information.  Needs PT/OT notes from last 24 hours.  Hard faxed to [phone].

## 2020-08-25 NOTE — ASSESSMENT & PLAN NOTE
Monitor- Levels running 135-136 three weeks ago  Add 1500ml fluid restriction  Nephrology consulted  Seizure precautions    Stable- likely sec to Intracranial tumor    Improving a little to 132 with IVF    Improving    Same at 131    08/22  - Na+ 133, improving  - Continue to monitor    08/23  - Na+ 131  - Stable. Monitor    8/25/20  Na 132. monitor

## 2020-08-25 NOTE — PLAN OF CARE
Received phone call from [facility].  Authorization obtained.  They are unable to accept patient this late.  Will transfer tomorrow morning.       08/25/20 1524   Post-Acute Status   Post-Acute Authorization Placement   Post-Acute Placement Status Authorization Obtained

## 2020-08-25 NOTE — PLAN OF CARE
Total A with toileting hygiene and changing brief in standing with Total A; t/f to chair with Mod A using RW

## 2020-08-25 NOTE — PT/OT/SLP PROGRESS
Occupational Therapy   Treatment    Name: Cata Lang  MRN: [id number]  Admitting Diagnosis:  Hydrocephalus, acquired  5 Days Post-Op    Recommendations:     Discharge Recommendations: rehabilitation facility  Discharge Equipment Recommendations:  walker, rolling, bedside commode, bath bench  Barriers to discharge:       Assessment:     Cata Lang is a 49 y.o. female with a medical diagnosis of Hydrocephalus, acquired.  She presents with DEBILITY AND GENERALIZED WEAKNESS. Performance deficits affecting function are weakness, gait instability, decreased upper extremity function, impaired endurance, impaired balance, decreased safety awareness, impaired self care skills, impaired functional mobilty.     Rehab Prognosis:  Fair; patient would benefit from acute skilled OT services to address these deficits and reach maximum level of function.       Plan:     Patient to be seen 3 x/week to address the above listed problems via self-care/home management, therapeutic activities, therapeutic exercises  · Plan of Care Expires: 08/25/20  · Plan of Care Reviewed with: patient    Subjective     Pain/Comfort:  · Pain Rating 1: 0/10    Objective:     Communicated with: NURSE AND Epic CHART REVIEW prior to session.  Patient found SITTING ON BSC with telemetry upon OT entry to room.    General Precautions: Standard, fall   Orthopedic Precautions:N/A   Braces: N/A     Occupational Performance:     Bed Mobility:    · Patient completed Rolling/Turning to Left with  minimum assistance  · Patient completed Scooting/Bridging with minimum assistance  · Patient completed Sit to Supine with minimum assistance     Functional Mobility/Transfers:  · Patient completed Sit <> Stand Transfer with minimum assistance  with  hand-held assist   · Patient completed Toilet Transfer Step Transfer technique with minimum assistance with  BED RAIL    Activities of Daily Living:  · Toileting: maximal assistance MAX A DUE TO FOLY PACEMENT      [facility]  "6 Click ADL: 16    Treatment & Education:  PT SEEN IN ROOM WITH NO COMPLAINTS OF PAIN. PT SEATED ON BSC WITH AND STATED" I'M TRYING TO HAVE A BM AND THEY JUST PUT MCKEON IN LAST NIGHT." PT UNABLE TO PERFORM BM ON BSC AND REQUESTED TO RETURN TO BED. PT REQ MIN A WITH BSC> BED WITH MIN A . PT REQ MIN A WITH SIT>SUPINE ON RIGHT SIDELYING.  PT WITH EXTENDED AMOUNT OF TIME. TO COMPLETE TASK.  Patient left right sidelying with all lines intact, call button in reach and SHANDRA CHIRINOS notifiedEducation:      GOALS:   Multidisciplinary Problems     Occupational Therapy Goals        Problem: Occupational Therapy Goal    Goal Priority Disciplines Outcome Interventions   Occupational Therapy Goal     OT, PT/OT Ongoing, Progressing    Description: LTGS to be met by 8/28/2020  1. Pt will perform LE dressing with Min A  2. Pt will perform UE dressing with SBA  3. Pt will perform toilet t/f with Mod A  4. Pt will perform (B) UE therapeutic exercises 1 x 20 reps                    Time Tracking:     OT Date of Treatment: 08/25/20  OT Start Time: 1025  OT Stop Time: 1050  OT Total Time (min): 25 min    Billable Minutes:Therapeutic Activity 25 MINUTES    Inna Ornelas, OT  8/25/2020    "

## 2020-08-25 NOTE — PLAN OF CARE
Patient received on shift, laying in bed awake, x4, in no acute distress or disocmofrt. Vitals present within stable limits; neurchecks done q4hrs as ordered with no new deficits observed. Medications tolerated well. Wound care to buttocks provided for and remains clean and intact. PT/OT provided for and accepted on shift. Will cont to monitor progress.

## 2020-08-25 NOTE — PROGRESS NOTES
F/U visit with Ms. Lang for continued wound care and pressure injury prevention. Patient is found resting in bed with eyes closed, awaked to verbal stimulation. Bilateral heels intact with no redness. Sacrum, coccyx intact with no redness or skin breakdown noted. Right upper buttock/hip region ulcerations again assessed. Re-epithelialized with deep red hyperpigmentation, no open wounds. Cleansed with saline, painted with cavilon, and new foam dressing applied for protection. No further wound care needs. Will sign off.

## 2020-08-26 VITALS
TEMPERATURE: 99 F | WEIGHT: 246.06 LBS | HEIGHT: 70 IN | OXYGEN SATURATION: 93 % | BODY MASS INDEX: 35.23 KG/M2 | HEART RATE: 85 BPM | RESPIRATION RATE: 15 BRPM | DIASTOLIC BLOOD PRESSURE: 72 MMHG | SYSTOLIC BLOOD PRESSURE: 153 MMHG

## 2020-08-26 LAB
ANION GAP SERPL CALC-SCNC: 13 MMOL/L (ref 8–16)
BASOPHILS # BLD AUTO: 0.06 K/UL (ref 0–0.2)
BASOPHILS NFR BLD: 0.5 % (ref 0–1.9)
BUN SERPL-MCNC: 16 MG/DL (ref 6–20)
CALCIUM SERPL-MCNC: 8.6 MG/DL (ref 8.7–10.5)
CHLORIDE SERPL-SCNC: 97 MMOL/L (ref 95–110)
CO2 SERPL-SCNC: 22 MMOL/L (ref 23–29)
CREAT SERPL-MCNC: 0.8 MG/DL (ref 0.5–1.4)
DIFFERENTIAL METHOD: ABNORMAL
EOSINOPHIL # BLD AUTO: 0.3 K/UL (ref 0–0.5)
EOSINOPHIL NFR BLD: 2.3 % (ref 0–8)
ERYTHROCYTE [DISTWIDTH] IN BLOOD BY AUTOMATED COUNT: 17 % (ref 11.5–14.5)
EST. GFR  (AFRICAN AMERICAN): >60 ML/MIN/1.73 M^2
EST. GFR  (NON AFRICAN AMERICAN): >60 ML/MIN/1.73 M^2
GLUCOSE SERPL-MCNC: 136 MG/DL (ref 70–110)
HCT VFR BLD AUTO: 31.2 % (ref 37–48.5)
HGB BLD-MCNC: 9.7 G/DL (ref 12–16)
IMM GRANULOCYTES # BLD AUTO: 0.15 K/UL (ref 0–0.04)
IMM GRANULOCYTES NFR BLD AUTO: 1.3 % (ref 0–0.5)
LYMPHOCYTES # BLD AUTO: 2.9 K/UL (ref 1–4.8)
LYMPHOCYTES NFR BLD: 24.8 % (ref 18–48)
MCH RBC QN AUTO: 26.4 PG (ref 27–31)
MCHC RBC AUTO-ENTMCNC: 31.1 G/DL (ref 32–36)
MCV RBC AUTO: 85 FL (ref 82–98)
MONOCYTES # BLD AUTO: 0.8 K/UL (ref 0.3–1)
MONOCYTES NFR BLD: 6.7 % (ref 4–15)
NEUTROPHILS # BLD AUTO: 7.4 K/UL (ref 1.8–7.7)
NEUTROPHILS NFR BLD: 64.4 % (ref 38–73)
NRBC BLD-RTO: 0 /100 WBC
PLATELET # BLD AUTO: 251 K/UL (ref 150–350)
PMV BLD AUTO: 11 FL (ref 9.2–12.9)
POCT GLUCOSE: 122 MG/DL (ref 70–110)
POTASSIUM SERPL-SCNC: 3.5 MMOL/L (ref 3.5–5.1)
RBC # BLD AUTO: 3.68 M/UL (ref 4–5.4)
SODIUM SERPL-SCNC: 132 MMOL/L (ref 136–145)
WBC # BLD AUTO: 11.49 K/UL (ref 3.9–12.7)

## 2020-08-26 PROCEDURE — 36415 COLL VENOUS BLD VENIPUNCTURE: CPT

## 2020-08-26 PROCEDURE — 80048 BASIC METABOLIC PNL TOTAL CA: CPT

## 2020-08-26 PROCEDURE — 99024 PR POST-OP FOLLOW-UP VISIT: ICD-10-PCS | Mod: ,,, | Performed by: NEUROLOGICAL SURGERY

## 2020-08-26 PROCEDURE — 94761 N-INVAS EAR/PLS OXIMETRY MLT: CPT

## 2020-08-26 PROCEDURE — 97110 THERAPEUTIC EXERCISES: CPT

## 2020-08-26 PROCEDURE — 85025 COMPLETE CBC W/AUTO DIFF WBC: CPT

## 2020-08-26 PROCEDURE — 25000003 PHARM REV CODE 250: Performed by: NURSE PRACTITIONER

## 2020-08-26 PROCEDURE — 99024 POSTOP FOLLOW-UP VISIT: CPT | Mod: ,,, | Performed by: NEUROLOGICAL SURGERY

## 2020-08-26 PROCEDURE — 25000003 PHARM REV CODE 250: Performed by: EMERGENCY MEDICINE

## 2020-08-26 PROCEDURE — 97530 THERAPEUTIC ACTIVITIES: CPT

## 2020-08-26 PROCEDURE — 25000003 PHARM REV CODE 250: Performed by: NEUROLOGICAL SURGERY

## 2020-08-26 RX ORDER — DOXYCYCLINE HYCLATE 100 MG
100 TABLET ORAL EVERY 12 HOURS
Start: 2020-08-26 | End: 2020-09-05

## 2020-08-26 RX ORDER — ASCORBIC ACID 500 MG
500 TABLET ORAL NIGHTLY
COMMUNITY
Start: 2020-08-26

## 2020-08-26 RX ADMIN — CALCIUM 500 MG: 500 TABLET ORAL at 12:08

## 2020-08-26 RX ADMIN — CALCIUM 500 MG: 500 TABLET ORAL at 08:08

## 2020-08-26 RX ADMIN — THERA TABS 1 TABLET: TAB at 08:08

## 2020-08-26 RX ADMIN — ACETAMINOPHEN 650 MG: 325 TABLET ORAL at 05:08

## 2020-08-26 RX ADMIN — APIXABAN 5 MG: 2.5 TABLET, FILM COATED ORAL at 08:08

## 2020-08-26 RX ADMIN — FERROUS SULFATE TAB EC 325 MG (65 MG FE EQUIVALENT) 325 MG: 325 (65 FE) TABLET DELAYED RESPONSE at 08:08

## 2020-08-26 RX ADMIN — LEVOTHYROXINE SODIUM 137 MCG: 25 TABLET ORAL at 05:08

## 2020-08-26 RX ADMIN — PANTOPRAZOLE SODIUM 40 MG: 40 TABLET, DELAYED RELEASE ORAL at 08:08

## 2020-08-26 RX ADMIN — Medication 5000 UNITS: at 08:08

## 2020-08-26 RX ADMIN — LEVETIRACETAM 1500 MG: 500 TABLET ORAL at 08:08

## 2020-08-26 RX ADMIN — DULOXETINE 30 MG: 30 CAPSULE, DELAYED RELEASE ORAL at 08:08

## 2020-08-26 RX ADMIN — CALCITRIOL CAPSULES 0.25 MCG 0.25 MCG: 0.25 CAPSULE ORAL at 08:08

## 2020-08-26 RX ADMIN — DOXYCYCLINE HYCLATE 100 MG: 100 TABLET, COATED ORAL at 08:08

## 2020-08-26 RX ADMIN — HYDROCODONE BITARTRATE AND ACETAMINOPHEN 1 TABLET: 5; 325 TABLET ORAL at 12:08

## 2020-08-26 NOTE — PROGRESS NOTES
Cata Lang is a 49 y.o. female patient.   1. Obstructive hydrocephalus    2. Status post craniotomy    3. Meningioma, cerebral    4. Hyponatremia    5. Hypertension associated with diabetes    6. Calcified cerebral meningioma s/p Craniotomy/ Resection    7. Anemia due to stage 3 chronic kidney disease    8. Hypocalcemia    9. Postprocedural pseudomeningocele    10. Hydrocephalus, acquired    11. Type 2 diabetes mellitus with stage 3 chronic kidney disease, without long-term current use of insulin    12. Frequent falls    13. Hypokalemia    14. Pre-op evaluation    15. Other hydrocephalus      Past Medical History:   Diagnosis Date    Abscess, dental 8/23/2020    Diabetes mellitus     Hydrocephalus, acquired 8/17/2020    Hypertension     Seizures     Stroke     Thyroid disease      No past surgical history pertinent negatives on file.  Scheduled Meds:   amLODIPine  5 mg Oral Daily    apixaban  5 mg Oral BID    ascorbic acid (vitamin C)  500 mg Oral QHS    atorvastatin  10 mg Oral QHS    calcitRIOL  0.25 mcg Oral Daily    calcium carbonate  500 mg Oral QID    cholecalciferol (vitamin D3)  5,000 Units Oral Daily    doxycycline  100 mg Oral Q12H    DULoxetine  30 mg Oral Daily    ferrous sulfate  325 mg Oral BID WM    gabapentin  600 mg Oral QHS    levETIRAcetam  1,500 mg Oral BID    levothyroxine  137 mcg Oral Before breakfast    multivitamin  1 tablet Oral Daily    nozaseptin   Each Nostril BID    pantoprazole  40 mg Oral Daily     Continuous Infusions:  PRN Meds:acetaminophen, dextrose 50%, dextrose 50%, famotidine, glucagon (human recombinant), glucose, glucose, hydrALAZINE, HYDROcodone-acetaminophen, ibuprofen, insulin aspart U-100, melatonin, ondansetron    Review of patient's allergies indicates:   Allergen Reactions    Heparin analogues Other (See Comments)     DANYELLE     Hydrochlorothiazide      hypercalcemia     Active Hospital Problems    Diagnosis  POA    *Hydrocephalus,  "acquired [G91.9]  Yes     Priority: 1 - High    Meningioma, cerebral s/p resection [D32.0]  Yes     Priority: 1 - High    Abscess, dental [K04.7]  Yes     Chronic    Status post craniotomy [Z98.890]  Not Applicable    Type 2 diabetes mellitus with stage 3 chronic kidney disease, without long-term current use of insulin [E11.22, N18.3]  Yes    Hypothyroidism [E03.9]  Yes    Frequent falls [R29.6]  Not Applicable    Hyperlipidemia associated with type 2 diabetes mellitus [E11.69, E78.5]  Yes    History of CVA in adulthood [Z86.73]  Not Applicable    Anemia due to stage 3 chronic kidney disease [N18.3, D63.1]  Yes    Hyponatremia [E87.1]  Yes    Hypertension associated with diabetes [E11.59, I10]  Yes      Resolved Hospital Problems    Diagnosis Date Resolved POA    Postprocedural pseudomeningocele [G97.82] 08/17/2020 Yes     Priority: 2     Hypocalcemia [E83.51] 08/20/2020 Yes     Blood pressure (!) 153/72, pulse 85, temperature 98.5 °F (36.9 °C), temperature source Oral, resp. rate 15, height 5' 10" (1.778 m), weight 111.6 kg (246 lb 0.5 oz), SpO2 (!) 93 %, not currently breastfeeding.    Subjective:   Diet: Adequate intake.  Patient reports no nausea or vomiting.    Activity level: Returning to normal.    Pain control: Well controlled.      Objective:  Vital signs (most recent): Blood pressure (!) 153/72, pulse 85, temperature 98.5 °F (36.9 °C), temperature source Oral, resp. rate 15, height 5' 10" (1.778 m), weight 111.6 kg (246 lb 0.5 oz), SpO2 (!) 93 %, not currently breastfeeding.  General appearance: Comfortable and in no acute distress.    Lungs:  Normal effort.  Breath sounds normal.    Heart: Normal rate.  Regular rhythm.    Chest: Symmetric chest wall expansion.    Abdomen: Abdomen is soft and flat.    Bowel sounds:  Bowel sounds are normal.    Wound:  Clean.  There is no dehiscence.  There is no drainage.    Neurological: The patient is alert and oriented to person, place and time.  GCS score: " 15.       Assessment:   Post-op: 6 days.    Condition: In stable condition.     Plan:  Transfer Plan: Ready for transfer to rehab.     Follow up and discharge instructions placed.       Jose Hagen MD  8/26/2020

## 2020-08-26 NOTE — PLAN OF CARE
Patients received laying in bed awake, x4, in no acute distress or discomfort. Vitals present within stable limits. Medications tolerated well.  Surgical site to head remains clean and dry STEPHANY; denies pain or discomfort to site at this time. PT/OT conducted on shift; pt. Accepted. Safety precautions maintained. Will cont. To monitor progress.

## 2020-08-26 NOTE — DISCHARGE SUMMARY
[facility] Hospital Medicine  Discharge Summary      Patient Name: Cata Lang  MRN: [id number]  Admission Date: 8/17/2020  Hospital Length of Stay: 9 days  Discharge Date and Time:  08/26/2020 10:39 AM  Attending Physician: Rj Low MD   Discharging Provider: Mey Perera NP  Primary Care Provider: Sabra Fregoso MD      HPI:   This is a 50 yo female who has a PMHX of Dm2, HTN, CKD stage 3, anemia of chronic disease, Prior CVA in 2015 felt to be from her with residual left sided weakness,  And s/p craniotomy for meningioma on 7/31/20 done by Dr. Hagen. She was discharged from rehab 3 days ago and reports that the next day she began  having dizziness, HA, and increased left sided weakness form baseline. Patient has also been having multiple falls the past few days. Patient had a follow up visit with Dr. Hagen today and per report, CT showed signs of  hydrocephalus. Dr. Hagen requested patient be placed in the hospital for further monitoring, physical therapy, and plans for upcoming   shunt placement.                                                   Procedure(s) (LRB):  INSERTION, SHUNT, VENTRICULOPERITONEAL, ENDOSCOPIC, USING STEALTH (Right)  LAPAROSCOPY, DIAGNOSTIC (N/A)      Hospital Course:   50 yo female who has a PMHX of DM2, HTN, CKD stage 3, anemia of chronic disease, Prior CVA in 2015 with residual left sided weakness, nd s/p craniotomy for meningioma on 7/31/20 done by Dr. Hagen. She was discharged from rehab 3 days ago and reports that the next day she began having dizziness, HA, and increased left sided weakness form baseline. Patient has also been having multiple falls the past few days. Patient had a follow up visit with Dr. Hagen today and per report, CT showed signs of Hydrocephalus. She was also found to be Hyponatremic with a Na of 129- likely sec to IVVD. Pt admitted to [facility] and started on IVF. Pt was also a tapering dose of Decadron orally. This  morning she feels lot better, more alert and awake and stronger, speech getting clearer. Dr. Hagen plans Shunt placement this Friday. Na and Cl slightly better, pt eating drinking better too. Will get PT/OT in am   8/19- Seen and examined with Dr. Jose Hagen, looks and feels better, more alert and responsive, facial expression improved, Na has improved to 132, feels stronger on her legs. Participated with PT and walked a little. Await  shunt surgery in am.   8/20- seen post op, doing a little better, s/p  shunt placement by Dr. Hagen which went uneventfully. Post op was AAOX3, speech clear, her HA was much better, just had incisional pain. Now resting comfortably after Norco.   8/21- POD 1, doing better. Resting comfortable, did OK with PT/OT, eating drinking more, labs improved, Na still 131. Getting IVF so will stop in am. Dr. Hagen following and adjusting the  shunt. Had post op CT head this am. Hydrocephalus still the same.   As of 08/22 -- POD # 2. Labs reviewed. Stable. PT/OT recommending rehab placement. Consult placed to case management for dispo. Per neurosurgery, pt can now start DVT prophylaxis.  Unable to tolerate lovenox.   As of 08/23 -- POD # 3. Labs reviewed. Slight increase in WBC count from 11.57K to 13.06K. Afebrile. Discussed case with Dr. Hagen, feels pt may have a dental abscess. Placed on oral doxycycline. Social work consulted for rehab placement -- referrals sent to [facility] and [facility].   As of 08/24- POD #4,  shunt surgery, awaiting Rehab placement. Continue Doxycillin for dental abscess.   As of 8/25/20, patient is doing well. Has been accepted to [facility]. Will plan to discharge in AM. Requesting suppository.     As of 8/26 Patient has been evaluated bu neurosurgeon Dr. Hagen and cleared for DC to inpatient rehab today with outpatient follow-up in clinic in approximately 2 weeks.  VS remain stable, and patient has no  complaints.  She is currently AAO, resting comfortably in bed with  at bedside.  Incisions are CDI with no drainage, or s/s of infection.  Case d/w Dr. Hagen, ok to continue home ASA and Eliquis for DVT prophylaxis with plans to DC Eliquis once patient is more mobile.  Case also d/w Dr. Low, patient seen and examined and deemed medically stable to DC to inpatient rehab today.  Patient will DC to the care of [facility] for continued PT/OT 5 times per week.  Medications reconciled for DC.  Patient will need to complete a 10 day course of oral doxycycline for suspected dental abscess and will need outpatient follow-up with her dentist for monitoring.  She will need to follow-up with her PCP and with neuro surgery upon DC from rehab as directed.     Consults:   Consults (From admission, onward)        Status Ordering Provider     Inpatient consult to Neurosurgery  Once     Provider:  Jose Hagen MD    Completed CRYSTAL PABON     Inpatient consult to Social Work  Once     Provider:  (Not yet assigned)    Completed TOPHER VALVERDE          No new Assessment & Plan notes have been filed under this hospital service since the last note was generated.  Service: Hospital Medicine    Final Active Diagnoses:    Diagnosis Date Noted POA    PRINCIPAL PROBLEM:  Hydrocephalus, acquired [G91.9] 08/17/2020 Yes    Abscess, dental [K04.7] 08/23/2020 Yes     Chronic    Status post craniotomy [Z98.890] 08/20/2020 Not Applicable    Type 2 diabetes mellitus with stage 3 chronic kidney disease, without long-term current use of insulin [E11.22, N18.3] 08/17/2020 Yes    Hypothyroidism [E03.9] 08/17/2020 Yes    Frequent falls [R29.6] 08/17/2020 Not Applicable    Hyperlipidemia associated with type 2 diabetes mellitus [E11.69, E78.5] 08/17/2020 Yes    History of CVA in adulthood [Z86.73] 08/17/2020 Not Applicable    Anemia due to stage 3 chronic kidney disease [N18.3, D63.1]  Yes    Meningioma, cerebral s/p  resection [D32.0] 07/31/2020 Yes    Hyponatremia [E87.1] 07/26/2020 Yes    Hypertension associated with diabetes [E11.59, I10] 07/26/2020 Yes      Problems Resolved During this Admission:    Diagnosis Date Noted Date Resolved POA    Postprocedural pseudomeningocele [G97.82] 08/06/2020 08/17/2020 Yes    Hypocalcemia [E83.51] 08/02/2020 08/20/2020 Yes       Discharged Condition: good    Disposition: Rehab Facility    Follow Up:  Follow-up Information     Tania Arthur PA-C In 2 weeks.    Specialty: Neurosurgery  Why: For wound re-check and head CT no contrast  Contact information:  [address] [place] [address] DR Xavi MARTINEZ [address] [phone]             Sabra Fregoso MD.    Specialty: Internal Medicine  Why: Follow-up with PCP upon discharge from rehab.  Contact information:  [address] [place] [address] Dr Casey MARTINEZ [id number] [phone]                 Patient Instructions:      CT Head Without Contrast   Standing Status: Future Standing Exp. Date: 09/26/20   Scheduling Instructions: In 2 weeks     Order Specific Question Answer Comments   May the Radiologist modify the order per protocol to meet the clinical needs of the patient? Yes      Diet Adult Regular     Notify your health care provider if you experience any of the following:  temperature >100.4     Notify your health care provider if you experience any of the following:  persistent nausea and vomiting or diarrhea     Notify your health care provider if you experience any of the following:  severe uncontrolled pain     Notify your health care provider if you experience any of the following:  severe persistent headache     Notify your health care provider if you experience any of the following:  persistent dizziness, light-headedness, or visual disturbances     Notify your health care provider if you experience any of the following:  increased confusion or weakness     Activity as tolerated       Significant Diagnostic Studies: Labs:   BMP:   Recent  Labs   Lab 08/25/20  0621 08/26/20  0714   * 136*   * 132*   K 3.3* 3.5   CL 95 97   CO2 21* 22*   BUN 17 16   CREATININE 0.8 0.8   CALCIUM 8.9 8.6*   , CMP   Recent Labs   Lab 08/25/20  0621 08/26/20  0714   * 132*   K 3.3* 3.5   CL 95 97   CO2 21* 22*   * 136*   BUN 17 16   CREATININE 0.8 0.8   CALCIUM 8.9 8.6*   ANIONGAP 16 13   ESTGFRAFRICA >60 >60   EGFRNONAA >60 >60    and CBC   Recent Labs   Lab 08/25/20  0621 08/26/20  0714   WBC 13.31* 11.49   HGB 9.9* 9.7*   HCT 31.3* 31.2*    251       Pending Diagnostic Studies:     None         Medications:  Reconciled Home Medications:      Medication List      START taking these medications    apixaban 5 mg Tab  Commonly known as: ELIQUIS  Take 1 tablet (5 mg total) by mouth 2 (two) times daily.     ascorbic acid (vitamin C) 500 MG tablet  Commonly known as: VITAMIN C  Take 1 tablet (500 mg total) by mouth every evening.     doxycycline 100 MG tablet  Commonly known as: VIBRA-TABS  Take 1 tablet (100 mg total) by mouth every 12 (twelve) hours. for 10 days     multivitamin Tab  Take 1 tablet by mouth once daily.  Start taking on: August 27, 2020        CONTINUE taking these medications    amLODIPine 5 MG tablet  Commonly known as: NORVASC  Take 1 tablet (5 mg total) by mouth once daily.     aspirin 81 MG Chew  Take 1 tablet (81 mg total) by mouth once daily.     atorvastatin 10 MG tablet  Commonly known as: LIPITOR  Take 10 mg by mouth once daily.     calcitRIOL 0.25 MCG Cap  Commonly known as: ROCALTROL  0.25 mcg once daily.     calcium carbonate 500 mg calcium (1,250 mg) tablet  Commonly known as: OS-THANIA  Take 1 tablet (500 mg total) by mouth 4 (four) times daily.     cholecalciferol (vitamin D3) 125 mcg (5,000 unit) Tab  Take 1 tablet (5,000 Units total) by mouth once daily.     DULoxetine 30 MG capsule  Commonly known as: CYMBALTA     ferrous sulfate 325 (65 FE) MG EC tablet  Take 1 tablet (325 mg total) by mouth 2 (two) times  daily.     gabapentin 600 MG tablet  Commonly known as: NEURONTIN  Take 600 mg by mouth once daily.     HYDROcodone-acetaminophen 5-325 mg per tablet  Commonly known as: NORCO  Take 1 tablet by mouth every 4 (four) hours as needed.     levETIRAcetam 500 MG Tab  Commonly known as: KEPPRA  Take 1,500 mg by mouth 2 (two) times daily.     levothyroxine 137 MCG Tab tablet  Commonly known as: SYNTHROID  Take 1 tablet (137 mcg total) by mouth once daily.     metFORMIN 500 MG tablet  Commonly known as: GLUCOPHAGE  Take 500 mg by mouth 2 (two) times daily with meals.     OMEPRAZOLE ORAL  Take by mouth.     zolpidem 10 mg Tab  Commonly known as: AMBIEN  Take 1 tablet (10 mg total) by mouth nightly as needed.        STOP taking these medications    dexAMETHasone 2 MG tablet  Commonly known as: DECADRON     ondansetron 4 MG tablet  Commonly known as: ZOFRAN            Indwelling Lines/Drains at time of discharge:   Lines/Drains/Airways     Drain                 Urethral Catheter 08/25/20 0005 Straight-tip 16 Fr. 1 day                Time spent on the discharge of patient: 40 minutes  Patient was seen and examined on the date of discharge and determined to be suitable for discharge.         Mey Perera, RANDA, ACNP-BC  Department of Hospital Medicine  [facility]

## 2020-08-26 NOTE — PLAN OF CARE
Fall prevention precautions maintained, pt remained free of falls throughout shift, call bell and personal items within reach, pt's pain adequately controlled by prn pain medication, pt given suppository resulting in BMx2. 24 hour chart check completed. Will continue to monitor.

## 2020-08-26 NOTE — DISCHARGE INSTRUCTIONS
If you have any questions, please contact the neurosurgery clinic.    You may start to shower.  It is ok to get your incision wet.  Use mild soap such as baby shampoo near your incision.  Pat incision dry.    Please ambulate with assistance and perform leg pumping exercises to avoid getting any blood clots in the legs    Take your pain medications and anti-seizure medications as prescribed.    You are no longer required to take your steroid as this has been discontinued.    No driving at this time.    You will follow-up in clinic in 2 weeks for wound check and with a head CT no contrast.

## 2020-08-26 NOTE — PT/OT/SLP PROGRESS
Physical Therapy  Treatment    Cata Lang   MRN: [id number]   Admitting Diagnosis: Hydrocephalus, acquired    PT Received On: 08/26/20  PT Start Time: 0750     PT Stop Time: 0815    PT Total Time (min): 25 min       Billable Minutes:  Therapeutic Activity 15 and Therapeutic Exercise 10    Treatment Type: Treatment  PT/PTA: PT     PTA Visit Number: 0       General Precautions: Standard, fall  Orthopedic Precautions: N/A   Braces: N/A         Subjective:  Communicated with NURSE ACEVEDO AND Epic CHART REVIEW  prior to session.  PT AGREED TO TX     Pain/Comfort  Pain Rating 1: 0/10  Pain Rating Post-Intervention 1: 0/10    Objective:   Patient found with: peripheral IV, telemetry    Functional Mobility:  PT MET IN RM LOG ROLLED TO RIGHT AND SEATED EOB WITH MOD A AND INC TIME TO COMPLETED TASK. PT SCOOTED TO EOB WITH MOD AND MULT CUES TO COMPLETE TASK. PT WITH SLOW RESPONSE TO CUES AND REQUIRES MULT CUES TO COMPLETE TASK. PT STOOD X 2 TRIALS WITH MAX A X 2 AND LEFT LATERAL LEAN. PT WITH INC DIFFICULTY UN- WEIGHTING R LE D/T L LE WEAKNESS. PT RETURNED SEATED FOR REST. PT STOOD X 3 TRIAL AND T/F TO CHAIR WITH MAX A X 2 AND DEC ECCENTRIC CONTROL. PT SCOOTED BACK IN CHAIR WITH MIN A AND LEFT SEATED AND SET UP WITH BREAKFAST. PT LEFT WITH ALL NEEDS MET AND  PRESENT.     AM-PAC 6 CLICK MOBILITY  How much help from another person does this patient currently need?   1 = Unable, Total/Dependent Assistance  2 = A lot, Maximum/Moderate Assistance  3 = A little, Minimum/Contact Guard/Supervision  4 = None, Modified [place]/Independent    Turning over in bed (including adjusting bedclothes, sheets and blankets)?: 2  Sitting down on and standing up from a chair with arms (e.g., wheelchair, bedside commode, etc.): 2  Moving from lying on back to sitting on the side of the bed?: 2  Moving to and from a bed to a chair (including a wheelchair)?: 2  Need to walk in hospital room?: 1  Climbing 3-5 steps with a railing?:  1  Basic Mobility Total Score: 10    AM-PAC Raw Score CMS G-Code Modifier Level of Impairment Assistance   6 % Total / Unable   7 - 9 CM 80 - 100% Maximal Assist   10 - 14 CL 60 - 80% Moderate Assist   15 - 19 CK 40 - 60% Moderate Assist   20 - 22 CJ 20 - 40% Minimal Assist   23 CI 1-20% SBA / CGA   24 CH 0% Independent/ Mod I     Patient left up in chair with call button in reach and chair alarm on.    Assessment:  PT CHAVEZ MIN TX HOWEVER FATIGUES QUICKLY AND CONT L SIDED WEAKNESS LIMITS GROSS FUNC MOBILITY     Rehab identified problem list/impairments: Rehab identified problem list/impairments: weakness, impaired endurance, impaired self care skills, impaired functional mobilty, gait instability, impaired balance, pain, decreased safety awareness, decreased lower extremity function, decreased upper extremity function, decreased coordination, decreased ROM, abnormal tone    Rehab potential is good.    Activity tolerance: Poor    Discharge recommendations: Discharge Facility/Level of Care Needs: rehabilitation facility     Barriers to discharge:      Equipment recommendations:       GOALS:   Multidisciplinary Problems     Physical Therapy Goals        Problem: Physical Therapy Goal    Goal Priority Disciplines Outcome Goal Variances Interventions   Physical Therapy Goal     PT, PT/OT Ongoing, Progressing     Description: LTG'S TO BE MET IN 7 DAYS (8-28-20)  1. PT WILL REQUIRE TINO FOR BED MOBILITY  2. PT WILL REQUIRE TINO FOR TF BED<>CHAIR  3. PT WILL AMB 50' WITH RW AND MODA                   PLAN:    Patient to be seen 5 x/week  to address the above listed problems via therapeutic activities, therapeutic exercises  Plan of Care expires: 08/26/20  Plan of Care reviewed with: patient         Harriett Bernard, PT  08/26/2020

## 2020-08-26 NOTE — PLAN OF CARE
Patient being transferred to [facility] today.  Please call report to [phone].  Patient's  is transporting patient to rehab in his private vehicle.    D/c order, JENNI STORM faxed to [facility] @ [phone]       08/26/20 1037   Post-Acute Status   Post-Acute Authorization Placement   Post-Acute Placement Status Set-up Complete

## 2020-08-27 NOTE — PROGRESS NOTES
[facility] Hospital Medicine  Progress Note    Patient Name: Cata Lang  MRN: [id number]  Admission Date: 8/17/2020  Attending Physician: Rj Low MD   Primary Care Provider: Sabra Fregoso MD        Patient information was obtained from patient and ER records.      Subjective:      Principal Problem:Hydrocephalus, acquired     Chief Complaint:        Chief Complaint   Patient presents with    Post-op Problem       Sent by neurosurgery, Dr. Hagen for shunt placement s/p craniotomy x2wks. Hydrocephalus noted on CT scan    HPI: This is a 50 yo female who has a PMHX of Dm2, HTN, CKD stage 3, anemia of chronic disease, Prior CVA in 2015 felt to be from her with residual left sided weakness,  And s/p craniotomy for meningioma on 7/31/20 done by Dr. Hagen. She was discharged from rehab 3 days ago and reports that the next day she began  having dizziness, HA, and increased left sided weakness form baseline. Patient has also been having multiple falls the past few days. Patient had a follow up visit with Dr. Hagen today and per report, CT showed signs of  hydrocephalus. Dr. Hagen requested patient be placed in the hospital for further monitoring, physical therapy, and plans for upcoming   shunt placement.         Hospital Course:   50 yo female who has a PMHX of DM2, HTN, CKD stage 3, anemia of chronic disease, Prior CVA in 2015 with residual left sided weakness, nd s/p craniotomy for meningioma on 7/31/20 done by Dr. Hagen. She was discharged from rehab 3 days ago and reports that the next day she began having dizziness, HA, and increased left sided weakness form baseline. Patient has also been having multiple falls the past few days. Patient had a follow up visit with Dr. Hagen today and per report, CT showed signs of Hydrocephalus. She was also found to be Hyponatremic with a Na of 129- likely sec to IVVD. Pt admitted to Tele and started on IVF. Pt was also a tapering dose of  Decadron orally. This morning she feels lot better, more alert and awake and stronger, speech getting clearer. Dr. Hagen plans Shunt placement this Friday. Na and Cl slightly better, pt eating drinking better too. Will get PT/OT in am   8/19- Seen and examined with Dr. Jose Hagen, looks and feels better, more alert and responsive, facial expression improved, Na has improved to 132, feels stronger on her legs. Participated with PT and walked a little. Await  shunt surgery in am.   8/20- seen post op, doing a little better, s/p  shunt placement by Dr. Hagen which went uneventfully. Post op was AAOX3, speech clear, her HA was much better, just had incisional pain. Now resting comfortably after Norco.   8/21- POD 1, doing better. Resting comfortable, did OK with PT/OT, eating drinking more, labs improved, Na still 131. Getting IVF so will stop in am. Dr. Hagen following and adjusting the  shunt. Had post op CT head this am. Hydrocephalus still the same.   As of 08/22 -- POD # 2. Labs reviewed. Stable. PT/OT recommending rehab placement. Consult placed to case management for dispo. Per neurosurgery, pt can now start DVT prophylaxis.  Unable to tolerate lovenox.   As of 08/23 -- POD # 3. Labs reviewed. Slight increase in WBC count from 11.57K to 13.06K. Afebrile. Discussed case with Dr. Hagen, feels pt may have a dental abscess. Placed on oral doxycycline. Social work consulted for rehab placement -- referrals sent to [facility] and [facility].   As of 08/24- POD #4,  shunt surgery, awaiting Rehab placement. Continue Doxycillin for dental abscess.   As of 8/25/20, patient is doing well. Has been accepted to [facility]. Will plan to discharge in AM. Requesting suppository.              Review of Systems   Constitutional: Positive for activity change and fatigue. Negative for appetite change, chills, diaphoresis and fever.   HENT: Negative for ear discharge, ear pain  and facial swelling.    Eyes: Negative for pain, redness and visual disturbance.   Respiratory: Negative for apnea, cough, choking, chest tightness, shortness of breath, wheezing and stridor.    Cardiovascular: Negative for chest pain, palpitations and leg swelling.   Gastrointestinal: Negative for abdominal distention, blood in stool, constipation, diarrhea, nausea and vomiting.   Endocrine: Negative for polydipsia and polyphagia.   Genitourinary: Negative for difficulty urinating, dysuria, flank pain and hematuria.   Musculoskeletal: Negative for neck pain and neck stiffness.   Skin: Negative for color change.   Allergic/Immunologic: Negative for food allergies.   Neurological: Positive for weakness. Negative for dizziness, seizures, facial asymmetry, speech difficulty and headaches.   Hematological: Does not bruise/bleed easily.   Psychiatric/Behavioral: Negative for agitation, behavioral problems, confusion, dysphoric mood and suicidal ideas. The patient is not nervous/anxious.       Objective:      Vital Signs (Most Recent):  Temp: 98 °F (36.7 °C) (08/25/20 1728)  Pulse: 95 (08/25/20 1728)  Resp: 16 (08/25/20 1728)  BP: 133/60 (08/25/20 1728)  SpO2: 96 % (08/25/20 1728) Vital Signs (24h Range):  Temp:  [98 °F (36.7 °C)-98.7 °F (37.1 °C)] 98 °F (36.7 °C)  Pulse:  [74-95] 95  Resp:  [16-18] 16  SpO2:  [94 %-98 %] 96 %  BP: (132-178)/(60-79) 133/60      Weight: 115.6 kg (254 lb 13.6 oz)  Body mass index is 36.57 kg/m².     Physical Exam  Vitals signs and nursing note reviewed.   Constitutional:       General: She is not in acute distress.     Appearance: She is not ill-appearing, toxic-appearing or diaphoretic.   HENT:      Head:      Comments:  craniotomy incision with staples intact     Right Ear: There is no impacted cerumen.      Left Ear: There is no impacted cerumen.      Nose: No congestion or rhinorrhea.      Mouth/Throat:      Dentition: Dental caries present.      Pharynx: No posterior oropharyngeal  erythema.   Neck:      Musculoskeletal: No neck rigidity or muscular tenderness.      Vascular: No carotid bruit.   Cardiovascular:      Heart sounds: No murmur. No gallop.    Pulmonary:      Effort: No respiratory distress.      Breath sounds: No stridor. No wheezing, rhonchi or rales.   Chest:      Chest wall: No tenderness.   Abdominal:      General: There is no distension.      Palpations: There is no mass.      Tenderness: There is no abdominal tenderness. There is no right CVA tenderness, left CVA tenderness, guarding or rebound.      Hernia: No hernia is present.   Musculoskeletal:         General: No swelling, tenderness, deformity or signs of injury.      Right lower leg: No edema.      Left lower leg: No edema.   Lymphadenopathy:      Cervical: No cervical adenopathy.   Skin:     General: Skin is warm and dry.      Capillary Refill: Capillary refill takes less than 2 seconds.   Neurological:      Cranial Nerves: No cranial nerve deficit.      Sensory: No sensory deficit.      Motor: No weakness.      Coordination: Coordination normal.   Psychiatric:         Mood and Affect: Mood normal.         Behavior: Behavior normal.               Significant Labs:   CBC:       Recent Labs   Lab 08/25/20  0621   WBC 13.31*   HGB 9.9*   HCT 31.3*         CMP:       Recent Labs   Lab 08/25/20  0621   *   K 3.3*   CL 95   CO2 21*   *   BUN 17   CREATININE 0.8   CALCIUM 8.9   ANIONGAP 16   EGFRNONAA >60         Significant Imaging: I have reviewed all pertinent imaging results/findings within the past 24 hours.     Assessment/Plan:      * Hydrocephalus, acquired  S/p craniotomy and L Frontal Meningioma resection- now pt has developed Hydrocephalus in the same area- needs Shunt  No need for any Mannitol  Continue to taper decadron     Clinically a little better, Na also improved to 132   shunt in am     S/p  shunt placement- doing well post op     POD 1, repeat CT OK,  shunt adjusted by   Hieu  Continue PT/OT     08/22  - POD #2. Stable. No changes. Continue current regimen. Neurosurgery following     08/23  - POD #3. Stable. No changes. Continue current regimen. Neurosurgery following, plans to repeat CT head in AM     8/25  POD #5 doing well awaiting placement        Abscess, dental  - Dr. Hagen started on oral doxycycline today  - Will need F/U with dentist upon discharge        Status post craniotomy     Neurosurgery managing   Awaiting Rehab placement      History of CVA in adulthood  Patient reports Hx of CVA in 2015 which was felt to be caused from her prior pseudomeningocele  Patient states her home Arixtra was recently discontinued about a week or so ago        Hyperlipidemia associated with type 2 diabetes mellitus  - Continue home statin        Frequent falls  New Onset  Fall precautions  Consult physical therapy     Sec to Hydrocephalus- improving     Continue PT/OT     Hypothyroidism  - Continue levothyroxine           Lab Results   Component Value Date     TSH 1.544 07/26/2020         Continue synthroid      Type 2 diabetes mellitus with stage 3 chronic kidney disease, without long-term current use of insulin  - Accuchecks and low dose SSI     Anemia due to stage 3 chronic kidney disease  - Hgb/Hct 9..5/30.4 -- stable  - Monitor        Meningioma, cerebral s/p resection  7/31/2020 S/p Crainotomy with Aspiration of frontal pseudomeningocele   Patient reports she has been taking her decadron at home but is unsure of the dosage- Asked  to bring in   Will defer steroid dosing to NeuroSurgeon     S/p resection 7/31- See above     Await  Shunt placement     S/p resection     08/22  - Stable  - PT/OT recommending rehab placement  - Case management consulted     08/23  - Stable  - Reviewed. Unchanged. Referrals sent to [facility]hab and [facility]      8/24  Stable awaiting Rehab placement      Hypertension associated with diabetes  - Continue home  medications including amlodipine 5 mg po daily        Hyponatremia  Monitor- Levels running 135-136 three weeks ago  Add 1500ml fluid restriction  Nephrology consulted  Seizure precautions     Stable- likely sec to Intracranial tumor     Improving a little to 132 with IVF     Improving     Same at 131     08/22  - Na+ 133, improving  - Continue to monitor     08/23  - Na+ 131  - Stable. Monitor     8/25/20  Na 132. monitor            VTE Risk Mitigation (From admission, onward)                  Ordered        apixaban tablet 5 mg  2 times daily      08/22/20 1516        Place HOLLAND hose  Until discontinued      08/20/20 1239        IP VTE HIGH RISK PATIENT  Once      08/20/20 1239        Place sequential compression device  Until discontinued      08/20/20 1239        Place HOLLAND hose  Until discontinued      08/17/20 2341                     Wang Samson NP  Department of Hospital Medicine   [facility]

## 2020-08-30 ENCOUNTER — HISTORICAL (OUTPATIENT)
Dept: ADMINISTRATIVE | Facility: HOSPITAL | Age: 50
End: 2020-08-30

## 2020-08-31 ENCOUNTER — TELEPHONE (OUTPATIENT)
Dept: RADIATION ONCOLOGY | Facility: CLINIC | Age: 50
End: 2020-08-31

## 2020-08-31 NOTE — TELEPHONE ENCOUNTER
Called patient to schedule Rad Onc consult. She is currently in rehab & will not be out for another week, doesn't know when anyone can bring her. I told her we will call her back next week to rescheule consult, pt verbalized understanding.

## 2020-09-08 ENCOUNTER — TELEPHONE (OUTPATIENT)
Dept: NEUROSURGERY | Facility: CLINIC | Age: 50
End: 2020-09-08

## 2020-09-08 NOTE — TELEPHONE ENCOUNTER
Spoke w/ spouse he states wife isn't improving he didn't give feedback to what he meant isn't improving.    He indicated patient is in [facility] and really would like to speak to MD Hieu LANDEROS discussing his wife care.      ----- Message from Xenia Arnold sent at 9/8/2020 11:19 AM CDT -----  Regarding: request call back  Spouse request call back , stated pt still in hospital ..,.[phone]

## 2020-09-09 ENCOUNTER — TELEPHONE (OUTPATIENT)
Dept: NEUROSURGERY | Facility: HOSPITAL | Age: 50
End: 2020-09-09

## 2020-09-10 ENCOUNTER — TELEPHONE (OUTPATIENT)
Dept: NEUROSURGERY | Facility: CLINIC | Age: 50
End: 2020-09-10

## 2020-09-10 NOTE — TELEPHONE ENCOUNTER
Patient has an pending order place was she suppose to get completed prior to visit.     I do know she is currently in a rehab facility in [place].  She has an upcoming appt   9/15/2020 10:30 AM JAMES Neumann - Neurosurgery [facility]

## 2020-09-13 LAB
FINAL CULTURE: NORMAL
FINAL CULTURE: NORMAL

## 2020-09-22 ENCOUNTER — TELEPHONE (OUTPATIENT)
Dept: RADIATION ONCOLOGY | Facility: CLINIC | Age: 50
End: 2020-09-22

## 2020-09-22 NOTE — TELEPHONE ENCOUNTER
Made call to see if patient was d/c from therapy in [place]. Spoke with  Raulito who stated she was still there and that she had a stroke last week and not sure when she will be d/c. Informed patient we will f/u again to check on patient and to call us back if she gets d/c and they are ready to make an appt.  verbalized understanding and thanked me for calling.

## 2020-09-25 ENCOUNTER — TELEPHONE (OUTPATIENT)
Dept: NEUROSURGERY | Facility: CLINIC | Age: 50
End: 2020-09-25

## 2020-09-25 NOTE — TELEPHONE ENCOUNTER
Patient has pending orders but not completed. CT Head Without Contrast expires on 9/26/20.      Please place new orders patient has an upcoming appt scheduled for 10/1/20.

## 2020-09-28 DIAGNOSIS — G91.1 OBSTRUCTIVE HYDROCEPHALUS: Primary | ICD-10-CM

## 2020-09-28 NOTE — TELEPHONE ENCOUNTER
Called patient discussed have   10/1/2020 1:10 PM [facility] CT1 LIMIT 500 LBS [facility] [place] - CT Scan [place]     prior to appt [facility]  due to testing is needed before actual appt.    Patient accepted appt but plan to call back if spouse cant make time zone.

## 2020-09-28 NOTE — TELEPHONE ENCOUNTER
10/1/2020 4:00 PM Tania Arthur PA-C [facility] - Neurosurgery  Medical [facility]     Patient aware appt date/ time allowing her to transport from one facility to the other. Patient verbalized understanding.

## 2020-10-13 ENCOUNTER — TELEPHONE (OUTPATIENT)
Dept: NEUROSURGERY | Facility: CLINIC | Age: 50
End: 2020-10-13

## 2020-10-13 NOTE — TELEPHONE ENCOUNTER
I called and spoke with the pt (Raulito) and informed him that the pt did not have her Ct completed so her appt has to be rescheduled until she has her CT. The pt  stated he canceled the pt appt because she was still in rehab and the appts no longer worked he stated he rescheduled the appt to see the pprovider but forgot to reschedule the CT appt. I rescheduled both appt based off the husbands request to have both appts on the same date and time. I verbally gave the  both appt date and times. The  verbalized receiving and understanding both appts.

## 2020-10-27 ENCOUNTER — TELEPHONE (OUTPATIENT)
Dept: NEUROSURGERY | Facility: CLINIC | Age: 50
End: 2020-10-27

## 2020-10-27 NOTE — TELEPHONE ENCOUNTER
Called patient left message to return call to discuss further - need to confirm patient will be attending upcoming appt.    10/29/2020 1:00 PM [facility] CT1 LIMIT 500 LBS [facility] [place]   10/29/2020 2:00 PM Jose Hagen MD [facility] - Neurosurgery [facility]

## 2020-10-28 NOTE — TELEPHONE ENCOUNTER
Called patient left message to return call to discuss further - need to confirm patient will be attending upcoming appt.     10/29/2020 1:00 PM [facility] CT1 LIMIT 500 LBS [facility] [place]   10/29/2020 2:00 PM Jose Hagen MD [facility] - Neurosurgery

## 2020-10-29 ENCOUNTER — OFFICE VISIT (OUTPATIENT)
Dept: NEUROSURGERY | Facility: CLINIC | Age: 50
End: 2020-10-29
Payer: COMMERCIAL

## 2020-10-29 ENCOUNTER — HOSPITAL ENCOUNTER (OUTPATIENT)
Dept: RADIOLOGY | Facility: HOSPITAL | Age: 50
Discharge: HOME | End: 2020-10-29
Attending: PHYSICIAN ASSISTANT
Payer: COMMERCIAL

## 2020-10-29 VITALS
BODY MASS INDEX: 35.3 KG/M2 | DIASTOLIC BLOOD PRESSURE: 82 MMHG | RESPIRATION RATE: 18 BRPM | SYSTOLIC BLOOD PRESSURE: 116 MMHG | HEIGHT: 70 IN | HEART RATE: 113 BPM

## 2020-10-29 DIAGNOSIS — I15.2 HYPERTENSION ASSOCIATED WITH DIABETES: ICD-10-CM

## 2020-10-29 DIAGNOSIS — Z98.890 STATUS POST CRANIOTOMY: ICD-10-CM

## 2020-10-29 DIAGNOSIS — E11.69 HYPERLIPIDEMIA ASSOCIATED WITH TYPE 2 DIABETES MELLITUS: ICD-10-CM

## 2020-10-29 DIAGNOSIS — R29.6 FREQUENT FALLS: ICD-10-CM

## 2020-10-29 DIAGNOSIS — E11.59 HYPERTENSION ASSOCIATED WITH DIABETES: ICD-10-CM

## 2020-10-29 DIAGNOSIS — G91.9 HYDROCEPHALUS, ACQUIRED: ICD-10-CM

## 2020-10-29 DIAGNOSIS — D32.0 MENINGIOMA, CEREBRAL: Primary | ICD-10-CM

## 2020-10-29 DIAGNOSIS — G91.1 OBSTRUCTIVE HYDROCEPHALUS: ICD-10-CM

## 2020-10-29 DIAGNOSIS — E78.5 HYPERLIPIDEMIA ASSOCIATED WITH TYPE 2 DIABETES MELLITUS: ICD-10-CM

## 2020-10-29 DIAGNOSIS — E08.65 DIABETES MELLITUS DUE TO UNDERLYING CONDITION WITH HYPERGLYCEMIA, WITHOUT LONG-TERM CURRENT USE OF INSULIN: ICD-10-CM

## 2020-10-29 PROCEDURE — 99024 POSTOP FOLLOW-UP VISIT: CPT | Mod: S$GLB,,, | Performed by: NEUROLOGICAL SURGERY

## 2020-10-29 PROCEDURE — 3044F HG A1C LEVEL LT 7.0%: CPT | Mod: CPTII,S$GLB,, | Performed by: NEUROLOGICAL SURGERY

## 2020-10-29 PROCEDURE — 70450 CT HEAD/BRAIN W/O DYE: CPT | Mod: TC

## 2020-10-29 PROCEDURE — 99024 PR POST-OP FOLLOW-UP VISIT: ICD-10-PCS | Mod: S$GLB,,, | Performed by: NEUROLOGICAL SURGERY

## 2020-10-29 PROCEDURE — 99999 PR PBB SHADOW E&M-EST. PATIENT-LVL V: CPT | Mod: PBBFAC,,, | Performed by: NEUROLOGICAL SURGERY

## 2020-10-29 PROCEDURE — 3008F BODY MASS INDEX DOCD: CPT | Mod: CPTII,S$GLB,, | Performed by: NEUROLOGICAL SURGERY

## 2020-10-29 PROCEDURE — 99999 PR PBB SHADOW E&M-EST. PATIENT-LVL V: ICD-10-PCS | Mod: PBBFAC,,, | Performed by: NEUROLOGICAL SURGERY

## 2020-10-29 PROCEDURE — 3008F PR BODY MASS INDEX (BMI) DOCUMENTED: ICD-10-PCS | Mod: CPTII,S$GLB,, | Performed by: NEUROLOGICAL SURGERY

## 2020-10-29 PROCEDURE — 3044F PR MOST RECENT HEMOGLOBIN A1C LEVEL <7.0%: ICD-10-PCS | Mod: CPTII,S$GLB,, | Performed by: NEUROLOGICAL SURGERY

## 2020-10-29 NOTE — PROGRESS NOTES
Subjective:      Patient ID: Cata Lang is a 49 y.o. female.    Chief Complaint: Post-op Evaluation (#1 Shunt 8/20/20 )    This unfortunate 49-year-old lady had presented in late July of this year with profound mental status changes, left hemiparesis, dehydration, refractory seizures and evidence of a large enhancing mass in the right frontal area that ultimately proved to be an aggressive meningioma.  She underwent craniotomy and resection of the tumor in late July, with grade 2 meningioma with aggressive features being noted.  The patient was very poorly compensated medically and was a heavy smoker and poorly-controlled diabetic prior to her presentation.  The patient did reasonably well initially in rehab, however suffered a stroke well in rehab several weeks postop resulting in a dense left hemiparesis.  She has continued to complain of depression, headache and left-sided weakness.  Her  is concerned about her lack of motivation to participate in physical therapy.    Past Medical History:   Diagnosis Date    Abscess, dental 8/23/2020    Diabetes mellitus     Hydrocephalus, acquired 8/17/2020    Hypertension     Seizures     Stroke     Thyroid disease      Past Surgical History:   Procedure Laterality Date    CRANIOTOMY      DIAGNOSTIC LAPAROSCOPY N/A 8/20/2020    Procedure: LAPAROSCOPY, DIAGNOSTIC;  Surgeon: Remy Davenport MD;  Location: [facility];  Service: General;  Laterality: N/A;    THYROIDECTOMY      TONSILLECTOMY       History reviewed. No pertinent family history.  Social History     Tobacco Use    Smoking status: Current Every Day Smoker     Packs/day: 1.00     Types: Cigarettes    Tobacco comment: none since July 2020   Substance Use Topics    Alcohol use: Not Currently    Drug use: Not Currently     Current Outpatient Medications on File Prior to Visit   Medication Sig Dispense Refill    amLODIPine (NORVASC) 5 MG tablet Take 1 tablet (5 mg total) by mouth once daily. 30  tablet 11    apixaban (ELIQUIS) 5 mg Tab Take 1 tablet (5 mg total) by mouth 2 (two) times daily.      ascorbic acid, vitamin C, (VITAMIN C) 500 MG tablet Take 1 tablet (500 mg total) by mouth every evening.      aspirin 81 MG Chew Take 1 tablet (81 mg total) by mouth once daily.  0    atorvastatin (LIPITOR) 10 MG tablet Take 10 mg by mouth once daily.       calcitRIOL (ROCALTROL) 0.25 MCG Cap 0.25 mcg once daily.       calcium carbonate (OS-THANIA) 500 mg calcium (1,250 mg) tablet Take 1 tablet (500 mg total) by mouth 4 (four) times daily.  0    cholecalciferol, vitamin D3, 125 mcg (5,000 unit) Tab Take 1 tablet (5,000 Units total) by mouth once daily. 30 tablet 1    DULoxetine (CYMBALTA) 30 MG capsule       ferrous sulfate 325 (65 FE) MG EC tablet Take 1 tablet (325 mg total) by mouth 2 (two) times daily. 60 tablet 1    gabapentin (NEURONTIN) 600 MG tablet Take 600 mg by mouth once daily.       HYDROcodone-acetaminophen (NORCO) 5-325 mg per tablet Take 1 tablet by mouth every 4 (four) hours as needed. 15 tablet 0    levETIRAcetam (KEPPRA) 500 MG Tab Take 1,500 mg by mouth 2 (two) times daily.      levothyroxine (SYNTHROID) 137 MCG Tab tablet Take 1 tablet (137 mcg total) by mouth once daily.      metFORMIN (GLUCOPHAGE) 500 MG tablet Take 500 mg by mouth 2 (two) times daily with meals.      multivitamin Tab Take 1 tablet by mouth once daily.      OMEPRAZOLE ORAL Take by mouth.      zolpidem (AMBIEN) 10 mg Tab Take 1 tablet (10 mg total) by mouth nightly as needed. 30 tablet 0     No current facility-administered medications on file prior to visit.      Review of Systems   Constitutional: Positive for activity change and fatigue. Negative for diaphoresis and unexpected weight change.   HENT: Negative for hearing loss, rhinorrhea, trouble swallowing and voice change.    Eyes: Negative for photophobia and visual disturbance.   Respiratory: Negative for chest tightness and shortness of breath.     Cardiovascular: Negative for chest pain.   Gastrointestinal: Negative for constipation, nausea and vomiting.   Endocrine: Negative for cold intolerance, heat intolerance, polydipsia, polyphagia and polyuria.   Genitourinary: Negative for difficulty urinating.   Musculoskeletal: Negative for back pain, neck pain and neck stiffness.   Skin: Negative.    Allergic/Immunologic: Negative.    Neurological: Positive for seizures, weakness and headaches. Negative for dizziness, tremors, syncope, facial asymmetry, speech difficulty and numbness.   Hematological: Negative for adenopathy. Bruises/bleeds easily.   Psychiatric/Behavioral: Positive for dysphoric mood. Negative for behavioral problems, confusion and decreased concentration.         Objective:     Vitals:    10/29/20 1347   BP: 116/82   Pulse: (!) 113   Resp: 18      Review of patient's allergies indicates:   Allergen Reactions    Heparin analogues Other (See Comments)     DANYELLE     Hydrochlorothiazide      hypercalcemia      Body mass index is 35.3 kg/m².     Physical Exam:  Nursing note and vitals reviewed.    Constitutional: She appears well-developed and well-nourished.     Cardiovascular: Normal rate and regular rhythm.     Abdominal: Soft. Bowel sounds are normal.     Skin: Skin displays no rash on trunk and no rash on extremities. Skin displays lesions on extremities.   The patient has a large contusion of the left tibia from a fall sustained last night     Psych/Behavior: She is oriented to person, place, and time.     Neurological:        DTRs: Tricep reflexes are 1+ on the right side and 1+ on the left side. Bicep reflexes are 1+ on the right side and 1+ on the left side. Brachioradialis reflexes are 1+ on the right side and 1+ on the left side. Patellar reflexes are 1+ on the right side and 1+ on the left side. Achilles reflexes are 1+ on the right side and 1+ on the left side.     Neurologic Exam     Mental Status   Oriented to person, place, and  time.   Attention: normal.   Speech: speech is normal   Level of consciousness: alert  Knowledge: consistent with education and good.     Cranial Nerves   Cranial nerves II through XII intact.     Motor Exam   Muscle bulk: normal  Overall muscle tone: normal  Right arm tone: normal  Left arm tone: decreased  Right arm pronator drift: absent  Left arm pronator drift: present  Right leg tone: normal  Left leg tone: decreased    Strength   Strength 5/5 except as noted.   Left neck flexion: 4/5  Left neck extension: 4/5  Left deltoid: 3/5  Left biceps: 3/5  Left triceps: 3/5  Left wrist flexion: 3/5  Left wrist extension: 3/5  Left interossei: 3/5  Left iliopsoas: 3/5  Left quadriceps: 3/5  Left hamstring: 3/5  Left anterior tibial: 3/5  Left gastroc: 3/5    Sensory Exam   Light touch normal.   Vibration normal.   Proprioception normal.   Pinprick normal.     Gait, Coordination, and Reflexes     Gait  Gait: (Patient unable to ambulate due to weakness in the left leg)    Tremor   Resting tremor: absent  Intention tremor: absent    Reflexes   Right brachioradialis: 1+  Left brachioradialis: 1+  Right biceps: 1+  Left biceps: 1+  Right triceps: 1+  Left triceps: 1+  Right patellar: 1+  Left patellar: 1+  Right achilles: 1+  Left achilles: 1+  Right : 1+  Left : 1+  Right plantar: normal  Left plantar: upgoing    I  reviewed and interpreted all pertinent imaging regarding this case.  A CT of the head without contrast is available for review compared to previous studies.  Areas of encephalomalacia seen in the right frontal polar areas similar to previous.  The right frontal shunt catheter remains in good position.  There has been no significant change the degree of hydrocephalus.  In the posterior frontal parietal region, there seems to be an increase in vasogenic edema consistent with her history of stroke.  I reviewed her previous MRI from August, and there is a separate area of meningioma in the right posterior  frontal parietal area that, if it has progressed significantly, could be contributing to the patient's left-sided weakness.  Assessment:   Status post resection of aggressive right frontal meningioma.  Left hemiparesis, history of recent stroke.  Known multiple areas of new meningioma activity entire right frontoparietal convexity.  1. Obstructive hydrocephalus    2. Hydrocephalus, acquired    3. Status post craniotomy    4. Hypertension associated with diabetes    5. Hyperlipidemia associated with type 2 diabetes mellitus    6. Diabetes mellitus due to underlying condition with hyperglycemia, without long-term current use of insulin    7. Meningioma, cerebral s/p resection    8. Frequent falls      Plan:   The patient is being referred for radiation therapy to the aggressive meningioma.  The patient has requested referral to Radiation Oncology in the left face area which we have started.  I have asked the patient to return in 2-3 weeks with a new MRI to take a closer look at the area where worsening increased edema on the CT.  Her motor deterioration could possibly be related to rapidly growing meningioma in this region rather than stroke.  Obstructive hydrocephalus  -     MRI Brain W WO Contrast; Future; Expected date: 10/29/2020  -     Ambulatory referral/consult to Radiation Oncology; Future; Expected date: 11/05/2020    Hydrocephalus, acquired    Status post craniotomy    Hypertension associated with diabetes    Hyperlipidemia associated with type 2 diabetes mellitus    Diabetes mellitus due to underlying condition with hyperglycemia, without long-term current use of insulin    Meningioma, cerebral s/p resection    Frequent falls        Thank you for the referral   Please call with any questions    Jose Hagen MD  Neurosurgery

## 2020-10-30 ENCOUNTER — TELEPHONE (OUTPATIENT)
Dept: RADIATION ONCOLOGY | Facility: CLINIC | Age: 50
End: 2020-10-30

## 2020-10-30 ENCOUNTER — TELEPHONE (OUTPATIENT)
Dept: NEUROSURGERY | Facility: CLINIC | Age: 50
End: 2020-10-30

## 2020-10-30 NOTE — TELEPHONE ENCOUNTER
Per MD Hagen - Fax MRI's ,CT's  Pathology report, Op Note - confirmation successfully delivered.     Dr. Bernardino Marsh  Address: [address] [place], [place] [address] [address]  Phone: [phone]  Fax: [phone]

## 2020-10-30 NOTE — TELEPHONE ENCOUNTER
Made call to schedule consult appt in radiation oncology. Patient stated they were set up for radiation consult in [place] because they could not make daily appts here in .

## 2020-11-02 ENCOUNTER — TELEPHONE (OUTPATIENT)
Dept: NEUROSURGERY | Facility: CLINIC | Age: 50
End: 2020-11-02

## 2020-11-02 NOTE — TELEPHONE ENCOUNTER
Spoke w/ Rox at [facility]  she indicated patient is in [facility] currently and she is unable to advise patient to f/u w/ PCP regarding leg pain.       ----- Message from Jose Hagen MD sent at 10/31/2020 12:09 PM CDT -----  Contact: rox/ [facility]  PCP for leg bruise  ----- Message -----  From: Jacque Alejo MA  Sent: 10/30/2020   1:02 PM CDT  To: Jose Hagen MD    Would this be something that Neurosurgery would address are should patient f/u w/ PCP?     Please advise     ----- Message -----  From: Jennifer Olmstead  Sent: 10/30/2020  12:24 PM CDT  To: On Neurosurgery Clinical Support    Rox would like a call back in regards to getting xray of pt leg, it is red and swollen.  Please call [phone].    Thanks    Jennifer Olmstead

## 2020-11-04 ENCOUNTER — PATIENT MESSAGE (OUTPATIENT)
Dept: NEUROSURGERY | Facility: CLINIC | Age: 50
End: 2020-11-04

## 2020-11-05 ENCOUNTER — TELEPHONE (OUTPATIENT)
Dept: NEUROSURGERY | Facility: CLINIC | Age: 50
End: 2020-11-05

## 2020-11-05 NOTE — TELEPHONE ENCOUNTER
----- Message from Merlyn Bui sent at 11/5/2020  9:05 AM CST -----  Contact: Coleman Cabezas is requesting a call back in regards to missed call that may have came from this office. Please call him back at [phone].    Thanks  DD

## 2020-11-05 NOTE — TELEPHONE ENCOUNTER
Spoke with staff from [facility] staff from radiology informed me that she's able to reschedule pts MRI at their location pts new appintment and and time is 11/12/20 @ 1pm staff informed me that I will have to fax over pts MRI orders I faxed orders over to [phone] I reached out to pt to inform her with this info left vm for pt to return call.

## 2020-11-05 NOTE — TELEPHONE ENCOUNTER
Spoke with pts  informed pt that I did speak with staff from [facility] in radiology and she did inform me that she can have pt MRI rescheduled I informed pts  that the new appointment hasn't populated let staff said she was awaiting for MRI orders to be faxed I informed pt  that after the MRI appointment is reschdeuled I will then reschedule pts appointment time to a virtual appointment and that I will keep them updated and that they can keep an eye out on the portal to see the new appointment times and location. Pt and pts  DANIEL.

## 2020-11-05 NOTE — TELEPHONE ENCOUNTER
Spoke with pt in regards to portal message pt wanted to see if she can get her MRI rescheduled in [place]  I informed pt that I wasn't aware of [facility] having a facility in [place] as I proceeded to reschedule her appt I went to the locations but I didn't see anything pt stated that [facility] is now with [facility] she said she will give them a call and reschedule her MRI  I informed her once she has her MRI rescheduled I can set her up and virtual appointment with Dr Hagen pt DANIEL.

## 2020-11-06 ENCOUNTER — TELEPHONE (OUTPATIENT)
Dept: NEUROSURGERY | Facility: CLINIC | Age: 50
End: 2020-11-06

## 2020-11-06 NOTE — TELEPHONE ENCOUNTER
[1:48 PM] Lyn Peoples      I received the below message in regards to MRN:  [id number]  Caller: [facility] (Today,  1:38 PM)            Madeline called in regarding speaking to the nurse about an authorization for the pt MRI. Please give her a call back at [phone].       Thanks,   Pb       She stated she needs to authorization # for this MRI          ?[1:49 PM] Lyn Shelton      This is a case you were working with.  ?[2:36 PM] Osiel Pratt      thank you  ?[2:36 PM] Lyn Peoples      No problem  ?[2:58 PM] Lyn Peoples      will you be reaching out to her to give her the requested information  ?[2:59 PM] Osiel Pratt      I call and gave her the information    Information was given to the caller by Osiel Pratt                     ----- Message from Amanda Quintanilla sent at 11/6/2020  1:38 PM CST -----  Contact: [facility]  Madeline called in regarding speaking to the nurse about an authorization for the pt MRI. Please give her a call back at [phone].    Thanks,  Pb

## 2020-11-09 ENCOUNTER — TELEPHONE (OUTPATIENT)
Dept: NEUROSURGERY | Facility: CLINIC | Age: 50
End: 2020-11-09

## 2020-11-09 NOTE — TELEPHONE ENCOUNTER
Spoke with Raulito Lang pt's , expressed the importance of pt coming into the [place] office for MRI with visit to follow, informed him the Stealth Protocol is only performed at the [facility], he then stated he need MRI performed at [facility] which is an [facility] associated facility closer to them, and pt to have virtual appt, d/t he's not able to take off work, and explain if pt was to come to [place], he would have to rent a wheel chair accessible van get pt in and out of van multiple times and rent a hotel room, I expressed understanding, but again stressed the importance of the visit from Provider, he then stated he will have call [facility] and have them to give us a call//ns

## 2020-11-09 NOTE — TELEPHONE ENCOUNTER
Varinder to the pt and informed him that Dr. Hagen will be reaching out to them in reference to their concerns about the procedure.     The pt verbalized understanding      ----- Message from Juju Fuentes sent at 11/9/2020  9:19 AM CST -----  Who Called: Re ([facility])    What is the reqeust in detail: Would like to confirm if the patient can go to [facility] to have her MRI done, states their hospital doesn't do the protocol for the MRI that Dr. Hagen suggested to the patient's  and her  also is stating he can't continue to keep bringing her to [place]. Please advise.     Can the clinic reply by [facility]?: No    Best Call Back Number: [phone]

## 2020-11-11 ENCOUNTER — TELEPHONE (OUTPATIENT)
Dept: NEUROSURGERY | Facility: CLINIC | Age: 50
End: 2020-11-11

## 2020-11-11 NOTE — TELEPHONE ENCOUNTER
Spoke with Tequila from 's office in reference to the attached pt. Tequila stated she needed the pt to have her MRI of the Brain w/wo scheduled before the pt can be seen in office with them. Tequila provided me with the number of the facility to contact and make the appt [phone].    I called Ash Dickson at [phone] central scheduling dept and was able to get the pt scheduled for her MRI on 11/25 at 11am.    I called Tequila back at 's office and informed her of the pt's MRI appt.    Bridget verbalized understanding.        ----- Message from Meg Diamond sent at 11/11/2020 10:08 AM CST -----  Please call Tequila/Dr Pina @ [phone] regarding pt referral they sent over, have questions for Dr Hagen office.       normal...

## 2020-11-11 NOTE — TELEPHONE ENCOUNTER
Spoke with pt in reference to the message below, pt wanted to know when is she scheduled for MRI, informed pt MRI is scheduled on 11/25 at 11 am at [facility], pt stated she needed to know to schedule transportation//ns     ----- Message from Mikayla De La Rosa sent at 11/11/2020  4:43 PM CST -----  Regarding:  [facility]  Would like a call from nurse in regards to an MRI . Please call back at [phone]       Thank You ,   Mikayla De La Rosa

## 2020-11-18 ENCOUNTER — HISTORICAL (OUTPATIENT)
Dept: ADMINISTRATIVE | Facility: HOSPITAL | Age: 50
End: 2020-11-18

## 2020-11-18 LAB — GRAM STN SPEC: NORMAL

## 2021-01-26 ENCOUNTER — HOSPITAL ENCOUNTER (INPATIENT)
Facility: HOSPITAL | Age: 51
LOS: 22 days | Discharge: NURSING, REHAB OR LONG-TERM CARE | DRG: 064 | End: 2021-02-18
Attending: EMERGENCY MEDICINE | Admitting: PSYCHIATRY & NEUROLOGY
Payer: COMMERCIAL

## 2021-01-26 DIAGNOSIS — I95.9 HYPOTENSION: ICD-10-CM

## 2021-01-26 DIAGNOSIS — N39.0 UTI (URINARY TRACT INFECTION): ICD-10-CM

## 2021-01-26 DIAGNOSIS — N30.00 ACUTE CYSTITIS WITHOUT HEMATURIA: ICD-10-CM

## 2021-01-26 DIAGNOSIS — D32.0 MENINGIOMA, CEREBRAL: ICD-10-CM

## 2021-01-26 DIAGNOSIS — D32.0 INTRACRANIAL MENINGIOMA: Primary | ICD-10-CM

## 2021-01-26 DIAGNOSIS — R07.9 CHEST PAIN: ICD-10-CM

## 2021-01-26 DIAGNOSIS — T66.XXXD ADVERSE EFFECT OF RADIATION THERAPY, SUBSEQUENT ENCOUNTER: ICD-10-CM

## 2021-01-26 DIAGNOSIS — E87.1 HYPONATREMIA: ICD-10-CM

## 2021-01-26 DIAGNOSIS — G91.9 HYDROCEPHALUS, ACQUIRED: ICD-10-CM

## 2021-01-26 DIAGNOSIS — D32.9 MENINGIOMA: ICD-10-CM

## 2021-01-26 DIAGNOSIS — Z78.9 ON ENTERAL NUTRITION: ICD-10-CM

## 2021-01-26 DIAGNOSIS — R33.9 URINARY RETENTION: ICD-10-CM

## 2021-01-26 DIAGNOSIS — R40.2422 GLASGOW COMA SCALE TOTAL SCORE 9-12, AT ARRIVAL TO EMERGENCY DEPARTMENT: ICD-10-CM

## 2021-01-26 DIAGNOSIS — R53.1 LEFT-SIDED WEAKNESS: ICD-10-CM

## 2021-01-26 DIAGNOSIS — E11.69 HYPERLIPIDEMIA ASSOCIATED WITH TYPE 2 DIABETES MELLITUS: ICD-10-CM

## 2021-01-26 DIAGNOSIS — I63.422 CEREBROVASCULAR ACCIDENT (CVA) DUE TO EMBOLISM OF LEFT ANTERIOR CEREBRAL ARTERY: ICD-10-CM

## 2021-01-26 DIAGNOSIS — T38.0X5A ADVERSE EFFECT OF CORTICOSTEROIDS, INITIAL ENCOUNTER: ICD-10-CM

## 2021-01-26 DIAGNOSIS — E11.9 TYPE 2 DIABETES MELLITUS WITHOUT COMPLICATION, WITHOUT LONG-TERM CURRENT USE OF INSULIN: ICD-10-CM

## 2021-01-26 DIAGNOSIS — T38.0X5D ADVERSE EFFECT OF CORTICOSTEROIDS, SUBSEQUENT ENCOUNTER: ICD-10-CM

## 2021-01-26 DIAGNOSIS — E03.9 HYPOTHYROIDISM, UNSPECIFIED TYPE: ICD-10-CM

## 2021-01-26 DIAGNOSIS — G93.40 ACUTE ENCEPHALOPATHY: ICD-10-CM

## 2021-01-26 DIAGNOSIS — D72.829 LEUKOCYTOSIS, UNSPECIFIED TYPE: ICD-10-CM

## 2021-01-26 DIAGNOSIS — T66.XXXA ADVERSE EFFECT OF RADIATION THERAPY, INITIAL ENCOUNTER: ICD-10-CM

## 2021-01-26 DIAGNOSIS — R56.9 SEIZURE: ICD-10-CM

## 2021-01-26 DIAGNOSIS — G81.91 HEMIPARESIS OF RIGHT DOMINANT SIDE, UNSPECIFIED HEMIPARESIS ETIOLOGY: ICD-10-CM

## 2021-01-26 DIAGNOSIS — E78.5 HYPERLIPIDEMIA ASSOCIATED WITH TYPE 2 DIABETES MELLITUS: ICD-10-CM

## 2021-01-26 LAB
ALBUMIN SERPL BCP-MCNC: 3.1 G/DL (ref 3.5–5.2)
ALLENS TEST: ABNORMAL
ALP SERPL-CCNC: 176 U/L (ref 55–135)
ALT SERPL W/O P-5'-P-CCNC: 16 U/L (ref 10–44)
ANION GAP SERPL CALC-SCNC: 11 MMOL/L (ref 8–16)
APTT BLDCRRT: 24 SEC (ref 21–32)
AST SERPL-CCNC: 14 U/L (ref 10–40)
BASOPHILS # BLD AUTO: 0.03 K/UL (ref 0–0.2)
BASOPHILS NFR BLD: 0.2 % (ref 0–1.9)
BILIRUB SERPL-MCNC: 0.4 MG/DL (ref 0.1–1)
BUN SERPL-MCNC: 15 MG/DL (ref 6–20)
CALCIUM SERPL-MCNC: 8.2 MG/DL (ref 8.7–10.5)
CHLORIDE SERPL-SCNC: 98 MMOL/L (ref 95–110)
CO2 SERPL-SCNC: 25 MMOL/L (ref 23–29)
CREAT SERPL-MCNC: 0.7 MG/DL (ref 0.5–1.4)
CTP QC/QA: YES
DIFFERENTIAL METHOD: ABNORMAL
EOSINOPHIL # BLD AUTO: 0 K/UL (ref 0–0.5)
EOSINOPHIL NFR BLD: 0.3 % (ref 0–8)
ERYTHROCYTE [DISTWIDTH] IN BLOOD BY AUTOMATED COUNT: 17.5 % (ref 11.5–14.5)
EST. GFR  (AFRICAN AMERICAN): >60 ML/MIN/1.73 M^2
EST. GFR  (NON AFRICAN AMERICAN): >60 ML/MIN/1.73 M^2
GLUCOSE SERPL-MCNC: 123 MG/DL (ref 70–110)
HCO3 UR-SCNC: 27.7 MMOL/L (ref 24–28)
HCT VFR BLD AUTO: 35.9 % (ref 37–48.5)
HGB BLD-MCNC: 11.6 G/DL (ref 12–16)
IMM GRANULOCYTES # BLD AUTO: 0.12 K/UL (ref 0–0.04)
IMM GRANULOCYTES NFR BLD AUTO: 0.9 % (ref 0–0.5)
INR PPP: 1 (ref 0.8–1.2)
LYMPHOCYTES # BLD AUTO: 1.7 K/UL (ref 1–4.8)
LYMPHOCYTES NFR BLD: 12.6 % (ref 18–48)
MCH RBC QN AUTO: 26.1 PG (ref 27–31)
MCHC RBC AUTO-ENTMCNC: 32.3 G/DL (ref 32–36)
MCV RBC AUTO: 81 FL (ref 82–98)
MONOCYTES # BLD AUTO: 0.8 K/UL (ref 0.3–1)
MONOCYTES NFR BLD: 5.5 % (ref 4–15)
NEUTROPHILS # BLD AUTO: 11 K/UL (ref 1.8–7.7)
NEUTROPHILS NFR BLD: 80.5 % (ref 38–73)
NRBC BLD-RTO: 0 /100 WBC
PCO2 BLDA: 41.5 MMHG (ref 35–45)
PH SMN: 7.43 [PH] (ref 7.35–7.45)
PLATELET # BLD AUTO: 390 K/UL (ref 150–350)
PMV BLD AUTO: 9 FL (ref 9.2–12.9)
PO2 BLDA: 93 MMHG (ref 40–60)
POC BE: 3 MMOL/L
POC SATURATED O2: 97 % (ref 95–100)
POC TCO2: 29 MMOL/L (ref 24–29)
POTASSIUM SERPL-SCNC: 3.8 MMOL/L (ref 3.5–5.1)
PROT SERPL-MCNC: 6.7 G/DL (ref 6–8.4)
PROTHROMBIN TIME: 11.2 SEC (ref 9–12.5)
RBC # BLD AUTO: 4.45 M/UL (ref 4–5.4)
SAMPLE: ABNORMAL
SARS-COV-2 RDRP RESP QL NAA+PROBE: NEGATIVE
SITE: ABNORMAL
SODIUM SERPL-SCNC: 134 MMOL/L (ref 136–145)
WBC # BLD AUTO: 13.6 K/UL (ref 3.9–12.7)

## 2021-01-26 PROCEDURE — 36620 ARTERIAL LINE: ICD-10-PCS | Mod: ,,, | Performed by: NURSE PRACTITIONER

## 2021-01-26 PROCEDURE — 84439 ASSAY OF FREE THYROXINE: CPT

## 2021-01-26 PROCEDURE — 83036 HEMOGLOBIN GLYCOSYLATED A1C: CPT

## 2021-01-26 PROCEDURE — 80061 LIPID PANEL: CPT

## 2021-01-26 PROCEDURE — 99285 EMERGENCY DEPT VISIT HI MDM: CPT | Mod: 25

## 2021-01-26 PROCEDURE — 85730 THROMBOPLASTIN TIME PARTIAL: CPT

## 2021-01-26 PROCEDURE — 36620 INSERTION CATHETER ARTERY: CPT | Mod: ,,, | Performed by: NURSE PRACTITIONER

## 2021-01-26 PROCEDURE — 80053 COMPREHEN METABOLIC PANEL: CPT

## 2021-01-26 PROCEDURE — 84443 ASSAY THYROID STIM HORMONE: CPT

## 2021-01-26 PROCEDURE — 82962 GLUCOSE BLOOD TEST: CPT

## 2021-01-26 PROCEDURE — 99900035 HC TECH TIME PER 15 MIN (STAT)

## 2021-01-26 PROCEDURE — 85610 PROTHROMBIN TIME: CPT

## 2021-01-26 PROCEDURE — 99285 EMERGENCY DEPT VISIT HI MDM: CPT | Mod: ,,, | Performed by: EMERGENCY MEDICINE

## 2021-01-26 PROCEDURE — 82803 BLOOD GASES ANY COMBINATION: CPT

## 2021-01-26 PROCEDURE — 83735 ASSAY OF MAGNESIUM: CPT

## 2021-01-26 PROCEDURE — 85025 COMPLETE CBC W/AUTO DIFF WBC: CPT

## 2021-01-26 PROCEDURE — 86900 BLOOD TYPING SEROLOGIC ABO: CPT

## 2021-01-26 PROCEDURE — U0002 COVID-19 LAB TEST NON-CDC: HCPCS | Performed by: EMERGENCY MEDICINE

## 2021-01-26 PROCEDURE — 99285 PR EMERGENCY DEPT VISIT,LEVEL V: ICD-10-PCS | Mod: ,,, | Performed by: EMERGENCY MEDICINE

## 2021-01-27 PROBLEM — E11.9 TYPE 2 DIABETES MELLITUS, WITHOUT LONG-TERM CURRENT USE OF INSULIN: Status: ACTIVE | Noted: 2020-08-17

## 2021-01-27 PROBLEM — D32.9 MENINGIOMA: Status: ACTIVE | Noted: 2021-01-27

## 2021-01-27 PROBLEM — R56.9 SEIZURE: Status: ACTIVE | Noted: 2021-01-27

## 2021-01-27 PROBLEM — G81.91 HEMIPARESIS OF RIGHT DOMINANT SIDE: Status: ACTIVE | Noted: 2021-01-27

## 2021-01-27 PROBLEM — I63.9 CVA (CEREBRAL VASCULAR ACCIDENT): Status: ACTIVE | Noted: 2021-01-27

## 2021-01-27 PROBLEM — D32.0 INTRACRANIAL MENINGIOMA: Status: ACTIVE | Noted: 2021-01-27

## 2021-01-27 PROBLEM — R53.1 LEFT-SIDED WEAKNESS: Status: ACTIVE | Noted: 2021-01-27

## 2021-01-27 LAB
ABO + RH BLD: NORMAL
ALBUMIN SERPL BCP-MCNC: 3.1 G/DL (ref 3.5–5.2)
ALLENS TEST: ABNORMAL
ALLENS TEST: ABNORMAL
ALP SERPL-CCNC: 174 U/L (ref 55–135)
ALT SERPL W/O P-5'-P-CCNC: 15 U/L (ref 10–44)
ANION GAP SERPL CALC-SCNC: 14 MMOL/L (ref 8–16)
AST SERPL-CCNC: 14 U/L (ref 10–40)
BASOPHILS # BLD AUTO: 0.04 K/UL (ref 0–0.2)
BASOPHILS NFR BLD: 0.3 % (ref 0–1.9)
BILIRUB SERPL-MCNC: 0.5 MG/DL (ref 0.1–1)
BILIRUB UR QL STRIP: NEGATIVE
BLD GP AB SCN CELLS X3 SERPL QL: NORMAL
BUN SERPL-MCNC: 16 MG/DL (ref 6–20)
CALCIUM SERPL-MCNC: 8.2 MG/DL (ref 8.7–10.5)
CHLORIDE SERPL-SCNC: 99 MMOL/L (ref 95–110)
CHOLEST SERPL-MCNC: 138 MG/DL (ref 120–199)
CHOLEST/HDLC SERPL: 3.1 {RATIO} (ref 2–5)
CLARITY UR REFRACT.AUTO: CLEAR
CO2 SERPL-SCNC: 22 MMOL/L (ref 23–29)
COLOR UR AUTO: YELLOW
CREAT SERPL-MCNC: 0.7 MG/DL (ref 0.5–1.4)
DELSYS: ABNORMAL
DIFFERENTIAL METHOD: ABNORMAL
EOSINOPHIL # BLD AUTO: 0 K/UL (ref 0–0.5)
EOSINOPHIL NFR BLD: 0.3 % (ref 0–8)
ERYTHROCYTE [DISTWIDTH] IN BLOOD BY AUTOMATED COUNT: 17.5 % (ref 11.5–14.5)
EST. GFR  (AFRICAN AMERICAN): >60 ML/MIN/1.73 M^2
EST. GFR  (NON AFRICAN AMERICAN): >60 ML/MIN/1.73 M^2
ESTIMATED AVG GLUCOSE: 108 MG/DL (ref 68–131)
FLOW: 2
GLUCOSE SERPL-MCNC: 113 MG/DL (ref 70–110)
GLUCOSE SERPL-MCNC: 115 MG/DL (ref 70–110)
GLUCOSE UR QL STRIP: NEGATIVE
HBA1C MFR BLD HPLC: 5.4 % (ref 4–5.6)
HCO3 UR-SCNC: 26.4 MMOL/L (ref 24–28)
HCO3 UR-SCNC: 28.6 MMOL/L (ref 24–28)
HCT VFR BLD AUTO: 36.9 % (ref 37–48.5)
HDLC SERPL-MCNC: 45 MG/DL (ref 40–75)
HDLC SERPL: 32.6 % (ref 20–50)
HGB BLD-MCNC: 11.6 G/DL (ref 12–16)
HGB UR QL STRIP: ABNORMAL
IMM GRANULOCYTES # BLD AUTO: 0.11 K/UL (ref 0–0.04)
IMM GRANULOCYTES NFR BLD AUTO: 0.9 % (ref 0–0.5)
KETONES UR QL STRIP: NEGATIVE
LDLC SERPL CALC-MCNC: 69.6 MG/DL (ref 63–159)
LEUKOCYTE ESTERASE UR QL STRIP: NEGATIVE
LYMPHOCYTES # BLD AUTO: 1.5 K/UL (ref 1–4.8)
LYMPHOCYTES NFR BLD: 11.5 % (ref 18–48)
MAGNESIUM SERPL-MCNC: 1.9 MG/DL (ref 1.6–2.6)
MAGNESIUM SERPL-MCNC: 2 MG/DL (ref 1.6–2.6)
MCH RBC QN AUTO: 25.7 PG (ref 27–31)
MCHC RBC AUTO-ENTMCNC: 31.4 G/DL (ref 32–36)
MCV RBC AUTO: 82 FL (ref 82–98)
MICROSCOPIC COMMENT: ABNORMAL
MODE: ABNORMAL
MONOCYTES # BLD AUTO: 0.7 K/UL (ref 0.3–1)
MONOCYTES NFR BLD: 5.6 % (ref 4–15)
NEUTROPHILS # BLD AUTO: 10.3 K/UL (ref 1.8–7.7)
NEUTROPHILS NFR BLD: 81.4 % (ref 38–73)
NITRITE UR QL STRIP: NEGATIVE
NON-SQ EPI CELLS #/AREA URNS AUTO: 1 /HPF
NONHDLC SERPL-MCNC: 93 MG/DL
NRBC BLD-RTO: 0 /100 WBC
PCO2 BLDA: 41.2 MMHG (ref 35–45)
PCO2 BLDA: 44.6 MMHG (ref 35–45)
PH SMN: 7.42 [PH] (ref 7.35–7.45)
PH SMN: 7.42 [PH] (ref 7.35–7.45)
PH UR STRIP: 6 [PH] (ref 5–8)
PHOSPHATE SERPL-MCNC: 5.9 MG/DL (ref 2.7–4.5)
PLATELET # BLD AUTO: 366 K/UL (ref 150–350)
PMV BLD AUTO: 8.9 FL (ref 9.2–12.9)
PO2 BLDA: 137 MMHG (ref 80–100)
PO2 BLDA: 58 MMHG (ref 40–60)
POC BE: 2 MMOL/L
POC BE: 4 MMOL/L
POC SATURATED O2: 90 % (ref 95–100)
POC SATURATED O2: 99 % (ref 95–100)
POC TCO2: 28 MMOL/L (ref 23–27)
POC TCO2: 30 MMOL/L (ref 24–29)
POCT GLUCOSE: 134 MG/DL (ref 70–110)
POCT GLUCOSE: 138 MG/DL (ref 70–110)
POCT GLUCOSE: 142 MG/DL (ref 70–110)
POCT GLUCOSE: 143 MG/DL (ref 70–110)
POTASSIUM SERPL-SCNC: 3.8 MMOL/L (ref 3.5–5.1)
PROT SERPL-MCNC: 6.9 G/DL (ref 6–8.4)
PROT UR QL STRIP: NEGATIVE
RBC # BLD AUTO: 4.52 M/UL (ref 4–5.4)
RBC #/AREA URNS AUTO: 21 /HPF (ref 0–4)
SAMPLE: ABNORMAL
SAMPLE: ABNORMAL
SITE: ABNORMAL
SITE: ABNORMAL
SODIUM SERPL-SCNC: 135 MMOL/L (ref 136–145)
SP GR UR STRIP: 1.01 (ref 1–1.03)
SP02: 100
SQUAMOUS #/AREA URNS AUTO: 1 /HPF
T4 FREE SERPL-MCNC: 1.37 NG/DL (ref 0.71–1.51)
TRIGL SERPL-MCNC: 117 MG/DL (ref 30–150)
TSH SERPL DL<=0.005 MIU/L-ACNC: 0.31 UIU/ML (ref 0.4–4)
TSH SERPL DL<=0.005 MIU/L-ACNC: 0.74 UIU/ML (ref 0.4–4)
URN SPEC COLLECT METH UR: ABNORMAL
WBC # BLD AUTO: 12.67 K/UL (ref 3.9–12.7)
WBC #/AREA URNS AUTO: 2 /HPF (ref 0–5)

## 2021-01-27 PROCEDURE — 25000242 PHARM REV CODE 250 ALT 637 W/ HCPCS: Performed by: STUDENT IN AN ORGANIZED HEALTH CARE EDUCATION/TRAINING PROGRAM

## 2021-01-27 PROCEDURE — 85025 COMPLETE CBC W/AUTO DIFF WBC: CPT

## 2021-01-27 PROCEDURE — 93010 ELECTROCARDIOGRAM REPORT: CPT | Mod: ,,, | Performed by: INTERNAL MEDICINE

## 2021-01-27 PROCEDURE — 82803 BLOOD GASES ANY COMBINATION: CPT

## 2021-01-27 PROCEDURE — 63600175 PHARM REV CODE 636 W HCPCS: Performed by: NURSE PRACTITIONER

## 2021-01-27 PROCEDURE — 36620 INSERTION CATHETER ARTERY: CPT

## 2021-01-27 PROCEDURE — 99223 PR INITIAL HOSPITAL CARE,LEVL III: ICD-10-PCS | Mod: ,,, | Performed by: PSYCHIATRY & NEUROLOGY

## 2021-01-27 PROCEDURE — 25000003 PHARM REV CODE 250: Performed by: EMERGENCY MEDICINE

## 2021-01-27 PROCEDURE — 99223 1ST HOSP IP/OBS HIGH 75: CPT | Mod: ,,, | Performed by: PSYCHIATRY & NEUROLOGY

## 2021-01-27 PROCEDURE — 25500020 PHARM REV CODE 255: Performed by: EMERGENCY MEDICINE

## 2021-01-27 PROCEDURE — 25000003 PHARM REV CODE 250: Performed by: NURSE PRACTITIONER

## 2021-01-27 PROCEDURE — A9585 GADOBUTROL INJECTION: HCPCS | Performed by: EMERGENCY MEDICINE

## 2021-01-27 PROCEDURE — 92610 EVALUATE SWALLOWING FUNCTION: CPT

## 2021-01-27 PROCEDURE — 99900035 HC TECH TIME PER 15 MIN (STAT)

## 2021-01-27 PROCEDURE — 20000000 HC ICU ROOM

## 2021-01-27 PROCEDURE — 84100 ASSAY OF PHOSPHORUS: CPT

## 2021-01-27 PROCEDURE — 99292 CRITICAL CARE ADDL 30 MIN: CPT | Mod: ICN,,, | Performed by: PSYCHIATRY & NEUROLOGY

## 2021-01-27 PROCEDURE — 99292 PR CRITICAL CARE, ADDL 30 MIN: ICD-10-PCS | Mod: ICN,,, | Performed by: PSYCHIATRY & NEUROLOGY

## 2021-01-27 PROCEDURE — 27000221 HC OXYGEN, UP TO 24 HOURS

## 2021-01-27 PROCEDURE — 80053 COMPREHEN METABOLIC PANEL: CPT

## 2021-01-27 PROCEDURE — 63600175 PHARM REV CODE 636 W HCPCS: Performed by: STUDENT IN AN ORGANIZED HEALTH CARE EDUCATION/TRAINING PROGRAM

## 2021-01-27 PROCEDURE — 99291 PR CRITICAL CARE, E/M 30-74 MINUTES: ICD-10-PCS | Mod: 25,ICN,, | Performed by: NURSE PRACTITIONER

## 2021-01-27 PROCEDURE — 94668 MNPJ CHEST WALL SBSQ: CPT

## 2021-01-27 PROCEDURE — 94761 N-INVAS EAR/PLS OXIMETRY MLT: CPT

## 2021-01-27 PROCEDURE — 93010 EKG 12-LEAD: ICD-10-PCS | Mod: ,,, | Performed by: INTERNAL MEDICINE

## 2021-01-27 PROCEDURE — 95720 EEG PHY/QHP EA INCR W/VEEG: CPT | Mod: ,,, | Performed by: PSYCHIATRY & NEUROLOGY

## 2021-01-27 PROCEDURE — 81001 URINALYSIS AUTO W/SCOPE: CPT

## 2021-01-27 PROCEDURE — 84443 ASSAY THYROID STIM HORMONE: CPT

## 2021-01-27 PROCEDURE — 93005 ELECTROCARDIOGRAM TRACING: CPT

## 2021-01-27 PROCEDURE — 25000003 PHARM REV CODE 250: Performed by: STUDENT IN AN ORGANIZED HEALTH CARE EDUCATION/TRAINING PROGRAM

## 2021-01-27 PROCEDURE — 83735 ASSAY OF MAGNESIUM: CPT

## 2021-01-27 PROCEDURE — 37799 UNLISTED PX VASCULAR SURGERY: CPT

## 2021-01-27 PROCEDURE — 99291 CRITICAL CARE FIRST HOUR: CPT | Mod: 25,ICN,, | Performed by: NURSE PRACTITIONER

## 2021-01-27 PROCEDURE — 94640 AIRWAY INHALATION TREATMENT: CPT

## 2021-01-27 PROCEDURE — 95720 PR EEG, W/VIDEO, CONT RECORD, I&R, >12<26 HRS: ICD-10-PCS | Mod: ,,, | Performed by: PSYCHIATRY & NEUROLOGY

## 2021-01-27 RX ORDER — LEVOTHYROXINE SODIUM 137 UG/1
137 TABLET ORAL
Status: DISCONTINUED | OUTPATIENT
Start: 2021-01-28 | End: 2021-02-02

## 2021-01-27 RX ORDER — GADOBUTROL 604.72 MG/ML
10 INJECTION INTRAVENOUS
Status: COMPLETED | OUTPATIENT
Start: 2021-01-27 | End: 2021-01-27

## 2021-01-27 RX ORDER — LIDOCAINE HYDROCHLORIDE 10 MG/ML
INJECTION INFILTRATION; PERINEURAL
Status: DISPENSED
Start: 2021-01-27 | End: 2021-01-28

## 2021-01-27 RX ORDER — ONDANSETRON 2 MG/ML
4 INJECTION INTRAMUSCULAR; INTRAVENOUS EVERY 8 HOURS PRN
Status: DISCONTINUED | OUTPATIENT
Start: 2021-01-27 | End: 2021-01-27

## 2021-01-27 RX ORDER — PANTOPRAZOLE SODIUM 20 MG/1
20 TABLET, DELAYED RELEASE ORAL DAILY
COMMUNITY

## 2021-01-27 RX ORDER — ACETAMINOPHEN 325 MG/1
650 TABLET ORAL EVERY 6 HOURS PRN
Status: DISCONTINUED | OUTPATIENT
Start: 2021-01-27 | End: 2021-02-18 | Stop reason: HOSPADM

## 2021-01-27 RX ORDER — VALSARTAN 40 MG/1
40 TABLET ORAL DAILY
COMMUNITY

## 2021-01-27 RX ORDER — SODIUM CHLORIDE 0.9 % (FLUSH) 0.9 %
10 SYRINGE (ML) INJECTION
Status: DISCONTINUED | OUTPATIENT
Start: 2021-01-27 | End: 2021-02-18 | Stop reason: HOSPADM

## 2021-01-27 RX ORDER — LABETALOL HCL 20 MG/4 ML
10 SYRINGE (ML) INTRAVENOUS EVERY 4 HOURS PRN
Status: DISCONTINUED | OUTPATIENT
Start: 2021-01-27 | End: 2021-02-18 | Stop reason: HOSPADM

## 2021-01-27 RX ORDER — INSULIN ASPART 100 [IU]/ML
1-10 INJECTION, SOLUTION INTRAVENOUS; SUBCUTANEOUS EVERY 6 HOURS PRN
Status: DISCONTINUED | OUTPATIENT
Start: 2021-01-27 | End: 2021-02-05

## 2021-01-27 RX ORDER — GLUCAGON 1 MG
1 KIT INJECTION
Status: DISCONTINUED | OUTPATIENT
Start: 2021-01-27 | End: 2021-02-05

## 2021-01-27 RX ORDER — PANTOPRAZOLE SODIUM 40 MG/10ML
40 INJECTION, POWDER, LYOPHILIZED, FOR SOLUTION INTRAVENOUS DAILY
Status: DISCONTINUED | OUTPATIENT
Start: 2021-01-27 | End: 2021-01-27

## 2021-01-27 RX ORDER — SODIUM CHLORIDE FOR INHALATION 3 %
4 VIAL, NEBULIZER (ML) INHALATION EVERY 4 HOURS
Status: DISPENSED | OUTPATIENT
Start: 2021-01-27 | End: 2021-01-28

## 2021-01-27 RX ORDER — FAMOTIDINE 20 MG/1
20 TABLET, FILM COATED ORAL 2 TIMES DAILY
Status: DISCONTINUED | OUTPATIENT
Start: 2021-01-27 | End: 2021-02-02

## 2021-01-27 RX ORDER — AMLODIPINE BESYLATE 5 MG/1
5 TABLET ORAL DAILY
Status: DISCONTINUED | OUTPATIENT
Start: 2021-01-27 | End: 2021-01-27

## 2021-01-27 RX ORDER — ACETAMINOPHEN 325 MG/1
650 TABLET ORAL EVERY 6 HOURS PRN
Status: DISCONTINUED | OUTPATIENT
Start: 2021-01-27 | End: 2021-01-27

## 2021-01-27 RX ORDER — AMLODIPINE BESYLATE 5 MG/1
5 TABLET ORAL DAILY
Status: DISCONTINUED | OUTPATIENT
Start: 2021-01-28 | End: 2021-01-29

## 2021-01-27 RX ORDER — FAMOTIDINE 20 MG/1
20 TABLET, FILM COATED ORAL 2 TIMES DAILY
Status: DISCONTINUED | OUTPATIENT
Start: 2021-01-27 | End: 2021-01-27

## 2021-01-27 RX ORDER — LABETALOL HCL 20 MG/4 ML
10 SYRINGE (ML) INTRAVENOUS EVERY 4 HOURS PRN
Status: DISCONTINUED | OUTPATIENT
Start: 2021-01-27 | End: 2021-01-27

## 2021-01-27 RX ORDER — AMOXICILLIN 250 MG
1 CAPSULE ORAL 2 TIMES DAILY
Status: DISCONTINUED | OUTPATIENT
Start: 2021-01-27 | End: 2021-01-27

## 2021-01-27 RX ORDER — ATORVASTATIN CALCIUM 10 MG/1
10 TABLET, FILM COATED ORAL DAILY
Status: DISCONTINUED | OUTPATIENT
Start: 2021-01-27 | End: 2021-01-27

## 2021-01-27 RX ORDER — SODIUM CHLORIDE 9 MG/ML
INJECTION, SOLUTION INTRAVENOUS CONTINUOUS
Status: DISCONTINUED | OUTPATIENT
Start: 2021-01-27 | End: 2021-01-31

## 2021-01-27 RX ORDER — LIDOCAINE HYDROCHLORIDE 10 MG/ML
1 INJECTION INFILTRATION; PERINEURAL ONCE
Status: COMPLETED | OUTPATIENT
Start: 2021-01-27 | End: 2021-01-27

## 2021-01-27 RX ORDER — AMOXICILLIN 250 MG
1 CAPSULE ORAL 2 TIMES DAILY
Status: DISCONTINUED | OUTPATIENT
Start: 2021-01-27 | End: 2021-02-02

## 2021-01-27 RX ORDER — ATORVASTATIN CALCIUM 10 MG/1
10 TABLET, FILM COATED ORAL DAILY
Status: DISCONTINUED | OUTPATIENT
Start: 2021-01-28 | End: 2021-02-02

## 2021-01-27 RX ORDER — ONDANSETRON 2 MG/ML
4 INJECTION INTRAMUSCULAR; INTRAVENOUS EVERY 6 HOURS PRN
Status: DISCONTINUED | OUTPATIENT
Start: 2021-01-27 | End: 2021-02-18 | Stop reason: HOSPADM

## 2021-01-27 RX ORDER — TAMSULOSIN HYDROCHLORIDE 0.4 MG/1
0.4 CAPSULE ORAL DAILY
COMMUNITY

## 2021-01-27 RX ORDER — DEXAMETHASONE SODIUM PHOSPHATE 4 MG/ML
4 INJECTION, SOLUTION INTRA-ARTICULAR; INTRALESIONAL; INTRAMUSCULAR; INTRAVENOUS; SOFT TISSUE EVERY 6 HOURS
Status: DISCONTINUED | OUTPATIENT
Start: 2021-01-27 | End: 2021-02-10

## 2021-01-27 RX ORDER — LEVETIRACETAM 15 MG/ML
1500 INJECTION INTRAVASCULAR EVERY 12 HOURS
Status: DISCONTINUED | OUTPATIENT
Start: 2021-01-27 | End: 2021-01-29

## 2021-01-27 RX ORDER — MUPIROCIN 20 MG/G
OINTMENT TOPICAL 2 TIMES DAILY
Status: COMPLETED | OUTPATIENT
Start: 2021-01-27 | End: 2021-01-31

## 2021-01-27 RX ADMIN — LEVETIRACETAM INJECTION 1500 MG: 15 INJECTION INTRAVENOUS at 08:01

## 2021-01-27 RX ADMIN — SODIUM CHLORIDE SOLN NEBU 3% 4 ML: 3 NEBU SOLN at 08:01

## 2021-01-27 RX ADMIN — DEXAMETHASONE SODIUM PHOSPHATE 4 MG: 4 INJECTION INTRA-ARTICULAR; INTRALESIONAL; INTRAMUSCULAR; INTRAVENOUS; SOFT TISSUE at 05:01

## 2021-01-27 RX ADMIN — LIDOCAINE HYDROCHLORIDE 1 ML: 10 INJECTION, SOLUTION INFILTRATION; PERINEURAL at 03:01

## 2021-01-27 RX ADMIN — DEXAMETHASONE SODIUM PHOSPHATE 4 MG: 4 INJECTION INTRA-ARTICULAR; INTRALESIONAL; INTRAMUSCULAR; INTRAVENOUS; SOFT TISSUE at 12:01

## 2021-01-27 RX ADMIN — MUPIROCIN: 20 OINTMENT TOPICAL at 08:01

## 2021-01-27 RX ADMIN — GADOBUTROL 10 ML: 604.72 INJECTION INTRAVENOUS at 01:01

## 2021-01-27 RX ADMIN — DEXAMETHASONE SODIUM PHOSPHATE 4 MG: 4 INJECTION INTRA-ARTICULAR; INTRALESIONAL; INTRAMUSCULAR; INTRAVENOUS; SOFT TISSUE at 11:01

## 2021-01-27 RX ADMIN — MUPIROCIN 1 TUBE: 20 OINTMENT TOPICAL at 05:01

## 2021-01-27 RX ADMIN — LEVETIRACETAM INJECTION 1500 MG: 15 INJECTION INTRAVENOUS at 09:01

## 2021-01-27 RX ADMIN — ONDANSETRON 4 MG: 2 INJECTION INTRAMUSCULAR; INTRAVENOUS at 03:01

## 2021-01-27 RX ADMIN — SODIUM CHLORIDE: 0.9 INJECTION, SOLUTION INTRAVENOUS at 03:01

## 2021-01-28 PROBLEM — I63.422 CEREBROVASCULAR ACCIDENT (CVA) DUE TO EMBOLISM OF LEFT ANTERIOR CEREBRAL ARTERY: Status: ACTIVE | Noted: 2021-01-27

## 2021-01-28 LAB
ALBUMIN SERPL BCP-MCNC: 3 G/DL (ref 3.5–5.2)
ALP SERPL-CCNC: 167 U/L (ref 55–135)
ALT SERPL W/O P-5'-P-CCNC: 14 U/L (ref 10–44)
ANION GAP SERPL CALC-SCNC: 12 MMOL/L (ref 8–16)
APTT BLDCRRT: 23.1 SEC (ref 21–32)
ASCENDING AORTA: 2.97 CM
AST SERPL-CCNC: 15 U/L (ref 10–40)
AV INDEX (PROSTH): 0.81
AV MEAN GRADIENT: 3 MMHG
AV PEAK GRADIENT: 4 MMHG
AV VALVE AREA: 2.55 CM2
AV VELOCITY RATIO: 0.78
BASOPHILS # BLD AUTO: 0.02 K/UL (ref 0–0.2)
BASOPHILS NFR BLD: 0.2 % (ref 0–1.9)
BILIRUB SERPL-MCNC: 0.4 MG/DL (ref 0.1–1)
BSA FOR ECHO PROCEDURE: 2.1 M2
BUN SERPL-MCNC: 11 MG/DL (ref 6–20)
CALCIUM SERPL-MCNC: 8.2 MG/DL (ref 8.7–10.5)
CHLORIDE SERPL-SCNC: 101 MMOL/L (ref 95–110)
CO2 SERPL-SCNC: 23 MMOL/L (ref 23–29)
CREAT SERPL-MCNC: 0.6 MG/DL (ref 0.5–1.4)
CV ECHO LV RWT: 0.33 CM
DIFFERENTIAL METHOD: ABNORMAL
DOP CALC AO PEAK VEL: 1.01 M/S
DOP CALC AO VTI: 19.36 CM
DOP CALC LVOT AREA: 3.1 CM2
DOP CALC LVOT DIAMETER: 2 CM
DOP CALC LVOT PEAK VEL: 0.79 M/S
DOP CALC LVOT STROKE VOLUME: 49.42 CM3
DOP CALCLVOT PEAK VEL VTI: 15.74 CM
ECHO LV POSTERIOR WALL: 0.72 CM (ref 0.6–1.1)
EOSINOPHIL # BLD AUTO: 0 K/UL (ref 0–0.5)
EOSINOPHIL NFR BLD: 0 % (ref 0–8)
ERYTHROCYTE [DISTWIDTH] IN BLOOD BY AUTOMATED COUNT: 17.2 % (ref 11.5–14.5)
EST. GFR  (AFRICAN AMERICAN): >60 ML/MIN/1.73 M^2
EST. GFR  (NON AFRICAN AMERICAN): >60 ML/MIN/1.73 M^2
FRACTIONAL SHORTENING: 26 % (ref 28–44)
GLUCOSE SERPL-MCNC: 146 MG/DL (ref 70–110)
HCT VFR BLD AUTO: 34.9 % (ref 37–48.5)
HGB BLD-MCNC: 11 G/DL (ref 12–16)
IMM GRANULOCYTES # BLD AUTO: 0.14 K/UL (ref 0–0.04)
IMM GRANULOCYTES NFR BLD AUTO: 1.3 % (ref 0–0.5)
INR PPP: 1 (ref 0.8–1.2)
INTERVENTRICULAR SEPTUM: 0.81 CM (ref 0.6–1.1)
LA MAJOR: 5.38 CM
LA MINOR: 4.78 CM
LA WIDTH: 3.54 CM
LEFT ATRIUM SIZE: 3.2 CM
LEFT ATRIUM VOLUME INDEX: 23.5 ML/M2
LEFT ATRIUM VOLUME: 48.74 CM3
LEFT INTERNAL DIMENSION IN SYSTOLE: 3.18 CM (ref 2.1–4)
LEFT VENTRICLE DIASTOLIC VOLUME INDEX: 40.3 ML/M2
LEFT VENTRICLE DIASTOLIC VOLUME: 83.43 ML
LEFT VENTRICLE MASS INDEX: 48 G/M2
LEFT VENTRICLE SYSTOLIC VOLUME INDEX: 19.5 ML/M2
LEFT VENTRICLE SYSTOLIC VOLUME: 40.36 ML
LEFT VENTRICULAR INTERNAL DIMENSION IN DIASTOLE: 4.31 CM (ref 3.5–6)
LEFT VENTRICULAR MASS: 99.7 G
LYMPHOCYTES # BLD AUTO: 1 K/UL (ref 1–4.8)
LYMPHOCYTES NFR BLD: 9.1 % (ref 18–48)
MAGNESIUM SERPL-MCNC: 1.9 MG/DL (ref 1.6–2.6)
MCH RBC QN AUTO: 25.8 PG (ref 27–31)
MCHC RBC AUTO-ENTMCNC: 31.5 G/DL (ref 32–36)
MCV RBC AUTO: 82 FL (ref 82–98)
MONOCYTES # BLD AUTO: 0.3 K/UL (ref 0.3–1)
MONOCYTES NFR BLD: 2.5 % (ref 4–15)
NEUTROPHILS # BLD AUTO: 9.2 K/UL (ref 1.8–7.7)
NEUTROPHILS NFR BLD: 86.9 % (ref 38–73)
NRBC BLD-RTO: 0 /100 WBC
PHOSPHATE SERPL-MCNC: 4.3 MG/DL (ref 2.7–4.5)
PLATELET # BLD AUTO: 343 K/UL (ref 150–350)
PMV BLD AUTO: 10.6 FL (ref 9.2–12.9)
POCT GLUCOSE: 135 MG/DL (ref 70–110)
POCT GLUCOSE: 137 MG/DL (ref 70–110)
POCT GLUCOSE: 142 MG/DL (ref 70–110)
POTASSIUM SERPL-SCNC: 3.9 MMOL/L (ref 3.5–5.1)
PROT SERPL-MCNC: 6.6 G/DL (ref 6–8.4)
PROTHROMBIN TIME: 10.7 SEC (ref 9–12.5)
RA MAJOR: 4.55 CM
RA PRESSURE: 3 MMHG
RA WIDTH: 3.2 CM
RBC # BLD AUTO: 4.27 M/UL (ref 4–5.4)
RIGHT VENTRICULAR END-DIASTOLIC DIMENSION: 3.95 CM
SINUS: 2.89 CM
SODIUM SERPL-SCNC: 136 MMOL/L (ref 136–145)
STJ: 2.78 CM
TDI LATERAL: 0.09 M/S
TDI SEPTAL: 0.07 M/S
TDI: 0.08 M/S
TRICUSPID ANNULAR PLANE SYSTOLIC EXCURSION: 1.87 CM
WBC # BLD AUTO: 10.6 K/UL (ref 3.9–12.7)

## 2021-01-28 PROCEDURE — 63600175 PHARM REV CODE 636 W HCPCS: Performed by: STUDENT IN AN ORGANIZED HEALTH CARE EDUCATION/TRAINING PROGRAM

## 2021-01-28 PROCEDURE — 85025 COMPLETE CBC W/AUTO DIFF WBC: CPT

## 2021-01-28 PROCEDURE — 99233 SBSQ HOSP IP/OBS HIGH 50: CPT | Mod: ,,, | Performed by: PSYCHIATRY & NEUROLOGY

## 2021-01-28 PROCEDURE — 25000003 PHARM REV CODE 250: Performed by: NURSE PRACTITIONER

## 2021-01-28 PROCEDURE — 25500020 PHARM REV CODE 255: Performed by: PSYCHIATRY & NEUROLOGY

## 2021-01-28 PROCEDURE — 97530 THERAPEUTIC ACTIVITIES: CPT

## 2021-01-28 PROCEDURE — 25000242 PHARM REV CODE 250 ALT 637 W/ HCPCS: Performed by: STUDENT IN AN ORGANIZED HEALTH CARE EDUCATION/TRAINING PROGRAM

## 2021-01-28 PROCEDURE — 85730 THROMBOPLASTIN TIME PARTIAL: CPT

## 2021-01-28 PROCEDURE — 83735 ASSAY OF MAGNESIUM: CPT

## 2021-01-28 PROCEDURE — 94640 AIRWAY INHALATION TREATMENT: CPT

## 2021-01-28 PROCEDURE — 85610 PROTHROMBIN TIME: CPT

## 2021-01-28 PROCEDURE — 97166 OT EVAL MOD COMPLEX 45 MIN: CPT

## 2021-01-28 PROCEDURE — 99233 PR SUBSEQUENT HOSPITAL CARE,LEVL III: ICD-10-PCS | Mod: ,,, | Performed by: PSYCHIATRY & NEUROLOGY

## 2021-01-28 PROCEDURE — 97802 MEDICAL NUTRITION INDIV IN: CPT

## 2021-01-28 PROCEDURE — 25000003 PHARM REV CODE 250: Performed by: PSYCHIATRY & NEUROLOGY

## 2021-01-28 PROCEDURE — 97162 PT EVAL MOD COMPLEX 30 MIN: CPT

## 2021-01-28 PROCEDURE — 80053 COMPREHEN METABOLIC PANEL: CPT

## 2021-01-28 PROCEDURE — 92526 ORAL FUNCTION THERAPY: CPT

## 2021-01-28 PROCEDURE — 20000000 HC ICU ROOM

## 2021-01-28 PROCEDURE — 97535 SELF CARE MNGMENT TRAINING: CPT

## 2021-01-28 PROCEDURE — 94761 N-INVAS EAR/PLS OXIMETRY MLT: CPT

## 2021-01-28 PROCEDURE — 84100 ASSAY OF PHOSPHORUS: CPT

## 2021-01-28 RX ORDER — AMANTADINE HYDROCHLORIDE 50 MG/5ML
100 SOLUTION ORAL DAILY
Status: DISCONTINUED | OUTPATIENT
Start: 2021-01-28 | End: 2021-01-29

## 2021-01-28 RX ORDER — LEVETIRACETAM 10 MG/ML
INJECTION INTRAVASCULAR
Status: DISPENSED
Start: 2021-01-28 | End: 2021-01-29

## 2021-01-28 RX ADMIN — FAMOTIDINE 20 MG: 20 TABLET, FILM COATED ORAL at 08:01

## 2021-01-28 RX ADMIN — DEXAMETHASONE SODIUM PHOSPHATE 4 MG: 4 INJECTION INTRA-ARTICULAR; INTRALESIONAL; INTRAMUSCULAR; INTRAVENOUS; SOFT TISSUE at 12:01

## 2021-01-28 RX ADMIN — SODIUM CHLORIDE SOLN NEBU 3% 4 ML: 3 NEBU SOLN at 03:01

## 2021-01-28 RX ADMIN — DOCUSATE SODIUM AND SENNOSIDES 1 TABLET: 8.6; 5 TABLET, FILM COATED ORAL at 08:01

## 2021-01-28 RX ADMIN — DOCUSATE SODIUM AND SENNOSIDES 1 TABLET: 8.6; 5 TABLET, FILM COATED ORAL at 09:01

## 2021-01-28 RX ADMIN — LEVOTHYROXINE SODIUM 137 MCG: 0.14 TABLET ORAL at 06:01

## 2021-01-28 RX ADMIN — AMANTADINE HYDROCHLORIDE 100 MG: 50 SOLUTION ORAL at 11:01

## 2021-01-28 RX ADMIN — AMLODIPINE BESYLATE 5 MG: 5 TABLET ORAL at 08:01

## 2021-01-28 RX ADMIN — LEVETIRACETAM INJECTION 1500 MG: 15 INJECTION INTRAVENOUS at 09:01

## 2021-01-28 RX ADMIN — SODIUM CHLORIDE SOLN NEBU 3% 4 ML: 3 NEBU SOLN at 12:01

## 2021-01-28 RX ADMIN — MUPIROCIN: 20 OINTMENT TOPICAL at 08:01

## 2021-01-28 RX ADMIN — ATORVASTATIN CALCIUM 10 MG: 10 TABLET, FILM COATED ORAL at 08:01

## 2021-01-28 RX ADMIN — DEXAMETHASONE SODIUM PHOSPHATE 4 MG: 4 INJECTION INTRA-ARTICULAR; INTRALESIONAL; INTRAMUSCULAR; INTRAVENOUS; SOFT TISSUE at 06:01

## 2021-01-28 RX ADMIN — LEVETIRACETAM INJECTION 1500 MG: 15 INJECTION INTRAVENOUS at 08:01

## 2021-01-28 RX ADMIN — FAMOTIDINE 20 MG: 20 TABLET, FILM COATED ORAL at 09:01

## 2021-01-28 RX ADMIN — IOHEXOL 100 ML: 350 INJECTION, SOLUTION INTRAVENOUS at 04:01

## 2021-01-28 RX ADMIN — ACETAMINOPHEN 650 MG: 325 TABLET ORAL at 08:01

## 2021-01-28 RX ADMIN — MUPIROCIN: 20 OINTMENT TOPICAL at 09:01

## 2021-01-29 LAB
ALBUMIN SERPL BCP-MCNC: 3.1 G/DL (ref 3.5–5.2)
ALP SERPL-CCNC: 169 U/L (ref 55–135)
ALT SERPL W/O P-5'-P-CCNC: 23 U/L (ref 10–44)
ANION GAP SERPL CALC-SCNC: 9 MMOL/L (ref 8–16)
AST SERPL-CCNC: 24 U/L (ref 10–40)
BASOPHILS # BLD AUTO: 0.02 K/UL (ref 0–0.2)
BASOPHILS NFR BLD: 0.2 % (ref 0–1.9)
BILIRUB SERPL-MCNC: 0.4 MG/DL (ref 0.1–1)
BUN SERPL-MCNC: 14 MG/DL (ref 6–20)
CALCIUM SERPL-MCNC: 8.1 MG/DL (ref 8.7–10.5)
CHLORIDE SERPL-SCNC: 102 MMOL/L (ref 95–110)
CO2 SERPL-SCNC: 25 MMOL/L (ref 23–29)
CREAT SERPL-MCNC: 0.6 MG/DL (ref 0.5–1.4)
DIFFERENTIAL METHOD: ABNORMAL
EOSINOPHIL # BLD AUTO: 0 K/UL (ref 0–0.5)
EOSINOPHIL NFR BLD: 0 % (ref 0–8)
ERYTHROCYTE [DISTWIDTH] IN BLOOD BY AUTOMATED COUNT: 16.9 % (ref 11.5–14.5)
EST. GFR  (AFRICAN AMERICAN): >60 ML/MIN/1.73 M^2
EST. GFR  (NON AFRICAN AMERICAN): >60 ML/MIN/1.73 M^2
GLUCOSE SERPL-MCNC: 148 MG/DL (ref 70–110)
HCT VFR BLD AUTO: 34.9 % (ref 37–48.5)
HGB BLD-MCNC: 11.3 G/DL (ref 12–16)
IMM GRANULOCYTES # BLD AUTO: 0.18 K/UL (ref 0–0.04)
IMM GRANULOCYTES NFR BLD AUTO: 1.6 % (ref 0–0.5)
LYMPHOCYTES # BLD AUTO: 1.3 K/UL (ref 1–4.8)
LYMPHOCYTES NFR BLD: 10.9 % (ref 18–48)
MAGNESIUM SERPL-MCNC: 1.9 MG/DL (ref 1.6–2.6)
MCH RBC QN AUTO: 26.1 PG (ref 27–31)
MCHC RBC AUTO-ENTMCNC: 32.4 G/DL (ref 32–36)
MCV RBC AUTO: 81 FL (ref 82–98)
MONOCYTES # BLD AUTO: 0.6 K/UL (ref 0.3–1)
MONOCYTES NFR BLD: 5.4 % (ref 4–15)
NEUTROPHILS # BLD AUTO: 9.5 K/UL (ref 1.8–7.7)
NEUTROPHILS NFR BLD: 81.9 % (ref 38–73)
NRBC BLD-RTO: 0 /100 WBC
PHOSPHATE SERPL-MCNC: 3.4 MG/DL (ref 2.7–4.5)
PLATELET # BLD AUTO: 396 K/UL (ref 150–350)
PMV BLD AUTO: 9.3 FL (ref 9.2–12.9)
POCT GLUCOSE: 127 MG/DL (ref 70–110)
POCT GLUCOSE: 140 MG/DL (ref 70–110)
POCT GLUCOSE: 149 MG/DL (ref 70–110)
POTASSIUM SERPL-SCNC: 3.8 MMOL/L (ref 3.5–5.1)
PROT SERPL-MCNC: 6.6 G/DL (ref 6–8.4)
RBC # BLD AUTO: 4.33 M/UL (ref 4–5.4)
SODIUM SERPL-SCNC: 136 MMOL/L (ref 136–145)
WBC # BLD AUTO: 11.55 K/UL (ref 3.9–12.7)

## 2021-01-29 PROCEDURE — 63600175 PHARM REV CODE 636 W HCPCS: Performed by: STUDENT IN AN ORGANIZED HEALTH CARE EDUCATION/TRAINING PROGRAM

## 2021-01-29 PROCEDURE — 25000003 PHARM REV CODE 250: Performed by: STUDENT IN AN ORGANIZED HEALTH CARE EDUCATION/TRAINING PROGRAM

## 2021-01-29 PROCEDURE — 95718 PR EEG, W/VIDEO, CONT RECORD, I&R, 2-12 HRS: ICD-10-PCS | Mod: ,,, | Performed by: PSYCHIATRY & NEUROLOGY

## 2021-01-29 PROCEDURE — 25000003 PHARM REV CODE 250: Performed by: PSYCHIATRY & NEUROLOGY

## 2021-01-29 PROCEDURE — 99233 SBSQ HOSP IP/OBS HIGH 50: CPT | Mod: ,,, | Performed by: PSYCHIATRY & NEUROLOGY

## 2021-01-29 PROCEDURE — 92526 ORAL FUNCTION THERAPY: CPT

## 2021-01-29 PROCEDURE — 85025 COMPLETE CBC W/AUTO DIFF WBC: CPT

## 2021-01-29 PROCEDURE — 83735 ASSAY OF MAGNESIUM: CPT

## 2021-01-29 PROCEDURE — 25000003 PHARM REV CODE 250: Performed by: EMERGENCY MEDICINE

## 2021-01-29 PROCEDURE — 25000003 PHARM REV CODE 250: Performed by: NURSE PRACTITIONER

## 2021-01-29 PROCEDURE — 99233 PR SUBSEQUENT HOSPITAL CARE,LEVL III: ICD-10-PCS | Mod: ,,, | Performed by: PSYCHIATRY & NEUROLOGY

## 2021-01-29 PROCEDURE — 95718 EEG PHYS/QHP 2-12 HR W/VEEG: CPT | Mod: ,,, | Performed by: PSYCHIATRY & NEUROLOGY

## 2021-01-29 PROCEDURE — 84100 ASSAY OF PHOSPHORUS: CPT

## 2021-01-29 PROCEDURE — 11000001 HC ACUTE MED/SURG PRIVATE ROOM

## 2021-01-29 PROCEDURE — 80053 COMPREHEN METABOLIC PANEL: CPT

## 2021-01-29 RX ORDER — LOSARTAN POTASSIUM 50 MG/1
50 TABLET ORAL DAILY
Status: DISCONTINUED | OUTPATIENT
Start: 2021-01-29 | End: 2021-02-02

## 2021-01-29 RX ORDER — AMLODIPINE BESYLATE 10 MG/1
10 TABLET ORAL DAILY
Status: DISCONTINUED | OUTPATIENT
Start: 2021-01-30 | End: 2021-02-02

## 2021-01-29 RX ORDER — LEVETIRACETAM 100 MG/ML
1500 SOLUTION ORAL 2 TIMES DAILY
Status: DISCONTINUED | OUTPATIENT
Start: 2021-01-29 | End: 2021-02-02

## 2021-01-29 RX ORDER — NAPROXEN SODIUM 220 MG/1
81 TABLET, FILM COATED ORAL DAILY
Status: DISCONTINUED | OUTPATIENT
Start: 2021-01-29 | End: 2021-01-31

## 2021-01-29 RX ORDER — MODAFINIL 100 MG/1
200 TABLET ORAL DAILY
Status: DISCONTINUED | OUTPATIENT
Start: 2021-01-29 | End: 2021-02-02

## 2021-01-29 RX ADMIN — LEVETIRACETAM 1500 MG: 100 SOLUTION ORAL at 09:01

## 2021-01-29 RX ADMIN — FAMOTIDINE 20 MG: 20 TABLET, FILM COATED ORAL at 09:01

## 2021-01-29 RX ADMIN — LOSARTAN POTASSIUM 50 MG: 50 TABLET, FILM COATED ORAL at 10:01

## 2021-01-29 RX ADMIN — MODAFINIL 200 MG: 100 TABLET ORAL at 10:01

## 2021-01-29 RX ADMIN — AMLODIPINE BESYLATE 5 MG: 5 TABLET ORAL at 09:01

## 2021-01-29 RX ADMIN — LEVETIRACETAM INJECTION 1500 MG: 15 INJECTION INTRAVENOUS at 08:01

## 2021-01-29 RX ADMIN — DEXAMETHASONE SODIUM PHOSPHATE 4 MG: 4 INJECTION INTRA-ARTICULAR; INTRALESIONAL; INTRAMUSCULAR; INTRAVENOUS; SOFT TISSUE at 05:01

## 2021-01-29 RX ADMIN — LEVOTHYROXINE SODIUM 137 MCG: 0.14 TABLET ORAL at 09:01

## 2021-01-29 RX ADMIN — DOCUSATE SODIUM AND SENNOSIDES 1 TABLET: 8.6; 5 TABLET, FILM COATED ORAL at 08:01

## 2021-01-29 RX ADMIN — DEXAMETHASONE SODIUM PHOSPHATE 4 MG: 4 INJECTION INTRA-ARTICULAR; INTRALESIONAL; INTRAMUSCULAR; INTRAVENOUS; SOFT TISSUE at 11:01

## 2021-01-29 RX ADMIN — DEXAMETHASONE SODIUM PHOSPHATE 4 MG: 4 INJECTION INTRA-ARTICULAR; INTRALESIONAL; INTRAMUSCULAR; INTRAVENOUS; SOFT TISSUE at 12:01

## 2021-01-29 RX ADMIN — AMANTADINE HYDROCHLORIDE 100 MG: 50 SOLUTION ORAL at 08:01

## 2021-01-29 RX ADMIN — ASPIRIN 81 MG: 81 TABLET, CHEWABLE ORAL at 10:01

## 2021-01-29 RX ADMIN — DOCUSATE SODIUM AND SENNOSIDES 1 TABLET: 8.6; 5 TABLET, FILM COATED ORAL at 09:01

## 2021-01-29 RX ADMIN — DEXAMETHASONE SODIUM PHOSPHATE 4 MG: 4 INJECTION INTRA-ARTICULAR; INTRALESIONAL; INTRAMUSCULAR; INTRAVENOUS; SOFT TISSUE at 09:01

## 2021-01-29 RX ADMIN — MUPIROCIN: 20 OINTMENT TOPICAL at 08:01

## 2021-01-29 RX ADMIN — MUPIROCIN: 20 OINTMENT TOPICAL at 09:01

## 2021-01-29 RX ADMIN — ATORVASTATIN CALCIUM 10 MG: 10 TABLET, FILM COATED ORAL at 08:01

## 2021-01-29 RX ADMIN — FAMOTIDINE 20 MG: 20 TABLET, FILM COATED ORAL at 08:01

## 2021-01-30 LAB
ALBUMIN SERPL BCP-MCNC: 2.9 G/DL (ref 3.5–5.2)
ALP SERPL-CCNC: 174 U/L (ref 55–135)
ALT SERPL W/O P-5'-P-CCNC: 41 U/L (ref 10–44)
ANION GAP SERPL CALC-SCNC: 11 MMOL/L (ref 8–16)
AST SERPL-CCNC: 48 U/L (ref 10–40)
BASOPHILS # BLD AUTO: 0.02 K/UL (ref 0–0.2)
BASOPHILS NFR BLD: 0.2 % (ref 0–1.9)
BILIRUB SERPL-MCNC: 0.5 MG/DL (ref 0.1–1)
BUN SERPL-MCNC: 18 MG/DL (ref 6–20)
CALCIUM SERPL-MCNC: 7.5 MG/DL (ref 8.7–10.5)
CHLORIDE SERPL-SCNC: 102 MMOL/L (ref 95–110)
CO2 SERPL-SCNC: 21 MMOL/L (ref 23–29)
CREAT SERPL-MCNC: 0.7 MG/DL (ref 0.5–1.4)
DIFFERENTIAL METHOD: ABNORMAL
EOSINOPHIL # BLD AUTO: 0 K/UL (ref 0–0.5)
EOSINOPHIL NFR BLD: 0 % (ref 0–8)
ERYTHROCYTE [DISTWIDTH] IN BLOOD BY AUTOMATED COUNT: 17 % (ref 11.5–14.5)
EST. GFR  (AFRICAN AMERICAN): >60 ML/MIN/1.73 M^2
EST. GFR  (NON AFRICAN AMERICAN): >60 ML/MIN/1.73 M^2
GLUCOSE SERPL-MCNC: 160 MG/DL (ref 70–110)
HCT VFR BLD AUTO: 36.4 % (ref 37–48.5)
HGB BLD-MCNC: 11.9 G/DL (ref 12–16)
IMM GRANULOCYTES # BLD AUTO: 0.2 K/UL (ref 0–0.04)
IMM GRANULOCYTES NFR BLD AUTO: 1.5 % (ref 0–0.5)
LYMPHOCYTES # BLD AUTO: 1.3 K/UL (ref 1–4.8)
LYMPHOCYTES NFR BLD: 10.3 % (ref 18–48)
MAGNESIUM SERPL-MCNC: 1.9 MG/DL (ref 1.6–2.6)
MCH RBC QN AUTO: 26 PG (ref 27–31)
MCHC RBC AUTO-ENTMCNC: 32.7 G/DL (ref 32–36)
MCV RBC AUTO: 80 FL (ref 82–98)
MONOCYTES # BLD AUTO: 1 K/UL (ref 0.3–1)
MONOCYTES NFR BLD: 7.7 % (ref 4–15)
NEUTROPHILS # BLD AUTO: 10.4 K/UL (ref 1.8–7.7)
NEUTROPHILS NFR BLD: 80.3 % (ref 38–73)
NRBC BLD-RTO: 0 /100 WBC
PHOSPHATE SERPL-MCNC: 2.9 MG/DL (ref 2.7–4.5)
PLATELET # BLD AUTO: 420 K/UL (ref 150–350)
PMV BLD AUTO: 9.8 FL (ref 9.2–12.9)
POCT GLUCOSE: 151 MG/DL (ref 70–110)
POCT GLUCOSE: 159 MG/DL (ref 70–110)
POCT GLUCOSE: 179 MG/DL (ref 70–110)
POTASSIUM SERPL-SCNC: 3.5 MMOL/L (ref 3.5–5.1)
PROT SERPL-MCNC: 6.2 G/DL (ref 6–8.4)
RBC # BLD AUTO: 4.57 M/UL (ref 4–5.4)
SODIUM SERPL-SCNC: 134 MMOL/L (ref 136–145)
WBC # BLD AUTO: 12.96 K/UL (ref 3.9–12.7)

## 2021-01-30 PROCEDURE — 99233 PR SUBSEQUENT HOSPITAL CARE,LEVL III: ICD-10-PCS | Mod: ,,, | Performed by: PHYSICIAN ASSISTANT

## 2021-01-30 PROCEDURE — 25000003 PHARM REV CODE 250: Performed by: NURSE PRACTITIONER

## 2021-01-30 PROCEDURE — 83735 ASSAY OF MAGNESIUM: CPT

## 2021-01-30 PROCEDURE — 85025 COMPLETE CBC W/AUTO DIFF WBC: CPT

## 2021-01-30 PROCEDURE — 84100 ASSAY OF PHOSPHORUS: CPT

## 2021-01-30 PROCEDURE — 80053 COMPREHEN METABOLIC PANEL: CPT

## 2021-01-30 PROCEDURE — 36415 COLL VENOUS BLD VENIPUNCTURE: CPT

## 2021-01-30 PROCEDURE — 11000001 HC ACUTE MED/SURG PRIVATE ROOM

## 2021-01-30 PROCEDURE — 63600175 PHARM REV CODE 636 W HCPCS: Performed by: STUDENT IN AN ORGANIZED HEALTH CARE EDUCATION/TRAINING PROGRAM

## 2021-01-30 PROCEDURE — 99233 SBSQ HOSP IP/OBS HIGH 50: CPT | Mod: ,,, | Performed by: PHYSICIAN ASSISTANT

## 2021-01-30 PROCEDURE — 25000003 PHARM REV CODE 250: Performed by: PSYCHIATRY & NEUROLOGY

## 2021-01-30 PROCEDURE — 25000003 PHARM REV CODE 250: Performed by: STUDENT IN AN ORGANIZED HEALTH CARE EDUCATION/TRAINING PROGRAM

## 2021-01-30 RX ADMIN — LEVETIRACETAM 1500 MG: 100 SOLUTION ORAL at 08:01

## 2021-01-30 RX ADMIN — MUPIROCIN: 20 OINTMENT TOPICAL at 08:01

## 2021-01-30 RX ADMIN — DEXAMETHASONE SODIUM PHOSPHATE 4 MG: 4 INJECTION INTRA-ARTICULAR; INTRALESIONAL; INTRAMUSCULAR; INTRAVENOUS; SOFT TISSUE at 05:01

## 2021-01-30 RX ADMIN — FAMOTIDINE 20 MG: 20 TABLET, FILM COATED ORAL at 08:01

## 2021-01-30 RX ADMIN — ASPIRIN 81 MG: 81 TABLET, CHEWABLE ORAL at 08:01

## 2021-01-30 RX ADMIN — LOSARTAN POTASSIUM 50 MG: 50 TABLET, FILM COATED ORAL at 08:01

## 2021-01-30 RX ADMIN — DOCUSATE SODIUM AND SENNOSIDES 1 TABLET: 8.6; 5 TABLET, FILM COATED ORAL at 09:01

## 2021-01-30 RX ADMIN — MODAFINIL 200 MG: 100 TABLET ORAL at 08:01

## 2021-01-30 RX ADMIN — LEVOTHYROXINE SODIUM 137 MCG: 0.14 TABLET ORAL at 05:01

## 2021-01-30 RX ADMIN — DOCUSATE SODIUM AND SENNOSIDES 1 TABLET: 8.6; 5 TABLET, FILM COATED ORAL at 08:01

## 2021-01-30 RX ADMIN — AMLODIPINE BESYLATE 10 MG: 10 TABLET ORAL at 08:01

## 2021-01-30 RX ADMIN — DEXAMETHASONE SODIUM PHOSPHATE 4 MG: 4 INJECTION INTRA-ARTICULAR; INTRALESIONAL; INTRAMUSCULAR; INTRAVENOUS; SOFT TISSUE at 06:01

## 2021-01-30 RX ADMIN — ATORVASTATIN CALCIUM 10 MG: 10 TABLET, FILM COATED ORAL at 08:01

## 2021-01-30 RX ADMIN — SODIUM CHLORIDE: 0.9 INJECTION, SOLUTION INTRAVENOUS at 03:01

## 2021-01-30 RX ADMIN — DEXAMETHASONE SODIUM PHOSPHATE 4 MG: 4 INJECTION INTRA-ARTICULAR; INTRALESIONAL; INTRAMUSCULAR; INTRAVENOUS; SOFT TISSUE at 12:01

## 2021-01-31 LAB
ALBUMIN SERPL BCP-MCNC: 2.8 G/DL (ref 3.5–5.2)
ALP SERPL-CCNC: 165 U/L (ref 55–135)
ALT SERPL W/O P-5'-P-CCNC: 60 U/L (ref 10–44)
ANION GAP SERPL CALC-SCNC: 14 MMOL/L (ref 8–16)
AST SERPL-CCNC: 47 U/L (ref 10–40)
BACTERIA #/AREA URNS AUTO: ABNORMAL /HPF
BASOPHILS # BLD AUTO: 0.02 K/UL (ref 0–0.2)
BASOPHILS NFR BLD: 0.1 % (ref 0–1.9)
BILIRUB SERPL-MCNC: 0.4 MG/DL (ref 0.1–1)
BILIRUB UR QL STRIP: NEGATIVE
BUN SERPL-MCNC: 28 MG/DL (ref 6–20)
CALCIUM SERPL-MCNC: 6.9 MG/DL (ref 8.7–10.5)
CHLORIDE SERPL-SCNC: 104 MMOL/L (ref 95–110)
CLARITY UR REFRACT.AUTO: ABNORMAL
CO2 SERPL-SCNC: 17 MMOL/L (ref 23–29)
COLOR UR AUTO: YELLOW
CREAT SERPL-MCNC: 0.7 MG/DL (ref 0.5–1.4)
DIFFERENTIAL METHOD: ABNORMAL
EOSINOPHIL # BLD AUTO: 0 K/UL (ref 0–0.5)
EOSINOPHIL NFR BLD: 0 % (ref 0–8)
ERYTHROCYTE [DISTWIDTH] IN BLOOD BY AUTOMATED COUNT: 17.3 % (ref 11.5–14.5)
EST. GFR  (AFRICAN AMERICAN): >60 ML/MIN/1.73 M^2
EST. GFR  (NON AFRICAN AMERICAN): >60 ML/MIN/1.73 M^2
GLUCOSE SERPL-MCNC: 184 MG/DL (ref 70–110)
GLUCOSE UR QL STRIP: NEGATIVE
HCT VFR BLD AUTO: 35.9 % (ref 37–48.5)
HGB BLD-MCNC: 11.5 G/DL (ref 12–16)
HGB UR QL STRIP: ABNORMAL
IMM GRANULOCYTES # BLD AUTO: 0.25 K/UL (ref 0–0.04)
IMM GRANULOCYTES NFR BLD AUTO: 1.6 % (ref 0–0.5)
INR PPP: 1 (ref 0.8–1.2)
KETONES UR QL STRIP: NEGATIVE
LEUKOCYTE ESTERASE UR QL STRIP: ABNORMAL
LYMPHOCYTES # BLD AUTO: 1.2 K/UL (ref 1–4.8)
LYMPHOCYTES NFR BLD: 7.7 % (ref 18–48)
MAGNESIUM SERPL-MCNC: 1.9 MG/DL (ref 1.6–2.6)
MCH RBC QN AUTO: 25.9 PG (ref 27–31)
MCHC RBC AUTO-ENTMCNC: 32 G/DL (ref 32–36)
MCV RBC AUTO: 81 FL (ref 82–98)
MICROSCOPIC COMMENT: ABNORMAL
MONOCYTES # BLD AUTO: 0.9 K/UL (ref 0.3–1)
MONOCYTES NFR BLD: 5.9 % (ref 4–15)
NEUTROPHILS # BLD AUTO: 13.5 K/UL (ref 1.8–7.7)
NEUTROPHILS NFR BLD: 84.7 % (ref 38–73)
NITRITE UR QL STRIP: NEGATIVE
NRBC BLD-RTO: 0 /100 WBC
PH UR STRIP: 6 [PH] (ref 5–8)
PHOSPHATE SERPL-MCNC: 2.6 MG/DL (ref 2.7–4.5)
PLATELET # BLD AUTO: 428 K/UL (ref 150–350)
PMV BLD AUTO: 10.4 FL (ref 9.2–12.9)
POCT GLUCOSE: 159 MG/DL (ref 70–110)
POCT GLUCOSE: 173 MG/DL (ref 70–110)
POCT GLUCOSE: 178 MG/DL (ref 70–110)
POTASSIUM SERPL-SCNC: 3.6 MMOL/L (ref 3.5–5.1)
PROT SERPL-MCNC: 5.8 G/DL (ref 6–8.4)
PROT UR QL STRIP: NEGATIVE
PROTHROMBIN TIME: 10.9 SEC (ref 9–12.5)
RBC # BLD AUTO: 4.44 M/UL (ref 4–5.4)
RBC #/AREA URNS AUTO: 15 /HPF (ref 0–4)
SODIUM SERPL-SCNC: 135 MMOL/L (ref 136–145)
SP GR UR STRIP: 1.02 (ref 1–1.03)
SQUAMOUS #/AREA URNS AUTO: 11 /HPF
URN SPEC COLLECT METH UR: ABNORMAL
WBC # BLD AUTO: 15.93 K/UL (ref 3.9–12.7)
WBC #/AREA URNS AUTO: 2 /HPF (ref 0–5)

## 2021-01-31 PROCEDURE — 84100 ASSAY OF PHOSPHORUS: CPT

## 2021-01-31 PROCEDURE — 99233 PR SUBSEQUENT HOSPITAL CARE,LEVL III: ICD-10-PCS | Mod: ,,, | Performed by: PHYSICIAN ASSISTANT

## 2021-01-31 PROCEDURE — 85025 COMPLETE CBC W/AUTO DIFF WBC: CPT

## 2021-01-31 PROCEDURE — 85610 PROTHROMBIN TIME: CPT

## 2021-01-31 PROCEDURE — 36415 COLL VENOUS BLD VENIPUNCTURE: CPT

## 2021-01-31 PROCEDURE — 25000003 PHARM REV CODE 250: Performed by: NURSE PRACTITIONER

## 2021-01-31 PROCEDURE — 63600175 PHARM REV CODE 636 W HCPCS: Performed by: NURSE PRACTITIONER

## 2021-01-31 PROCEDURE — 94640 AIRWAY INHALATION TREATMENT: CPT

## 2021-01-31 PROCEDURE — 94668 MNPJ CHEST WALL SBSQ: CPT

## 2021-01-31 PROCEDURE — 83735 ASSAY OF MAGNESIUM: CPT

## 2021-01-31 PROCEDURE — 25000003 PHARM REV CODE 250: Performed by: PSYCHIATRY & NEUROLOGY

## 2021-01-31 PROCEDURE — 81001 URINALYSIS AUTO W/SCOPE: CPT

## 2021-01-31 PROCEDURE — 80053 COMPREHEN METABOLIC PANEL: CPT

## 2021-01-31 PROCEDURE — 11000001 HC ACUTE MED/SURG PRIVATE ROOM

## 2021-01-31 PROCEDURE — 63600175 PHARM REV CODE 636 W HCPCS: Performed by: STUDENT IN AN ORGANIZED HEALTH CARE EDUCATION/TRAINING PROGRAM

## 2021-01-31 PROCEDURE — 99233 SBSQ HOSP IP/OBS HIGH 50: CPT | Mod: ,,, | Performed by: PHYSICIAN ASSISTANT

## 2021-01-31 PROCEDURE — 25000242 PHARM REV CODE 250 ALT 637 W/ HCPCS: Performed by: PHYSICIAN ASSISTANT

## 2021-01-31 PROCEDURE — 25000003 PHARM REV CODE 250: Performed by: PHYSICIAN ASSISTANT

## 2021-01-31 PROCEDURE — 25000003 PHARM REV CODE 250: Performed by: STUDENT IN AN ORGANIZED HEALTH CARE EDUCATION/TRAINING PROGRAM

## 2021-01-31 PROCEDURE — 94761 N-INVAS EAR/PLS OXIMETRY MLT: CPT

## 2021-01-31 RX ORDER — SILODOSIN 4 MG/1
4 CAPSULE ORAL DAILY
Status: DISCONTINUED | OUTPATIENT
Start: 2021-01-31 | End: 2021-02-02

## 2021-01-31 RX ORDER — IPRATROPIUM BROMIDE AND ALBUTEROL SULFATE 2.5; .5 MG/3ML; MG/3ML
3 SOLUTION RESPIRATORY (INHALATION) EVERY 8 HOURS
Status: DISCONTINUED | OUTPATIENT
Start: 2021-01-31 | End: 2021-02-01

## 2021-01-31 RX ORDER — CALCIUM CARBONATE 500(1250)
1000 TABLET ORAL 2 TIMES DAILY
Status: DISCONTINUED | OUTPATIENT
Start: 2021-01-31 | End: 2021-02-01

## 2021-01-31 RX ADMIN — MODAFINIL 200 MG: 100 TABLET ORAL at 09:01

## 2021-01-31 RX ADMIN — IPRATROPIUM BROMIDE AND ALBUTEROL SULFATE 3 ML: .5; 2.5 SOLUTION RESPIRATORY (INHALATION) at 04:01

## 2021-01-31 RX ADMIN — INSULIN ASPART 2 UNITS: 100 INJECTION, SOLUTION INTRAVENOUS; SUBCUTANEOUS at 05:01

## 2021-01-31 RX ADMIN — LOSARTAN POTASSIUM 50 MG: 50 TABLET, FILM COATED ORAL at 09:01

## 2021-01-31 RX ADMIN — CALCIUM 1000 MG: 500 TABLET ORAL at 09:01

## 2021-01-31 RX ADMIN — ASPIRIN 81 MG: 81 TABLET, CHEWABLE ORAL at 09:01

## 2021-01-31 RX ADMIN — LEVETIRACETAM 1500 MG: 100 SOLUTION ORAL at 11:01

## 2021-01-31 RX ADMIN — ATORVASTATIN CALCIUM 10 MG: 10 TABLET, FILM COATED ORAL at 09:01

## 2021-01-31 RX ADMIN — MUPIROCIN: 20 OINTMENT TOPICAL at 09:01

## 2021-01-31 RX ADMIN — DEXAMETHASONE SODIUM PHOSPHATE 4 MG: 4 INJECTION INTRA-ARTICULAR; INTRALESIONAL; INTRAMUSCULAR; INTRAVENOUS; SOFT TISSUE at 05:01

## 2021-01-31 RX ADMIN — DOCUSATE SODIUM AND SENNOSIDES 1 TABLET: 8.6; 5 TABLET, FILM COATED ORAL at 09:01

## 2021-01-31 RX ADMIN — AMLODIPINE BESYLATE 10 MG: 10 TABLET ORAL at 09:01

## 2021-01-31 RX ADMIN — LEVETIRACETAM 1500 MG: 100 SOLUTION ORAL at 10:01

## 2021-01-31 RX ADMIN — DEXAMETHASONE SODIUM PHOSPHATE 4 MG: 4 INJECTION INTRA-ARTICULAR; INTRALESIONAL; INTRAMUSCULAR; INTRAVENOUS; SOFT TISSUE at 04:01

## 2021-01-31 RX ADMIN — DEXAMETHASONE SODIUM PHOSPHATE 4 MG: 4 INJECTION INTRA-ARTICULAR; INTRALESIONAL; INTRAMUSCULAR; INTRAVENOUS; SOFT TISSUE at 12:01

## 2021-01-31 RX ADMIN — DEXAMETHASONE SODIUM PHOSPHATE 4 MG: 4 INJECTION INTRA-ARTICULAR; INTRALESIONAL; INTRAMUSCULAR; INTRAVENOUS; SOFT TISSUE at 11:01

## 2021-01-31 RX ADMIN — LEVOTHYROXINE SODIUM 137 MCG: 0.14 TABLET ORAL at 05:01

## 2021-01-31 RX ADMIN — FAMOTIDINE 20 MG: 20 TABLET, FILM COATED ORAL at 09:01

## 2021-01-31 RX ADMIN — SILODOSIN 4 MG: 4 CAPSULE ORAL at 09:01

## 2021-02-01 LAB
ALBUMIN SERPL BCP-MCNC: 2.9 G/DL (ref 3.5–5.2)
ALP SERPL-CCNC: 170 U/L (ref 55–135)
ALT SERPL W/O P-5'-P-CCNC: 74 U/L (ref 10–44)
ANION GAP SERPL CALC-SCNC: 12 MMOL/L (ref 8–16)
ANISOCYTOSIS BLD QL SMEAR: SLIGHT
AST SERPL-CCNC: 42 U/L (ref 10–40)
BASOPHILS # BLD AUTO: 0.03 K/UL (ref 0–0.2)
BASOPHILS NFR BLD: 0.1 % (ref 0–1.9)
BILIRUB SERPL-MCNC: 0.3 MG/DL (ref 0.1–1)
BUN SERPL-MCNC: 28 MG/DL (ref 6–20)
CA-I BLDV-SCNC: 0.97 MMOL/L (ref 1.06–1.42)
CALCIUM SERPL-MCNC: 6.7 MG/DL (ref 8.7–10.5)
CHLORIDE SERPL-SCNC: 100 MMOL/L (ref 95–110)
CO2 SERPL-SCNC: 19 MMOL/L (ref 23–29)
CREAT SERPL-MCNC: 0.7 MG/DL (ref 0.5–1.4)
DIFFERENTIAL METHOD: ABNORMAL
EOSINOPHIL # BLD AUTO: 0 K/UL (ref 0–0.5)
EOSINOPHIL NFR BLD: 0 % (ref 0–8)
ERYTHROCYTE [DISTWIDTH] IN BLOOD BY AUTOMATED COUNT: 17.3 % (ref 11.5–14.5)
EST. GFR  (AFRICAN AMERICAN): >60 ML/MIN/1.73 M^2
EST. GFR  (NON AFRICAN AMERICAN): >60 ML/MIN/1.73 M^2
GLUCOSE SERPL-MCNC: 183 MG/DL (ref 70–110)
HCT VFR BLD AUTO: 36.4 % (ref 37–48.5)
HGB BLD-MCNC: 11.8 G/DL (ref 12–16)
IMM GRANULOCYTES # BLD AUTO: 0.37 K/UL (ref 0–0.04)
IMM GRANULOCYTES NFR BLD AUTO: 1.6 % (ref 0–0.5)
LYMPHOCYTES # BLD AUTO: 1.4 K/UL (ref 1–4.8)
LYMPHOCYTES NFR BLD: 5.9 % (ref 18–48)
MAGNESIUM SERPL-MCNC: 2.1 MG/DL (ref 1.6–2.6)
MCH RBC QN AUTO: 26 PG (ref 27–31)
MCHC RBC AUTO-ENTMCNC: 32.4 G/DL (ref 32–36)
MCV RBC AUTO: 80 FL (ref 82–98)
MONOCYTES # BLD AUTO: 1.4 K/UL (ref 0.3–1)
MONOCYTES NFR BLD: 6 % (ref 4–15)
NEUTROPHILS # BLD AUTO: 20.6 K/UL (ref 1.8–7.7)
NEUTROPHILS NFR BLD: 86.4 % (ref 38–73)
NRBC BLD-RTO: 0 /100 WBC
PHOSPHATE SERPL-MCNC: 2.7 MG/DL (ref 2.7–4.5)
PLATELET # BLD AUTO: 454 K/UL (ref 150–350)
PLATELET BLD QL SMEAR: ABNORMAL
PMV BLD AUTO: 10 FL (ref 9.2–12.9)
POCT GLUCOSE: 136 MG/DL (ref 70–110)
POCT GLUCOSE: 157 MG/DL (ref 70–110)
POCT GLUCOSE: 167 MG/DL (ref 70–110)
POTASSIUM SERPL-SCNC: 4.1 MMOL/L (ref 3.5–5.1)
PROT SERPL-MCNC: 5.8 G/DL (ref 6–8.4)
RBC # BLD AUTO: 4.53 M/UL (ref 4–5.4)
SODIUM SERPL-SCNC: 131 MMOL/L (ref 136–145)
WBC # BLD AUTO: 23.8 K/UL (ref 3.9–12.7)

## 2021-02-01 PROCEDURE — 82330 ASSAY OF CALCIUM: CPT | Mod: 91

## 2021-02-01 PROCEDURE — 97110 THERAPEUTIC EXERCISES: CPT | Mod: CQ

## 2021-02-01 PROCEDURE — 25500020 PHARM REV CODE 255: Performed by: NEUROLOGICAL SURGERY

## 2021-02-01 PROCEDURE — 94761 N-INVAS EAR/PLS OXIMETRY MLT: CPT

## 2021-02-01 PROCEDURE — 83735 ASSAY OF MAGNESIUM: CPT

## 2021-02-01 PROCEDURE — 99233 PR SUBSEQUENT HOSPITAL CARE,LEVL III: ICD-10-PCS | Mod: ,,, | Performed by: PHYSICIAN ASSISTANT

## 2021-02-01 PROCEDURE — 25000003 PHARM REV CODE 250: Performed by: PSYCHIATRY & NEUROLOGY

## 2021-02-01 PROCEDURE — 25000003 PHARM REV CODE 250: Performed by: PHYSICIAN ASSISTANT

## 2021-02-01 PROCEDURE — 63600175 PHARM REV CODE 636 W HCPCS: Performed by: STUDENT IN AN ORGANIZED HEALTH CARE EDUCATION/TRAINING PROGRAM

## 2021-02-01 PROCEDURE — 99233 SBSQ HOSP IP/OBS HIGH 50: CPT | Mod: ,,, | Performed by: PHYSICIAN ASSISTANT

## 2021-02-01 PROCEDURE — 11000001 HC ACUTE MED/SURG PRIVATE ROOM

## 2021-02-01 PROCEDURE — 25000003 PHARM REV CODE 250: Performed by: RADIOLOGY

## 2021-02-01 PROCEDURE — 85025 COMPLETE CBC W/AUTO DIFF WBC: CPT

## 2021-02-01 PROCEDURE — 25000003 PHARM REV CODE 250: Performed by: NURSE PRACTITIONER

## 2021-02-01 PROCEDURE — 25000242 PHARM REV CODE 250 ALT 637 W/ HCPCS: Performed by: PHYSICIAN ASSISTANT

## 2021-02-01 PROCEDURE — 80053 COMPREHEN METABOLIC PANEL: CPT

## 2021-02-01 PROCEDURE — 36415 COLL VENOUS BLD VENIPUNCTURE: CPT

## 2021-02-01 PROCEDURE — 99252 IP/OBS CONSLTJ NEW/EST SF 35: CPT | Mod: ,,, | Performed by: NURSE PRACTITIONER

## 2021-02-01 PROCEDURE — 84100 ASSAY OF PHOSPHORUS: CPT

## 2021-02-01 PROCEDURE — 99252 PR INITIAL INPATIENT CONSULT,LEVL II: ICD-10-PCS | Mod: ,,, | Performed by: NURSE PRACTITIONER

## 2021-02-01 PROCEDURE — 82330 ASSAY OF CALCIUM: CPT

## 2021-02-01 PROCEDURE — 25000003 PHARM REV CODE 250: Performed by: STUDENT IN AN ORGANIZED HEALTH CARE EDUCATION/TRAINING PROGRAM

## 2021-02-01 PROCEDURE — A9698 NON-RAD CONTRAST MATERIALNOC: HCPCS | Performed by: STUDENT IN AN ORGANIZED HEALTH CARE EDUCATION/TRAINING PROGRAM

## 2021-02-01 PROCEDURE — 94668 MNPJ CHEST WALL SBSQ: CPT

## 2021-02-01 PROCEDURE — 27000221 HC OXYGEN, UP TO 24 HOURS

## 2021-02-01 PROCEDURE — 94640 AIRWAY INHALATION TREATMENT: CPT

## 2021-02-01 PROCEDURE — 63600175 PHARM REV CODE 636 W HCPCS: Performed by: NURSE PRACTITIONER

## 2021-02-01 PROCEDURE — 25500020 PHARM REV CODE 255: Performed by: STUDENT IN AN ORGANIZED HEALTH CARE EDUCATION/TRAINING PROGRAM

## 2021-02-01 PROCEDURE — 63600175 PHARM REV CODE 636 W HCPCS: Performed by: RADIOLOGY

## 2021-02-01 RX ORDER — SODIUM CHLORIDE 0.9 % (FLUSH) 0.9 %
10 SYRINGE (ML) INJECTION
Status: DISCONTINUED | OUTPATIENT
Start: 2021-02-01 | End: 2021-02-18 | Stop reason: HOSPADM

## 2021-02-01 RX ORDER — LIDOCAINE HYDROCHLORIDE 10 MG/ML
INJECTION INFILTRATION; PERINEURAL CODE/TRAUMA/SEDATION MEDICATION
Status: COMPLETED | OUTPATIENT
Start: 2021-02-01 | End: 2021-02-01

## 2021-02-01 RX ORDER — IODIXANOL 320 MG/ML
100 INJECTION, SOLUTION INTRAVASCULAR
Status: COMPLETED | OUTPATIENT
Start: 2021-02-01 | End: 2021-02-01

## 2021-02-01 RX ORDER — FENTANYL CITRATE 50 UG/ML
INJECTION, SOLUTION INTRAMUSCULAR; INTRAVENOUS CODE/TRAUMA/SEDATION MEDICATION
Status: COMPLETED | OUTPATIENT
Start: 2021-02-01 | End: 2021-02-01

## 2021-02-01 RX ORDER — IPRATROPIUM BROMIDE AND ALBUTEROL SULFATE 2.5; .5 MG/3ML; MG/3ML
3 SOLUTION RESPIRATORY (INHALATION) EVERY 6 HOURS
Status: DISCONTINUED | OUTPATIENT
Start: 2021-02-01 | End: 2021-02-07

## 2021-02-01 RX ORDER — MIDAZOLAM HYDROCHLORIDE 1 MG/ML
INJECTION INTRAMUSCULAR; INTRAVENOUS CODE/TRAUMA/SEDATION MEDICATION
Status: COMPLETED | OUTPATIENT
Start: 2021-02-01 | End: 2021-02-01

## 2021-02-01 RX ORDER — SODIUM CHLORIDE 9 MG/ML
INJECTION, SOLUTION INTRAVENOUS CONTINUOUS
Status: ACTIVE | OUTPATIENT
Start: 2021-02-01 | End: 2021-02-01

## 2021-02-01 RX ORDER — IODIXANOL 320 MG/ML
50 INJECTION, SOLUTION INTRAVASCULAR
Status: COMPLETED | OUTPATIENT
Start: 2021-02-01 | End: 2021-02-01

## 2021-02-01 RX ORDER — CEFAZOLIN SODIUM 1 G/50ML
SOLUTION INTRAVENOUS
Status: COMPLETED | OUTPATIENT
Start: 2021-02-01 | End: 2021-02-01

## 2021-02-01 RX ADMIN — SILODOSIN 4 MG: 4 CAPSULE ORAL at 07:02

## 2021-02-01 RX ADMIN — SODIUM CHLORIDE: 0.9 INJECTION, SOLUTION INTRAVENOUS at 12:02

## 2021-02-01 RX ADMIN — LIDOCAINE HYDROCHLORIDE 5 ML: 10 INJECTION, SOLUTION INFILTRATION; PERINEURAL at 04:02

## 2021-02-01 RX ADMIN — MIDAZOLAM HYDROCHLORIDE 1 MG: 1 INJECTION, SOLUTION INTRAMUSCULAR; INTRAVENOUS at 04:02

## 2021-02-01 RX ADMIN — Medication 450 ML: at 02:02

## 2021-02-01 RX ADMIN — LOSARTAN POTASSIUM 50 MG: 50 TABLET, FILM COATED ORAL at 07:02

## 2021-02-01 RX ADMIN — CEFAZOLIN SODIUM 1 G: 1 SOLUTION INTRAVENOUS at 04:02

## 2021-02-01 RX ADMIN — FAMOTIDINE 20 MG: 20 TABLET, FILM COATED ORAL at 07:02

## 2021-02-01 RX ADMIN — LEVETIRACETAM 1500 MG: 100 SOLUTION ORAL at 07:02

## 2021-02-01 RX ADMIN — IPRATROPIUM BROMIDE AND ALBUTEROL SULFATE 3 ML: .5; 2.5 SOLUTION RESPIRATORY (INHALATION) at 08:02

## 2021-02-01 RX ADMIN — INSULIN ASPART 2 UNITS: 100 INJECTION, SOLUTION INTRAVENOUS; SUBCUTANEOUS at 12:02

## 2021-02-01 RX ADMIN — LIDOCAINE HYDROCHLORIDE 5 ML: 10 INJECTION, SOLUTION INFILTRATION; PERINEURAL at 05:02

## 2021-02-01 RX ADMIN — AMLODIPINE BESYLATE 10 MG: 10 TABLET ORAL at 07:02

## 2021-02-01 RX ADMIN — FENTANYL CITRATE 50 MCG: 50 INJECTION, SOLUTION INTRAMUSCULAR; INTRAVENOUS at 04:02

## 2021-02-01 RX ADMIN — FAMOTIDINE 20 MG: 20 TABLET, FILM COATED ORAL at 10:02

## 2021-02-01 RX ADMIN — IODIXANOL 24 ML: 320 INJECTION, SOLUTION INTRAVASCULAR at 05:02

## 2021-02-01 RX ADMIN — DOCUSATE SODIUM AND SENNOSIDES 1 TABLET: 8.6; 5 TABLET, FILM COATED ORAL at 07:02

## 2021-02-01 RX ADMIN — FENTANYL CITRATE 50 MCG: 50 INJECTION, SOLUTION INTRAMUSCULAR; INTRAVENOUS at 05:02

## 2021-02-01 RX ADMIN — LEVOTHYROXINE SODIUM 137 MCG: 0.14 TABLET ORAL at 05:02

## 2021-02-01 RX ADMIN — DOCUSATE SODIUM AND SENNOSIDES 1 TABLET: 8.6; 5 TABLET, FILM COATED ORAL at 10:02

## 2021-02-01 RX ADMIN — MODAFINIL 200 MG: 100 TABLET ORAL at 07:02

## 2021-02-01 RX ADMIN — CALCIUM 1000 MG: 500 TABLET ORAL at 07:02

## 2021-02-01 RX ADMIN — DEXAMETHASONE SODIUM PHOSPHATE 4 MG: 4 INJECTION INTRA-ARTICULAR; INTRALESIONAL; INTRAMUSCULAR; INTRAVENOUS; SOFT TISSUE at 12:02

## 2021-02-01 RX ADMIN — LEVETIRACETAM 1500 MG: 100 SOLUTION ORAL at 10:02

## 2021-02-01 RX ADMIN — DEXAMETHASONE SODIUM PHOSPHATE 4 MG: 4 INJECTION INTRA-ARTICULAR; INTRALESIONAL; INTRAMUSCULAR; INTRAVENOUS; SOFT TISSUE at 05:02

## 2021-02-01 RX ADMIN — IODIXANOL 85 ML: 320 INJECTION, SOLUTION INTRAVASCULAR at 05:02

## 2021-02-01 RX ADMIN — IPRATROPIUM BROMIDE AND ALBUTEROL SULFATE 3 ML: .5; 2.5 SOLUTION RESPIRATORY (INHALATION) at 12:02

## 2021-02-01 RX ADMIN — ATORVASTATIN CALCIUM 10 MG: 10 TABLET, FILM COATED ORAL at 07:02

## 2021-02-02 LAB
ALBUMIN SERPL BCP-MCNC: 2.7 G/DL (ref 3.5–5.2)
ALP SERPL-CCNC: 155 U/L (ref 55–135)
ALT SERPL W/O P-5'-P-CCNC: 63 U/L (ref 10–44)
ANION GAP SERPL CALC-SCNC: 13 MMOL/L (ref 8–16)
ANISOCYTOSIS BLD QL SMEAR: SLIGHT
AST SERPL-CCNC: 20 U/L (ref 10–40)
BASOPHILS # BLD AUTO: 0.04 K/UL (ref 0–0.2)
BASOPHILS NFR BLD: 0.2 % (ref 0–1.9)
BILIRUB SERPL-MCNC: 0.4 MG/DL (ref 0.1–1)
BUN SERPL-MCNC: 21 MG/DL (ref 6–20)
BURR CELLS BLD QL SMEAR: ABNORMAL
CA-I BLDV-SCNC: 0.96 MMOL/L (ref 1.06–1.42)
CALCIUM SERPL-MCNC: 7 MG/DL (ref 8.7–10.5)
CHLORIDE SERPL-SCNC: 103 MMOL/L (ref 95–110)
CO2 SERPL-SCNC: 20 MMOL/L (ref 23–29)
CREAT SERPL-MCNC: 0.6 MG/DL (ref 0.5–1.4)
DIFFERENTIAL METHOD: ABNORMAL
EOSINOPHIL # BLD AUTO: 0 K/UL (ref 0–0.5)
EOSINOPHIL NFR BLD: 0 % (ref 0–8)
ERYTHROCYTE [DISTWIDTH] IN BLOOD BY AUTOMATED COUNT: 17.7 % (ref 11.5–14.5)
EST. GFR  (AFRICAN AMERICAN): >60 ML/MIN/1.73 M^2
EST. GFR  (NON AFRICAN AMERICAN): >60 ML/MIN/1.73 M^2
GLUCOSE SERPL-MCNC: 151 MG/DL (ref 70–110)
HCT VFR BLD AUTO: 34.4 % (ref 37–48.5)
HGB BLD-MCNC: 11.2 G/DL (ref 12–16)
IMM GRANULOCYTES # BLD AUTO: 0.28 K/UL (ref 0–0.04)
IMM GRANULOCYTES NFR BLD AUTO: 1.2 % (ref 0–0.5)
LYMPHOCYTES # BLD AUTO: 1 K/UL (ref 1–4.8)
LYMPHOCYTES NFR BLD: 4.2 % (ref 18–48)
MAGNESIUM SERPL-MCNC: 2 MG/DL (ref 1.6–2.6)
MCH RBC QN AUTO: 26.4 PG (ref 27–31)
MCHC RBC AUTO-ENTMCNC: 32.6 G/DL (ref 32–36)
MCV RBC AUTO: 81 FL (ref 82–98)
MONOCYTES # BLD AUTO: 1 K/UL (ref 0.3–1)
MONOCYTES NFR BLD: 4.3 % (ref 4–15)
NEUTROPHILS # BLD AUTO: 20.4 K/UL (ref 1.8–7.7)
NEUTROPHILS NFR BLD: 90.1 % (ref 38–73)
NRBC BLD-RTO: 0 /100 WBC
OVALOCYTES BLD QL SMEAR: ABNORMAL
PHOSPHATE SERPL-MCNC: 4.3 MG/DL (ref 2.7–4.5)
PLATELET # BLD AUTO: 403 K/UL (ref 150–350)
PLATELET BLD QL SMEAR: ABNORMAL
PMV BLD AUTO: 10.1 FL (ref 9.2–12.9)
POCT GLUCOSE: 166 MG/DL (ref 70–110)
POCT GLUCOSE: 177 MG/DL (ref 70–110)
POCT GLUCOSE: 177 MG/DL (ref 70–110)
POCT GLUCOSE: 187 MG/DL (ref 70–110)
POIKILOCYTOSIS BLD QL SMEAR: SLIGHT
POLYCHROMASIA BLD QL SMEAR: ABNORMAL
POTASSIUM SERPL-SCNC: 3.9 MMOL/L (ref 3.5–5.1)
PROT SERPL-MCNC: 5.6 G/DL (ref 6–8.4)
RBC # BLD AUTO: 4.24 M/UL (ref 4–5.4)
SODIUM SERPL-SCNC: 136 MMOL/L (ref 136–145)
WBC # BLD AUTO: 22.67 K/UL (ref 3.9–12.7)

## 2021-02-02 PROCEDURE — 99232 PR SUBSEQUENT HOSPITAL CARE,LEVL II: ICD-10-PCS | Mod: ,,, | Performed by: NURSE PRACTITIONER

## 2021-02-02 PROCEDURE — 80053 COMPREHEN METABOLIC PANEL: CPT

## 2021-02-02 PROCEDURE — 63600175 PHARM REV CODE 636 W HCPCS: Performed by: STUDENT IN AN ORGANIZED HEALTH CARE EDUCATION/TRAINING PROGRAM

## 2021-02-02 PROCEDURE — 83735 ASSAY OF MAGNESIUM: CPT

## 2021-02-02 PROCEDURE — 27000221 HC OXYGEN, UP TO 24 HOURS

## 2021-02-02 PROCEDURE — 99233 SBSQ HOSP IP/OBS HIGH 50: CPT | Mod: ,,, | Performed by: PSYCHIATRY & NEUROLOGY

## 2021-02-02 PROCEDURE — 94761 N-INVAS EAR/PLS OXIMETRY MLT: CPT

## 2021-02-02 PROCEDURE — 85025 COMPLETE CBC W/AUTO DIFF WBC: CPT

## 2021-02-02 PROCEDURE — 25000003 PHARM REV CODE 250: Performed by: STUDENT IN AN ORGANIZED HEALTH CARE EDUCATION/TRAINING PROGRAM

## 2021-02-02 PROCEDURE — 25000242 PHARM REV CODE 250 ALT 637 W/ HCPCS: Performed by: PHYSICIAN ASSISTANT

## 2021-02-02 PROCEDURE — 25000003 PHARM REV CODE 250: Performed by: NURSE PRACTITIONER

## 2021-02-02 PROCEDURE — 94668 MNPJ CHEST WALL SBSQ: CPT

## 2021-02-02 PROCEDURE — 25000003 PHARM REV CODE 250: Performed by: PHYSICIAN ASSISTANT

## 2021-02-02 PROCEDURE — 99233 PR SUBSEQUENT HOSPITAL CARE,LEVL III: ICD-10-PCS | Mod: ,,, | Performed by: PSYCHIATRY & NEUROLOGY

## 2021-02-02 PROCEDURE — 36415 COLL VENOUS BLD VENIPUNCTURE: CPT

## 2021-02-02 PROCEDURE — 87040 BLOOD CULTURE FOR BACTERIA: CPT

## 2021-02-02 PROCEDURE — 99232 SBSQ HOSP IP/OBS MODERATE 35: CPT | Mod: ,,, | Performed by: NURSE PRACTITIONER

## 2021-02-02 PROCEDURE — 99233 SBSQ HOSP IP/OBS HIGH 50: CPT | Mod: ,,, | Performed by: PHYSICIAN ASSISTANT

## 2021-02-02 PROCEDURE — 97530 THERAPEUTIC ACTIVITIES: CPT

## 2021-02-02 PROCEDURE — 94667 MNPJ CHEST WALL 1ST: CPT

## 2021-02-02 PROCEDURE — 25000003 PHARM REV CODE 250: Performed by: PSYCHIATRY & NEUROLOGY

## 2021-02-02 PROCEDURE — 92523 SPEECH SOUND LANG COMPREHEN: CPT

## 2021-02-02 PROCEDURE — 94640 AIRWAY INHALATION TREATMENT: CPT

## 2021-02-02 PROCEDURE — 84100 ASSAY OF PHOSPHORUS: CPT

## 2021-02-02 PROCEDURE — 63600175 PHARM REV CODE 636 W HCPCS: Performed by: NURSE PRACTITIONER

## 2021-02-02 PROCEDURE — 92526 ORAL FUNCTION THERAPY: CPT

## 2021-02-02 PROCEDURE — 11000001 HC ACUTE MED/SURG PRIVATE ROOM

## 2021-02-02 PROCEDURE — 99900026 HC AIRWAY MAINTENANCE (STAT)

## 2021-02-02 PROCEDURE — 99900035 HC TECH TIME PER 15 MIN (STAT)

## 2021-02-02 PROCEDURE — 99233 PR SUBSEQUENT HOSPITAL CARE,LEVL III: ICD-10-PCS | Mod: ,,, | Performed by: PHYSICIAN ASSISTANT

## 2021-02-02 RX ORDER — LOSARTAN POTASSIUM 50 MG/1
50 TABLET ORAL DAILY
Status: DISCONTINUED | OUTPATIENT
Start: 2021-02-03 | End: 2021-02-18 | Stop reason: HOSPADM

## 2021-02-02 RX ORDER — ATORVASTATIN CALCIUM 10 MG/1
10 TABLET, FILM COATED ORAL DAILY
Status: DISCONTINUED | OUTPATIENT
Start: 2021-02-03 | End: 2021-02-18 | Stop reason: HOSPADM

## 2021-02-02 RX ORDER — AMOXICILLIN 250 MG
1 CAPSULE ORAL 2 TIMES DAILY
Status: DISCONTINUED | OUTPATIENT
Start: 2021-02-02 | End: 2021-02-18 | Stop reason: HOSPADM

## 2021-02-02 RX ORDER — AMLODIPINE BESYLATE 10 MG/1
10 TABLET ORAL DAILY
Status: DISCONTINUED | OUTPATIENT
Start: 2021-02-03 | End: 2021-02-10

## 2021-02-02 RX ORDER — SILODOSIN 4 MG/1
4 CAPSULE ORAL DAILY
Status: DISCONTINUED | OUTPATIENT
Start: 2021-02-03 | End: 2021-02-18 | Stop reason: HOSPADM

## 2021-02-02 RX ORDER — LEVOTHYROXINE SODIUM 137 UG/1
137 TABLET ORAL
Status: DISCONTINUED | OUTPATIENT
Start: 2021-02-03 | End: 2021-02-18 | Stop reason: HOSPADM

## 2021-02-02 RX ORDER — LEVETIRACETAM 100 MG/ML
1500 SOLUTION ORAL 2 TIMES DAILY
Status: DISCONTINUED | OUTPATIENT
Start: 2021-02-02 | End: 2021-02-07

## 2021-02-02 RX ORDER — FAMOTIDINE 20 MG/1
20 TABLET, FILM COATED ORAL 2 TIMES DAILY
Status: DISCONTINUED | OUTPATIENT
Start: 2021-02-02 | End: 2021-02-18 | Stop reason: HOSPADM

## 2021-02-02 RX ORDER — MODAFINIL 100 MG/1
200 TABLET ORAL DAILY
Status: DISCONTINUED | OUTPATIENT
Start: 2021-02-03 | End: 2021-02-09

## 2021-02-02 RX ADMIN — LEVETIRACETAM 1500 MG: 100 SOLUTION ORAL at 08:02

## 2021-02-02 RX ADMIN — ATORVASTATIN CALCIUM 10 MG: 10 TABLET, FILM COATED ORAL at 08:02

## 2021-02-02 RX ADMIN — FAMOTIDINE 20 MG: 20 TABLET, FILM COATED ORAL at 09:02

## 2021-02-02 RX ADMIN — DOCUSATE SODIUM AND SENNOSIDES 1 TABLET: 8.6; 5 TABLET, FILM COATED ORAL at 08:02

## 2021-02-02 RX ADMIN — DEXAMETHASONE SODIUM PHOSPHATE 4 MG: 4 INJECTION INTRA-ARTICULAR; INTRALESIONAL; INTRAMUSCULAR; INTRAVENOUS; SOFT TISSUE at 11:02

## 2021-02-02 RX ADMIN — INSULIN ASPART 2 UNITS: 100 INJECTION, SOLUTION INTRAVENOUS; SUBCUTANEOUS at 08:02

## 2021-02-02 RX ADMIN — DEXAMETHASONE SODIUM PHOSPHATE 4 MG: 4 INJECTION INTRA-ARTICULAR; INTRALESIONAL; INTRAMUSCULAR; INTRAVENOUS; SOFT TISSUE at 05:02

## 2021-02-02 RX ADMIN — MODAFINIL 200 MG: 100 TABLET ORAL at 08:02

## 2021-02-02 RX ADMIN — FAMOTIDINE 20 MG: 20 TABLET, FILM COATED ORAL at 08:02

## 2021-02-02 RX ADMIN — AMLODIPINE BESYLATE 10 MG: 10 TABLET ORAL at 08:02

## 2021-02-02 RX ADMIN — DOCUSATE SODIUM AND SENNOSIDES 1 TABLET: 8.6; 5 TABLET, FILM COATED ORAL at 09:02

## 2021-02-02 RX ADMIN — INSULIN ASPART 2 UNITS: 100 INJECTION, SOLUTION INTRAVENOUS; SUBCUTANEOUS at 06:02

## 2021-02-02 RX ADMIN — ACETAMINOPHEN 650 MG: 325 TABLET ORAL at 11:02

## 2021-02-02 RX ADMIN — IPRATROPIUM BROMIDE AND ALBUTEROL SULFATE 3 ML: .5; 2.5 SOLUTION RESPIRATORY (INHALATION) at 08:02

## 2021-02-02 RX ADMIN — DEXAMETHASONE SODIUM PHOSPHATE 4 MG: 4 INJECTION INTRA-ARTICULAR; INTRALESIONAL; INTRAMUSCULAR; INTRAVENOUS; SOFT TISSUE at 06:02

## 2021-02-02 RX ADMIN — IPRATROPIUM BROMIDE AND ALBUTEROL SULFATE 3 ML: .5; 2.5 SOLUTION RESPIRATORY (INHALATION) at 01:02

## 2021-02-02 RX ADMIN — IPRATROPIUM BROMIDE AND ALBUTEROL SULFATE 3 ML: .5; 2.5 SOLUTION RESPIRATORY (INHALATION) at 12:02

## 2021-02-02 RX ADMIN — SILODOSIN 4 MG: 4 CAPSULE ORAL at 08:02

## 2021-02-02 RX ADMIN — LOSARTAN POTASSIUM 50 MG: 50 TABLET, FILM COATED ORAL at 08:02

## 2021-02-02 RX ADMIN — ONDANSETRON 4 MG: 2 INJECTION INTRAMUSCULAR; INTRAVENOUS at 09:02

## 2021-02-02 RX ADMIN — LEVOTHYROXINE SODIUM 137 MCG: 0.14 TABLET ORAL at 05:02

## 2021-02-02 RX ADMIN — IPRATROPIUM BROMIDE AND ALBUTEROL SULFATE 3 ML: .5; 2.5 SOLUTION RESPIRATORY (INHALATION) at 07:02

## 2021-02-02 RX ADMIN — DEXAMETHASONE SODIUM PHOSPHATE 4 MG: 4 INJECTION INTRA-ARTICULAR; INTRALESIONAL; INTRAMUSCULAR; INTRAVENOUS; SOFT TISSUE at 01:02

## 2021-02-02 RX ADMIN — LEVETIRACETAM 1500 MG: 100 SOLUTION ORAL at 09:02

## 2021-02-02 RX ADMIN — INSULIN ASPART 2 UNITS: 100 INJECTION, SOLUTION INTRAVENOUS; SUBCUTANEOUS at 12:02

## 2021-02-03 LAB
ALBUMIN SERPL BCP-MCNC: 2.9 G/DL (ref 3.5–5.2)
ALP SERPL-CCNC: 173 U/L (ref 55–135)
ALT SERPL W/O P-5'-P-CCNC: 80 U/L (ref 10–44)
ANION GAP SERPL CALC-SCNC: 12 MMOL/L (ref 8–16)
AST SERPL-CCNC: 34 U/L (ref 10–40)
BASOPHILS # BLD AUTO: 0.03 K/UL (ref 0–0.2)
BASOPHILS NFR BLD: 0.2 % (ref 0–1.9)
BILIRUB SERPL-MCNC: 0.5 MG/DL (ref 0.1–1)
BUN SERPL-MCNC: 25 MG/DL (ref 6–20)
CALCIUM SERPL-MCNC: 7.3 MG/DL (ref 8.7–10.5)
CHLORIDE SERPL-SCNC: 101 MMOL/L (ref 95–110)
CLARITY CSF: CLEAR
CO2 SERPL-SCNC: 21 MMOL/L (ref 23–29)
COLOR CSF: COLORLESS
CREAT SERPL-MCNC: 0.6 MG/DL (ref 0.5–1.4)
DIFFERENTIAL METHOD: ABNORMAL
EOSINOPHIL # BLD AUTO: 0 K/UL (ref 0–0.5)
EOSINOPHIL NFR BLD: 0 % (ref 0–8)
ERYTHROCYTE [DISTWIDTH] IN BLOOD BY AUTOMATED COUNT: 17.7 % (ref 11.5–14.5)
EST. GFR  (AFRICAN AMERICAN): >60 ML/MIN/1.73 M^2
EST. GFR  (NON AFRICAN AMERICAN): >60 ML/MIN/1.73 M^2
GLUCOSE CSF-MCNC: 114 MG/DL (ref 40–70)
GLUCOSE SERPL-MCNC: 177 MG/DL (ref 70–110)
GRAM STN SPEC: NORMAL
HCT VFR BLD AUTO: 35.5 % (ref 37–48.5)
HGB BLD-MCNC: 11.4 G/DL (ref 12–16)
IMM GRANULOCYTES # BLD AUTO: 0.28 K/UL (ref 0–0.04)
IMM GRANULOCYTES NFR BLD AUTO: 1.6 % (ref 0–0.5)
LYMPHOCYTES # BLD AUTO: 1.1 K/UL (ref 1–4.8)
LYMPHOCYTES NFR BLD: 6.3 % (ref 18–48)
LYMPHOCYTES NFR CSF MANUAL: 77 % (ref 40–80)
MAGNESIUM SERPL-MCNC: 2.2 MG/DL (ref 1.6–2.6)
MCH RBC QN AUTO: 25.6 PG (ref 27–31)
MCHC RBC AUTO-ENTMCNC: 32.1 G/DL (ref 32–36)
MCV RBC AUTO: 80 FL (ref 82–98)
MONOCYTES # BLD AUTO: 0.8 K/UL (ref 0.3–1)
MONOCYTES NFR BLD: 4.2 % (ref 4–15)
MONOS+MACROS NFR CSF MANUAL: 21 % (ref 15–45)
NEUTROPHILS # BLD AUTO: 15.5 K/UL (ref 1.8–7.7)
NEUTROPHILS NFR BLD: 87.7 % (ref 38–73)
NEUTROPHILS NFR CSF MANUAL: 2 % (ref 0–6)
NRBC BLD-RTO: 0 /100 WBC
PHOSPHATE SERPL-MCNC: 3.7 MG/DL (ref 2.7–4.5)
PLATELET # BLD AUTO: 409 K/UL (ref 150–350)
PMV BLD AUTO: 10.3 FL (ref 9.2–12.9)
POCT GLUCOSE: 174 MG/DL (ref 70–110)
POCT GLUCOSE: 190 MG/DL (ref 70–110)
POCT GLUCOSE: 199 MG/DL (ref 70–110)
POTASSIUM SERPL-SCNC: 4.2 MMOL/L (ref 3.5–5.1)
PROT CSF-MCNC: 41 MG/DL (ref 15–40)
PROT SERPL-MCNC: 6 G/DL (ref 6–8.4)
RBC # BLD AUTO: 4.46 M/UL (ref 4–5.4)
RBC # CSF: 0 /CU MM
SODIUM SERPL-SCNC: 134 MMOL/L (ref 136–145)
SPECIMEN VOL CSF: 3 ML
WBC # BLD AUTO: 17.7 K/UL (ref 3.9–12.7)
WBC # CSF: 2 /CU MM (ref 0–5)

## 2021-02-03 PROCEDURE — 25000003 PHARM REV CODE 250: Performed by: PHYSICIAN ASSISTANT

## 2021-02-03 PROCEDURE — 92507 TX SP LANG VOICE COMM INDIV: CPT

## 2021-02-03 PROCEDURE — 99233 SBSQ HOSP IP/OBS HIGH 50: CPT | Mod: ,,, | Performed by: PHYSICIAN ASSISTANT

## 2021-02-03 PROCEDURE — 83735 ASSAY OF MAGNESIUM: CPT

## 2021-02-03 PROCEDURE — 63600175 PHARM REV CODE 636 W HCPCS: Performed by: STUDENT IN AN ORGANIZED HEALTH CARE EDUCATION/TRAINING PROGRAM

## 2021-02-03 PROCEDURE — 25000242 PHARM REV CODE 250 ALT 637 W/ HCPCS: Performed by: PHYSICIAN ASSISTANT

## 2021-02-03 PROCEDURE — 99233 PR SUBSEQUENT HOSPITAL CARE,LEVL III: ICD-10-PCS | Mod: ,,, | Performed by: PHYSICIAN ASSISTANT

## 2021-02-03 PROCEDURE — 84157 ASSAY OF PROTEIN OTHER: CPT

## 2021-02-03 PROCEDURE — 25000003 PHARM REV CODE 250: Performed by: NURSE PRACTITIONER

## 2021-02-03 PROCEDURE — 87205 SMEAR GRAM STAIN: CPT

## 2021-02-03 PROCEDURE — 84100 ASSAY OF PHOSPHORUS: CPT

## 2021-02-03 PROCEDURE — 87070 CULTURE OTHR SPECIMN AEROBIC: CPT

## 2021-02-03 PROCEDURE — 85025 COMPLETE CBC W/AUTO DIFF WBC: CPT

## 2021-02-03 PROCEDURE — 94667 MNPJ CHEST WALL 1ST: CPT

## 2021-02-03 PROCEDURE — 63600175 PHARM REV CODE 636 W HCPCS: Performed by: NURSE PRACTITIONER

## 2021-02-03 PROCEDURE — 94640 AIRWAY INHALATION TREATMENT: CPT

## 2021-02-03 PROCEDURE — 94761 N-INVAS EAR/PLS OXIMETRY MLT: CPT

## 2021-02-03 PROCEDURE — 89051 BODY FLUID CELL COUNT: CPT

## 2021-02-03 PROCEDURE — 36415 COLL VENOUS BLD VENIPUNCTURE: CPT

## 2021-02-03 PROCEDURE — 82945 GLUCOSE OTHER FLUID: CPT

## 2021-02-03 PROCEDURE — 11000001 HC ACUTE MED/SURG PRIVATE ROOM

## 2021-02-03 PROCEDURE — 92526 ORAL FUNCTION THERAPY: CPT

## 2021-02-03 PROCEDURE — 94668 MNPJ CHEST WALL SBSQ: CPT

## 2021-02-03 PROCEDURE — 99900035 HC TECH TIME PER 15 MIN (STAT)

## 2021-02-03 PROCEDURE — 80053 COMPREHEN METABOLIC PANEL: CPT

## 2021-02-03 RX ADMIN — DEXAMETHASONE SODIUM PHOSPHATE 4 MG: 4 INJECTION INTRA-ARTICULAR; INTRALESIONAL; INTRAMUSCULAR; INTRAVENOUS; SOFT TISSUE at 11:02

## 2021-02-03 RX ADMIN — IPRATROPIUM BROMIDE AND ALBUTEROL SULFATE 3 ML: .5; 2.5 SOLUTION RESPIRATORY (INHALATION) at 12:02

## 2021-02-03 RX ADMIN — LEVETIRACETAM 1500 MG: 100 SOLUTION ORAL at 10:02

## 2021-02-03 RX ADMIN — DOCUSATE SODIUM AND SENNOSIDES 1 TABLET: 8.6; 5 TABLET, FILM COATED ORAL at 09:02

## 2021-02-03 RX ADMIN — IPRATROPIUM BROMIDE AND ALBUTEROL SULFATE 3 ML: .5; 2.5 SOLUTION RESPIRATORY (INHALATION) at 07:02

## 2021-02-03 RX ADMIN — SILODOSIN 4 MG: 4 CAPSULE ORAL at 09:02

## 2021-02-03 RX ADMIN — FAMOTIDINE 20 MG: 20 TABLET, FILM COATED ORAL at 09:02

## 2021-02-03 RX ADMIN — ACETAMINOPHEN 650 MG: 325 TABLET ORAL at 09:02

## 2021-02-03 RX ADMIN — ATORVASTATIN CALCIUM 10 MG: 10 TABLET, FILM COATED ORAL at 09:02

## 2021-02-03 RX ADMIN — LEVETIRACETAM 1500 MG: 100 SOLUTION ORAL at 09:02

## 2021-02-03 RX ADMIN — MODAFINIL 200 MG: 100 TABLET ORAL at 09:02

## 2021-02-03 RX ADMIN — LEVOTHYROXINE SODIUM 137 MCG: 0.14 TABLET ORAL at 05:02

## 2021-02-03 RX ADMIN — INSULIN ASPART 2 UNITS: 100 INJECTION, SOLUTION INTRAVENOUS; SUBCUTANEOUS at 11:02

## 2021-02-03 RX ADMIN — LOSARTAN POTASSIUM 50 MG: 50 TABLET, FILM COATED ORAL at 09:02

## 2021-02-03 RX ADMIN — ONDANSETRON 4 MG: 2 INJECTION INTRAMUSCULAR; INTRAVENOUS at 11:02

## 2021-02-03 RX ADMIN — DEXAMETHASONE SODIUM PHOSPHATE 4 MG: 4 INJECTION INTRA-ARTICULAR; INTRALESIONAL; INTRAMUSCULAR; INTRAVENOUS; SOFT TISSUE at 06:02

## 2021-02-03 RX ADMIN — AMLODIPINE BESYLATE 10 MG: 10 TABLET ORAL at 09:02

## 2021-02-03 RX ADMIN — DEXAMETHASONE SODIUM PHOSPHATE 4 MG: 4 INJECTION INTRA-ARTICULAR; INTRALESIONAL; INTRAMUSCULAR; INTRAVENOUS; SOFT TISSUE at 05:02

## 2021-02-04 LAB
ALBUMIN SERPL BCP-MCNC: 2.9 G/DL (ref 3.5–5.2)
ALBUMIN SERPL BCP-MCNC: 2.9 G/DL (ref 3.5–5.2)
ALP SERPL-CCNC: 169 U/L (ref 55–135)
ALP SERPL-CCNC: 169 U/L (ref 55–135)
ALT SERPL W/O P-5'-P-CCNC: 85 U/L (ref 10–44)
ALT SERPL W/O P-5'-P-CCNC: 85 U/L (ref 10–44)
ANION GAP SERPL CALC-SCNC: 12 MMOL/L (ref 8–16)
ANION GAP SERPL CALC-SCNC: 12 MMOL/L (ref 8–16)
AST SERPL-CCNC: 27 U/L (ref 10–40)
AST SERPL-CCNC: 27 U/L (ref 10–40)
BASOPHILS # BLD AUTO: 0.02 K/UL (ref 0–0.2)
BASOPHILS # BLD AUTO: 0.02 K/UL (ref 0–0.2)
BASOPHILS NFR BLD: 0.1 % (ref 0–1.9)
BASOPHILS NFR BLD: 0.1 % (ref 0–1.9)
BILIRUB SERPL-MCNC: 0.5 MG/DL (ref 0.1–1)
BILIRUB SERPL-MCNC: 0.5 MG/DL (ref 0.1–1)
BUN SERPL-MCNC: 31 MG/DL (ref 6–20)
BUN SERPL-MCNC: 31 MG/DL (ref 6–20)
CALCIUM SERPL-MCNC: 7.4 MG/DL (ref 8.7–10.5)
CALCIUM SERPL-MCNC: 7.4 MG/DL (ref 8.7–10.5)
CHLORIDE SERPL-SCNC: 100 MMOL/L (ref 95–110)
CHLORIDE SERPL-SCNC: 100 MMOL/L (ref 95–110)
CO2 SERPL-SCNC: 20 MMOL/L (ref 23–29)
CO2 SERPL-SCNC: 20 MMOL/L (ref 23–29)
CREAT SERPL-MCNC: 0.7 MG/DL (ref 0.5–1.4)
CREAT SERPL-MCNC: 0.7 MG/DL (ref 0.5–1.4)
DIFFERENTIAL METHOD: ABNORMAL
DIFFERENTIAL METHOD: ABNORMAL
EOSINOPHIL # BLD AUTO: 0 K/UL (ref 0–0.5)
EOSINOPHIL # BLD AUTO: 0 K/UL (ref 0–0.5)
EOSINOPHIL NFR BLD: 0 % (ref 0–8)
EOSINOPHIL NFR BLD: 0 % (ref 0–8)
ERYTHROCYTE [DISTWIDTH] IN BLOOD BY AUTOMATED COUNT: 17.3 % (ref 11.5–14.5)
ERYTHROCYTE [DISTWIDTH] IN BLOOD BY AUTOMATED COUNT: 17.3 % (ref 11.5–14.5)
EST. GFR  (AFRICAN AMERICAN): >60 ML/MIN/1.73 M^2
EST. GFR  (AFRICAN AMERICAN): >60 ML/MIN/1.73 M^2
EST. GFR  (NON AFRICAN AMERICAN): >60 ML/MIN/1.73 M^2
EST. GFR  (NON AFRICAN AMERICAN): >60 ML/MIN/1.73 M^2
GLUCOSE SERPL-MCNC: 227 MG/DL (ref 70–110)
GLUCOSE SERPL-MCNC: 227 MG/DL (ref 70–110)
HCT VFR BLD AUTO: 35.2 % (ref 37–48.5)
HCT VFR BLD AUTO: 35.2 % (ref 37–48.5)
HGB BLD-MCNC: 11.7 G/DL (ref 12–16)
HGB BLD-MCNC: 11.7 G/DL (ref 12–16)
IMM GRANULOCYTES # BLD AUTO: 0.31 K/UL (ref 0–0.04)
IMM GRANULOCYTES # BLD AUTO: 0.31 K/UL (ref 0–0.04)
IMM GRANULOCYTES NFR BLD AUTO: 2 % (ref 0–0.5)
IMM GRANULOCYTES NFR BLD AUTO: 2 % (ref 0–0.5)
LYMPHOCYTES # BLD AUTO: 0.8 K/UL (ref 1–4.8)
LYMPHOCYTES # BLD AUTO: 0.8 K/UL (ref 1–4.8)
LYMPHOCYTES NFR BLD: 5.2 % (ref 18–48)
LYMPHOCYTES NFR BLD: 5.2 % (ref 18–48)
MAGNESIUM SERPL-MCNC: 2.3 MG/DL (ref 1.6–2.6)
MCH RBC QN AUTO: 26.5 PG (ref 27–31)
MCH RBC QN AUTO: 26.5 PG (ref 27–31)
MCHC RBC AUTO-ENTMCNC: 33.2 G/DL (ref 32–36)
MCHC RBC AUTO-ENTMCNC: 33.2 G/DL (ref 32–36)
MCV RBC AUTO: 80 FL (ref 82–98)
MCV RBC AUTO: 80 FL (ref 82–98)
MONOCYTES # BLD AUTO: 0.5 K/UL (ref 0.3–1)
MONOCYTES # BLD AUTO: 0.5 K/UL (ref 0.3–1)
MONOCYTES NFR BLD: 3.4 % (ref 4–15)
MONOCYTES NFR BLD: 3.4 % (ref 4–15)
NEUTROPHILS # BLD AUTO: 13.9 K/UL (ref 1.8–7.7)
NEUTROPHILS # BLD AUTO: 13.9 K/UL (ref 1.8–7.7)
NEUTROPHILS NFR BLD: 89.3 % (ref 38–73)
NEUTROPHILS NFR BLD: 89.3 % (ref 38–73)
NRBC BLD-RTO: 0 /100 WBC
NRBC BLD-RTO: 0 /100 WBC
PHOSPHATE SERPL-MCNC: 3.9 MG/DL (ref 2.7–4.5)
PLATELET # BLD AUTO: 411 K/UL (ref 150–350)
PLATELET # BLD AUTO: 411 K/UL (ref 150–350)
PMV BLD AUTO: 8.6 FL (ref 9.2–12.9)
PMV BLD AUTO: 8.6 FL (ref 9.2–12.9)
POCT GLUCOSE: 200 MG/DL (ref 70–110)
POCT GLUCOSE: 201 MG/DL (ref 70–110)
POCT GLUCOSE: 201 MG/DL (ref 70–110)
POCT GLUCOSE: 203 MG/DL (ref 70–110)
POCT GLUCOSE: 214 MG/DL (ref 70–110)
POTASSIUM SERPL-SCNC: 4.6 MMOL/L (ref 3.5–5.1)
POTASSIUM SERPL-SCNC: 4.6 MMOL/L (ref 3.5–5.1)
PROT SERPL-MCNC: 6 G/DL (ref 6–8.4)
PROT SERPL-MCNC: 6 G/DL (ref 6–8.4)
RBC # BLD AUTO: 4.42 M/UL (ref 4–5.4)
RBC # BLD AUTO: 4.42 M/UL (ref 4–5.4)
SODIUM SERPL-SCNC: 132 MMOL/L (ref 136–145)
SODIUM SERPL-SCNC: 132 MMOL/L (ref 136–145)
WBC # BLD AUTO: 15.58 K/UL (ref 3.9–12.7)
WBC # BLD AUTO: 15.58 K/UL (ref 3.9–12.7)

## 2021-02-04 PROCEDURE — 63600175 PHARM REV CODE 636 W HCPCS: Performed by: PHYSICIAN ASSISTANT

## 2021-02-04 PROCEDURE — 25000003 PHARM REV CODE 250: Performed by: PHYSICIAN ASSISTANT

## 2021-02-04 PROCEDURE — 63600175 PHARM REV CODE 636 W HCPCS: Performed by: NURSE PRACTITIONER

## 2021-02-04 PROCEDURE — 94668 MNPJ CHEST WALL SBSQ: CPT

## 2021-02-04 PROCEDURE — 92507 TX SP LANG VOICE COMM INDIV: CPT

## 2021-02-04 PROCEDURE — A9585 GADOBUTROL INJECTION: HCPCS | Performed by: NEUROLOGICAL SURGERY

## 2021-02-04 PROCEDURE — 94760 N-INVAS EAR/PLS OXIMETRY 1: CPT

## 2021-02-04 PROCEDURE — 36415 COLL VENOUS BLD VENIPUNCTURE: CPT

## 2021-02-04 PROCEDURE — 99233 SBSQ HOSP IP/OBS HIGH 50: CPT | Mod: ,,, | Performed by: PHYSICIAN ASSISTANT

## 2021-02-04 PROCEDURE — 11000001 HC ACUTE MED/SURG PRIVATE ROOM

## 2021-02-04 PROCEDURE — 94640 AIRWAY INHALATION TREATMENT: CPT

## 2021-02-04 PROCEDURE — 25500020 PHARM REV CODE 255: Performed by: NEUROLOGICAL SURGERY

## 2021-02-04 PROCEDURE — 25000003 PHARM REV CODE 250: Performed by: STUDENT IN AN ORGANIZED HEALTH CARE EDUCATION/TRAINING PROGRAM

## 2021-02-04 PROCEDURE — 84100 ASSAY OF PHOSPHORUS: CPT

## 2021-02-04 PROCEDURE — 80053 COMPREHEN METABOLIC PANEL: CPT

## 2021-02-04 PROCEDURE — 25000242 PHARM REV CODE 250 ALT 637 W/ HCPCS: Performed by: PHYSICIAN ASSISTANT

## 2021-02-04 PROCEDURE — 97110 THERAPEUTIC EXERCISES: CPT

## 2021-02-04 PROCEDURE — 97530 THERAPEUTIC ACTIVITIES: CPT

## 2021-02-04 PROCEDURE — 63600175 PHARM REV CODE 636 W HCPCS: Performed by: STUDENT IN AN ORGANIZED HEALTH CARE EDUCATION/TRAINING PROGRAM

## 2021-02-04 PROCEDURE — 25000003 PHARM REV CODE 250: Performed by: NURSE PRACTITIONER

## 2021-02-04 PROCEDURE — 85025 COMPLETE CBC W/AUTO DIFF WBC: CPT

## 2021-02-04 PROCEDURE — 99233 PR SUBSEQUENT HOSPITAL CARE,LEVL III: ICD-10-PCS | Mod: ,,, | Performed by: PHYSICIAN ASSISTANT

## 2021-02-04 PROCEDURE — 94761 N-INVAS EAR/PLS OXIMETRY MLT: CPT

## 2021-02-04 PROCEDURE — 83735 ASSAY OF MAGNESIUM: CPT

## 2021-02-04 RX ORDER — DEXAMETHASONE SODIUM PHOSPHATE 100 MG/10ML
10 INJECTION INTRAMUSCULAR; INTRAVENOUS ONCE
Status: COMPLETED | OUTPATIENT
Start: 2021-02-04 | End: 2021-02-04

## 2021-02-04 RX ORDER — GADOBUTROL 604.72 MG/ML
10 INJECTION INTRAVENOUS
Status: COMPLETED | OUTPATIENT
Start: 2021-02-04 | End: 2021-02-04

## 2021-02-04 RX ORDER — POLYETHYLENE GLYCOL 3350 17 G/17G
17 POWDER, FOR SOLUTION ORAL DAILY
Status: DISCONTINUED | OUTPATIENT
Start: 2021-02-05 | End: 2021-02-18 | Stop reason: HOSPADM

## 2021-02-04 RX ORDER — SODIUM CHLORIDE 9 MG/ML
INJECTION, SOLUTION INTRAVENOUS CONTINUOUS
Status: DISCONTINUED | OUTPATIENT
Start: 2021-02-04 | End: 2021-02-08

## 2021-02-04 RX ORDER — NAPROXEN SODIUM 220 MG/1
81 TABLET, FILM COATED ORAL DAILY
Status: DISCONTINUED | OUTPATIENT
Start: 2021-02-05 | End: 2021-02-18 | Stop reason: HOSPADM

## 2021-02-04 RX ORDER — INSULIN ASPART 100 [IU]/ML
4 INJECTION, SOLUTION INTRAVENOUS; SUBCUTANEOUS
Status: DISCONTINUED | OUTPATIENT
Start: 2021-02-04 | End: 2021-02-05

## 2021-02-04 RX ADMIN — INSULIN ASPART 2 UNITS: 100 INJECTION, SOLUTION INTRAVENOUS; SUBCUTANEOUS at 11:02

## 2021-02-04 RX ADMIN — ONDANSETRON 4 MG: 2 INJECTION INTRAMUSCULAR; INTRAVENOUS at 02:02

## 2021-02-04 RX ADMIN — DEXAMETHASONE SODIUM PHOSPHATE 10 MG: 10 INJECTION INTRAMUSCULAR; INTRAVENOUS at 12:02

## 2021-02-04 RX ADMIN — MODAFINIL 200 MG: 100 TABLET ORAL at 09:02

## 2021-02-04 RX ADMIN — INSULIN ASPART 2 UNITS: 100 INJECTION, SOLUTION INTRAVENOUS; SUBCUTANEOUS at 12:02

## 2021-02-04 RX ADMIN — INSULIN ASPART 4 UNITS: 100 INJECTION, SOLUTION INTRAVENOUS; SUBCUTANEOUS at 11:02

## 2021-02-04 RX ADMIN — LOSARTAN POTASSIUM 50 MG: 50 TABLET, FILM COATED ORAL at 09:02

## 2021-02-04 RX ADMIN — DOCUSATE SODIUM AND SENNOSIDES 1 TABLET: 8.6; 5 TABLET, FILM COATED ORAL at 09:02

## 2021-02-04 RX ADMIN — IPRATROPIUM BROMIDE AND ALBUTEROL SULFATE 3 ML: .5; 2.5 SOLUTION RESPIRATORY (INHALATION) at 01:02

## 2021-02-04 RX ADMIN — SODIUM CHLORIDE: 0.9 INJECTION, SOLUTION INTRAVENOUS at 04:02

## 2021-02-04 RX ADMIN — GADOBUTROL 10 ML: 604.72 INJECTION INTRAVENOUS at 04:02

## 2021-02-04 RX ADMIN — INSULIN ASPART 4 UNITS: 100 INJECTION, SOLUTION INTRAVENOUS; SUBCUTANEOUS at 05:02

## 2021-02-04 RX ADMIN — IPRATROPIUM BROMIDE AND ALBUTEROL SULFATE 3 ML: .5; 2.5 SOLUTION RESPIRATORY (INHALATION) at 12:02

## 2021-02-04 RX ADMIN — LEVOTHYROXINE SODIUM 137 MCG: 0.14 TABLET ORAL at 06:02

## 2021-02-04 RX ADMIN — AMLODIPINE BESYLATE 10 MG: 10 TABLET ORAL at 09:02

## 2021-02-04 RX ADMIN — FAMOTIDINE 20 MG: 20 TABLET, FILM COATED ORAL at 09:02

## 2021-02-04 RX ADMIN — SILODOSIN 4 MG: 4 CAPSULE ORAL at 09:02

## 2021-02-04 RX ADMIN — IPRATROPIUM BROMIDE AND ALBUTEROL SULFATE 3 ML: .5; 2.5 SOLUTION RESPIRATORY (INHALATION) at 07:02

## 2021-02-04 RX ADMIN — ATORVASTATIN CALCIUM 10 MG: 10 TABLET, FILM COATED ORAL at 09:02

## 2021-02-04 RX ADMIN — SODIUM CHLORIDE: 0.9 INJECTION, SOLUTION INTRAVENOUS at 02:02

## 2021-02-04 RX ADMIN — DEXAMETHASONE SODIUM PHOSPHATE 4 MG: 4 INJECTION INTRA-ARTICULAR; INTRALESIONAL; INTRAMUSCULAR; INTRAVENOUS; SOFT TISSUE at 11:02

## 2021-02-04 RX ADMIN — LEVETIRACETAM 1500 MG: 100 SOLUTION ORAL at 09:02

## 2021-02-04 RX ADMIN — ACETAMINOPHEN 650 MG: 325 TABLET ORAL at 09:02

## 2021-02-04 RX ADMIN — DEXAMETHASONE SODIUM PHOSPHATE 4 MG: 4 INJECTION INTRA-ARTICULAR; INTRALESIONAL; INTRAMUSCULAR; INTRAVENOUS; SOFT TISSUE at 06:02

## 2021-02-04 RX ADMIN — ONDANSETRON 4 MG: 2 INJECTION INTRAMUSCULAR; INTRAVENOUS at 09:02

## 2021-02-04 RX ADMIN — INSULIN ASPART 4 UNITS: 100 INJECTION, SOLUTION INTRAVENOUS; SUBCUTANEOUS at 06:02

## 2021-02-04 RX ADMIN — DEXAMETHASONE SODIUM PHOSPHATE 4 MG: 4 INJECTION INTRA-ARTICULAR; INTRALESIONAL; INTRAMUSCULAR; INTRAVENOUS; SOFT TISSUE at 05:02

## 2021-02-05 PROBLEM — T38.0X5A ADVERSE EFFECT OF CORTICOSTEROIDS: Status: ACTIVE | Noted: 2021-02-05

## 2021-02-05 PROBLEM — Z78.9 ON ENTERAL NUTRITION: Status: ACTIVE | Noted: 2021-02-05

## 2021-02-05 LAB
ALBUMIN SERPL BCP-MCNC: 2.8 G/DL (ref 3.5–5.2)
ALP SERPL-CCNC: 143 U/L (ref 55–135)
ALT SERPL W/O P-5'-P-CCNC: 79 U/L (ref 10–44)
ANION GAP SERPL CALC-SCNC: 11 MMOL/L (ref 8–16)
AST SERPL-CCNC: 21 U/L (ref 10–40)
BASOPHILS # BLD AUTO: 0.03 K/UL (ref 0–0.2)
BASOPHILS NFR BLD: 0.2 % (ref 0–1.9)
BILIRUB SERPL-MCNC: 0.4 MG/DL (ref 0.1–1)
BUN SERPL-MCNC: 33 MG/DL (ref 6–20)
CALCIUM SERPL-MCNC: 7 MG/DL (ref 8.7–10.5)
CHLORIDE SERPL-SCNC: 102 MMOL/L (ref 95–110)
CO2 SERPL-SCNC: 19 MMOL/L (ref 23–29)
CREAT SERPL-MCNC: 0.6 MG/DL (ref 0.5–1.4)
DIFFERENTIAL METHOD: ABNORMAL
EOSINOPHIL # BLD AUTO: 0 K/UL (ref 0–0.5)
EOSINOPHIL NFR BLD: 0 % (ref 0–8)
ERYTHROCYTE [DISTWIDTH] IN BLOOD BY AUTOMATED COUNT: 17.4 % (ref 11.5–14.5)
EST. GFR  (AFRICAN AMERICAN): >60 ML/MIN/1.73 M^2
EST. GFR  (NON AFRICAN AMERICAN): >60 ML/MIN/1.73 M^2
GLUCOSE SERPL-MCNC: 225 MG/DL (ref 70–110)
HCT VFR BLD AUTO: 34.1 % (ref 37–48.5)
HGB BLD-MCNC: 11.2 G/DL (ref 12–16)
IMM GRANULOCYTES # BLD AUTO: 0.4 K/UL (ref 0–0.04)
IMM GRANULOCYTES NFR BLD AUTO: 2.3 % (ref 0–0.5)
LYMPHOCYTES # BLD AUTO: 0.9 K/UL (ref 1–4.8)
LYMPHOCYTES NFR BLD: 5.1 % (ref 18–48)
MAGNESIUM SERPL-MCNC: 2.2 MG/DL (ref 1.6–2.6)
MCH RBC QN AUTO: 26.3 PG (ref 27–31)
MCHC RBC AUTO-ENTMCNC: 32.8 G/DL (ref 32–36)
MCV RBC AUTO: 80 FL (ref 82–98)
MONOCYTES # BLD AUTO: 0.9 K/UL (ref 0.3–1)
MONOCYTES NFR BLD: 5 % (ref 4–15)
NEUTROPHILS # BLD AUTO: 15.3 K/UL (ref 1.8–7.7)
NEUTROPHILS NFR BLD: 87.4 % (ref 38–73)
NRBC BLD-RTO: 0 /100 WBC
PHOSPHATE SERPL-MCNC: 3.1 MG/DL (ref 2.7–4.5)
PLATELET # BLD AUTO: 393 K/UL (ref 150–350)
PMV BLD AUTO: 10.1 FL (ref 9.2–12.9)
POCT GLUCOSE: 161 MG/DL (ref 70–110)
POCT GLUCOSE: 205 MG/DL (ref 70–110)
POCT GLUCOSE: 226 MG/DL (ref 70–110)
POCT GLUCOSE: 234 MG/DL (ref 70–110)
POCT GLUCOSE: 242 MG/DL (ref 70–110)
POTASSIUM SERPL-SCNC: 4.4 MMOL/L (ref 3.5–5.1)
PROT SERPL-MCNC: 5.6 G/DL (ref 6–8.4)
RBC # BLD AUTO: 4.26 M/UL (ref 4–5.4)
SODIUM SERPL-SCNC: 132 MMOL/L (ref 136–145)
WBC # BLD AUTO: 17.46 K/UL (ref 3.9–12.7)

## 2021-02-05 PROCEDURE — A9698 NON-RAD CONTRAST MATERIALNOC: HCPCS | Performed by: NEUROLOGICAL SURGERY

## 2021-02-05 PROCEDURE — 36415 COLL VENOUS BLD VENIPUNCTURE: CPT

## 2021-02-05 PROCEDURE — 87798 DETECT AGENT NOS DNA AMP: CPT | Mod: 59

## 2021-02-05 PROCEDURE — 87533 HHV-6 DNA QUANT: CPT

## 2021-02-05 PROCEDURE — 85025 COMPLETE CBC W/AUTO DIFF WBC: CPT

## 2021-02-05 PROCEDURE — 84100 ASSAY OF PHOSPHORUS: CPT

## 2021-02-05 PROCEDURE — 86592 SYPHILIS TEST NON-TREP QUAL: CPT

## 2021-02-05 PROCEDURE — 25000003 PHARM REV CODE 250: Performed by: PHYSICIAN ASSISTANT

## 2021-02-05 PROCEDURE — 80053 COMPREHEN METABOLIC PANEL: CPT

## 2021-02-05 PROCEDURE — 92611 MOTION FLUOROSCOPY/SWALLOW: CPT

## 2021-02-05 PROCEDURE — 25000003 PHARM REV CODE 250: Performed by: STUDENT IN AN ORGANIZED HEALTH CARE EDUCATION/TRAINING PROGRAM

## 2021-02-05 PROCEDURE — 11000001 HC ACUTE MED/SURG PRIVATE ROOM

## 2021-02-05 PROCEDURE — 63600175 PHARM REV CODE 636 W HCPCS: Performed by: NURSE PRACTITIONER

## 2021-02-05 PROCEDURE — 25500020 PHARM REV CODE 255: Performed by: NEUROLOGICAL SURGERY

## 2021-02-05 PROCEDURE — 97530 THERAPEUTIC ACTIVITIES: CPT

## 2021-02-05 PROCEDURE — 94668 MNPJ CHEST WALL SBSQ: CPT

## 2021-02-05 PROCEDURE — 99233 SBSQ HOSP IP/OBS HIGH 50: CPT | Mod: ,,, | Performed by: PHYSICIAN ASSISTANT

## 2021-02-05 PROCEDURE — 99222 PR INITIAL HOSPITAL CARE,LEVL II: ICD-10-PCS | Mod: ,,, | Performed by: NURSE PRACTITIONER

## 2021-02-05 PROCEDURE — 97535 SELF CARE MNGMENT TRAINING: CPT

## 2021-02-05 PROCEDURE — 94640 AIRWAY INHALATION TREATMENT: CPT

## 2021-02-05 PROCEDURE — 63600175 PHARM REV CODE 636 W HCPCS: Performed by: STUDENT IN AN ORGANIZED HEALTH CARE EDUCATION/TRAINING PROGRAM

## 2021-02-05 PROCEDURE — 99233 PR SUBSEQUENT HOSPITAL CARE,LEVL III: ICD-10-PCS | Mod: ,,, | Performed by: PHYSICIAN ASSISTANT

## 2021-02-05 PROCEDURE — 87798 DETECT AGENT NOS DNA AMP: CPT

## 2021-02-05 PROCEDURE — 99222 1ST HOSP IP/OBS MODERATE 55: CPT | Mod: ,,, | Performed by: NURSE PRACTITIONER

## 2021-02-05 PROCEDURE — 83735 ASSAY OF MAGNESIUM: CPT

## 2021-02-05 PROCEDURE — 94761 N-INVAS EAR/PLS OXIMETRY MLT: CPT

## 2021-02-05 PROCEDURE — 25000242 PHARM REV CODE 250 ALT 637 W/ HCPCS: Performed by: PHYSICIAN ASSISTANT

## 2021-02-05 PROCEDURE — 97112 NEUROMUSCULAR REEDUCATION: CPT

## 2021-02-05 PROCEDURE — 87529 HSV DNA AMP PROBE: CPT

## 2021-02-05 RX ORDER — INSULIN ASPART 100 [IU]/ML
4 INJECTION, SOLUTION INTRAVENOUS; SUBCUTANEOUS
Status: DISCONTINUED | OUTPATIENT
Start: 2021-02-06 | End: 2021-02-06

## 2021-02-05 RX ORDER — INSULIN ASPART 100 [IU]/ML
4 INJECTION, SOLUTION INTRAVENOUS; SUBCUTANEOUS
Status: DISCONTINUED | OUTPATIENT
Start: 2021-02-05 | End: 2021-02-06

## 2021-02-05 RX ORDER — INSULIN ASPART 100 [IU]/ML
1-10 INJECTION, SOLUTION INTRAVENOUS; SUBCUTANEOUS EVERY 4 HOURS PRN
Status: DISCONTINUED | OUTPATIENT
Start: 2021-02-05 | End: 2021-02-07

## 2021-02-05 RX ORDER — DEXTROSE MONOHYDRATE 100 MG/ML
INJECTION, SOLUTION INTRAVENOUS CONTINUOUS PRN
Status: DISCONTINUED | OUTPATIENT
Start: 2021-02-05 | End: 2021-02-07

## 2021-02-05 RX ORDER — CLOPIDOGREL BISULFATE 75 MG/1
75 TABLET ORAL DAILY
Status: DISCONTINUED | OUTPATIENT
Start: 2021-02-05 | End: 2021-02-18 | Stop reason: HOSPADM

## 2021-02-05 RX ORDER — GLUCAGON 1 MG
1 KIT INJECTION
Status: DISCONTINUED | OUTPATIENT
Start: 2021-02-05 | End: 2021-02-07

## 2021-02-05 RX ADMIN — SODIUM CHLORIDE: 0.9 INJECTION, SOLUTION INTRAVENOUS at 11:02

## 2021-02-05 RX ADMIN — POLYETHYLENE GLYCOL 3350 17 G: 17 POWDER, FOR SOLUTION ORAL at 08:02

## 2021-02-05 RX ADMIN — DOCUSATE SODIUM AND SENNOSIDES 1 TABLET: 8.6; 5 TABLET, FILM COATED ORAL at 08:02

## 2021-02-05 RX ADMIN — ATORVASTATIN CALCIUM 10 MG: 10 TABLET, FILM COATED ORAL at 08:02

## 2021-02-05 RX ADMIN — INSULIN ASPART 4 UNITS: 100 INJECTION, SOLUTION INTRAVENOUS; SUBCUTANEOUS at 08:02

## 2021-02-05 RX ADMIN — DEXAMETHASONE SODIUM PHOSPHATE 4 MG: 4 INJECTION INTRA-ARTICULAR; INTRALESIONAL; INTRAMUSCULAR; INTRAVENOUS; SOFT TISSUE at 05:02

## 2021-02-05 RX ADMIN — LEVETIRACETAM 1500 MG: 100 SOLUTION ORAL at 08:02

## 2021-02-05 RX ADMIN — INSULIN ASPART 4 UNITS: 100 INJECTION, SOLUTION INTRAVENOUS; SUBCUTANEOUS at 04:02

## 2021-02-05 RX ADMIN — IPRATROPIUM BROMIDE AND ALBUTEROL SULFATE 3 ML: .5; 2.5 SOLUTION RESPIRATORY (INHALATION) at 08:02

## 2021-02-05 RX ADMIN — CLOPIDOGREL BISULFATE 75 MG: 75 TABLET, FILM COATED ORAL at 03:02

## 2021-02-05 RX ADMIN — FAMOTIDINE 20 MG: 20 TABLET, FILM COATED ORAL at 08:02

## 2021-02-05 RX ADMIN — DEXAMETHASONE SODIUM PHOSPHATE 4 MG: 4 INJECTION INTRA-ARTICULAR; INTRALESIONAL; INTRAMUSCULAR; INTRAVENOUS; SOFT TISSUE at 12:02

## 2021-02-05 RX ADMIN — INSULIN ASPART 4 UNITS: 100 INJECTION, SOLUTION INTRAVENOUS; SUBCUTANEOUS at 11:02

## 2021-02-05 RX ADMIN — DEXAMETHASONE SODIUM PHOSPHATE 4 MG: 4 INJECTION INTRA-ARTICULAR; INTRALESIONAL; INTRAMUSCULAR; INTRAVENOUS; SOFT TISSUE at 11:02

## 2021-02-05 RX ADMIN — DOCUSATE SODIUM AND SENNOSIDES 1 TABLET: 8.6; 5 TABLET, FILM COATED ORAL at 09:02

## 2021-02-05 RX ADMIN — LEVOTHYROXINE SODIUM 137 MCG: 0.14 TABLET ORAL at 06:02

## 2021-02-05 RX ADMIN — ASPIRIN 81 MG: 81 TABLET, CHEWABLE ORAL at 08:02

## 2021-02-05 RX ADMIN — BARIUM SULFATE 30 ML: 0.81 POWDER, FOR SUSPENSION ORAL at 03:02

## 2021-02-05 RX ADMIN — DEXAMETHASONE SODIUM PHOSPHATE 4 MG: 4 INJECTION INTRA-ARTICULAR; INTRALESIONAL; INTRAMUSCULAR; INTRAVENOUS; SOFT TISSUE at 06:02

## 2021-02-05 RX ADMIN — SILODOSIN 4 MG: 4 CAPSULE ORAL at 08:02

## 2021-02-05 RX ADMIN — INSULIN ASPART 2 UNITS: 100 INJECTION, SOLUTION INTRAVENOUS; SUBCUTANEOUS at 12:02

## 2021-02-05 RX ADMIN — SODIUM CHLORIDE: 0.9 INJECTION, SOLUTION INTRAVENOUS at 01:02

## 2021-02-05 RX ADMIN — INSULIN ASPART 4 UNITS: 100 INJECTION, SOLUTION INTRAVENOUS; SUBCUTANEOUS at 09:02

## 2021-02-05 RX ADMIN — FAMOTIDINE 20 MG: 20 TABLET, FILM COATED ORAL at 09:02

## 2021-02-05 RX ADMIN — IPRATROPIUM BROMIDE AND ALBUTEROL SULFATE 3 ML: .5; 2.5 SOLUTION RESPIRATORY (INHALATION) at 07:02

## 2021-02-05 RX ADMIN — INSULIN ASPART 2 UNITS: 100 INJECTION, SOLUTION INTRAVENOUS; SUBCUTANEOUS at 04:02

## 2021-02-05 RX ADMIN — MODAFINIL 200 MG: 100 TABLET ORAL at 08:02

## 2021-02-05 RX ADMIN — INSULIN ASPART 4 UNITS: 100 INJECTION, SOLUTION INTRAVENOUS; SUBCUTANEOUS at 06:02

## 2021-02-05 RX ADMIN — LEVETIRACETAM 1500 MG: 100 SOLUTION ORAL at 09:02

## 2021-02-05 RX ADMIN — AMLODIPINE BESYLATE 10 MG: 10 TABLET ORAL at 08:02

## 2021-02-05 RX ADMIN — LOSARTAN POTASSIUM 50 MG: 50 TABLET, FILM COATED ORAL at 08:02

## 2021-02-05 RX ADMIN — IPRATROPIUM BROMIDE AND ALBUTEROL SULFATE 3 ML: .5; 2.5 SOLUTION RESPIRATORY (INHALATION) at 02:02

## 2021-02-06 LAB
ALBUMIN SERPL BCP-MCNC: 2.7 G/DL (ref 3.5–5.2)
ALP SERPL-CCNC: 141 U/L (ref 55–135)
ALT SERPL W/O P-5'-P-CCNC: 85 U/L (ref 10–44)
ANION GAP SERPL CALC-SCNC: 10 MMOL/L (ref 8–16)
AST SERPL-CCNC: 25 U/L (ref 10–40)
BASOPHILS # BLD AUTO: 0.04 K/UL (ref 0–0.2)
BASOPHILS NFR BLD: 0.2 % (ref 0–1.9)
BILIRUB SERPL-MCNC: 0.4 MG/DL (ref 0.1–1)
BUN SERPL-MCNC: 30 MG/DL (ref 6–20)
CALCIUM SERPL-MCNC: 6.8 MG/DL (ref 8.7–10.5)
CHLORIDE SERPL-SCNC: 104 MMOL/L (ref 95–110)
CO2 SERPL-SCNC: 20 MMOL/L (ref 23–29)
CREAT SERPL-MCNC: 0.6 MG/DL (ref 0.5–1.4)
DIFFERENTIAL METHOD: ABNORMAL
EOSINOPHIL # BLD AUTO: 0 K/UL (ref 0–0.5)
EOSINOPHIL NFR BLD: 0 % (ref 0–8)
ERYTHROCYTE [DISTWIDTH] IN BLOOD BY AUTOMATED COUNT: 17.5 % (ref 11.5–14.5)
EST. GFR  (AFRICAN AMERICAN): >60 ML/MIN/1.73 M^2
EST. GFR  (NON AFRICAN AMERICAN): >60 ML/MIN/1.73 M^2
GLUCOSE SERPL-MCNC: 209 MG/DL (ref 70–110)
HCT VFR BLD AUTO: 34.2 % (ref 37–48.5)
HGB BLD-MCNC: 11.1 G/DL (ref 12–16)
IMM GRANULOCYTES # BLD AUTO: 0.56 K/UL (ref 0–0.04)
IMM GRANULOCYTES NFR BLD AUTO: 3 % (ref 0–0.5)
LYMPHOCYTES # BLD AUTO: 1.1 K/UL (ref 1–4.8)
LYMPHOCYTES NFR BLD: 5.5 % (ref 18–48)
MAGNESIUM SERPL-MCNC: 2.1 MG/DL (ref 1.6–2.6)
MCH RBC QN AUTO: 26 PG (ref 27–31)
MCHC RBC AUTO-ENTMCNC: 32.5 G/DL (ref 32–36)
MCV RBC AUTO: 80 FL (ref 82–98)
MONOCYTES # BLD AUTO: 0.8 K/UL (ref 0.3–1)
MONOCYTES NFR BLD: 4.2 % (ref 4–15)
NEUTROPHILS # BLD AUTO: 16.5 K/UL (ref 1.8–7.7)
NEUTROPHILS NFR BLD: 87.1 % (ref 38–73)
NRBC BLD-RTO: 0 /100 WBC
PHOSPHATE SERPL-MCNC: 3.5 MG/DL (ref 2.7–4.5)
PLATELET # BLD AUTO: 370 K/UL (ref 150–350)
PMV BLD AUTO: 9.4 FL (ref 9.2–12.9)
POCT GLUCOSE: 180 MG/DL (ref 70–110)
POCT GLUCOSE: 193 MG/DL (ref 70–110)
POCT GLUCOSE: 203 MG/DL (ref 70–110)
POCT GLUCOSE: 207 MG/DL (ref 70–110)
POCT GLUCOSE: 224 MG/DL (ref 70–110)
POCT GLUCOSE: 228 MG/DL (ref 70–110)
POTASSIUM SERPL-SCNC: 4.5 MMOL/L (ref 3.5–5.1)
PROT SERPL-MCNC: 5.4 G/DL (ref 6–8.4)
PTH-INTACT SERPL-MCNC: 34 PG/ML (ref 9–77)
RBC # BLD AUTO: 4.27 M/UL (ref 4–5.4)
SODIUM SERPL-SCNC: 134 MMOL/L (ref 136–145)
WBC # BLD AUTO: 18.95 K/UL (ref 3.9–12.7)

## 2021-02-06 PROCEDURE — 25000003 PHARM REV CODE 250: Performed by: PHYSICIAN ASSISTANT

## 2021-02-06 PROCEDURE — 99900035 HC TECH TIME PER 15 MIN (STAT)

## 2021-02-06 PROCEDURE — 36415 COLL VENOUS BLD VENIPUNCTURE: CPT

## 2021-02-06 PROCEDURE — 85025 COMPLETE CBC W/AUTO DIFF WBC: CPT

## 2021-02-06 PROCEDURE — 80053 COMPREHEN METABOLIC PANEL: CPT

## 2021-02-06 PROCEDURE — 99499 UNLISTED E&M SERVICE: CPT | Mod: ,,, | Performed by: NEUROLOGICAL SURGERY

## 2021-02-06 PROCEDURE — 63600175 PHARM REV CODE 636 W HCPCS: Performed by: PHYSICIAN ASSISTANT

## 2021-02-06 PROCEDURE — 25000242 PHARM REV CODE 250 ALT 637 W/ HCPCS: Performed by: PHYSICIAN ASSISTANT

## 2021-02-06 PROCEDURE — 63600175 PHARM REV CODE 636 W HCPCS: Performed by: STUDENT IN AN ORGANIZED HEALTH CARE EDUCATION/TRAINING PROGRAM

## 2021-02-06 PROCEDURE — 99233 SBSQ HOSP IP/OBS HIGH 50: CPT | Mod: ,,, | Performed by: PHYSICIAN ASSISTANT

## 2021-02-06 PROCEDURE — 94668 MNPJ CHEST WALL SBSQ: CPT

## 2021-02-06 PROCEDURE — 94640 AIRWAY INHALATION TREATMENT: CPT

## 2021-02-06 PROCEDURE — 99499 NO LOS: ICD-10-PCS | Mod: ,,, | Performed by: NEUROLOGICAL SURGERY

## 2021-02-06 PROCEDURE — 25000003 PHARM REV CODE 250: Performed by: STUDENT IN AN ORGANIZED HEALTH CARE EDUCATION/TRAINING PROGRAM

## 2021-02-06 PROCEDURE — 63600175 PHARM REV CODE 636 W HCPCS: Performed by: NURSE PRACTITIONER

## 2021-02-06 PROCEDURE — 11000001 HC ACUTE MED/SURG PRIVATE ROOM

## 2021-02-06 PROCEDURE — 99233 PR SUBSEQUENT HOSPITAL CARE,LEVL III: ICD-10-PCS | Mod: ,,, | Performed by: PHYSICIAN ASSISTANT

## 2021-02-06 PROCEDURE — 84100 ASSAY OF PHOSPHORUS: CPT

## 2021-02-06 PROCEDURE — 94761 N-INVAS EAR/PLS OXIMETRY MLT: CPT

## 2021-02-06 PROCEDURE — 83735 ASSAY OF MAGNESIUM: CPT

## 2021-02-06 PROCEDURE — 83970 ASSAY OF PARATHORMONE: CPT

## 2021-02-06 PROCEDURE — 25000003 PHARM REV CODE 250: Performed by: NURSE PRACTITIONER

## 2021-02-06 PROCEDURE — 94760 N-INVAS EAR/PLS OXIMETRY 1: CPT

## 2021-02-06 RX ORDER — INSULIN ASPART 100 [IU]/ML
6 INJECTION, SOLUTION INTRAVENOUS; SUBCUTANEOUS
Status: DISCONTINUED | OUTPATIENT
Start: 2021-02-06 | End: 2021-02-07

## 2021-02-06 RX ORDER — CALCIUM CARBONATE 500(1250)
1000 TABLET ORAL 2 TIMES DAILY
Status: DISCONTINUED | OUTPATIENT
Start: 2021-02-06 | End: 2021-02-18 | Stop reason: HOSPADM

## 2021-02-06 RX ORDER — SUCRALFATE 1 G/1
1 TABLET ORAL
Status: DISCONTINUED | OUTPATIENT
Start: 2021-02-06 | End: 2021-02-18 | Stop reason: HOSPADM

## 2021-02-06 RX ORDER — INSULIN ASPART 100 [IU]/ML
6 INJECTION, SOLUTION INTRAVENOUS; SUBCUTANEOUS
Status: DISCONTINUED | OUTPATIENT
Start: 2021-02-07 | End: 2021-02-07

## 2021-02-06 RX ADMIN — IPRATROPIUM BROMIDE AND ALBUTEROL SULFATE 3 ML: .5; 2.5 SOLUTION RESPIRATORY (INHALATION) at 09:02

## 2021-02-06 RX ADMIN — AMLODIPINE BESYLATE 10 MG: 10 TABLET ORAL at 08:02

## 2021-02-06 RX ADMIN — INSULIN ASPART 4 UNITS: 100 INJECTION, SOLUTION INTRAVENOUS; SUBCUTANEOUS at 04:02

## 2021-02-06 RX ADMIN — LEVETIRACETAM 1500 MG: 100 SOLUTION ORAL at 08:02

## 2021-02-06 RX ADMIN — SODIUM CHLORIDE: 0.9 INJECTION, SOLUTION INTRAVENOUS at 11:02

## 2021-02-06 RX ADMIN — LEVETIRACETAM 1500 MG: 100 SOLUTION ORAL at 11:02

## 2021-02-06 RX ADMIN — ATORVASTATIN CALCIUM 10 MG: 10 TABLET, FILM COATED ORAL at 08:02

## 2021-02-06 RX ADMIN — DEXAMETHASONE SODIUM PHOSPHATE 4 MG: 4 INJECTION INTRA-ARTICULAR; INTRALESIONAL; INTRAMUSCULAR; INTRAVENOUS; SOFT TISSUE at 06:02

## 2021-02-06 RX ADMIN — MODAFINIL 200 MG: 100 TABLET ORAL at 08:02

## 2021-02-06 RX ADMIN — INSULIN ASPART 2 UNITS: 100 INJECTION, SOLUTION INTRAVENOUS; SUBCUTANEOUS at 04:02

## 2021-02-06 RX ADMIN — INSULIN ASPART 6 UNITS: 100 INJECTION, SOLUTION INTRAVENOUS; SUBCUTANEOUS at 04:02

## 2021-02-06 RX ADMIN — DOCUSATE SODIUM AND SENNOSIDES 1 TABLET: 8.6; 5 TABLET, FILM COATED ORAL at 08:02

## 2021-02-06 RX ADMIN — DEXAMETHASONE SODIUM PHOSPHATE 4 MG: 4 INJECTION INTRA-ARTICULAR; INTRALESIONAL; INTRAMUSCULAR; INTRAVENOUS; SOFT TISSUE at 12:02

## 2021-02-06 RX ADMIN — DEXAMETHASONE SODIUM PHOSPHATE 4 MG: 4 INJECTION INTRA-ARTICULAR; INTRALESIONAL; INTRAMUSCULAR; INTRAVENOUS; SOFT TISSUE at 11:02

## 2021-02-06 RX ADMIN — CALCIUM GLUCONATE 1000 MG: 98 INJECTION, SOLUTION INTRAVENOUS at 04:02

## 2021-02-06 RX ADMIN — ONDANSETRON 4 MG: 2 INJECTION INTRAMUSCULAR; INTRAVENOUS at 08:02

## 2021-02-06 RX ADMIN — INSULIN ASPART 4 UNITS: 100 INJECTION, SOLUTION INTRAVENOUS; SUBCUTANEOUS at 12:02

## 2021-02-06 RX ADMIN — FAMOTIDINE 20 MG: 20 TABLET, FILM COATED ORAL at 08:02

## 2021-02-06 RX ADMIN — SODIUM CHLORIDE: 0.9 INJECTION, SOLUTION INTRAVENOUS at 08:02

## 2021-02-06 RX ADMIN — SUCRALFATE 1 G: 1 TABLET ORAL at 08:02

## 2021-02-06 RX ADMIN — CLOPIDOGREL BISULFATE 75 MG: 75 TABLET, FILM COATED ORAL at 08:02

## 2021-02-06 RX ADMIN — ACETAMINOPHEN 650 MG: 325 TABLET ORAL at 08:02

## 2021-02-06 RX ADMIN — DEXAMETHASONE SODIUM PHOSPHATE 4 MG: 4 INJECTION INTRA-ARTICULAR; INTRALESIONAL; INTRAMUSCULAR; INTRAVENOUS; SOFT TISSUE at 05:02

## 2021-02-06 RX ADMIN — IPRATROPIUM BROMIDE AND ALBUTEROL SULFATE 3 ML: .5; 2.5 SOLUTION RESPIRATORY (INHALATION) at 12:02

## 2021-02-06 RX ADMIN — SUCRALFATE 1 G: 1 TABLET ORAL at 04:02

## 2021-02-06 RX ADMIN — Medication 1000 MG: at 08:02

## 2021-02-06 RX ADMIN — POLYETHYLENE GLYCOL 3350 17 G: 17 POWDER, FOR SOLUTION ORAL at 08:02

## 2021-02-06 RX ADMIN — LEVOTHYROXINE SODIUM 137 MCG: 0.14 TABLET ORAL at 06:02

## 2021-02-06 RX ADMIN — INSULIN ASPART 4 UNITS: 100 INJECTION, SOLUTION INTRAVENOUS; SUBCUTANEOUS at 08:02

## 2021-02-06 RX ADMIN — LOSARTAN POTASSIUM 50 MG: 50 TABLET, FILM COATED ORAL at 08:02

## 2021-02-06 RX ADMIN — ASPIRIN 81 MG: 81 TABLET, CHEWABLE ORAL at 08:02

## 2021-02-06 RX ADMIN — SODIUM CHLORIDE: 0.9 INJECTION, SOLUTION INTRAVENOUS at 12:02

## 2021-02-06 RX ADMIN — Medication 1000 MG: at 12:02

## 2021-02-06 RX ADMIN — INSULIN ASPART 1 UNITS: 100 INJECTION, SOLUTION INTRAVENOUS; SUBCUTANEOUS at 08:02

## 2021-02-06 RX ADMIN — SILODOSIN 4 MG: 4 CAPSULE ORAL at 08:02

## 2021-02-07 LAB
ALBUMIN SERPL BCP-MCNC: 2.7 G/DL (ref 3.5–5.2)
ALP SERPL-CCNC: 149 U/L (ref 55–135)
ALT SERPL W/O P-5'-P-CCNC: 92 U/L (ref 10–44)
ANION GAP SERPL CALC-SCNC: 11 MMOL/L (ref 8–16)
AST SERPL-CCNC: 23 U/L (ref 10–40)
BASOPHILS # BLD AUTO: 0.03 K/UL (ref 0–0.2)
BASOPHILS NFR BLD: 0.2 % (ref 0–1.9)
BILIRUB SERPL-MCNC: 0.4 MG/DL (ref 0.1–1)
BUN SERPL-MCNC: 26 MG/DL (ref 6–20)
CALCIUM SERPL-MCNC: 7.5 MG/DL (ref 8.7–10.5)
CHLORIDE SERPL-SCNC: 104 MMOL/L (ref 95–110)
CO2 SERPL-SCNC: 20 MMOL/L (ref 23–29)
CREAT SERPL-MCNC: 0.6 MG/DL (ref 0.5–1.4)
DIFFERENTIAL METHOD: ABNORMAL
EOSINOPHIL # BLD AUTO: 0 K/UL (ref 0–0.5)
EOSINOPHIL NFR BLD: 0 % (ref 0–8)
ERYTHROCYTE [DISTWIDTH] IN BLOOD BY AUTOMATED COUNT: 17.2 % (ref 11.5–14.5)
EST. GFR  (AFRICAN AMERICAN): >60 ML/MIN/1.73 M^2
EST. GFR  (NON AFRICAN AMERICAN): >60 ML/MIN/1.73 M^2
GLUCOSE SERPL-MCNC: 195 MG/DL (ref 70–110)
HCT VFR BLD AUTO: 33.6 % (ref 37–48.5)
HGB BLD-MCNC: 10.7 G/DL (ref 12–16)
IMM GRANULOCYTES # BLD AUTO: 0.5 K/UL (ref 0–0.04)
IMM GRANULOCYTES NFR BLD AUTO: 3 % (ref 0–0.5)
LYMPHOCYTES # BLD AUTO: 1.1 K/UL (ref 1–4.8)
LYMPHOCYTES NFR BLD: 6.7 % (ref 18–48)
MAGNESIUM SERPL-MCNC: 1.9 MG/DL (ref 1.6–2.6)
MCH RBC QN AUTO: 25.9 PG (ref 27–31)
MCHC RBC AUTO-ENTMCNC: 31.8 G/DL (ref 32–36)
MCV RBC AUTO: 81 FL (ref 82–98)
MONOCYTES # BLD AUTO: 0.7 K/UL (ref 0.3–1)
MONOCYTES NFR BLD: 4 % (ref 4–15)
NEUTROPHILS # BLD AUTO: 14.2 K/UL (ref 1.8–7.7)
NEUTROPHILS NFR BLD: 86.1 % (ref 38–73)
NRBC BLD-RTO: 0 /100 WBC
PHOSPHATE SERPL-MCNC: 4 MG/DL (ref 2.7–4.5)
PLATELET # BLD AUTO: 344 K/UL (ref 150–350)
PMV BLD AUTO: 10.2 FL (ref 9.2–12.9)
POCT GLUCOSE: 177 MG/DL (ref 70–110)
POCT GLUCOSE: 187 MG/DL (ref 70–110)
POCT GLUCOSE: 194 MG/DL (ref 70–110)
POCT GLUCOSE: 200 MG/DL (ref 70–110)
POCT GLUCOSE: 203 MG/DL (ref 70–110)
POCT GLUCOSE: 211 MG/DL (ref 70–110)
POTASSIUM SERPL-SCNC: 4.1 MMOL/L (ref 3.5–5.1)
PROT SERPL-MCNC: 5.3 G/DL (ref 6–8.4)
RBC # BLD AUTO: 4.13 M/UL (ref 4–5.4)
SODIUM SERPL-SCNC: 135 MMOL/L (ref 136–145)
WBC # BLD AUTO: 16.49 K/UL (ref 3.9–12.7)

## 2021-02-07 PROCEDURE — 94760 N-INVAS EAR/PLS OXIMETRY 1: CPT

## 2021-02-07 PROCEDURE — 11000001 HC ACUTE MED/SURG PRIVATE ROOM

## 2021-02-07 PROCEDURE — 99232 PR SUBSEQUENT HOSPITAL CARE,LEVL II: ICD-10-PCS | Mod: ,,, | Performed by: PHYSICIAN ASSISTANT

## 2021-02-07 PROCEDURE — 25000003 PHARM REV CODE 250: Performed by: PHYSICIAN ASSISTANT

## 2021-02-07 PROCEDURE — 25000003 PHARM REV CODE 250: Performed by: STUDENT IN AN ORGANIZED HEALTH CARE EDUCATION/TRAINING PROGRAM

## 2021-02-07 PROCEDURE — 99900035 HC TECH TIME PER 15 MIN (STAT)

## 2021-02-07 PROCEDURE — 94761 N-INVAS EAR/PLS OXIMETRY MLT: CPT

## 2021-02-07 PROCEDURE — 83735 ASSAY OF MAGNESIUM: CPT

## 2021-02-07 PROCEDURE — 63600175 PHARM REV CODE 636 W HCPCS: Performed by: NURSE PRACTITIONER

## 2021-02-07 PROCEDURE — 85025 COMPLETE CBC W/AUTO DIFF WBC: CPT

## 2021-02-07 PROCEDURE — 84100 ASSAY OF PHOSPHORUS: CPT

## 2021-02-07 PROCEDURE — 99232 SBSQ HOSP IP/OBS MODERATE 35: CPT | Mod: ,,, | Performed by: PHYSICIAN ASSISTANT

## 2021-02-07 PROCEDURE — 25000242 PHARM REV CODE 250 ALT 637 W/ HCPCS: Performed by: PHYSICIAN ASSISTANT

## 2021-02-07 PROCEDURE — 94640 AIRWAY INHALATION TREATMENT: CPT

## 2021-02-07 PROCEDURE — 94668 MNPJ CHEST WALL SBSQ: CPT

## 2021-02-07 PROCEDURE — 36415 COLL VENOUS BLD VENIPUNCTURE: CPT

## 2021-02-07 PROCEDURE — 80053 COMPREHEN METABOLIC PANEL: CPT

## 2021-02-07 PROCEDURE — 63600175 PHARM REV CODE 636 W HCPCS: Performed by: STUDENT IN AN ORGANIZED HEALTH CARE EDUCATION/TRAINING PROGRAM

## 2021-02-07 RX ORDER — INSULIN ASPART 100 [IU]/ML
8 INJECTION, SOLUTION INTRAVENOUS; SUBCUTANEOUS
Status: DISCONTINUED | OUTPATIENT
Start: 2021-02-07 | End: 2021-02-08

## 2021-02-07 RX ORDER — INSULIN ASPART 100 [IU]/ML
1-10 INJECTION, SOLUTION INTRAVENOUS; SUBCUTANEOUS
Status: DISCONTINUED | OUTPATIENT
Start: 2021-02-07 | End: 2021-02-18 | Stop reason: HOSPADM

## 2021-02-07 RX ORDER — IBUPROFEN 200 MG
24 TABLET ORAL
Status: DISCONTINUED | OUTPATIENT
Start: 2021-02-07 | End: 2021-02-18 | Stop reason: HOSPADM

## 2021-02-07 RX ORDER — LEVETIRACETAM 100 MG/ML
2000 SOLUTION ORAL 2 TIMES DAILY
Status: DISCONTINUED | OUTPATIENT
Start: 2021-02-07 | End: 2021-02-18 | Stop reason: HOSPADM

## 2021-02-07 RX ORDER — IBUPROFEN 200 MG
16 TABLET ORAL
Status: DISCONTINUED | OUTPATIENT
Start: 2021-02-07 | End: 2021-02-18 | Stop reason: HOSPADM

## 2021-02-07 RX ORDER — GLUCAGON 1 MG
1 KIT INJECTION
Status: DISCONTINUED | OUTPATIENT
Start: 2021-02-07 | End: 2021-02-18 | Stop reason: HOSPADM

## 2021-02-07 RX ORDER — IPRATROPIUM BROMIDE AND ALBUTEROL SULFATE 2.5; .5 MG/3ML; MG/3ML
3 SOLUTION RESPIRATORY (INHALATION) EVERY 8 HOURS
Status: DISCONTINUED | OUTPATIENT
Start: 2021-02-07 | End: 2021-02-18 | Stop reason: HOSPADM

## 2021-02-07 RX ADMIN — LEVOTHYROXINE SODIUM 137 MCG: 0.14 TABLET ORAL at 05:02

## 2021-02-07 RX ADMIN — INSULIN ASPART 6 UNITS: 100 INJECTION, SOLUTION INTRAVENOUS; SUBCUTANEOUS at 11:02

## 2021-02-07 RX ADMIN — SILODOSIN 4 MG: 4 CAPSULE ORAL at 09:02

## 2021-02-07 RX ADMIN — INSULIN ASPART 2 UNITS: 100 INJECTION, SOLUTION INTRAVENOUS; SUBCUTANEOUS at 11:02

## 2021-02-07 RX ADMIN — FAMOTIDINE 20 MG: 20 TABLET, FILM COATED ORAL at 09:02

## 2021-02-07 RX ADMIN — IPRATROPIUM BROMIDE AND ALBUTEROL SULFATE 3 ML: .5; 2.5 SOLUTION RESPIRATORY (INHALATION) at 12:02

## 2021-02-07 RX ADMIN — POLYETHYLENE GLYCOL 3350 17 G: 17 POWDER, FOR SOLUTION ORAL at 09:02

## 2021-02-07 RX ADMIN — INSULIN ASPART 2 UNITS: 100 INJECTION, SOLUTION INTRAVENOUS; SUBCUTANEOUS at 09:02

## 2021-02-07 RX ADMIN — DEXAMETHASONE SODIUM PHOSPHATE 4 MG: 4 INJECTION INTRA-ARTICULAR; INTRALESIONAL; INTRAMUSCULAR; INTRAVENOUS; SOFT TISSUE at 05:02

## 2021-02-07 RX ADMIN — IPRATROPIUM BROMIDE AND ALBUTEROL SULFATE 3 ML: .5; 2.5 SOLUTION RESPIRATORY (INHALATION) at 09:02

## 2021-02-07 RX ADMIN — Medication 1000 MG: at 09:02

## 2021-02-07 RX ADMIN — FAMOTIDINE 20 MG: 20 TABLET, FILM COATED ORAL at 08:02

## 2021-02-07 RX ADMIN — AMLODIPINE BESYLATE 10 MG: 10 TABLET ORAL at 09:02

## 2021-02-07 RX ADMIN — CLOPIDOGREL BISULFATE 75 MG: 75 TABLET, FILM COATED ORAL at 09:02

## 2021-02-07 RX ADMIN — INSULIN ASPART 2 UNITS: 100 INJECTION, SOLUTION INTRAVENOUS; SUBCUTANEOUS at 05:02

## 2021-02-07 RX ADMIN — IPRATROPIUM BROMIDE AND ALBUTEROL SULFATE 3 ML: .5; 2.5 SOLUTION RESPIRATORY (INHALATION) at 03:02

## 2021-02-07 RX ADMIN — INSULIN ASPART 6 UNITS: 100 INJECTION, SOLUTION INTRAVENOUS; SUBCUTANEOUS at 07:02

## 2021-02-07 RX ADMIN — DOCUSATE SODIUM AND SENNOSIDES 1 TABLET: 8.6; 5 TABLET, FILM COATED ORAL at 08:02

## 2021-02-07 RX ADMIN — SUCRALFATE 1 G: 1 TABLET ORAL at 05:02

## 2021-02-07 RX ADMIN — INSULIN ASPART 2 UNITS: 100 INJECTION, SOLUTION INTRAVENOUS; SUBCUTANEOUS at 07:02

## 2021-02-07 RX ADMIN — SUCRALFATE 1 G: 1 TABLET ORAL at 11:02

## 2021-02-07 RX ADMIN — Medication 1000 MG: at 08:02

## 2021-02-07 RX ADMIN — ASPIRIN 81 MG: 81 TABLET, CHEWABLE ORAL at 09:02

## 2021-02-07 RX ADMIN — LEVETIRACETAM 1500 MG: 100 SOLUTION ORAL at 12:02

## 2021-02-07 RX ADMIN — DEXAMETHASONE SODIUM PHOSPHATE 4 MG: 4 INJECTION INTRA-ARTICULAR; INTRALESIONAL; INTRAMUSCULAR; INTRAVENOUS; SOFT TISSUE at 11:02

## 2021-02-07 RX ADMIN — ATORVASTATIN CALCIUM 10 MG: 10 TABLET, FILM COATED ORAL at 09:02

## 2021-02-07 RX ADMIN — SUCRALFATE 1 G: 1 TABLET ORAL at 08:02

## 2021-02-07 RX ADMIN — MODAFINIL 200 MG: 100 TABLET ORAL at 09:02

## 2021-02-07 RX ADMIN — INSULIN ASPART 8 UNITS: 100 INJECTION, SOLUTION INTRAVENOUS; SUBCUTANEOUS at 05:02

## 2021-02-07 RX ADMIN — LOSARTAN POTASSIUM 50 MG: 50 TABLET, FILM COATED ORAL at 09:02

## 2021-02-07 RX ADMIN — LEVETIRACETAM 2000 MG: 100 SOLUTION ORAL at 08:02

## 2021-02-07 RX ADMIN — DOCUSATE SODIUM AND SENNOSIDES 1 TABLET: 8.6; 5 TABLET, FILM COATED ORAL at 09:02

## 2021-02-08 LAB
ALBUMIN SERPL BCP-MCNC: 2.9 G/DL (ref 3.5–5.2)
ALP SERPL-CCNC: 162 U/L (ref 55–135)
ALT SERPL W/O P-5'-P-CCNC: 102 U/L (ref 10–44)
ANION GAP SERPL CALC-SCNC: 11 MMOL/L (ref 8–16)
AST SERPL-CCNC: 23 U/L (ref 10–40)
BACTERIA BLD CULT: NORMAL
BASOPHILS # BLD AUTO: 0.05 K/UL (ref 0–0.2)
BASOPHILS NFR BLD: 0.3 % (ref 0–1.9)
BILIRUB SERPL-MCNC: 0.5 MG/DL (ref 0.1–1)
BUN SERPL-MCNC: 25 MG/DL (ref 6–20)
CALCIUM SERPL-MCNC: 7.7 MG/DL (ref 8.7–10.5)
CHLORIDE SERPL-SCNC: 102 MMOL/L (ref 95–110)
CO2 SERPL-SCNC: 19 MMOL/L (ref 23–29)
CREAT SERPL-MCNC: 0.7 MG/DL (ref 0.5–1.4)
DIFFERENTIAL METHOD: ABNORMAL
EOSINOPHIL # BLD AUTO: 0 K/UL (ref 0–0.5)
EOSINOPHIL NFR BLD: 0 % (ref 0–8)
ERYTHROCYTE [DISTWIDTH] IN BLOOD BY AUTOMATED COUNT: 16.9 % (ref 11.5–14.5)
EST. GFR  (AFRICAN AMERICAN): >60 ML/MIN/1.73 M^2
EST. GFR  (NON AFRICAN AMERICAN): >60 ML/MIN/1.73 M^2
GLUCOSE SERPL-MCNC: 207 MG/DL (ref 70–110)
HCT VFR BLD AUTO: 34.4 % (ref 37–48.5)
HGB BLD-MCNC: 11.4 G/DL (ref 12–16)
IMM GRANULOCYTES # BLD AUTO: 0.73 K/UL (ref 0–0.04)
IMM GRANULOCYTES NFR BLD AUTO: 3.7 % (ref 0–0.5)
LYMPHOCYTES # BLD AUTO: 1.2 K/UL (ref 1–4.8)
LYMPHOCYTES NFR BLD: 6.2 % (ref 18–48)
MCH RBC QN AUTO: 26.4 PG (ref 27–31)
MCHC RBC AUTO-ENTMCNC: 33.1 G/DL (ref 32–36)
MCV RBC AUTO: 80 FL (ref 82–98)
MONOCYTES # BLD AUTO: 0.8 K/UL (ref 0.3–1)
MONOCYTES NFR BLD: 4.3 % (ref 4–15)
NEUTROPHILS # BLD AUTO: 16.8 K/UL (ref 1.8–7.7)
NEUTROPHILS NFR BLD: 85.5 % (ref 38–73)
NRBC BLD-RTO: 0 /100 WBC
PLATELET # BLD AUTO: 375 K/UL (ref 150–350)
PMV BLD AUTO: 9.9 FL (ref 9.2–12.9)
POCT GLUCOSE: 137 MG/DL (ref 70–110)
POCT GLUCOSE: 180 MG/DL (ref 70–110)
POCT GLUCOSE: 196 MG/DL (ref 70–110)
POCT GLUCOSE: 198 MG/DL (ref 70–110)
POTASSIUM SERPL-SCNC: 4.1 MMOL/L (ref 3.5–5.1)
PROT SERPL-MCNC: 5.7 G/DL (ref 6–8.4)
RBC # BLD AUTO: 4.32 M/UL (ref 4–5.4)
SODIUM SERPL-SCNC: 132 MMOL/L (ref 136–145)
WBC # BLD AUTO: 19.64 K/UL (ref 3.9–12.7)

## 2021-02-08 PROCEDURE — 63600175 PHARM REV CODE 636 W HCPCS: Performed by: STUDENT IN AN ORGANIZED HEALTH CARE EDUCATION/TRAINING PROGRAM

## 2021-02-08 PROCEDURE — 97110 THERAPEUTIC EXERCISES: CPT

## 2021-02-08 PROCEDURE — 94640 AIRWAY INHALATION TREATMENT: CPT

## 2021-02-08 PROCEDURE — 25000003 PHARM REV CODE 250: Performed by: STUDENT IN AN ORGANIZED HEALTH CARE EDUCATION/TRAINING PROGRAM

## 2021-02-08 PROCEDURE — 85025 COMPLETE CBC W/AUTO DIFF WBC: CPT

## 2021-02-08 PROCEDURE — 25000003 PHARM REV CODE 250: Performed by: PHYSICIAN ASSISTANT

## 2021-02-08 PROCEDURE — 99233 PR SUBSEQUENT HOSPITAL CARE,LEVL III: ICD-10-PCS | Mod: ,,, | Performed by: PHYSICIAN ASSISTANT

## 2021-02-08 PROCEDURE — 99233 SBSQ HOSP IP/OBS HIGH 50: CPT | Mod: ,,, | Performed by: PHYSICIAN ASSISTANT

## 2021-02-08 PROCEDURE — 25000003 PHARM REV CODE 250: Performed by: NURSE PRACTITIONER

## 2021-02-08 PROCEDURE — 97535 SELF CARE MNGMENT TRAINING: CPT

## 2021-02-08 PROCEDURE — 36415 COLL VENOUS BLD VENIPUNCTURE: CPT

## 2021-02-08 PROCEDURE — 11000001 HC ACUTE MED/SURG PRIVATE ROOM

## 2021-02-08 PROCEDURE — 94668 MNPJ CHEST WALL SBSQ: CPT

## 2021-02-08 PROCEDURE — 25000242 PHARM REV CODE 250 ALT 637 W/ HCPCS: Performed by: PHYSICIAN ASSISTANT

## 2021-02-08 PROCEDURE — 94761 N-INVAS EAR/PLS OXIMETRY MLT: CPT

## 2021-02-08 PROCEDURE — 80053 COMPREHEN METABOLIC PANEL: CPT

## 2021-02-08 PROCEDURE — 92526 ORAL FUNCTION THERAPY: CPT

## 2021-02-08 PROCEDURE — 27000221 HC OXYGEN, UP TO 24 HOURS

## 2021-02-08 RX ORDER — CYCLOBENZAPRINE HCL 5 MG
5 TABLET ORAL 3 TIMES DAILY PRN
Status: DISCONTINUED | OUTPATIENT
Start: 2021-02-08 | End: 2021-02-18 | Stop reason: HOSPADM

## 2021-02-08 RX ORDER — INSULIN ASPART 100 [IU]/ML
10 INJECTION, SOLUTION INTRAVENOUS; SUBCUTANEOUS
Status: DISCONTINUED | OUTPATIENT
Start: 2021-02-08 | End: 2021-02-14

## 2021-02-08 RX ADMIN — ACETAMINOPHEN 650 MG: 325 TABLET ORAL at 10:02

## 2021-02-08 RX ADMIN — Medication 1 G: at 10:02

## 2021-02-08 RX ADMIN — LOSARTAN POTASSIUM 50 MG: 50 TABLET, FILM COATED ORAL at 09:02

## 2021-02-08 RX ADMIN — SUCRALFATE 1 G: 1 TABLET ORAL at 11:02

## 2021-02-08 RX ADMIN — ACETAMINOPHEN 650 MG: 325 TABLET ORAL at 09:02

## 2021-02-08 RX ADMIN — ASPIRIN 81 MG: 81 TABLET, CHEWABLE ORAL at 09:02

## 2021-02-08 RX ADMIN — LEVETIRACETAM 2000 MG: 100 SOLUTION ORAL at 09:02

## 2021-02-08 RX ADMIN — DEXAMETHASONE SODIUM PHOSPHATE 4 MG: 4 INJECTION INTRA-ARTICULAR; INTRALESIONAL; INTRAMUSCULAR; INTRAVENOUS; SOFT TISSUE at 05:02

## 2021-02-08 RX ADMIN — SUCRALFATE 1 G: 1 TABLET ORAL at 09:02

## 2021-02-08 RX ADMIN — DEXAMETHASONE SODIUM PHOSPHATE 4 MG: 4 INJECTION INTRA-ARTICULAR; INTRALESIONAL; INTRAMUSCULAR; INTRAVENOUS; SOFT TISSUE at 06:02

## 2021-02-08 RX ADMIN — ATORVASTATIN CALCIUM 10 MG: 10 TABLET, FILM COATED ORAL at 09:02

## 2021-02-08 RX ADMIN — POLYETHYLENE GLYCOL 3350 17 G: 17 POWDER, FOR SOLUTION ORAL at 09:02

## 2021-02-08 RX ADMIN — Medication 1000 MG: at 09:02

## 2021-02-08 RX ADMIN — INSULIN ASPART 8 UNITS: 100 INJECTION, SOLUTION INTRAVENOUS; SUBCUTANEOUS at 09:02

## 2021-02-08 RX ADMIN — DEXAMETHASONE SODIUM PHOSPHATE 4 MG: 4 INJECTION INTRA-ARTICULAR; INTRALESIONAL; INTRAMUSCULAR; INTRAVENOUS; SOFT TISSUE at 12:02

## 2021-02-08 RX ADMIN — AMLODIPINE BESYLATE 10 MG: 10 TABLET ORAL at 09:02

## 2021-02-08 RX ADMIN — Medication 1000 MG: at 10:02

## 2021-02-08 RX ADMIN — SILODOSIN 4 MG: 4 CAPSULE ORAL at 09:02

## 2021-02-08 RX ADMIN — LEVOTHYROXINE SODIUM 137 MCG: 0.14 TABLET ORAL at 06:02

## 2021-02-08 RX ADMIN — DOCUSATE SODIUM AND SENNOSIDES 1 TABLET: 8.6; 5 TABLET, FILM COATED ORAL at 09:02

## 2021-02-08 RX ADMIN — FAMOTIDINE 20 MG: 20 TABLET, FILM COATED ORAL at 10:02

## 2021-02-08 RX ADMIN — CYCLOBENZAPRINE HYDROCHLORIDE 5 MG: 5 TABLET, FILM COATED ORAL at 11:02

## 2021-02-08 RX ADMIN — INSULIN ASPART 2 UNITS: 100 INJECTION, SOLUTION INTRAVENOUS; SUBCUTANEOUS at 11:02

## 2021-02-08 RX ADMIN — INSULIN ASPART 10 UNITS: 100 INJECTION, SOLUTION INTRAVENOUS; SUBCUTANEOUS at 05:02

## 2021-02-08 RX ADMIN — DEXAMETHASONE SODIUM PHOSPHATE 4 MG: 4 INJECTION INTRA-ARTICULAR; INTRALESIONAL; INTRAMUSCULAR; INTRAVENOUS; SOFT TISSUE at 11:02

## 2021-02-08 RX ADMIN — SODIUM CHLORIDE: 0.9 INJECTION, SOLUTION INTRAVENOUS at 12:02

## 2021-02-08 RX ADMIN — IPRATROPIUM BROMIDE AND ALBUTEROL SULFATE 3 ML: .5; 2.5 SOLUTION RESPIRATORY (INHALATION) at 12:02

## 2021-02-08 RX ADMIN — SODIUM CHLORIDE: 0.9 INJECTION, SOLUTION INTRAVENOUS at 09:02

## 2021-02-08 RX ADMIN — INSULIN ASPART 1 UNITS: 100 INJECTION, SOLUTION INTRAVENOUS; SUBCUTANEOUS at 10:02

## 2021-02-08 RX ADMIN — IPRATROPIUM BROMIDE AND ALBUTEROL SULFATE 3 ML: .5; 2.5 SOLUTION RESPIRATORY (INHALATION) at 03:02

## 2021-02-08 RX ADMIN — INSULIN ASPART 8 UNITS: 100 INJECTION, SOLUTION INTRAVENOUS; SUBCUTANEOUS at 11:02

## 2021-02-08 RX ADMIN — LEVETIRACETAM 2000 MG: 100 SOLUTION ORAL at 10:02

## 2021-02-08 RX ADMIN — SUCRALFATE 1 G: 1 TABLET ORAL at 06:02

## 2021-02-08 RX ADMIN — CLOPIDOGREL BISULFATE 75 MG: 75 TABLET, FILM COATED ORAL at 09:02

## 2021-02-08 RX ADMIN — MODAFINIL 200 MG: 100 TABLET ORAL at 09:02

## 2021-02-08 RX ADMIN — FAMOTIDINE 20 MG: 20 TABLET, FILM COATED ORAL at 09:02

## 2021-02-09 LAB
ALBUMIN SERPL BCP-MCNC: 2.8 G/DL (ref 3.5–5.2)
ALP SERPL-CCNC: 144 U/L (ref 55–135)
ALT SERPL W/O P-5'-P-CCNC: 91 U/L (ref 10–44)
ANION GAP SERPL CALC-SCNC: 11 MMOL/L (ref 8–16)
AST SERPL-CCNC: 22 U/L (ref 10–40)
BASOPHILS # BLD AUTO: 0.05 K/UL (ref 0–0.2)
BASOPHILS NFR BLD: 0.2 % (ref 0–1.9)
BILIRUB SERPL-MCNC: 0.4 MG/DL (ref 0.1–1)
BUN SERPL-MCNC: 25 MG/DL (ref 6–20)
CALCIUM SERPL-MCNC: 7.8 MG/DL (ref 8.7–10.5)
CHLORIDE SERPL-SCNC: 105 MMOL/L (ref 95–110)
CO2 SERPL-SCNC: 20 MMOL/L (ref 23–29)
CREAT SERPL-MCNC: 0.6 MG/DL (ref 0.5–1.4)
DIFFERENTIAL METHOD: ABNORMAL
EOSINOPHIL # BLD AUTO: 0 K/UL (ref 0–0.5)
EOSINOPHIL NFR BLD: 0 % (ref 0–8)
ERYTHROCYTE [DISTWIDTH] IN BLOOD BY AUTOMATED COUNT: 17.1 % (ref 11.5–14.5)
EST. GFR  (AFRICAN AMERICAN): >60 ML/MIN/1.73 M^2
EST. GFR  (NON AFRICAN AMERICAN): >60 ML/MIN/1.73 M^2
GLUCOSE SERPL-MCNC: 148 MG/DL (ref 70–110)
HCT VFR BLD AUTO: 34.9 % (ref 37–48.5)
HGB BLD-MCNC: 11.3 G/DL (ref 12–16)
HSV1, PCR, CSF: NEGATIVE
HSV2, PCR, CSF: NEGATIVE
IMM GRANULOCYTES # BLD AUTO: 0.67 K/UL (ref 0–0.04)
IMM GRANULOCYTES NFR BLD AUTO: 3.3 % (ref 0–0.5)
LYMPHOCYTES # BLD AUTO: 2.2 K/UL (ref 1–4.8)
LYMPHOCYTES NFR BLD: 10.7 % (ref 18–48)
MCH RBC QN AUTO: 26.3 PG (ref 27–31)
MCHC RBC AUTO-ENTMCNC: 32.4 G/DL (ref 32–36)
MCV RBC AUTO: 81 FL (ref 82–98)
MONOCYTES # BLD AUTO: 1 K/UL (ref 0.3–1)
MONOCYTES NFR BLD: 4.7 % (ref 4–15)
NEUTROPHILS # BLD AUTO: 16.4 K/UL (ref 1.8–7.7)
NEUTROPHILS NFR BLD: 81.1 % (ref 38–73)
NRBC BLD-RTO: 0 /100 WBC
PLATELET # BLD AUTO: 342 K/UL (ref 150–350)
PMV BLD AUTO: 10.3 FL (ref 9.2–12.9)
POCT GLUCOSE: 126 MG/DL (ref 70–110)
POCT GLUCOSE: 149 MG/DL (ref 70–110)
POCT GLUCOSE: 182 MG/DL (ref 70–110)
POCT GLUCOSE: 187 MG/DL (ref 70–110)
POTASSIUM SERPL-SCNC: 3.5 MMOL/L (ref 3.5–5.1)
PROT SERPL-MCNC: 5.3 G/DL (ref 6–8.4)
RBC # BLD AUTO: 4.3 M/UL (ref 4–5.4)
SODIUM SERPL-SCNC: 136 MMOL/L (ref 136–145)
SPECIMEN SOURCE: NORMAL
VARICELLA ZOSTER BY PCR RESULT: NEGATIVE
WBC # BLD AUTO: 20.23 K/UL (ref 3.9–12.7)

## 2021-02-09 PROCEDURE — 25000003 PHARM REV CODE 250: Performed by: PHYSICIAN ASSISTANT

## 2021-02-09 PROCEDURE — 93005 ELECTROCARDIOGRAM TRACING: CPT

## 2021-02-09 PROCEDURE — 63600175 PHARM REV CODE 636 W HCPCS: Performed by: STUDENT IN AN ORGANIZED HEALTH CARE EDUCATION/TRAINING PROGRAM

## 2021-02-09 PROCEDURE — 25000003 PHARM REV CODE 250: Performed by: STUDENT IN AN ORGANIZED HEALTH CARE EDUCATION/TRAINING PROGRAM

## 2021-02-09 PROCEDURE — 36415 COLL VENOUS BLD VENIPUNCTURE: CPT

## 2021-02-09 PROCEDURE — 92507 TX SP LANG VOICE COMM INDIV: CPT

## 2021-02-09 PROCEDURE — 30200315 PPD INTRADERMAL TEST REV CODE 302: Performed by: NEUROLOGICAL SURGERY

## 2021-02-09 PROCEDURE — 94640 AIRWAY INHALATION TREATMENT: CPT

## 2021-02-09 PROCEDURE — 94761 N-INVAS EAR/PLS OXIMETRY MLT: CPT

## 2021-02-09 PROCEDURE — 25000242 PHARM REV CODE 250 ALT 637 W/ HCPCS: Performed by: PHYSICIAN ASSISTANT

## 2021-02-09 PROCEDURE — 11000001 HC ACUTE MED/SURG PRIVATE ROOM

## 2021-02-09 PROCEDURE — 86580 TB INTRADERMAL TEST: CPT | Performed by: NEUROLOGICAL SURGERY

## 2021-02-09 PROCEDURE — 80053 COMPREHEN METABOLIC PANEL: CPT

## 2021-02-09 PROCEDURE — 94668 MNPJ CHEST WALL SBSQ: CPT

## 2021-02-09 PROCEDURE — 93010 EKG 12-LEAD: ICD-10-PCS | Mod: ,,, | Performed by: INTERNAL MEDICINE

## 2021-02-09 PROCEDURE — 85025 COMPLETE CBC W/AUTO DIFF WBC: CPT

## 2021-02-09 PROCEDURE — 99231 SBSQ HOSP IP/OBS SF/LOW 25: CPT | Mod: ,,, | Performed by: NURSE PRACTITIONER

## 2021-02-09 PROCEDURE — 99231 PR SUBSEQUENT HOSPITAL CARE,LEVL I: ICD-10-PCS | Mod: ,,, | Performed by: NURSE PRACTITIONER

## 2021-02-09 PROCEDURE — 92526 ORAL FUNCTION THERAPY: CPT

## 2021-02-09 PROCEDURE — 93010 ELECTROCARDIOGRAM REPORT: CPT | Mod: ,,, | Performed by: INTERNAL MEDICINE

## 2021-02-09 RX ORDER — METOPROLOL TARTRATE 1 MG/ML
5 INJECTION, SOLUTION INTRAVENOUS ONCE AS NEEDED
Status: DISCONTINUED | OUTPATIENT
Start: 2021-02-09 | End: 2021-02-18 | Stop reason: HOSPADM

## 2021-02-09 RX ADMIN — INSULIN ASPART 10 UNITS: 100 INJECTION, SOLUTION INTRAVENOUS; SUBCUTANEOUS at 08:02

## 2021-02-09 RX ADMIN — Medication 1 G: at 08:02

## 2021-02-09 RX ADMIN — IPRATROPIUM BROMIDE AND ALBUTEROL SULFATE 3 ML: .5; 2.5 SOLUTION RESPIRATORY (INHALATION) at 12:02

## 2021-02-09 RX ADMIN — AMLODIPINE BESYLATE 10 MG: 10 TABLET ORAL at 08:02

## 2021-02-09 RX ADMIN — DOCUSATE SODIUM AND SENNOSIDES 1 TABLET: 8.6; 5 TABLET, FILM COATED ORAL at 08:02

## 2021-02-09 RX ADMIN — INSULIN ASPART 1 UNITS: 100 INJECTION, SOLUTION INTRAVENOUS; SUBCUTANEOUS at 10:02

## 2021-02-09 RX ADMIN — HYPROMELLOSE 2910 1 DROP: 5 SOLUTION OPHTHALMIC at 10:02

## 2021-02-09 RX ADMIN — SUCRALFATE 1 G: 1 TABLET ORAL at 05:02

## 2021-02-09 RX ADMIN — DEXAMETHASONE SODIUM PHOSPHATE 4 MG: 4 INJECTION INTRA-ARTICULAR; INTRALESIONAL; INTRAMUSCULAR; INTRAVENOUS; SOFT TISSUE at 11:02

## 2021-02-09 RX ADMIN — HYPROMELLOSE 2910 1 DROP: 5 SOLUTION OPHTHALMIC at 03:02

## 2021-02-09 RX ADMIN — SILODOSIN 4 MG: 4 CAPSULE ORAL at 08:02

## 2021-02-09 RX ADMIN — ASPIRIN 81 MG: 81 TABLET, CHEWABLE ORAL at 08:02

## 2021-02-09 RX ADMIN — HYPROMELLOSE 2910 1 DROP: 5 SOLUTION OPHTHALMIC at 09:02

## 2021-02-09 RX ADMIN — IPRATROPIUM BROMIDE AND ALBUTEROL SULFATE 3 ML: .5; 2.5 SOLUTION RESPIRATORY (INHALATION) at 08:02

## 2021-02-09 RX ADMIN — LEVOTHYROXINE SODIUM 137 MCG: 0.14 TABLET ORAL at 06:02

## 2021-02-09 RX ADMIN — DOCUSATE SODIUM AND SENNOSIDES 1 TABLET: 8.6; 5 TABLET, FILM COATED ORAL at 10:02

## 2021-02-09 RX ADMIN — FAMOTIDINE 20 MG: 20 TABLET, FILM COATED ORAL at 10:02

## 2021-02-09 RX ADMIN — FAMOTIDINE 20 MG: 20 TABLET, FILM COATED ORAL at 08:02

## 2021-02-09 RX ADMIN — LOSARTAN POTASSIUM 50 MG: 50 TABLET, FILM COATED ORAL at 08:02

## 2021-02-09 RX ADMIN — CLOPIDOGREL BISULFATE 75 MG: 75 TABLET, FILM COATED ORAL at 08:02

## 2021-02-09 RX ADMIN — SUCRALFATE 1 G: 1 TABLET ORAL at 09:02

## 2021-02-09 RX ADMIN — INSULIN ASPART 10 UNITS: 100 INJECTION, SOLUTION INTRAVENOUS; SUBCUTANEOUS at 04:02

## 2021-02-09 RX ADMIN — Medication 1 G: at 10:02

## 2021-02-09 RX ADMIN — IPRATROPIUM BROMIDE AND ALBUTEROL SULFATE 3 ML: .5; 2.5 SOLUTION RESPIRATORY (INHALATION) at 02:02

## 2021-02-09 RX ADMIN — LEVETIRACETAM 2000 MG: 100 SOLUTION ORAL at 10:02

## 2021-02-09 RX ADMIN — CYCLOBENZAPRINE HYDROCHLORIDE 5 MG: 5 TABLET, FILM COATED ORAL at 10:02

## 2021-02-09 RX ADMIN — INSULIN ASPART 2 UNITS: 100 INJECTION, SOLUTION INTRAVENOUS; SUBCUTANEOUS at 04:02

## 2021-02-09 RX ADMIN — MODAFINIL 200 MG: 100 TABLET ORAL at 08:02

## 2021-02-09 RX ADMIN — Medication 1000 MG: at 08:02

## 2021-02-09 RX ADMIN — DEXAMETHASONE SODIUM PHOSPHATE 4 MG: 4 INJECTION INTRA-ARTICULAR; INTRALESIONAL; INTRAMUSCULAR; INTRAVENOUS; SOFT TISSUE at 05:02

## 2021-02-09 RX ADMIN — SODIUM CHLORIDE 500 ML: 0.9 INJECTION, SOLUTION INTRAVENOUS at 03:02

## 2021-02-09 RX ADMIN — DEXAMETHASONE SODIUM PHOSPHATE 4 MG: 4 INJECTION INTRA-ARTICULAR; INTRALESIONAL; INTRAMUSCULAR; INTRAVENOUS; SOFT TISSUE at 06:02

## 2021-02-09 RX ADMIN — LEVETIRACETAM 2000 MG: 100 SOLUTION ORAL at 09:02

## 2021-02-09 RX ADMIN — CYCLOBENZAPRINE HYDROCHLORIDE 5 MG: 5 TABLET, FILM COATED ORAL at 04:02

## 2021-02-09 RX ADMIN — POLYETHYLENE GLYCOL 3350 17 G: 17 POWDER, FOR SOLUTION ORAL at 08:02

## 2021-02-09 RX ADMIN — ATORVASTATIN CALCIUM 10 MG: 10 TABLET, FILM COATED ORAL at 08:02

## 2021-02-09 RX ADMIN — Medication 1000 MG: at 10:02

## 2021-02-09 RX ADMIN — TUBERCULIN PURIFIED PROTEIN DERIVATIVE 5 UNITS: 5 INJECTION, SOLUTION INTRADERMAL at 04:02

## 2021-02-09 RX ADMIN — INSULIN ASPART 10 UNITS: 100 INJECTION, SOLUTION INTRAVENOUS; SUBCUTANEOUS at 11:02

## 2021-02-09 RX ADMIN — SUCRALFATE 1 G: 1 TABLET ORAL at 11:02

## 2021-02-10 LAB
ALBUMIN SERPL BCP-MCNC: 2.7 G/DL (ref 3.5–5.2)
ALP SERPL-CCNC: 144 U/L (ref 55–135)
ALT SERPL W/O P-5'-P-CCNC: 87 U/L (ref 10–44)
ANION GAP SERPL CALC-SCNC: 10 MMOL/L (ref 8–16)
AST SERPL-CCNC: 20 U/L (ref 10–40)
BASOPHILS # BLD AUTO: 0.03 K/UL (ref 0–0.2)
BASOPHILS NFR BLD: 0.2 % (ref 0–1.9)
BILIRUB SERPL-MCNC: 0.5 MG/DL (ref 0.1–1)
BUN SERPL-MCNC: 27 MG/DL (ref 6–20)
CALCIUM SERPL-MCNC: 7.5 MG/DL (ref 8.7–10.5)
CHLORIDE SERPL-SCNC: 107 MMOL/L (ref 95–110)
CMV DNA SERPL NAA+PROBE-ACNC: <200 IU/ML
CMV DNA SERPL NAA+PROBE-LOG IU: <2.3 LOG IU/ML
CO2 SERPL-SCNC: 20 MMOL/L (ref 23–29)
CREAT SERPL-MCNC: 0.6 MG/DL (ref 0.5–1.4)
DIFFERENTIAL METHOD: ABNORMAL
EOSINOPHIL # BLD AUTO: 0 K/UL (ref 0–0.5)
EOSINOPHIL NFR BLD: 0 % (ref 0–8)
ERYTHROCYTE [DISTWIDTH] IN BLOOD BY AUTOMATED COUNT: 17.2 % (ref 11.5–14.5)
EST. GFR  (AFRICAN AMERICAN): >60 ML/MIN/1.73 M^2
EST. GFR  (NON AFRICAN AMERICAN): >60 ML/MIN/1.73 M^2
GLUCOSE SERPL-MCNC: 178 MG/DL (ref 70–110)
HCT VFR BLD AUTO: 32.4 % (ref 37–48.5)
HGB BLD-MCNC: 10.6 G/DL (ref 12–16)
IMM GRANULOCYTES # BLD AUTO: 0.41 K/UL (ref 0–0.04)
IMM GRANULOCYTES NFR BLD AUTO: 2.5 % (ref 0–0.5)
LYMPHOCYTES # BLD AUTO: 0.9 K/UL (ref 1–4.8)
LYMPHOCYTES NFR BLD: 5.7 % (ref 18–48)
MCH RBC QN AUTO: 26.7 PG (ref 27–31)
MCHC RBC AUTO-ENTMCNC: 32.7 G/DL (ref 32–36)
MCV RBC AUTO: 82 FL (ref 82–98)
MONOCYTES # BLD AUTO: 0.5 K/UL (ref 0.3–1)
MONOCYTES NFR BLD: 2.9 % (ref 4–15)
NEUTROPHILS # BLD AUTO: 14.6 K/UL (ref 1.8–7.7)
NEUTROPHILS NFR BLD: 88.7 % (ref 38–73)
NRBC BLD-RTO: 0 /100 WBC
PLATELET # BLD AUTO: 320 K/UL (ref 150–350)
PMV BLD AUTO: 10.2 FL (ref 9.2–12.9)
POCT GLUCOSE: 113 MG/DL (ref 70–110)
POCT GLUCOSE: 156 MG/DL (ref 70–110)
POCT GLUCOSE: 169 MG/DL (ref 70–110)
POCT GLUCOSE: 207 MG/DL (ref 70–110)
POTASSIUM SERPL-SCNC: 3.5 MMOL/L (ref 3.5–5.1)
PROT SERPL-MCNC: 5.2 G/DL (ref 6–8.4)
RBC # BLD AUTO: 3.97 M/UL (ref 4–5.4)
SODIUM SERPL-SCNC: 137 MMOL/L (ref 136–145)
SPECIMEN SOURCE: NORMAL
SPECIMEN SOURCE: NORMAL
VDRL CSF QL: NORMAL
WBC # BLD AUTO: 16.46 K/UL (ref 3.9–12.7)
WNV RNA CSF QL NAA+PROBE: NEGATIVE

## 2021-02-10 PROCEDURE — 25000003 PHARM REV CODE 250: Performed by: PHYSICIAN ASSISTANT

## 2021-02-10 PROCEDURE — 80053 COMPREHEN METABOLIC PANEL: CPT

## 2021-02-10 PROCEDURE — 99231 SBSQ HOSP IP/OBS SF/LOW 25: CPT | Mod: ,,, | Performed by: NURSE PRACTITIONER

## 2021-02-10 PROCEDURE — 99231 PR SUBSEQUENT HOSPITAL CARE,LEVL I: ICD-10-PCS | Mod: ,,, | Performed by: NURSE PRACTITIONER

## 2021-02-10 PROCEDURE — 99024 PR POST-OP FOLLOW-UP VISIT: ICD-10-PCS | Mod: ,,, | Performed by: PHYSICIAN ASSISTANT

## 2021-02-10 PROCEDURE — 92507 TX SP LANG VOICE COMM INDIV: CPT

## 2021-02-10 PROCEDURE — 85025 COMPLETE CBC W/AUTO DIFF WBC: CPT

## 2021-02-10 PROCEDURE — 63600175 PHARM REV CODE 636 W HCPCS: Performed by: PHYSICIAN ASSISTANT

## 2021-02-10 PROCEDURE — 94640 AIRWAY INHALATION TREATMENT: CPT

## 2021-02-10 PROCEDURE — 11000001 HC ACUTE MED/SURG PRIVATE ROOM

## 2021-02-10 PROCEDURE — 94668 MNPJ CHEST WALL SBSQ: CPT

## 2021-02-10 PROCEDURE — 25000242 PHARM REV CODE 250 ALT 637 W/ HCPCS: Performed by: PHYSICIAN ASSISTANT

## 2021-02-10 PROCEDURE — 25000003 PHARM REV CODE 250: Performed by: STUDENT IN AN ORGANIZED HEALTH CARE EDUCATION/TRAINING PROGRAM

## 2021-02-10 PROCEDURE — 94761 N-INVAS EAR/PLS OXIMETRY MLT: CPT

## 2021-02-10 PROCEDURE — 99024 POSTOP FOLLOW-UP VISIT: CPT | Mod: ,,, | Performed by: PHYSICIAN ASSISTANT

## 2021-02-10 PROCEDURE — 97110 THERAPEUTIC EXERCISES: CPT | Mod: CQ

## 2021-02-10 PROCEDURE — 36415 COLL VENOUS BLD VENIPUNCTURE: CPT

## 2021-02-10 PROCEDURE — 99900035 HC TECH TIME PER 15 MIN (STAT)

## 2021-02-10 PROCEDURE — 63600175 PHARM REV CODE 636 W HCPCS: Performed by: STUDENT IN AN ORGANIZED HEALTH CARE EDUCATION/TRAINING PROGRAM

## 2021-02-10 RX ORDER — DEXAMETHASONE SODIUM PHOSPHATE 4 MG/ML
8 INJECTION, SOLUTION INTRA-ARTICULAR; INTRALESIONAL; INTRAMUSCULAR; INTRAVENOUS; SOFT TISSUE EVERY 6 HOURS
Status: DISCONTINUED | OUTPATIENT
Start: 2021-02-10 | End: 2021-02-10

## 2021-02-10 RX ORDER — DEXAMETHASONE SODIUM PHOSPHATE 4 MG/ML
4 INJECTION, SOLUTION INTRA-ARTICULAR; INTRALESIONAL; INTRAMUSCULAR; INTRAVENOUS; SOFT TISSUE EVERY 8 HOURS
Status: DISCONTINUED | OUTPATIENT
Start: 2021-02-10 | End: 2021-02-17

## 2021-02-10 RX ORDER — AMLODIPINE BESYLATE 5 MG/1
5 TABLET ORAL DAILY
Status: DISCONTINUED | OUTPATIENT
Start: 2021-02-11 | End: 2021-02-12

## 2021-02-10 RX ADMIN — CYCLOBENZAPRINE HYDROCHLORIDE 5 MG: 5 TABLET, FILM COATED ORAL at 05:02

## 2021-02-10 RX ADMIN — INSULIN ASPART 10 UNITS: 100 INJECTION, SOLUTION INTRAVENOUS; SUBCUTANEOUS at 07:02

## 2021-02-10 RX ADMIN — POLYETHYLENE GLYCOL 3350 17 G: 17 POWDER, FOR SOLUTION ORAL at 08:02

## 2021-02-10 RX ADMIN — CLOPIDOGREL BISULFATE 75 MG: 75 TABLET, FILM COATED ORAL at 08:02

## 2021-02-10 RX ADMIN — Medication 1 G: at 08:02

## 2021-02-10 RX ADMIN — HYPROMELLOSE 2910 1 DROP: 5 SOLUTION OPHTHALMIC at 03:02

## 2021-02-10 RX ADMIN — DOCUSATE SODIUM AND SENNOSIDES 1 TABLET: 8.6; 5 TABLET, FILM COATED ORAL at 08:02

## 2021-02-10 RX ADMIN — INSULIN ASPART 2 UNITS: 100 INJECTION, SOLUTION INTRAVENOUS; SUBCUTANEOUS at 09:02

## 2021-02-10 RX ADMIN — DEXAMETHASONE SODIUM PHOSPHATE 4 MG: 4 INJECTION INTRA-ARTICULAR; INTRALESIONAL; INTRAMUSCULAR; INTRAVENOUS; SOFT TISSUE at 03:02

## 2021-02-10 RX ADMIN — LEVETIRACETAM 2000 MG: 100 SOLUTION ORAL at 08:02

## 2021-02-10 RX ADMIN — INSULIN ASPART 10 UNITS: 100 INJECTION, SOLUTION INTRAVENOUS; SUBCUTANEOUS at 04:02

## 2021-02-10 RX ADMIN — SUCRALFATE 1 G: 1 TABLET ORAL at 04:02

## 2021-02-10 RX ADMIN — HYPROMELLOSE 2910 1 DROP: 5 SOLUTION OPHTHALMIC at 08:02

## 2021-02-10 RX ADMIN — SUCRALFATE 1 G: 1 TABLET ORAL at 06:02

## 2021-02-10 RX ADMIN — Medication 1000 MG: at 08:02

## 2021-02-10 RX ADMIN — SUCRALFATE 1 G: 1 TABLET ORAL at 08:02

## 2021-02-10 RX ADMIN — FAMOTIDINE 20 MG: 20 TABLET, FILM COATED ORAL at 08:02

## 2021-02-10 RX ADMIN — DEXAMETHASONE SODIUM PHOSPHATE 4 MG: 4 INJECTION INTRA-ARTICULAR; INTRALESIONAL; INTRAMUSCULAR; INTRAVENOUS; SOFT TISSUE at 09:02

## 2021-02-10 RX ADMIN — SUCRALFATE 1 G: 1 TABLET ORAL at 11:02

## 2021-02-10 RX ADMIN — ASPIRIN 81 MG: 81 TABLET, CHEWABLE ORAL at 08:02

## 2021-02-10 RX ADMIN — IPRATROPIUM BROMIDE AND ALBUTEROL SULFATE 3 ML: .5; 2.5 SOLUTION RESPIRATORY (INHALATION) at 09:02

## 2021-02-10 RX ADMIN — INSULIN ASPART 2 UNITS: 100 INJECTION, SOLUTION INTRAVENOUS; SUBCUTANEOUS at 07:02

## 2021-02-10 RX ADMIN — IPRATROPIUM BROMIDE AND ALBUTEROL SULFATE 3 ML: .5; 2.5 SOLUTION RESPIRATORY (INHALATION) at 12:02

## 2021-02-10 RX ADMIN — INSULIN ASPART 2 UNITS: 100 INJECTION, SOLUTION INTRAVENOUS; SUBCUTANEOUS at 11:02

## 2021-02-10 RX ADMIN — INSULIN ASPART 10 UNITS: 100 INJECTION, SOLUTION INTRAVENOUS; SUBCUTANEOUS at 11:02

## 2021-02-10 RX ADMIN — AMLODIPINE BESYLATE 10 MG: 10 TABLET ORAL at 08:02

## 2021-02-10 RX ADMIN — LEVOTHYROXINE SODIUM 137 MCG: 0.14 TABLET ORAL at 06:02

## 2021-02-10 RX ADMIN — DEXAMETHASONE SODIUM PHOSPHATE 4 MG: 4 INJECTION INTRA-ARTICULAR; INTRALESIONAL; INTRAMUSCULAR; INTRAVENOUS; SOFT TISSUE at 06:02

## 2021-02-10 RX ADMIN — DEXAMETHASONE SODIUM PHOSPHATE 4 MG: 4 INJECTION INTRA-ARTICULAR; INTRALESIONAL; INTRAMUSCULAR; INTRAVENOUS; SOFT TISSUE at 12:02

## 2021-02-10 RX ADMIN — IPRATROPIUM BROMIDE AND ALBUTEROL SULFATE 3 ML: .5; 2.5 SOLUTION RESPIRATORY (INHALATION) at 04:02

## 2021-02-10 RX ADMIN — ATORVASTATIN CALCIUM 10 MG: 10 TABLET, FILM COATED ORAL at 08:02

## 2021-02-10 RX ADMIN — LOSARTAN POTASSIUM 50 MG: 50 TABLET, FILM COATED ORAL at 08:02

## 2021-02-10 RX ADMIN — IPRATROPIUM BROMIDE AND ALBUTEROL SULFATE 3 ML: .5; 2.5 SOLUTION RESPIRATORY (INHALATION) at 11:02

## 2021-02-10 RX ADMIN — SILODOSIN 4 MG: 4 CAPSULE ORAL at 08:02

## 2021-02-11 LAB
ALBUMIN SERPL BCP-MCNC: 2.7 G/DL (ref 3.5–5.2)
ALP SERPL-CCNC: 151 U/L (ref 55–135)
ALT SERPL W/O P-5'-P-CCNC: 86 U/L (ref 10–44)
ANION GAP SERPL CALC-SCNC: 12 MMOL/L (ref 8–16)
AST SERPL-CCNC: 19 U/L (ref 10–40)
BASOPHILS # BLD AUTO: 0.01 K/UL (ref 0–0.2)
BASOPHILS NFR BLD: 0.1 % (ref 0–1.9)
BILIRUB SERPL-MCNC: 0.4 MG/DL (ref 0.1–1)
BUN SERPL-MCNC: 32 MG/DL (ref 6–20)
CALCIUM SERPL-MCNC: 7.5 MG/DL (ref 8.7–10.5)
CHLORIDE SERPL-SCNC: 105 MMOL/L (ref 95–110)
CO2 SERPL-SCNC: 20 MMOL/L (ref 23–29)
CREAT SERPL-MCNC: 0.6 MG/DL (ref 0.5–1.4)
DIFFERENTIAL METHOD: ABNORMAL
EOSINOPHIL # BLD AUTO: 0 K/UL (ref 0–0.5)
EOSINOPHIL NFR BLD: 0.1 % (ref 0–8)
ERYTHROCYTE [DISTWIDTH] IN BLOOD BY AUTOMATED COUNT: 17 % (ref 11.5–14.5)
EST. GFR  (AFRICAN AMERICAN): >60 ML/MIN/1.73 M^2
EST. GFR  (NON AFRICAN AMERICAN): >60 ML/MIN/1.73 M^2
GLUCOSE SERPL-MCNC: 159 MG/DL (ref 70–110)
HCT VFR BLD AUTO: 31.9 % (ref 37–48.5)
HGB BLD-MCNC: 10.4 G/DL (ref 12–16)
HHV6 DNA # SPEC NAA+PROBE: <1000 CPY/ML
HHV6 DNA SPEC NAA+PROBE-LOG#: <3 LOG
HHV6 DNA SPEC NAA+PROBE: NORMAL
HHV6 IGG+IGM SER-IMP: NOT DETECTED
IMM GRANULOCYTES # BLD AUTO: 0.29 K/UL (ref 0–0.04)
IMM GRANULOCYTES NFR BLD AUTO: 1.7 % (ref 0–0.5)
LYMPHOCYTES # BLD AUTO: 1.3 K/UL (ref 1–4.8)
LYMPHOCYTES NFR BLD: 7.8 % (ref 18–48)
MCH RBC QN AUTO: 26.7 PG (ref 27–31)
MCHC RBC AUTO-ENTMCNC: 32.6 G/DL (ref 32–36)
MCV RBC AUTO: 82 FL (ref 82–98)
MONOCYTES # BLD AUTO: 0.5 K/UL (ref 0.3–1)
MONOCYTES NFR BLD: 2.8 % (ref 4–15)
NEUTROPHILS # BLD AUTO: 14.5 K/UL (ref 1.8–7.7)
NEUTROPHILS NFR BLD: 87.5 % (ref 38–73)
NRBC BLD-RTO: 0 /100 WBC
PLATELET # BLD AUTO: 338 K/UL (ref 150–350)
PMV BLD AUTO: 9.6 FL (ref 9.2–12.9)
POCT GLUCOSE: 142 MG/DL (ref 70–110)
POCT GLUCOSE: 153 MG/DL (ref 70–110)
POCT GLUCOSE: 155 MG/DL (ref 70–110)
POCT GLUCOSE: 228 MG/DL (ref 70–110)
POTASSIUM SERPL-SCNC: 3.5 MMOL/L (ref 3.5–5.1)
PROT SERPL-MCNC: 5.3 G/DL (ref 6–8.4)
RBC # BLD AUTO: 3.9 M/UL (ref 4–5.4)
SARS-COV-2 RNA RESP QL NAA+PROBE: NOT DETECTED
SODIUM SERPL-SCNC: 137 MMOL/L (ref 136–145)
SPECIMEN SOURCE: NORMAL
TB INDURATION 48 - 72 HR READ: 0 MM
WBC # BLD AUTO: 16.59 K/UL (ref 3.9–12.7)

## 2021-02-11 PROCEDURE — 11000001 HC ACUTE MED/SURG PRIVATE ROOM

## 2021-02-11 PROCEDURE — 25000003 PHARM REV CODE 250: Performed by: PHYSICIAN ASSISTANT

## 2021-02-11 PROCEDURE — 92507 TX SP LANG VOICE COMM INDIV: CPT

## 2021-02-11 PROCEDURE — 99233 SBSQ HOSP IP/OBS HIGH 50: CPT | Mod: ,,, | Performed by: PHYSICIAN ASSISTANT

## 2021-02-11 PROCEDURE — 94640 AIRWAY INHALATION TREATMENT: CPT

## 2021-02-11 PROCEDURE — 25000003 PHARM REV CODE 250: Performed by: NURSE PRACTITIONER

## 2021-02-11 PROCEDURE — 94668 MNPJ CHEST WALL SBSQ: CPT

## 2021-02-11 PROCEDURE — 80053 COMPREHEN METABOLIC PANEL: CPT

## 2021-02-11 PROCEDURE — U0003 INFECTIOUS AGENT DETECTION BY NUCLEIC ACID (DNA OR RNA); SEVERE ACUTE RESPIRATORY SYNDROME CORONAVIRUS 2 (SARS-COV-2) (CORONAVIRUS DISEASE [COVID-19]), AMPLIFIED PROBE TECHNIQUE, MAKING USE OF HIGH THROUGHPUT TECHNOLOGIES AS DESCRIBED BY CMS-2020-01-R: HCPCS

## 2021-02-11 PROCEDURE — 36415 COLL VENOUS BLD VENIPUNCTURE: CPT

## 2021-02-11 PROCEDURE — 97110 THERAPEUTIC EXERCISES: CPT

## 2021-02-11 PROCEDURE — 97535 SELF CARE MNGMENT TRAINING: CPT

## 2021-02-11 PROCEDURE — 63600175 PHARM REV CODE 636 W HCPCS: Performed by: PHYSICIAN ASSISTANT

## 2021-02-11 PROCEDURE — 99900035 HC TECH TIME PER 15 MIN (STAT)

## 2021-02-11 PROCEDURE — U0005 INFEC AGEN DETEC AMPLI PROBE: HCPCS

## 2021-02-11 PROCEDURE — 63600175 PHARM REV CODE 636 W HCPCS: Performed by: NURSE PRACTITIONER

## 2021-02-11 PROCEDURE — 99233 PR SUBSEQUENT HOSPITAL CARE,LEVL III: ICD-10-PCS | Mod: ,,, | Performed by: PHYSICIAN ASSISTANT

## 2021-02-11 PROCEDURE — 25000242 PHARM REV CODE 250 ALT 637 W/ HCPCS: Performed by: PHYSICIAN ASSISTANT

## 2021-02-11 PROCEDURE — 25000003 PHARM REV CODE 250: Performed by: STUDENT IN AN ORGANIZED HEALTH CARE EDUCATION/TRAINING PROGRAM

## 2021-02-11 PROCEDURE — 85025 COMPLETE CBC W/AUTO DIFF WBC: CPT

## 2021-02-11 PROCEDURE — 97803 MED NUTRITION INDIV SUBSEQ: CPT

## 2021-02-11 PROCEDURE — 94761 N-INVAS EAR/PLS OXIMETRY MLT: CPT

## 2021-02-11 RX ORDER — DEXAMETHASONE 2 MG/1
TABLET ORAL
Qty: 75 TABLET | Refills: 1
Start: 2021-02-11 | End: 2021-02-18 | Stop reason: SDUPTHER

## 2021-02-11 RX ORDER — INSULIN ASPART 100 [IU]/ML
10 INJECTION, SOLUTION INTRAVENOUS; SUBCUTANEOUS
Refills: 0
Start: 2021-02-11 | End: 2021-02-18

## 2021-02-11 RX ORDER — SUCRALFATE 1 G/1
1 TABLET ORAL
Start: 2021-02-11

## 2021-02-11 RX ORDER — CLOPIDOGREL BISULFATE 75 MG/1
75 TABLET ORAL DAILY
Qty: 14 TABLET | Refills: 0
Start: 2021-02-12 | End: 2021-02-26

## 2021-02-11 RX ORDER — IPRATROPIUM BROMIDE AND ALBUTEROL SULFATE 2.5; .5 MG/3ML; MG/3ML
3 SOLUTION RESPIRATORY (INHALATION) EVERY 6 HOURS PRN
Qty: 1 BOX | Refills: 0
Start: 2021-02-11 | End: 2022-02-11

## 2021-02-11 RX ORDER — CYCLOBENZAPRINE HCL 5 MG
5 TABLET ORAL 3 TIMES DAILY PRN
Start: 2021-02-11 | End: 2021-02-25

## 2021-02-11 RX ADMIN — FAMOTIDINE 20 MG: 20 TABLET, FILM COATED ORAL at 09:02

## 2021-02-11 RX ADMIN — SILODOSIN 4 MG: 4 CAPSULE ORAL at 09:02

## 2021-02-11 RX ADMIN — ONDANSETRON 4 MG: 2 INJECTION INTRAMUSCULAR; INTRAVENOUS at 01:02

## 2021-02-11 RX ADMIN — DEXAMETHASONE SODIUM PHOSPHATE 4 MG: 4 INJECTION INTRA-ARTICULAR; INTRALESIONAL; INTRAMUSCULAR; INTRAVENOUS; SOFT TISSUE at 09:02

## 2021-02-11 RX ADMIN — LEVETIRACETAM 2000 MG: 100 SOLUTION ORAL at 09:02

## 2021-02-11 RX ADMIN — Medication 1000 MG: at 09:02

## 2021-02-11 RX ADMIN — CLOPIDOGREL BISULFATE 75 MG: 75 TABLET, FILM COATED ORAL at 09:02

## 2021-02-11 RX ADMIN — SUCRALFATE 1 G: 1 TABLET ORAL at 04:02

## 2021-02-11 RX ADMIN — IPRATROPIUM BROMIDE AND ALBUTEROL SULFATE 3 ML: .5; 2.5 SOLUTION RESPIRATORY (INHALATION) at 07:02

## 2021-02-11 RX ADMIN — INSULIN ASPART 10 UNITS: 100 INJECTION, SOLUTION INTRAVENOUS; SUBCUTANEOUS at 04:02

## 2021-02-11 RX ADMIN — ATORVASTATIN CALCIUM 10 MG: 10 TABLET, FILM COATED ORAL at 09:02

## 2021-02-11 RX ADMIN — Medication 1 G: at 09:02

## 2021-02-11 RX ADMIN — INSULIN ASPART 10 UNITS: 100 INJECTION, SOLUTION INTRAVENOUS; SUBCUTANEOUS at 09:02

## 2021-02-11 RX ADMIN — INSULIN ASPART 4 UNITS: 100 INJECTION, SOLUTION INTRAVENOUS; SUBCUTANEOUS at 11:02

## 2021-02-11 RX ADMIN — ASPIRIN 81 MG: 81 TABLET, CHEWABLE ORAL at 09:02

## 2021-02-11 RX ADMIN — POLYETHYLENE GLYCOL 3350 17 G: 17 POWDER, FOR SOLUTION ORAL at 09:02

## 2021-02-11 RX ADMIN — IPRATROPIUM BROMIDE AND ALBUTEROL SULFATE 3 ML: .5; 2.5 SOLUTION RESPIRATORY (INHALATION) at 04:02

## 2021-02-11 RX ADMIN — SUCRALFATE 1 G: 1 TABLET ORAL at 09:02

## 2021-02-11 RX ADMIN — DOCUSATE SODIUM AND SENNOSIDES 1 TABLET: 8.6; 5 TABLET, FILM COATED ORAL at 09:02

## 2021-02-11 RX ADMIN — HYPROMELLOSE 2910 1 DROP: 5 SOLUTION OPHTHALMIC at 09:02

## 2021-02-11 RX ADMIN — ACETAMINOPHEN 650 MG: 325 TABLET ORAL at 09:02

## 2021-02-11 RX ADMIN — ACETAMINOPHEN 650 MG: 325 TABLET ORAL at 10:02

## 2021-02-11 RX ADMIN — DEXAMETHASONE SODIUM PHOSPHATE 4 MG: 4 INJECTION INTRA-ARTICULAR; INTRALESIONAL; INTRAMUSCULAR; INTRAVENOUS; SOFT TISSUE at 01:02

## 2021-02-11 RX ADMIN — AMLODIPINE BESYLATE 5 MG: 5 TABLET ORAL at 09:02

## 2021-02-11 RX ADMIN — CYCLOBENZAPRINE HYDROCHLORIDE 5 MG: 5 TABLET, FILM COATED ORAL at 07:02

## 2021-02-11 RX ADMIN — LOSARTAN POTASSIUM 50 MG: 50 TABLET, FILM COATED ORAL at 09:02

## 2021-02-11 RX ADMIN — HYPROMELLOSE 2910 1 DROP: 5 SOLUTION OPHTHALMIC at 03:02

## 2021-02-11 RX ADMIN — SUCRALFATE 1 G: 1 TABLET ORAL at 10:02

## 2021-02-11 RX ADMIN — INSULIN ASPART 1 UNITS: 100 INJECTION, SOLUTION INTRAVENOUS; SUBCUTANEOUS at 09:02

## 2021-02-11 RX ADMIN — INSULIN ASPART 10 UNITS: 100 INJECTION, SOLUTION INTRAVENOUS; SUBCUTANEOUS at 11:02

## 2021-02-11 RX ADMIN — SUCRALFATE 1 G: 1 TABLET ORAL at 06:02

## 2021-02-11 RX ADMIN — LEVOTHYROXINE SODIUM 137 MCG: 0.14 TABLET ORAL at 06:02

## 2021-02-11 RX ADMIN — DEXAMETHASONE SODIUM PHOSPHATE 4 MG: 4 INJECTION INTRA-ARTICULAR; INTRALESIONAL; INTRAMUSCULAR; INTRAVENOUS; SOFT TISSUE at 06:02

## 2021-02-12 LAB
ALBUMIN SERPL BCP-MCNC: 2.8 G/DL (ref 3.5–5.2)
ALP SERPL-CCNC: 153 U/L (ref 55–135)
ALT SERPL W/O P-5'-P-CCNC: 81 U/L (ref 10–44)
AMORPH CRY UR QL COMP ASSIST: ABNORMAL
ANION GAP SERPL CALC-SCNC: 12 MMOL/L (ref 8–16)
AST SERPL-CCNC: 20 U/L (ref 10–40)
BACTERIA #/AREA URNS AUTO: ABNORMAL /HPF
BASOPHILS # BLD AUTO: 0.01 K/UL (ref 0–0.2)
BASOPHILS NFR BLD: 0.1 % (ref 0–1.9)
BILIRUB SERPL-MCNC: 0.4 MG/DL (ref 0.1–1)
BILIRUB UR QL STRIP: NEGATIVE
BUN SERPL-MCNC: 33 MG/DL (ref 6–20)
CALCIUM SERPL-MCNC: 7.7 MG/DL (ref 8.7–10.5)
CHLORIDE SERPL-SCNC: 106 MMOL/L (ref 95–110)
CLARITY UR REFRACT.AUTO: ABNORMAL
CO2 SERPL-SCNC: 21 MMOL/L (ref 23–29)
COLOR UR AUTO: YELLOW
CREAT SERPL-MCNC: 0.7 MG/DL (ref 0.5–1.4)
DIFFERENTIAL METHOD: ABNORMAL
EOSINOPHIL # BLD AUTO: 0 K/UL (ref 0–0.5)
EOSINOPHIL NFR BLD: 0 % (ref 0–8)
ERYTHROCYTE [DISTWIDTH] IN BLOOD BY AUTOMATED COUNT: 17.3 % (ref 11.5–14.5)
EST. GFR  (AFRICAN AMERICAN): >60 ML/MIN/1.73 M^2
EST. GFR  (NON AFRICAN AMERICAN): >60 ML/MIN/1.73 M^2
GLUCOSE SERPL-MCNC: 211 MG/DL (ref 70–110)
GLUCOSE UR QL STRIP: NEGATIVE
HCT VFR BLD AUTO: 33.5 % (ref 37–48.5)
HGB BLD-MCNC: 10.9 G/DL (ref 12–16)
HGB UR QL STRIP: NEGATIVE
HYALINE CASTS UR QL AUTO: 0 /LPF
IMM GRANULOCYTES # BLD AUTO: 0.23 K/UL (ref 0–0.04)
IMM GRANULOCYTES NFR BLD AUTO: 1.2 % (ref 0–0.5)
KETONES UR QL STRIP: NEGATIVE
LEUKOCYTE ESTERASE UR QL STRIP: ABNORMAL
LYMPHOCYTES # BLD AUTO: 0.9 K/UL (ref 1–4.8)
LYMPHOCYTES NFR BLD: 5 % (ref 18–48)
MCH RBC QN AUTO: 26.8 PG (ref 27–31)
MCHC RBC AUTO-ENTMCNC: 32.5 G/DL (ref 32–36)
MCV RBC AUTO: 82 FL (ref 82–98)
MICROSCOPIC COMMENT: ABNORMAL
MONOCYTES # BLD AUTO: 0.5 K/UL (ref 0.3–1)
MONOCYTES NFR BLD: 2.5 % (ref 4–15)
NEUTROPHILS # BLD AUTO: 16.9 K/UL (ref 1.8–7.7)
NEUTROPHILS NFR BLD: 91.2 % (ref 38–73)
NITRITE UR QL STRIP: POSITIVE
NRBC BLD-RTO: 0 /100 WBC
PH UR STRIP: 8 [PH] (ref 5–8)
PLATELET # BLD AUTO: 342 K/UL (ref 150–350)
PMV BLD AUTO: 9.6 FL (ref 9.2–12.9)
POCT GLUCOSE: 156 MG/DL (ref 70–110)
POCT GLUCOSE: 182 MG/DL (ref 70–110)
POCT GLUCOSE: 188 MG/DL (ref 70–110)
POCT GLUCOSE: 240 MG/DL (ref 70–110)
POTASSIUM SERPL-SCNC: 3.7 MMOL/L (ref 3.5–5.1)
PROT SERPL-MCNC: 5.4 G/DL (ref 6–8.4)
PROT UR QL STRIP: ABNORMAL
RBC # BLD AUTO: 4.07 M/UL (ref 4–5.4)
RBC #/AREA URNS AUTO: 0 /HPF (ref 0–4)
SODIUM SERPL-SCNC: 139 MMOL/L (ref 136–145)
SP GR UR STRIP: 1.02 (ref 1–1.03)
SQUAMOUS #/AREA URNS AUTO: 1 /HPF
URN SPEC COLLECT METH UR: ABNORMAL
WBC # BLD AUTO: 18.49 K/UL (ref 3.9–12.7)
WBC #/AREA URNS AUTO: 3 /HPF (ref 0–5)
YEAST UR QL AUTO: ABNORMAL

## 2021-02-12 PROCEDURE — 99233 PR SUBSEQUENT HOSPITAL CARE,LEVL III: ICD-10-PCS | Mod: ,,, | Performed by: PHYSICIAN ASSISTANT

## 2021-02-12 PROCEDURE — 25000003 PHARM REV CODE 250: Performed by: NURSE PRACTITIONER

## 2021-02-12 PROCEDURE — 99900035 HC TECH TIME PER 15 MIN (STAT)

## 2021-02-12 PROCEDURE — 63600175 PHARM REV CODE 636 W HCPCS: Performed by: PHYSICIAN ASSISTANT

## 2021-02-12 PROCEDURE — 99233 SBSQ HOSP IP/OBS HIGH 50: CPT | Mod: ,,, | Performed by: PHYSICIAN ASSISTANT

## 2021-02-12 PROCEDURE — 99232 SBSQ HOSP IP/OBS MODERATE 35: CPT | Mod: ,,, | Performed by: NURSE PRACTITIONER

## 2021-02-12 PROCEDURE — 25000242 PHARM REV CODE 250 ALT 637 W/ HCPCS: Performed by: PHYSICIAN ASSISTANT

## 2021-02-12 PROCEDURE — 99232 PR SUBSEQUENT HOSPITAL CARE,LEVL II: ICD-10-PCS | Mod: ,,, | Performed by: NURSE PRACTITIONER

## 2021-02-12 PROCEDURE — 80053 COMPREHEN METABOLIC PANEL: CPT

## 2021-02-12 PROCEDURE — 25000003 PHARM REV CODE 250: Performed by: PHYSICIAN ASSISTANT

## 2021-02-12 PROCEDURE — 92526 ORAL FUNCTION THERAPY: CPT

## 2021-02-12 PROCEDURE — 87086 URINE CULTURE/COLONY COUNT: CPT

## 2021-02-12 PROCEDURE — 92507 TX SP LANG VOICE COMM INDIV: CPT

## 2021-02-12 PROCEDURE — 81001 URINALYSIS AUTO W/SCOPE: CPT

## 2021-02-12 PROCEDURE — 36415 COLL VENOUS BLD VENIPUNCTURE: CPT

## 2021-02-12 PROCEDURE — 85025 COMPLETE CBC W/AUTO DIFF WBC: CPT

## 2021-02-12 PROCEDURE — 94640 AIRWAY INHALATION TREATMENT: CPT

## 2021-02-12 PROCEDURE — 94761 N-INVAS EAR/PLS OXIMETRY MLT: CPT

## 2021-02-12 PROCEDURE — 11000001 HC ACUTE MED/SURG PRIVATE ROOM

## 2021-02-12 PROCEDURE — 27000221 HC OXYGEN, UP TO 24 HOURS

## 2021-02-12 PROCEDURE — 25000003 PHARM REV CODE 250: Performed by: STUDENT IN AN ORGANIZED HEALTH CARE EDUCATION/TRAINING PROGRAM

## 2021-02-12 PROCEDURE — 94668 MNPJ CHEST WALL SBSQ: CPT

## 2021-02-12 RX ADMIN — DOCUSATE SODIUM AND SENNOSIDES 1 TABLET: 8.6; 5 TABLET, FILM COATED ORAL at 10:02

## 2021-02-12 RX ADMIN — ATORVASTATIN CALCIUM 10 MG: 10 TABLET, FILM COATED ORAL at 10:02

## 2021-02-12 RX ADMIN — Medication 1 G: at 10:02

## 2021-02-12 RX ADMIN — LEVETIRACETAM 2000 MG: 100 SOLUTION ORAL at 09:02

## 2021-02-12 RX ADMIN — Medication 1000 MG: at 10:02

## 2021-02-12 RX ADMIN — IPRATROPIUM BROMIDE AND ALBUTEROL SULFATE 3 ML: .5; 2.5 SOLUTION RESPIRATORY (INHALATION) at 12:02

## 2021-02-12 RX ADMIN — IPRATROPIUM BROMIDE AND ALBUTEROL SULFATE 3 ML: .5; 2.5 SOLUTION RESPIRATORY (INHALATION) at 08:02

## 2021-02-12 RX ADMIN — CLOPIDOGREL BISULFATE 75 MG: 75 TABLET, FILM COATED ORAL at 10:02

## 2021-02-12 RX ADMIN — IPRATROPIUM BROMIDE AND ALBUTEROL SULFATE 3 ML: .5; 2.5 SOLUTION RESPIRATORY (INHALATION) at 04:02

## 2021-02-12 RX ADMIN — Medication 1000 MG: at 09:02

## 2021-02-12 RX ADMIN — HYPROMELLOSE 2910 1 DROP: 5 SOLUTION OPHTHALMIC at 09:02

## 2021-02-12 RX ADMIN — LOSARTAN POTASSIUM 50 MG: 50 TABLET, FILM COATED ORAL at 10:02

## 2021-02-12 RX ADMIN — SUCRALFATE 1 G: 1 TABLET ORAL at 04:02

## 2021-02-12 RX ADMIN — INSULIN ASPART 10 UNITS: 100 INJECTION, SOLUTION INTRAVENOUS; SUBCUTANEOUS at 04:02

## 2021-02-12 RX ADMIN — DEXAMETHASONE SODIUM PHOSPHATE 4 MG: 4 INJECTION INTRA-ARTICULAR; INTRALESIONAL; INTRAMUSCULAR; INTRAVENOUS; SOFT TISSUE at 09:02

## 2021-02-12 RX ADMIN — FAMOTIDINE 20 MG: 20 TABLET, FILM COATED ORAL at 10:02

## 2021-02-12 RX ADMIN — SUCRALFATE 1 G: 1 TABLET ORAL at 06:02

## 2021-02-12 RX ADMIN — DEXAMETHASONE SODIUM PHOSPHATE 4 MG: 4 INJECTION INTRA-ARTICULAR; INTRALESIONAL; INTRAMUSCULAR; INTRAVENOUS; SOFT TISSUE at 04:02

## 2021-02-12 RX ADMIN — INSULIN ASPART 10 UNITS: 100 INJECTION, SOLUTION INTRAVENOUS; SUBCUTANEOUS at 12:02

## 2021-02-12 RX ADMIN — SUCRALFATE 1 G: 1 TABLET ORAL at 09:02

## 2021-02-12 RX ADMIN — DEXAMETHASONE SODIUM PHOSPHATE 4 MG: 4 INJECTION INTRA-ARTICULAR; INTRALESIONAL; INTRAMUSCULAR; INTRAVENOUS; SOFT TISSUE at 06:02

## 2021-02-12 RX ADMIN — SILODOSIN 4 MG: 4 CAPSULE ORAL at 10:02

## 2021-02-12 RX ADMIN — POLYETHYLENE GLYCOL 3350 17 G: 17 POWDER, FOR SOLUTION ORAL at 10:02

## 2021-02-12 RX ADMIN — HYPROMELLOSE 2910 1 DROP: 5 SOLUTION OPHTHALMIC at 04:02

## 2021-02-12 RX ADMIN — DOCUSATE SODIUM AND SENNOSIDES 1 TABLET: 8.6; 5 TABLET, FILM COATED ORAL at 09:02

## 2021-02-12 RX ADMIN — FAMOTIDINE 20 MG: 20 TABLET, FILM COATED ORAL at 09:02

## 2021-02-12 RX ADMIN — SUCRALFATE 1 G: 1 TABLET ORAL at 10:02

## 2021-02-12 RX ADMIN — INSULIN ASPART 2 UNITS: 100 INJECTION, SOLUTION INTRAVENOUS; SUBCUTANEOUS at 04:02

## 2021-02-12 RX ADMIN — LEVETIRACETAM 2000 MG: 100 SOLUTION ORAL at 10:02

## 2021-02-12 RX ADMIN — LEVOTHYROXINE SODIUM 137 MCG: 0.14 TABLET ORAL at 06:02

## 2021-02-12 RX ADMIN — ACETAMINOPHEN 650 MG: 325 TABLET ORAL at 10:02

## 2021-02-12 RX ADMIN — INSULIN ASPART 1 UNITS: 100 INJECTION, SOLUTION INTRAVENOUS; SUBCUTANEOUS at 10:02

## 2021-02-12 RX ADMIN — ASPIRIN 81 MG: 81 TABLET, CHEWABLE ORAL at 10:02

## 2021-02-13 LAB
ALBUMIN SERPL BCP-MCNC: 2.8 G/DL (ref 3.5–5.2)
ALP SERPL-CCNC: 147 U/L (ref 55–135)
ALT SERPL W/O P-5'-P-CCNC: 77 U/L (ref 10–44)
ANION GAP SERPL CALC-SCNC: 11 MMOL/L (ref 8–16)
AST SERPL-CCNC: 18 U/L (ref 10–40)
BASOPHILS # BLD AUTO: 0.02 K/UL (ref 0–0.2)
BASOPHILS NFR BLD: 0.1 % (ref 0–1.9)
BILIRUB SERPL-MCNC: 0.4 MG/DL (ref 0.1–1)
BUN SERPL-MCNC: 35 MG/DL (ref 6–20)
CALCIUM SERPL-MCNC: 7.9 MG/DL (ref 8.7–10.5)
CHLORIDE SERPL-SCNC: 106 MMOL/L (ref 95–110)
CO2 SERPL-SCNC: 22 MMOL/L (ref 23–29)
CREAT SERPL-MCNC: 0.6 MG/DL (ref 0.5–1.4)
DIFFERENTIAL METHOD: ABNORMAL
EOSINOPHIL # BLD AUTO: 0 K/UL (ref 0–0.5)
EOSINOPHIL NFR BLD: 0 % (ref 0–8)
ERYTHROCYTE [DISTWIDTH] IN BLOOD BY AUTOMATED COUNT: 17.4 % (ref 11.5–14.5)
EST. GFR  (AFRICAN AMERICAN): >60 ML/MIN/1.73 M^2
EST. GFR  (NON AFRICAN AMERICAN): >60 ML/MIN/1.73 M^2
GLUCOSE SERPL-MCNC: 187 MG/DL (ref 70–110)
HCT VFR BLD AUTO: 32.3 % (ref 37–48.5)
HGB BLD-MCNC: 10.3 G/DL (ref 12–16)
IMM GRANULOCYTES # BLD AUTO: 0.18 K/UL (ref 0–0.04)
IMM GRANULOCYTES NFR BLD AUTO: 1.2 % (ref 0–0.5)
LYMPHOCYTES # BLD AUTO: 0.8 K/UL (ref 1–4.8)
LYMPHOCYTES NFR BLD: 5.5 % (ref 18–48)
MCH RBC QN AUTO: 26.3 PG (ref 27–31)
MCHC RBC AUTO-ENTMCNC: 31.9 G/DL (ref 32–36)
MCV RBC AUTO: 83 FL (ref 82–98)
MONOCYTES # BLD AUTO: 0.4 K/UL (ref 0.3–1)
MONOCYTES NFR BLD: 2.8 % (ref 4–15)
NEUTROPHILS # BLD AUTO: 13.7 K/UL (ref 1.8–7.7)
NEUTROPHILS NFR BLD: 90.4 % (ref 38–73)
NRBC BLD-RTO: 0 /100 WBC
PLATELET # BLD AUTO: 322 K/UL (ref 150–350)
PMV BLD AUTO: 9.6 FL (ref 9.2–12.9)
POCT GLUCOSE: 191 MG/DL (ref 70–110)
POCT GLUCOSE: 194 MG/DL (ref 70–110)
POCT GLUCOSE: 200 MG/DL (ref 70–110)
POCT GLUCOSE: 228 MG/DL (ref 70–110)
POTASSIUM SERPL-SCNC: 3.7 MMOL/L (ref 3.5–5.1)
PROT SERPL-MCNC: 5.4 G/DL (ref 6–8.4)
RBC # BLD AUTO: 3.91 M/UL (ref 4–5.4)
SODIUM SERPL-SCNC: 139 MMOL/L (ref 136–145)
WBC # BLD AUTO: 15.16 K/UL (ref 3.9–12.7)

## 2021-02-13 PROCEDURE — 99231 PR SUBSEQUENT HOSPITAL CARE,LEVL I: ICD-10-PCS | Mod: ,,, | Performed by: NEUROLOGICAL SURGERY

## 2021-02-13 PROCEDURE — 85025 COMPLETE CBC W/AUTO DIFF WBC: CPT

## 2021-02-13 PROCEDURE — 94668 MNPJ CHEST WALL SBSQ: CPT

## 2021-02-13 PROCEDURE — 25000003 PHARM REV CODE 250: Performed by: NURSE PRACTITIONER

## 2021-02-13 PROCEDURE — 63600175 PHARM REV CODE 636 W HCPCS: Performed by: PHYSICIAN ASSISTANT

## 2021-02-13 PROCEDURE — 94640 AIRWAY INHALATION TREATMENT: CPT

## 2021-02-13 PROCEDURE — 80053 COMPREHEN METABOLIC PANEL: CPT

## 2021-02-13 PROCEDURE — 25000003 PHARM REV CODE 250: Performed by: PHYSICIAN ASSISTANT

## 2021-02-13 PROCEDURE — 25000003 PHARM REV CODE 250: Performed by: STUDENT IN AN ORGANIZED HEALTH CARE EDUCATION/TRAINING PROGRAM

## 2021-02-13 PROCEDURE — 25000242 PHARM REV CODE 250 ALT 637 W/ HCPCS: Performed by: PHYSICIAN ASSISTANT

## 2021-02-13 PROCEDURE — 94761 N-INVAS EAR/PLS OXIMETRY MLT: CPT

## 2021-02-13 PROCEDURE — 99231 SBSQ HOSP IP/OBS SF/LOW 25: CPT | Mod: ,,, | Performed by: NEUROLOGICAL SURGERY

## 2021-02-13 PROCEDURE — 11000001 HC ACUTE MED/SURG PRIVATE ROOM

## 2021-02-13 PROCEDURE — 63600175 PHARM REV CODE 636 W HCPCS: Performed by: STUDENT IN AN ORGANIZED HEALTH CARE EDUCATION/TRAINING PROGRAM

## 2021-02-13 PROCEDURE — 36415 COLL VENOUS BLD VENIPUNCTURE: CPT

## 2021-02-13 RX ORDER — LIDOCAINE HYDROCHLORIDE 10 MG/ML
1 INJECTION INFILTRATION; PERINEURAL ONCE
Status: COMPLETED | OUTPATIENT
Start: 2021-02-13 | End: 2021-02-13

## 2021-02-13 RX ORDER — CEFTRIAXONE 1 G/1
500 INJECTION, POWDER, FOR SOLUTION INTRAMUSCULAR; INTRAVENOUS ONCE
Status: COMPLETED | OUTPATIENT
Start: 2021-02-13 | End: 2021-02-13

## 2021-02-13 RX ORDER — SULFAMETHOXAZOLE AND TRIMETHOPRIM 800; 160 MG/1; MG/1
1 TABLET ORAL 2 TIMES DAILY
Status: DISCONTINUED | OUTPATIENT
Start: 2021-02-13 | End: 2021-02-15

## 2021-02-13 RX ADMIN — ATORVASTATIN CALCIUM 10 MG: 10 TABLET, FILM COATED ORAL at 11:02

## 2021-02-13 RX ADMIN — SUCRALFATE 1 G: 1 TABLET ORAL at 05:02

## 2021-02-13 RX ADMIN — INSULIN ASPART 1 UNITS: 100 INJECTION, SOLUTION INTRAVENOUS; SUBCUTANEOUS at 09:02

## 2021-02-13 RX ADMIN — INSULIN ASPART 4 UNITS: 100 INJECTION, SOLUTION INTRAVENOUS; SUBCUTANEOUS at 11:02

## 2021-02-13 RX ADMIN — CLOPIDOGREL BISULFATE 75 MG: 75 TABLET, FILM COATED ORAL at 10:02

## 2021-02-13 RX ADMIN — CYCLOBENZAPRINE HYDROCHLORIDE 5 MG: 5 TABLET, FILM COATED ORAL at 09:02

## 2021-02-13 RX ADMIN — IPRATROPIUM BROMIDE AND ALBUTEROL SULFATE 3 ML: .5; 2.5 SOLUTION RESPIRATORY (INHALATION) at 07:02

## 2021-02-13 RX ADMIN — ASPIRIN 81 MG: 81 TABLET, CHEWABLE ORAL at 11:02

## 2021-02-13 RX ADMIN — LEVOTHYROXINE SODIUM 137 MCG: 0.14 TABLET ORAL at 05:02

## 2021-02-13 RX ADMIN — CEFTRIAXONE SODIUM 500 MG: 1 INJECTION, POWDER, FOR SOLUTION INTRAMUSCULAR; INTRAVENOUS at 07:02

## 2021-02-13 RX ADMIN — HYPROMELLOSE 2910 1 DROP: 5 SOLUTION OPHTHALMIC at 09:02

## 2021-02-13 RX ADMIN — INSULIN ASPART 10 UNITS: 100 INJECTION, SOLUTION INTRAVENOUS; SUBCUTANEOUS at 05:02

## 2021-02-13 RX ADMIN — IPRATROPIUM BROMIDE AND ALBUTEROL SULFATE 3 ML: .5; 2.5 SOLUTION RESPIRATORY (INHALATION) at 04:02

## 2021-02-13 RX ADMIN — SILODOSIN 4 MG: 4 CAPSULE ORAL at 11:02

## 2021-02-13 RX ADMIN — ACETAMINOPHEN 650 MG: 325 TABLET ORAL at 07:02

## 2021-02-13 RX ADMIN — LEVETIRACETAM 2000 MG: 100 SOLUTION ORAL at 11:02

## 2021-02-13 RX ADMIN — POLYETHYLENE GLYCOL 3350 17 G: 17 POWDER, FOR SOLUTION ORAL at 10:02

## 2021-02-13 RX ADMIN — FAMOTIDINE 20 MG: 20 TABLET, FILM COATED ORAL at 11:02

## 2021-02-13 RX ADMIN — DOCUSATE SODIUM AND SENNOSIDES 1 TABLET: 8.6; 5 TABLET, FILM COATED ORAL at 11:02

## 2021-02-13 RX ADMIN — FAMOTIDINE 20 MG: 20 TABLET, FILM COATED ORAL at 09:02

## 2021-02-13 RX ADMIN — LIDOCAINE HYDROCHLORIDE 1 ML: 10 INJECTION, SOLUTION INFILTRATION; PERINEURAL at 07:02

## 2021-02-13 RX ADMIN — INSULIN ASPART 10 UNITS: 100 INJECTION, SOLUTION INTRAVENOUS; SUBCUTANEOUS at 11:02

## 2021-02-13 RX ADMIN — DOCUSATE SODIUM AND SENNOSIDES 1 TABLET: 8.6; 5 TABLET, FILM COATED ORAL at 09:02

## 2021-02-13 RX ADMIN — DEXAMETHASONE SODIUM PHOSPHATE 4 MG: 4 INJECTION INTRA-ARTICULAR; INTRALESIONAL; INTRAMUSCULAR; INTRAVENOUS; SOFT TISSUE at 04:02

## 2021-02-13 RX ADMIN — SUCRALFATE 1 G: 1 TABLET ORAL at 09:02

## 2021-02-13 RX ADMIN — SULFAMETHOXAZOLE AND TRIMETHOPRIM 1 TABLET: 800; 160 TABLET ORAL at 09:02

## 2021-02-13 RX ADMIN — LOSARTAN POTASSIUM 50 MG: 50 TABLET, FILM COATED ORAL at 11:02

## 2021-02-13 RX ADMIN — HYPROMELLOSE 2910 1 DROP: 5 SOLUTION OPHTHALMIC at 04:02

## 2021-02-13 RX ADMIN — DEXAMETHASONE SODIUM PHOSPHATE 4 MG: 4 INJECTION INTRA-ARTICULAR; INTRALESIONAL; INTRAMUSCULAR; INTRAVENOUS; SOFT TISSUE at 05:02

## 2021-02-13 RX ADMIN — IPRATROPIUM BROMIDE AND ALBUTEROL SULFATE 3 ML: .5; 2.5 SOLUTION RESPIRATORY (INHALATION) at 12:02

## 2021-02-13 RX ADMIN — ACETAMINOPHEN 650 MG: 325 TABLET ORAL at 12:02

## 2021-02-13 RX ADMIN — CYCLOBENZAPRINE HYDROCHLORIDE 5 MG: 5 TABLET, FILM COATED ORAL at 05:02

## 2021-02-13 RX ADMIN — DEXAMETHASONE SODIUM PHOSPHATE 4 MG: 4 INJECTION INTRA-ARTICULAR; INTRALESIONAL; INTRAMUSCULAR; INTRAVENOUS; SOFT TISSUE at 09:02

## 2021-02-13 RX ADMIN — Medication 1000 MG: at 09:02

## 2021-02-13 RX ADMIN — Medication 1000 MG: at 11:02

## 2021-02-13 RX ADMIN — LEVETIRACETAM 2000 MG: 100 SOLUTION ORAL at 09:02

## 2021-02-14 LAB
ALBUMIN SERPL BCP-MCNC: 2.7 G/DL (ref 3.5–5.2)
ALP SERPL-CCNC: 144 U/L (ref 55–135)
ALT SERPL W/O P-5'-P-CCNC: 71 U/L (ref 10–44)
ANION GAP SERPL CALC-SCNC: 9 MMOL/L (ref 8–16)
AST SERPL-CCNC: 16 U/L (ref 10–40)
BASOPHILS # BLD AUTO: 0.01 K/UL (ref 0–0.2)
BASOPHILS NFR BLD: 0.1 % (ref 0–1.9)
BILIRUB SERPL-MCNC: 0.3 MG/DL (ref 0.1–1)
BUN SERPL-MCNC: 31 MG/DL (ref 6–20)
CALCIUM SERPL-MCNC: 7.6 MG/DL (ref 8.7–10.5)
CHLORIDE SERPL-SCNC: 106 MMOL/L (ref 95–110)
CO2 SERPL-SCNC: 24 MMOL/L (ref 23–29)
CREAT SERPL-MCNC: 0.6 MG/DL (ref 0.5–1.4)
DIFFERENTIAL METHOD: ABNORMAL
EOSINOPHIL # BLD AUTO: 0 K/UL (ref 0–0.5)
EOSINOPHIL NFR BLD: 0 % (ref 0–8)
ERYTHROCYTE [DISTWIDTH] IN BLOOD BY AUTOMATED COUNT: 17.1 % (ref 11.5–14.5)
EST. GFR  (AFRICAN AMERICAN): >60 ML/MIN/1.73 M^2
EST. GFR  (NON AFRICAN AMERICAN): >60 ML/MIN/1.73 M^2
GLUCOSE SERPL-MCNC: 186 MG/DL (ref 70–110)
HCT VFR BLD AUTO: 30.7 % (ref 37–48.5)
HGB BLD-MCNC: 10 G/DL (ref 12–16)
IMM GRANULOCYTES # BLD AUTO: 0.15 K/UL (ref 0–0.04)
IMM GRANULOCYTES NFR BLD AUTO: 1.2 % (ref 0–0.5)
LYMPHOCYTES # BLD AUTO: 0.8 K/UL (ref 1–4.8)
LYMPHOCYTES NFR BLD: 6.7 % (ref 18–48)
MCH RBC QN AUTO: 26.8 PG (ref 27–31)
MCHC RBC AUTO-ENTMCNC: 32.6 G/DL (ref 32–36)
MCV RBC AUTO: 82 FL (ref 82–98)
MONOCYTES # BLD AUTO: 0.3 K/UL (ref 0.3–1)
MONOCYTES NFR BLD: 2.5 % (ref 4–15)
NEUTROPHILS # BLD AUTO: 11.1 K/UL (ref 1.8–7.7)
NEUTROPHILS NFR BLD: 89.5 % (ref 38–73)
NRBC BLD-RTO: 0 /100 WBC
PLATELET # BLD AUTO: 251 K/UL (ref 150–350)
PMV BLD AUTO: 9.4 FL (ref 9.2–12.9)
POCT GLUCOSE: 149 MG/DL (ref 70–110)
POCT GLUCOSE: 188 MG/DL (ref 70–110)
POCT GLUCOSE: 226 MG/DL (ref 70–110)
POTASSIUM SERPL-SCNC: 3.7 MMOL/L (ref 3.5–5.1)
PROT SERPL-MCNC: 5.2 G/DL (ref 6–8.4)
RBC # BLD AUTO: 3.73 M/UL (ref 4–5.4)
SODIUM SERPL-SCNC: 139 MMOL/L (ref 136–145)
WBC # BLD AUTO: 12.41 K/UL (ref 3.9–12.7)

## 2021-02-14 PROCEDURE — 25000242 PHARM REV CODE 250 ALT 637 W/ HCPCS: Performed by: PHYSICIAN ASSISTANT

## 2021-02-14 PROCEDURE — 25000003 PHARM REV CODE 250: Performed by: STUDENT IN AN ORGANIZED HEALTH CARE EDUCATION/TRAINING PROGRAM

## 2021-02-14 PROCEDURE — 25000003 PHARM REV CODE 250: Performed by: NURSE PRACTITIONER

## 2021-02-14 PROCEDURE — 25000003 PHARM REV CODE 250: Performed by: PHYSICIAN ASSISTANT

## 2021-02-14 PROCEDURE — 94668 MNPJ CHEST WALL SBSQ: CPT

## 2021-02-14 PROCEDURE — 80053 COMPREHEN METABOLIC PANEL: CPT

## 2021-02-14 PROCEDURE — 63600175 PHARM REV CODE 636 W HCPCS: Performed by: PHYSICIAN ASSISTANT

## 2021-02-14 PROCEDURE — 94640 AIRWAY INHALATION TREATMENT: CPT

## 2021-02-14 PROCEDURE — 85025 COMPLETE CBC W/AUTO DIFF WBC: CPT

## 2021-02-14 PROCEDURE — 94761 N-INVAS EAR/PLS OXIMETRY MLT: CPT

## 2021-02-14 PROCEDURE — 11000001 HC ACUTE MED/SURG PRIVATE ROOM

## 2021-02-14 PROCEDURE — 36415 COLL VENOUS BLD VENIPUNCTURE: CPT

## 2021-02-14 RX ORDER — INSULIN ASPART 100 [IU]/ML
12 INJECTION, SOLUTION INTRAVENOUS; SUBCUTANEOUS
Status: DISCONTINUED | OUTPATIENT
Start: 2021-02-14 | End: 2021-02-18 | Stop reason: HOSPADM

## 2021-02-14 RX ADMIN — LOSARTAN POTASSIUM 50 MG: 50 TABLET, FILM COATED ORAL at 08:02

## 2021-02-14 RX ADMIN — DOCUSATE SODIUM AND SENNOSIDES 1 TABLET: 8.6; 5 TABLET, FILM COATED ORAL at 08:02

## 2021-02-14 RX ADMIN — CYCLOBENZAPRINE HYDROCHLORIDE 5 MG: 5 TABLET, FILM COATED ORAL at 12:02

## 2021-02-14 RX ADMIN — SILODOSIN 4 MG: 4 CAPSULE ORAL at 08:02

## 2021-02-14 RX ADMIN — ATORVASTATIN CALCIUM 10 MG: 10 TABLET, FILM COATED ORAL at 08:02

## 2021-02-14 RX ADMIN — ACETAMINOPHEN 650 MG: 325 TABLET ORAL at 09:02

## 2021-02-14 RX ADMIN — ASPIRIN 81 MG: 81 TABLET, CHEWABLE ORAL at 08:02

## 2021-02-14 RX ADMIN — SULFAMETHOXAZOLE AND TRIMETHOPRIM 1 TABLET: 800; 160 TABLET ORAL at 08:02

## 2021-02-14 RX ADMIN — INSULIN ASPART 2 UNITS: 100 INJECTION, SOLUTION INTRAVENOUS; SUBCUTANEOUS at 08:02

## 2021-02-14 RX ADMIN — POLYETHYLENE GLYCOL 3350 17 G: 17 POWDER, FOR SOLUTION ORAL at 08:02

## 2021-02-14 RX ADMIN — IPRATROPIUM BROMIDE AND ALBUTEROL SULFATE 3 ML: .5; 2.5 SOLUTION RESPIRATORY (INHALATION) at 04:02

## 2021-02-14 RX ADMIN — HYPROMELLOSE 2910 1 DROP: 5 SOLUTION OPHTHALMIC at 03:02

## 2021-02-14 RX ADMIN — INSULIN ASPART 12 UNITS: 100 INJECTION, SOLUTION INTRAVENOUS; SUBCUTANEOUS at 04:02

## 2021-02-14 RX ADMIN — LEVOTHYROXINE SODIUM 137 MCG: 0.14 TABLET ORAL at 05:02

## 2021-02-14 RX ADMIN — Medication 1000 MG: at 08:02

## 2021-02-14 RX ADMIN — SUCRALFATE 1 G: 1 TABLET ORAL at 05:02

## 2021-02-14 RX ADMIN — SUCRALFATE 1 G: 1 TABLET ORAL at 12:02

## 2021-02-14 RX ADMIN — HYPROMELLOSE 2910 1 DROP: 5 SOLUTION OPHTHALMIC at 08:02

## 2021-02-14 RX ADMIN — IPRATROPIUM BROMIDE AND ALBUTEROL SULFATE 3 ML: .5; 2.5 SOLUTION RESPIRATORY (INHALATION) at 09:02

## 2021-02-14 RX ADMIN — FAMOTIDINE 20 MG: 20 TABLET, FILM COATED ORAL at 09:02

## 2021-02-14 RX ADMIN — HYPROMELLOSE 2910 1 DROP: 5 SOLUTION OPHTHALMIC at 09:02

## 2021-02-14 RX ADMIN — CLOPIDOGREL BISULFATE 75 MG: 75 TABLET, FILM COATED ORAL at 08:02

## 2021-02-14 RX ADMIN — DOCUSATE SODIUM AND SENNOSIDES 1 TABLET: 8.6; 5 TABLET, FILM COATED ORAL at 09:02

## 2021-02-14 RX ADMIN — SULFAMETHOXAZOLE AND TRIMETHOPRIM 1 TABLET: 800; 160 TABLET ORAL at 09:02

## 2021-02-14 RX ADMIN — INSULIN ASPART 12 UNITS: 100 INJECTION, SOLUTION INTRAVENOUS; SUBCUTANEOUS at 12:02

## 2021-02-14 RX ADMIN — INSULIN ASPART 10 UNITS: 100 INJECTION, SOLUTION INTRAVENOUS; SUBCUTANEOUS at 07:02

## 2021-02-14 RX ADMIN — DEXAMETHASONE SODIUM PHOSPHATE 4 MG: 4 INJECTION INTRA-ARTICULAR; INTRALESIONAL; INTRAMUSCULAR; INTRAVENOUS; SOFT TISSUE at 05:02

## 2021-02-14 RX ADMIN — LEVETIRACETAM 2000 MG: 100 SOLUTION ORAL at 09:02

## 2021-02-14 RX ADMIN — IPRATROPIUM BROMIDE AND ALBUTEROL SULFATE 3 ML: .5; 2.5 SOLUTION RESPIRATORY (INHALATION) at 01:02

## 2021-02-14 RX ADMIN — Medication 1000 MG: at 09:02

## 2021-02-14 RX ADMIN — LEVETIRACETAM 2000 MG: 100 SOLUTION ORAL at 08:02

## 2021-02-14 RX ADMIN — DEXAMETHASONE SODIUM PHOSPHATE 4 MG: 4 INJECTION INTRA-ARTICULAR; INTRALESIONAL; INTRAMUSCULAR; INTRAVENOUS; SOFT TISSUE at 10:02

## 2021-02-14 RX ADMIN — SUCRALFATE 1 G: 1 TABLET ORAL at 09:02

## 2021-02-14 RX ADMIN — FAMOTIDINE 20 MG: 20 TABLET, FILM COATED ORAL at 08:02

## 2021-02-15 PROBLEM — R07.9 CHEST PAIN: Status: ACTIVE | Noted: 2021-02-15

## 2021-02-15 PROBLEM — N39.0 UTI (URINARY TRACT INFECTION): Status: ACTIVE | Noted: 2021-02-15

## 2021-02-15 LAB
ALBUMIN SERPL BCP-MCNC: 3.1 G/DL (ref 3.5–5.2)
ALP SERPL-CCNC: 182 U/L (ref 55–135)
ALT SERPL W/O P-5'-P-CCNC: 78 U/L (ref 10–44)
ANION GAP SERPL CALC-SCNC: 14 MMOL/L (ref 8–16)
AST SERPL-CCNC: 18 U/L (ref 10–40)
BACTERIA UR CULT: NORMAL
BACTERIA UR CULT: NORMAL
BASOPHILS # BLD AUTO: 0.02 K/UL (ref 0–0.2)
BASOPHILS NFR BLD: 0.1 % (ref 0–1.9)
BILIRUB SERPL-MCNC: 0.4 MG/DL (ref 0.1–1)
BUN SERPL-MCNC: 28 MG/DL (ref 6–20)
CALCIUM SERPL-MCNC: 8.1 MG/DL (ref 8.7–10.5)
CHLORIDE SERPL-SCNC: 101 MMOL/L (ref 95–110)
CO2 SERPL-SCNC: 20 MMOL/L (ref 23–29)
CREAT SERPL-MCNC: 0.9 MG/DL (ref 0.5–1.4)
DIFFERENTIAL METHOD: ABNORMAL
EOSINOPHIL # BLD AUTO: 0 K/UL (ref 0–0.5)
EOSINOPHIL NFR BLD: 0.1 % (ref 0–8)
ERYTHROCYTE [DISTWIDTH] IN BLOOD BY AUTOMATED COUNT: 17.2 % (ref 11.5–14.5)
EST. GFR  (AFRICAN AMERICAN): >60 ML/MIN/1.73 M^2
EST. GFR  (NON AFRICAN AMERICAN): >60 ML/MIN/1.73 M^2
GLUCOSE SERPL-MCNC: 295 MG/DL (ref 70–110)
HCT VFR BLD AUTO: 34.6 % (ref 37–48.5)
HGB BLD-MCNC: 11 G/DL (ref 12–16)
IMM GRANULOCYTES # BLD AUTO: 0.17 K/UL (ref 0–0.04)
IMM GRANULOCYTES NFR BLD AUTO: 0.9 % (ref 0–0.5)
LYMPHOCYTES # BLD AUTO: 0.6 K/UL (ref 1–4.8)
LYMPHOCYTES NFR BLD: 3.1 % (ref 18–48)
MCH RBC QN AUTO: 26.3 PG (ref 27–31)
MCHC RBC AUTO-ENTMCNC: 31.8 G/DL (ref 32–36)
MCV RBC AUTO: 83 FL (ref 82–98)
MONOCYTES # BLD AUTO: 0.4 K/UL (ref 0.3–1)
MONOCYTES NFR BLD: 1.9 % (ref 4–15)
NEUTROPHILS # BLD AUTO: 18.3 K/UL (ref 1.8–7.7)
NEUTROPHILS NFR BLD: 93.9 % (ref 38–73)
NRBC BLD-RTO: 0 /100 WBC
PLATELET # BLD AUTO: 252 K/UL (ref 150–350)
PMV BLD AUTO: 10.5 FL (ref 9.2–12.9)
POCT GLUCOSE: 147 MG/DL (ref 70–110)
POCT GLUCOSE: 147 MG/DL (ref 70–110)
POCT GLUCOSE: 181 MG/DL (ref 70–110)
POCT GLUCOSE: 204 MG/DL (ref 70–110)
POCT GLUCOSE: 211 MG/DL (ref 70–110)
POTASSIUM SERPL-SCNC: 4.2 MMOL/L (ref 3.5–5.1)
PROT SERPL-MCNC: 5.9 G/DL (ref 6–8.4)
RBC # BLD AUTO: 4.18 M/UL (ref 4–5.4)
SODIUM SERPL-SCNC: 135 MMOL/L (ref 136–145)
TROPONIN I SERPL DL<=0.01 NG/ML-MCNC: 0.01 NG/ML (ref 0–0.03)
WBC # BLD AUTO: 19.51 K/UL (ref 3.9–12.7)

## 2021-02-15 PROCEDURE — 25000003 PHARM REV CODE 250: Performed by: PHYSICIAN ASSISTANT

## 2021-02-15 PROCEDURE — 99900026 HC AIRWAY MAINTENANCE (STAT)

## 2021-02-15 PROCEDURE — 93010 ELECTROCARDIOGRAM REPORT: CPT | Mod: ,,, | Performed by: INTERNAL MEDICINE

## 2021-02-15 PROCEDURE — 94668 MNPJ CHEST WALL SBSQ: CPT

## 2021-02-15 PROCEDURE — 25000242 PHARM REV CODE 250 ALT 637 W/ HCPCS: Performed by: PHYSICIAN ASSISTANT

## 2021-02-15 PROCEDURE — 99233 PR SUBSEQUENT HOSPITAL CARE,LEVL III: ICD-10-PCS | Mod: ,,, | Performed by: PHYSICIAN ASSISTANT

## 2021-02-15 PROCEDURE — 93005 ELECTROCARDIOGRAM TRACING: CPT

## 2021-02-15 PROCEDURE — 80053 COMPREHEN METABOLIC PANEL: CPT

## 2021-02-15 PROCEDURE — 25000003 PHARM REV CODE 250: Performed by: STUDENT IN AN ORGANIZED HEALTH CARE EDUCATION/TRAINING PROGRAM

## 2021-02-15 PROCEDURE — 93010 EKG 12-LEAD: ICD-10-PCS | Mod: ,,, | Performed by: INTERNAL MEDICINE

## 2021-02-15 PROCEDURE — 11000001 HC ACUTE MED/SURG PRIVATE ROOM

## 2021-02-15 PROCEDURE — 27000221 HC OXYGEN, UP TO 24 HOURS

## 2021-02-15 PROCEDURE — 84484 ASSAY OF TROPONIN QUANT: CPT | Mod: 91

## 2021-02-15 PROCEDURE — 99233 SBSQ HOSP IP/OBS HIGH 50: CPT | Mod: ,,, | Performed by: PHYSICIAN ASSISTANT

## 2021-02-15 PROCEDURE — 99900035 HC TECH TIME PER 15 MIN (STAT)

## 2021-02-15 PROCEDURE — 63600175 PHARM REV CODE 636 W HCPCS: Performed by: PHYSICIAN ASSISTANT

## 2021-02-15 PROCEDURE — 97530 THERAPEUTIC ACTIVITIES: CPT

## 2021-02-15 PROCEDURE — 99222 1ST HOSP IP/OBS MODERATE 55: CPT | Mod: ,,, | Performed by: INTERNAL MEDICINE

## 2021-02-15 PROCEDURE — 99222 PR INITIAL HOSPITAL CARE,LEVL II: ICD-10-PCS | Mod: ,,, | Performed by: INTERNAL MEDICINE

## 2021-02-15 PROCEDURE — 94640 AIRWAY INHALATION TREATMENT: CPT

## 2021-02-15 PROCEDURE — 85025 COMPLETE CBC W/AUTO DIFF WBC: CPT

## 2021-02-15 PROCEDURE — 94761 N-INVAS EAR/PLS OXIMETRY MLT: CPT

## 2021-02-15 PROCEDURE — 36415 COLL VENOUS BLD VENIPUNCTURE: CPT

## 2021-02-15 RX ORDER — CIPROFLOXACIN 500 MG/1
500 TABLET ORAL EVERY 12 HOURS
Status: DISCONTINUED | OUTPATIENT
Start: 2021-02-15 | End: 2021-02-18 | Stop reason: HOSPADM

## 2021-02-15 RX ADMIN — DEXAMETHASONE SODIUM PHOSPHATE 4 MG: 4 INJECTION INTRA-ARTICULAR; INTRALESIONAL; INTRAMUSCULAR; INTRAVENOUS; SOFT TISSUE at 06:02

## 2021-02-15 RX ADMIN — DEXAMETHASONE SODIUM PHOSPHATE 4 MG: 4 INJECTION INTRA-ARTICULAR; INTRALESIONAL; INTRAMUSCULAR; INTRAVENOUS; SOFT TISSUE at 01:02

## 2021-02-15 RX ADMIN — CIPROFLOXACIN HYDROCHLORIDE 500 MG: 500 TABLET, FILM COATED ORAL at 09:02

## 2021-02-15 RX ADMIN — IPRATROPIUM BROMIDE AND ALBUTEROL SULFATE 3 ML: .5; 2.5 SOLUTION RESPIRATORY (INHALATION) at 07:02

## 2021-02-15 RX ADMIN — HYPROMELLOSE 2910 1 DROP: 5 SOLUTION OPHTHALMIC at 03:02

## 2021-02-15 RX ADMIN — DEXAMETHASONE SODIUM PHOSPHATE 4 MG: 4 INJECTION INTRA-ARTICULAR; INTRALESIONAL; INTRAMUSCULAR; INTRAVENOUS; SOFT TISSUE at 09:02

## 2021-02-15 RX ADMIN — SILODOSIN 4 MG: 4 CAPSULE ORAL at 08:02

## 2021-02-15 RX ADMIN — SUCRALFATE 1 G: 1 TABLET ORAL at 09:02

## 2021-02-15 RX ADMIN — CLOPIDOGREL BISULFATE 75 MG: 75 TABLET, FILM COATED ORAL at 08:02

## 2021-02-15 RX ADMIN — INSULIN ASPART 2 UNITS: 100 INJECTION, SOLUTION INTRAVENOUS; SUBCUTANEOUS at 12:02

## 2021-02-15 RX ADMIN — ATORVASTATIN CALCIUM 10 MG: 10 TABLET, FILM COATED ORAL at 08:02

## 2021-02-15 RX ADMIN — POLYETHYLENE GLYCOL 3350 17 G: 17 POWDER, FOR SOLUTION ORAL at 08:02

## 2021-02-15 RX ADMIN — IPRATROPIUM BROMIDE AND ALBUTEROL SULFATE 3 ML: .5; 2.5 SOLUTION RESPIRATORY (INHALATION) at 04:02

## 2021-02-15 RX ADMIN — SUCRALFATE 1 G: 1 TABLET ORAL at 05:02

## 2021-02-15 RX ADMIN — INSULIN ASPART 12 UNITS: 100 INJECTION, SOLUTION INTRAVENOUS; SUBCUTANEOUS at 05:02

## 2021-02-15 RX ADMIN — LEVOTHYROXINE SODIUM 137 MCG: 0.14 TABLET ORAL at 06:02

## 2021-02-15 RX ADMIN — INSULIN ASPART 12 UNITS: 100 INJECTION, SOLUTION INTRAVENOUS; SUBCUTANEOUS at 07:02

## 2021-02-15 RX ADMIN — FAMOTIDINE 20 MG: 20 TABLET, FILM COATED ORAL at 09:02

## 2021-02-15 RX ADMIN — LEVETIRACETAM 2000 MG: 100 SOLUTION ORAL at 09:02

## 2021-02-15 RX ADMIN — SULFAMETHOXAZOLE AND TRIMETHOPRIM 1 TABLET: 800; 160 TABLET ORAL at 08:02

## 2021-02-15 RX ADMIN — DOCUSATE SODIUM AND SENNOSIDES 1 TABLET: 8.6; 5 TABLET, FILM COATED ORAL at 09:02

## 2021-02-15 RX ADMIN — SUCRALFATE 1 G: 1 TABLET ORAL at 11:02

## 2021-02-15 RX ADMIN — LEVETIRACETAM 2000 MG: 100 SOLUTION ORAL at 08:02

## 2021-02-15 RX ADMIN — ASPIRIN 81 MG: 81 TABLET, CHEWABLE ORAL at 08:02

## 2021-02-15 RX ADMIN — INSULIN ASPART 12 UNITS: 100 INJECTION, SOLUTION INTRAVENOUS; SUBCUTANEOUS at 11:02

## 2021-02-15 RX ADMIN — HYPROMELLOSE 2910 1 DROP: 5 SOLUTION OPHTHALMIC at 09:02

## 2021-02-15 RX ADMIN — INSULIN ASPART 4 UNITS: 100 INJECTION, SOLUTION INTRAVENOUS; SUBCUTANEOUS at 08:02

## 2021-02-15 RX ADMIN — FAMOTIDINE 20 MG: 20 TABLET, FILM COATED ORAL at 08:02

## 2021-02-15 RX ADMIN — DOCUSATE SODIUM AND SENNOSIDES 1 TABLET: 8.6; 5 TABLET, FILM COATED ORAL at 08:02

## 2021-02-15 RX ADMIN — SUCRALFATE 1 G: 1 TABLET ORAL at 06:02

## 2021-02-15 RX ADMIN — IPRATROPIUM BROMIDE AND ALBUTEROL SULFATE 3 ML: .5; 2.5 SOLUTION RESPIRATORY (INHALATION) at 12:02

## 2021-02-15 RX ADMIN — Medication 1000 MG: at 09:02

## 2021-02-15 RX ADMIN — LOSARTAN POTASSIUM 50 MG: 50 TABLET, FILM COATED ORAL at 08:02

## 2021-02-15 RX ADMIN — Medication 1000 MG: at 08:02

## 2021-02-16 LAB
ALBUMIN SERPL BCP-MCNC: 2.7 G/DL (ref 3.5–5.2)
ALP SERPL-CCNC: 157 U/L (ref 55–135)
ALT SERPL W/O P-5'-P-CCNC: 62 U/L (ref 10–44)
ANION GAP SERPL CALC-SCNC: 10 MMOL/L (ref 8–16)
AST SERPL-CCNC: 14 U/L (ref 10–40)
BASOPHILS # BLD AUTO: 0.02 K/UL (ref 0–0.2)
BASOPHILS NFR BLD: 0.1 % (ref 0–1.9)
BILIRUB SERPL-MCNC: 0.4 MG/DL (ref 0.1–1)
BUN SERPL-MCNC: 23 MG/DL (ref 6–20)
CALCIUM SERPL-MCNC: 7.9 MG/DL (ref 8.7–10.5)
CHLORIDE SERPL-SCNC: 105 MMOL/L (ref 95–110)
CO2 SERPL-SCNC: 18 MMOL/L (ref 23–29)
CREAT SERPL-MCNC: 0.6 MG/DL (ref 0.5–1.4)
DIFFERENTIAL METHOD: ABNORMAL
EOSINOPHIL # BLD AUTO: 0 K/UL (ref 0–0.5)
EOSINOPHIL NFR BLD: 0.1 % (ref 0–8)
ERYTHROCYTE [DISTWIDTH] IN BLOOD BY AUTOMATED COUNT: 17.1 % (ref 11.5–14.5)
EST. GFR  (AFRICAN AMERICAN): >60 ML/MIN/1.73 M^2
EST. GFR  (NON AFRICAN AMERICAN): >60 ML/MIN/1.73 M^2
GLUCOSE SERPL-MCNC: 179 MG/DL (ref 70–110)
HCT VFR BLD AUTO: 32.7 % (ref 37–48.5)
HGB BLD-MCNC: 10.3 G/DL (ref 12–16)
IMM GRANULOCYTES # BLD AUTO: 0.17 K/UL (ref 0–0.04)
IMM GRANULOCYTES NFR BLD AUTO: 1 % (ref 0–0.5)
LYMPHOCYTES # BLD AUTO: 1.1 K/UL (ref 1–4.8)
LYMPHOCYTES NFR BLD: 6.3 % (ref 18–48)
MCH RBC QN AUTO: 26.2 PG (ref 27–31)
MCHC RBC AUTO-ENTMCNC: 31.5 G/DL (ref 32–36)
MCV RBC AUTO: 83 FL (ref 82–98)
MONOCYTES # BLD AUTO: 0.4 K/UL (ref 0.3–1)
MONOCYTES NFR BLD: 2.1 % (ref 4–15)
NEUTROPHILS # BLD AUTO: 15.3 K/UL (ref 1.8–7.7)
NEUTROPHILS NFR BLD: 90.4 % (ref 38–73)
NRBC BLD-RTO: 0 /100 WBC
PLATELET # BLD AUTO: 195 K/UL (ref 150–350)
PMV BLD AUTO: 10.6 FL (ref 9.2–12.9)
POCT GLUCOSE: 147 MG/DL (ref 70–110)
POCT GLUCOSE: 148 MG/DL (ref 70–110)
POCT GLUCOSE: 167 MG/DL (ref 70–110)
POTASSIUM SERPL-SCNC: 3.6 MMOL/L (ref 3.5–5.1)
PROT SERPL-MCNC: 5.3 G/DL (ref 6–8.4)
RBC # BLD AUTO: 3.93 M/UL (ref 4–5.4)
SODIUM SERPL-SCNC: 133 MMOL/L (ref 136–145)
TROPONIN I SERPL DL<=0.01 NG/ML-MCNC: 0.01 NG/ML (ref 0–0.03)
WBC # BLD AUTO: 16.88 K/UL (ref 3.9–12.7)

## 2021-02-16 PROCEDURE — 11000001 HC ACUTE MED/SURG PRIVATE ROOM

## 2021-02-16 PROCEDURE — 25000003 PHARM REV CODE 250: Performed by: STUDENT IN AN ORGANIZED HEALTH CARE EDUCATION/TRAINING PROGRAM

## 2021-02-16 PROCEDURE — 25000003 PHARM REV CODE 250: Performed by: NURSE PRACTITIONER

## 2021-02-16 PROCEDURE — 99900035 HC TECH TIME PER 15 MIN (STAT)

## 2021-02-16 PROCEDURE — 94761 N-INVAS EAR/PLS OXIMETRY MLT: CPT

## 2021-02-16 PROCEDURE — 94640 AIRWAY INHALATION TREATMENT: CPT

## 2021-02-16 PROCEDURE — 25000242 PHARM REV CODE 250 ALT 637 W/ HCPCS

## 2021-02-16 PROCEDURE — 63600175 PHARM REV CODE 636 W HCPCS: Performed by: PHYSICIAN ASSISTANT

## 2021-02-16 PROCEDURE — 25000003 PHARM REV CODE 250: Performed by: PHYSICIAN ASSISTANT

## 2021-02-16 PROCEDURE — 94668 MNPJ CHEST WALL SBSQ: CPT

## 2021-02-16 PROCEDURE — 80053 COMPREHEN METABOLIC PANEL: CPT

## 2021-02-16 PROCEDURE — 85025 COMPLETE CBC W/AUTO DIFF WBC: CPT

## 2021-02-16 PROCEDURE — 25000242 PHARM REV CODE 250 ALT 637 W/ HCPCS: Performed by: PHYSICIAN ASSISTANT

## 2021-02-16 PROCEDURE — 36415 COLL VENOUS BLD VENIPUNCTURE: CPT

## 2021-02-16 RX ORDER — ALBUTEROL SULFATE 2.5 MG/.5ML
SOLUTION RESPIRATORY (INHALATION)
Status: COMPLETED
Start: 2021-02-16 | End: 2021-02-16

## 2021-02-16 RX ADMIN — FAMOTIDINE 20 MG: 20 TABLET, FILM COATED ORAL at 08:02

## 2021-02-16 RX ADMIN — Medication 1000 MG: at 08:02

## 2021-02-16 RX ADMIN — ACETAMINOPHEN 650 MG: 325 TABLET ORAL at 05:02

## 2021-02-16 RX ADMIN — INSULIN ASPART 12 UNITS: 100 INJECTION, SOLUTION INTRAVENOUS; SUBCUTANEOUS at 11:02

## 2021-02-16 RX ADMIN — IPRATROPIUM BROMIDE AND ALBUTEROL SULFATE 3 ML: .5; 2.5 SOLUTION RESPIRATORY (INHALATION) at 03:02

## 2021-02-16 RX ADMIN — CLOPIDOGREL BISULFATE 75 MG: 75 TABLET, FILM COATED ORAL at 08:02

## 2021-02-16 RX ADMIN — DEXAMETHASONE SODIUM PHOSPHATE 4 MG: 4 INJECTION INTRA-ARTICULAR; INTRALESIONAL; INTRAMUSCULAR; INTRAVENOUS; SOFT TISSUE at 10:02

## 2021-02-16 RX ADMIN — ACETAMINOPHEN 650 MG: 325 TABLET ORAL at 07:02

## 2021-02-16 RX ADMIN — IPRATROPIUM BROMIDE AND ALBUTEROL SULFATE 3 ML: .5; 2.5 SOLUTION RESPIRATORY (INHALATION) at 08:02

## 2021-02-16 RX ADMIN — ASPIRIN 81 MG: 81 TABLET, CHEWABLE ORAL at 08:02

## 2021-02-16 RX ADMIN — DOCUSATE SODIUM AND SENNOSIDES 1 TABLET: 8.6; 5 TABLET, FILM COATED ORAL at 08:02

## 2021-02-16 RX ADMIN — SILODOSIN 4 MG: 4 CAPSULE ORAL at 08:02

## 2021-02-16 RX ADMIN — SUCRALFATE 1 G: 1 TABLET ORAL at 03:02

## 2021-02-16 RX ADMIN — DEXAMETHASONE SODIUM PHOSPHATE 4 MG: 4 INJECTION INTRA-ARTICULAR; INTRALESIONAL; INTRAMUSCULAR; INTRAVENOUS; SOFT TISSUE at 05:02

## 2021-02-16 RX ADMIN — LEVETIRACETAM 2000 MG: 100 SOLUTION ORAL at 09:02

## 2021-02-16 RX ADMIN — ALBUTEROL SULFATE 2.5 MG: 2.5 SOLUTION RESPIRATORY (INHALATION) at 11:02

## 2021-02-16 RX ADMIN — HYPROMELLOSE 2910 1 DROP: 5 SOLUTION OPHTHALMIC at 08:02

## 2021-02-16 RX ADMIN — LEVETIRACETAM 2000 MG: 100 SOLUTION ORAL at 08:02

## 2021-02-16 RX ADMIN — LOSARTAN POTASSIUM 50 MG: 50 TABLET, FILM COATED ORAL at 08:02

## 2021-02-16 RX ADMIN — SUCRALFATE 1 G: 1 TABLET ORAL at 08:02

## 2021-02-16 RX ADMIN — ATORVASTATIN CALCIUM 10 MG: 10 TABLET, FILM COATED ORAL at 08:02

## 2021-02-16 RX ADMIN — CIPROFLOXACIN HYDROCHLORIDE 500 MG: 500 TABLET, FILM COATED ORAL at 08:02

## 2021-02-16 RX ADMIN — DEXAMETHASONE SODIUM PHOSPHATE 4 MG: 4 INJECTION INTRA-ARTICULAR; INTRALESIONAL; INTRAMUSCULAR; INTRAVENOUS; SOFT TISSUE at 02:02

## 2021-02-16 RX ADMIN — IPRATROPIUM BROMIDE AND ALBUTEROL SULFATE 3 ML: .5; 2.5 SOLUTION RESPIRATORY (INHALATION) at 12:02

## 2021-02-16 RX ADMIN — SUCRALFATE 1 G: 1 TABLET ORAL at 05:02

## 2021-02-16 RX ADMIN — SUCRALFATE 1 G: 1 TABLET ORAL at 11:02

## 2021-02-16 RX ADMIN — INSULIN ASPART 2 UNITS: 100 INJECTION, SOLUTION INTRAVENOUS; SUBCUTANEOUS at 11:02

## 2021-02-16 RX ADMIN — LEVOTHYROXINE SODIUM 137 MCG: 0.14 TABLET ORAL at 05:02

## 2021-02-16 RX ADMIN — INSULIN ASPART 12 UNITS: 100 INJECTION, SOLUTION INTRAVENOUS; SUBCUTANEOUS at 03:02

## 2021-02-16 RX ADMIN — INSULIN ASPART 12 UNITS: 100 INJECTION, SOLUTION INTRAVENOUS; SUBCUTANEOUS at 08:02

## 2021-02-16 RX ADMIN — POLYETHYLENE GLYCOL 3350 17 G: 17 POWDER, FOR SOLUTION ORAL at 08:02

## 2021-02-16 RX ADMIN — CIPROFLOXACIN HYDROCHLORIDE 500 MG: 500 TABLET, FILM COATED ORAL at 09:02

## 2021-02-16 RX ADMIN — HYPROMELLOSE 2910 1 DROP: 5 SOLUTION OPHTHALMIC at 03:02

## 2021-02-17 LAB
ALBUMIN SERPL BCP-MCNC: 2.9 G/DL (ref 3.5–5.2)
ALP SERPL-CCNC: 189 U/L (ref 55–135)
ALT SERPL W/O P-5'-P-CCNC: 65 U/L (ref 10–44)
ANION GAP SERPL CALC-SCNC: 11 MMOL/L (ref 8–16)
AST SERPL-CCNC: 19 U/L (ref 10–40)
BASOPHILS # BLD AUTO: 0.02 K/UL (ref 0–0.2)
BASOPHILS NFR BLD: 0.1 % (ref 0–1.9)
BILIRUB SERPL-MCNC: 0.4 MG/DL (ref 0.1–1)
BUN SERPL-MCNC: 32 MG/DL (ref 6–20)
CALCIUM SERPL-MCNC: 7.5 MG/DL (ref 8.7–10.5)
CHLORIDE SERPL-SCNC: 103 MMOL/L (ref 95–110)
CO2 SERPL-SCNC: 18 MMOL/L (ref 23–29)
CREAT SERPL-MCNC: 0.7 MG/DL (ref 0.5–1.4)
DIFFERENTIAL METHOD: ABNORMAL
EOSINOPHIL # BLD AUTO: 0 K/UL (ref 0–0.5)
EOSINOPHIL NFR BLD: 0.1 % (ref 0–8)
ERYTHROCYTE [DISTWIDTH] IN BLOOD BY AUTOMATED COUNT: 17.2 % (ref 11.5–14.5)
EST. GFR  (AFRICAN AMERICAN): >60 ML/MIN/1.73 M^2
EST. GFR  (NON AFRICAN AMERICAN): >60 ML/MIN/1.73 M^2
GLUCOSE SERPL-MCNC: 267 MG/DL (ref 70–110)
HCT VFR BLD AUTO: 33.1 % (ref 37–48.5)
HGB BLD-MCNC: 10.8 G/DL (ref 12–16)
IMM GRANULOCYTES # BLD AUTO: 0.2 K/UL (ref 0–0.04)
IMM GRANULOCYTES NFR BLD AUTO: 1.2 % (ref 0–0.5)
LYMPHOCYTES # BLD AUTO: 0.7 K/UL (ref 1–4.8)
LYMPHOCYTES NFR BLD: 4.3 % (ref 18–48)
MCH RBC QN AUTO: 26.8 PG (ref 27–31)
MCHC RBC AUTO-ENTMCNC: 32.6 G/DL (ref 32–36)
MCV RBC AUTO: 82 FL (ref 82–98)
MONOCYTES # BLD AUTO: 0.3 K/UL (ref 0.3–1)
MONOCYTES NFR BLD: 2 % (ref 4–15)
NEUTROPHILS # BLD AUTO: 15.1 K/UL (ref 1.8–7.7)
NEUTROPHILS NFR BLD: 92.3 % (ref 38–73)
NRBC BLD-RTO: 0 /100 WBC
PLATELET # BLD AUTO: 259 K/UL (ref 150–350)
PMV BLD AUTO: 9.3 FL (ref 9.2–12.9)
POCT GLUCOSE: 143 MG/DL (ref 70–110)
POCT GLUCOSE: 145 MG/DL (ref 70–110)
POCT GLUCOSE: 173 MG/DL (ref 70–110)
POCT GLUCOSE: 213 MG/DL (ref 70–110)
POTASSIUM SERPL-SCNC: 3.9 MMOL/L (ref 3.5–5.1)
PROT SERPL-MCNC: 5.7 G/DL (ref 6–8.4)
RBC # BLD AUTO: 4.03 M/UL (ref 4–5.4)
SODIUM SERPL-SCNC: 132 MMOL/L (ref 136–145)
WBC # BLD AUTO: 16.38 K/UL (ref 3.9–12.7)

## 2021-02-17 PROCEDURE — 85025 COMPLETE CBC W/AUTO DIFF WBC: CPT

## 2021-02-17 PROCEDURE — 27000221 HC OXYGEN, UP TO 24 HOURS

## 2021-02-17 PROCEDURE — 92507 TX SP LANG VOICE COMM INDIV: CPT

## 2021-02-17 PROCEDURE — 63600175 PHARM REV CODE 636 W HCPCS: Performed by: PHYSICIAN ASSISTANT

## 2021-02-17 PROCEDURE — 97110 THERAPEUTIC EXERCISES: CPT

## 2021-02-17 PROCEDURE — 25000242 PHARM REV CODE 250 ALT 637 W/ HCPCS: Performed by: PHYSICIAN ASSISTANT

## 2021-02-17 PROCEDURE — 25000003 PHARM REV CODE 250: Performed by: PHYSICIAN ASSISTANT

## 2021-02-17 PROCEDURE — 11000001 HC ACUTE MED/SURG PRIVATE ROOM

## 2021-02-17 PROCEDURE — 36415 COLL VENOUS BLD VENIPUNCTURE: CPT

## 2021-02-17 PROCEDURE — 99233 SBSQ HOSP IP/OBS HIGH 50: CPT | Mod: ,,, | Performed by: PHYSICIAN ASSISTANT

## 2021-02-17 PROCEDURE — A6209 FOAM DRSG <=16 SQ IN W/O BDR: HCPCS

## 2021-02-17 PROCEDURE — 99233 PR SUBSEQUENT HOSPITAL CARE,LEVL III: ICD-10-PCS | Mod: ,,, | Performed by: PHYSICIAN ASSISTANT

## 2021-02-17 PROCEDURE — 94668 MNPJ CHEST WALL SBSQ: CPT

## 2021-02-17 PROCEDURE — 80053 COMPREHEN METABOLIC PANEL: CPT

## 2021-02-17 PROCEDURE — 25000003 PHARM REV CODE 250: Performed by: STUDENT IN AN ORGANIZED HEALTH CARE EDUCATION/TRAINING PROGRAM

## 2021-02-17 PROCEDURE — 97530 THERAPEUTIC ACTIVITIES: CPT

## 2021-02-17 PROCEDURE — 94640 AIRWAY INHALATION TREATMENT: CPT

## 2021-02-17 PROCEDURE — 94761 N-INVAS EAR/PLS OXIMETRY MLT: CPT

## 2021-02-17 RX ORDER — DEXAMETHASONE SODIUM PHOSPHATE 4 MG/ML
4 INJECTION, SOLUTION INTRA-ARTICULAR; INTRALESIONAL; INTRAMUSCULAR; INTRAVENOUS; SOFT TISSUE EVERY 8 HOURS
Status: DISCONTINUED | OUTPATIENT
Start: 2021-02-17 | End: 2021-02-18

## 2021-02-17 RX ORDER — DEXAMETHASONE SODIUM PHOSPHATE 4 MG/ML
12 INJECTION, SOLUTION INTRA-ARTICULAR; INTRALESIONAL; INTRAMUSCULAR; INTRAVENOUS; SOFT TISSUE EVERY 8 HOURS
Status: DISCONTINUED | OUTPATIENT
Start: 2021-02-17 | End: 2021-02-17

## 2021-02-17 RX ADMIN — LEVOTHYROXINE SODIUM 137 MCG: 0.14 TABLET ORAL at 05:02

## 2021-02-17 RX ADMIN — INSULIN ASPART 12 UNITS: 100 INJECTION, SOLUTION INTRAVENOUS; SUBCUTANEOUS at 07:02

## 2021-02-17 RX ADMIN — ASPIRIN 81 MG: 81 TABLET, CHEWABLE ORAL at 09:02

## 2021-02-17 RX ADMIN — DEXAMETHASONE SODIUM PHOSPHATE 4 MG: 4 INJECTION INTRA-ARTICULAR; INTRALESIONAL; INTRAMUSCULAR; INTRAVENOUS; SOFT TISSUE at 10:02

## 2021-02-17 RX ADMIN — SUCRALFATE 1 G: 1 TABLET ORAL at 11:02

## 2021-02-17 RX ADMIN — SUCRALFATE 1 G: 1 TABLET ORAL at 09:02

## 2021-02-17 RX ADMIN — SILODOSIN 4 MG: 4 CAPSULE ORAL at 09:02

## 2021-02-17 RX ADMIN — SUCRALFATE 1 G: 1 TABLET ORAL at 05:02

## 2021-02-17 RX ADMIN — IPRATROPIUM BROMIDE AND ALBUTEROL SULFATE 3 ML: .5; 2.5 SOLUTION RESPIRATORY (INHALATION) at 09:02

## 2021-02-17 RX ADMIN — INSULIN ASPART 12 UNITS: 100 INJECTION, SOLUTION INTRAVENOUS; SUBCUTANEOUS at 05:02

## 2021-02-17 RX ADMIN — INSULIN ASPART 2 UNITS: 100 INJECTION, SOLUTION INTRAVENOUS; SUBCUTANEOUS at 10:02

## 2021-02-17 RX ADMIN — POLYETHYLENE GLYCOL 3350 17 G: 17 POWDER, FOR SOLUTION ORAL at 09:02

## 2021-02-17 RX ADMIN — DOCUSATE SODIUM AND SENNOSIDES 1 TABLET: 8.6; 5 TABLET, FILM COATED ORAL at 09:02

## 2021-02-17 RX ADMIN — INSULIN ASPART 12 UNITS: 100 INJECTION, SOLUTION INTRAVENOUS; SUBCUTANEOUS at 11:02

## 2021-02-17 RX ADMIN — DEXAMETHASONE SODIUM PHOSPHATE 4 MG: 4 INJECTION INTRA-ARTICULAR; INTRALESIONAL; INTRAMUSCULAR; INTRAVENOUS; SOFT TISSUE at 02:02

## 2021-02-17 RX ADMIN — Medication 1000 MG: at 09:02

## 2021-02-17 RX ADMIN — LEVETIRACETAM 2000 MG: 100 SOLUTION ORAL at 09:02

## 2021-02-17 RX ADMIN — CLOPIDOGREL BISULFATE 75 MG: 75 TABLET, FILM COATED ORAL at 09:02

## 2021-02-17 RX ADMIN — SUCRALFATE 1 G: 1 TABLET ORAL at 04:02

## 2021-02-17 RX ADMIN — HYPROMELLOSE 2910 1 DROP: 5 SOLUTION OPHTHALMIC at 09:02

## 2021-02-17 RX ADMIN — FAMOTIDINE 20 MG: 20 TABLET, FILM COATED ORAL at 09:02

## 2021-02-17 RX ADMIN — IPRATROPIUM BROMIDE AND ALBUTEROL SULFATE 3 ML: .5; 2.5 SOLUTION RESPIRATORY (INHALATION) at 11:02

## 2021-02-17 RX ADMIN — INSULIN ASPART 4 UNITS: 100 INJECTION, SOLUTION INTRAVENOUS; SUBCUTANEOUS at 05:02

## 2021-02-17 RX ADMIN — LOSARTAN POTASSIUM 50 MG: 50 TABLET, FILM COATED ORAL at 09:02

## 2021-02-17 RX ADMIN — CIPROFLOXACIN HYDROCHLORIDE 500 MG: 500 TABLET, FILM COATED ORAL at 09:02

## 2021-02-17 RX ADMIN — ATORVASTATIN CALCIUM 10 MG: 10 TABLET, FILM COATED ORAL at 09:02

## 2021-02-17 RX ADMIN — HYPROMELLOSE 2910 1 DROP: 5 SOLUTION OPHTHALMIC at 03:02

## 2021-02-17 RX ADMIN — LEVETIRACETAM 2000 MG: 100 SOLUTION ORAL at 10:02

## 2021-02-17 RX ADMIN — IPRATROPIUM BROMIDE AND ALBUTEROL SULFATE 3 ML: .5; 2.5 SOLUTION RESPIRATORY (INHALATION) at 03:02

## 2021-02-17 RX ADMIN — DEXAMETHASONE SODIUM PHOSPHATE 4 MG: 4 INJECTION INTRA-ARTICULAR; INTRALESIONAL; INTRAMUSCULAR; INTRAVENOUS; SOFT TISSUE at 05:02

## 2021-02-18 ENCOUNTER — TELEPHONE (OUTPATIENT)
Dept: NEUROSURGERY | Facility: CLINIC | Age: 51
End: 2021-02-18

## 2021-02-18 VITALS
OXYGEN SATURATION: 97 % | DIASTOLIC BLOOD PRESSURE: 68 MMHG | HEART RATE: 85 BPM | BODY MASS INDEX: 28.06 KG/M2 | TEMPERATURE: 98 F | RESPIRATION RATE: 16 BRPM | HEIGHT: 70 IN | SYSTOLIC BLOOD PRESSURE: 123 MMHG | WEIGHT: 196 LBS

## 2021-02-18 PROBLEM — E66.9 OBESITY: Status: ACTIVE | Noted: 2021-02-18

## 2021-02-18 LAB
ALBUMIN SERPL BCP-MCNC: 2.9 G/DL (ref 3.5–5.2)
ALP SERPL-CCNC: 195 U/L (ref 55–135)
ALT SERPL W/O P-5'-P-CCNC: 74 U/L (ref 10–44)
ANION GAP SERPL CALC-SCNC: 10 MMOL/L (ref 8–16)
AST SERPL-CCNC: 22 U/L (ref 10–40)
BASOPHILS # BLD AUTO: 0.02 K/UL (ref 0–0.2)
BASOPHILS NFR BLD: 0.2 % (ref 0–1.9)
BILIRUB SERPL-MCNC: 0.6 MG/DL (ref 0.1–1)
BUN SERPL-MCNC: 26 MG/DL (ref 6–20)
CALCIUM SERPL-MCNC: 7.4 MG/DL (ref 8.7–10.5)
CHLORIDE SERPL-SCNC: 104 MMOL/L (ref 95–110)
CO2 SERPL-SCNC: 20 MMOL/L (ref 23–29)
CREAT SERPL-MCNC: 0.6 MG/DL (ref 0.5–1.4)
DIFFERENTIAL METHOD: ABNORMAL
EOSINOPHIL # BLD AUTO: 0 K/UL (ref 0–0.5)
EOSINOPHIL NFR BLD: 0.1 % (ref 0–8)
ERYTHROCYTE [DISTWIDTH] IN BLOOD BY AUTOMATED COUNT: 17.4 % (ref 11.5–14.5)
EST. GFR  (AFRICAN AMERICAN): >60 ML/MIN/1.73 M^2
EST. GFR  (NON AFRICAN AMERICAN): >60 ML/MIN/1.73 M^2
GLUCOSE SERPL-MCNC: 225 MG/DL (ref 70–110)
HCT VFR BLD AUTO: 32 % (ref 37–48.5)
HGB BLD-MCNC: 10.5 G/DL (ref 12–16)
IMM GRANULOCYTES # BLD AUTO: 0.16 K/UL (ref 0–0.04)
IMM GRANULOCYTES NFR BLD AUTO: 1.3 % (ref 0–0.5)
LYMPHOCYTES # BLD AUTO: 0.6 K/UL (ref 1–4.8)
LYMPHOCYTES NFR BLD: 5.2 % (ref 18–48)
MCH RBC QN AUTO: 27.2 PG (ref 27–31)
MCHC RBC AUTO-ENTMCNC: 32.8 G/DL (ref 32–36)
MCV RBC AUTO: 83 FL (ref 82–98)
MONOCYTES # BLD AUTO: 0.2 K/UL (ref 0.3–1)
MONOCYTES NFR BLD: 1.8 % (ref 4–15)
NEUTROPHILS # BLD AUTO: 10.9 K/UL (ref 1.8–7.7)
NEUTROPHILS NFR BLD: 91.4 % (ref 38–73)
NRBC BLD-RTO: 0 /100 WBC
PLATELET # BLD AUTO: 210 K/UL (ref 150–350)
PMV BLD AUTO: 10.2 FL (ref 9.2–12.9)
POCT GLUCOSE: 205 MG/DL (ref 70–110)
POTASSIUM SERPL-SCNC: 3.5 MMOL/L (ref 3.5–5.1)
PROT SERPL-MCNC: 5.5 G/DL (ref 6–8.4)
RBC # BLD AUTO: 3.86 M/UL (ref 4–5.4)
SODIUM SERPL-SCNC: 134 MMOL/L (ref 136–145)
WBC # BLD AUTO: 11.93 K/UL (ref 3.9–12.7)

## 2021-02-18 PROCEDURE — 94668 MNPJ CHEST WALL SBSQ: CPT

## 2021-02-18 PROCEDURE — 99233 SBSQ HOSP IP/OBS HIGH 50: CPT | Mod: ,,, | Performed by: PHYSICIAN ASSISTANT

## 2021-02-18 PROCEDURE — 63600175 PHARM REV CODE 636 W HCPCS: Performed by: PHYSICIAN ASSISTANT

## 2021-02-18 PROCEDURE — 97110 THERAPEUTIC EXERCISES: CPT

## 2021-02-18 PROCEDURE — 85025 COMPLETE CBC W/AUTO DIFF WBC: CPT

## 2021-02-18 PROCEDURE — 92526 ORAL FUNCTION THERAPY: CPT

## 2021-02-18 PROCEDURE — 36415 COLL VENOUS BLD VENIPUNCTURE: CPT

## 2021-02-18 PROCEDURE — 99232 PR SUBSEQUENT HOSPITAL CARE,LEVL II: ICD-10-PCS | Mod: ,,, | Performed by: NURSE PRACTITIONER

## 2021-02-18 PROCEDURE — 94761 N-INVAS EAR/PLS OXIMETRY MLT: CPT

## 2021-02-18 PROCEDURE — 99232 SBSQ HOSP IP/OBS MODERATE 35: CPT | Mod: ,,, | Performed by: NURSE PRACTITIONER

## 2021-02-18 PROCEDURE — 25000003 PHARM REV CODE 250: Performed by: STUDENT IN AN ORGANIZED HEALTH CARE EDUCATION/TRAINING PROGRAM

## 2021-02-18 PROCEDURE — 97535 SELF CARE MNGMENT TRAINING: CPT

## 2021-02-18 PROCEDURE — 94640 AIRWAY INHALATION TREATMENT: CPT

## 2021-02-18 PROCEDURE — 25000242 PHARM REV CODE 250 ALT 637 W/ HCPCS: Performed by: PHYSICIAN ASSISTANT

## 2021-02-18 PROCEDURE — 80053 COMPREHEN METABOLIC PANEL: CPT

## 2021-02-18 PROCEDURE — 25000003 PHARM REV CODE 250: Performed by: PHYSICIAN ASSISTANT

## 2021-02-18 PROCEDURE — 99233 PR SUBSEQUENT HOSPITAL CARE,LEVL III: ICD-10-PCS | Mod: ,,, | Performed by: PHYSICIAN ASSISTANT

## 2021-02-18 RX ORDER — INSULIN ASPART 100 [IU]/ML
10 INJECTION, SOLUTION INTRAVENOUS; SUBCUTANEOUS
Refills: 0
Start: 2021-02-18 | End: 2022-02-18

## 2021-02-18 RX ORDER — CIPROFLOXACIN 500 MG/1
500 TABLET ORAL EVERY 12 HOURS
Qty: 20 TABLET | Refills: 0
Start: 2021-02-18 | End: 2021-02-28

## 2021-02-18 RX ORDER — DEXAMETHASONE SODIUM PHOSPHATE 4 MG/ML
8 INJECTION, SOLUTION INTRA-ARTICULAR; INTRALESIONAL; INTRAMUSCULAR; INTRAVENOUS; SOFT TISSUE EVERY 12 HOURS
Status: DISCONTINUED | OUTPATIENT
Start: 2021-02-18 | End: 2021-02-18

## 2021-02-18 RX ORDER — AMOXICILLIN 250 MG
1 CAPSULE ORAL 2 TIMES DAILY
Status: CANCELLED | OUTPATIENT
Start: 2021-02-18

## 2021-02-18 RX ORDER — DEXAMETHASONE 2 MG/1
TABLET ORAL
Qty: 75 TABLET | Refills: 1
Start: 2021-02-18

## 2021-02-18 RX ORDER — HYDROCODONE BITARTRATE AND ACETAMINOPHEN 5; 325 MG/1; MG/1
1 TABLET ORAL EVERY 4 HOURS PRN
Qty: 60 TABLET | Refills: 0 | Status: SHIPPED | OUTPATIENT
Start: 2021-02-18

## 2021-02-18 RX ORDER — CALCIUM CARBONATE 200(500)MG
500 TABLET,CHEWABLE ORAL 2 TIMES DAILY PRN
Status: CANCELLED | OUTPATIENT
Start: 2021-02-18

## 2021-02-18 RX ORDER — TALC
6 POWDER (GRAM) TOPICAL NIGHTLY PRN
Status: CANCELLED | OUTPATIENT
Start: 2021-02-18

## 2021-02-18 RX ORDER — DEXAMETHASONE SODIUM PHOSPHATE 4 MG/ML
4 INJECTION, SOLUTION INTRA-ARTICULAR; INTRALESIONAL; INTRAMUSCULAR; INTRAVENOUS; SOFT TISSUE EVERY 12 HOURS
Status: DISCONTINUED | OUTPATIENT
Start: 2021-02-18 | End: 2021-02-18 | Stop reason: HOSPADM

## 2021-02-18 RX ORDER — ACETAMINOPHEN 325 MG/1
650 TABLET ORAL EVERY 6 HOURS PRN
Status: CANCELLED | OUTPATIENT
Start: 2021-02-18

## 2021-02-18 RX ADMIN — HYPROMELLOSE 2910 1 DROP: 5 SOLUTION OPHTHALMIC at 10:02

## 2021-02-18 RX ADMIN — LOSARTAN POTASSIUM 50 MG: 50 TABLET, FILM COATED ORAL at 09:02

## 2021-02-18 RX ADMIN — ATORVASTATIN CALCIUM 10 MG: 10 TABLET, FILM COATED ORAL at 09:02

## 2021-02-18 RX ADMIN — ASPIRIN 81 MG: 81 TABLET, CHEWABLE ORAL at 09:02

## 2021-02-18 RX ADMIN — SILODOSIN 4 MG: 4 CAPSULE ORAL at 09:02

## 2021-02-18 RX ADMIN — INSULIN ASPART 12 UNITS: 100 INJECTION, SOLUTION INTRAVENOUS; SUBCUTANEOUS at 10:02

## 2021-02-18 RX ADMIN — CIPROFLOXACIN HYDROCHLORIDE 500 MG: 500 TABLET, FILM COATED ORAL at 09:02

## 2021-02-18 RX ADMIN — LEVETIRACETAM 2000 MG: 100 SOLUTION ORAL at 09:02

## 2021-02-18 RX ADMIN — CLOPIDOGREL BISULFATE 75 MG: 75 TABLET, FILM COATED ORAL at 09:02

## 2021-02-18 RX ADMIN — LEVOTHYROXINE SODIUM 137 MCG: 0.14 TABLET ORAL at 05:02

## 2021-02-18 RX ADMIN — DOCUSATE SODIUM AND SENNOSIDES 1 TABLET: 8.6; 5 TABLET, FILM COATED ORAL at 09:02

## 2021-02-18 RX ADMIN — Medication 1000 MG: at 09:02

## 2021-02-18 RX ADMIN — DEXAMETHASONE SODIUM PHOSPHATE 4 MG: 4 INJECTION INTRA-ARTICULAR; INTRALESIONAL; INTRAMUSCULAR; INTRAVENOUS; SOFT TISSUE at 06:02

## 2021-02-18 RX ADMIN — SUCRALFATE 1 G: 1 TABLET ORAL at 10:02

## 2021-02-18 RX ADMIN — FAMOTIDINE 20 MG: 20 TABLET, FILM COATED ORAL at 09:02

## 2021-02-18 RX ADMIN — SUCRALFATE 1 G: 1 TABLET ORAL at 05:02

## 2021-02-18 RX ADMIN — POLYETHYLENE GLYCOL 3350 17 G: 17 POWDER, FOR SOLUTION ORAL at 09:02

## 2021-02-18 RX ADMIN — IPRATROPIUM BROMIDE AND ALBUTEROL SULFATE 3 ML: .5; 2.5 SOLUTION RESPIRATORY (INHALATION) at 08:02

## 2021-03-26 LAB — POCT GLUCOSE: 113 MG/DL (ref 70–110)

## 2021-05-03 NOTE — PROGRESS NOTES
[facility]  Neurosurgery  Progress Note    Subjective:     Interval History: About the same. Still slightly confused, complains of headache. Otherwise awake and alert.    History of Present Illness: See consult note. Progressive hydrocephalus following craniotomy for a large right frontal meningioma 3 weeks ago.    Post-Op Info:  * No surgery found *          Medications:  Continuous Infusions:  Scheduled Meds:   amLODIPine  5 mg Oral Daily    ascorbic acid (vitamin C)  500 mg Oral QHS    atorvastatin  10 mg Oral QHS    calcitRIOL  0.25 mcg Oral Daily    calcium carbonate  500 mg Oral QID    cholecalciferol (vitamin D3)  5,000 Units Oral Daily    dexAMETHasone  2 mg Oral Daily    DULoxetine  30 mg Oral Daily    ferrous sulfate  325 mg Oral BID WM    gabapentin  600 mg Oral Daily    levETIRAcetam  1,500 mg Oral BID    levothyroxine  137 mcg Oral Before breakfast    multivitamin  1 tablet Oral Daily    pantoprazole  40 mg Oral Daily     PRN Meds:dextrose 50%, dextrose 50%, famotidine, glucagon (human recombinant), glucose, glucose, HYDROcodone-acetaminophen, insulin aspart U-100, melatonin     Review of Systems   Constitutional: Negative for diaphoresis, fatigue and unexpected weight change.        Headache   HENT: Negative for hearing loss, rhinorrhea, trouble swallowing and voice change.    Eyes: Negative for photophobia and visual disturbance.   Respiratory: Negative for chest tightness and shortness of breath.    Cardiovascular: Negative for chest pain.   Gastrointestinal: Negative for constipation, nausea and vomiting.   Endocrine: Negative for cold intolerance, heat intolerance, polydipsia, polyphagia and polyuria.   Genitourinary: Negative for difficulty urinating.   Musculoskeletal: Negative for back pain, neck pain and neck stiffness.   Skin: Negative.    Allergic/Immunologic: Negative.    Neurological: Positive for weakness and headaches. Negative for dizziness, tremors,  seizures, syncope, facial asymmetry, speech difficulty and numbness.   Hematological: Negative for adenopathy. Does not bruise/bleed easily.   Psychiatric/Behavioral: Positive for confusion. Negative for behavioral problems and decreased concentration.     Objective:     Weight: 117.5 kg (259 lb 0.7 oz)  Body mass index is 37.17 kg/m².  Vital Signs (Most Recent):  Temp: 96.2 °F (35.7 °C) (08/18/20 0746)  Pulse: 68 (08/18/20 0746)  Resp: 18 (08/18/20 0746)  BP: (!) 180/81 (08/18/20 0746)  SpO2: 97 % (08/18/20 0746) Vital Signs (24h Range):  Temp:  [96.2 °F (35.7 °C)-98.1 °F (36.7 °C)] 96.2 °F (35.7 °C)  Pulse:  [60-85] 68  Resp:  [16-20] 18  SpO2:  [92 %-97 %] 97 %  BP: (129-186)/() 180/81                          Physical Exam:  Nursing note and vitals reviewed.    Constitutional: She appears well-developed and well-nourished.     Eyes: Pupils are equal, round, and reactive to light. Conjunctivae and EOM are normal.     Cardiovascular: Normal rate, regular rhythm and normal pulses.     Abdominal: Soft. Bowel sounds are normal.     Skin: Skin displays no rash on trunk and no rash on extremities.     Psych/Behavior: She is alert.   Mood depresssed     Musculoskeletal: Gait is normal and abnormal.        Neck: Range of motion is full. Muscle strength is 5/5. Tone is normal.        Back: Range of motion is full. Muscle strength is 5/5. Tone is normal.        Right Upper Extremities: Range of motion is full. Muscle strength is 5/5. Tone is normal.        Left Upper Extremities: Range of motion is full. Muscle strength is 4/5. Tone is normal.       Right Lower Extremities: Range of motion is full. There is no tenderness. Muscle strength is 5/5.        Left Lower Extremities: Range of motion is full. There is no tenderness. Muscle strength is 4/5.     Neurological:        Coordination: She has a normal Romberg Test, normal finger to nose coordination and normal tandem walking coordination.        Sensory: There is no  sensory deficit in the trunk. There is no sensory deficit in the extremities.        DTRs: Tricep reflexes are 1+ on the right side and 1+ on the left side. Bicep reflexes are 1+ on the right side and 1+ on the left side. Brachioradialis reflexes are 1+ on the right side and 1+ on the left side. Patellar reflexes are 1+ on the right side and 1+ on the left side. Achilles reflexes are 1+ on the right side and 1+ on the left side. She displays no Babinski's sign on the right side. She displays Babinski's sign on the left side.        Cranial nerves: Cranial nerve(s) II, III, IV, V, VI, VII, VIII, IX, X, XI and XII are intact.       Significant Labs:  Recent Labs   Lab 08/17/20  1355 08/18/20  0606   * 144*   * 130*   K 4.0 3.8   CL 92* 94*   CO2 24 25   BUN 16 16   CREATININE 1.0 1.1   CALCIUM 9.5 9.4   MG  --  1.9     Recent Labs   Lab 08/17/20  1355 08/18/20  0606   WBC 14.89* 13.58*   HGB 10.1* 10.0*   HCT 32.7* 32.3*    137*     Recent Labs   Lab 08/17/20  1355   INR 1.0   APTT 24.8     Microbiology Results (last 7 days)     ** No results found for the last 168 hours. **        All pertinent labs from the last 24 hours have been reviewed.  Significant Diagnostics:  CT: Ct Head Without Contrast    Addendum Date: 8/17/2020    Correction: There is mild increase in dilatation of the ventricular system particularly the right temporal horn consistent with developing hydrocephalus. Electronically signed by: Gui Herrera MD Date:    08/17/2020 Time:    16:11    Result Date: 8/17/2020  No detrimental change with expected evolutionary findings of the right frontal lobe intraoperative bed.  Significant decrease in size in near complete resolution of the scalp fluid collection. Electronically signed by: Gui Herrera MD Date:    08/17/2020 Time:    12:02    Assessment/Plan:   Tentatively plan for  shunt Thursday 8/20/20 with assistance of general surgery. Na+ correcting well.  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] 07/31/2020 Yes     Chronic    Type 2 diabetes mellitus with stage 3 chronic kidney disease, without long-term current use of insulin [E11.22, N18.3] 08/17/2020 Yes    Hypothyroidism [E03.9] 08/17/2020 Yes    Frequent falls [R29.6] 08/17/2020 Not Applicable    Hyperlipidemia associated with type 2 diabetes mellitus [E11.69, E78.5] 08/17/2020 Yes    History of CVA in adulthood [Z86.73] 08/17/2020 Not Applicable    Hypocalcemia [E83.51] 08/02/2020 Yes    Anemia due to stage 3 chronic kidney disease [N18.3, D63.1]  Yes    Hyponatremia [E87.1] 07/26/2020 Yes    Hypertension associated with diabetes [E11.59, I10] 07/26/2020 Yes      Problems Resolved During this Admission:    Diagnosis Date Noted Date Resolved POA    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

## 2022-04-09 ENCOUNTER — HISTORICAL (OUTPATIENT)
Dept: ADMINISTRATIVE | Facility: HOSPITAL | Age: 52
End: 2022-04-09
Payer: COMMERCIAL

## 2022-04-25 VITALS
DIASTOLIC BLOOD PRESSURE: 85 MMHG | HEIGHT: 70 IN | BODY MASS INDEX: 41.06 KG/M2 | WEIGHT: 286.81 LBS | SYSTOLIC BLOOD PRESSURE: 104 MMHG

## 2022-04-30 NOTE — DISCHARGE SUMMARY
Patient:   Cata Lang            MRN: [id number]            FIN: [id number]               Age:   49 years     Sex:  Female     :  1970   Associated Diagnoses:   None   Author:   April Oliva      Date of Service: 10/8/2020      Discharge Information      Discharge Summary Information   Date of Admission: 2020  Date of Discharge: 10/8/2020  Admit Diagnosis: Obstructive hydrocephalus         Calcified cerebral meningioma         Diabetes type 2         Seizure disorder         Left mandibular dental carry         CAD         Hyperlipidemia         Hypertension         Vitamin D deficiency         Depression/anxiety         Morbid obesity         CKD stage III         Anemia         Hypothyroidism         Tobacco abuse         Hypocalcemia         Hyponatremia         Cerebrovascular disease         Polyneuropathy         Insomnia         Urinary retention  Discharge Diagnosis: Obstructive hydrocephalus (stable)              Calcified cerebral meningioma (stable)              Diabetes type 2 (stable)              Seizure disorder (stable)              Left mandibular dental carry (resolved)              CAD (stable)              Hyperlipidemia (stable)              Hypertension (stable)              Vitamin D deficiency (stable)              Depression/anxiety (stable)              Morbid obesity (stable)              CKD stage III (stable)              Anemia (stable)              Hypothyroidism (stable)              Tobacco abuse (stable)              Hypocalcemia (stable)              Hyponatremia (stable)              Cerebrovascular disease (stable)              Polyneuropathy (stable)              Insomnia (improved)             Urinary retention (stable)             PCM (stable)              Leukocytosis (improved)             Degenerative disc disease and spondylosis L5S1 (stable)              Constipation (stable)              Acute ischemic CVA (stable)               Orthostatic hypotension (resolved)             3 mm left PCA aneurysm (stable)              Metabolic acidosis (stable)              Hypokalemia (resolved)             Vaginal bleeding (stable)              Hypomagnesemia (resolved)    Internal Medicine (attending): Rogelio Canales MD  Physiatry (consulting): Cole Santacruz MD    OUTPATIENT PROVIDERS  PCP: Sabra Fregoso MD ([place], [place])  Neurology: Gianluca Green MD  Neurosurgery: Jose Hagen MD ([place], [place])  Cardiology: Rashi Santos MD      Hospital Course   48yo female with history of a meningioma s/p craniotomy 7/31/2020.  Past medical history of CVA in 2015 with residual left-sided weakness.  Also has a history of seizure disorder, which is managed by Dr. Green.  Clinic notes have been reviewed.  Most recent seizure was at the beginning of 8/2020 per patient report.  She describes focal seizures with left-sided weakness and numbness that last approximately 10-15 minutes.  No associated loss of consciousness, convulsions, or bowel/bladder incontinence.  Keppra dosage was last increased in April 2020.  Postoperatively, she completed an inpatient rehab stay at [facility].  Reported improvement in mobility and ADLs throughout inpatient rehab stay.  3 days after discharge home, she began to experience dizziness, headaches, and progressive left-sided weakness.  Reported multiple falls.  Neurosurgery evaluated.  CT significant for obstructive hydrocephalus.  Admitted to [facility] and subsequently tolerated  shunt placement on 8/20/2020 with no reported intra-or postoperative complications.  Postop, AAO x3, speech clear, and reported improvement in HA.  Only complained of incisional pain per medical records.  Hospital stay complicated by an apparent hypersensitivity reaction to HCTZ.  She reported difficulty breathing after taking.  Hospital record also noted hyponatremia prompting nephrology consultation  and subsequent 1500 mL fluid restriction.  Records also mention chronic hypocalcemia for which she takes Tums 4 times daily.  Completed x4 days doxycycline 100 mg bid for left dental pain.  Symptoms persist with minimal improvement.  No associated fever or chills.  Otherwise, hospital stay was uneventful.  Ongoing significant generalized weakness requiring maximal assistance with bed-to-wheelchair transfer and sit-to-stand; moderate assistance supine-to-sit and bed mobility.  Stable for transfer to inpatient rehab on 8/26.  Tolerated transfer to [facility] rehab unit on 8/26 without incident.    Throughout inpatient rehab course remained inconsistent in continence of bladder and remained continent of bowel.  On 8/26 complained of left mandibular dental pain s/p doxycycline x5 days with minimal symptomatic improvement.  Initiated Augmentin 875 mg twice daily x10 days.  Monte catheter removed and reinserted on 8/28.  Labs significant for thrombocytopenia on 8/28 likely due to Augmentin.  Resolved throughout hospital stay.  On 8/31 prealbumin trended down with decreased appetite.  Improved with Megace 400 mg twice daily x5 days.  On 9/1 hypertensive and initiated losartan 100 mg daily subsequently complained of orthostatic hypotension on 9/4.  Decreased losartan to 40 mg daily and normal saline 500 mL bolus given with improvement.  Appeared depressed on 9/2.  Initiated Cymbalta 60 mg daily.  Increased left-sided weakness noted on 9/3.  MRI significant for right caudate nucleus acute nonhemorrhagic infarct.  Permissive hypertension x24 hours.  Labs significant for hypercalcemia on 9/7.  Initiated 1 L normal saline at 75 mL/hour and discontinued calcium carbonate.  On 9/8 tachycardia and fever appreciated.  Initiated normal saline at 75 mL/hour x2 L.  Initiated Zosyn 3.375 g every 8 hours x14 days.  Obtained blood cultures x2.  Urine culture significant for >/= 100,000 colonies/ml pseudomonas  aeruginosa.  2 view chest x-ray unremarkable.  CTA head and neck significant for 3 mm left PCA aneurysm.  Discontinued Plavix after discussing recommendations with neurology.  Discontinued indwelling catheter and reinserted on 9/9.  Initiated Flomax 0.4 mg at bedtime.  Consulted ID for continued tachycardia and fever.  Blood cultures with no growth.  Improved with antibiotic course, stop date 9/18.  Leukocytosis improved and ID signed off.  On 9/14 hypocalcemia appreciated.  Initiated previous dose of calcium carbonate 500 mg 3 times daily.  On 9/15 labs significant for metabolic acidosis.  Initiated 1/2 normal saline with 1 amp bicarb at 75 mL/hour once with no improvement.  Initiated sodium bicarb 1300 mg 3 times daily with improvement in metabolic acidosis.  On 9/15 discontinued indwelling catheter.  Constipation on 9/25.  KUB significant for constipation.  Initiated Linzess 145 mcg daily with improvement.  Complaints of postmenopausal vaginal bleeding on 9/22.  Held Eliquis.  Vaginal bleeding improved throughout the course of a week.  H&H remained stable.  Resumed Eliquis on 9/28.  Will follow-up with GYN outpatient.  Throughout hospital course hypokalemia appreciated.  Initiated K. Dur 40 mEq with improvement.  On 10/5 hypomagnesemia appreciated.  Initiated 1 g magnesium.  Throughout hospital course hyponatremia remained stable.  PT reported dependent for sit to stand, bed mobility with moderate assist, and limited motivation.  OT reported minimal assist with upper body dressing and ADLs.  Mod to max assist with lower body dressing.  Plans to go home with  with parents assisting while  works during the day. Vital signs at goal.  Good glycemic control.  Medication reconciliation completed.  Discharge orders initiated.  Stable for transfer home with home health and family care.  Follow-up with scheduled appointments documented below.     Chief Complaint: f/u meningioma s/p craniotomy on 7/31 with  postoperative obstructive hydrocephalus s/p  shunt on 8/20      Physical Examination      Vital Signs (last 24 hrs)_____  Last Charted___________  Temp Oral      36.7 DegC  (OCT 08 10:30)  Heart Rate Peripheral   88 bpm  (OCT 08 10:30)  Resp Rate          18 br/min  (OCT 08 10:30)  SBP      138 mmHg  (OCT 08 10:30)  DBP      78 mmHg  (OCT 08 10:30)  SpO2      98 %  (OCT 08 10:30)     General:  Alert and oriented, No acute distress.    Eye:  Vision unchanged.    HENT:  Right scalp shaven with healing approximated surgical incision - scabbing with no evidence of infection, Left mandibular molar pain to palpation without associated edema or evidence of abscess.    Neck:  Supple.    Respiratory:  Lungs are clear to auscultation, Respirations are non-labored, Breath sounds are equal, Symmetrical chest wall expansion, No chest wall tenderness.    Cardiovascular:  Normal rate, No murmur, Good pulses equal in all extremities, Normal peripheral perfusion, No edema.    Gastrointestinal:  Soft, Non-tender, Normal bowel sounds, Obese.    Musculoskeletal:  Generalized weakness, Chronic left-sided weakness.    Integumentary:  Warm, Intact, Moist, No pallor, No rash, Pressure related injury   Buttock Right Outer Pressure ulcer - Incision, Wound Pressure Ulcer Stage: Suspected deep tissue injury.    Neurologic:  Alert, Oriented, Normal sensory, Cranial Nerves II-XII are grossly intact.    Cognition and Speech:  Oriented, Speech clear and coherent, Functional cognition intact.    Psychiatric:  Cooperative, Appropriate mood & affect, Normal judgment, Non-suicidal.        Results Review   General results   Most recent results   Discrete results only   10/6/2020 5:58 CDT       Sodium Lvl                140 mmol/L                             Potassium Lvl             3.6 mmol/L                             Chloride                  102 mmol/L                             CO2                       24 mmol/L                              Calcium Lvl               7.5 mg/dL  LOW                             Magnesium Lvl             1.80 mg/dL                             Glucose Lvl               128 mg/dL  HI                             BUN                       11.6 mg/dL                             Creatinine                0.77 mg/dL                             eGFR-AA                   >60  NA                             eGFR-MANUELITO                  >60  NA                             Bili Total                0.3 mg/dL                             Bili Direct               0.2 mg/dL                             Bili Indirect             0.10 mg/dL                             AST                       10 unit/L                             ALT                       14 unit/L                             Alk Phos                  166 unit/L  HI                             Total Protein             6.1 gm/dL  LOW                             Albumin Lvl               2.9 gm/dL  LOW                             Globulin                  3.2 gm/dL                             A/G Ratio                 0.9 ratio  LOW        Chest x-ray results   Reported at  9/8/2020 08:03:00, Interpretation:  No active pulmonary disease. Suboptimal lateral projection   Radiology results   X-ray, Cervical spine , Reported at  8/30/2020 13:48:00, Interpretation:  Degenerative disc disease and spondylosis more pronounced at L5-S1.   Radiology results   CT, Without contrast, Head , Reported at  9/21/2020 13:27:00, Interpretation:  No acute intracranial abnormality or significant interval change compared to 9/1/2020.   Radiology results   CT, Without contrast, Head , Reported at  9/1/2020 09:48:00, Interpretation:  Postoperative exam with no comparison. There are right frontal surgical changes. There is a ventriculostomy via a right parietal approach distal tip at right lateral ventricle. No hydrocephalus and no extra-axial fluid collection. There is right frontal encephalomalacia  as well as bifrontal white matter diminished attenuation greater right. There is similar diminished attenuation left occipital predominantly white matter with cortical extension. No midline shift.   Radiology results   CT, CTA head and neck , Reported at  9/8/2020 05:24:00, Interpretation:  No interval changes when compared to the previous CT. No enhancing abnormalities. If present, stenosis of the carotid bulbs is measured based on NASCET criteria, i.e. area of maximal stenosis compared to the cervical ICA distal to the bulb. Cervical CTA: There are mild atherosclerotic calcifications at the origins of the great vessels which remain patent. The common carotid arteries are normal in caliber. There are mild atherosclerotic calcifications at the right carotid bulb with less than 10% stenosis. The internal carotid arteries are normal in caliber. The vertebral arteries are patent and normal in caliber. Intracranial CTA: There are calcifications along the carotid siphons with mild narrowing on the right. The right middle cerebral arteries are diminutive and irregular but patent proximally. The anterior cerebral arteries and left middle cerebral arteries are patent proximally with diffuse irregularity of the middle cerebral arteries. Left vertebral artery is hypoplastic. There are mild calcifications along the right vertebral artery without hemodynamically significant stenosis. The basilar artery is patent and normal in caliber. There is a 3 mm wide neck aneurysm at the confluence of the left posterior cerebral artery and posterior communicating artery. The posterior cerebral arteries are otherwise normal in caliber. The dural venous sinuses are patent.   Radiology results   MRI, Brain , Reported at  9/3/2020 13:06:00, Interpretation:  Right caudate nucleus acute nonhemorrhagic infarct. Right frontal lobe encephalomalacia, gliotic changes, chronic microvascular ischemia and atrophy.   Radiology results   ECHO,  Transthoracic echocardiogram , Reported at  9/8/2020 04:59:00, Interpretation:  Difficult examination due to poor window.  No image enhancer available.  Bubble study nondiagnostic due to poor image quality.  Left ventricular EF estimated at 50%.       Discharge Plan   Discharge Summary Plan   Discharge Status: improved.        Location: Discharge to home with home health     Medications: See discharge medicine reconciliation     Activity: As tolerated     Diet: Diabetic meal plan     Instructions:  Take all medications as prescribed.          Attend appointments as scheduled.          Return to ED if symptoms worsen, or if t > 100.4.     Education: Obstructive hydrocephalus, diabetes type 2, hypocalcemia, 3 mm left PCA aneurysm, acute ischemic CVA     Follow-up: Sabra Fregoso MD on 10/14/2020 at 1015           Sea clare Poon MD on 10/21/2020 at 9 AM           Jose Hagen MD on 10/1/2020 at 1300    Discussed plan of care, and patient communicated understanding. Agreed to comply with recommendations.    Discharge Time: 58 minutes

## 2022-05-02 NOTE — HISTORICAL [facility] CERNER
This is a historical note converted from Nayla. Formatting and pictures may have been removed.  Please reference Nayla for original formatting and attached multimedia. Chief Complaint  obstructive hydrocephalus s/p  shunt 2/2 Craniotomy 2/2 meningioma  Reason for Consultation  Physiatry  History of Present Illness  Admit MD: Rogelio Canales MD  Consult Physiatry: Cole Santacruz MD  HPI: 50yo WF with PMH?of DM, HTN, CKD stage 3, anemia of chronic disease, and CVA in 2015 with?residual left sided weakness?underwent a?craniotomy for meningioma on 7/31.??Discharged from?inpatient rehab with completion.???Three days later,?she began having symptoms of dizziness, headache, and increased left sided weakness. Reported having multiple falls.??Presented for a follow up visit with her surgeon. ?CT scan showed signs of hydrocephalus.??Admitted to the hospital.??Underwent  shunt placement for obstructive hydrocephalus on 8/20. Oral doxycycline noted?potentially from a?dental abscess per MD notes.??Participating with therapy.??Functional status includes Sit-to-stand and bed to WC transfers requiring maximal assistance x 2. Supine-to-sit and bed mobility at moderate assistance. Patient was evaluated, accepted, and admitted to inpatient rehab to improve functional status. Transferred to [facility] on 8/26 without incident.  8/28: Seen in OT gym, seated in WC with PT working? with her on LE exercises. Reported focal seizure like activity with OT prior. OT reports with a lateral stare and physical unable to move during session as if she was stuck in a standing position. She then was able to follow one step commands. Patient reports these episodes continuing to happen since hospitalization. Reports sleeping really well last night. Admits to constant throbbing pain across her frontal craniotomy incision site as well as chronic LBP. Heat pack per PT helpful. Reports good appetite and bowels moving. Slow delayed speech, but using  appropriate humor during conversation. Amb w/RW distance limited by LBP. VSSAF.  ?  Review of Systems  Comprehensive Review of Systems performed with no exceptions other than as noted in HPI.  Barriers to Discharge:  Social:Patient completed schooling at [employer]- she has a certificate in medical records. She is unemployed and has been trying to apply for disability but has been previously denied. ?There is an  actively working on this. ?Denies history of  involvement.?   works full time. ?He can assist with all of her needs WHEN HE IS NOT WORKING. ?He typically sets up medication for patient. ?Patient will be at home alone during the day. ?Patients parents live nearby can assist as needed but are elderly so  tells me they cannot assist much.?  Medical: obstructive hydrocephalus, s/p  shunt,?s/p craniotomy for meningioma, ? seizures, DM, HTN, CKD stage 3, Thyroid dz, anemia of chronic disease, and CVA in 2015 with?residual left sided weakness,?  Functional:  Prior Level of Fx: ?Patient was discharged from [facility] rehab only a few days before this admission. ?Her prior level of function after [facility] stay included independent with mobility. ?She was taking meds with them being set up for her. ?Before ALL of her surgeries she was more independent but driving seldomly.?  Residence: ?Patient lives with spouse and son (17 year old) in [place]. They live in a single-story home with a ramp to gain entry. ?She uses a TUB only. *does not have elevated toilet  DME: ?Patient has the following DME: rolling walker, motorized scooter, and bedside commode.?  Psychiatric:?Hx mental health/substance abuse: ?Denies history of mental health problems / Current everyday smoker per chart  ?  ?  Physical Exam  Vitals & Measurements  T:?36.5? ?C (Oral)? TMIN:?36.4? ?C (Oral)? TMAX:?36.5? ?C (Oral)? HR:?90(Peripheral)? RR:?20? BP:?143/83? SpO2:?100%?  General:?well-developed, obese, in no acute distress  Eye: EOMI,  clear conjunctiva, eyelids normal, glasses  HENT:?normocephalic,?Left mandibular molar pain to palpation without associated edema or evidence of abscess. ?  Neck: full range of motion, supple  Respiratory:?clear to auscultation bilaterally  Cardiovascular:?regular rate and rhythm without murmurs, no edema  Gastrointestinal:?soft, non-tender, non-distended with normal bowel sounds, without masses to palpation  Musculoskeletal:?full range of motion of all extremities/spine without limitation or discomfort  Integumentary: no rashes or skin lesions present, right scalp shaven, ?right frontal crani incision-healing with formed scabs, Buttock Right Outer Pressure ulcer - Incision, Wound Pressure Ulcer Stage: Suspected deep tissue injury.  Neurologic: cranial nerves grossly intact, delayed, focal seizures?, appropriate mood, slo movements  ?  Assessment/Plan  1.?Obstructive hydrocephalus?G91.1  2.?S/P ventriculoperitoneal shunt?Z98.2  3.?Calcified cerebral meningioma?D32.0  4.?Weakness?R53.1  5.?S/P craniotomy?Z98.890  6.?Seizure disorder?G40.909  7.?DMII (diabetes mellitus, type 2)?E11.9  8.?Dental caries?K02.9  9.?CAD (coronary artery disease)?I25.10  10.?HLD (hyperlipidemia)?E78.5  11.?HTN (hypertension)?I10  12.?Vitamin D deficiency?E55.9  13.?Depression?F32.9  14.?Morbid obesity?E66.01  15.?Tobacco user?Z72.0  16.?Iron deficiency?E61.1  17.?Hypothyroidism?E03.9  18.?S/P complete thyroidectomy?E89.0  19.?Hypocalcemia?E83.51  20.?Hyponatremia?E87.1  21.?History of CVA in adulthood?Z86.73  22.?Polyneuropathy?G62.9  23.?Insomnia?G47.00  24.?Urinary retention?R33.9  25.?Protein calorie malnutrition?E46  26.?Leukocytosis?D72.829  27.?Thrombocytopenia?D69.6  28.?DARIN (obstructive sleep apnea)?G47.33  Orders:  MD to Nurse, 08/28/20 14:11:00 CDT, Obtain person photo as well as wound pictures- Crani,  shunt, pressure ulcer, dental abcsess if able  Pain:? ?chronic LBP-denies radiation, HA-frontal (throbbing)  DULoxetine  (Cymbalta)30 mg, form: Cap-DR, Oral, Daily  gabapentin 300 mg oral capsule)600 mg, form: Cap, Oral, qPM, first dose 08/26/20 21:00  acetaminophen 650 mg, form: Tab, Oral, BID PRN for pain, mild  acetaminophen-[id number]/325mg, form: Tab, Oral, q4hr PRN for pain, moderate  Wounds:?right scalp shaven, ?right frontal crani incision-healing with formed scabs, Buttock Right Outer Pressure ulcer - Incision, Wound Pressure Ulcer Stage: Suspected deep tissue injury.  s/p craniotomy on 7/31/2020  s/p  shunt 8/20/2020  Precautions:?seizure. fallrisk??  Bracing: none  Bowels/Bladder: last BM 8/26? ?  Swallowing:?Diabetic Diet with PROSource BID. Fluids 1500mL??  Sleep:?really good last night. Impoving??  Depression/Anxiety: not noted  DULoxetine (Cymbalta)30 mg, form: Cap-DR, Oral, Daily  Function: Tolerating therapy. Continue PT/OT/RT  VTE Prophylaxis:?  apixaban 5 mg oral tablet)5 mg, form: Tab, Oral, BID  clopidogrel 75 mg oral tablet)75 mg, form: Tab, Oral, Daily  aspirin 81 mg oral tablet, CHEWABLE)81 mg, form: Tab-Chew, Oral, Daily  Code Status:? FULL CODE?  Discharge:? Patient lives with spouse and son (17 year old) in [place]. They live in a single-story home with a ramp to gain entry.??She is unemployed and has been trying to apply for disability but has been previously denied. ?There is an  actively working on this. ?Denies history of  involvement.? works full time. ?He can assist with all of her needs WHEN HE IS NOT WORKING. ?He typically sets up medication for patient. ?Patient will be at home alone during the day. ?Patients parents live nearby can assist as needed but are elderly so  tells me they cannot assist much.?Date pending.  ?   Problem List/Past Medical History  Ongoing  CAD (coronary artery disease)  DMII (diabetes mellitus, type 2)  Heart attack  HLD (hyperlipidemia)  HTN (hypertension)  Hypocalcemia  Morbid obesity  Seizure  Seizure disorder  Stroke  Historical  No  qualifying data  Procedure/Surgical History  Colonoscopy through stoma; with biopsy, single or multiple (12/17/2019)  Colonoscopy, flexible; with biopsy, single or multiple (12/17/2019)   Medications  Inpatient  acetaminophen, 650 mg= 2 tab(s), Oral, BID, PRN  acetaminophen-HYDROcodone, 5mg/325mg, Oral, q4hr, PRN  apixaban 5 mg oral tablet, 5 mg= 1 tab(s), Oral, BID  ascorbic acid 500 mg oral tablet, 500 mg= 1 tab(s), Oral, Daily  aspirin 81 mg oral tablet, CHEWABLE, 81 mg= 1 tab(s), Oral, Daily  atorvastatin 10 mg oral tablet, 10 mg= 1 tab(s), Oral, Daily  Augmentin 875 mg-125 mg oral tablet, 875 mg= 1 tab(s), Oral, BIDWMeal  calcitriol 0.25 mcg oral capsule, 0.25 mcg= 1 cap(s), Oral, Daily  calcium (as carbonate) 500 mg oral tablet, chewable, 500 mg= 1 tab(s), Oral, QID  clopidogrel 75 mg oral tablet, 75 mg= 1 tab(s), Oral, Daily  Colace 100 mg oral capsule, 100 mg= 1 cap(s), Oral, BID  Cymbalta, 30 mg= 1 cap(s), Oral, Daily  Dextrose 50% and Water (50 mL vial/syringe), 12.5 gm= 25 mL, IV Push, Once, PRN  Dextrose 50% and Water (50 mL vial/syringe), 12.5 gm= 25 mL, IV Push, As Directed, PRN  Dextrose 50% and Water (50 mL vial/syringe), 25 gm= 50 mL, IV Push, As Directed, PRN  Dextrose 50% in Water intravenous solution, 12.5 gm= 25 mL, IV Push, As Directed, PRN  Dulcolax Laxative 10 mg RECTAL suppository, 10 mg= 1 supp, CA (rectal), q3day, PRN  ferrous sulfate 325 mg (65 mg elemental iron) oral enteric coated tablet, 325 mg= 1 tab(s), Oral, BID  gabapentin 300 mg oral capsule, 600 mg= 2 cap(s), Oral, qPM  glucagon, 1 mg= 1 EA, IM, q10min, PRN  glucagon, 1 mg= 1 EA, IM, q10min, PRN  insulin lispro, 2-14 units, Subcutaneous, As Directed, PRN  Keppra, 1500 mg= 3 tab(s), Oral, BID  lactulose 10 g/15 mL oral syrup, 20 gm= 30 mL, Oral, Every other day, PRN  [facility] melatonin 3 mg oral tablet, 6 mg= 2 tab(s), Oral, At Bedtime, PRN  levothyroxine 137 mcg (0.137 mg) oral tablet, 137 mcg= 5.48 tab(s), Oral, Daily  metformin 500  mg oral tablet, 500 mg= 1 tab(s), Oral, BID  MVI with Minerals (Adult Tab), 1 tab(s), Oral, Daily  Vitamin D3 1000 intl units (25 mcg) oral tablet, 5000 units= 5 tab(s), Oral, Daily  Home  amLODIPine, 5 mg, Oral, Daily  apixaban 5 mg oral tablet, 5 mg= 1 tab(s), Oral, BID  ascorbic acid 500 mg oral capsule, 500 mg= 1 cap(s), Oral, Daily  aspirin 81 mg oral tablet, 81 mg= 1 tab(s), Oral, Daily  atorvastatin 10 mg oral tablet, 10 mg= 1 tab(s), Oral, Daily  calcitriol 0.25 mcg oral capsule, 0.25 mcg= 1 cap(s), Oral, Daily  calcium carbonate, 500 mg, Oral, QID  cholecalciferol 5000 intl units (125 mcg) oral tablet, 5000 IntUnit= 1 tab(s), Oral, Daily  clopidogrel 75 mg oral tablet, 75 mg= 1 tab(s), Oral, Daily,? ?Not taking  Cymbalta, 30 mg, Oral, Daily  doxycycline 100 mg oral tab, 100 mg, Oral, BID  ergocalciferol 50,000 intl units (1.25 mg) oral capsule, 46658 IntUnit= 1 cap(s), Oral, qWeek,? ?Not taking  ferrous sulfate 325 mg (65 mg elemental iron) oral delayed release tablet, 325 mg= 1 tab(s), Oral, BID  gabapentin 600 mg oral tablet, 600 mg= 1 tab(s), Oral, qPM  hydrochlorothiazide-losartan 12.5 mg-100 mg oral tablet, 1 tab(s), Oral, Daily,? ?Not taking  Keppra 1000 mg oral tablet, 1500 mg= 1.5 tab(s), Oral, BID, 11 refills  levothyroxine 137 mcg (0.137 mg) oral tablet, 137 mcg= 1 tab(s), Oral, Daily  metformin 500 mg oral tablet, 500 mg= 1 tab(s), Oral, BID  multivitamin with minerals (Adult Tab), 1 tab(s), Oral, Daily  Norco 5 mg-325 mg oral tablet, 1 tab(s), Oral, q4hr, PRN  zolpidem 5 mg oral tablet, 5 mg= 1 tab(s), Oral, qPM,? ?Still taking, not as prescribed: 10mg q HS PRN  Allergies  No Known Medication Allergies  Social History  Abuse/Neglect  No, No, Yes, 08/26/2020  No, 02/17/2020  Alcohol  Past, 1-2 times per year, 01/14/2020  Substance Use  Never, 01/14/2020  Tobacco  10 or more cigarettes (1/2 pack or more)/day in last 30 days, No, 08/26/2020  4 or less cigarettes(less than 1/4 pack)/day in last 30  days, Electronic Device, N/A, 13 Years (Age started)., 02/17/2020  Family History  Stroke: Father.  Lab Results  Test Name Test Result Date/Time   Sodium Lvl 132 mmol/L (Low) 08/28/2020 05:30 CDT   Potassium Lvl 3.4 mmol/L (Low) 08/28/2020 05:30 CDT   Chloride 95 mmol/L (Low) 08/28/2020 05:30 CDT   CO2 24 mmol/L 08/28/2020 05:30 CDT   Calcium Lvl 8.7 mg/dL 08/28/2020 05:30 CDT   Magnesium Lvl 2.00 mg/dL 08/28/2020 05:30 CDT   Glucose Lvl 140 mg/dL (High) 08/28/2020 05:30 CDT   BUN 14.0 mg/dL 08/28/2020 05:30 CDT   Creatinine 0.74 mg/dL 08/28/2020 05:30 CDT   BUN/Creat Ratio 19 08/28/2020 05:30 CDT   eGFR-AA >60 08/28/2020 05:30 CDT   eGFR-MANUELITO >60 08/28/2020 05:30 CDT   TSH 2.5992 uIU/mL 08/28/2020 05:30 CDT   WBC 15.0 x10(3)/mcL (High) 08/28/2020 05:30 CDT   RBC 3.86 x10(6)/mcL (Low) 08/28/2020 05:30 CDT   Hgb 10.1 gm/dL (Low) 08/28/2020 05:30 CDT   Hct 31.7 % (Low) 08/28/2020 05:30 CDT   Platelet 125 x10(3)/mcL (Low) 08/28/2020 05:30 CDT   MCV 82.1 fL 08/28/2020 05:30 CDT   MCH 26.2 pg (Low) 08/28/2020 05:30 CDT   MCHC 31.9 gm/dL (Low) 08/28/2020 05:30 CDT   RDW 16.3 % 08/28/2020 05:30 CDT   MPV 11.7 fL (High) 08/28/2020 05:30 CDT   Abs Neut 11.33 x10(3)/mcL (High) 08/28/2020 05:30 CDT   Neutro Auto 75.3 % (High) 08/28/2020 05:30 CDT   Lymph Auto 15.7 % (Low) 08/28/2020 05:30 CDT   Mono Auto 4.9 % 08/28/2020 05:30 CDT   Eos Auto 1.9 % 08/28/2020 05:30 CDT   Abs Eos 0.28 x10(3)/mcL 08/28/2020 05:30 CDT   Basophil Auto 0.4 % 08/28/2020 05:30 CDT   Abs Neutro 11.33 x10(3)/mcL (High) 08/28/2020 05:30 CDT   Abs Lymph 2.37 x10(3)/mcL 08/28/2020 05:30 CDT   Abs Mono 0.74 x10(3)/mcL 08/28/2020 05:30 CDT   Abs Baso 0.06 x10(3)/mcL 08/28/2020 05:30 CDT   NRBC% 0.0 % 08/28/2020 05:30 CDT   IG% 1.800 % (High) 08/28/2020 05:30 CDT   IG# 0.2700 (High) 08/28/2020 05:30 CDT   Diagnostic Results  US, BL LE NIVA , Reported at ?8/20/2020 22:22:00, Interpretation: ?No evidence of DVT in BL LE? [1]     [1]?Rehab Admission H&P; Parker CARDENAS,  Rogelio Cooney 08/26/2020 19:47 CDT   8/28/20  observed and evaluated patient during Speech therapy  working on increasing functional communication and cognition  participating and showing signs of progress  reviewed chart  discussed case with therapy, NP, SW and nursing staff  agree with present course and plan as outlined above

## 2022-05-02 NOTE — HISTORICAL [facility] CERNER
This is a historical note converted from Nayla. Formatting and pictures may have been removed.  Please reference Nayla for original formatting and attached multimedia. ?  Chief Complaint:?f/u meningioma s/p craniotomy on 7/31 with postoperative obstructive hydrocephalus s/p  shunt on 8/20  Date of Service: 9/10/2020?  ?  Basic Information?  Admit Information: 48yo female with history of a meningioma s/p craniotomy 7/31/2020. ?Past medical history of CVA in 2015 with residual left-sided weakness. ?Also has a history of seizure disorder, which is managed by Dr. Green. ?Clinic notes have been reviewed. ?Most recent seizure was at the beginning of 8/2020 per patient report. ?She describes focal seizures with left-sided weakness and numbness that last approximately 10-15 minutes. ?No associated loss of consciousness, convulsions, or bowel/bladder incontinence. ?Keppra dosage was last increased in April 2020. ?Postoperatively, she completed an inpatient rehab stay at [facility]. ?Reported improvement in mobility and ADLs throughout inpatient rehab stay. ?3 days after discharge home, she began to experience dizziness, headaches, and progressive left-sided weakness. ?Reported multiple falls. ?Neurosurgery evaluated. ?CT significant for obstructive hydrocephalus. ?Admitted to [facility] and subsequently tolerated  shunt placement on 8/20/2020 with no reported intra-or postoperative complications. ?Postop, AAO x3, speech clear, and reported improvement in HA. ?Only complained of incisional pain per medical records. ?Hospital stay complicated by an apparent hypersensitivity reaction to HCTZ. ?She reported difficulty breathing after taking. ?Hospital record also noted hyponatremia prompting nephrology consultation and subsequent 1500 mL fluid restriction. ?Records also mention chronic hypocalcemia for which she takes Tums 4 times daily. ?Completed x4 days doxycycline 100 mg bid  for left dental pain. ?Symptoms persist with minimal improvement. ?No associated fever or chills. ?Otherwise, hospital stay was uneventful. ?Ongoing significant generalized weakness requiring maximal assistance with bed-to-wheelchair transfer and sit-to-stand; moderate assistance supine-to-sit and bed mobility. ?Stable for transfer to inpatient rehab on 8/26. ?Tolerated transfer to [facility] rehab unit on 8/26 without incident.  Todays Information: No acute events overnight. ?Sitting on side of bed working with therapy comfortably. ?Reports better sleep and appetite. ?Last BM 9/9. ?CBGs at goal. ?Vital signs at goal with no recent recorded fevers. ?Labs reviewed, Corrected calcium 9.2, albumin 2.7, WBC 13.4, H&H 8.4/27, otherwise unremarkable. ?No new imaging today.? [1]  Subjective  HPI: 48yo WF with PMH?of DM, HTN, CKD stage 3, anemia of chronic disease, and CVA in 2015 with?residual left sided weakness?underwent a?craniotomy for meningioma on 7/31.??Discharged from?inpatient rehab with completion.???Three days later,?she began having symptoms of dizziness, headache, and increased left sided weakness. Reported having multiple falls.??Presented for a follow up visit with her surgeon. ?CT scan showed signs of hydrocephalus.??Admitted to the hospital.??Underwent  shunt placement for obstructive hydrocephalus on 8/20. Oral doxycycline noted?potentially from a?dental abscess per MD notes.??Participating with therapy.??Functional status includes Sit-to-stand and bed to WC transfers requiring maximal assistance x 2. Supine-to-sit and bed mobility at moderate assistance. Patient was evaluated, accepted, and admitted to inpatient rehab to improve functional status. Transferred to [facility] on 8/26 without incident.  9/11: Seen with OT, in patient room, rolling from side to side to place Leticia lift beneath patient. 2 person assist. Patient states that she is in a good mood today because she knows  her  is coming. Smiling and laughing with staff. Greets me by name. She has been extremely depressed and resistant to therapy prior to today. Reports good sleep. Appetite improving and she ate all of her breakfast. Reports having a very large BM yesterday (incontinent). Having LBP as well as knee pain and hip pain. Reports ACE wrap to left knee yesterday with PT was helpful.? Order Neoprene knee brace. Not safe for Ambulation currently. VSSAF.  ?Review of Systems [2]  Patient was seen today.? Her mood has improved she is smiling today.? She has started working with therapy as well and states that she wants to do it.? She states she started the Cymbalta since she has been at the hospital and she thinks it is starting to work.  ? She is , has 2 kids, completed 12th grade, not working.? She denies any alcohol or drug use.? She does admit to depression and anxiety do her to her medical conditions.? Sleep and appetite are fair.? She denies any hallucinations or paranoia.? She denies any suicidal or homicidal ideations.? She admits to a suicide attempt in 2005 where she ended up in remaining in hospital after an overdose.? She does not see a psychiatrist at this present time but was seeing Arik in [place].  ?  Physical Exam  Vitals & Measurements  T:?36.6? ?C (Oral)? TMIN:?36.6? ?C (Oral)? TMAX:?36.7? ?C (Oral)? HR:?84(Peripheral)? BP:?114/61? SpO2:?97%?  Assessment/Plan  1.?Obstructive hydrocephalus?G91.1  2.?S/P ventriculoperitoneal shunt?Z98.2  3.?Calcified cerebral meningioma?D32.0  4.?Weakness?R53.1,?Left-sided weakness?R53.1  5.?S/P craniotomy?Z98.890  6.?Seizure disorder?G40.909  7.?DMII (diabetes mellitus, type 2)?E11.9  8.?Dental caries?K02.9  9.?CAD (coronary artery disease)?I25.10  10.?HLD (hyperlipidemia)?E78.5  11.?HTN (hypertension)?I10  12.?Vitamin D deficiency?E55.9  13.?Depression?F32.9  14.?Morbid obesity?E66.01  15.?Tobacco user?Z72.0  16.?Iron  deficiency?E61.1  17.?Hypothyroidism?E03.9  18.?S/P complete thyroidectomy?E89.0  19.?Hypocalcemia?E83.51  20.?Hyponatremia?E87.1  21.?History of CVA in adulthood?Z86.73  22.?Polyneuropathy?G62.9  23.?Insomnia?G47.00  24.?Urinary retention?R33.9  25.?Protein calorie malnutrition?E46  26.?Leukocytosis?D72.829  27.?Thrombocytopenia?D69.6  28.?DARIN (obstructive sleep apnea)?G47.33  29.?Back pain?M54.9  30.?DDD (degenerative disc disease), lumbar?M51.36  31.?Spondylosis?M47.9  32.?Facet arthropathy?M47.819  33.?Ischemic cerebrovascular accident (CVA)?I63.9  ?  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 Continue with current medications patient reports that Cymbalta is starting to work and she wants to participate in therapy  ?follow up with psychiatric provider   Problem List/Past Medical History  Ongoing  CAD (coronary artery disease)  DMII (diabetes mellitus, type 2)  Heart attack  HLD (hyperlipidemia)  HTN (hypertension)  Hypocalcemia  Morbid obesity  Seizure  Seizure disorder  Stroke  Historical  No qualifying data  Procedure/Surgical History  Colonoscopy through stoma; with biopsy, single or multiple (12/17/2019)  Colonoscopy, flexible; with biopsy, single or multiple (12/17/2019)   Medications  Inpatient  acetaminophen, 650 mg= 2 tab(s), Oral, BID, PRN  acetaminophen-HYDROcodone, 1 tab(s), Oral, q4hr, PRN  apixaban 5 mg oral tablet, 5 mg= 1 tab(s), Oral, BID  aspirin 81 mg oral tablet, CHEWABLE, 81 mg= 1 tab(s), Oral, Daily  atorvastatin 10 mg oral tablet, 10 mg= 1 tab(s), Oral, Daily  Biofreeze 4% topical gel, 2 susan, TOP, TID, PRN  Biofreeze 4% topical gel, 1 susan, TOP, TID, PRN  Carafate, 1 gm= 1 tab(s), Oral, [id number]  Colace 100 mg oral capsule, 100 mg= 1 cap(s), Oral, BID  Cymbalta, 60 mg= 2 cap(s), Oral, Daily  Dextrose 50% and Water (50 mL vial/syringe), 12.5 gm= 25 mL, IV Push, Once, PRN  Dextrose 50% and Water (50 mL vial/syringe), 12.5 gm= 25 mL, IV Push, As Directed,  PRN  Dextrose 50% and Water (50 mL vial/syringe), 25 gm= 50 mL, IV Push, As Directed, PRN  Dextrose 50% in Water intravenous solution, 12.5 gm= 25 mL, IV Push, As Directed, PRN  Dulcolax Laxative 10 mg RECTAL suppository, 10 mg= 1 supp, DE (rectal), q3day, PRN  ferrous sulfate 325 mg (65 mg elemental iron) oral enteric coated tablet, 325 mg= 1 tab(s), Oral, BID  gabapentin 300 mg oral capsule, 600 mg= 2 cap(s), Oral, qPM  glucagon, 1 mg= 1 EA, IM, q10min, PRN  glucagon, 1 mg= 1 EA, IM, q10min, PRN  hydrALAZINE, 10 mg= 0.5 mL, IV Push, q4hr, PRN  insulin lispro, 2-14 units, Subcutaneous, As Directed, PRN  Keppra, 1500 mg= 3 tab(s), Oral, BID  labetalol, 10 mg= 2 mL, IV Push, q10min, PRN  lactulose 10 g/15 mL oral syrup, 20 gm= 30 mL, Oral, Every other day, PRN  [facility] melatonin 3 mg oral tablet, 9 mg= 3 tab(s), Oral, At Bedtime  levothyroxine, 0.112 mg= 1 tab(s), Oral, Daily  levothyroxine 25 mcg (0.025 mg) oral tablet, 0.025 mg= 1 tab(s), Oral, Daily  Lidoderm 5% topical film, 1 patch(es), TD, q24hr  Lidoderm 5% topical film, 1 patch(es), TD, q24hr  metformin 500 mg oral tablet, 500 mg= 1 tab(s), Oral, BID  MiraLax (polyethylene glycol 3350), 17 gm= 1 packet(s), Oral, Daily  Protonix, 20 mg= 1 tab(s), Oral, BID  Tylenol, 650 mg= 20.3 mL, Oral, q6hr, PRN  valsartan 80 mg oral tablet, 40 mg= 0.5 tab(s), Oral, Daily  Zofran 2 mg/mL injectable solution, 2 mg= 1 mL, IV Push, q4hr, PRN  Zosyn 3.375 gm (for IVPB)  Home  amLODIPine, 5 mg, Oral, Daily  apixaban 5 mg oral tablet, 5 mg= 1 tab(s), Oral, BID  ascorbic acid 500 mg oral capsule, 500 mg= 1 cap(s), Oral, Daily  aspirin 81 mg oral tablet, 81 mg= 1 tab(s), Oral, Daily  atorvastatin 10 mg oral tablet, 10 mg= 1 tab(s), Oral, Daily  calcitriol 0.25 mcg oral capsule, 0.25 mcg= 1 cap(s), Oral, Daily  calcium carbonate, 500 mg, Oral, QID  cholecalciferol 5000 intl units (125 mcg) oral tablet, 5000 IntUnit= 1 tab(s), Oral, Daily  clopidogrel 75 mg oral tablet, 75 mg= 1 tab(s),  Oral, Daily,? ?Not taking  Cymbalta, 30 mg, Oral, Daily  doxycycline 100 mg oral tab, 100 mg, Oral, BID  ergocalciferol 50,000 intl units (1.25 mg) oral capsule, 33602 IntUnit= 1 cap(s), Oral, qWeek,? ?Not taking  ferrous sulfate 325 mg (65 mg elemental iron) oral delayed release tablet, 325 mg= 1 tab(s), Oral, BID  gabapentin 600 mg oral tablet, 600 mg= 1 tab(s), Oral, qPM  hydrochlorothiazide-losartan 12.5 mg-100 mg oral tablet, 1 tab(s), Oral, Daily,? ?Not taking  Keppra 1000 mg oral tablet, 1500 mg= 1.5 tab(s), Oral, BID, 11 refills  levothyroxine 137 mcg (0.137 mg) oral tablet, 137 mcg= 1 tab(s), Oral, Daily  metformin 500 mg oral tablet, 500 mg= 1 tab(s), Oral, BID  multivitamin with minerals (Adult Tab), 1 tab(s), Oral, Daily  Norco 5 mg-325 mg oral tablet, 1 tab(s), Oral, q4hr, PRN  zolpidem 5 mg oral tablet, 5 mg= 1 tab(s), Oral, qPM,? ?Still taking, not as prescribed: 10mg q HS PRN  Allergies  No Known Medication Allergies  Social History  Abuse/Neglect  No, No, Yes, 08/26/2020  No, 02/17/2020  Alcohol  Past, 1-2 times per year, 01/14/2020  Substance Use  Never, 01/14/2020  Tobacco  10 or more cigarettes (1/2 pack or more)/day in last 30 days, No, 08/26/2020  4 or less cigarettes(less than 1/4 pack)/day in last 30 days, Electronic Device, N/A, 13 Years (Age started)., 02/17/2020  Family History  Stroke: Father.     [1]?Rehab Progress Note; April Oliva 09/10/2020 17:40 CDT  [2]?PMR Progress/SOAP Note; Mery Stokes 09/11/2020 10:58 CDT
